# Patient Record
Sex: MALE | Race: WHITE | NOT HISPANIC OR LATINO | Employment: OTHER | URBAN - NONMETROPOLITAN AREA
[De-identification: names, ages, dates, MRNs, and addresses within clinical notes are randomized per-mention and may not be internally consistent; named-entity substitution may affect disease eponyms.]

---

## 2020-12-19 ENCOUNTER — HOSPITAL ENCOUNTER (EMERGENCY)
Facility: HOSPITAL | Age: 85
Discharge: HOME | End: 2020-12-19
Attending: EMERGENCY MEDICINE
Payer: MEDICARE

## 2020-12-19 VITALS
HEIGHT: 69 IN | WEIGHT: 205 LBS | BODY MASS INDEX: 30.36 KG/M2 | DIASTOLIC BLOOD PRESSURE: 88 MMHG | HEART RATE: 84 BPM | RESPIRATION RATE: 14 BRPM | OXYGEN SATURATION: 98 % | SYSTOLIC BLOOD PRESSURE: 155 MMHG | TEMPERATURE: 98 F

## 2020-12-19 DIAGNOSIS — S39.012A LUMBAR STRAIN, INITIAL ENCOUNTER: Primary | ICD-10-CM

## 2020-12-19 DIAGNOSIS — R52 PAIN: ICD-10-CM

## 2020-12-19 LAB
BACTERIA #/AREA URNS HPF: NEGATIVE /HPF
BILIRUB UR QL STRIP: NEGATIVE
CLARITY UR: CLEAR
COLOR UR: YELLOW
GLUCOSE UR QL STRIP: NEGATIVE
HGB UR QL STRIP: ABNORMAL
HYALINE CASTS #/AREA URNS LPF: 2 /LPF
KETONES UR QL STRIP: ABNORMAL
LEUKOCYTE ESTERASE UR QL STRIP: NEGATIVE
MICROSCOPIC COMMENT: ABNORMAL
NITRITE UR QL STRIP: NEGATIVE
PH UR STRIP: 6 [PH] (ref 5–8)
PROT UR QL STRIP: ABNORMAL
RBC #/AREA URNS HPF: 12 /HPF (ref 0–4)
SP GR UR STRIP: 1.02 (ref 1–1.03)
SQUAMOUS #/AREA URNS HPF: 1 /HPF
URN SPEC COLLECT METH UR: ABNORMAL
UROBILINOGEN UR STRIP-ACNC: 1 EU/DL
WBC #/AREA URNS HPF: 2 /HPF (ref 0–5)

## 2020-12-19 PROCEDURE — 25000003 PHARM REV CODE 250: Performed by: EMERGENCY MEDICINE

## 2020-12-19 PROCEDURE — 99284 EMERGENCY DEPT VISIT MOD MDM: CPT | Mod: 25

## 2020-12-19 PROCEDURE — 81000 URINALYSIS NONAUTO W/SCOPE: CPT

## 2020-12-19 RX ORDER — NAPROXEN 375 MG/1
375 TABLET ORAL 2 TIMES DAILY WITH MEALS
Qty: 10 TABLET | Refills: 1 | Status: SHIPPED | OUTPATIENT
Start: 2020-12-19 | End: 2021-05-26

## 2020-12-19 RX ORDER — NAPROXEN 500 MG/1
500 TABLET ORAL
Status: COMPLETED | OUTPATIENT
Start: 2020-12-19 | End: 2020-12-19

## 2020-12-19 RX ADMIN — NAPROXEN 500 MG: 500 TABLET ORAL at 10:12

## 2020-12-20 NOTE — DISCHARGE INSTRUCTIONS
Compression fracture of the L3 vertebra appears stable.  Rest, avoid heavy lifting, see your doctor as needed.

## 2020-12-20 NOTE — ED PROVIDER NOTES
Encounter Date: 12/19/2020       History     Chief Complaint   Patient presents with    Hip Pain     I think I pulled something in my lower back or hip two days ago.    Back Pain     He is generally active and vigorous, has been doing yard work but no specific heavy lifting or trauma.  No fall.  Feels that he pulled something in his left low back and hip region, painful for 2 or 3 days.  Some relief with Advil and Tylenol.  Stable chronic urinary frequency and nocturia without change.  No radicular symptoms and no weakness.  No nausea, vomiting, fever, flank pain, or other complaints.  Here with his wife.  Has chronic stable dizziness and chronic known heart murmur    The history is provided by the patient and the spouse. No  was used.     Review of patient's allergies indicates:  No Known Allergies  Past Medical History:   Diagnosis Date    Hyperchloremia      Past Surgical History:   Procedure Laterality Date    SHOULDER SURGERY       History reviewed. No pertinent family history.  Social History     Tobacco Use    Smoking status: Never Smoker   Substance Use Topics    Alcohol use: Yes    Drug use: Never     Review of Systems   Constitutional: Negative for chills and fever.   HENT: Negative for congestion, facial swelling, nosebleeds and sinus pressure.    Eyes: Negative for pain and redness.   Respiratory: Negative for chest tightness, shortness of breath and wheezing.    Cardiovascular: Negative for chest pain, palpitations and leg swelling.   Gastrointestinal: Negative for abdominal distention, abdominal pain, diarrhea, nausea and vomiting.   Endocrine: Negative for cold intolerance, polydipsia and polyphagia.   Genitourinary: Positive for frequency. Negative for difficulty urinating, dysuria and hematuria.   Musculoskeletal: Positive for arthralgias and back pain. Negative for myalgias and neck pain.        See HPI   Skin: Negative for color change and rash.   Neurological: Negative  for dizziness, weakness, numbness and headaches.   Hematological: Negative for adenopathy. Does not bruise/bleed easily.   Psychiatric/Behavioral: Negative for agitation and behavioral problems.   All other systems reviewed and are negative.      Physical Exam     Initial Vitals [12/19/20 1949]   BP Pulse Resp Temp SpO2   (!) 160/99 90 16 98 °F (36.7 °C) 95 %      MAP       --         Physical Exam    Nursing note and vitals reviewed.  Constitutional: He appears well-developed and well-nourished. He is not diaphoretic. No distress.   HENT:   Head: Normocephalic and atraumatic.   Mouth/Throat: Oropharynx is clear and moist. No oropharyngeal exudate.   Eyes: Conjunctivae and EOM are normal. Pupils are equal, round, and reactive to light. Right eye exhibits no discharge. Left eye exhibits no discharge. No scleral icterus.   Neck: Normal range of motion. Neck supple. No thyromegaly present. No tracheal deviation present. No JVD present.   Cardiovascular: Normal rate and regular rhythm. Exam reveals no gallop and no friction rub.    No murmur heard.  2-3/6 systolic murmur widely radiating   Pulmonary/Chest: Breath sounds normal. No respiratory distress. He has no wheezes. He has no rhonchi. He has no rales. He exhibits no tenderness.   Abdominal: Soft. Bowel sounds are normal. He exhibits no distension and no mass. There is no abdominal tenderness. There is no rebound and no guarding.   Musculoskeletal: Normal range of motion. No tenderness or edema.      Comments: Indicates right low lumbar and right posterior hip region generally without point tenderness, effusion, warmth, deformity, or other findings.  Gait and station are normal, range of motion is normal, negative straight leg raise.   Lymphadenopathy:     He has no cervical adenopathy.   Neurological: He is alert and oriented to person, place, and time. He has normal strength. No cranial nerve deficit.   Skin: Skin is warm and dry. No rash noted. No erythema.    Psychiatric: He has a normal mood and affect. His behavior is normal. Judgment and thought content normal.         ED Course   Procedures  Labs Reviewed   URINALYSIS, REFLEX TO URINE CULTURE - Abnormal; Notable for the following components:       Result Value    Protein, UA 2+ (*)     Ketones, UA Trace (*)     Occult Blood UA 2+ (*)     All other components within normal limits    Narrative:     Specimen Source->Urine   URINALYSIS MICROSCOPIC - Abnormal; Notable for the following components:    RBC, UA 12 (*)     Hyaline Casts, UA 2 (*)     All other components within normal limits    Narrative:     Specimen Source->Urine          Imaging Results          CT Lumbar Spine Without Contrast (In process)                X-Ray Lumbar Spine Ap And Lateral (In process)                X-Ray Hip 2 View Left (In process)               X-Rays:   Independently Interpreted Readings:   Other Readings:  Plain films hips - DJD/ NAF; plain films L-spine - age indeterminant L3 compression    CT lumbar spine shows 40% compression of the body of L3, probably subacute, with minimal associated spinal canal stenosis due to retropulsion of bony cortex.  Degenerative changes otherwise, no acute findings.      10:01 PM Stable/ no change/ counseled in detail/ stable for d/c.                                Clinical Impression:     ICD-10-CM ICD-9-CM   1. Lumbar strain, initial encounter  S39.012A 847.2   2. Pain  R52 780.96                      Disposition:   Disposition: Discharged  Condition: Stable     ED Disposition Condition    Discharge Stable        ED Prescriptions     Medication Sig Dispense Start Date End Date Auth. Provider    naproxen (EC-NAPROSYN) 375 MG TbEC EC tablet Take 1 tablet (375 mg total) by mouth 2 (two) times daily with meals. 10 tablet 12/19/2020  Rivera Bangura MD        Follow-up Information     Follow up With Specialties Details Why Contact Info Additional Information    Luis Enrique Guzman Jr., MD Internal  Medicine  As needed [address] [facility] [address]  [phone]       [facility] - Emergency Department Emergency Medicine  As needed [address] [facility] [id number] [phone] Floor 1                                       Rivera Bangura MD  12/19/20 1928

## 2020-12-21 ENCOUNTER — TELEPHONE (OUTPATIENT)
Dept: EMERGENCY MEDICINE | Facility: HOSPITAL | Age: 85
End: 2020-12-21

## 2021-01-20 ENCOUNTER — IMMUNIZATION (OUTPATIENT)
Dept: OBSTETRICS AND GYNECOLOGY | Facility: CLINIC | Age: 86
End: 2021-01-20
Payer: MEDICARE

## 2021-01-20 DIAGNOSIS — Z23 NEED FOR VACCINATION: Primary | ICD-10-CM

## 2021-01-20 PROCEDURE — 91300 COVID-19, MRNA, LNP-S, PF, 30 MCG/0.3 ML DOSE VACCINE: CPT | Mod: PBBFAC | Performed by: ANESTHESIOLOGY

## 2021-02-11 ENCOUNTER — IMMUNIZATION (OUTPATIENT)
Dept: OBSTETRICS AND GYNECOLOGY | Facility: CLINIC | Age: 86
End: 2021-02-11
Payer: MEDICARE

## 2021-02-11 DIAGNOSIS — Z23 NEED FOR VACCINATION: Primary | ICD-10-CM

## 2021-02-11 PROCEDURE — 91300 COVID-19, MRNA, LNP-S, PF, 30 MCG/0.3 ML DOSE VACCINE: CPT | Mod: PBBFAC | Performed by: ANESTHESIOLOGY

## 2021-02-11 PROCEDURE — 0002A COVID-19, MRNA, LNP-S, PF, 30 MCG/0.3 ML DOSE VACCINE: CPT | Mod: PBBFAC | Performed by: ANESTHESIOLOGY

## 2021-05-26 PROBLEM — E55.9 VITAMIN D DEFICIENCY: Status: ACTIVE | Noted: 2021-05-26

## 2021-05-26 PROBLEM — E78.5 HYPERLIPIDEMIA: Status: ACTIVE | Noted: 2021-05-26

## 2021-05-26 PROBLEM — I65.29 CAROTID ARTERY STENOSIS: Status: ACTIVE | Noted: 2021-05-26

## 2021-05-26 PROBLEM — R42 DIZZINESS: Status: ACTIVE | Noted: 2021-05-26

## 2021-05-26 PROBLEM — I10 HYPERTENSION: Status: ACTIVE | Noted: 2021-05-26

## 2021-05-26 PROBLEM — R73.01 IFG (IMPAIRED FASTING GLUCOSE): Status: ACTIVE | Noted: 2021-05-26

## 2021-05-26 PROBLEM — N40.0 BPH (BENIGN PROSTATIC HYPERPLASIA): Status: ACTIVE | Noted: 2021-05-26

## 2021-11-12 ENCOUNTER — IMMUNIZATION (OUTPATIENT)
Dept: OBSTETRICS AND GYNECOLOGY | Facility: CLINIC | Age: 86
End: 2021-11-12
Payer: MEDICARE

## 2021-11-12 DIAGNOSIS — Z23 NEED FOR VACCINATION: Primary | ICD-10-CM

## 2021-11-12 PROCEDURE — 0003A COVID-19, MRNA, LNP-S, PF, 30 MCG/0.3 ML DOSE VACCINE: CPT | Mod: PBBFAC

## 2021-11-12 PROCEDURE — 91300 COVID-19, MRNA, LNP-S, PF, 30 MCG/0.3 ML DOSE VACCINE: CPT | Mod: PBBFAC

## 2021-11-30 PROBLEM — Z00.00 WELLNESS EXAMINATION: Status: ACTIVE | Noted: 2021-11-30

## 2022-01-07 ENCOUNTER — LAB VISIT (OUTPATIENT)
Dept: LAB | Facility: HOSPITAL | Age: 87
End: 2022-01-07
Attending: INTERNAL MEDICINE
Payer: MEDICARE

## 2022-01-07 DIAGNOSIS — Z20.822 EXPOSURE TO COVID-19 VIRUS: ICD-10-CM

## 2022-01-07 PROCEDURE — U0005 INFEC AGEN DETEC AMPLI PROBE: HCPCS | Performed by: NURSE PRACTITIONER

## 2022-01-07 PROCEDURE — U0003 INFECTIOUS AGENT DETECTION BY NUCLEIC ACID (DNA OR RNA); SEVERE ACUTE RESPIRATORY SYNDROME CORONAVIRUS 2 (SARS-COV-2) (CORONAVIRUS DISEASE [COVID-19]), AMPLIFIED PROBE TECHNIQUE, MAKING USE OF HIGH THROUGHPUT TECHNOLOGIES AS DESCRIBED BY CMS-2020-01-R: HCPCS | Performed by: NURSE PRACTITIONER

## 2022-01-08 DIAGNOSIS — U07.1 COVID-19 VIRUS DETECTED: ICD-10-CM

## 2022-01-08 LAB
SARS-COV-2 RNA RESP QL NAA+PROBE: DETECTED
SARS-COV-2- CYCLE NUMBER: 21

## 2022-03-07 PROBLEM — Z00.00 WELLNESS EXAMINATION: Status: RESOLVED | Noted: 2021-11-30 | Resolved: 2022-03-07

## 2022-04-11 ENCOUNTER — HISTORICAL (OUTPATIENT)
Dept: ADMINISTRATIVE | Facility: HOSPITAL | Age: 87
End: 2022-04-11
Payer: MEDICARE

## 2022-04-27 VITALS
DIASTOLIC BLOOD PRESSURE: 84 MMHG | BODY MASS INDEX: 30.36 KG/M2 | SYSTOLIC BLOOD PRESSURE: 121 MMHG | HEIGHT: 69 IN | WEIGHT: 205 LBS

## 2022-06-20 ENCOUNTER — HOSPITAL ENCOUNTER (OUTPATIENT)
Dept: RADIOLOGY | Facility: HOSPITAL | Age: 87
Discharge: HOME | End: 2022-06-20
Attending: NURSE PRACTITIONER
Payer: MEDICARE

## 2022-06-20 DIAGNOSIS — M50.93 CERVICAL DISC DISORDER OF CERVICOTHORACIC REGION: Primary | ICD-10-CM

## 2022-06-20 DIAGNOSIS — M50.93 CERVICAL DISC DISORDER OF CERVICOTHORACIC REGION: ICD-10-CM

## 2022-06-20 PROCEDURE — 72040 X-RAY EXAM NECK SPINE 2-3 VW: CPT | Mod: TC

## 2022-10-26 DIAGNOSIS — M50.93 CERVICAL DISC DISORDER, UNSPECIFIED, CERVICOTHORACIC REGION: Primary | ICD-10-CM

## 2022-10-27 ENCOUNTER — HOSPITAL ENCOUNTER (OUTPATIENT)
Dept: RADIOLOGY | Facility: HOSPITAL | Age: 87
Discharge: HOME | End: 2022-10-27
Attending: NURSE PRACTITIONER
Payer: MEDICARE

## 2022-10-27 DIAGNOSIS — M50.93 CERVICAL DISC DISORDER, UNSPECIFIED, CERVICOTHORACIC REGION: ICD-10-CM

## 2022-10-27 PROCEDURE — 72141 MRI NECK SPINE W/O DYE: CPT | Mod: TC

## 2022-12-07 PROBLEM — Z00.00 ROUTINE GENERAL MEDICAL EXAMINATION AT A HEALTH CARE FACILITY: Status: ACTIVE | Noted: 2022-12-07

## 2022-12-29 ENCOUNTER — HOSPITAL ENCOUNTER (OUTPATIENT)
Dept: RADIOLOGY | Facility: HOSPITAL | Age: 87
Discharge: HOME | End: 2022-12-29
Attending: FAMILY MEDICINE
Payer: MEDICARE

## 2022-12-29 DIAGNOSIS — J06.9 UPPER RESPIRATORY TRACT INFECTION, UNSPECIFIED TYPE: ICD-10-CM

## 2022-12-29 PROCEDURE — 71046 X-RAY EXAM CHEST 2 VIEWS: CPT | Mod: TC

## 2023-03-13 PROBLEM — Z00.00 ROUTINE GENERAL MEDICAL EXAMINATION AT A HEALTH CARE FACILITY: Status: RESOLVED | Noted: 2022-12-07 | Resolved: 2023-03-13

## 2023-05-30 ENCOUNTER — HOSPITAL ENCOUNTER (OUTPATIENT)
Dept: PULMONOLOGY | Facility: HOSPITAL | Age: 88
Discharge: HOME | End: 2023-05-30
Attending: INTERNAL MEDICINE
Payer: MEDICARE

## 2023-05-30 DIAGNOSIS — I50.9 CHF (CONGESTIVE HEART FAILURE): ICD-10-CM

## 2023-05-30 DIAGNOSIS — I50.9 CHF (CONGESTIVE HEART FAILURE): Primary | ICD-10-CM

## 2023-05-30 PROCEDURE — 94727 GAS DIL/WSHOT DETER LNG VOL: CPT

## 2023-05-30 PROCEDURE — 94729 DIFFUSING CAPACITY: CPT

## 2023-05-30 PROCEDURE — 94375 RESPIRATORY FLOW VOLUME LOOP: CPT

## 2023-06-15 PROBLEM — Z20.822 EXPOSURE TO COVID-19 VIRUS: Status: RESOLVED | Noted: 2022-01-07 | Resolved: 2023-06-15

## 2023-06-15 PROBLEM — J06.9 URI (UPPER RESPIRATORY INFECTION): Status: RESOLVED | Noted: 2022-12-29 | Resolved: 2023-06-15

## 2023-06-15 PROBLEM — I38 VALVULAR HEART DISEASE: Status: ACTIVE | Noted: 2023-06-15

## 2023-08-25 PROBLEM — I73.9 PAD (PERIPHERAL ARTERY DISEASE): Status: ACTIVE | Noted: 2023-08-25

## 2023-08-25 PROBLEM — I77.1 STENOSIS OF LEFT SUBCLAVIAN ARTERY: Status: ACTIVE | Noted: 2023-08-25

## 2023-10-03 PROBLEM — I50.9: Status: ACTIVE | Noted: 2023-10-03

## 2023-10-03 PROBLEM — I35.0 NONRHEUMATIC AORTIC VALVE STENOSIS: Status: ACTIVE | Noted: 2023-10-03

## 2023-10-03 PROBLEM — I50.32 CHRONIC DIASTOLIC HEART FAILURE, NYHA CLASS 1: Status: ACTIVE | Noted: 2023-10-03

## 2023-10-04 PROBLEM — Z95.3 S/P TAVR (TRANSCATHETER AORTIC VALVE REPLACEMENT): Status: ACTIVE | Noted: 2023-10-04

## 2023-10-04 PROBLEM — Z95.2 S/P TAVR (TRANSCATHETER AORTIC VALVE REPLACEMENT): Status: ACTIVE | Noted: 2023-10-04

## 2023-10-05 PROBLEM — I35.0 NONRHEUMATIC AORTIC VALVE STENOSIS: Status: RESOLVED | Noted: 2023-10-03 | Resolved: 2023-10-05

## 2023-10-05 PROBLEM — I50.32 CHRONIC DIASTOLIC HEART FAILURE, NYHA CLASS 1: Status: RESOLVED | Noted: 2023-10-03 | Resolved: 2023-10-05

## 2023-10-06 ENCOUNTER — HOSPITAL ENCOUNTER (EMERGENCY)
Facility: HOSPITAL | Age: 88
Discharge: ANOTHER HOSPITAL | End: 2023-10-06
Attending: EMERGENCY MEDICINE
Payer: MEDICARE

## 2023-10-06 VITALS
SYSTOLIC BLOOD PRESSURE: 134 MMHG | WEIGHT: 202 LBS | RESPIRATION RATE: 18 BRPM | OXYGEN SATURATION: 96 % | TEMPERATURE: 98 F | HEIGHT: 69 IN | BODY MASS INDEX: 29.92 KG/M2 | DIASTOLIC BLOOD PRESSURE: 60 MMHG | HEART RATE: 72 BPM

## 2023-10-06 DIAGNOSIS — Z13.9 ENCOUNTER FOR MEDICAL SCREENING EXAMINATION: ICD-10-CM

## 2023-10-06 DIAGNOSIS — I44.0 1ST DEGREE AV BLOCK: Primary | ICD-10-CM

## 2023-10-06 DIAGNOSIS — Z95.2 S/P TAVR (TRANSCATHETER AORTIC VALVE REPLACEMENT): ICD-10-CM

## 2023-10-06 PROBLEM — I44.2 CHB (COMPLETE HEART BLOCK): Status: ACTIVE | Noted: 2023-10-06

## 2023-10-06 LAB
ANION GAP SERPL CALC-SCNC: 3 MMOL/L (ref 3–11)
BASOPHILS # BLD AUTO: 0.02 K/UL (ref 0–0.2)
BASOPHILS NFR BLD: 0.3 % (ref 0–1.9)
BUN SERPL-MCNC: 17 MG/DL (ref 10–30)
CALCIUM SERPL-MCNC: 8.2 MG/DL (ref 8.7–10.5)
CHLORIDE SERPL-SCNC: 107 MMOL/L (ref 95–110)
CO2 SERPL-SCNC: 29 MMOL/L (ref 23–29)
CREAT SERPL-MCNC: 1.2 MG/DL (ref 0.5–1.4)
DIFFERENTIAL METHOD: ABNORMAL
EOSINOPHIL # BLD AUTO: 0.1 K/UL (ref 0–0.5)
EOSINOPHIL NFR BLD: 1.8 % (ref 0–8)
ERYTHROCYTE [DISTWIDTH] IN BLOOD BY AUTOMATED COUNT: 12.8 % (ref 11.5–14.5)
EST. GFR  (NO RACE VARIABLE): 56.4 ML/MIN/1.73 M^2
GLUCOSE SERPL-MCNC: 120 MG/DL (ref 70–110)
HCT VFR BLD AUTO: 39.8 % (ref 40–54)
HGB BLD-MCNC: 13.3 G/DL (ref 14–18)
IMM GRANULOCYTES # BLD AUTO: 0.02 K/UL (ref 0–0.04)
IMM GRANULOCYTES NFR BLD AUTO: 0.3 % (ref 0–0.5)
LYMPHOCYTES # BLD AUTO: 0.5 K/UL (ref 1–4.8)
LYMPHOCYTES NFR BLD: 7.8 % (ref 18–48)
MCH RBC QN AUTO: 32.8 PG (ref 27–31)
MCHC RBC AUTO-ENTMCNC: 33.4 G/DL (ref 32–36)
MCV RBC AUTO: 98 FL (ref 82–98)
MONOCYTES # BLD AUTO: 0.5 K/UL (ref 0.3–1)
MONOCYTES NFR BLD: 6.9 % (ref 4–15)
NEUTROPHILS # BLD AUTO: 5.4 K/UL (ref 1.8–7.7)
NEUTROPHILS NFR BLD: 82.9 % (ref 38–73)
NRBC BLD-RTO: 0 /100 WBC
PLATELET # BLD AUTO: 120 K/UL (ref 150–450)
PMV BLD AUTO: 10.1 FL (ref 9.2–12.9)
POTASSIUM SERPL-SCNC: 4 MMOL/L (ref 3.5–5.1)
RBC # BLD AUTO: 4.06 M/UL (ref 4.6–6.2)
SODIUM SERPL-SCNC: 139 MMOL/L (ref 136–145)
WBC # BLD AUTO: 6.54 K/UL (ref 3.9–12.7)

## 2023-10-06 PROCEDURE — 99285 EMERGENCY DEPT VISIT HI MDM: CPT

## 2023-10-06 PROCEDURE — 80048 BASIC METABOLIC PNL TOTAL CA: CPT | Performed by: EMERGENCY MEDICINE

## 2023-10-06 PROCEDURE — 36415 COLL VENOUS BLD VENIPUNCTURE: CPT | Performed by: EMERGENCY MEDICINE

## 2023-10-06 PROCEDURE — 85025 COMPLETE CBC W/AUTO DIFF WBC: CPT | Performed by: EMERGENCY MEDICINE

## 2023-10-06 NOTE — ED NOTES
[facility] reporting 3rd degree AV block captured on telemetry monitor - unable to fax rhythm strip per staff.

## 2023-10-06 NOTE — ED NOTES
Spoke to staff at [facility] who states patient was placed on a halter monitor and went into a 3rd degree block in the night and patient was called and told to go to the ER. Extension given to speak with tech but tech unavailable Dr. Garza would like to know how long patient was in block

## 2023-10-06 NOTE — ED PROVIDER NOTES
"EMERGENCY DEPARTMENT HISTORY AND PHYSICAL EXAM     This note is dictated on M*Modal word recognition program.  There are word recognition mistakes and grammatical errors that are occasionally missed on review.     Date: 10/6/2023   Patient Name: Tobi Liz Jr.       History of Presenting Illness      Chief Complaint   Patient presents with    Monitor check     Has heart monitor in place, post valve replacement on Wed at [facility], was called this morning and told to come to ER to have "monitor checked."  Pt denies chest pain, irregular heart beat, shortness of breath.        1300   Tobi Liz Jr. is a 89 y.o. male with PMHX of hypertension, hyperlipidemia, PID, congestive heart failure, status post TAVR POD#3 who presents to the emergency department C/O possible arrhythmia.    Patient was discharged from [facility] yesterday.  States he has been feeling well and denies complaints.  Patient is wearing a remote cardiac monitor and they were called today by [facility] and instructed to go to nearest hospital based on abnormal rhythm      PCP: Luis Enrique Guzman Jr., MD        No current facility-administered medications for this encounter.     Current Outpatient Medications   Medication Sig Dispense Refill    amLODIPine (NORVASC) 5 MG tablet Take 1 tablet (5 mg total) by mouth once daily. 30 tablet 11    aspirin (ECOTRIN) 81 MG EC tablet Take 81 mg by mouth once daily.      atorvastatin (LIPITOR) 80 MG tablet Take 1 tablet by mouth once daily. Prescribed by Dr. Gamble      finasteride (PROSCAR) 5 mg tablet Take 5 mg by mouth once daily. Prescribed by Urologist      tamsulosin (FLOMAX) 0.4 mg Cap Take 1 capsule by mouth once daily. Prescribed by Dr. Navarrete             Past History     Past Medical History:   Past Medical History:   Diagnosis Date    Aortic valve stenosis     Arthritis     BPH (benign prostatic hyperplasia)     Carotid artery stenosis     Chronic diastolic heart failure  "    ETOH abuse     Exposure to COVID-19 virus 01/07/2022    Hyperchloremia     Kidney stones     Murmur     Polymyalgia rheumatica     PVD (peripheral vascular disease)     Renal artery stenosis         Past Surgical History:   Past Surgical History:   Procedure Laterality Date    ANGIOGRAM, CAROTID Left 8/25/2023    Procedure: ANGIOGRAM, CAROTID;  Surgeon: Nick Box MD;  Location: [facility] CATH;  Service: Cardiology;  Laterality: Left;    CATARACT EXTRACTION, BILATERAL      ECHOCARDIOGRAM,TRANSESOPHAGEAL N/A 10/3/2023    Procedure: Transesophageal echo (JOSE ARMANDO) intra-procedure log documentation;  Surgeon: Nick Box MD;  Location: [facility] OR;  Service: Cardiology;  Laterality: N/A;    HAND SURGERY      PERCUTANEOUS TRANSCATHETER AORTIC VALVE REPLACEMENT (TAVR) Left 10/3/2023    Procedure: REPLACEMENT, AORTIC VALVE, PERCUTANEOUS, TRANSCATHETER;  Surgeon: Nick Box MD;  Location: [facility] OR;  Service: Cardiology;  Laterality: Left;    PERCUTANEOUS TRANSCATHETER AORTIC VALVE REPLACEMENT (TAVR) Left 10/3/2023    Procedure: REPLACEMENT, AORTIC VALVE, PERCUTANEOUS, TRANSCATHETER carotid access;  Surgeon: Jun Brito MD;  Location: [facility] OR;  Service: Cardiovascular;  Laterality: Left;    PERCUTANEOUS TRANSLUMINAL ANGIOPLASTY N/A 8/25/2023    Procedure: ANGIOPLASTY-PERCUTANEOUS TRANSLUMINAL (PTA);  Surgeon: Nick Box MD;  Location: [facility] CATH;  Service: Cardiology;  Laterality: N/A;    SHOULDER SURGERY Left     total shoulder replacement    WOUND EXPLORATION N/A 10/3/2023    Procedure: EXPLORATION, WOUND;  Surgeon: Jun Brito MD;  Location: [facility] OR;  Service: Cardiology;  Laterality: N/A;        Family History:   Family History   Problem Relation Age of Onset    Cancer Mother     Stroke Father         Social History:   Social History     Tobacco Use    Smoking status: Former     Types: Cigars    Smokeless tobacco: Never   Substance Use Topics    Alcohol use: Yes     Alcohol/week: 3.0 standard  "drinks of alcohol     Types: 3 Shots of liquor per week     Comment: 3 martinis daily    Drug use: Never        Allergies:   Review of patient's allergies indicates:  No Known Allergies       Review of Systems   Review of Systems   See HPI for pertinent positives and negatives       Physical Exam     Vitals:    10/06/23 1203 10/06/23 1208   BP: (!) 161/69    Pulse: 73    Resp: 17    Temp:  98.2 °F (36.8 °C)   TempSrc:  Oral   SpO2: (!) 93%    Weight: 91.6 kg (202 lb)    Height: 5' 9" (1.753 m)       Physical Exam  Vitals and nursing note reviewed.   Constitutional:       General: He is not in acute distress.     Appearance: Normal appearance. He is well-developed. He is not ill-appearing.   HENT:      Head: Normocephalic and atraumatic.   Eyes:      Extraocular Movements: Extraocular movements intact.      Conjunctiva/sclera: Conjunctivae normal.   Neck:      Comments: Ecchymosis to left side of neck  Cardiovascular:      Rate and Rhythm: Normal rate and regular rhythm.      Comments: Monitor on chest (bodyguardian mini plus)  Pulmonary:      Effort: Pulmonary effort is normal. No respiratory distress.   Musculoskeletal:         General: No deformity or signs of injury. Normal range of motion.      Cervical back: Normal range of motion. No rigidity.   Skin:     General: Skin is warm and dry.      Coloration: Skin is not pale.      Findings: No rash.   Neurological:      General: No focal deficit present.      Mental Status: He is alert and oriented to person, place, and time.      Cranial Nerves: No cranial nerve deficit.      Motor: No weakness.      Coordination: Coordination normal.              Diagnostic Study Results      Labs -   Recent Results (from the past 12 hour(s))   CBC Auto Differential    Collection Time: 10/06/23 12:36 PM   Result Value Ref Range    WBC 6.54 3.90 - 12.70 K/uL    RBC 4.06 (L) 4.60 - 6.20 M/uL    Hemoglobin 13.3 (L) 14.0 - 18.0 g/dL    Hematocrit 39.8 (L) 40.0 - 54.0 %    MCV 98 82 - " 98 fL    MCH 32.8 (H) 27.0 - 31.0 pg    MCHC 33.4 32.0 - 36.0 g/dL    RDW 12.8 11.5 - 14.5 %    Platelets 120 (L) 150 - 450 K/uL    MPV 10.1 9.2 - 12.9 fL    Immature Granulocytes 0.3 0.0 - 0.5 %    Gran # (ANC) 5.4 1.8 - 7.7 K/uL    Immature Grans (Abs) 0.02 0.00 - 0.04 K/uL    Lymph # 0.5 (L) 1.0 - 4.8 K/uL    Mono # 0.5 0.3 - 1.0 K/uL    Eos # 0.1 0.0 - 0.5 K/uL    Baso # 0.02 0.00 - 0.20 K/uL    nRBC 0 0 /100 WBC    Gran % 82.9 (H) 38.0 - 73.0 %    Lymph % 7.8 (L) 18.0 - 48.0 %    Mono % 6.9 4.0 - 15.0 %    Eosinophil % 1.8 0.0 - 8.0 %    Basophil % 0.3 0.0 - 1.9 %    Differential Method Automated    Basic Metabolic Panel    Collection Time: 10/06/23 12:36 PM   Result Value Ref Range    Sodium 139 136 - 145 mmol/L    Potassium 4.0 3.5 - 5.1 mmol/L    Chloride 107 95 - 110 mmol/L    CO2 29 23 - 29 mmol/L    Glucose 120 (H) 70 - 110 mg/dL    BUN 17 10 - 30 mg/dL    Creatinine 1.2 0.5 - 1.4 mg/dL    Calcium 8.2 (L) 8.7 - 10.5 mg/dL    Anion Gap 3 3 - 11 mmol/L    eGFR 56.4 (A) >60 mL/min/1.73 m^2        Radiologic Studies -    No orders to display        Medications given in the ED-   Medications - No data to display        Medical Decision Making    I am the first provider for this patient.     I reviewed the vital signs, available nursing notes, past medical history, past surgical history, family history and social history.     Vital Signs:  Reviewed the patient's vital signs.     Pulse Oximetry Analysis and Interpretation:    93% on Room Air, low normal    Cardiac Monitor:   Interpreted by Dr. Nabil Garza at 1324    Rate: 74 bpm   Rhythm: Sinus     EKG Interpretation: (Per my independent interpretation, pending formal read)   Interpreted by Nabil Garza MD at 1240   Sinus rhythm rate of 73 first-degree AV block, [place] interval 264, left bundle branch block pattern unchanged from prior study yesterday      External Test Results (Pertinent to encounter):    Records Reviewed: Nursing Notes, Current  Prescription Medications, Old Medical Records, External Medical Records , Previous EKG, and Previous Radiology Studies    History Obtained By: Patient    Provider Notes: Tobi Liz Jr. is a 89 y.o. male with cardiac arrhythmia    Co-morbidities Considered: recent TAVR    Differential Diagnosis:  Dysrhythmia      ED Course:    Charge nurse spoke with [facility] in [place] and they report the patient had a 3.9s run of 3rd degree heart block around 10AM.    1:30 PM  Spoke with Dr. Box, patients cardiologist.  He reports that as patient had run of third-degree heart block he will require a pacemaker as there is high likelihood he will re-enter third-degree heart block.  He has accepted patient to transfer to home.    I updated patient and his wife of plan.    ED Course as of 10/06/23 1603   Fri Oct 06, 2023   1349 WBC: 6.54 [MO]   1349 Hemoglobin(!): 13.3 [MO]   1349 Sodium: 139 [MO]   1349 Potassium: 4.0 [MO]   1349 Creatinine: 1.2 [MO]      ED Course User Index  [MO] Nabil Garza MD       Problems Addressed:  Heart block    Procedures:   Procedures       Diagnosis and Disposition          CLINICAL IMPRESSION:         1. 1st degree AV block    2. Encounter for medical screening examination    3. S/P TAVR (transcatheter aortic valve replacement)              PLAN:   1. Transfer  2.      Medication List        ASK your doctor about these medications      amLODIPine 5 MG tablet  Commonly known as: NORVASC  Take 1 tablet (5 mg total) by mouth once daily.     aspirin 81 MG EC tablet  Commonly known as: ECOTRIN     atorvastatin 80 MG tablet  Commonly known as: LIPITOR     finasteride 5 mg tablet  Commonly known as: PROSCAR     tamsulosin 0.4 mg Cap  Commonly known as: FLOMAX             3. Luis Enrique Guzman Jr., MD  [address] [place] [address]  [phone]    Schedule an appointment as soon as possible for a visit   Primary care follow up    Nick Box MD  [address] [place] [address]  [id number]  [phone]    Go to   As Scheduled       _______________________________     Please note that this dictation was completed with M*"[employer], [employer] [employer]", the computer voice recognition software.  Quite often unanticipated grammatical, syntax, homophones, and other interpretive errors are inadvertently transcribed by the computer software.  Please disregard these errors.  Please excuse any errors that have escaped final proofreading.             Nabil Garza MD  10/06/23 8895

## 2024-02-26 ENCOUNTER — HOSPITAL ENCOUNTER (EMERGENCY)
Facility: HOSPITAL | Age: 89
Discharge: HOME | End: 2024-02-26
Attending: EMERGENCY MEDICINE
Payer: MEDICARE

## 2024-02-26 VITALS
RESPIRATION RATE: 18 BRPM | TEMPERATURE: 98 F | HEIGHT: 69 IN | SYSTOLIC BLOOD PRESSURE: 144 MMHG | DIASTOLIC BLOOD PRESSURE: 65 MMHG | HEART RATE: 75 BPM | WEIGHT: 200 LBS | OXYGEN SATURATION: 95 % | BODY MASS INDEX: 29.62 KG/M2

## 2024-02-26 DIAGNOSIS — M94.0 COSTOCHONDRITIS: Primary | ICD-10-CM

## 2024-02-26 DIAGNOSIS — R07.9 CHEST PAIN: ICD-10-CM

## 2024-02-26 LAB
ALBUMIN SERPL BCP-MCNC: 3.4 G/DL (ref 3.5–5.2)
ALP SERPL-CCNC: 33 U/L (ref 55–135)
ALT SERPL W/O P-5'-P-CCNC: 17 U/L (ref 10–44)
ANION GAP SERPL CALC-SCNC: 5 MMOL/L (ref 3–11)
AST SERPL-CCNC: 11 U/L (ref 10–40)
BASOPHILS # BLD AUTO: 0.01 K/UL (ref 0–0.2)
BASOPHILS NFR BLD: 0.1 % (ref 0–1.9)
BILIRUB SERPL-MCNC: 0.7 MG/DL (ref 0.1–1)
BUN SERPL-MCNC: 17 MG/DL (ref 10–30)
CALCIUM SERPL-MCNC: 9 MG/DL (ref 8.7–10.5)
CHLORIDE SERPL-SCNC: 107 MMOL/L (ref 95–110)
CO2 SERPL-SCNC: 27 MMOL/L (ref 23–29)
CREAT SERPL-MCNC: 1.2 MG/DL (ref 0.5–1.4)
DIFFERENTIAL METHOD BLD: ABNORMAL
EOSINOPHIL # BLD AUTO: 0 K/UL (ref 0–0.5)
EOSINOPHIL NFR BLD: 0.5 % (ref 0–8)
ERYTHROCYTE [DISTWIDTH] IN BLOOD BY AUTOMATED COUNT: 12.4 % (ref 11.5–14.5)
EST. GFR  (NO RACE VARIABLE): 56 ML/MIN/1.73 M^2
GLUCOSE SERPL-MCNC: 146 MG/DL (ref 70–110)
HCT VFR BLD AUTO: 42.9 % (ref 40–54)
HGB BLD-MCNC: 14.8 G/DL (ref 14–18)
IMM GRANULOCYTES # BLD AUTO: 0.02 K/UL (ref 0–0.04)
IMM GRANULOCYTES NFR BLD AUTO: 0.2 % (ref 0–0.5)
LYMPHOCYTES # BLD AUTO: 0.4 K/UL (ref 1–4.8)
LYMPHOCYTES NFR BLD: 4.5 % (ref 18–48)
MCH RBC QN AUTO: 33.2 PG (ref 27–31)
MCHC RBC AUTO-ENTMCNC: 34.5 G/DL (ref 32–36)
MCV RBC AUTO: 96 FL (ref 82–98)
MONOCYTES # BLD AUTO: 0.6 K/UL (ref 0.3–1)
MONOCYTES NFR BLD: 6.6 % (ref 4–15)
NEUTROPHILS # BLD AUTO: 7.6 K/UL (ref 1.8–7.7)
NEUTROPHILS NFR BLD: 88.1 % (ref 38–73)
NRBC BLD-RTO: 0 /100 WBC
NT-PROBNP SERPL-MCNC: 200 PG/ML (ref 5–1800)
PLATELET # BLD AUTO: 158 K/UL (ref 150–450)
PMV BLD AUTO: 9.5 FL (ref 9.2–12.9)
POTASSIUM SERPL-SCNC: 4.1 MMOL/L (ref 3.5–5.1)
PROT SERPL-MCNC: 7.4 G/DL (ref 6–8.4)
RBC # BLD AUTO: 4.46 M/UL (ref 4.6–6.2)
SODIUM SERPL-SCNC: 139 MMOL/L (ref 136–145)
TROPONIN I SERPL DL<=0.01 NG/ML-MCNC: 10.8 PG/ML (ref 0–60)
WBC # BLD AUTO: 8.59 K/UL (ref 3.9–12.7)

## 2024-02-26 PROCEDURE — 85025 COMPLETE CBC W/AUTO DIFF WBC: CPT | Performed by: CLINICAL NURSE SPECIALIST

## 2024-02-26 PROCEDURE — 80053 COMPREHEN METABOLIC PANEL: CPT | Performed by: CLINICAL NURSE SPECIALIST

## 2024-02-26 PROCEDURE — 36415 COLL VENOUS BLD VENIPUNCTURE: CPT | Performed by: CLINICAL NURSE SPECIALIST

## 2024-02-26 PROCEDURE — 84484 ASSAY OF TROPONIN QUANT: CPT | Performed by: CLINICAL NURSE SPECIALIST

## 2024-02-26 PROCEDURE — 99285 EMERGENCY DEPT VISIT HI MDM: CPT | Mod: 25

## 2024-02-26 PROCEDURE — 93005 ELECTROCARDIOGRAM TRACING: CPT

## 2024-02-26 PROCEDURE — 83880 ASSAY OF NATRIURETIC PEPTIDE: CPT | Performed by: CLINICAL NURSE SPECIALIST

## 2024-02-26 PROCEDURE — 93010 ELECTROCARDIOGRAM REPORT: CPT | Mod: ,,, | Performed by: INTERNAL MEDICINE

## 2024-02-26 RX ORDER — METHOCARBAMOL 500 MG/1
1000 TABLET, FILM COATED ORAL 3 TIMES DAILY
Qty: 30 TABLET | Refills: 0 | Status: SHIPPED | OUTPATIENT
Start: 2024-02-26 | End: 2024-03-02

## 2024-02-26 RX ORDER — KETOROLAC TROMETHAMINE 10 MG/1
10 TABLET, FILM COATED ORAL EVERY 6 HOURS
Qty: 20 TABLET | Refills: 0 | Status: SHIPPED | OUTPATIENT
Start: 2024-02-26 | End: 2024-03-02

## 2024-02-26 NOTE — ED PROVIDER NOTES
Encounter Date: 2/26/2024       History     Chief Complaint   Patient presents with    Chest Pain     Pt referred to ED from [facility] due to complaints of upper chest discomfort worsened with movement / deep inspiration. Denied URI symptoms.      34-year-old male presents to the emergency room with upper chest discomfort which is worse with movement and taking a deep breath.  Denies any URI symptoms.  Patient went to [facility] and was if sent to the emergency room for evaluation.  Patient has aortic valve stenosis, PVD        Review of patient's allergies indicates:  No Known Allergies  Past Medical History:   Diagnosis Date    Aortic valve stenosis     Arthritis     BPH (benign prostatic hyperplasia)     Carotid artery stenosis     Chronic diastolic heart failure     ETOH abuse     Exposure to COVID-19 virus 01/07/2022    Hyperchloremia     Kidney stones     Murmur     Polymyalgia rheumatica     PVD (peripheral vascular disease)     Renal artery stenosis      Past Surgical History:   Procedure Laterality Date    A-V CARDIAC PACEMAKER INSERTION N/A 10/6/2023    Procedure: INSERTION, CARDIAC PACEMAKER, DUAL CHAMBER;  Surgeon: Douglas Salguero MD;  Location: [facility] CATH;  Service: Cardiology;  Laterality: N/A;    ANGIOGRAM, CAROTID Left 8/25/2023    Procedure: ANGIOGRAM, CAROTID;  Surgeon: Nick Box MD;  Location: [facility] CATH;  Service: Cardiology;  Laterality: Left;    CATARACT EXTRACTION, BILATERAL      ECHOCARDIOGRAM,TRANSESOPHAGEAL N/A 10/3/2023    Procedure: Transesophageal echo (JOSE ARMANDO) intra-procedure log documentation;  Surgeon: Nick Box MD;  Location: [facility] OR;  Service: Cardiology;  Laterality: N/A;    HAND SURGERY      PERCUTANEOUS TRANSCATHETER AORTIC VALVE REPLACEMENT (TAVR) Left 10/3/2023    Procedure: REPLACEMENT, AORTIC VALVE, PERCUTANEOUS, TRANSCATHETER;  Surgeon: Nick Box MD;  Location: [facility] OR;  Service: Cardiology;  Laterality: Left;    PERCUTANEOUS TRANSCATHETER AORTIC VALVE REPLACEMENT (TAVR) Left  10/3/2023    Procedure: REPLACEMENT, AORTIC VALVE, PERCUTANEOUS, TRANSCATHETER carotid access;  Surgeon: Jun Brito MD;  [facility];  Service: Cardiovascular;  Laterality: Left;    PERCUTANEOUS TRANSLUMINAL ANGIOPLASTY N/A 8/25/2023    Procedure: ANGIOPLASTY-PERCUTANEOUS TRANSLUMINAL (PTA);  Surgeon: Nick Box MD;  [facility];  Service: Cardiology;  Laterality: N/A;    SHOULDER SURGERY Left     total shoulder replacement    WOUND EXPLORATION N/A 10/3/2023    Procedure: EXPLORATION, WOUND;  Surgeon: Jun Brito MD;  [facility];  Service: Cardiology;  Laterality: N/A;     Family History   Problem Relation Age of Onset    Cancer Mother     Stroke Father      Social History     Tobacco Use    Smoking status: Former     Types: Cigars    Smokeless tobacco: Never   Substance Use Topics    Alcohol use: Yes     Alcohol/week: 3.0 standard drinks of alcohol     Types: 3 Shots of liquor per week     Comment: 3 martinis daily    Drug use: Never     Review of Systems   Constitutional:  Negative for fever.   HENT:  Negative for sore throat.    Respiratory:  Positive for chest tightness and shortness of breath.    Cardiovascular:  Positive for chest pain.   Gastrointestinal:  Negative for nausea.   Genitourinary:  Negative for dysuria.   Musculoskeletal:  Negative for back pain.   Skin:  Negative for rash.   Neurological:  Negative for weakness.   Hematological:  Does not bruise/bleed easily.   All other systems reviewed and are negative.      Physical Exam     Initial Vitals [02/26/24 1440]   BP Pulse Resp Temp SpO2   (!) 143/73 89 18 98 °F (36.7 °C) 96 %      MAP       --         Physical Exam    Nursing note and vitals reviewed.  Constitutional: He appears well-developed and well-nourished.   HENT:   Head: Normocephalic and atraumatic.   Eyes: Pupils are equal, round, and reactive to light.   Neck:   Normal range of motion.  Cardiovascular:  Normal rate and regular rhythm.            Pulmonary/Chest: Breath sounds normal.   Abdominal: Abdomen is soft. Bowel sounds are normal.   Musculoskeletal:         General: Normal range of motion.      Cervical back: Normal range of motion.     Neurological: He is alert and oriented to person, place, and time.   Psychiatric: He has a normal mood and affect.         ED Course   Procedures  Labs Reviewed   CBC W/ AUTO DIFFERENTIAL - Abnormal; Notable for the following components:       Result Value    RBC 4.46 (*)     MCH 33.2 (*)     Lymph # 0.4 (*)     Gran % 88.1 (*)     Lymph % 4.5 (*)     All other components within normal limits   COMPREHENSIVE METABOLIC PANEL - Abnormal; Notable for the following components:    Glucose 146 (*)     Albumin 3.4 (*)     Alkaline Phosphatase 33 (*)     eGFR 56.0 (*)     All other components within normal limits   NT-PRO NATRIURETIC PEPTIDE   TROPONIN I HIGH SENSITIVITY          Imaging Results              X-Ray Chest AP Portable (Final result)  Result time 02/26/24 16:16:33      Final result by Yasmine Flores MD (02/26/24 16:16:33)                   Impression:      No acute findings      Electronically signed by: Yasmine Flores MD  Date:    02/26/2024  Time:    16:16               Narrative:    EXAMINATION:  XR CHEST AP PORTABLE    CLINICAL HISTORY:  Chest pain, unspecified    COMPARISON:  10/06/2023    FINDINGS:  Clear lungs.  No pleural fluid or pneumothorax detected.    Enlarged cardiac silhouette.  Prior TAVR.  Left pacemaker.  Left shoulder prosthesis.                                       Medications - No data to display  Medical Decision Making  Amount and/or Complexity of Data Reviewed  Labs: ordered.  Radiology: ordered.    Risk  Prescription drug management.                                      Clinical Impression:  Final diagnoses:  [R07.9] Chest pain  [M94.0] Costochondritis (Primary)          ED Disposition Condition    Discharge Stable          ED Prescriptions       Medication Sig Dispense Start  Date End Date Auth. Provider    methocarbamoL (ROBAXIN) 500 MG Tab Take 2 tablets (1,000 mg total) by mouth 3 (three) times daily. for 5 days 30 tablet 2/26/2024 3/2/2024 Yvonne Santana NP    ketorolac (TORADOL) 10 mg tablet Take 1 tablet (10 mg total) by mouth every 6 (six) hours. for 5 days 20 tablet 2/26/2024 3/2/2024 Yvonne Santana NP          Follow-up Information       Follow up With Specialties Details Why Contact Info    Luis Enrique Guzman Jr., MD Internal Medicine  As needed [address] [address]  [phone]               Yvonne Santana NP  02/26/24 8106

## 2024-02-27 ENCOUNTER — PATIENT OUTREACH (OUTPATIENT)
Dept: EMERGENCY MEDICINE | Facility: HOSPITAL | Age: 89
End: 2024-02-27
Payer: MEDICARE

## 2024-02-27 LAB
OHS QRS DURATION: 148 MS
OHS QTC CALCULATION: 498 MS

## 2024-02-27 NOTE — PROGRESS NOTES
Patient declined assistance making a follow-up appointment with his PCP at this time.       Dana Rainey  ED Navigator  [phone]

## 2024-03-13 ENCOUNTER — HOSPITAL ENCOUNTER (EMERGENCY)
Facility: HOSPITAL | Age: 89
Discharge: HOME | End: 2024-03-13
Attending: INTERNAL MEDICINE
Payer: MEDICARE

## 2024-03-13 VITALS
TEMPERATURE: 98 F | DIASTOLIC BLOOD PRESSURE: 75 MMHG | RESPIRATION RATE: 26 BRPM | WEIGHT: 200 LBS | HEART RATE: 115 BPM | BODY MASS INDEX: 29.62 KG/M2 | HEIGHT: 69 IN | OXYGEN SATURATION: 96 % | SYSTOLIC BLOOD PRESSURE: 131 MMHG

## 2024-03-13 DIAGNOSIS — R73.9 HYPERGLYCEMIA: ICD-10-CM

## 2024-03-13 DIAGNOSIS — R42 DIZZINESS: Primary | ICD-10-CM

## 2024-03-13 LAB
ALBUMIN SERPL BCP-MCNC: 3.2 G/DL (ref 3.5–5.2)
ALP SERPL-CCNC: 43 U/L (ref 55–135)
ALT SERPL W/O P-5'-P-CCNC: 40 U/L (ref 10–44)
ANION GAP SERPL CALC-SCNC: 7 MMOL/L (ref 3–11)
AST SERPL-CCNC: 16 U/L (ref 10–40)
BASOPHILS # BLD AUTO: 0.02 K/UL (ref 0–0.2)
BASOPHILS NFR BLD: 0.2 % (ref 0–1.9)
BILIRUB SERPL-MCNC: 1.1 MG/DL (ref 0.1–1)
BUN SERPL-MCNC: 28 MG/DL (ref 10–30)
CALCIUM SERPL-MCNC: 9.3 MG/DL (ref 8.7–10.5)
CHLORIDE SERPL-SCNC: 102 MMOL/L (ref 95–110)
CO2 SERPL-SCNC: 26 MMOL/L (ref 23–29)
CREAT SERPL-MCNC: 1.3 MG/DL (ref 0.5–1.4)
DIFFERENTIAL METHOD BLD: ABNORMAL
EOSINOPHIL # BLD AUTO: 0 K/UL (ref 0–0.5)
EOSINOPHIL NFR BLD: 0.3 % (ref 0–8)
ERYTHROCYTE [DISTWIDTH] IN BLOOD BY AUTOMATED COUNT: 12.5 % (ref 11.5–14.5)
EST. GFR  (NO RACE VARIABLE): 50.9 ML/MIN/1.73 M^2
GLUCOSE SERPL-MCNC: 222 MG/DL (ref 70–110)
HCT VFR BLD AUTO: 40.4 % (ref 40–54)
HGB BLD-MCNC: 13.5 G/DL (ref 14–18)
IMM GRANULOCYTES # BLD AUTO: 0.06 K/UL (ref 0–0.04)
IMM GRANULOCYTES NFR BLD AUTO: 0.5 % (ref 0–0.5)
LYMPHOCYTES # BLD AUTO: 0.5 K/UL (ref 1–4.8)
LYMPHOCYTES NFR BLD: 4.2 % (ref 18–48)
MCH RBC QN AUTO: 32.1 PG (ref 27–31)
MCHC RBC AUTO-ENTMCNC: 33.4 G/DL (ref 32–36)
MCV RBC AUTO: 96 FL (ref 82–98)
MONOCYTES # BLD AUTO: 0.7 K/UL (ref 0.3–1)
MONOCYTES NFR BLD: 6.6 % (ref 4–15)
NEUTROPHILS # BLD AUTO: 9.7 K/UL (ref 1.8–7.7)
NEUTROPHILS NFR BLD: 88.2 % (ref 38–73)
NRBC BLD-RTO: 0 /100 WBC
PLATELET # BLD AUTO: 262 K/UL (ref 150–450)
PMV BLD AUTO: 10.1 FL (ref 9.2–12.9)
POTASSIUM SERPL-SCNC: 4.7 MMOL/L (ref 3.5–5.1)
PROT SERPL-MCNC: 7.5 G/DL (ref 6–8.4)
RBC # BLD AUTO: 4.2 M/UL (ref 4.6–6.2)
SODIUM SERPL-SCNC: 135 MMOL/L (ref 136–145)
WBC # BLD AUTO: 11.02 K/UL (ref 3.9–12.7)

## 2024-03-13 PROCEDURE — 25000003 PHARM REV CODE 250: Performed by: INTERNAL MEDICINE

## 2024-03-13 PROCEDURE — 63600175 PHARM REV CODE 636 W HCPCS: Performed by: INTERNAL MEDICINE

## 2024-03-13 PROCEDURE — 80053 COMPREHEN METABOLIC PANEL: CPT | Performed by: INTERNAL MEDICINE

## 2024-03-13 PROCEDURE — 96375 TX/PRO/DX INJ NEW DRUG ADDON: CPT

## 2024-03-13 PROCEDURE — 96374 THER/PROPH/DIAG INJ IV PUSH: CPT

## 2024-03-13 PROCEDURE — 85025 COMPLETE CBC W/AUTO DIFF WBC: CPT | Performed by: INTERNAL MEDICINE

## 2024-03-13 PROCEDURE — 99285 EMERGENCY DEPT VISIT HI MDM: CPT | Mod: 25

## 2024-03-13 PROCEDURE — 36415 COLL VENOUS BLD VENIPUNCTURE: CPT | Performed by: INTERNAL MEDICINE

## 2024-03-13 RX ORDER — PROCHLORPERAZINE EDISYLATE 5 MG/ML
10 INJECTION INTRAMUSCULAR; INTRAVENOUS
Status: COMPLETED | OUTPATIENT
Start: 2024-03-13 | End: 2024-03-13

## 2024-03-13 RX ORDER — MECLIZINE HYDROCHLORIDE 25 MG/1
25 TABLET ORAL 3 TIMES DAILY PRN
Qty: 20 TABLET | Refills: 0 | Status: SHIPPED | OUTPATIENT
Start: 2024-03-13 | End: 2024-03-15 | Stop reason: ALTCHOICE

## 2024-03-13 RX ORDER — DEXAMETHASONE SODIUM PHOSPHATE 4 MG/ML
8 INJECTION, SOLUTION INTRA-ARTICULAR; INTRALESIONAL; INTRAMUSCULAR; INTRAVENOUS; SOFT TISSUE
Status: COMPLETED | OUTPATIENT
Start: 2024-03-13 | End: 2024-03-13

## 2024-03-13 RX ORDER — DEXAMETHASONE SODIUM PHOSPHATE 4 MG/ML
8 INJECTION, SOLUTION INTRA-ARTICULAR; INTRALESIONAL; INTRAMUSCULAR; INTRAVENOUS; SOFT TISSUE
Status: DISCONTINUED | OUTPATIENT
Start: 2024-03-13 | End: 2024-03-13

## 2024-03-13 RX ADMIN — PROCHLORPERAZINE EDISYLATE 10 MG: 5 INJECTION INTRAMUSCULAR; INTRAVENOUS at 07:03

## 2024-03-13 RX ADMIN — DEXAMETHASONE SODIUM PHOSPHATE 8 MG: 4 INJECTION, SOLUTION INTRA-ARTICULAR; INTRALESIONAL; INTRAMUSCULAR; INTRAVENOUS; SOFT TISSUE at 07:03

## 2024-03-13 RX ADMIN — SODIUM CHLORIDE 1000 ML: 9 INJECTION, SOLUTION INTRAVENOUS at 07:03

## 2024-03-13 NOTE — ED PROVIDER NOTES
"03/13/2024         6:32 AM    Source of History:  History obtained from patient and significant other.     Chief complaint:  From Nurse Triage:  Dizziness (Patient reports he has had intermittent dizziness x 15 days that "worsens with his leisure time over the last two days."/Denies chest pain or shortness of breath. No nausea/vomiting. Patient also notes weakness, lack of sleep, and has not been eating well. )    HISTORY OF PRESENT ILLNES:  Tobi Liz Jr. is a 89 y.o. male  has a past medical history of Aortic valve stenosis, Arthritis, BPH (benign prostatic hyperplasia), Carotid artery stenosis, Chronic diastolic heart failure, ETOH abuse, Exposure to COVID-19 virus (01/07/2022), Hyperchloremia, Kidney stones, Murmur, Polymyalgia rheumatica, PVD (peripheral vascular disease), and Renal artery stenosis. presenting with Dizziness (Patient reports he has had intermittent dizziness x 15 days that "worsens with his leisure time over the last two days."/Denies chest pain or shortness of breath. No nausea/vomiting. Patient also notes weakness, lack of sleep, and has not been eating well. )  Patient states he has been dizzy for 15 years.  He states over the past 2 weeks and especially last 2 days it has been constant.  Describes it as a lightheadedness.  Has not had any syncopal episodes or near syncopal episodes and no focal weaknesses.      REVIEW OF SYSTEMS:   Constitutional symptoms:     Skin symptoms:      Eye symptoms:     ENMT symptoms:      Respiratory symptoms:      Cardiovascular symptoms:     Gastrointestinal symptoms:      Genitourinary symptoms:     Musculoskeletal symptoms:      Neurologic symptoms:      Psychiatric symptoms:               Additional review of systems information: Patient Denies Any Other Complaints.    All Other Systems Reviewed With Patient And Negative.    ALLEGIES:  Review of patient's allergies indicates:  No Known Allergies    MEDICINE LIST:  Current Outpatient Medications "   Medication Instructions    aspirin (ECOTRIN) 81 mg, Oral, Daily    atorvastatin (LIPITOR) 80 MG tablet 1 tablet, Oral, Daily, Prescribed by Dr. Gamble    finasteride (PROSCAR) 5 mg, Oral, Daily, Prescribed by Urologist    meclizine (ANTIVERT) 25 mg, Oral, 3 times daily PRN    tamsulosin (FLOMAX) 0.4 mg Cap 1 capsule, Oral, Daily, Prescribed by Dr. Navarrete        PMH:  As per HPI and below:    Reviewed and updated in chart.    PAST MEDICAL HISTORY:  Past Medical History:   Diagnosis Date    Aortic valve stenosis     Arthritis     BPH (benign prostatic hyperplasia)     Carotid artery stenosis     Chronic diastolic heart failure     ETOH abuse     Exposure to COVID-19 virus 01/07/2022    Hyperchloremia     Kidney stones     Murmur     Polymyalgia rheumatica     PVD (peripheral vascular disease)     Renal artery stenosis         PAST SURGICAL HISTORY:  Past Surgical History:   Procedure Laterality Date    A-V CARDIAC PACEMAKER INSERTION N/A 10/6/2023    Procedure: INSERTION, CARDIAC PACEMAKER, DUAL CHAMBER;  Surgeon: Douglas Salguero MD;  Location: [facility] CATH;  Service: Cardiology;  Laterality: N/A;    ANGIOGRAM, CAROTID Left 8/25/2023    Procedure: ANGIOGRAM, CAROTID;  Surgeon: Nick Box MD;  Location: [facility] CATH;  Service: Cardiology;  Laterality: Left;    CATARACT EXTRACTION, BILATERAL      ECHOCARDIOGRAM,TRANSESOPHAGEAL N/A 10/3/2023    Procedure: Transesophageal echo (JOSE ARMANDO) intra-procedure log documentation;  Surgeon: Nick Box MD;  Location: [facility] OR;  Service: Cardiology;  Laterality: N/A;    HAND SURGERY      PERCUTANEOUS TRANSCATHETER AORTIC VALVE REPLACEMENT (TAVR) Left 10/3/2023    Procedure: REPLACEMENT, AORTIC VALVE, PERCUTANEOUS, TRANSCATHETER;  Surgeon: Nick Box MD;  Location: [facility] OR;  Service: Cardiology;  Laterality: Left;    PERCUTANEOUS TRANSCATHETER AORTIC VALVE REPLACEMENT (TAVR) Left 10/3/2023    Procedure: REPLACEMENT, AORTIC VALVE, PERCUTANEOUS, TRANSCATHETER carotid access;   Surgeon: Jun Brito MD;  Location: [facility] OR;  Service: Cardiovascular;  Laterality: Left;    PERCUTANEOUS TRANSLUMINAL ANGIOPLASTY N/A 8/25/2023    Procedure: ANGIOPLASTY-PERCUTANEOUS TRANSLUMINAL (PTA);  Surgeon: Nick Box MD;  Location: [facility] CATH;  Service: Cardiology;  Laterality: N/A;    SHOULDER SURGERY Left     total shoulder replacement    WOUND EXPLORATION N/A 10/3/2023    Procedure: EXPLORATION, WOUND;  Surgeon: Jun Brito MD;  Location: [facility] OR;  Service: Cardiology;  Laterality: N/A;       SOCIAL HISTORY:  Social History     Tobacco Use    Smoking status: Former     Types: Cigars    Smokeless tobacco: Never   Substance Use Topics    Alcohol use: Yes     Alcohol/week: 3.0 standard drinks of alcohol     Types: 3 Shots of liquor per week     Comment: 3 martinis daily    Drug use: Never       FAMILY HISTORY:  Family History   Problem Relation Age of Onset    Cancer Mother     Stroke Father         PROBLEM LIST:  Patient Active Problem List   Diagnosis    Hypertension    Hyperlipidemia    Dizziness    Carotid artery stenosis    BPH (benign prostatic hyperplasia)    IFG (impaired fasting glucose)    Vitamin D deficiency    Valvular heart disease    PAD (peripheral artery disease)    Stenosis of left subclavian artery    Congestive heart failure, NYHA class II    S/P TAVR (transcatheter aortic valve replacement)    CHB (complete heart block)        PHYSICAL EXAM:      ED Triage Vitals [03/13/24 0615]   BP (!) 168/96   Pulse (!) 115   Resp 20   Temp 98.3 °F (36.8 °C)   SpO2 96 %        Vital Signs: Reviewed As In Chart.  General:  Alert, No Cardiorespiratory Distress Noted.  Head: Normocephalic   Eye:  Pupils equal and reactive to light and accomodation. Extraocular Movements Are Intact.   ENT: Mucus membranes are moist.   Neck: Supple  Cardiovascular:  Regular Rate And Rhythm     Respiratory:  Clear to auscultation bilaterally    Gastrointestinal:  Soft, Non Distended, Non Tenderness,  Normal Bowel Sounds.    Neurological:  Alert And Oriented To Person, Place, Time, And Situation, Normal Motor Observed, Normal Speech Observed.  No nystagmus  Back: Normal range of motion  Musculoskeletal:  No Gross Deformity Noted.   Genital: deferred    Psychiatric:  Cooperative.  Skin: warm, dry  Lymphatic: No lymphadenopathy      ED WORKUP FOR MEDICAL DECISION MAKING:    ED ORDERS:  Orders Placed This Encounter   Procedures    CT Head Without Contrast    CBC auto differential    Comprehensive metabolic panel    Orthostatic vital signs    Insert Saline lock IV       ED MEDICINES:  Medications   sodium chloride 0.9% bolus 1,000 mL 1,000 mL (1,000 mLs Intravenous New Bag 3/13/24 0706)   prochlorperazine injection Soln 10 mg (10 mg Intravenous Given 3/13/24 0705)   dexAMETHasone injection 8 mg (8 mg Intravenous Given 3/13/24 0705)                ED LABS ORDERED AND REVIEWED:  Admission on 03/13/2024   Component Date Value Ref Range Status    WBC 03/13/2024 11.02  3.90 - 12.70 K/uL Final    RBC 03/13/2024 4.20 (L)  4.60 - 6.20 M/uL Final    Hemoglobin 03/13/2024 13.5 (L)  14.0 - 18.0 g/dL Final    Hematocrit 03/13/2024 40.4  40.0 - 54.0 % Final    MCV 03/13/2024 96  82 - 98 fL Final    MCH 03/13/2024 32.1 (H)  27.0 - 31.0 pg Final    MCHC 03/13/2024 33.4  32.0 - 36.0 g/dL Final    RDW 03/13/2024 12.5  11.5 - 14.5 % Final    Platelets 03/13/2024 262  150 - 450 K/uL Final    MPV 03/13/2024 10.1  9.2 - 12.9 fL Final    Immature Granulocytes 03/13/2024 0.5  0.0 - 0.5 % Final    Gran # (ANC) 03/13/2024 9.7 (H)  1.8 - 7.7 K/uL Final    Immature Grans (Abs) 03/13/2024 0.06 (H)  0.00 - 0.04 K/uL Final    Lymph # 03/13/2024 0.5 (L)  1.0 - 4.8 K/uL Final    Mono # 03/13/2024 0.7  0.3 - 1.0 K/uL Final    Eos # 03/13/2024 0.0  0.0 - 0.5 K/uL Final    Baso # 03/13/2024 0.02  0.00 - 0.20 K/uL Final    nRBC 03/13/2024 0  0 /100 WBC Final    Gran % 03/13/2024 88.2 (H)  38.0 - 73.0 % Final    Lymph % 03/13/2024 4.2 (L)  18.0 - 48.0 %  "Final    Mono % 03/13/2024 6.6  4.0 - 15.0 % Final    Eosinophil % 03/13/2024 0.3  0.0 - 8.0 % Final    Basophil % 03/13/2024 0.2  0.0 - 1.9 % Final    Differential Method 03/13/2024 Automated   Final    Sodium 03/13/2024 135 (L)  136 - 145 mmol/L Final    Potassium 03/13/2024 4.7  3.5 - 5.1 mmol/L Final    Chloride 03/13/2024 102  95 - 110 mmol/L Final    CO2 03/13/2024 26  23 - 29 mmol/L Final    Glucose 03/13/2024 222 (H)  70 - 110 mg/dL Final    BUN 03/13/2024 28  10 - 30 mg/dL Final    Creatinine 03/13/2024 1.3  0.5 - 1.4 mg/dL Final    Calcium 03/13/2024 9.3  8.7 - 10.5 mg/dL Final    Total Protein 03/13/2024 7.5  6.0 - 8.4 g/dL Final    Albumin 03/13/2024 3.2 (L)  3.5 - 5.2 g/dL Final    Total Bilirubin 03/13/2024 1.1 (H)  0.1 - 1.0 mg/dL Final    Alkaline Phosphatase 03/13/2024 43 (L)  55 - 135 U/L Final    AST 03/13/2024 16  10 - 40 U/L Final    ALT 03/13/2024 40  10 - 44 U/L Final    eGFR 03/13/2024 50.9 (A)  >60 mL/min/1.73 m^2 Final    Anion Gap 03/13/2024 7  3 - 11 mmol/L Final       RADIOLOGY STUDIES ORDERED AND REVIEWED:  Imaging Results              CT Head Without Contrast (In process)                     MEDICAL DECISION MAKING:    Tobi LOZADA Shalonda Dooley is 89 y.o. male who  has a past medical history of Aortic valve stenosis, Arthritis, BPH (benign prostatic hyperplasia), Carotid artery stenosis, Chronic diastolic heart failure, ETOH abuse, Exposure to COVID-19 virus (01/07/2022), Hyperchloremia, Kidney stones, Murmur, Polymyalgia rheumatica, PVD (peripheral vascular disease), and Renal artery stenosis. arrives in ER with c/o Dizziness (Patient reports he has had intermittent dizziness x 15 days that "worsens with his leisure time over the last two days."/Denies chest pain or shortness of breath. No nausea/vomiting. Patient also notes weakness, lack of sleep, and has not been eating well. )      Reviewed Nurses Note. Reviewed Vital Signs.     Reviewed Pertinent old records, History and updated as " necessary.    Vitals:    03/13/24 0731   BP: 131/75   Pulse: (!) 115   Resp: (!) 26   Temp:         Medical Decision Making  Differential diagnosis includes but is not limited to dizziness, vertigo, generalized weakness, syncope, dysrhythmia, CVA, TIA, hypertension, hypotension, hyperglycemia, hypoglycemia, dehydration, labyrinthitis, Meniere's, upper respiratory infection, otitis media, sinusitis, UTI, pneumonia, GI bleed, anemia and anxiety.    Amount and/or Complexity of Data Reviewed  Labs: ordered.     Details: Elevated glucose otherwise unremarkable    Radiology: ordered.     Details: Radiology report reviewed no acute findings.    Risk  Prescription drug management.                        PROCEDURES PERFORMED IN ED:  Procedures    DIAGNOSTIC IMPRESSION:        ICD-10-CM ICD-9-CM   1. Dizziness  R42 780.4   2. Hyperglycemia  R73.9 790.29         ED Disposition Condition    Discharge Stable               Medication List        START taking these medications      meclizine 25 mg tablet  Commonly known as: ANTIVERT  Take 1 tablet (25 mg total) by mouth 3 (three) times daily as needed.            ASK your doctor about these medications      aspirin 81 MG EC tablet  Commonly known as: ECOTRIN     atorvastatin 80 MG tablet  Commonly known as: LIPITOR     finasteride 5 mg tablet  Commonly known as: PROSCAR     tamsulosin 0.4 mg Cap  Commonly known as: FLOMAX               Where to Get Your Medications        These medications were sent to [address]  [address] [place] [address] [address]      Phone: [phone]   meclizine 25 mg tablet           Follow-up Information       Primary care physician In 2 days.                              ED Prescriptions       Medication Sig Dispense Start Date End Date Auth. Provider    meclizine (ANTIVERT) 25 mg tablet Take 1 tablet (25 mg total) by mouth 3 (three) times daily as needed. 20 tablet 3/13/2024 -- Trevon Bear MD           Follow-up Information       Follow up With Specialties Details Why Contact Info    Primary care physician  In 2 days                Trevon Bear MD  03/13/24 7024

## 2024-03-14 ENCOUNTER — PATIENT OUTREACH (OUTPATIENT)
Dept: EMERGENCY MEDICINE | Facility: HOSPITAL | Age: 89
End: 2024-03-14
Payer: MEDICARE

## 2024-03-14 NOTE — PROGRESS NOTES
Dana Rainey, Patient Care Assistant  ED Navigator  Emergency Department    Project: [facility] ED Navigator  Role: Community Health Worker    Date: 03/14/2024  Patient Name: Tobi Liz Jr.  MRN: [id number]  PCP: Luis Enrique Guzman Jr., MD    Assessment:     Tobi Liz Jr. is a 89 y.o. male who has presented to ED for dizziness. Patient has visited the ED 2 times in the past 3 months. Patient did contact PCP.     ED Navigator Initial Assessment    ED Navigator Enrollment Documentation  Consent to Services  Does patient consent to completing the assessment?: Yes  Contact  Method of Initial Contact: Phone  Transportation  Does the patient have issues with Transportation?: No  Does the patient have transportation to and from healthcare appointments?: Yes  Insurance Coverage  Do you have coverage/adequate coverage?: Yes  Type/kind of coverage: MEDICARE PART A & B  Specialist Appointment  PCP Follow Up Appointment  Medications  Is patient able to afford medication?: Yes  Is patient unable to get medication due to lack of transportation?: No  Psychological  Food  Communication/Education  Other Financial Concerns  Other Social Barriers/Concerns  Does the patient have any additional barriers or concerns?: None  Primary Barrier  Barriers identified: Financial barrier (health insurance, employment, etc.), Structural barrier (service availability, waiting times, etc.)  Root Cause of ED Utilization: Chronic Conditions  Plan to address Chronic Conditions: Remind patient of upcoming PCP/specialist appointment within 24 to 48 hours before scheduled appt  Next steps: Scheduled Appointment/Referral, Provided Education  Scheduled Appointment Date: 3/15/24  Appointment Reminder Date: 3/14/24  Expected Date of Follow Up 1: 4/22/24  Additional Documentation: Pt stated he is a little dizzy and has a PCP appointment for tomorrow scheduled. Pt did not need/want any other appointments scheduled. Pt confirmed he has no issues with  transportation or getting medications. ED navigator will follow-up with patient on/around 4/22/2024.    Dana Rainey  ED Navigator  [phone]            Social History     Socioeconomic History    Marital status:    Tobacco Use    Smoking status: Former     Types: Cigars    Smokeless tobacco: Never   Substance and Sexual Activity    Alcohol use: Yes     Alcohol/week: 3.0 standard drinks of alcohol     Types: 3 Shots of liquor per week     Comment: 3 martinis daily    Drug use: Never    Sexual activity: Not Currently     Social Determinants of Health     Financial Resource Strain: Low Risk  (10/5/2023)    Overall Financial Resource Strain (CARDIA)     Difficulty of Paying Living Expenses: Not hard at all   Food Insecurity: No Food Insecurity (10/5/2023)    Hunger Vital Sign     Worried About Running Out of Food in the Last Year: Never true     Ran Out of Food in the Last Year: Never true   Transportation Needs: No Transportation Needs (10/5/2023)    PRAPARE - Transportation     Lack of Transportation (Medical): No     Lack of Transportation (Non-Medical): No   Stress: No Stress Concern Present (10/5/2023)    [employer] - Occupational Stress Questionnaire     Feeling of Stress : Not at all   Housing Stability: Unknown (10/5/2023)    Housing Stability Vital Sign     Unable to Pay for Housing in the Last Year: No     Unstable Housing in the Last Year: No       Plan:     Pt stated he is a little dizzy and has a PCP appointment for tomorrow scheduled. Pt did not need/want any other appointments scheduled. Pt confirmed he has no issues with transportation or getting medications. ED navigator will follow-up with patient on/around 4/22/2024.    Dana Rainey  ED Navigator  [phone]

## 2024-03-14 NOTE — PROGRESS NOTES
Pt is scheduled with Dr. Guzman for tomorrow at 10:30 a.m. and will be attending.    Dana Rainey  ED Navigator  [phone]

## 2024-03-15 PROBLEM — I74.5 OCCLUSION OF RIGHT ILIAC ARTERY: Status: ACTIVE | Noted: 2024-03-15

## 2024-03-15 PROBLEM — M35.3 POLYMYALGIA RHEUMATICA: Status: ACTIVE | Noted: 2024-03-15

## 2024-04-01 PROBLEM — L03.90 CELLULITIS: Status: ACTIVE | Noted: 2024-04-01

## 2024-04-05 PROBLEM — G47.00 INSOMNIA: Status: ACTIVE | Noted: 2024-04-05

## 2024-04-08 PROBLEM — R60.0 LOCALIZED EDEMA: Status: ACTIVE | Noted: 2024-04-08

## 2024-04-11 ENCOUNTER — OFFICE VISIT (OUTPATIENT)
Dept: SURGERY | Facility: CLINIC | Age: 89
End: 2024-04-11
Payer: MEDICARE

## 2024-04-11 VITALS
SYSTOLIC BLOOD PRESSURE: 165 MMHG | DIASTOLIC BLOOD PRESSURE: 71 MMHG | OXYGEN SATURATION: 96 % | HEIGHT: 69 IN | HEART RATE: 76 BPM | BODY MASS INDEX: 29.52 KG/M2 | WEIGHT: 199.31 LBS

## 2024-04-11 DIAGNOSIS — L97.919 CHRONIC VENOUS HYPERTENSION W ULCER OF BILATERAL LOW EXTRM: Primary | ICD-10-CM

## 2024-04-11 DIAGNOSIS — L97.929 CHRONIC VENOUS HYPERTENSION W ULCER OF BILATERAL LOW EXTRM: Primary | ICD-10-CM

## 2024-04-11 DIAGNOSIS — L03.119 CELLULITIS OF LOWER EXTREMITY, UNSPECIFIED LATERALITY: ICD-10-CM

## 2024-04-11 DIAGNOSIS — I87.313 CHRONIC VENOUS HYPERTENSION W ULCER OF BILATERAL LOW EXTRM: Primary | ICD-10-CM

## 2024-04-11 PROCEDURE — 99213 OFFICE O/P EST LOW 20 MIN: CPT | Mod: PBBFAC | Performed by: STUDENT IN AN ORGANIZED HEALTH CARE EDUCATION/TRAINING PROGRAM

## 2024-04-11 PROCEDURE — 99999 PR PBB SHADOW E&M-EST. PATIENT-LVL III: CPT | Mod: PBBFAC,,, | Performed by: STUDENT IN AN ORGANIZED HEALTH CARE EDUCATION/TRAINING PROGRAM

## 2024-04-11 PROCEDURE — 99204 OFFICE O/P NEW MOD 45 MIN: CPT | Mod: S$PBB,,, | Performed by: STUDENT IN AN ORGANIZED HEALTH CARE EDUCATION/TRAINING PROGRAM

## 2024-04-15 NOTE — H&P
[facility] H&P      Consult: bilateral venous ulcers   Consulting Service: Dr. Guzman   Chief Complaint: bilateral leg pain    HPI: Pt is a 89 y.o. male who presents with bilateral LE venous HTN with superficial ulceration. Has HH once a week;  Spouse changes dressings on off days.        PMH:   Past Medical History:   Diagnosis Date    Aortic valve stenosis     Arthritis     BPH (benign prostatic hyperplasia)     Carotid artery stenosis     Chronic diastolic heart failure     ETOH abuse     Exposure to COVID-19 virus 01/07/2022    Hyperchloremia     Kidney stones     Murmur     Polymyalgia rheumatica     PVD (peripheral vascular disease)     Renal artery stenosis      PSH:   Past Surgical History:   Procedure Laterality Date    A-V CARDIAC PACEMAKER INSERTION N/A 10/6/2023    Procedure: INSERTION, CARDIAC PACEMAKER, DUAL CHAMBER;  Surgeon: Douglas Salguero MD;  Location: [facility] CATH;  Service: Cardiology;  Laterality: N/A;    ANGIOGRAM, CAROTID Left 8/25/2023    Procedure: ANGIOGRAM, CAROTID;  Surgeon: Nick Box MD;  Location: [facility] CATH;  Service: Cardiology;  Laterality: Left;    CATARACT EXTRACTION, BILATERAL      ECHOCARDIOGRAM,TRANSESOPHAGEAL N/A 10/3/2023    Procedure: Transesophageal echo (JOSE ARMANDO) intra-procedure log documentation;  Surgeon: Nick Box MD;  Location: [facility] OR;  Service: Cardiology;  Laterality: N/A;    HAND SURGERY      PERCUTANEOUS TRANSCATHETER AORTIC VALVE REPLACEMENT (TAVR) Left 10/3/2023    Procedure: REPLACEMENT, AORTIC VALVE, PERCUTANEOUS, TRANSCATHETER;  Surgeon: Nick Box MD;  Location: [facility] OR;  Service: Cardiology;  Laterality: Left;    PERCUTANEOUS TRANSCATHETER AORTIC VALVE REPLACEMENT (TAVR) Left 10/3/2023    Procedure: REPLACEMENT, AORTIC VALVE, PERCUTANEOUS, TRANSCATHETER carotid access;  Surgeon: Jun Brito MD;  Location: [facility] OR;  Service: Cardiovascular;  Laterality: Left;    PERCUTANEOUS TRANSLUMINAL ANGIOPLASTY N/A  8/25/2023    Procedure: ANGIOPLASTY-PERCUTANEOUS TRANSLUMINAL (PTA);  Surgeon: Nick Box MD;  Location: [facility] CATH;  Service: Cardiology;  Laterality: N/A;    SHOULDER SURGERY Left     total shoulder replacement    WOUND EXPLORATION N/A 10/3/2023    Procedure: EXPLORATION, WOUND;  Surgeon: Jun Brito MD;  Location: [facility] OR;  Service: Cardiology;  Laterality: N/A;     Meds: See medication list;  No anticoagulation  ALL: Patient has no known allergies.  FHX: non contributory    SOC:   Social History     Socioeconomic History    Marital status:    Tobacco Use    Smoking status: Former     Types: Cigars    Smokeless tobacco: Never   Substance and Sexual Activity    Alcohol use: Yes     Alcohol/week: 3.0 standard drinks of alcohol     Types: 3 Shots of liquor per week     Comment: 3 martinis daily    Drug use: Never    Sexual activity: Not Currently     Social Determinants of Health     Financial Resource Strain: Low Risk  (10/5/2023)    Overall Financial Resource Strain (CARDIA)     Difficulty of Paying Living Expenses: Not hard at all   Food Insecurity: No Food Insecurity (10/5/2023)    Hunger Vital Sign     Worried About Running Out of Food in the Last Year: Never true     Ran Out of Food in the Last Year: Never true   Transportation Needs: No Transportation Needs (10/5/2023)    PRAPARE - Transportation     Lack of Transportation (Medical): No     Lack of Transportation (Non-Medical): No   Stress: No Stress Concern Present (10/5/2023)    [employer] [place] [employer] - Occupational Stress Questionnaire     Feeling of Stress : Not at all   Housing Stability: Unknown (10/5/2023)    Housing Stability Vital Sign     Unable to Pay for Housing in the Last Year: No     Unstable Housing in the Last Year: No     ROS: Review of Systems   Constitutional:  Negative for chills, fever, malaise/fatigue and weight loss.   Respiratory:  Negative for cough.    Cardiovascular:  Negative for chest pain.  "  Gastrointestinal:  Negative for abdominal pain, blood in stool, constipation, diarrhea, heartburn, melena, nausea and vomiting.   All other systems reviewed and are negative.      Physical Exam:  BP (!) 165/71   Pulse 76   Ht 5' 9" (1.753 m)   Wt 90.4 kg (199 lb 4.7 oz)   SpO2 96%   BMI 29.43 kg/m²   Physical Exam  Constitutional:       General: He is not in acute distress.     Appearance: He is not ill-appearing or toxic-appearing.   Cardiovascular:      Rate and Rhythm: Normal rate and regular rhythm.   Pulmonary:      Effort: Pulmonary effort is normal. No respiratory distress.   Abdominal:      General: There is no distension.      Palpations: Abdomen is soft.      Tenderness: There is no abdominal tenderness. There is no guarding or rebound.   Musculoskeletal:      Right lower leg: Edema present.      Left lower leg: Edema present.      Comments: +2 pitting edema to bilateral LE with stasis dermatitis   Superficial excoriations to dorsum of L foot and ant leg    Skin:     General: Skin is warm and dry.      Capillary Refill: Capillary refill takes less than 2 seconds.   Neurological:      Mental Status: He is alert.             A/P: Pt is a 89 y.o. male who presents with bilateral venous HTN with ulceration   -Unna boot to LLE   -Multi layer wrap to RLE   - clinic on Tuesday       Carolina Burgos MD  [phone]      "

## 2024-04-16 ENCOUNTER — OFFICE VISIT (OUTPATIENT)
Dept: WOUND CARE | Facility: HOSPITAL | Age: 89
End: 2024-04-16
Attending: STUDENT IN AN ORGANIZED HEALTH CARE EDUCATION/TRAINING PROGRAM
Payer: MEDICARE

## 2024-04-16 VITALS
SYSTOLIC BLOOD PRESSURE: 127 MMHG | HEART RATE: 73 BPM | TEMPERATURE: 98 F | DIASTOLIC BLOOD PRESSURE: 87 MMHG | RESPIRATION RATE: 18 BRPM

## 2024-04-16 DIAGNOSIS — L97.522 ULCER OF LEFT FOOT, WITH FAT LAYER EXPOSED: Primary | ICD-10-CM

## 2024-04-16 DIAGNOSIS — L97.919 CHRONIC VENOUS HYPERTENSION W ULCER OF BILATERAL LOW EXTRM: ICD-10-CM

## 2024-04-16 DIAGNOSIS — L97.929 IDIOPATHIC CHRONIC VENOUS HYPERTENSION OF LEFT LOWER EXTREMITY WITH ULCER: ICD-10-CM

## 2024-04-16 DIAGNOSIS — L97.929 CHRONIC VENOUS HYPERTENSION W ULCER OF BILATERAL LOW EXTRM: ICD-10-CM

## 2024-04-16 DIAGNOSIS — L97.919 IDIOPATHIC CHRONIC VENOUS HYPERTENSION OF RIGHT LOWER EXTREMITY WITH ULCER: ICD-10-CM

## 2024-04-16 DIAGNOSIS — L97.812 ULCER OF RIGHT PRETIBIAL REGION, WITH FAT LAYER EXPOSED: ICD-10-CM

## 2024-04-16 DIAGNOSIS — I87.313 CHRONIC VENOUS HYPERTENSION W ULCER OF BILATERAL LOW EXTRM: ICD-10-CM

## 2024-04-16 DIAGNOSIS — I87.311 IDIOPATHIC CHRONIC VENOUS HYPERTENSION OF RIGHT LOWER EXTREMITY WITH ULCER: ICD-10-CM

## 2024-04-16 DIAGNOSIS — I73.9 PERIPHERAL VASCULAR DISEASE, UNSPECIFIED: ICD-10-CM

## 2024-04-16 DIAGNOSIS — I87.312 IDIOPATHIC CHRONIC VENOUS HYPERTENSION OF LEFT LOWER EXTREMITY WITH ULCER: ICD-10-CM

## 2024-04-16 PROCEDURE — 99214 OFFICE O/P EST MOD 30 MIN: CPT | Mod: 25

## 2024-04-16 PROCEDURE — 11042 DBRDMT SUBQ TIS 1ST 20SQCM/<: CPT

## 2024-04-16 PROCEDURE — 99499 UNLISTED E&M SERVICE: CPT | Mod: ,,, | Performed by: STUDENT IN AN ORGANIZED HEALTH CARE EDUCATION/TRAINING PROGRAM

## 2024-04-16 PROCEDURE — 11042 DBRDMT SUBQ TIS 1ST 20SQCM/<: CPT | Mod: ,,, | Performed by: STUDENT IN AN ORGANIZED HEALTH CARE EDUCATION/TRAINING PROGRAM

## 2024-04-16 NOTE — ASSESSMENT & PLAN NOTE
Wounds debrided in clinic - improved swelling and drainage from last week   HH twice a week   Silver Ag with multi compression wrap every other day   Leg elevation

## 2024-04-16 NOTE — H&P
[facility]  Wound Care  History and Physical        History:  Pt is a 89 y.o. male who presents with bilateral LE venous HTN with superficial ulceration. Has HH once a week;  Dressing changed in  surgery clinic last Thursday.    Past Medical History:   Diagnosis Date    Aortic valve stenosis     Arthritis     BPH (benign prostatic hyperplasia)     Carotid artery stenosis     Chronic diastolic heart failure     ETOH abuse     Exposure to COVID-19 virus 01/07/2022    Hyperchloremia     Kidney stones     Murmur     Polymyalgia rheumatica     PVD (peripheral vascular disease)     Renal artery stenosis        Past Surgical History:   Procedure Laterality Date    A-V CARDIAC PACEMAKER INSERTION N/A 10/6/2023    Procedure: INSERTION, CARDIAC PACEMAKER, DUAL CHAMBER;  Surgeon: Douglas Salguero MD;  Location: [facility] CATH;  Service: Cardiology;  Laterality: N/A;    ANGIOGRAM, CAROTID Left 8/25/2023    Procedure: ANGIOGRAM, CAROTID;  Surgeon: Nick Box MD;  Location: [facility] CATH;  Service: Cardiology;  Laterality: Left;    CATARACT EXTRACTION, BILATERAL      ECHOCARDIOGRAM,TRANSESOPHAGEAL N/A 10/3/2023    Procedure: Transesophageal echo (JOSE ARMANDO) intra-procedure log documentation;  Surgeon: Nick Box MD;  Location: [facility] OR;  Service: Cardiology;  Laterality: N/A;    HAND SURGERY      PERCUTANEOUS TRANSCATHETER AORTIC VALVE REPLACEMENT (TAVR) Left 10/3/2023    Procedure: REPLACEMENT, AORTIC VALVE, PERCUTANEOUS, TRANSCATHETER;  Surgeon: Nick Box MD;  Location: [facility] OR;  Service: Cardiology;  Laterality: Left;    PERCUTANEOUS TRANSCATHETER AORTIC VALVE REPLACEMENT (TAVR) Left 10/3/2023    Procedure: REPLACEMENT, AORTIC VALVE, PERCUTANEOUS, TRANSCATHETER carotid access;  Surgeon: Jun Brito MD;  Location: [facility] OR;  Service: Cardiovascular;  Laterality: Left;    PERCUTANEOUS TRANSLUMINAL ANGIOPLASTY N/A 8/25/2023    Procedure: ANGIOPLASTY-PERCUTANEOUS TRANSLUMINAL (PTA);  Surgeon: Charu  Nick WHITE MD;  Location: [facility] CATH;  Service: Cardiology;  Laterality: N/A;    SHOULDER SURGERY Left     total shoulder replacement    WOUND EXPLORATION N/A 10/3/2023    Procedure: EXPLORATION, WOUND;  Surgeon: Jun Brito MD;  Location: [facility] OR;  Service: Cardiology;  Laterality: N/A;       Family History   Problem Relation Name Age of Onset    Cancer Mother      Stroke Father          reports that he has quit smoking. His smoking use included cigars. He has never used smokeless tobacco. He reports current alcohol use of about 3.0 standard drinks of alcohol per week. He reports that he does not use drugs.    has a current medication list which includes the following prescription(s): aspirin, clindamycin, clotrimazole-betamethasone 1-0.05%, finasteride, furosemide, ketorolac, mupirocin, rosuvastatin, tamsulosin, and zonisamide.    Allergies:  Patient has no known allergies.    Review of Systems:  Review of Systems   Constitutional:  Negative for chills, fever, malaise/fatigue and weight loss.   Respiratory:  Negative for cough.    Cardiovascular:  Negative for chest pain.   Gastrointestinal:  Negative for abdominal pain, blood in stool, constipation, diarrhea, heartburn, melena, nausea and vomiting.   All other systems reviewed and are negative.        Vitals:    04/16/24 1314   BP: 127/87   Pulse: 73   Resp: 18   Temp: 98.2 °F (36.8 °C)         BMI:  There is no height or weight on file to calculate BMI.    Physical Exam:  Physical Exam  Constitutional:       General: He is not in acute distress.     Appearance: He is not ill-appearing or toxic-appearing.   Cardiovascular:      Rate and Rhythm: Normal rate and regular rhythm.   Pulmonary:      Effort: Pulmonary effort is normal. No respiratory distress.   Abdominal:      General: There is no distension.      Palpations: Abdomen is soft.      Tenderness: There is no abdominal tenderness. There is no guarding or rebound.   Musculoskeletal:      Comments:  "5.0 x 1.5 x 0.1 cm superficial ulcer to dorsum of left foot   0.6 x 0.5 x 0.1 cm superficial ulcer to ant right leg with no SOI    Unable to obtain RLE TYRELL   Skin:     General: Skin is warm and dry.      Capillary Refill: Capillary refill takes less than 2 seconds.   Neurological:      Mental Status: He is alert.         A1C:  No results for input(s): "HGBA1C" in the last 2160 hours.  BMP:  Recent Labs   Lab Result Units 03/13/24  0645   Glucose mg/dL 222*   Sodium mmol/L 135*   Potassium mmol/L 4.7   Chloride mmol/L 102   CO2 mmol/L 26   BUN mg/dL 28   Creatinine mg/dL 1.3   Calcium mg/dL 9.3      CBC:  Recent Labs   Lab Result Units 02/26/24  1518 03/13/24  0645   WBC K/uL 8.59 11.02   RBC M/uL 4.46* 4.20*   Hemoglobin g/dL 14.8 13.5*   Hematocrit % 42.9 40.4   Platelets K/uL 158 262   MCV fL 96 96   MCH pg 33.2* 32.1*   MCHC g/dL 34.5 33.4     CMP:  Recent Labs   Lab Result Units 02/26/24  1518 03/13/24  0645   Glucose mg/dL 146* 222*   Calcium mg/dL 9.0 9.3   Albumin g/dL 3.4* 3.2*   Total Protein g/dL 7.4 7.5   Sodium mmol/L 139 135*   Potassium mmol/L 4.1 4.7   CO2 mmol/L 27 26   Chloride mmol/L 107 102   BUN mg/dL 17 28   Alkaline Phosphatase U/L 33* 43*   ALT U/L 17 40   AST U/L 11 16   Total Bilirubin mg/dL 0.7 1.1*     PREALBUMIN:  No results for input(s): "PREALBUMIN" in the last 2160 hours.  WOUND CULTURES:  No results for input(s): "LABAERO" in the last 2160 hours.        Plan:  Problem List Items Addressed This Visit          Orthopedic    Idiopathic chronic venous hypertension of right lower extremity with ulcer    Current Assessment & Plan     See left leg ulcer         Idiopathic chronic venous hypertension of left lower extremity with ulcer    Current Assessment & Plan     Wounds debrided in clinic - improved swelling and drainage from last week   HH twice a week   Silver Ag with multi compression wrap every other day   Leg elevation             Other Visit Diagnoses       Ulcer of left foot, with " fat layer exposed    -  Primary    Ulcer of right pretibial region, with fat layer exposed        Peripheral vascular disease, unspecified        Chronic venous hypertension w ulcer of bilateral low extrm              See Wound Docs note for plan and follow up.    RTC 1 week     Katrina Castille [facility]

## 2024-04-23 ENCOUNTER — OFFICE VISIT (OUTPATIENT)
Dept: WOUND CARE | Facility: HOSPITAL | Age: 89
End: 2024-04-23
Attending: STUDENT IN AN ORGANIZED HEALTH CARE EDUCATION/TRAINING PROGRAM
Payer: MEDICARE

## 2024-04-23 VITALS
TEMPERATURE: 98 F | HEART RATE: 76 BPM | DIASTOLIC BLOOD PRESSURE: 88 MMHG | SYSTOLIC BLOOD PRESSURE: 126 MMHG | RESPIRATION RATE: 18 BRPM

## 2024-04-23 DIAGNOSIS — L97.522 ULCER OF LEFT FOOT, WITH FAT LAYER EXPOSED: Primary | ICD-10-CM

## 2024-04-23 DIAGNOSIS — I87.312 IDIOPATHIC CHRONIC VENOUS HYPERTENSION OF LEFT LOWER EXTREMITY WITH ULCER: ICD-10-CM

## 2024-04-23 DIAGNOSIS — I73.9 PERIPHERAL VASCULAR DISEASE, UNSPECIFIED: ICD-10-CM

## 2024-04-23 DIAGNOSIS — L97.929 IDIOPATHIC CHRONIC VENOUS HYPERTENSION OF LEFT LOWER EXTREMITY WITH ULCER: ICD-10-CM

## 2024-04-23 PROCEDURE — 11042 DBRDMT SUBQ TIS 1ST 20SQCM/<: CPT | Mod: ,,, | Performed by: STUDENT IN AN ORGANIZED HEALTH CARE EDUCATION/TRAINING PROGRAM

## 2024-04-23 PROCEDURE — 11042 DBRDMT SUBQ TIS 1ST 20SQCM/<: CPT

## 2024-04-23 PROCEDURE — 99499 UNLISTED E&M SERVICE: CPT | Mod: ,,, | Performed by: STUDENT IN AN ORGANIZED HEALTH CARE EDUCATION/TRAINING PROGRAM

## 2024-04-23 NOTE — PROGRESS NOTES
[facility]  Wound Care  Progress Note        HPI:  Pt is a 89 y.o. male who presents with bilateral LE venous HTN with superficial ulceration to dorsal surface of left foot.  Complaint with wound care and around the clock leg elevation.  Voices no complaints.       Review of Systems:  12 point ROS negative save for those outlined in HPI          Vitals:    04/23/24 0950   BP: 126/88   Pulse: 76   Resp: 18   Temp: 98.4 °F (36.9 °C)           Physical Exam:  Gen: AAOx3; NAD  CV: RRR  Resp: breaths equal and nonlabored  Abd: Soft, NT, ND;  Ext: no c/c/e  Wound: 3.0 x 1.3 x 0.1 cm superficial wounds to dorsum of left toes with moderate exudate        Problem List Items Addressed This Visit          Orthopedic    Idiopathic chronic venous hypertension of left lower extremity with ulcer    Current Assessment & Plan     Wounds debrided in clinic - continued improvement due to compliance with leg elevation  HH twice a week   Silver Ag with multi compression wrap every other day   Leg elevation           Other Visit Diagnoses       Ulcer of left foot, with fat layer exposed    -  Primary    Peripheral vascular disease, unspecified                  Plan:  See Wound Docs note for plan and follow up.    RTC 1 week     Carolina Burgos  [facility]

## 2024-04-23 NOTE — ASSESSMENT & PLAN NOTE
Wounds debrided in clinic - continued improvement due to compliance with leg elevation  HH twice a week   Silver Ag with multi compression wrap every other day   Leg elevation

## 2024-04-25 ENCOUNTER — PATIENT OUTREACH (OUTPATIENT)
Dept: EMERGENCY MEDICINE | Facility: HOSPITAL | Age: 89
End: 2024-04-25
Payer: MEDICARE

## 2024-04-25 NOTE — PROGRESS NOTES
Pt is doing pretty good, as expressed. Pt revealed he has everything he is needing.    ED navigator ensured there was nothing she could help patient with today. ED navigator reminded patient to reach out if there is ever anything she can assist with. ED navigator will follow-up with patient on/around 6/20/2024.    Dana Rainey  ED Navigator  [phone]

## 2024-04-30 ENCOUNTER — OFFICE VISIT (OUTPATIENT)
Dept: WOUND CARE | Facility: HOSPITAL | Age: 89
End: 2024-04-30
Attending: STUDENT IN AN ORGANIZED HEALTH CARE EDUCATION/TRAINING PROGRAM
Payer: MEDICARE

## 2024-04-30 VITALS
DIASTOLIC BLOOD PRESSURE: 79 MMHG | TEMPERATURE: 98 F | SYSTOLIC BLOOD PRESSURE: 124 MMHG | RESPIRATION RATE: 19 BRPM | HEART RATE: 73 BPM

## 2024-04-30 DIAGNOSIS — I87.312 IDIOPATHIC CHRONIC VENOUS HYPERTENSION OF LEFT LOWER EXTREMITY WITH ULCER: ICD-10-CM

## 2024-04-30 DIAGNOSIS — L97.929 IDIOPATHIC CHRONIC VENOUS HYPERTENSION OF LEFT LOWER EXTREMITY WITH ULCER: ICD-10-CM

## 2024-04-30 DIAGNOSIS — I73.9 PERIPHERAL VASCULAR DISEASE, UNSPECIFIED: ICD-10-CM

## 2024-04-30 DIAGNOSIS — L60.0 INGROWN NAIL OF GREAT TOE OF RIGHT FOOT: ICD-10-CM

## 2024-04-30 DIAGNOSIS — L97.522 ULCER OF LEFT FOOT, WITH FAT LAYER EXPOSED: Primary | ICD-10-CM

## 2024-04-30 PROCEDURE — 11765 WEDGE EXCISION SKN NAIL FOLD: CPT | Mod: T5,,, | Performed by: STUDENT IN AN ORGANIZED HEALTH CARE EDUCATION/TRAINING PROGRAM

## 2024-04-30 PROCEDURE — 99499 UNLISTED E&M SERVICE: CPT | Mod: ,,, | Performed by: STUDENT IN AN ORGANIZED HEALTH CARE EDUCATION/TRAINING PROGRAM

## 2024-04-30 PROCEDURE — 99213 OFFICE O/P EST LOW 20 MIN: CPT

## 2024-05-01 NOTE — ASSESSMENT & PLAN NOTE
Medial edge excised and clipped - hemostatic  Bacitracin ointment and cover daily   OK to shower  RTC PRN

## 2024-05-01 NOTE — PROGRESS NOTES
[facility]  Wound Care  Progress Note        HPI:  Pt is a 89 y.o. male who presents with bilateral LE venous HTN with superficial ulceration to dorsal surface of left foot.  Complaint with wound care and around the clock leg elevation.  Reports pain to right great toe due to ingrowth of medial aspect.       Review of Systems:  12 point ROS negative save for those outlined in HPI          Vitals:    04/30/24 0932   BP: 124/79   Pulse: 73   Resp: 19   Temp: 98.4 °F (36.9 °C)           Physical Exam:  Gen: AAOx3; NAD  CV: RRR  Resp: breaths equal and nonlabored  Abd: Soft, NT, ND;  Ext: no c/c/e  Wound: healed left foot with no SOI  Ingrowth of medial edge of right great toe nail        Problem List Items Addressed This Visit          Derm    Ingrown nail of great toe of right foot    Current Assessment & Plan     Medial edge excised and clipped - hemostatic  Bacitracin ointment and cover daily   OK to shower  RTC PRN             Orthopedic    Idiopathic chronic venous hypertension of left lower extremity with ulcer    Current Assessment & Plan     Healed   Tubigrips ordered and given   Continue around the clock compression except when sleeping   RTC PRN           Other Visit Diagnoses       Ulcer of left foot, with fat layer exposed    -  Primary    Peripheral vascular disease, unspecified                  Plan:  See Wound Docs note for plan and follow up.    RTC 1 week     Carolina Burgos  [facility]

## 2024-05-01 NOTE — PROCEDURES
[facility]   General Surgery Department  Procedure Note     SUMMARY     Date of Procedure: 4/30/2024    Procedure: Procedure(s) (LRB):  Excision of nail fold of right great toe    Surgeon(s) and Role:  Carolina Burgos MD     Pre-Operative Diagnosis: Ingrown nail of right great toe     Post-Operative Diagnosis: Same         Anesthesia: Local    Findings:  Excision of medial nail fold of right great toe    Indications for the Procedure:  89yo male who presents with ingrown nail of great right toe    Procedure in Detail:   After informed consent was obtained and time out performed, the right great toe was prepped and draped in sterile fashion. A digital block was administered with 1% lidocaine without epi. Once the digit was confirmed numb by the patient, the nail plate along the medial nail fold was released and excised. The nail bed was cauterized with silver nitrate. Anitbiotic ointment and sterile dressing were applied. All sharps disposed of in appropriate receptacle. Patient tolerated procedure well and discharged to home in stable condition.     Complications: No    Estimated Blood Loss (EBL): None             Specimens: None           Condition: Good        Carolina Burgos MD  General Surgery   [phone]

## 2024-05-01 NOTE — ASSESSMENT & PLAN NOTE
Healed   Tubigrips ordered and given   Continue around the clock compression except when sleeping   RTC PRN

## 2024-05-08 ENCOUNTER — LAB VISIT (OUTPATIENT)
Dept: LAB | Facility: HOSPITAL | Age: 89
End: 2024-05-08
Attending: INTERNAL MEDICINE
Payer: MEDICARE

## 2024-05-08 DIAGNOSIS — I74.5 OCCLUSION OF RIGHT ILIAC ARTERY: ICD-10-CM

## 2024-05-08 DIAGNOSIS — I65.23 BILATERAL CAROTID ARTERY STENOSIS: ICD-10-CM

## 2024-05-08 DIAGNOSIS — I44.2 CHB (COMPLETE HEART BLOCK): ICD-10-CM

## 2024-05-08 DIAGNOSIS — I10 PRIMARY HYPERTENSION: ICD-10-CM

## 2024-05-08 DIAGNOSIS — I73.9 PAD (PERIPHERAL ARTERY DISEASE): ICD-10-CM

## 2024-05-08 DIAGNOSIS — L03.119 CELLULITIS OF LOWER EXTREMITY, UNSPECIFIED LATERALITY: ICD-10-CM

## 2024-05-08 DIAGNOSIS — E78.2 MIXED HYPERLIPIDEMIA: ICD-10-CM

## 2024-05-08 DIAGNOSIS — Z79.899 ENCOUNTER FOR LONG-TERM (CURRENT) USE OF OTHER MEDICATIONS: ICD-10-CM

## 2024-05-08 DIAGNOSIS — I50.9 CONGESTIVE HEART FAILURE, NYHA CLASS 2, UNSPECIFIED CONGESTIVE HEART FAILURE TYPE: ICD-10-CM

## 2024-05-08 LAB
ALBUMIN SERPL BCP-MCNC: 3.3 G/DL (ref 3.5–5.2)
ALBUMIN/CREAT UR: 6.1 UG/MG (ref 0–30)
ALP SERPL-CCNC: 53 U/L (ref 55–135)
ALT SERPL W/O P-5'-P-CCNC: 32 U/L (ref 10–44)
ANION GAP SERPL CALC-SCNC: 3 MMOL/L (ref 3–11)
AST SERPL-CCNC: 17 U/L (ref 10–40)
BASOPHILS # BLD AUTO: 0.01 K/UL (ref 0–0.2)
BASOPHILS NFR BLD: 0.2 % (ref 0–1.9)
BILIRUB SERPL-MCNC: 0.6 MG/DL (ref 0.1–1)
BUN SERPL-MCNC: 13 MG/DL (ref 10–30)
CALCIUM SERPL-MCNC: 9.2 MG/DL (ref 8.7–10.5)
CHLORIDE SERPL-SCNC: 106 MMOL/L (ref 95–110)
CHOLEST SERPL-MCNC: 211 MG/DL (ref 120–199)
CHOLEST/HDLC SERPL: 3.1 {RATIO} (ref 2–5)
CO2 SERPL-SCNC: 29 MMOL/L (ref 23–29)
CREAT SERPL-MCNC: 1 MG/DL (ref 0.5–1.4)
CREAT UR-MCNC: 82.5 MG/DL (ref 23–375)
DIFFERENTIAL METHOD BLD: ABNORMAL
EOSINOPHIL # BLD AUTO: 0.1 K/UL (ref 0–0.5)
EOSINOPHIL NFR BLD: 2.1 % (ref 0–8)
ERYTHROCYTE [DISTWIDTH] IN BLOOD BY AUTOMATED COUNT: 15.4 % (ref 11.5–14.5)
EST. GFR  (NO RACE VARIABLE): >60 ML/MIN/1.73 M^2
ESTIMATED AVG GLUCOSE: 131 MG/DL (ref 68–131)
GLUCOSE SERPL-MCNC: 115 MG/DL (ref 70–110)
HBA1C MFR BLD: 6.2 % (ref 4–5.6)
HCT VFR BLD AUTO: 43.1 % (ref 40–54)
HDLC SERPL-MCNC: 69 MG/DL (ref 40–75)
HDLC SERPL: 32.7 % (ref 20–50)
HGB BLD-MCNC: 13.9 G/DL (ref 14–18)
IMM GRANULOCYTES # BLD AUTO: 0.03 K/UL (ref 0–0.04)
IMM GRANULOCYTES NFR BLD AUTO: 0.5 % (ref 0–0.5)
LDLC SERPL CALC-MCNC: 118.4 MG/DL (ref 63–159)
LYMPHOCYTES # BLD AUTO: 0.6 K/UL (ref 1–4.8)
LYMPHOCYTES NFR BLD: 9.8 % (ref 18–48)
MCH RBC QN AUTO: 29.6 PG (ref 27–31)
MCHC RBC AUTO-ENTMCNC: 32.3 G/DL (ref 32–36)
MCV RBC AUTO: 92 FL (ref 82–98)
MICROALBUMIN UR DL<=1MG/L-MCNC: 5 MG/L
MONOCYTES # BLD AUTO: 0.4 K/UL (ref 0.3–1)
MONOCYTES NFR BLD: 7.2 % (ref 4–15)
NEUTROPHILS # BLD AUTO: 4.6 K/UL (ref 1.8–7.7)
NEUTROPHILS NFR BLD: 80.2 % (ref 38–73)
NONHDLC SERPL-MCNC: 142 MG/DL
NRBC BLD-RTO: 0 /100 WBC
PLATELET # BLD AUTO: 220 K/UL (ref 150–450)
PMV BLD AUTO: 9 FL (ref 9.2–12.9)
POTASSIUM SERPL-SCNC: 4.7 MMOL/L (ref 3.5–5.1)
PROT SERPL-MCNC: 7.9 G/DL (ref 6–8.4)
RBC # BLD AUTO: 4.7 M/UL (ref 4.6–6.2)
SODIUM SERPL-SCNC: 138 MMOL/L (ref 136–145)
T4 FREE SERPL-MCNC: 0.79 NG/DL (ref 0.71–1.51)
TRIGL SERPL-MCNC: 118 MG/DL (ref 30–150)
TSH SERPL DL<=0.005 MIU/L-ACNC: 3.47 UIU/ML (ref 0.4–4)
WBC # BLD AUTO: 5.72 K/UL (ref 3.9–12.7)

## 2024-05-08 PROCEDURE — 80053 COMPREHEN METABOLIC PANEL: CPT | Performed by: INTERNAL MEDICINE

## 2024-05-08 PROCEDURE — 85025 COMPLETE CBC W/AUTO DIFF WBC: CPT | Performed by: INTERNAL MEDICINE

## 2024-05-08 PROCEDURE — 36415 COLL VENOUS BLD VENIPUNCTURE: CPT | Performed by: INTERNAL MEDICINE

## 2024-05-08 PROCEDURE — 83036 HEMOGLOBIN GLYCOSYLATED A1C: CPT | Performed by: INTERNAL MEDICINE

## 2024-05-08 PROCEDURE — 80061 LIPID PANEL: CPT | Performed by: INTERNAL MEDICINE

## 2024-05-08 PROCEDURE — 84439 ASSAY OF FREE THYROXINE: CPT | Performed by: INTERNAL MEDICINE

## 2024-05-08 PROCEDURE — 82043 UR ALBUMIN QUANTITATIVE: CPT | Performed by: INTERNAL MEDICINE

## 2024-05-08 PROCEDURE — 84443 ASSAY THYROID STIM HORMONE: CPT | Performed by: INTERNAL MEDICINE

## 2024-06-11 ENCOUNTER — OFFICE VISIT (OUTPATIENT)
Dept: WOUND CARE | Facility: HOSPITAL | Age: 89
End: 2024-06-11
Attending: STUDENT IN AN ORGANIZED HEALTH CARE EDUCATION/TRAINING PROGRAM
Payer: MEDICARE

## 2024-06-11 VITALS
HEART RATE: 73 BPM | DIASTOLIC BLOOD PRESSURE: 60 MMHG | RESPIRATION RATE: 17 BRPM | TEMPERATURE: 98 F | SYSTOLIC BLOOD PRESSURE: 127 MMHG

## 2024-06-11 DIAGNOSIS — I87.312 IDIOPATHIC CHRONIC VENOUS HYPERTENSION OF LEFT LOWER EXTREMITY WITH ULCER: ICD-10-CM

## 2024-06-11 DIAGNOSIS — L97.929 IDIOPATHIC CHRONIC VENOUS HYPERTENSION OF LEFT LOWER EXTREMITY WITH ULCER: ICD-10-CM

## 2024-06-11 DIAGNOSIS — L97.919 IDIOPATHIC CHRONIC VENOUS HYPERTENSION OF RIGHT LOWER EXTREMITY WITH ULCER: ICD-10-CM

## 2024-06-11 DIAGNOSIS — I87.311 IDIOPATHIC CHRONIC VENOUS HYPERTENSION OF RIGHT LOWER EXTREMITY WITH ULCER: ICD-10-CM

## 2024-06-11 DIAGNOSIS — L97.511 NON-PRESSURE CHRONIC ULCER OF OTHER PART OF RIGHT FOOT LIMITED TO BREAKDOWN OF SKIN: Primary | ICD-10-CM

## 2024-06-11 DIAGNOSIS — L97.521 NON-PRESSURE CHRONIC ULCER OF OTHER PART OF LEFT FOOT LIMITED TO BREAKDOWN OF SKIN: ICD-10-CM

## 2024-06-11 DIAGNOSIS — I73.9 PERIPHERAL VASCULAR DISEASE, UNSPECIFIED: ICD-10-CM

## 2024-06-11 PROCEDURE — 99214 OFFICE O/P EST MOD 30 MIN: CPT | Mod: ,,, | Performed by: STUDENT IN AN ORGANIZED HEALTH CARE EDUCATION/TRAINING PROGRAM

## 2024-06-11 PROCEDURE — 99214 OFFICE O/P EST MOD 30 MIN: CPT

## 2024-06-12 RX ORDER — CLINDAMYCIN HYDROCHLORIDE 300 MG/1
300 CAPSULE ORAL 3 TIMES DAILY
Qty: 21 CAPSULE | Refills: 0 | Status: SHIPPED | OUTPATIENT
Start: 2024-06-12 | End: 2024-06-19

## 2024-06-17 NOTE — ASSESSMENT & PLAN NOTE
Silver Ag to 5th toe wound   ACE wrap from foot to below the knee   Pt advised to remove wrapping only when bathing and sleeping  Around the clock leg elevation while not ambulating

## 2024-06-17 NOTE — PROGRESS NOTES
[facility]  Wound Care  Progress Note        HPI:  Pt is a 89 y.o. male who presents with bilateral LE venous HTN who presents with recurrent swelling and pain to bilateral LE.  Reports compliance with compression socks.         Review of Systems:  12 point ROS negative save for those outlined in HPI          Vitals:    06/11/24 1000   BP: 127/60   Pulse: 73   Resp: 17   Temp: 98.4 °F (36.9 °C)           Physical Exam:  Gen: AAOx3; NAD  CV: RRR  Resp: breaths equal and nonlabored  Abd: Soft, NT, ND;  Ext: +2 pitting edema to bilateral LE with mild erythema from feet to mid shin.  No open wound or excoriations.  Evidence of dried exudate around toe nails and interdigit skin maceration.   Small 0.2 cm wound to 5th toe without necrosis         Problem List Items Addressed This Visit          Orthopedic    Idiopathic chronic venous hypertension of right lower extremity with ulcer    Current Assessment & Plan     Silver Ag to 5th toe wound   ACE wrap from foot to below the knee   Pt advised to remove wrapping only when bathing and sleeping  Around the clock leg elevation while not ambulating          Idiopathic chronic venous hypertension of left lower extremity with ulcer    Current Assessment & Plan     See right lower extremity           Other Visit Diagnoses       Non-pressure chronic ulcer of other part of right foot limited to breakdown of skin    -  Primary    Peripheral vascular disease, unspecified        Non-pressure chronic ulcer of other part of left foot limited to breakdown of skin                  Plan:  See Wound Docs note for plan and follow up.    RTC 1 week     Carolina Burgos  [facility]

## 2024-06-18 ENCOUNTER — OFFICE VISIT (OUTPATIENT)
Dept: WOUND CARE | Facility: HOSPITAL | Age: 89
End: 2024-06-18
Attending: STUDENT IN AN ORGANIZED HEALTH CARE EDUCATION/TRAINING PROGRAM
Payer: MEDICARE

## 2024-06-18 VITALS
RESPIRATION RATE: 18 BRPM | DIASTOLIC BLOOD PRESSURE: 63 MMHG | TEMPERATURE: 98 F | SYSTOLIC BLOOD PRESSURE: 124 MMHG | HEART RATE: 70 BPM

## 2024-06-18 DIAGNOSIS — I87.311 IDIOPATHIC CHRONIC VENOUS HYPERTENSION OF RIGHT LOWER EXTREMITY WITH ULCER: ICD-10-CM

## 2024-06-18 DIAGNOSIS — L97.929 IDIOPATHIC CHRONIC VENOUS HYPERTENSION OF LEFT LOWER EXTREMITY WITH ULCER: ICD-10-CM

## 2024-06-18 DIAGNOSIS — I73.9 PERIPHERAL VASCULAR DISEASE, UNSPECIFIED: Primary | ICD-10-CM

## 2024-06-18 DIAGNOSIS — I87.312 IDIOPATHIC CHRONIC VENOUS HYPERTENSION OF LEFT LOWER EXTREMITY WITH ULCER: ICD-10-CM

## 2024-06-18 DIAGNOSIS — L97.919 IDIOPATHIC CHRONIC VENOUS HYPERTENSION OF RIGHT LOWER EXTREMITY WITH ULCER: ICD-10-CM

## 2024-06-18 PROCEDURE — 99213 OFFICE O/P EST LOW 20 MIN: CPT

## 2024-06-18 PROCEDURE — 99213 OFFICE O/P EST LOW 20 MIN: CPT | Mod: ,,, | Performed by: STUDENT IN AN ORGANIZED HEALTH CARE EDUCATION/TRAINING PROGRAM

## 2024-06-20 ENCOUNTER — PATIENT OUTREACH (OUTPATIENT)
Dept: EMERGENCY MEDICINE | Facility: HOSPITAL | Age: 89
End: 2024-06-20
Payer: MEDICARE

## 2024-06-20 NOTE — PROGRESS NOTES
Pt is doing well. Pt has all his appointments scheduled. Pt has no needs, he expressed.    ED navigator ensured there was nothing she could help patient with. ED navigator may contact pt again if he comes back into ER and falls under report.    Dana Rainey  ED Navigator  [phone]

## 2024-06-21 NOTE — PROGRESS NOTES
[facility]  Wound Care  Progress Note        HPI:  Pt is a 89 y.o. male who presents with bilateral LE venous HTN who presents with recurrent swelling and pain to bilateral LE.  Says legs much improved.  Voices no complaints      Review of Systems:  12 point ROS negative save for those outlined in HPI          Vitals:    06/18/24 1143   BP: 124/63   Pulse: 70   Resp: 18   Temp: 98.4 °F (36.9 °C)           Physical Exam:  Gen: AAOx3; NAD  CV: RRR  Resp: breaths equal and nonlabored  Abd: Soft, NT, ND;  Ext:  Bilateral lower extremities without edema or wounds.  Stable, dry scab to dorsum of 5th right toe with no SOI  dry, stable scab to medial left foot with no SOI        Problem List Items Addressed This Visit          Orthopedic    Idiopathic chronic venous hypertension of right lower extremity with ulcer    Current Assessment & Plan     Tubigrips given  Return to clinic 1 week for surveillance visit         Idiopathic chronic venous hypertension of left lower extremity with ulcer     Other Visit Diagnoses       Peripheral vascular disease, unspecified    -  Primary              Plan:  See Wound Docs note for plan and follow up.    RTC 1 week     Carolina Burgos  [facility]

## 2024-06-25 ENCOUNTER — OFFICE VISIT (OUTPATIENT)
Dept: WOUND CARE | Facility: HOSPITAL | Age: 89
End: 2024-06-25
Attending: STUDENT IN AN ORGANIZED HEALTH CARE EDUCATION/TRAINING PROGRAM
Payer: MEDICARE

## 2024-06-25 VITALS
DIASTOLIC BLOOD PRESSURE: 66 MMHG | SYSTOLIC BLOOD PRESSURE: 121 MMHG | TEMPERATURE: 98 F | HEART RATE: 69 BPM | RESPIRATION RATE: 18 BRPM

## 2024-06-25 DIAGNOSIS — I73.9 PERIPHERAL VASCULAR DISEASE, UNSPECIFIED: Primary | ICD-10-CM

## 2024-06-25 PROCEDURE — 99212 OFFICE O/P EST SF 10 MIN: CPT

## 2024-06-25 PROCEDURE — 99499 UNLISTED E&M SERVICE: CPT | Mod: ,,, | Performed by: STUDENT IN AN ORGANIZED HEALTH CARE EDUCATION/TRAINING PROGRAM

## 2024-06-25 NOTE — PROGRESS NOTES
[facility]  Wound Care  Progress Note        HPI:  Pt is a 89 y.o. male who presents with bilateral LE venous HTN who presents with recurrent swelling and pain to bilateral LE.  Voices no complaints.  Has been compliant with around the clock compression sock use.    Review of Systems:  12 point ROS negative save for those outlined in HPI          Vitals:    06/25/24 1009   BP: 121/66   Pulse: 69   Resp: 18   Temp: 98.4 °F (36.9 °C)           Physical Exam:  Gen: AAOx3; NAD  CV: RRR  Resp: breaths equal and nonlabored  Abd: Soft, NT, ND;  Ext:  no c/c/e to bilateral lower extremities.  Dependent rubor to left lower extremity while sitting.  No ulcers or drainage        Problem List Items Addressed This Visit    None  Visit Diagnoses       Peripheral vascular disease, unspecified    -  Primary              Plan:  See Wound Docs note for plan and follow up.  Continue around the clock compression sock use  Return precautions given  Return to clinic p.r.n.    Carolina Burgos  [facility]

## 2024-07-22 PROBLEM — L03.90 CELLULITIS: Status: RESOLVED | Noted: 2024-04-01 | Resolved: 2024-07-22

## 2024-08-09 PROBLEM — E66.3 OVERWEIGHT (BMI 25.0-29.9): Status: ACTIVE | Noted: 2024-08-09

## 2024-09-02 PROBLEM — Z00.00 ROUTINE GENERAL MEDICAL EXAMINATION AT A HEALTH CARE FACILITY: Status: RESOLVED | Noted: 2022-12-07 | Resolved: 2024-09-02

## 2025-01-27 PROBLEM — I70.223 ATHEROSCLEROSIS OF NATIVE ARTERY OF BOTH LOWER EXTREMITIES WITH REST PAIN: Status: ACTIVE | Noted: 2023-08-25

## 2025-01-27 PROBLEM — N39.46 MIXED STRESS AND URGE URINARY INCONTINENCE: Status: ACTIVE | Noted: 2025-01-27

## 2025-01-29 PROBLEM — L03.119 LOWER EXTREMITY CELLULITIS: Status: ACTIVE | Noted: 2024-04-01

## 2025-02-11 ENCOUNTER — OFFICE VISIT (OUTPATIENT)
Dept: WOUND CARE | Facility: HOSPITAL | Age: OVER 89
End: 2025-02-11
Attending: STUDENT IN AN ORGANIZED HEALTH CARE EDUCATION/TRAINING PROGRAM
Payer: MEDICARE

## 2025-02-11 VITALS — SYSTOLIC BLOOD PRESSURE: 158 MMHG | DIASTOLIC BLOOD PRESSURE: 92 MMHG | HEART RATE: 70 BPM

## 2025-02-11 DIAGNOSIS — L97.812 NON-PRS CHRONIC ULCER OTH PRT R LOW LEG W FAT LAYER EXPOSED: ICD-10-CM

## 2025-02-11 DIAGNOSIS — I73.9 PERIPHERAL VASCULAR DISEASE, UNSPECIFIED: Primary | ICD-10-CM

## 2025-02-11 DIAGNOSIS — I87.311 IDIOPATHIC CHRONIC VENOUS HYPERTENSION OF RIGHT LOWER EXTREMITY WITH ULCER: ICD-10-CM

## 2025-02-11 DIAGNOSIS — L97.929 IDIOPATHIC CHRONIC VENOUS HYPERTENSION OF LEFT LOWER EXTREMITY WITH ULCER: ICD-10-CM

## 2025-02-11 DIAGNOSIS — L97.919 IDIOPATHIC CHRONIC VENOUS HYPERTENSION OF RIGHT LOWER EXTREMITY WITH ULCER: ICD-10-CM

## 2025-02-11 DIAGNOSIS — I87.312 IDIOPATHIC CHRONIC VENOUS HYPERTENSION OF LEFT LOWER EXTREMITY WITH ULCER: ICD-10-CM

## 2025-02-11 DIAGNOSIS — L97.822 NON-PRS CHRONIC ULCER OTH PRT L LOW LEG W FAT LAYER EXPOSED: ICD-10-CM

## 2025-02-11 PROCEDURE — 99214 OFFICE O/P EST MOD 30 MIN: CPT | Mod: 25

## 2025-02-11 PROCEDURE — 11042 DBRDMT SUBQ TIS 1ST 20SQCM/<: CPT

## 2025-02-11 PROCEDURE — 99499 UNLISTED E&M SERVICE: CPT | Mod: ,,, | Performed by: STUDENT IN AN ORGANIZED HEALTH CARE EDUCATION/TRAINING PROGRAM

## 2025-02-11 PROCEDURE — 11042 DBRDMT SUBQ TIS 1ST 20SQCM/<: CPT | Mod: ,,, | Performed by: STUDENT IN AN ORGANIZED HEALTH CARE EDUCATION/TRAINING PROGRAM

## 2025-02-18 ENCOUNTER — OFFICE VISIT (OUTPATIENT)
Dept: WOUND CARE | Facility: HOSPITAL | Age: OVER 89
End: 2025-02-18
Attending: STUDENT IN AN ORGANIZED HEALTH CARE EDUCATION/TRAINING PROGRAM
Payer: MEDICARE

## 2025-02-18 VITALS
DIASTOLIC BLOOD PRESSURE: 76 MMHG | TEMPERATURE: 98 F | HEART RATE: 83 BPM | RESPIRATION RATE: 18 BRPM | SYSTOLIC BLOOD PRESSURE: 139 MMHG

## 2025-02-18 DIAGNOSIS — L97.929 CHRONIC VENOUS HYPERTENSION W ULCER OF BILATERAL LOW EXTRM: ICD-10-CM

## 2025-02-18 DIAGNOSIS — L97.522 ULCER OF LEFT FOOT, WITH FAT LAYER EXPOSED: ICD-10-CM

## 2025-02-18 DIAGNOSIS — L97.919 CHRONIC VENOUS HYPERTENSION W ULCER OF BILATERAL LOW EXTRM: ICD-10-CM

## 2025-02-18 DIAGNOSIS — I87.311 IDIOPATHIC CHRONIC VENOUS HYPERTENSION OF RIGHT LOWER EXTREMITY WITH ULCER: ICD-10-CM

## 2025-02-18 DIAGNOSIS — L97.511 NON-PRESSURE CHRONIC ULCER OF OTHER PART OF RIGHT FOOT LIMITED TO BREAKDOWN OF SKIN: ICD-10-CM

## 2025-02-18 DIAGNOSIS — I87.312 IDIOPATHIC CHRONIC VENOUS HYPERTENSION OF LEFT LOWER EXTREMITY WITH ULCER: ICD-10-CM

## 2025-02-18 DIAGNOSIS — I73.9 PERIPHERAL VASCULAR DISEASE, UNSPECIFIED: Primary | ICD-10-CM

## 2025-02-18 DIAGNOSIS — I87.313 CHRONIC VENOUS HYPERTENSION W ULCER OF BILATERAL LOW EXTRM: ICD-10-CM

## 2025-02-18 DIAGNOSIS — L97.919 IDIOPATHIC CHRONIC VENOUS HYPERTENSION OF RIGHT LOWER EXTREMITY WITH ULCER: ICD-10-CM

## 2025-02-18 DIAGNOSIS — L97.812 NON-PRS CHRONIC ULCER OTH PRT R LOW LEG W FAT LAYER EXPOSED: ICD-10-CM

## 2025-02-18 DIAGNOSIS — L97.822 NON-PRS CHRONIC ULCER OTH PRT L LOW LEG W FAT LAYER EXPOSED: ICD-10-CM

## 2025-02-18 DIAGNOSIS — L97.929 IDIOPATHIC CHRONIC VENOUS HYPERTENSION OF LEFT LOWER EXTREMITY WITH ULCER: ICD-10-CM

## 2025-02-18 PROCEDURE — 11045 DBRDMT SUBQ TISS EACH ADDL: CPT

## 2025-02-18 PROCEDURE — 99499 UNLISTED E&M SERVICE: CPT | Mod: ,,, | Performed by: STUDENT IN AN ORGANIZED HEALTH CARE EDUCATION/TRAINING PROGRAM

## 2025-02-18 PROCEDURE — 11042 DBRDMT SUBQ TIS 1ST 20SQCM/<: CPT | Mod: ,,, | Performed by: STUDENT IN AN ORGANIZED HEALTH CARE EDUCATION/TRAINING PROGRAM

## 2025-02-18 PROCEDURE — 11045 DBRDMT SUBQ TISS EACH ADDL: CPT | Mod: ,,, | Performed by: STUDENT IN AN ORGANIZED HEALTH CARE EDUCATION/TRAINING PROGRAM

## 2025-02-18 PROCEDURE — 11042 DBRDMT SUBQ TIS 1ST 20SQCM/<: CPT

## 2025-02-22 NOTE — H&P
[facility]  Wound Care  History and Physical          HPI:  89yo male with history of bilateral LE venous hypertension and PAD who presents with recurrent bilateral LE swelling and venous stasis ulcers.     History:    Past Medical History:   Diagnosis Date    Aortic valve stenosis     Arthritis     BPH (benign prostatic hyperplasia)     Carotid artery stenosis     Chronic diastolic heart failure     ETOH abuse     Exposure to COVID-19 virus 01/07/2022    Hyperchloremia     Kidney stones     Murmur     Polymyalgia rheumatica     PVD (peripheral vascular disease)     Renal artery stenosis        Past Surgical History:   Procedure Laterality Date    A-V CARDIAC PACEMAKER INSERTION N/A 10/6/2023    Procedure: INSERTION, CARDIAC PACEMAKER, DUAL CHAMBER;  Surgeon: Douglas Salguero MD;  Location: [facility] CATH;  Service: Cardiology;  Laterality: N/A;    ANGIOGRAM, CAROTID Left 8/25/2023    Procedure: ANGIOGRAM, CAROTID;  Surgeon: Nick Box MD;  Location: [facility] CATH;  Service: Cardiology;  Laterality: Left;    CATARACT EXTRACTION, BILATERAL      ECHOCARDIOGRAM,TRANSESOPHAGEAL N/A 10/3/2023    Procedure: Transesophageal echo (JOSE ARMANDO) intra-procedure log documentation;  Surgeon: Nick Box MD;  Location: [facility] OR;  Service: Cardiology;  Laterality: N/A;    HAND SURGERY      INSERTION OF STENT INTO PERIPHERAL VESSEL N/A 1/30/2025    Procedure: INSERTION, STENT, VESSEL, PERIPHERAL;  Surgeon: Harsha Salcedo MD;  Location: [facility] CATH;  Service: Cardiology;  Laterality: N/A;    PERCUTANEOUS TRANSCATHETER AORTIC VALVE REPLACEMENT (TAVR) Left 10/3/2023    Procedure: REPLACEMENT, AORTIC VALVE, PERCUTANEOUS, TRANSCATHETER;  Surgeon: Nick Box MD;  Location: [facility] OR;  Service: Cardiology;  Laterality: Left;    PERCUTANEOUS TRANSCATHETER AORTIC VALVE REPLACEMENT (TAVR) Left 10/3/2023    Procedure: REPLACEMENT, AORTIC VALVE, PERCUTANEOUS, TRANSCATHETER carotid access;  Surgeon: Jun Brito MD;   Location: [facility] OR;  Service: Cardiovascular;  Laterality: Left;    PERCUTANEOUS TRANSLUMINAL ANGIOPLASTY N/A 8/25/2023    Procedure: ANGIOPLASTY-PERCUTANEOUS TRANSLUMINAL (PTA);  Surgeon: Nick Box MD;  Location: [facility] CATH;  Service: Cardiology;  Laterality: N/A;    SHOULDER SURGERY Left     total shoulder replacement    WOUND EXPLORATION N/A 10/3/2023    Procedure: EXPLORATION, WOUND;  Surgeon: Jun Brito MD;  Location: [facility] OR;  Service: Cardiology;  Laterality: N/A;       Family History   Problem Relation Name Age of Onset    Cancer Mother      Stroke Father          reports that he has quit smoking. His smoking use included cigars. He has never used smokeless tobacco. He reports current alcohol use of about 3.0 standard drinks of alcohol per week. He reports that he does not use drugs.    has a current medication list which includes the following prescription(s): amlodipine, aspirin, clopidogrel, rosuvastatin, solifenacin, and tamsulosin.    Allergies:  Patient has no known allergies.    Review of Systems:  Review of Systems   Constitutional:  Negative for chills, fever, malaise/fatigue and weight loss.   Respiratory:  Negative for cough.    Cardiovascular:  Negative for chest pain.   Gastrointestinal:  Negative for abdominal pain, blood in stool, constipation, diarrhea, heartburn, melena, nausea and vomiting.   All other systems reviewed and are negative.        Vitals:    02/11/25 1052   BP: (!) 158/92   Pulse: 70         BMI:  There is no height or weight on file to calculate BMI.    Physical Exam:  Physical Exam  Constitutional:       General: He is not in acute distress.     Appearance: He is not ill-appearing or toxic-appearing.   Cardiovascular:      Rate and Rhythm: Normal rate and regular rhythm.   Pulmonary:      Effort: Pulmonary effort is normal. No respiratory distress.   Abdominal:      General: There is no distension.      Palpations: Abdomen is soft.      Tenderness: There is no  "abdominal tenderness. There is no guarding or rebound.   Skin:     General: Skin is warm and dry.      Capillary Refill: Capillary refill takes less than 2 seconds.      Comments: +3 pitting edema to bilateral LE  13 x 12 x 0.1 cm skin excoriation and slough to ant right leg   10 x 20 x 0.1 cm circumferential area of skin excoriation and slough to LLE    Neurological:      Mental Status: He is alert.         A1C:  No results for input(s): "HGBA1C" in the last 2160 hours.  BMP:  Recent Labs   Lab Result Units 01/31/25  0532   Glucose mg/dL 116*   Sodium mmol/L 137   Potassium mmol/L 4.2   Chloride mmol/L 108   CO2 mmol/L 27   BUN mg/dL 26*   Creatinine mg/dL 1.21   Calcium mg/dL 8.8      CBC:  Recent Labs   Lab Result Units 01/29/25  1704 01/30/25  0535 01/31/25  0532   WBC K/uL 7.70 7.00 8.00   RBC M/uL 4.69 4.31* 4.31*   Hemoglobin g/dL 14.9 14.2 14.0   Hematocrit % 43.8 40.2 40.6   Platelets K/uL 185 178 185   MCV fL 93 93 94   MCH pg 31.8* 33.0* 32.4*   MCHC g/dL 34.0 35.3 34.4     CMP:  Recent Labs   Lab Result Units 01/29/25  1704 01/30/25  0535 01/31/25  0532   Glucose mg/dL 108* 117* 116*   Calcium mg/dL 9.0 8.5 8.8   Albumin g/dL 3.8 3.4* 3.5   Total Protein g/dL 7.0 6.6 6.9   Sodium mmol/L 139 141 137   Potassium mmol/L 4.7 4.1 4.2   CO2 mmol/L 25 27 27   Chloride mmol/L 106 108 108   BUN mg/dL 42* 33* 26*   Alkaline Phosphatase U/L 49 43 42   ALT U/L 25 23 23   AST U/L 27 29 35   Total Bilirubin mg/dL 0.7 0.9 0.9     PREALBUMIN:  No results for input(s): "PREALBUMIN" in the last 2160 hours.  WOUND CULTURES:  Recent Labs   Lab Result Units 01/29/25  1735   Aerobic Bacterial Culture  PROVIDENCIA RETTGERI  Few  *           Plan:  Problem List Items Addressed This Visit       Idiopathic chronic venous hypertension of right lower extremity with ulcer    Idiopathic chronic venous hypertension of left lower extremity with ulcer     Other Visit Diagnoses         Peripheral vascular disease, unspecified    -  " Primary      Non-prs chronic ulcer oth prt l low leg w fat layer exposed          Non-prs chronic ulcer oth prt r low leg w fat layer exposed                See Wound Docs note for plan and follow up.    Bilateral LE debrided   Silver Ag + multi layer wrap to bilateral LE   Unna boot contraindicated by HH due to profound PAD   Followed by [facility] - has angio scheduled for later this week     RTC 1 week     Katrina Castille [facility]

## 2025-02-24 NOTE — PROGRESS NOTES
[facility]  Wound Care  Progress Note        HPI:    89yo male with history of bilateral venous hypertension and PAD complicated by bilateral venous stasis ulceration and swelling who presents for wound check.  S/p bilateral LE angio with  of right iliac, 99% stenosis of left CFA s/p arthrectomy with PTA, and PTA of left IRVING.    Review of Systems:  12 point ROS negative save for those outlined in HPI          Vitals:    02/18/25 1040   BP: 139/76   Pulse: 83   Resp: 18   Temp: 98.2 °F (36.8 °C)           Physical Exam:  Gen: AAOx3; NAD  CV: RRR  Resp: breaths equal and nonlabored  Abd: Soft, NT, ND;  Ext: +2 pitting edema to bilateral LE   Wound: 17 x 13 x 0.1 cm superficial ulcer to ant right leg   15 x 15 x 0.1 cm circumferential superficial ulcer to left lower leg         Problem List Items Addressed This Visit       Idiopathic chronic venous hypertension of right lower extremity with ulcer    Idiopathic chronic venous hypertension of left lower extremity with ulcer     Other Visit Diagnoses         Peripheral vascular disease, unspecified    -  Primary      Non-prs chronic ulcer oth prt l low leg w fat layer exposed          Non-prs chronic ulcer oth prt r low leg w fat layer exposed          Non-pressure chronic ulcer of other part of right foot limited to breakdown of skin          Ulcer of left foot, with fat layer exposed          Chronic venous hypertension w ulcer of bilateral low extrm                  Plan:  See Wound Docs note for plan and follow up.    --Wounds debrided   --Pt with severe PAD s/p angio with PTA of left CFA and IRVING with  of right IRVING  --Not candidate for unna boot placement due severity of disease and risk of critical limb ischemia   --Cardiology plans to observe wound healing for 1 month, with plans for LLE revascularization if not improvement seen.    --RLE revascularization is last resort   -Continue silver Ag + light to moderate compression wraps  daily  -Leg elevation while not ambulating     RTC 1 week       Carolinaduran Burgos  [facility]

## 2025-02-25 ENCOUNTER — OFFICE VISIT (OUTPATIENT)
Dept: WOUND CARE | Facility: HOSPITAL | Age: OVER 89
End: 2025-02-25
Attending: STUDENT IN AN ORGANIZED HEALTH CARE EDUCATION/TRAINING PROGRAM
Payer: MEDICARE

## 2025-02-25 VITALS — HEART RATE: 85 BPM | SYSTOLIC BLOOD PRESSURE: 135 MMHG | RESPIRATION RATE: 18 BRPM | DIASTOLIC BLOOD PRESSURE: 78 MMHG

## 2025-02-25 DIAGNOSIS — L97.812 NON-PRS CHRONIC ULCER OTH PRT R LOW LEG W FAT LAYER EXPOSED: ICD-10-CM

## 2025-02-25 DIAGNOSIS — I87.312 IDIOPATHIC CHRONIC VENOUS HYPERTENSION OF LEFT LOWER EXTREMITY WITH ULCER: ICD-10-CM

## 2025-02-25 DIAGNOSIS — L97.822 NON-PRS CHRONIC ULCER OTH PRT L LOW LEG W FAT LAYER EXPOSED: ICD-10-CM

## 2025-02-25 DIAGNOSIS — I87.311 IDIOPATHIC CHRONIC VENOUS HYPERTENSION OF RIGHT LOWER EXTREMITY WITH ULCER: ICD-10-CM

## 2025-02-25 DIAGNOSIS — I73.9 PERIPHERAL VASCULAR DISEASE, UNSPECIFIED: Primary | ICD-10-CM

## 2025-02-25 DIAGNOSIS — L97.929 IDIOPATHIC CHRONIC VENOUS HYPERTENSION OF LEFT LOWER EXTREMITY WITH ULCER: ICD-10-CM

## 2025-02-25 DIAGNOSIS — L97.919 IDIOPATHIC CHRONIC VENOUS HYPERTENSION OF RIGHT LOWER EXTREMITY WITH ULCER: ICD-10-CM

## 2025-02-25 PROCEDURE — 11042 DBRDMT SUBQ TIS 1ST 20SQCM/<: CPT | Mod: ,,, | Performed by: STUDENT IN AN ORGANIZED HEALTH CARE EDUCATION/TRAINING PROGRAM

## 2025-02-25 PROCEDURE — 11042 DBRDMT SUBQ TIS 1ST 20SQCM/<: CPT

## 2025-02-25 PROCEDURE — 99499 UNLISTED E&M SERVICE: CPT | Mod: ,,, | Performed by: STUDENT IN AN ORGANIZED HEALTH CARE EDUCATION/TRAINING PROGRAM

## 2025-02-25 PROCEDURE — 11045 DBRDMT SUBQ TISS EACH ADDL: CPT

## 2025-02-25 PROCEDURE — 11045 DBRDMT SUBQ TISS EACH ADDL: CPT | Mod: ,,, | Performed by: STUDENT IN AN ORGANIZED HEALTH CARE EDUCATION/TRAINING PROGRAM

## 2025-02-28 NOTE — PROGRESS NOTES
[facility]  Wound Care  Progress Note        HPI:    89yo male with history of bilateral venous hypertension and PAD complicated by bilateral venous stasis ulceration and swelling who presents for wound check.  S/p bilateral LE angio with  of right iliac, 99% stenosis of left CFA s/p arthrectomy with PTA, and PTA of left IRVING.      Compliant with light compression.  Still reporting lots of drainage.     Review of Systems:  12 point ROS negative save for those outlined in HPI          Vitals:    02/25/25 1042   BP: 135/78   Pulse: 85   Resp: 18           Physical Exam:  Gen: AAOx3; NAD  CV: RRR  Resp: breaths equal and nonlabored  Abd: Soft, NT, ND;  Ext: +2 pitting edema to bilateral LE   Wound: 16 x 14 x 0.1 cm superficial ulcer to ant right leg   14 x 15 x 0.1 cm circumferential superficial ulcer to left lower leg         Problem List Items Addressed This Visit       Idiopathic chronic venous hypertension of right lower extremity with ulcer    Idiopathic chronic venous hypertension of left lower extremity with ulcer     Other Visit Diagnoses         Peripheral vascular disease, unspecified    -  Primary      Non-prs chronic ulcer oth prt l low leg w fat layer exposed          Non-prs chronic ulcer oth prt r low leg w fat layer exposed                  Plan:  See Wound Docs note for plan and follow up.    --Wounds debrided   --Pt with severe PAD s/p angio with PTA of left CFA and IRVING with  of right IRVING  --Not candidate for unna boot placement due severity of disease and risk of critical limb ischemia   --Cardiology plans to observe wound healing for 1 month, with plans for LLE revascularization if not improvement seen.    --RLE revascularization is last resort   -Continue silver Ag + moderate compression wraps daily  -Leg elevation while not ambulating     RTC 1 week       Carolina Burgos  [facility]

## 2025-03-04 ENCOUNTER — HOSPITAL ENCOUNTER (EMERGENCY)
Facility: HOSPITAL | Age: OVER 89
Discharge: HOME | End: 2025-03-04
Attending: EMERGENCY MEDICINE
Payer: MEDICARE

## 2025-03-04 VITALS
HEIGHT: 69 IN | SYSTOLIC BLOOD PRESSURE: 181 MMHG | WEIGHT: 205 LBS | HEART RATE: 73 BPM | DIASTOLIC BLOOD PRESSURE: 75 MMHG | RESPIRATION RATE: 16 BRPM | OXYGEN SATURATION: 98 % | BODY MASS INDEX: 30.36 KG/M2 | TEMPERATURE: 98 F
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
## 2025-03-04 DIAGNOSIS — M25.551 PAIN OF RIGHT HIP: Primary | ICD-10-CM

## 2025-03-04 DIAGNOSIS — M54.50 RIGHT-SIDED LOW BACK PAIN WITHOUT SCIATICA, UNSPECIFIED CHRONICITY: ICD-10-CM

## 2025-03-04 PROCEDURE — 99284 EMERGENCY DEPT VISIT MOD MDM: CPT | Mod: 25

## 2025-03-04 PROCEDURE — 25000003 PHARM REV CODE 250: Performed by: EMERGENCY MEDICINE

## 2025-03-04 RX ORDER — OXYBUTYNIN CHLORIDE 5 MG/1
TABLET, EXTENDED RELEASE ORAL
Status: ON HOLD | COMMUNITY
Start: 2025-02-07

## 2025-03-04 RX ORDER — TRAMADOL HYDROCHLORIDE 50 MG/1
50 TABLET ORAL
Status: COMPLETED | OUTPATIENT
Start: 2025-03-04 | End: 2025-03-04

## 2025-03-04 RX ORDER — FINASTERIDE 5 MG/1
5 TABLET, FILM COATED ORAL DAILY
Status: ON HOLD | COMMUNITY

## 2025-03-04 RX ADMIN — TRAMADOL HYDROCHLORIDE 50 MG: 50 TABLET, COATED ORAL at 01:03

## 2025-03-04 NOTE — ED NOTES
Indwelling willams catheter intact upon patient's arrival.  Pt states he sees Dr Jules.  Pt states when the catheter was put in they had difficulty due to enlarged prostate

## 2025-03-04 NOTE — ED NOTES
Patients diaper brown with foul odor.  Diaper removed and patient cleansed.  Clean pad placed under patient.  Bolton intact, draining clear yellow urine in bag.

## 2025-03-04 NOTE — ED NOTES
Patient stood up with assistance.  Pt able to ambulate with help, state walking better then he was when he came in.  Pt's right lower extremity dressed with abd pad, rosalie, ace bandage and non skid socks applied and slippers put on.  Pt helped into wheelchair.  Bolton drained prior to d/c.

## 2025-03-04 NOTE — ED NOTES
Pt repositioned. Pillow placed to right side of midback and pt pulled up in bed; warm blanket given to pt's family member.

## 2025-03-04 NOTE — ED NOTES
Daughter at the bedside noticed dressing to right lower leg wet.  Dressing removed.  Purple dye and clear salve on legs.  Wound left open to air at this time and will be redressed.

## 2025-03-04 NOTE — ED PROVIDER NOTES
[facility] - EMERGENCY DEPARTMENT  EMERGENCY DEPARTMENT ENCOUNTER    Pt Name:  Tobi Liz Jr.  MRN:  [id number]  YOB: 1929  Date of evaluation: 3/4/2025  Provider: Hansel Lewis MD      CHIEF COMPLAINT:     Chief Complaint   Patient presents with    Back Pain     Pt stated that yesterday he began experiencing left sided groin / left lower back pain - that has since caused mobility issues to left leg - resulting in fall last night. Bolton in place - denied dysuria       HPI history provided by the patient.    HISTORY IF PRESENT ILLNESS:  (location/symptom, timing/onset, context/setting, quality, duration, modifying factors, severity)   Note limiting factors.     Tobi Liz Jr. is a 89 y.o. male who presents to the emergency department for right hip pain.  Patient tells me that he began having right-sided groin pain that radiated into his right lower back.  He then had a fall last night.  He tells me that he was attempting to pick his wife up off the ground after she fell.  This then caused him to fall onto his right hip.  Paramedics got there last night and lifted him into a chair where he basically sat the rest of the evening.  He complained that his right hip was hurt and he was unable to get comfortable.  This afternoon he has been unable to walk secondary to him having a right hip pain.    Nursing notes were reviewed    REVIEW OF SYSTEMS:     Review of Systems   Constitutional:  Negative for fatigue and fever.   Genitourinary:  Positive for flank pain (Right). Negative for hematuria.        Indwelling Bolton   Musculoskeletal:  Positive for arthralgias (Right hip), back pain (Right back) and gait problem (Secondary to right hip pain).       PAST MEDICAL HISTORY:     Past Medical History:   Diagnosis Date    Aortic valve stenosis     Arthritis     BPH (benign prostatic hyperplasia)     Carotid artery stenosis     Chronic diastolic heart failure     ETOH abuse     Exposure to COVID-19 virus  01/07/2022    Hyperchloremia     Kidney stones     Murmur     Polymyalgia rheumatica     PVD (peripheral vascular disease)     Renal artery stenosis        SURGICAL HISTORY:     Past Surgical History:   Procedure Laterality Date    A-V CARDIAC PACEMAKER INSERTION N/A 10/6/2023    Procedure: INSERTION, CARDIAC PACEMAKER, DUAL CHAMBER;  Surgeon: Douglas Salguero MD;  Location: [facility] CATH;  Service: Cardiology;  Laterality: N/A;    ANGIOGRAM, CAROTID Left 8/25/2023    Procedure: ANGIOGRAM, CAROTID;  Surgeon: Nick Box MD;  Location: [facility] CATH;  Service: Cardiology;  Laterality: Left;    CATARACT EXTRACTION, BILATERAL      ECHOCARDIOGRAM,TRANSESOPHAGEAL N/A 10/3/2023    Procedure: Transesophageal echo (JOSE ARMANDO) intra-procedure log documentation;  Surgeon: Nick Box MD;  Location: [facility] OR;  Service: Cardiology;  Laterality: N/A;    HAND SURGERY      INSERTION OF STENT INTO PERIPHERAL VESSEL N/A 1/30/2025    Procedure: INSERTION, STENT, VESSEL, PERIPHERAL;  Surgeon: Harsha Salcedo MD;  Location: [facility] CATH;  Service: Cardiology;  Laterality: N/A;    PERCUTANEOUS TRANSCATHETER AORTIC VALVE REPLACEMENT (TAVR) Left 10/3/2023    Procedure: REPLACEMENT, AORTIC VALVE, PERCUTANEOUS, TRANSCATHETER;  Surgeon: Nick Box MD;  Location: [facility] OR;  Service: Cardiology;  Laterality: Left;    PERCUTANEOUS TRANSCATHETER AORTIC VALVE REPLACEMENT (TAVR) Left 10/3/2023    Procedure: REPLACEMENT, AORTIC VALVE, PERCUTANEOUS, TRANSCATHETER carotid access;  Surgeon: Jun Brito MD;  Location: [facility] OR;  Service: Cardiovascular;  Laterality: Left;    PERCUTANEOUS TRANSLUMINAL ANGIOPLASTY N/A 8/25/2023    Procedure: ANGIOPLASTY-PERCUTANEOUS TRANSLUMINAL (PTA);  Surgeon: Nick Box MD;  Location: [facility] CATH;  Service: Cardiology;  Laterality: N/A;    SHOULDER SURGERY Left     total shoulder replacement    WOUND EXPLORATION N/A 10/3/2023    Procedure: EXPLORATION, WOUND;  Surgeon: Jun Brito MD;  Location: [facility]  OR;  Service: Cardiology;  Laterality: N/A;       CURRENT MEDICATIONS:     Previous Medications    AMLODIPINE (NORVASC) 10 MG TABLET    Take 5 mg by mouth once daily. Prescribed by Dr. Box    ASPIRIN (ECOTRIN) 81 MG EC TABLET    Take 81 mg by mouth once daily.    CLOPIDOGREL (PLAVIX) 75 MG TABLET    Take 1 tablet (75 mg total) by mouth once daily.    FINASTERIDE (PROSCAR) 5 MG TABLET    Take 5 mg by mouth once daily.    OXYBUTYNIN (DITROPAN-XL) 5 MG TR24    oxyBUTYnin chloride ER 5 mg tablet,extended release 24 hr, [RxNorm: 734226]    ROSUVASTATIN (CRESTOR) 5 MG TABLET    Take 1 tablet (5 mg total) by mouth once daily.    SOLIFENACIN (VESICARE) 10 MG TABLET    Take 10 mg by mouth once daily. Prescribed by Dr. Navarrete    TAMSULOSIN (FLOMAX) 0.4 MG CAP    Take 1 capsule by mouth once daily. Prescribed by Dr. Navarrete       ALLERGIES:     Patient has no known allergies.    SOCIAL HISTORY:     Social History     Socioeconomic History    Marital status:    Tobacco Use    Smoking status: Former     Types: Cigars     Passive exposure: Past    Smokeless tobacco: Never   Substance and Sexual Activity    Alcohol use: Yes     Alcohol/week: 3.0 standard drinks of alcohol     Types: 3 Shots of liquor per week     Comment: 3 martinis daily    Drug use: Never    Sexual activity: Not Currently     Social Drivers of Health     Financial Resource Strain: Low Risk  (1/30/2025)    Overall Financial Resource Strain (CARDIA)     Difficulty of Paying Living Expenses: Not hard at all   Food Insecurity: No Food Insecurity (1/30/2025)    Hunger Vital Sign     Worried About Running Out of Food in the Last Year: Never true     Ran Out of Food in the Last Year: Never true   Transportation Needs: No Transportation Needs (1/30/2025)    TRANSPORTATION NEEDS     Transportation : No   Stress: No Stress Concern Present (1/29/2025)    [employer] - Occupational Stress Questionnaire     Feeling of Stress : Not  at all   Housing Stability: Unknown (1/30/2025)    Housing Stability Vital Sign     Unable to Pay for Housing in the Last Year: No     Homeless in the Last Year: No       SCREENINGS:           PHYSICAL EXAM:     ED Triage Vitals   BP 03/04/25 1020 (!) 143/67   Pulse 03/04/25 1018 80   Resp 03/04/25 1018 18   Temp 03/04/25 1018 97.7 °F (36.5 °C)   SpO2 03/04/25 1018 98 %        Physical Exam  Vitals and nursing note reviewed.   Constitutional:       Appearance: Normal appearance. He is not ill-appearing.   HENT:      Head: Normocephalic and atraumatic.   Musculoskeletal:         General: Signs of injury present. No swelling or deformity.      Right hip: Tenderness present. No deformity or bony tenderness. Decreased range of motion (Secondary to pain).        Legs:       Comments: Patient unable to flex at the hip secondary to pain.  He tells me he has some internal and external rotation without pain.  He reports pain to the right LS region.   Skin:     General: Skin is warm and dry.      Capillary Refill: Capillary refill takes less than 2 seconds.   Neurological:      General: No focal deficit present.      Mental Status: He is alert.   Psychiatric:         Mood and Affect: Mood normal.         DIAGNOSTIC RESULTS:           RADIOLOGY:  Non plain film images such as CT, ultrasound and MRI are read by the radiologist.  Plain radiographic images were visualized and preliminarily interpreted by the emergency physician below findings:        CT Hip Without Contrast Right   Final Result      Mild osteoarthrosis right hip.  No fracture.         Electronically signed by: Luis Enrique Baig MD   Date:    03/04/2025   Time:    14:07      CT Abdomen Pelvis  Without Contrast   Final Result      1. Bilateral nephrolithiasis without hydronephrosis.   2. Evidence of constipation.  No bowel dilatation.   3. No acute findings.         Electronically signed by: Luis Enrique Baig MD   Date:    03/04/2025   Time:    11:33      X-Ray Hip 2  "or 3 views Right with Pelvis when performed   Final Result      No fracture.         Electronically signed by: Luis Enrique Baig MD   Date:    03/04/2025   Time:    11:29              LABS:  Labs Reviewed - No data to display    All other labs were within normal range her not returned as of this dictation.    EMERGENCY DEPARTMENT COURSE IN DIFFERENTIAL DIAGNOSIS/MDM:     Vitals:   Vitals:    03/04/25 1020 03/04/25 1021 03/04/25 1317 03/04/25 1407   BP: (!) 143/67   (!) 181/75   BP Location:    Left arm   Patient Position:    Lying   Pulse:    73   Resp:   16 16   Temp:    97.5 °F (36.4 °C)   TempSrc:    Oral   SpO2:    98%   Weight:  93 kg (205 lb)     Height:  5' 9" (1.753 m)          Medical Decision Making  Patient presented to the ED with multiple complaints.  He had reported some right lower back pain that was radiating to his groin prior to his fall.  Following his fall he was able to get up and get in his chair, but was uncomfortable and could not get into a position where he could not hurt.  Imaging this evening did not show a hip fracture.  I suspect that he has more of a contusion of his hip.  The CT showed renal stones, but no other acute findings.  CT of his hip also was negative for an acute fracture.  I question if the bilateral renal stones that he has may be contributing to the back and groin pain he experienced.  He told me he had no prior history of renal stones, but then as I am talking to him he said he has had to have them broken up before.  Patient was having some pain and I treated with Ultram.  He reports that his pain is better.  He is also able to ambulate.  He has follow up tomorrow.  After careful review of history, physical, and supporting data, there is very low suspicion for a life-threatening or limb-threatening cause of the patient's symptoms.  The patient's symptoms have improved from presentation and appears clinically well.  Patient was reexamined prior to discharge and appears to " be clinically improved.  Patient has been encouraged to follow up with his/her PCP and to return to the ED with any lack of improvement or worsening symptoms.  The patient's questions regarding the workup and plan were invited and answered.  The patient/guardian states understanding and agreement with the discharge planning.        Amount and/or Complexity of Data Reviewed  Radiology: ordered.    Risk  Prescription drug management.         Reassessment:          Medications   traMADoL tablet 50 mg (50 mg Oral Given 3/4/25 1317)       CONSULTS:  None  Unless otherwise noted below, none.    Procedures      Additional MDM:   Differential Diagnosis:   Differential diagnoses include, but not limited to hip fracture, UTI, pyelonephritis, hip contusion, renal stone           FINAL IMPRESSION:     1. Pain of right hip    2. Fall    3. Right-sided low back pain without sciatica, unspecified chronicity         DISPOSITION/PLAN:      ED Disposition Condition    Discharge Stable            PATIENT REFERRED TO:    Luis Enrique Guzman Jr., MD  [address] [facility] [address]  [phone]    Call in 3 days  As needed, For follow up      DISCHARGE MEDICATIONS:  New Prescriptions    No medications on file           (Please note that portions of this chart were completed with the voice recognition program.  Efforts were made to edit the dictations but occasionally words are mis-transcribed.)    Hansel Lewis MD (electronically signed) Emergency Physician                                 Hansel Lewis MD  03/04/25 4045

## 2025-03-08 ENCOUNTER — HOSPITAL ENCOUNTER (INPATIENT)
Facility: HOSPITAL | Age: OVER 89
LOS: 4 days | Discharge: NURSING, REHAB OR LONG-TERM CARE | DRG: 812 | End: 2025-03-13
Attending: EMERGENCY MEDICINE | Admitting: EMERGENCY MEDICINE
Payer: MEDICARE

## 2025-03-08 DIAGNOSIS — R00.0 RAPID HEART BEAT: ICD-10-CM

## 2025-03-08 DIAGNOSIS — I50.9 CONGESTIVE HEART FAILURE, NYHA CLASS 2, UNSPECIFIED CONGESTIVE HEART FAILURE TYPE: ICD-10-CM

## 2025-03-08 DIAGNOSIS — R52 PAIN: ICD-10-CM

## 2025-03-08 DIAGNOSIS — D64.9 ANEMIA: Primary | ICD-10-CM

## 2025-03-08 DIAGNOSIS — N40.0 BENIGN PROSTATIC HYPERPLASIA, UNSPECIFIED WHETHER LOWER URINARY TRACT SYMPTOMS PRESENT: ICD-10-CM

## 2025-03-08 DIAGNOSIS — R25.1 EPISODE OF SHAKING: ICD-10-CM

## 2025-03-08 DIAGNOSIS — D64.9 ANEMIA, UNSPECIFIED TYPE: ICD-10-CM

## 2025-03-08 LAB
ABO + RH BLD: NORMAL
ALBUMIN SERPL BCP-MCNC: 2.8 G/DL (ref 3.5–5.2)
ALP SERPL-CCNC: 41 U/L (ref 55–135)
ALT SERPL W/O P-5'-P-CCNC: 19 U/L (ref 10–44)
ANION GAP SERPL CALC-SCNC: 9 MMOL/L (ref 8–16)
AST SERPL-CCNC: 23 U/L (ref 10–40)
BASOPHILS # BLD AUTO: 0.02 K/UL (ref 0–0.2)
BASOPHILS NFR BLD: 0.2 % (ref 0–1.9)
BILIRUB SERPL-MCNC: 0.4 MG/DL (ref 0.1–1)
BLD GP AB SCN CELLS X3 SERPL QL: NORMAL
BUN SERPL-MCNC: 51 MG/DL (ref 10–30)
CALCIUM SERPL-MCNC: 8.2 MG/DL (ref 8.7–10.5)
CHLORIDE SERPL-SCNC: 109 MMOL/L (ref 95–110)
CO2 SERPL-SCNC: 19 MMOL/L (ref 23–29)
CREAT SERPL-MCNC: 1.2 MG/DL (ref 0.5–1.4)
DIFFERENTIAL METHOD BLD: ABNORMAL
EOSINOPHIL # BLD AUTO: 0 K/UL (ref 0–0.5)
EOSINOPHIL NFR BLD: 0.3 % (ref 0–8)
ERYTHROCYTE [DISTWIDTH] IN BLOOD BY AUTOMATED COUNT: 15.4 % (ref 11.5–14.5)
EST. GFR  (NO RACE VARIABLE): 55.7 ML/MIN/1.73 M^2
GLUCOSE SERPL-MCNC: 162 MG/DL (ref 70–110)
HCT VFR BLD AUTO: 17.3 % (ref 40–54)
HGB BLD-MCNC: 5.7 G/DL (ref 14–18)
IMM GRANULOCYTES # BLD AUTO: 0.32 K/UL (ref 0–0.04)
IMM GRANULOCYTES NFR BLD AUTO: 2.7 % (ref 0–0.5)
LYMPHOCYTES # BLD AUTO: 0.5 K/UL (ref 1–4.8)
LYMPHOCYTES NFR BLD: 4.5 % (ref 18–48)
MCH RBC QN AUTO: 32.4 PG (ref 27–31)
MCHC RBC AUTO-ENTMCNC: 32.9 G/DL (ref 32–36)
MCV RBC AUTO: 98 FL (ref 82–98)
MONOCYTES # BLD AUTO: 0.8 K/UL (ref 0.3–1)
MONOCYTES NFR BLD: 6.9 % (ref 4–15)
NEUTROPHILS # BLD AUTO: 10.2 K/UL (ref 1.8–7.7)
NEUTROPHILS NFR BLD: 85.4 % (ref 38–73)
NRBC BLD-RTO: 0 /100 WBC
OB PNL STL: NEGATIVE
PLATELET # BLD AUTO: 215 K/UL (ref 150–450)
PMV BLD AUTO: 9.6 FL (ref 9.2–12.9)
POTASSIUM SERPL-SCNC: 4.4 MMOL/L (ref 3.5–5.1)
PROLACTIN SERPL IA-MCNC: 30 NG/ML (ref 3.5–19.4)
PROT SERPL-MCNC: 5.8 G/DL (ref 6–8.4)
RBC # BLD AUTO: 1.76 M/UL (ref 4.6–6.2)
SODIUM SERPL-SCNC: 137 MMOL/L (ref 136–145)
SPECIMEN OUTDATE: NORMAL
TROPONIN I SERPL DL<=0.01 NG/ML-MCNC: 18 NG/L (ref 0–35)
WBC # BLD AUTO: 11.92 K/UL (ref 3.9–12.7)

## 2025-03-08 PROCEDURE — 99285 EMERGENCY DEPT VISIT HI MDM: CPT | Mod: 25

## 2025-03-08 PROCEDURE — 84146 ASSAY OF PROLACTIN: CPT | Performed by: EMERGENCY MEDICINE

## 2025-03-08 PROCEDURE — 36415 COLL VENOUS BLD VENIPUNCTURE: CPT | Performed by: EMERGENCY MEDICINE

## 2025-03-08 PROCEDURE — 93005 ELECTROCARDIOGRAM TRACING: CPT

## 2025-03-08 PROCEDURE — 82272 OCCULT BLD FECES 1-3 TESTS: CPT | Performed by: EMERGENCY MEDICINE

## 2025-03-08 PROCEDURE — 85025 COMPLETE CBC W/AUTO DIFF WBC: CPT | Performed by: EMERGENCY MEDICINE

## 2025-03-08 PROCEDURE — 86920 COMPATIBILITY TEST SPIN: CPT | Performed by: EMERGENCY MEDICINE

## 2025-03-08 PROCEDURE — 86850 RBC ANTIBODY SCREEN: CPT | Performed by: EMERGENCY MEDICINE

## 2025-03-08 PROCEDURE — 80053 COMPREHEN METABOLIC PANEL: CPT | Performed by: EMERGENCY MEDICINE

## 2025-03-08 PROCEDURE — 84484 ASSAY OF TROPONIN QUANT: CPT | Performed by: EMERGENCY MEDICINE

## 2025-03-08 PROCEDURE — 93010 ELECTROCARDIOGRAM REPORT: CPT | Mod: ,,, | Performed by: INTERNAL MEDICINE

## 2025-03-08 RX ORDER — CIPROFLOXACIN 500 MG/1
500 TABLET ORAL 2 TIMES DAILY
Status: ON HOLD | COMMUNITY
Start: 2025-02-26 | End: 2025-03-13

## 2025-03-09 PROBLEM — N30.01 ACUTE CYSTITIS WITH HEMATURIA: Status: ACTIVE | Noted: 2025-03-09

## 2025-03-09 PROBLEM — D64.9 ANEMIA: Status: ACTIVE | Noted: 2025-03-09

## 2025-03-09 LAB
ALBUMIN SERPL BCP-MCNC: 2.7 G/DL (ref 3.5–5.2)
ALP SERPL-CCNC: 42 U/L (ref 55–135)
ALT SERPL W/O P-5'-P-CCNC: 18 U/L (ref 10–44)
ANION GAP SERPL CALC-SCNC: 8 MMOL/L (ref 8–16)
AST SERPL-CCNC: 21 U/L (ref 10–40)
BACTERIA #/AREA URNS HPF: ABNORMAL /HPF
BASOPHILS # BLD AUTO: 0.03 K/UL (ref 0–0.2)
BASOPHILS NFR BLD: 0.3 % (ref 0–1.9)
BILIRUB SERPL-MCNC: 0.6 MG/DL (ref 0.1–1)
BILIRUB UR QL STRIP: NEGATIVE
BLD PROD TYP BPU: NORMAL
BLOOD UNIT EXPIRATION DATE: NORMAL
BLOOD UNIT TYPE CODE: 6200
BLOOD UNIT TYPE: NORMAL
BUN SERPL-MCNC: 43 MG/DL (ref 10–30)
CALCIUM SERPL-MCNC: 8 MG/DL (ref 8.7–10.5)
CHLORIDE SERPL-SCNC: 109 MMOL/L (ref 95–110)
CLARITY UR: ABNORMAL
CO2 SERPL-SCNC: 20 MMOL/L (ref 23–29)
CODING SYSTEM: NORMAL
COLOR UR: ABNORMAL
CREAT SERPL-MCNC: 1.1 MG/DL (ref 0.5–1.4)
CROSSMATCH INTERPRETATION: NORMAL
DIFFERENTIAL METHOD BLD: ABNORMAL
DISPENSE STATUS: NORMAL
EOSINOPHIL # BLD AUTO: 0 K/UL (ref 0–0.5)
EOSINOPHIL NFR BLD: 0.2 % (ref 0–8)
ERYTHROCYTE [DISTWIDTH] IN BLOOD BY AUTOMATED COUNT: 16.4 % (ref 11.5–14.5)
EST. GFR  (NO RACE VARIABLE): >60 ML/MIN/1.73 M^2
GLUCOSE SERPL-MCNC: 128 MG/DL (ref 70–110)
GLUCOSE UR QL STRIP: NEGATIVE
HCT VFR BLD AUTO: 18.7 % (ref 40–54)
HCT VFR BLD AUTO: 23.4 % (ref 40–54)
HCT VFR BLD AUTO: 25.8 % (ref 40–54)
HCT VFR BLD AUTO: 25.9 % (ref 40–54)
HCT VFR BLD AUTO: 26.7 % (ref 40–54)
HGB BLD-MCNC: 6.2 G/DL (ref 14–18)
HGB BLD-MCNC: 7.9 G/DL (ref 14–18)
HGB BLD-MCNC: 8.7 G/DL (ref 14–18)
HGB BLD-MCNC: 8.9 G/DL (ref 14–18)
HGB BLD-MCNC: 9.2 G/DL (ref 14–18)
HGB UR QL STRIP: ABNORMAL
HYALINE CASTS #/AREA URNS LPF: 2.4 /LPF
IMM GRANULOCYTES # BLD AUTO: 0.2 K/UL (ref 0–0.04)
IMM GRANULOCYTES NFR BLD AUTO: 1.8 % (ref 0–0.5)
KETONES UR QL STRIP: NEGATIVE
LEUKOCYTE ESTERASE UR QL STRIP: ABNORMAL
LYMPHOCYTES # BLD AUTO: 0.7 K/UL (ref 1–4.8)
LYMPHOCYTES NFR BLD: 6.3 % (ref 18–48)
MCH RBC QN AUTO: 31.5 PG (ref 27–31)
MCHC RBC AUTO-ENTMCNC: 33.2 G/DL (ref 32–36)
MCV RBC AUTO: 95 FL (ref 82–98)
MICROSCOPIC COMMENT: ABNORMAL
MONOCYTES # BLD AUTO: 0.8 K/UL (ref 0.3–1)
MONOCYTES NFR BLD: 7 % (ref 4–15)
NEUTROPHILS # BLD AUTO: 9.6 K/UL (ref 1.8–7.7)
NEUTROPHILS NFR BLD: 84.4 % (ref 38–73)
NITRITE UR QL STRIP: NEGATIVE
NRBC BLD-RTO: 0 /100 WBC
NUM UNITS TRANS PACKED RBC: NORMAL
OHS QRS DURATION: 112 MS
OHS QRS DURATION: 158 MS
OHS QTC CALCULATION: 466 MS
OHS QTC CALCULATION: 552 MS
PH UR STRIP: 7 [PH] (ref 5–8)
PLATELET # BLD AUTO: 200 K/UL (ref 150–450)
PMV BLD AUTO: 9.3 FL (ref 9.2–12.9)
POTASSIUM SERPL-SCNC: 4.2 MMOL/L (ref 3.5–5.1)
PROT SERPL-MCNC: 5.6 G/DL (ref 6–8.4)
PROT UR QL STRIP: ABNORMAL
RBC # BLD AUTO: 1.97 M/UL (ref 4.6–6.2)
RBC #/AREA URNS HPF: >100 /HPF (ref 0–4)
SODIUM SERPL-SCNC: 137 MMOL/L (ref 136–145)
SP GR UR STRIP: >=1.03 (ref 1–1.03)
SQUAMOUS #/AREA URNS HPF: 1 /HPF
URN SPEC COLLECT METH UR: ABNORMAL
UROBILINOGEN UR STRIP-ACNC: 1 EU/DL
WBC # BLD AUTO: 11.37 K/UL (ref 3.9–12.7)
WBC #/AREA URNS HPF: >100 /HPF (ref 0–5)

## 2025-03-09 PROCEDURE — 87088 URINE BACTERIA CULTURE: CPT | Performed by: EMERGENCY MEDICINE

## 2025-03-09 PROCEDURE — P9016 RBC LEUKOCYTES REDUCED: HCPCS | Performed by: EMERGENCY MEDICINE

## 2025-03-09 PROCEDURE — 80053 COMPREHEN METABOLIC PANEL: CPT | Performed by: EMERGENCY MEDICINE

## 2025-03-09 PROCEDURE — 25000003 PHARM REV CODE 250: Performed by: EMERGENCY MEDICINE

## 2025-03-09 PROCEDURE — 63600175 PHARM REV CODE 636 W HCPCS: Performed by: STUDENT IN AN ORGANIZED HEALTH CARE EDUCATION/TRAINING PROGRAM

## 2025-03-09 PROCEDURE — 85018 HEMOGLOBIN: CPT | Mod: 91 | Performed by: EMERGENCY MEDICINE

## 2025-03-09 PROCEDURE — 81000 URINALYSIS NONAUTO W/SCOPE: CPT | Performed by: EMERGENCY MEDICINE

## 2025-03-09 PROCEDURE — 87186 SC STD MICRODIL/AGAR DIL: CPT | Performed by: EMERGENCY MEDICINE

## 2025-03-09 PROCEDURE — 36430 TRANSFUSION BLD/BLD COMPNT: CPT

## 2025-03-09 PROCEDURE — 93005 ELECTROCARDIOGRAM TRACING: CPT

## 2025-03-09 PROCEDURE — 25500020 PHARM REV CODE 255: Performed by: EMERGENCY MEDICINE

## 2025-03-09 PROCEDURE — 85014 HEMATOCRIT: CPT | Mod: 91 | Performed by: EMERGENCY MEDICINE

## 2025-03-09 PROCEDURE — 85025 COMPLETE CBC W/AUTO DIFF WBC: CPT | Performed by: EMERGENCY MEDICINE

## 2025-03-09 PROCEDURE — 93010 ELECTROCARDIOGRAM REPORT: CPT | Mod: ,,, | Performed by: INTERNAL MEDICINE

## 2025-03-09 PROCEDURE — 36415 COLL VENOUS BLD VENIPUNCTURE: CPT | Performed by: EMERGENCY MEDICINE

## 2025-03-09 PROCEDURE — 63600175 PHARM REV CODE 636 W HCPCS: Performed by: EMERGENCY MEDICINE

## 2025-03-09 PROCEDURE — 87086 URINE CULTURE/COLONY COUNT: CPT | Performed by: EMERGENCY MEDICINE

## 2025-03-09 PROCEDURE — 30233N1 TRANSFUSION OF NONAUTOLOGOUS RED BLOOD CELLS INTO PERIPHERAL VEIN, PERCUTANEOUS APPROACH: ICD-10-PCS | Performed by: EMERGENCY MEDICINE

## 2025-03-09 PROCEDURE — 25000003 PHARM REV CODE 250: Performed by: INTERNAL MEDICINE

## 2025-03-09 PROCEDURE — 21400001 HC TELEMETRY ROOM

## 2025-03-09 PROCEDURE — 25000003 PHARM REV CODE 250: Performed by: STUDENT IN AN ORGANIZED HEALTH CARE EDUCATION/TRAINING PROGRAM

## 2025-03-09 RX ORDER — SODIUM CHLORIDE 0.9 % (FLUSH) 0.9 %
10 SYRINGE (ML) INJECTION
Status: DISCONTINUED | OUTPATIENT
Start: 2025-03-09 | End: 2025-03-13 | Stop reason: HOSPADM

## 2025-03-09 RX ORDER — FENTANYL CITRATE 50 UG/ML
25 INJECTION, SOLUTION INTRAMUSCULAR; INTRAVENOUS EVERY 4 HOURS PRN
Refills: 0 | Status: DISCONTINUED | OUTPATIENT
Start: 2025-03-09 | End: 2025-03-13 | Stop reason: HOSPADM

## 2025-03-09 RX ORDER — CEFTRIAXONE 2 G/1
2 INJECTION, POWDER, FOR SOLUTION INTRAMUSCULAR; INTRAVENOUS
Status: DISCONTINUED | OUTPATIENT
Start: 2025-03-09 | End: 2025-03-12

## 2025-03-09 RX ORDER — FINASTERIDE 5 MG/1
5 TABLET, FILM COATED ORAL DAILY
Status: DISCONTINUED | OUTPATIENT
Start: 2025-03-09 | End: 2025-03-13 | Stop reason: HOSPADM

## 2025-03-09 RX ORDER — LIDOCAINE HYDROCHLORIDE 20 MG/ML
JELLY TOPICAL
Status: COMPLETED | OUTPATIENT
Start: 2025-03-09 | End: 2025-03-09

## 2025-03-09 RX ORDER — MUPIROCIN 20 MG/G
OINTMENT TOPICAL 2 TIMES DAILY
Status: DISCONTINUED | OUTPATIENT
Start: 2025-03-09 | End: 2025-03-13 | Stop reason: HOSPADM

## 2025-03-09 RX ORDER — ACETAMINOPHEN 325 MG/1
650 TABLET ORAL EVERY 8 HOURS PRN
Status: DISCONTINUED | OUTPATIENT
Start: 2025-03-09 | End: 2025-03-13 | Stop reason: HOSPADM

## 2025-03-09 RX ORDER — FENTANYL CITRATE 50 UG/ML
25 INJECTION, SOLUTION INTRAMUSCULAR; INTRAVENOUS
Refills: 0 | Status: COMPLETED | OUTPATIENT
Start: 2025-03-09 | End: 2025-03-09

## 2025-03-09 RX ORDER — ONDANSETRON HYDROCHLORIDE 2 MG/ML
4 INJECTION, SOLUTION INTRAVENOUS EVERY 8 HOURS PRN
Status: DISCONTINUED | OUTPATIENT
Start: 2025-03-09 | End: 2025-03-13 | Stop reason: HOSPADM

## 2025-03-09 RX ORDER — OXYBUTYNIN CHLORIDE 5 MG/1
5 TABLET, EXTENDED RELEASE ORAL DAILY
Status: DISCONTINUED | OUTPATIENT
Start: 2025-03-09 | End: 2025-03-13 | Stop reason: HOSPADM

## 2025-03-09 RX ORDER — ATORVASTATIN CALCIUM 20 MG/1
20 TABLET, FILM COATED ORAL DAILY
Status: DISCONTINUED | OUTPATIENT
Start: 2025-03-09 | End: 2025-03-13 | Stop reason: HOSPADM

## 2025-03-09 RX ORDER — FAMOTIDINE 10 MG/ML
20 INJECTION, SOLUTION INTRAVENOUS DAILY
Status: DISCONTINUED | OUTPATIENT
Start: 2025-03-09 | End: 2025-03-10

## 2025-03-09 RX ORDER — TAMSULOSIN HYDROCHLORIDE 0.4 MG/1
1 CAPSULE ORAL DAILY
Status: DISCONTINUED | OUTPATIENT
Start: 2025-03-09 | End: 2025-03-13 | Stop reason: HOSPADM

## 2025-03-09 RX ORDER — TALC
6 POWDER (GRAM) TOPICAL NIGHTLY PRN
Status: DISCONTINUED | OUTPATIENT
Start: 2025-03-09 | End: 2025-03-13 | Stop reason: HOSPADM

## 2025-03-09 RX ADMIN — ATORVASTATIN CALCIUM 20 MG: 20 TABLET, FILM COATED ORAL at 12:03

## 2025-03-09 RX ADMIN — FAMOTIDINE 20 MG: 10 INJECTION, SOLUTION INTRAVENOUS at 08:03

## 2025-03-09 RX ADMIN — FINASTERIDE 5 MG: 5 TABLET, FILM COATED ORAL at 08:03

## 2025-03-09 RX ADMIN — FENTANYL CITRATE 25 MCG: 50 INJECTION, SOLUTION INTRAMUSCULAR; INTRAVENOUS at 12:03

## 2025-03-09 RX ADMIN — IOHEXOL 100 ML: 350 INJECTION, SOLUTION INTRAVENOUS at 12:03

## 2025-03-09 RX ADMIN — MUPIROCIN: 20 OINTMENT TOPICAL at 12:03

## 2025-03-09 RX ADMIN — CEFTRIAXONE 2 G: 2 INJECTION, POWDER, FOR SOLUTION INTRAMUSCULAR; INTRAVENOUS at 12:03

## 2025-03-09 RX ADMIN — LIDOCAINE HYDROCHLORIDE 10 ML: 20 JELLY TOPICAL at 10:03

## 2025-03-09 RX ADMIN — MUPIROCIN: 20 OINTMENT TOPICAL at 08:03

## 2025-03-09 RX ADMIN — FENTANYL CITRATE 25 MCG: 50 INJECTION, SOLUTION INTRAMUSCULAR; INTRAVENOUS at 05:03

## 2025-03-09 RX ADMIN — Medication 6 MG: at 10:03

## 2025-03-09 RX ADMIN — TAMSULOSIN HYDROCHLORIDE 0.4 MG: 0.4 CAPSULE ORAL at 12:03

## 2025-03-09 RX ADMIN — OXYBUTYNIN CHLORIDE 5 MG: 5 TABLET, EXTENDED RELEASE ORAL at 08:03

## 2025-03-09 NOTE — ASSESSMENT & PLAN NOTE
Anemia is likely due to  unsure at this time . Urine shows 3+ rbc and red color however pt does have a chronic indwelling catheter. He reports when he had the catheter placed a little over a week ago the bag was full of blood for 3 days. He also reports having a kidney stone. Most recent hemoglobin and hematocrit are listed below.  - Rectal exam without BRBPR/melena  - Stool occult megative  - CXR wnl, CTA abdomen with no identifiable causes, hip XR normal, no signs of bleeding or bruising other than some small wounds  - UA many bacteria and shows blood however unsure if it is from trauma of the catheter or UTI  - Receieved 3 units of pRBCs with response below  Recent Labs     03/08/25  2157 03/09/25  0424 03/09/25  0751   HGB 5.7* 6.2* 7.9*   HCT 17.3* 18.7* 23.4*     Plan  - Monitor serial CBC: Daily  - Transfuse PRBC if patient becomes hemodynamically unstable, symptomatic or H/H drops below 7/21.  - Patient has received 3 units of PRBCs on 3/9/25  - Patient's anemia is currently improving  -

## 2025-03-09 NOTE — ED NOTES
Performed many services for the pt. Transferred pt to a hospital bed, changed sheets, removed old bandages, pat dried his wounds (hx chronic PVD); dressed BLE with non-adherent telfa, kerlix, and ace wraps. Applied cushion dressing to coccyx (presence of stage II pressure wound), documented extensively his wounds. Applied SCDs and turned pt to L side using wedge. Left pt with call button and bed lowered and x3 bed rails up.

## 2025-03-09 NOTE — ED NOTES
Pt had a rhythm change after ambulating to the bathroom and back to bed. RN repeated EKG and notified. Dr. Venegas of rhythm change.

## 2025-03-09 NOTE — ED NOTES
Replaced pts willams catheter. Cleaned and changed pt bedding. Placed a brief on pt. Updated family on pts condition. Returned pt to left side and placed wedge under pt. Noted skin breakdown to pt external urethral opening (see media).

## 2025-03-09 NOTE — ASSESSMENT & PLAN NOTE
- UA with many bacteria and shows blood however unsure if it is from trauma of the catheter or UTI  - Receieved 3 units of pRBCs with response below    Plan  - Start Rocephin  - Follow cultures

## 2025-03-09 NOTE — H&P
"[facility] Emergency Department  [facility] Medicine  History & Physical    Patient Name: Tobi Liz Jr.  MRN: [id number]  Patient Class: IP- Inpatient  Admission Date: 3/8/2025  Attending Physician: Luis Enrique Guzman Jr., MD   Primary Care Provider: Luis Enrique Guzman Jr., MD         Patient information was obtained from patient and ER records.     Subjective:     Principal Problem:Anemia    Chief Complaint:   Chief Complaint   Patient presents with    Shaking     Pt to the ER for eval due to concerns over multiple episodes of "shaking and screaming" after standing from a seated position per EMS. Family concerned that activity may have been seizures, no hx. AAOX4 upon arrival, no complaints.         HPI: ED HPI:  89-year-old male history of arthritis, BPH, coronary artery disease presents the emergency department via EMS after he had an episode of shaking after he stood up fast. Never had a postictal phase. Talking during the episode. Occurred twice today. Denies chest pain or shortness of breath. Here in the ER states he feels fine, no weakness. No slurred speech no other issues. Alert oriented x3, GCS is 15. Family reports he has a history of anemia in the past. Patient's only complaint is minor pain to his right hip area after he leaned against his wheelchair when he got dizzy earlier.     IM HPI:  Patient Tobi Liz Jr. is a 89 y.o. male with a PMHx of  has a past medical history of Aortic valve stenosis, Arthritis, BPH (benign prostatic hyperplasia), Carotid artery stenosis, Chronic diastolic heart failure, ETOH abuse, Exposure to COVID-19 virus (01/07/2022), Hyperchloremia, Kidney stones, Murmur, Polymyalgia rheumatica, PVD (peripheral vascular disease), and Renal artery stenosis., presents complaining of shaking after standing from a seated position that started yesterday, he was aware and talking during the episode so not likely a seizure. Labs show he was severely anemic in the ED with Hgb of " 5. He had hematuria as well on UA along with bacteria. Ucx pending. His family states that he has a history of anemia in the past. He otherwise feels fine. He received 3 units of pRBCs in the ED with appropriate response.      Past Medical History:   Diagnosis Date    Aortic valve stenosis     Arthritis     BPH (benign prostatic hyperplasia)     Carotid artery stenosis     Chronic diastolic heart failure     ETOH abuse     Exposure to COVID-19 virus 01/07/2022    Hyperchloremia     Kidney stones     Murmur     Polymyalgia rheumatica     PVD (peripheral vascular disease)     Renal artery stenosis        Past Surgical History:   Procedure Laterality Date    A-V CARDIAC PACEMAKER INSERTION N/A 10/6/2023    Procedure: INSERTION, CARDIAC PACEMAKER, DUAL CHAMBER;  Surgeon: Douglas Salguero MD;  Location: [facility] CATH;  Service: Cardiology;  Laterality: N/A;    ANGIOGRAM, CAROTID Left 8/25/2023    Procedure: ANGIOGRAM, CAROTID;  Surgeon: Nick Box MD;  Location: [facility] CATH;  Service: Cardiology;  Laterality: Left;    CATARACT EXTRACTION, BILATERAL      ECHOCARDIOGRAM,TRANSESOPHAGEAL N/A 10/3/2023    Procedure: Transesophageal echo (JOSE ARMANDO) intra-procedure log documentation;  Surgeon: Nick Box MD;  Location: [facility] OR;  Service: Cardiology;  Laterality: N/A;    HAND SURGERY      INSERTION OF STENT INTO PERIPHERAL VESSEL N/A 1/30/2025    Procedure: INSERTION, STENT, VESSEL, PERIPHERAL;  Surgeon: Harsha Sacledo MD;  Location: [facility] CATH;  Service: Cardiology;  Laterality: N/A;    PERCUTANEOUS TRANSCATHETER AORTIC VALVE REPLACEMENT (TAVR) Left 10/3/2023    Procedure: REPLACEMENT, AORTIC VALVE, PERCUTANEOUS, TRANSCATHETER;  Surgeon: Nick Box MD;  Location: [facility] OR;  Service: Cardiology;  Laterality: Left;    PERCUTANEOUS TRANSCATHETER AORTIC VALVE REPLACEMENT (TAVR) Left 10/3/2023    Procedure: REPLACEMENT, AORTIC VALVE, PERCUTANEOUS, TRANSCATHETER carotid access;  Surgeon: Jun Brito MD;  Location: [facility]  OR;  Service: Cardiovascular;  Laterality: Left;    PERCUTANEOUS TRANSLUMINAL ANGIOPLASTY N/A 8/25/2023    Procedure: ANGIOPLASTY-PERCUTANEOUS TRANSLUMINAL (PTA);  Surgeon: Nick Box MD;  Location: [facility] CATH;  Service: Cardiology;  Laterality: N/A;    SHOULDER SURGERY Left     total shoulder replacement    WOUND EXPLORATION N/A 10/3/2023    Procedure: EXPLORATION, WOUND;  Surgeon: Jun Brito MD;  Location: [facility] OR;  Service: Cardiology;  Laterality: N/A;       Review of patient's allergies indicates:  No Known Allergies    No current facility-administered medications on file prior to encounter.     Current Outpatient Medications on File Prior to Encounter   Medication Sig    ciprofloxacin HCl (CIPRO) 500 MG tablet Take 500 mg by mouth 2 (two) times daily.    amLODIPine (NORVASC) 10 MG tablet Take 5 mg by mouth once daily. Prescribed by Dr. Box    aspirin (ECOTRIN) 81 MG EC tablet Take 81 mg by mouth once daily.    clopidogreL (PLAVIX) 75 mg tablet Take 1 tablet (75 mg total) by mouth once daily.    finasteride (PROSCAR) 5 mg tablet Take 5 mg by mouth once daily.    oxybutynin (DITROPAN-XL) 5 MG TR24 oxyBUTYnin chloride ER 5 mg tablet,extended release 24 hr, [RxNorm: 869996]    rosuvastatin (CRESTOR) 5 MG tablet Take 1 tablet (5 mg total) by mouth once daily.    tamsulosin (FLOMAX) 0.4 mg Cap Take 1 capsule by mouth once daily. Prescribed by Dr. Navarrete     Family History       Problem Relation (Age of Onset)    Cancer Mother    Stroke Father          Tobacco Use    Smoking status: Former     Types: Cigars     Passive exposure: Past    Smokeless tobacco: Never   Substance and Sexual Activity    Alcohol use: Yes     Alcohol/week: 3.0 standard drinks of alcohol     Types: 3 Shots of liquor per week     Comment: 3 martinis daily    Drug use: Never    Sexual activity: Not Currently     Review of Systems   Constitutional:  Negative for activity change, appetite change, fatigue and fever.   HENT:   Negative for congestion and voice change.    Eyes:  Negative for visual disturbance.   Respiratory:  Negative for cough, shortness of breath and wheezing.    Cardiovascular:  Negative for chest pain and leg swelling.   Gastrointestinal:  Negative for abdominal distention, abdominal pain, anal bleeding, blood in stool, constipation, diarrhea, nausea, rectal pain and vomiting.   Endocrine: Negative for polyuria.   Genitourinary:  Positive for difficulty urinating and hematuria. Negative for dysuria, flank pain and urgency.   Musculoskeletal:  Positive for arthralgias.   Skin:  Positive for pallor.   Neurological:  Negative for weakness, light-headedness and numbness.   Hematological:  Bruises/bleeds easily.   Psychiatric/Behavioral: Negative.       Objective:     Vital Signs (Most Recent):  Temp: 98.1 °F (36.7 °C) (03/09/25 0847)  Pulse: 71 (03/09/25 1042)  Resp: 16 (03/09/25 1136)  BP: (!) 127/55 (03/09/25 1136)  SpO2: 100 % (03/09/25 1136) Vital Signs (24h Range):  Temp:  [97.7 °F (36.5 °C)-98.6 °F (37 °C)] 98.1 °F (36.7 °C)  Pulse:  [69-90] 71  Resp:  [13-29] 16  SpO2:  [96 %-100 %] 100 %  BP: (106-149)/(40-79) 127/55     Weight: 98.3 kg (216 lb 11.4 oz)  Body mass index is 32 kg/m².     Physical Exam  Constitutional:       General: He is not in acute distress.     Appearance: He is not toxic-appearing.   HENT:      Head: Normocephalic.      Mouth/Throat:      Mouth: Mucous membranes are moist.   Eyes:      Extraocular Movements: Extraocular movements intact.   Cardiovascular:      Rate and Rhythm: Normal rate and regular rhythm.      Pulses: Normal pulses.      Heart sounds: No murmur heard.     No gallop.   Pulmonary:      Effort: Pulmonary effort is normal. No respiratory distress.      Breath sounds: Normal breath sounds. No wheezing.   Abdominal:      General: There is no distension.      Tenderness: There is no abdominal tenderness. There is no guarding.   Musculoskeletal:         General: No swelling,  "deformity or signs of injury.      Cervical back: Normal range of motion.   Skin:     General: Skin is warm.      Findings: Lesion (chronic wounds BLLE) present. No bruising, erythema or rash.   Neurological:      Mental Status: He is alert and oriented to person, place, and time.   Psychiatric:         Mood and Affect: Mood normal.                Recent Labs   Lab 03/08/25 2157 03/09/25 0424 03/09/25  0751   WBC 11.92 11.37  --    HGB 5.7* 6.2* 7.9*   HCT 17.3* 18.7* 23.4*   MCV 98 95  --    RBC 1.76* 1.97*  --    MCH 32.4* 31.5*  --    MCHC 32.9 33.2  --    RDW 15.4* 16.4*  --     200  --    MPV 9.6 9.3  --    GRAN 85.4*  10.2* 84.4*  9.6*  --    LYMPH 4.5*  0.5* 6.3*  0.7*  --    MONO 6.9  0.8 7.0  0.8  --    EOSINOPHIL 0.3 0.2  --    BASOPHIL 0.2 0.3  --        Recent Labs   Lab 03/08/25 2157 03/09/25  0424    137   K 4.4 4.2    109   CO2 19* 20*   BUN 51* 43*   CREATININE 1.2 1.1   * 128*   CALCIUM 8.2* 8.0*   PROT 5.8* 5.6*   ALBUMIN 2.8* 2.7*   ALKPHOS 41* 42*   BILITOT 0.4 0.6   ALT 19 18   AST 23 21   ANIONGAP 9 8       Recent Labs   Lab 03/09/25  1034   COLORU Red*   APPEARANCEUA Cloudy*   PHUR 7.0   SPECGRAV >=1.030*   PROTEINUA 2+*   GLUCUA Negative   KETONESU Negative   BILIRUBINUA Negative   OCCULTUA 3+*   UROBILINOGEN 1.0   NITRITE Negative   LEUKOCYTESUR 3+*   RBCUA >100*   WBCUA >100*   BACTERIA Rare   SQUAMEPITHEL 1   HYALINECASTS 2.4*   MICROCMT SEE COMMENT       No results for input(s): "PH", "PCO2", "PO2", "HCO3", "POCSATURATED", "BE" in the last 168 hours.    No results for input(s): "TROPONINI", "CPK", "CPKMB" in the last 168 hours.    No results for input(s): "PT", "INR", "APTT" in the last 168 hours.    Lab Results   Component Value Date    HGBA1C 5.8 (H) 11/11/2024       No results for input(s): "TSH", "J5IGAVU", "G1JJLKE", "THYROIDAB", "FREET4" in the last 168 hours.    Microbiology Results (last 7 days)       Procedure Component Value Units Date/Time    " Urine culture [[id number]] Collected: 03/09/25 1034    Order Status: No result Specimen: Urine Updated: 03/09/25 1118            CT Abdomen Pelvis With IV Contrast NO Oral Contrast  Result Date: 3/9/2025  EXAMINATION: CT ABDOMEN PELVIS WITH IV CONTRAST CLINICAL HISTORY: Anemia; CT/Cardiac Nuclear exams in prior 12 months: 1 TECHNIQUE: CT abdomen and pelvis with IV contrast.  Coronal reformats prepared.  Iterative reconstruction utilized. COMPARISON: CT without IV contrast of 03/04/2025 FINDINGS: Thickened, inflamed appearing proximal duodenum suggests duodenitis/peptic ulcer disease.  No free air. Catheter within an empty diffusely thickened urinary bladder.  Mildly enlarged prostate gland.  Segmental mild left hydroureter with areas of uroepithelial thickening suggest urinary tract infection.  Bilateral renal stones.  No hydronephrosis.  No ureteral stones. Unremarkable liver, adrenals, spleen and pancreas.  No bowel obstruction.  No free air.  Extensive aortoiliac system atherosclerotic plaque.  Prior TAVR.     1. Thickened inflamed appearing proximal duodenum suggests duodenitis/peptic ulcer disease.  No evidence of perforation. 2. Diffuse thick-walled urinary bladder, possibly chronic outlet obstruction and/or cystitis.  Left uroepithelial thickening suggest urinary tract infection 3. Bilateral nephrolithiasis 4. Otherwise, please see above Electronically signed by: Yasmine Flores MD Date:    03/09/2025 Time:    10:14    CT Hip Without Contrast Right  Result Date: 3/4/2025  EXAMINATION: CT HIP WITHOUT CONTRAST RIGHT CLINICAL HISTORY: Hip pain, stress fracture suspected, neg xray; TECHNIQUE: Axial CT images were obtained. Iterative reconstruction technique was used.  CT/cardiac nuclear exam/s in prior 12 months: 1. COMPARISON: CT abdomen pelvis without IV contrast 03/04/2025. FINDINGS: No right hip fracture or dislocation.  Mild joint space narrowing right hip joint with hypertrophic change.  No soft tissue  abnormality.     Mild osteoarthrosis right hip.  No fracture. Electronically signed by: Luis Enrique Baig MD Date:    03/04/2025 Time:    14:07    CT Abdomen Pelvis  Without Contrast  Result Date: 3/4/2025  EXAMINATION: CT ABDOMEN PELVIS WITHOUT CONTRAST CLINICAL HISTORY: Flank pain, kidney stone suspected;right flank and lower back pain; TECHNIQUE: Axial CT images were obtained. Iterative reconstruction technique was used. CT/cardiac nuclear exam/s in prior 12 months: 1. COMPARISON: None. FINDINGS: No hepatic abnormality.  No gallstones.  Spleen, pancreas, adrenal glands demonstrate no abnormality.  5 mm stone lower pole right kidney.  No right ureteral stone or hydronephrosis.  Multiple left renal stones measuring 2 mm each.  No left ureteral stone or hydronephrosis.  No gross gastric abnormality.  Small hiatal hernia.  No dilated bowel.  Moderate amount of stool in the colon and rectum.  The appendix is normal.  There is no free fluid or free air.  There is diffuse urinary bladder wall thickening with a Bolton catheter in place.  The prostate is enlarged.  There is atherosclerotic disease of the abdominal aorta.  No aneurysm.  No lymph node enlargement.  Visualized lung bases are clear.  Chronic compression fracture of L3, stable from CT of 12/19/2020.     1. Bilateral nephrolithiasis without hydronephrosis. 2. Evidence of constipation.  No bowel dilatation. 3. No acute findings. Electronically signed by: Luis Enrique Baig MD Date:    03/04/2025 Time:    11:33    X-Ray Hip 2 or 3 views Right with Pelvis when performed  Result Date: 3/4/2025  EXAMINATION: XR HIP WITH PELVIS WHEN PERFORMED 2 OR 3 VIEWS RIGHT CLINICAL HISTORY: Unspecified fall, initial encounter COMPARISON: None FINDINGS: Right hip radiographs, two views, demonstrate no fracture or dislocation.  No focal soft tissue abnormality.     No fracture. Electronically signed by: Luis Enrique Baig MD Date:    03/04/2025 Time:    11:29      Assessment/Plan:     *  Anemia  Anemia is likely due to  unsure at this time . Urine shows 3+ rbc and red color however pt does have a chronic indwelling catheter. He reports when he had the catheter placed a little over a week ago the bag was full of blood for 3 days. He also reports having a kidney stone. Most recent hemoglobin and hematocrit are listed below.  - Rectal exam without BRBPR/melena  - Stool occult megative  - CXR wnl, CTA abdomen with no identifiable causes, hip XR normal, no signs of bleeding or bruising other than some small wounds  - UA many bacteria and shows blood however unsure if it is from trauma of the catheter or UTI  - Receieved 3 units of pRBCs with response below  Recent Labs     03/08/25  2157 03/09/25  0424 03/09/25  0751   HGB 5.7* 6.2* 7.9*   HCT 17.3* 18.7* 23.4*     Plan  - Monitor serial CBC: Daily  - Transfuse PRBC if patient becomes hemodynamically unstable, symptomatic or H/H drops below 7/21.  - Patient has received 3 units of PRBCs on 3/9/25  - Patient's anemia is currently improving  -     Acute cystitis with hematuria  - UA with many bacteria and shows blood however unsure if it is from trauma of the catheter or UTI  - Receieved 3 units of pRBCs with response below    Plan  - Start Rocephin  - Follow cultures      Atherosclerosis of native artery of both lower extremities with rest pain  Hold plavix in the setting of a possible bleed    Plan  - Restart as needed      BPH (benign prostatic hyperplasia)  Cont home meds      Hyperlipidemia  Cont home statin      Hypertension  Patient's blood pressure range in the last 24 hours was: BP  Min: 106/40  Max: 149/79.The patient's inpatient anti-hypertensive regimen is listed below:  Current Antihypertensives       Plan  - BP is controlled, no changes needed to their regimen  - Hold home antihypertensives for now      VTE Risk Mitigation (From admission, onward)           Ordered     IP VTE HIGH RISK PATIENT  Once         03/09/25 0337     Place sequential  compression device  Until discontinued         03/09/25 0337     Place MERRITT hose  Until discontinued         03/09/25 0337                                    Gabriel Venegas MD  Department of Hospital Medicine  [place] - Emergency Department

## 2025-03-09 NOTE — ED PROVIDER NOTES
"Encounter Date: 3/8/2025       History     Chief Complaint   Patient presents with    Shaking     Pt to the ER for eval due to concerns over multiple episodes of "shaking and screaming" after standing from a seated position per EMS. Family concerned that activity may have been seizures, no hx. AAOX4 upon arrival, no complaints.      89-year-old male history of arthritis, BPH, coronary artery disease presents the emergency department via EMS after he had an episode of shaking after he stood up fast.  Never had a postictal phase.  Talking during the episode.  Occurred twice today.  Denies chest pain or shortness of breath.  Here in the ER states he feels fine, no weakness.  No slurred speech no other issues.  Alert oriented x3, GCS is 15.  Family reports he has a history of anemia in the past.  Patient's only complaint is minor pain to his right hip area after he leaned against his wheelchair when he got dizzy earlier.      Review of patient's allergies indicates:  No Known Allergies  Past Medical History:   Diagnosis Date    Aortic valve stenosis     Arthritis     BPH (benign prostatic hyperplasia)     Carotid artery stenosis     Chronic diastolic heart failure     ETOH abuse     Exposure to COVID-19 virus 01/07/2022    Hyperchloremia     Kidney stones     Murmur     Polymyalgia rheumatica     PVD (peripheral vascular disease)     Renal artery stenosis      Past Surgical History:   Procedure Laterality Date    A-V CARDIAC PACEMAKER INSERTION N/A 10/6/2023    Procedure: INSERTION, CARDIAC PACEMAKER, DUAL CHAMBER;  Surgeon: Douglas Slaguero MD;  Location: [facility] CATH;  Service: Cardiology;  Laterality: N/A;    ANGIOGRAM, CAROTID Left 8/25/2023    Procedure: ANGIOGRAM, CAROTID;  Surgeon: Nick Box MD;  Location: [facility] CATH;  Service: Cardiology;  Laterality: Left;    CATARACT EXTRACTION, BILATERAL      ECHOCARDIOGRAM,TRANSESOPHAGEAL N/A 10/3/2023    Procedure: Transesophageal echo (JOSE ARMANDO) intra-procedure log " documentation;  Surgeon: Nick Box MD;  [facility];  Service: Cardiology;  Laterality: N/A;    HAND SURGERY      INSERTION OF STENT INTO PERIPHERAL VESSEL N/A 1/30/2025    Procedure: INSERTION, STENT, VESSEL, PERIPHERAL;  Surgeon: Harsha Salcedo MD;  [facility];  Service: Cardiology;  Laterality: N/A;    PERCUTANEOUS TRANSCATHETER AORTIC VALVE REPLACEMENT (TAVR) Left 10/3/2023    Procedure: REPLACEMENT, AORTIC VALVE, PERCUTANEOUS, TRANSCATHETER;  Surgeon: Nick Box MD;  [facility];  Service: Cardiology;  Laterality: Left;    PERCUTANEOUS TRANSCATHETER AORTIC VALVE REPLACEMENT (TAVR) Left 10/3/2023    Procedure: REPLACEMENT, AORTIC VALVE, PERCUTANEOUS, TRANSCATHETER carotid access;  Surgeon: Jun Brito MD;  [facility];  Service: Cardiovascular;  Laterality: Left;    PERCUTANEOUS TRANSLUMINAL ANGIOPLASTY N/A 8/25/2023    Procedure: ANGIOPLASTY-PERCUTANEOUS TRANSLUMINAL (PTA);  Surgeon: Nick Box MD;  [facility];  Service: Cardiology;  Laterality: N/A;    SHOULDER SURGERY Left     total shoulder replacement    WOUND EXPLORATION N/A 10/3/2023    Procedure: EXPLORATION, WOUND;  Surgeon: Jun Brito MD;  [facility];  Service: Cardiology;  Laterality: N/A;     Family History   Problem Relation Name Age of Onset    Cancer Mother      Stroke Father       Social History[1]  Review of Systems   Constitutional:  Negative for fever.   HENT:  Negative for sore throat.    Respiratory:  Negative for shortness of breath.    Cardiovascular:  Negative for chest pain.   Gastrointestinal:  Negative for nausea.   Genitourinary:  Negative for dysuria.   Musculoskeletal:  Negative for back pain.   Skin:  Positive for pallor. Negative for rash.   Neurological:  Negative for weakness.   Hematological:  Does not bruise/bleed easily.   All other systems reviewed and are negative.      Physical Exam     Initial Vitals   BP Pulse Resp Temp SpO2   03/08/25 2128  03/08/25 2128 03/08/25 2128 03/08/25 2131 03/08/25 2128   (!) 141/64 88 20 97.8 °F (36.6 °C) 98 %      MAP       --                Physical Exam    Nursing note and vitals reviewed.  Constitutional: He appears well-developed and well-nourished. He is not diaphoretic. No distress.   HENT:   Head: Normocephalic and atraumatic.   Eyes: Conjunctivae and EOM are normal. Pupils are equal, round, and reactive to light. Right eye exhibits no discharge. Left eye exhibits no discharge. No scleral icterus.   Neck: Neck supple. No JVD present.   Normal range of motion.  Cardiovascular:  Normal rate, regular rhythm, normal heart sounds and intact distal pulses.           Pulmonary/Chest: Breath sounds normal. No stridor. No respiratory distress. He has no wheezes. He has no rhonchi. He has no rales. He exhibits no tenderness.   Abdominal: Abdomen is soft. Bowel sounds are normal. He exhibits no distension and no mass. There is no abdominal tenderness.   Abdomen is completely benign to deep palpation, no distention There is no rebound and no guarding.   Genitourinary:    Genitourinary Comments: Rectal exam performed with male RN at bedside.  Stool appears brown, no overt melena or hematochezia.  He has an indwelling Bolton catheter at he arrived with, urine is clear yellow     Musculoskeletal:         General: No tenderness or edema. Normal range of motion.      Cervical back: Normal range of motion and neck supple.      Comments: No bruising noted to thighs, no swelling noted as well.  No color changes.  Thorough exam performed of lower extremities, no swelling noted whatsoever, no color changes     Neurological: He is alert and oriented to person, place, and time. He has normal strength. GCS score is 15. GCS eye subscore is 4. GCS verbal subscore is 5. GCS motor subscore is 6.   Skin: Skin is warm and dry. Capillary refill takes less than 2 seconds. There is pallor.   Chronic wounds noted to bilateral lower legs         ED  Course   Critical Care    Date/Time: 3/9/2025 12:36 AM    Performed by: Antonino Graham MD  Authorized by: Antonino Graham MD  Direct patient critical care time: 20 minutes  Additional history critical care time: 10 minutes  Ordering / reviewing critical care time: 10 minutes  Documentation critical care time: 10 minutes  Consulting other physicians critical care time: 10 minutes  Consult with family critical care time: 10 minutes  Total critical care time (exclusive of procedural time) : 70 minutes  Critical care was necessary to treat or prevent imminent or life-threatening deterioration of the following conditions: Severe anemia requiring packed red blood cell transfusion.  Critical care was time spent personally by me on the following activities: review of old charts, re-evaluation of patient's condition, pulse oximetry, ordering and review of radiographic studies, ordering and review of laboratory studies, ordering and performing treatments and interventions, obtaining history from patient or surrogate, examination of patient, evaluation of patient's response to treatment, discussions with primary provider and development of treatment plan with patient or surrogate.        Labs Reviewed   CBC W/ AUTO DIFFERENTIAL - Abnormal       Result Value    WBC 11.92      RBC 1.76 (*)     Hemoglobin 5.7 (*)     Hematocrit 17.3 (*)     MCV 98      MCH 32.4 (*)     MCHC 32.9      RDW 15.4 (*)     Platelets 215      MPV 9.6      Immature Granulocytes 2.7 (*)     Gran # (ANC) 10.2 (*)     Immature Grans (Abs) 0.32 (*)     Lymph # 0.5 (*)     Mono # 0.8      Eos # 0.0      Baso # 0.02      nRBC 0      Gran % 85.4 (*)     Lymph % 4.5 (*)     Mono % 6.9      Eosinophil % 0.3      Basophil % 0.2      Differential Method Automated      Narrative:       H&H critical result(s) called and verbal readback obtained from   BILL MOORE RN by SOLO 03/08/2025 22:19   COMPREHENSIVE METABOLIC PANEL - Abnormal    Sodium 137      Potassium  4.4      Chloride 109      CO2 19 (*)     Glucose 162 (*)     BUN 51 (*)     Creatinine 1.2      Calcium 8.2 (*)     Total Protein 5.8 (*)     Albumin 2.8 (*)     Total Bilirubin 0.4      Alkaline Phosphatase 41 (*)     AST 23      ALT 19      eGFR 55.7 (*)     Anion Gap 9     PROLACTIN - Abnormal    Prolactin 30.0 (*)    TROPONIN I HIGH SENSITIVITY    Troponin I High Sensitivity 18     OCCULT BLOOD X 1, STOOL    Occult Blood Negative     CBC W/ AUTO DIFFERENTIAL   COMPREHENSIVE METABOLIC PANEL   HEMOGLOBIN   HEMATOCRIT   TYPE & SCREEN    Group & Rh A POS      Indirect Claire NEG      Specimen Outdate 03/11/2025 23:59     PREPARE RBC SOFT    UNIT NUMBER [id number]      Product Code A3356U90      DISPENSE STATUS ISSUED      CODING SYSTEM BXRJ351      Unit Blood Type Code 6200      Unit Blood Type A POS      Unit Expiration [id number]      CROSSMATCH INTERPRETATION Compatible      UNIT NUMBER [id number]      Product Code S8004S85      DISPENSE STATUS CROSSMATCHED      CODING SYSTEM KYQL454      Unit Blood Type Code 6200      Unit Blood Type A POS      Unit Expiration [id number]      CROSSMATCH INTERPRETATION Compatible      UNIT NUMBER [id number]      Product Code A6878K10      DISPENSE STATUS CROSSMATCHED      CODING SYSTEM ECGE996      Unit Blood Type Code 6200      Unit Blood Type A POS      Unit Expiration [id number]      CROSSMATCH INTERPRETATION Compatible       EKG Readings: (Independently Interpreted)   Initial Reading: No STEMI. Rhythm: Normal Sinus Rhythm. Heart Rate: 86. Ectopy: No Ectopy. Conduction: 1st Degree AV Block (Incomplete right bundle branch block). ST Segments: Normal ST Segments. T Waves: Normal. Axis: Left Axis Deviation. Clinical Impression: Normal Sinus Rhythm and AV Block - 1st Degree       Imaging Results              CT Abdomen Pelvis With IV Contrast NO Oral Contrast (In process)                      X-Ray Chest 1 View (In process)                      X-Ray Femur Ap/Lat  Right (In process)                      Medications   finasteride tablet 5 mg (has no administration in time range)   oxybutynin 24 hr tablet 5 mg (has no administration in time range)   sodium chloride 0.9% flush 10 mL (has no administration in time range)   melatonin tablet 6 mg (has no administration in time range)   ondansetron injection 4 mg (has no administration in time range)   famotidine (PF) injection 20 mg (has no administration in time range)   acetaminophen tablet 650 mg (has no administration in time range)   fentaNYL injection 25 mcg (has no administration in time range)   iohexoL (OMNIPAQUE 350) injection 100 mL (100 mLs Intravenous Given 3/9/25 0004)   fentaNYL injection 25 mcg (25 mcg Intravenous Given 3/9/25 0051)     Medical Decision Making  Amount and/or Complexity of Data Reviewed  Labs: ordered.  Radiology: ordered.    Risk  OTC drugs.  Prescription drug management.  Decision regarding hospitalization.               ED Course as of 03/09/25 0403   Sun Mar 09, 2025   0035 CT scan is negative for acute intra-abdominal or pelvic abnormality.  Chest x-ray is chronic changes.  Femur x-ray is negative as well.  Patient's only complaint is pain to his right leg after hitting it on his wheelchair when he got dizzy [SD]      ED Course User Index  [SD] Antonino Graham MD               Medical Decision Making:   Differential Diagnosis:   Episode of shaking, orthostatic episode             Clinical Impression:  Final diagnoses:  [R25.1] Episode of shaking  [R52] Pain  [D64.9] Anemia (Primary)          ED Disposition Condition    Admit Stable                  Antonino Graham MD  03/09/25 0041       Antonino Graham MD  03/09/25 0043         [1]   Social History  Tobacco Use    Smoking status: Former     Types: Cigars     Passive exposure: Past    Smokeless tobacco: Never   Substance Use Topics    Alcohol use: Yes     Alcohol/week: 3.0 standard drinks of alcohol     Types: 3 Shots of liquor per week      Comment: 3 krissis daily    Drug use: Never        Antonino Graham MD  03/09/25 0402

## 2025-03-09 NOTE — SUBJECTIVE & OBJECTIVE
Past Medical History:   Diagnosis Date    Aortic valve stenosis     Arthritis     BPH (benign prostatic hyperplasia)     Carotid artery stenosis     Chronic diastolic heart failure     ETOH abuse     Exposure to COVID-19 virus 01/07/2022    Hyperchloremia     Kidney stones     Murmur     Polymyalgia rheumatica     PVD (peripheral vascular disease)     Renal artery stenosis        Past Surgical History:   Procedure Laterality Date    A-V CARDIAC PACEMAKER INSERTION N/A 10/6/2023    Procedure: INSERTION, CARDIAC PACEMAKER, DUAL CHAMBER;  Surgeon: Douglas Salguero MD;  Location: [facility] CATH;  Service: Cardiology;  Laterality: N/A;    ANGIOGRAM, CAROTID Left 8/25/2023    Procedure: ANGIOGRAM, CAROTID;  Surgeon: Nick Box MD;  Location: [facility] CATH;  Service: Cardiology;  Laterality: Left;    CATARACT EXTRACTION, BILATERAL      ECHOCARDIOGRAM,TRANSESOPHAGEAL N/A 10/3/2023    Procedure: Transesophageal echo (JOSE ARMANDO) intra-procedure log documentation;  Surgeon: Nick Box MD;  Location: [facility] OR;  Service: Cardiology;  Laterality: N/A;    HAND SURGERY      INSERTION OF STENT INTO PERIPHERAL VESSEL N/A 1/30/2025    Procedure: INSERTION, STENT, VESSEL, PERIPHERAL;  Surgeon: Harsha Salcedo MD;  Location: [facility] CATH;  Service: Cardiology;  Laterality: N/A;    PERCUTANEOUS TRANSCATHETER AORTIC VALVE REPLACEMENT (TAVR) Left 10/3/2023    Procedure: REPLACEMENT, AORTIC VALVE, PERCUTANEOUS, TRANSCATHETER;  Surgeon: Nick Box MD;  Location: [facility] OR;  Service: Cardiology;  Laterality: Left;    PERCUTANEOUS TRANSCATHETER AORTIC VALVE REPLACEMENT (TAVR) Left 10/3/2023    Procedure: REPLACEMENT, AORTIC VALVE, PERCUTANEOUS, TRANSCATHETER carotid access;  Surgeon: Jun Brito MD;  Location: [facility] OR;  Service: Cardiovascular;  Laterality: Left;    PERCUTANEOUS TRANSLUMINAL ANGIOPLASTY N/A 8/25/2023    Procedure: ANGIOPLASTY-PERCUTANEOUS TRANSLUMINAL (PTA);  Surgeon: Nick Box MD;  Location: [facility] CATH;  Service:  Cardiology;  Laterality: N/A;    SHOULDER SURGERY Left     total shoulder replacement    WOUND EXPLORATION N/A 10/3/2023    Procedure: EXPLORATION, WOUND;  Surgeon: Jun Brito MD;  Location: [facility] OR;  Service: Cardiology;  Laterality: N/A;       Review of patient's allergies indicates:  No Known Allergies    No current facility-administered medications on file prior to encounter.     Current Outpatient Medications on File Prior to Encounter   Medication Sig    ciprofloxacin HCl (CIPRO) 500 MG tablet Take 500 mg by mouth 2 (two) times daily.    amLODIPine (NORVASC) 10 MG tablet Take 5 mg by mouth once daily. Prescribed by Dr. Box    aspirin (ECOTRIN) 81 MG EC tablet Take 81 mg by mouth once daily.    clopidogreL (PLAVIX) 75 mg tablet Take 1 tablet (75 mg total) by mouth once daily.    finasteride (PROSCAR) 5 mg tablet Take 5 mg by mouth once daily.    oxybutynin (DITROPAN-XL) 5 MG TR24 oxyBUTYnin chloride ER 5 mg tablet,extended release 24 hr, [RxNorm: 039453]    rosuvastatin (CRESTOR) 5 MG tablet Take 1 tablet (5 mg total) by mouth once daily.    tamsulosin (FLOMAX) 0.4 mg Cap Take 1 capsule by mouth once daily. Prescribed by Dr. Navarrete     Family History       Problem Relation (Age of Onset)    Cancer Mother    Stroke Father          Tobacco Use    Smoking status: Former     Types: Cigars     Passive exposure: Past    Smokeless tobacco: Never   Substance and Sexual Activity    Alcohol use: Yes     Alcohol/week: 3.0 standard drinks of alcohol     Types: 3 Shots of liquor per week     Comment: 3 martinis daily    Drug use: Never    Sexual activity: Not Currently     Review of Systems   Constitutional:  Negative for activity change, appetite change, fatigue and fever.   HENT:  Negative for congestion and voice change.    Eyes:  Negative for visual disturbance.   Respiratory:  Negative for cough, shortness of breath and wheezing.    Cardiovascular:  Negative for chest pain and leg swelling.    Gastrointestinal:  Negative for abdominal distention, abdominal pain, anal bleeding, blood in stool, constipation, diarrhea, nausea, rectal pain and vomiting.   Endocrine: Negative for polyuria.   Genitourinary:  Positive for difficulty urinating and hematuria. Negative for dysuria, flank pain and urgency.   Musculoskeletal:  Positive for arthralgias.   Skin:  Positive for pallor.   Neurological:  Negative for weakness, light-headedness and numbness.   Hematological:  Bruises/bleeds easily.   Psychiatric/Behavioral: Negative.       Objective:     Vital Signs (Most Recent):  Temp: 98.1 °F (36.7 °C) (03/09/25 0847)  Pulse: 71 (03/09/25 1042)  Resp: 16 (03/09/25 1136)  BP: (!) 127/55 (03/09/25 1136)  SpO2: 100 % (03/09/25 1136) Vital Signs (24h Range):  Temp:  [97.7 °F (36.5 °C)-98.6 °F (37 °C)] 98.1 °F (36.7 °C)  Pulse:  [69-90] 71  Resp:  [13-29] 16  SpO2:  [96 %-100 %] 100 %  BP: (106-149)/(40-79) 127/55     Weight: 98.3 kg (216 lb 11.4 oz)  Body mass index is 32 kg/m².     Physical Exam  Constitutional:       General: He is not in acute distress.     Appearance: He is not toxic-appearing.   HENT:      Head: Normocephalic.      Mouth/Throat:      Mouth: Mucous membranes are moist.   Eyes:      Extraocular Movements: Extraocular movements intact.   Cardiovascular:      Rate and Rhythm: Normal rate and regular rhythm.      Pulses: Normal pulses.      Heart sounds: No murmur heard.     No gallop.   Pulmonary:      Effort: Pulmonary effort is normal. No respiratory distress.      Breath sounds: Normal breath sounds. No wheezing.   Abdominal:      General: There is no distension.      Tenderness: There is no abdominal tenderness. There is no guarding.   Musculoskeletal:         General: No swelling, deformity or signs of injury.      Cervical back: Normal range of motion.   Skin:     General: Skin is warm.      Findings: Lesion (chronic wounds BLLE) present. No bruising, erythema or rash.   Neurological:      Mental  "Status: He is alert and oriented to person, place, and time.   Psychiatric:         Mood and Affect: Mood normal.                Recent Labs   Lab 03/08/25 2157 03/09/25 0424 03/09/25  0751   WBC 11.92 11.37  --    HGB 5.7* 6.2* 7.9*   HCT 17.3* 18.7* 23.4*   MCV 98 95  --    RBC 1.76* 1.97*  --    MCH 32.4* 31.5*  --    MCHC 32.9 33.2  --    RDW 15.4* 16.4*  --     200  --    MPV 9.6 9.3  --    GRAN 85.4*  10.2* 84.4*  9.6*  --    LYMPH 4.5*  0.5* 6.3*  0.7*  --    MONO 6.9  0.8 7.0  0.8  --    EOSINOPHIL 0.3 0.2  --    BASOPHIL 0.2 0.3  --        Recent Labs   Lab 03/08/25 2157 03/09/25  0424    137   K 4.4 4.2    109   CO2 19* 20*   BUN 51* 43*   CREATININE 1.2 1.1   * 128*   CALCIUM 8.2* 8.0*   PROT 5.8* 5.6*   ALBUMIN 2.8* 2.7*   ALKPHOS 41* 42*   BILITOT 0.4 0.6   ALT 19 18   AST 23 21   ANIONGAP 9 8       Recent Labs   Lab 03/09/25  1034   COLORU Red*   APPEARANCEUA Cloudy*   PHUR 7.0   SPECGRAV >=1.030*   PROTEINUA 2+*   GLUCUA Negative   KETONESU Negative   BILIRUBINUA Negative   OCCULTUA 3+*   UROBILINOGEN 1.0   NITRITE Negative   LEUKOCYTESUR 3+*   RBCUA >100*   WBCUA >100*   BACTERIA Rare   SQUAMEPITHEL 1   HYALINECASTS 2.4*   MICROCMT SEE COMMENT       No results for input(s): "PH", "PCO2", "PO2", "HCO3", "POCSATURATED", "BE" in the last 168 hours.    No results for input(s): "TROPONINI", "CPK", "CPKMB" in the last 168 hours.    No results for input(s): "PT", "INR", "APTT" in the last 168 hours.    Lab Results   Component Value Date    HGBA1C 5.8 (H) 11/11/2024       No results for input(s): "TSH", "I9AFQJC", "S4QEYIU", "THYROIDAB", "FREET4" in the last 168 hours.    Microbiology Results (last 7 days)       Procedure Component Value Units Date/Time    Urine culture [[id number]] Collected: 03/09/25 1034    Order Status: No result Specimen: Urine Updated: 03/09/25 1118            CT Abdomen Pelvis With IV Contrast NO Oral Contrast  Result Date: 3/9/2025  EXAMINATION: CT " ABDOMEN PELVIS WITH IV CONTRAST CLINICAL HISTORY: Anemia; CT/Cardiac Nuclear exams in prior 12 months: 1 TECHNIQUE: CT abdomen and pelvis with IV contrast.  Coronal reformats prepared.  Iterative reconstruction utilized. COMPARISON: CT without IV contrast of 03/04/2025 FINDINGS: Thickened, inflamed appearing proximal duodenum suggests duodenitis/peptic ulcer disease.  No free air. Catheter within an empty diffusely thickened urinary bladder.  Mildly enlarged prostate gland.  Segmental mild left hydroureter with areas of uroepithelial thickening suggest urinary tract infection.  Bilateral renal stones.  No hydronephrosis.  No ureteral stones. Unremarkable liver, adrenals, spleen and pancreas.  No bowel obstruction.  No free air.  Extensive aortoiliac system atherosclerotic plaque.  Prior TAVR.     1. Thickened inflamed appearing proximal duodenum suggests duodenitis/peptic ulcer disease.  No evidence of perforation. 2. Diffuse thick-walled urinary bladder, possibly chronic outlet obstruction and/or cystitis.  Left uroepithelial thickening suggest urinary tract infection 3. Bilateral nephrolithiasis 4. Otherwise, please see above Electronically signed by: Yasmine Flores MD Date:    03/09/2025 Time:    10:14    CT Hip Without Contrast Right  Result Date: 3/4/2025  EXAMINATION: CT HIP WITHOUT CONTRAST RIGHT CLINICAL HISTORY: Hip pain, stress fracture suspected, neg xray; TECHNIQUE: Axial CT images were obtained. Iterative reconstruction technique was used.  CT/cardiac nuclear exam/s in prior 12 months: 1. COMPARISON: CT abdomen pelvis without IV contrast 03/04/2025. FINDINGS: No right hip fracture or dislocation.  Mild joint space narrowing right hip joint with hypertrophic change.  No soft tissue abnormality.     Mild osteoarthrosis right hip.  No fracture. Electronically signed by: Luis Enrique Baig MD Date:    03/04/2025 Time:    14:07    CT Abdomen Pelvis  Without Contrast  Result Date: 3/4/2025  EXAMINATION: CT  ABDOMEN PELVIS WITHOUT CONTRAST CLINICAL HISTORY: Flank pain, kidney stone suspected;right flank and lower back pain; TECHNIQUE: Axial CT images were obtained. Iterative reconstruction technique was used. CT/cardiac nuclear exam/s in prior 12 months: 1. COMPARISON: None. FINDINGS: No hepatic abnormality.  No gallstones.  Spleen, pancreas, adrenal glands demonstrate no abnormality.  5 mm stone lower pole right kidney.  No right ureteral stone or hydronephrosis.  Multiple left renal stones measuring 2 mm each.  No left ureteral stone or hydronephrosis.  No gross gastric abnormality.  Small hiatal hernia.  No dilated bowel.  Moderate amount of stool in the colon and rectum.  The appendix is normal.  There is no free fluid or free air.  There is diffuse urinary bladder wall thickening with a Bolton catheter in place.  The prostate is enlarged.  There is atherosclerotic disease of the abdominal aorta.  No aneurysm.  No lymph node enlargement.  Visualized lung bases are clear.  Chronic compression fracture of L3, stable from CT of 12/19/2020.     1. Bilateral nephrolithiasis without hydronephrosis. 2. Evidence of constipation.  No bowel dilatation. 3. No acute findings. Electronically signed by: Luis Enrique Baig MD Date:    03/04/2025 Time:    11:33    X-Ray Hip 2 or 3 views Right with Pelvis when performed  Result Date: 3/4/2025  EXAMINATION: XR HIP WITH PELVIS WHEN PERFORMED 2 OR 3 VIEWS RIGHT CLINICAL HISTORY: Unspecified fall, initial encounter COMPARISON: None FINDINGS: Right hip radiographs, two views, demonstrate no fracture or dislocation.  No focal soft tissue abnormality.     No fracture. Electronically signed by: Luis Enrique Baig MD Date:    03/04/2025 Time:    11:29

## 2025-03-09 NOTE — ED NOTES
"Contacted Med Surg again, spoke with MARK King , "Rafael went to get some Steroids, I'll have her call you when she gets back"  "

## 2025-03-09 NOTE — ASSESSMENT & PLAN NOTE
Patient's blood pressure range in the last 24 hours was: BP  Min: 106/40  Max: 149/79.The patient's inpatient anti-hypertensive regimen is listed below:  Current Antihypertensives       Plan  - BP is controlled, no changes needed to their regimen  - Hold home antihypertensives for now

## 2025-03-10 LAB
ANION GAP SERPL CALC-SCNC: 6 MMOL/L (ref 8–16)
BASOPHILS # BLD AUTO: 0.03 K/UL (ref 0–0.2)
BASOPHILS NFR BLD: 0.3 % (ref 0–1.9)
BUN SERPL-MCNC: 27 MG/DL (ref 10–30)
CALCIUM SERPL-MCNC: 7.8 MG/DL (ref 8.7–10.5)
CHLORIDE SERPL-SCNC: 111 MMOL/L (ref 95–110)
CO2 SERPL-SCNC: 23 MMOL/L (ref 23–29)
CREAT SERPL-MCNC: 1.1 MG/DL (ref 0.5–1.4)
DIFFERENTIAL METHOD BLD: ABNORMAL
EOSINOPHIL # BLD AUTO: 0.3 K/UL (ref 0–0.5)
EOSINOPHIL NFR BLD: 2.6 % (ref 0–8)
ERYTHROCYTE [DISTWIDTH] IN BLOOD BY AUTOMATED COUNT: 17.5 % (ref 11.5–14.5)
EST. GFR  (NO RACE VARIABLE): >60 ML/MIN/1.73 M^2
GLUCOSE SERPL-MCNC: 107 MG/DL (ref 70–110)
HCT VFR BLD AUTO: 24.9 % (ref 40–54)
HCT VFR BLD AUTO: 25.3 % (ref 40–54)
HGB BLD-MCNC: 8.4 G/DL (ref 14–18)
HGB BLD-MCNC: 8.4 G/DL (ref 14–18)
IMM GRANULOCYTES # BLD AUTO: 0.17 K/UL (ref 0–0.04)
IMM GRANULOCYTES NFR BLD AUTO: 1.6 % (ref 0–0.5)
LYMPHOCYTES # BLD AUTO: 0.6 K/UL (ref 1–4.8)
LYMPHOCYTES NFR BLD: 5.2 % (ref 18–48)
MCH RBC QN AUTO: 31.1 PG (ref 27–31)
MCHC RBC AUTO-ENTMCNC: 33.2 G/DL (ref 32–36)
MCV RBC AUTO: 94 FL (ref 82–98)
MONOCYTES # BLD AUTO: 0.6 K/UL (ref 0.3–1)
MONOCYTES NFR BLD: 5.4 % (ref 4–15)
NEUTROPHILS # BLD AUTO: 9.2 K/UL (ref 1.8–7.7)
NEUTROPHILS NFR BLD: 84.9 % (ref 38–73)
NRBC BLD-RTO: 1 /100 WBC
PLATELET # BLD AUTO: 167 K/UL (ref 150–450)
PMV BLD AUTO: 9.5 FL (ref 9.2–12.9)
POTASSIUM SERPL-SCNC: 3.8 MMOL/L (ref 3.5–5.1)
RBC # BLD AUTO: 2.7 M/UL (ref 4.6–6.2)
SODIUM SERPL-SCNC: 140 MMOL/L (ref 136–145)
WBC # BLD AUTO: 10.87 K/UL (ref 3.9–12.7)

## 2025-03-10 PROCEDURE — 21400001 HC TELEMETRY ROOM

## 2025-03-10 PROCEDURE — 97162 PT EVAL MOD COMPLEX 30 MIN: CPT

## 2025-03-10 PROCEDURE — 63600175 PHARM REV CODE 636 W HCPCS: Performed by: STUDENT IN AN ORGANIZED HEALTH CARE EDUCATION/TRAINING PROGRAM

## 2025-03-10 PROCEDURE — 85025 COMPLETE CBC W/AUTO DIFF WBC: CPT | Performed by: STUDENT IN AN ORGANIZED HEALTH CARE EDUCATION/TRAINING PROGRAM

## 2025-03-10 PROCEDURE — 25000003 PHARM REV CODE 250: Performed by: STUDENT IN AN ORGANIZED HEALTH CARE EDUCATION/TRAINING PROGRAM

## 2025-03-10 PROCEDURE — 80048 BASIC METABOLIC PNL TOTAL CA: CPT | Performed by: STUDENT IN AN ORGANIZED HEALTH CARE EDUCATION/TRAINING PROGRAM

## 2025-03-10 PROCEDURE — 97166 OT EVAL MOD COMPLEX 45 MIN: CPT

## 2025-03-10 PROCEDURE — 11000001 HC ACUTE MED/SURG PRIVATE ROOM

## 2025-03-10 PROCEDURE — 36415 COLL VENOUS BLD VENIPUNCTURE: CPT | Performed by: STUDENT IN AN ORGANIZED HEALTH CARE EDUCATION/TRAINING PROGRAM

## 2025-03-10 PROCEDURE — 25000003 PHARM REV CODE 250: Performed by: EMERGENCY MEDICINE

## 2025-03-10 RX ORDER — FAMOTIDINE 20 MG/1
20 TABLET, FILM COATED ORAL DAILY
Status: DISCONTINUED | OUTPATIENT
Start: 2025-03-11 | End: 2025-03-13

## 2025-03-10 RX ORDER — BENZONATATE 100 MG/1
200 CAPSULE ORAL 3 TIMES DAILY PRN
Status: DISCONTINUED | OUTPATIENT
Start: 2025-03-10 | End: 2025-03-13 | Stop reason: HOSPADM

## 2025-03-10 RX ADMIN — OXYBUTYNIN CHLORIDE 5 MG: 5 TABLET, EXTENDED RELEASE ORAL at 08:03

## 2025-03-10 RX ADMIN — ACETAMINOPHEN 650 MG: 325 TABLET ORAL at 03:03

## 2025-03-10 RX ADMIN — CEFTRIAXONE 2 G: 2 INJECTION, POWDER, FOR SOLUTION INTRAMUSCULAR; INTRAVENOUS at 12:03

## 2025-03-10 RX ADMIN — ACETAMINOPHEN 650 MG: 325 TABLET ORAL at 10:03

## 2025-03-10 RX ADMIN — ATORVASTATIN CALCIUM 20 MG: 20 TABLET, FILM COATED ORAL at 08:03

## 2025-03-10 RX ADMIN — TAMSULOSIN HYDROCHLORIDE 0.4 MG: 0.4 CAPSULE ORAL at 08:03

## 2025-03-10 RX ADMIN — MUPIROCIN: 20 OINTMENT TOPICAL at 08:03

## 2025-03-10 RX ADMIN — FINASTERIDE 5 MG: 5 TABLET, FILM COATED ORAL at 08:03

## 2025-03-10 RX ADMIN — FAMOTIDINE 20 MG: 10 INJECTION, SOLUTION INTRAVENOUS at 08:03

## 2025-03-10 RX ADMIN — MUPIROCIN: 20 OINTMENT TOPICAL at 10:03

## 2025-03-10 RX ADMIN — Medication 6 MG: at 10:03

## 2025-03-10 NOTE — PROGRESS NOTES
Pharmacist Intervention IV to PO Note    Tobi Liz Jr. is a 89 y.o. male being treated with IV medication famotidine    Patient Data:    Vital Signs (Most Recent):  Temp: 98.2 °F (36.8 °C) (03/10/25 1114)  Pulse: 80 (03/10/25 1114)  Resp: 18 (03/10/25 1114)  BP: (!) 112/57 (03/10/25 1114)  SpO2: 98 % (03/10/25 1114) Vital Signs (72h Range):  Temp:  [97.5 °F (36.4 °C)-98.6 °F (37 °C)]   Pulse:  [69-90]   Resp:  [13-29]   BP: (101-149)/(40-79)   SpO2:  [96 %-100 %]      CBC:  Recent Labs   Lab 03/08/25 2157 03/09/25 0424 03/09/25  0751 03/09/25  2054 03/09/25  2354 03/10/25  0602   WBC 11.92 11.37  --   --   --  10.87   RBC 1.76* 1.97*  --   --   --  2.70*   HGB 5.7* 6.2*   < > 8.7* 8.4* 8.4*   HCT 17.3* 18.7*   < > 25.9* 24.9* 25.3*    200  --   --   --  167   MCV 98 95  --   --   --  94   MCH 32.4* 31.5*  --   --   --  31.1*   MCHC 32.9 33.2  --   --   --  33.2    < > = values in this interval not displayed.     CMP:     Recent Labs   Lab 03/08/25  2157 03/09/25  0424 03/10/25  0602   * 128* 107   CALCIUM 8.2* 8.0* 7.8*   ALBUMIN 2.8* 2.7*  --    PROT 5.8* 5.6*  --     137 140   K 4.4 4.2 3.8   CO2 19* 20* 23    109 111*   BUN 51* 43* 27   CREATININE 1.2 1.1 1.1   ALKPHOS 41* 42*  --    ALT 19 18  --    AST 23 21  --    BILITOT 0.4 0.6  --        Dietary Orders:  Diet Orders            Diet Heart Healthy Standard Tray: Heart Healthy starting at 03/09 1917            Based on the following criteria, this patient qualifies for intravenous to oral conversion:  [x] The patients gastrointestinal tract is functioning (tolerating medications via oral or enteral route for 24 hours and tolerating food or enteral feeds for 24 hours).  [x] The patient is hemodynamically stable for 24 hours (heart rate <100 beats per minute, systolic blood pressure >99 mm Hg, and respiratory rate <20 breaths per minute).  [x] The patient shows clinical improvement (afebrile for at least 24 hours and white  blood cell count downtrending or normalized). Additionally, the patient must be non-neutropenic (absolute neutrophil count >500 cells/mm3).  [x] For antimicrobials, the patient has received IV therapy for at least 24 hours.    IV medication famotidine will be changed to oral medication famotidine    Pharmacist's Name: Dipika Lee  Pharmacist's Extension: [phone]

## 2025-03-10 NOTE — HOSPITAL COURSE
"Chief Complaint   Patient presents with    Shaking       Pt to the ER for eval due to concerns over multiple episodes of "shaking and screaming" after standing from a seated position per EMS. Family concerned that activity may have been seizures, no hx. AAOX4 upon arrival, no complaints.          HPI: ED HPI:  89-year-old male history of arthritis, BPH, coronary artery disease presents the emergency department via EMS after he had an episode of shaking after he stood up fast. Never had a postictal phase. Talking during the episode. Occurred twice today. Denies chest pain or shortness of breath. Here in the ER states he feels fine, no weakness. No slurred speech no other issues. Alert oriented x3, GCS is 15. Family reports he has a history of anemia in the past. Patient's only complaint is minor pain to his right hip area after he leaned against his wheelchair when he got dizzy earlier.      IM HPI:  Patient Tobi Liz Jr. is a 89 y.o. male with a PMHx of  has a past medical history of Aortic valve stenosis, Arthritis, BPH (benign prostatic hyperplasia), Carotid artery stenosis, Chronic diastolic heart failure, ETOH abuse, Exposure to COVID-19 virus (01/07/2022), Hyperchloremia, Kidney stones, Murmur, Polymyalgia rheumatica, PVD (peripheral vascular disease), and Renal artery stenosis., presents complaining of shaking after standing from a seated position that started yesterday, he was aware and talking during the episode so not likely a seizure. Labs show he was severely anemic in the ED with Hgb of 5. He had hematuria as well on UA along with bacteria. Ucx pending. His family states that he has a history of anemia in the past. He otherwise feels fine. He received 3 units of pRBCs in the ED with appropriate response.       3/10/2025:  Patient with continued hematuria but is improving.  H&H are holding this morning.  Urine culture is pending and we will continue IV Rocephin for now.  Patient currently on " Flomax as well.  Patient c/o cough and recent CXR normal.   3/11/2025:  Urine culture growing Gram-negative rods.  Continue Rocephin as sensitivity is pending.  Otherwise labs show drop in H&H overnight and we will transfuse with 1 unit packed red blood cells today.  We will add steroids and Mucinex for congestion.  Psychiatric consult to assess decision-making capacity.  Will add nutritional consult, consider PPN.    3/12/2025: Patient seen by dietary yesterday and recommended adding supplements for now and adjusting diet to patient's legs.  She will follow up today to see outpatient does with month these modifications.  H&H significantly improved with blood transfusion yesterday.  Urine culture shows sensitivity to Bactrim and we will transition from IV antibiotics to oral therapy.  Mentation much improved this morning.    3/13/2025:  Patient overall significantly improved from admission.  H&H were stable yesterday.  Awaiting updated CBC this morning.  Otherwise renal function stable and no new electrolyte abnormalities noted.  Patient accepted to inpatient rehab and we will discharge today.

## 2025-03-10 NOTE — ASSESSMENT & PLAN NOTE
Anemia is likely due to unsure at this time. Urine shows 3+ rbc and red color however pt does have a chronic indwelling catheter. He reports when he had the catheter placed a little over a week ago the bag was full of blood for 3 days. He also reports having a kidney stone. Most recent hemoglobin and hematocrit are listed below.  - Rectal exam without BRBPR/melena  - Stool occult megative  - CXR wnl, CTA abdomen with no identifiable causes, hip XR normal, no signs of bleeding or bruising other than some small wounds  - UA many bacteria and shows blood however unsure if it is from trauma of the catheter or UTI  - Receieved 3 units of pRBCs with response below  Recent Labs     03/09/25  2054 03/09/25  2354 03/10/25  0602   HGB 8.7* 8.4* 8.4*   HCT 25.9* 24.9* 25.3*     Plan  - Monitor serial CBC: Daily  - Transfuse PRBC if patient becomes hemodynamically unstable, symptomatic or H/H drops below 7/21.  - Patient has received 3 units of PRBCs on 3/9/25  - Patient's anemia is currently improving  -    3/10/2025: H&H improved status post transfusion.  Continue to follow for now.

## 2025-03-10 NOTE — ASSESSMENT & PLAN NOTE
- UA with many bacteria and shows blood however unsure if it is from trauma of the catheter or UTI  - Receieved 3 units of pRBCs with response below    Plan  - Start Rocephin  - Follow cultures    3/10/2025:  Urine culture pending.  Continue Rocephin.

## 2025-03-10 NOTE — PLAN OF CARE
Problem: Occupational Therapy  Goal: Occupational Therapy Goal  Description: Goals to be met by: 3/28/2025     Patient will increase functional independence with ADLs by performing:    Bathing from  shower chair/bench with Supervision.  Sitting at edge of bed x10 minutes with [place].  Step transfer with Supervision and rolling walker  Increased functional strength to WFL for increased independence in activities of daily living.

## 2025-03-10 NOTE — SUBJECTIVE & OBJECTIVE
Review of Systems   Constitutional:  Negative for activity change, appetite change, fatigue and fever.   HENT:  Negative for congestion and voice change.    Eyes:  Negative for visual disturbance.   Respiratory:  Negative for cough, shortness of breath and wheezing.    Cardiovascular:  Negative for chest pain and leg swelling.   Gastrointestinal:  Negative for abdominal distention, abdominal pain, anal bleeding, blood in stool, constipation, diarrhea, nausea, rectal pain and vomiting.   Endocrine: Negative for polyuria.   Genitourinary:  Positive for difficulty urinating and hematuria. Negative for dysuria, flank pain and urgency.   Musculoskeletal:  Positive for arthralgias.   Skin:  Positive for pallor.   Neurological:  Negative for weakness, light-headedness and numbness.   Hematological:  Bruises/bleeds easily.   Psychiatric/Behavioral: Negative.       Objective:     Vital Signs (Most Recent):  Temp: 97.5 °F (36.4 °C) (03/10/25 0800)  Pulse: 75 (03/10/25 0800)  Resp: 18 (03/10/25 0800)  BP: (!) 111/58 (03/10/25 0800)  SpO2: 99 % (03/10/25 0800) Vital Signs (24h Range):  Temp:  [97.5 °F (36.4 °C)-98.4 °F (36.9 °C)] 97.5 °F (36.4 °C)  Pulse:  [71-82] 75  Resp:  [13-21] 18  SpO2:  [96 %-100 %] 99 %  BP: (101-149)/(44-79) 111/58     Weight: 98.3 kg (216 lb 11.4 oz)  Body mass index is 32 kg/m².    Intake/Output Summary (Last 24 hours) at 3/10/2025 0843  Last data filed at 3/10/2025 0516  Gross per 24 hour   Intake 470 ml   Output 1400 ml   Net -930 ml         Physical Exam  Constitutional:       General: He is not in acute distress.     Appearance: He is not toxic-appearing.   HENT:      Head: Normocephalic.      Mouth/Throat:      Mouth: Mucous membranes are moist.   Eyes:      Extraocular Movements: Extraocular movements intact.   Cardiovascular:      Rate and Rhythm: Normal rate and regular rhythm.      Pulses: Normal pulses.      Heart sounds: No murmur heard.     No gallop.   Pulmonary:      Effort: Pulmonary  effort is normal. No respiratory distress.      Breath sounds: Normal breath sounds. No wheezing.   Abdominal:      General: There is no distension.      Tenderness: There is no abdominal tenderness. There is no guarding.   Musculoskeletal:         General: No swelling, deformity or signs of injury.      Cervical back: Normal range of motion.   Skin:     General: Skin is warm.      Findings: Lesion (chronic wounds BLLE) present. No bruising, erythema or rash.   Neurological:      Mental Status: He is alert and oriented to person, place, and time.   Psychiatric:         Mood and Affect: Mood normal.               Significant Labs: All pertinent labs within the past 24 hours have been reviewed.    Significant Imaging: I have reviewed all pertinent imaging results/findings within the past 24 hours.

## 2025-03-10 NOTE — PLAN OF CARE
Director spoke to the patient's son, Mateo, via phone regarding current status of patient. Initially, alek was coming to the hospital today to assist the patient/family with assigning the son POA today at 3 PM. I explained that based on the providers review, the patient does not currently have capacity to sign paperwork. Mateo stated that he understood and would cancel the notary. We also discussed long term planning - patient's wife broke her hip and it currently in inpatient rehabilitation at [facility]. I will [place] back to the son if/when the patient is deemed to have capacity. Potential placement at [facility] is being considered by the family IF it is appropriate for patient.

## 2025-03-10 NOTE — ASSESSMENT & PLAN NOTE
Patient's blood pressure range in the last 24 hours was: BP  Min: 101/65  Max: 149/79.The patient's inpatient anti-hypertensive regimen is listed below:  Current Antihypertensives       Plan  - BP is controlled, no changes needed to their regimen  - Hold home antihypertensives for now

## 2025-03-10 NOTE — PROGRESS NOTES
[facility] Medicine  Progress Note    Patient Name: Tobi Liz Jr.  MRN: [id number]  Patient Class: IP- Inpatient   Admission Date: 3/8/2025  Length of Stay: 1 days  Attending Physician: Luis Enrique Guzman Jr., MD  Primary Care Provider: Luis Enrique Guzman Jr., MD        Subjective     Principal Problem:Anemia        HPI:  ED HPI:  89-year-old male history of arthritis, BPH, coronary artery disease presents the emergency department via EMS after he had an episode of shaking after he stood up fast. Never had a postictal phase. Talking during the episode. Occurred twice today. Denies chest pain or shortness of breath. Here in the ER states he feels fine, no weakness. No slurred speech no other issues. Alert oriented x3, GCS is 15. Family reports he has a history of anemia in the past. Patient's only complaint is minor pain to his right hip area after he leaned against his wheelchair when he got dizzy earlier.     IM HPI:  Patient Tobi Liz Jr. is a 89 y.o. male with a PMHx of  has a past medical history of Aortic valve stenosis, Arthritis, BPH (benign prostatic hyperplasia), Carotid artery stenosis, Chronic diastolic heart failure, ETOH abuse, Exposure to COVID-19 virus (01/07/2022), Hyperchloremia, Kidney stones, Murmur, Polymyalgia rheumatica, PVD (peripheral vascular disease), and Renal artery stenosis., presents complaining of shaking after standing from a seated position that started yesterday, he was aware and talking during the episode so not likely a seizure. Labs show he was severely anemic in the ED with Hgb of 5. He had hematuria as well on UA along with bacteria. Ucx pending. His family states that he has a history of anemia in the past. He otherwise feels fine. He received 3 units of pRBCs in the ED with appropriate response.      Overview/Hospital Course:  3/10/2025:  Patient with continued hematuria but is improving.  H&H are holding this morning.  Urine culture is  pending and we will continue IV Rocephin for now.  Patient currently on Flomax as well.  Patient c/o cough and recent CXR normal.       Review of Systems   Constitutional:  Negative for activity change, appetite change, fatigue and fever.   HENT:  Negative for congestion and voice change.    Eyes:  Negative for visual disturbance.   Respiratory:  Negative for cough, shortness of breath and wheezing.    Cardiovascular:  Negative for chest pain and leg swelling.   Gastrointestinal:  Negative for abdominal distention, abdominal pain, anal bleeding, blood in stool, constipation, diarrhea, nausea, rectal pain and vomiting.   Endocrine: Negative for polyuria.   Genitourinary:  Positive for difficulty urinating and hematuria. Negative for dysuria, flank pain and urgency.   Musculoskeletal:  Positive for arthralgias.   Skin:  Positive for pallor.   Neurological:  Negative for weakness, light-headedness and numbness.   Hematological:  Bruises/bleeds easily.   Psychiatric/Behavioral: Negative.       Objective:     Vital Signs (Most Recent):  Temp: 97.5 °F (36.4 °C) (03/10/25 0800)  Pulse: 75 (03/10/25 0800)  Resp: 18 (03/10/25 0800)  BP: (!) 111/58 (03/10/25 0800)  SpO2: 99 % (03/10/25 0800) Vital Signs (24h Range):  Temp:  [97.5 °F (36.4 °C)-98.4 °F (36.9 °C)] 97.5 °F (36.4 °C)  Pulse:  [71-82] 75  Resp:  [13-21] 18  SpO2:  [96 %-100 %] 99 %  BP: (101-149)/(44-79) 111/58     Weight: 98.3 kg (216 lb 11.4 oz)  Body mass index is 32 kg/m².    Intake/Output Summary (Last 24 hours) at 3/10/2025 0843  Last data filed at 3/10/2025 0516  Gross per 24 hour   Intake 470 ml   Output 1400 ml   Net -930 ml         Physical Exam  Constitutional:       General: He is not in acute distress.     Appearance: He is not toxic-appearing.   HENT:      Head: Normocephalic.      Mouth/Throat:      Mouth: Mucous membranes are moist.   Eyes:      Extraocular Movements: Extraocular movements intact.   Cardiovascular:      Rate and Rhythm: Normal rate  and regular rhythm.      Pulses: Normal pulses.      Heart sounds: No murmur heard.     No gallop.   Pulmonary:      Effort: Pulmonary effort is normal. No respiratory distress.      Breath sounds: Normal breath sounds. No wheezing.   Abdominal:      General: There is no distension.      Tenderness: There is no abdominal tenderness. There is no guarding.   Musculoskeletal:         General: No swelling, deformity or signs of injury.      Cervical back: Normal range of motion.   Skin:     General: Skin is warm.      Findings: Lesion (chronic wounds BLLE) present. No bruising, erythema or rash.   Neurological:      Mental Status: He is alert and oriented to person, place, and time.   Psychiatric:         Mood and Affect: Mood normal.               Significant Labs: All pertinent labs within the past 24 hours have been reviewed.    Significant Imaging: I have reviewed all pertinent imaging results/findings within the past 24 hours.      Assessment & Plan  Anemia  Anemia is likely due to  unsure at this time . Urine shows 3+ rbc and red color however pt does have a chronic indwelling catheter. He reports when he had the catheter placed a little over a week ago the bag was full of blood for 3 days. He also reports having a kidney stone. Most recent hemoglobin and hematocrit are listed below.  - Rectal exam without BRBPR/melena  - Stool occult megative  - CXR wnl, CTA abdomen with no identifiable causes, hip XR normal, no signs of bleeding or bruising other than some small wounds  - UA many bacteria and shows blood however unsure if it is from trauma of the catheter or UTI  - Receieved 3 units of pRBCs with response below  Recent Labs     03/09/25  2054 03/09/25  2354 03/10/25  0602   HGB 8.7* 8.4* 8.4*   HCT 25.9* 24.9* 25.3*     Plan  - Monitor serial CBC: Daily  - Transfuse PRBC if patient becomes hemodynamically unstable, symptomatic or H/H drops below 7/21.  - Patient has received 3 units of PRBCs on 3/9/25  -  Patient's anemia is currently improving  -    3/10/2025: H&H improved status post transfusion.  Continue to follow for now.  Hypertension  Patient's blood pressure range in the last 24 hours was: BP  Min: 101/65  Max: 149/79.The patient's inpatient anti-hypertensive regimen is listed below:  Current Antihypertensives       Plan  - BP is controlled, no changes needed to their regimen  - Hold home antihypertensives for now  Hyperlipidemia  Cont home statin    BPH (benign prostatic hyperplasia)  Cont home meds    Atherosclerosis of native artery of both lower extremities with rest pain  Hold plavix in the setting of a possible bleed    Plan  - Restart as needed    Acute cystitis with hematuria  - UA with many bacteria and shows blood however unsure if it is from trauma of the catheter or UTI  - Receieved 3 units of pRBCs with response below    Plan  - Start Rocephin  - Follow cultures    3/10/2025:  Urine culture pending.  Continue Rocephin.      VTE Risk Mitigation (From admission, onward)           Ordered     IP VTE HIGH RISK PATIENT  Once         03/09/25 0337     Place sequential compression device  Until discontinued         03/09/25 0337     Place MERRITT hose  Until discontinued         03/09/25 0337                    Discharge Planning   ADONAY:      Code Status: Full Code   Medical Readiness for Discharge Date:                            TALHA Luna  Department of Hospital Medicine   [facility]

## 2025-03-10 NOTE — PT/OT/SLP EVAL
Physical Therapy Evaluation    Patient Name:  Tobi Liz Jr.   MRN:  [id number]    Recommendations:     Discharge Recommendations:  (low intensity with potential to progress to moderate intensity)   Discharge Equipment Recommendations:  (TBD)   Barriers to discharge: Decreased caregiver support    Assessment:     Tobi Liz Jr. is a 89 y.o. male admitted with a medical diagnosis of Anemia.  He presents with the following impairments/functional limitations: weakness, impaired endurance, impaired functional mobility, impaired self care skills, gait instability, impaired balance, impaired cognition, decreased coordination, decreased lower extremity function, decreased safety awareness, pain, decreased ROM, impaired coordination, other (comment) (safety with functional mobility).    Rehab Prognosis: Fair; patient would benefit from acute skilled PT services to address these deficits and reach maximum level of function.    Recent Surgery: * No surgery found *      Plan:     During this hospitalization, patient to be seen 5 x/week to address the identified rehab impairments via gait training, therapeutic activities, therapeutic exercises, therapeutic groups, neuromuscular re-education, wheelchair management/training and progress toward the following goals:    Plan of Care Expires:  03/21/25    Subjective     Chief Complaint: patient reports he fell trying to assist his wife off of the floor following fall. Patient confused and thinking he is at the wrong hospital. Generalized (bilateral) LE discomfort with bandages noted (bilateral) LE's (lower leg). Patient unreliable historian.   Patient/Family Comments/goals: Patient hoping to return home with his wife. Family did not state patient's discharge location.   Pain/Comfort:  Pain Rating 1: 5/10  Location - Side 1: Bilateral  Location - Orientation 1: lower  Location 1:  (Lower extremeties generalized)  Pain Addressed 1: Reposition, Cessation of  Activity    Patients cultural, spiritual, Jainism conflicts given the current situation: no    Living Environment:  Lives with wife in single story home per patient report. Patient's wife recently had fall with hip fracture and subsequent surgery. Patient wife is in another facility. Patient has children and their spouses who help out when needed. Patient previously walking with SPC and has recently transitioned to Walker and wheelchair. Physical therapist spoke to patient son and son in law and it appears that patient has had multiple falls and has declined in functional mobility. Family reports that patient and wife are mobile in community. Patient reports wife performed cooking and driving tasks prior to most recent injury.   Prior to admission, patients level of function was MOD I.  Equipment used at home: walker, rolling, wheelchair.  DME owned (not currently used): single point cane.  Upon discharge, patient will have assistance from TBD.    Objective:     Communicated with nurse prior to session.  Patient found supine with willams catheter, peripheral IV  upon PT entry to room.    General Precautions: Standard, fall, other (see comments) (patient with confusion thinking he is in the wrong hospital as his wife is currently hospitalized in [place])  Orthopedic Precautions:  none  Braces:  none  Respiratory Status: Room air 99% pulse ox    Exams:  Cognitive Exam:  Patient is oriented to Person  Gross Motor Coordination:  impaired  Postural Exam:  Patient presented with the following abnormalities:    -       Rounded shoulders  -       Forward head  -       slumped posture  RLE ROM: WFL with apparent arthritic changes knees  RLE Strength: Deficits: grossly assessed 3+/5  LLE ROM: WFL with apparent arthritic changes knees  LLE Strength: grossly assessed 3+/5    Functional Mobility:  Bed Mobility:     Rolling Left:  stand by assistance  Rolling Right: stand by assistance  Scooting: contact guard  assistance  Bridging: contact guard assistance  Supine to Sit: moderate assistance  Sit to Supine: moderate assistance  Transfers:     Sit to Stand:  moderate assistance and of 2 persons with rolling walker  Gait: sidestepping at edge of bed to position self higher in the bed with rolling walker and assist of two persons; patient with dizziness upon standing  Balance: patient needing moderate assistance with standing dynamic activities      Treatment & Education:  Patient educated on importance of calling for assistance for mobility out of bed. Patient re-assured that his family knows what hospital he is in and son and son-in-law present at various times throughout evaluation. Patient/family educated that patient will receive physical therapist intervention while in this hospital as long as appropriate.     Patient left HOB elevated with all lines intact, call button in reach, bed alarm on, and nurse notified.    GOALS:   Multidisciplinary Problems       Physical Therapy Goals          Problem: Physical Therapy    Goal Priority Disciplines Outcome Interventions   Physical Therapy Goal     PT, PT/OT     Description: 1. Patient demonstrates transfers with mod independence with rolling walker to safely transition between surfaces.   2. Patient demonstrates ambulation with rolling walker with min assistance x50 feet.   3. Patient demonstrates modified independence with bed mobility tasks.   4. Patient demonstrates ability to perform (bilateral) LE therex sitting unsupported edge of bed x15 minutes.   5. Goals PRN.                        DME Justifications:  To be determined    History:     Past Medical History:   Diagnosis Date    Aortic valve stenosis     Arthritis     BPH (benign prostatic hyperplasia)     Carotid artery stenosis     Chronic diastolic heart failure     ETOH abuse     Exposure to COVID-19 virus 01/07/2022    Hyperchloremia     Kidney stones     Murmur     Polymyalgia rheumatica     PVD (peripheral  vascular disease)     Renal artery stenosis        Past Surgical History:   Procedure Laterality Date    A-V CARDIAC PACEMAKER INSERTION N/A 10/6/2023    Procedure: INSERTION, CARDIAC PACEMAKER, DUAL CHAMBER;  Surgeon: Douglas Salguero MD;  Location: [facility] CATH;  Service: Cardiology;  Laterality: N/A;    ANGIOGRAM, CAROTID Left 8/25/2023    Procedure: ANGIOGRAM, CAROTID;  Surgeon: Nick Box MD;  Location: [facility] CATH;  Service: Cardiology;  Laterality: Left;    CATARACT EXTRACTION, BILATERAL      ECHOCARDIOGRAM,TRANSESOPHAGEAL N/A 10/3/2023    Procedure: Transesophageal echo (JOSE ARMANDO) intra-procedure log documentation;  Surgeon: Nick Box MD;  Location: [facility] OR;  Service: Cardiology;  Laterality: N/A;    HAND SURGERY      INSERTION OF STENT INTO PERIPHERAL VESSEL N/A 1/30/2025    Procedure: INSERTION, STENT, VESSEL, PERIPHERAL;  Surgeon: Harsha Salcedo MD;  Location: [facility] CATH;  Service: Cardiology;  Laterality: N/A;    PERCUTANEOUS TRANSCATHETER AORTIC VALVE REPLACEMENT (TAVR) Left 10/3/2023    Procedure: REPLACEMENT, AORTIC VALVE, PERCUTANEOUS, TRANSCATHETER;  Surgeon: Nick Box MD;  Location: [facility] OR;  Service: Cardiology;  Laterality: Left;    PERCUTANEOUS TRANSCATHETER AORTIC VALVE REPLACEMENT (TAVR) Left 10/3/2023    Procedure: REPLACEMENT, AORTIC VALVE, PERCUTANEOUS, TRANSCATHETER carotid access;  Surgeon: Jun Brito MD;  Location: [facility] OR;  Service: Cardiovascular;  Laterality: Left;    PERCUTANEOUS TRANSLUMINAL ANGIOPLASTY N/A 8/25/2023    Procedure: ANGIOPLASTY-PERCUTANEOUS TRANSLUMINAL (PTA);  Surgeon: Nick Box MD;  Location: [facility] CATH;  Service: Cardiology;  Laterality: N/A;    SHOULDER SURGERY Left     total shoulder replacement    WOUND EXPLORATION N/A 10/3/2023    Procedure: EXPLORATION, WOUND;  Surgeon: Jun Brito MD;  Location: [facility] OR;  Service: Cardiology;  Laterality: N/A;       Time Tracking:     PT Received On: 03/10/25  PT Start Time: 0952     PT Stop  Time: 1015  PT Total Time (min): 23 min     Billable Minutes: Evaluation 23 minutes      03/10/2025

## 2025-03-10 NOTE — NURSING
Multiple attempts to get up out of bed. Moved closer to nurses' station. Bed alarm continues to be in use. Notified Daughter and son-in-law

## 2025-03-11 PROBLEM — S81.809A WOUND OF LOWER EXTREMITY: Status: ACTIVE | Noted: 2025-03-11

## 2025-03-11 LAB
ANION GAP SERPL CALC-SCNC: 7 MMOL/L (ref 8–16)
BACTERIA UR CULT: ABNORMAL
BASOPHILS # BLD AUTO: 0.03 K/UL (ref 0–0.2)
BASOPHILS NFR BLD: 0.3 % (ref 0–1.9)
BLD PROD TYP BPU: NORMAL
BLOOD UNIT EXPIRATION DATE: NORMAL
BLOOD UNIT TYPE CODE: 6200
BLOOD UNIT TYPE: NORMAL
BUN SERPL-MCNC: 20 MG/DL (ref 10–30)
CALCIUM SERPL-MCNC: 7.8 MG/DL (ref 8.7–10.5)
CHLORIDE SERPL-SCNC: 110 MMOL/L (ref 95–110)
CO2 SERPL-SCNC: 22 MMOL/L (ref 23–29)
CODING SYSTEM: NORMAL
CREAT SERPL-MCNC: 1 MG/DL (ref 0.5–1.4)
CROSSMATCH INTERPRETATION: NORMAL
DIFFERENTIAL METHOD BLD: ABNORMAL
DISPENSE STATUS: NORMAL
EOSINOPHIL # BLD AUTO: 0.4 K/UL (ref 0–0.5)
EOSINOPHIL NFR BLD: 3.9 % (ref 0–8)
ERYTHROCYTE [DISTWIDTH] IN BLOOD BY AUTOMATED COUNT: 17.5 % (ref 11.5–14.5)
EST. GFR  (NO RACE VARIABLE): >60 ML/MIN/1.73 M^2
GLUCOSE SERPL-MCNC: 104 MG/DL (ref 70–110)
HCT VFR BLD AUTO: 25 % (ref 40–54)
HGB BLD-MCNC: 8.1 G/DL (ref 14–18)
IMM GRANULOCYTES # BLD AUTO: 0.09 K/UL (ref 0–0.04)
IMM GRANULOCYTES NFR BLD AUTO: 0.9 % (ref 0–0.5)
LYMPHOCYTES # BLD AUTO: 0.3 K/UL (ref 1–4.8)
LYMPHOCYTES NFR BLD: 3.1 % (ref 18–48)
MCH RBC QN AUTO: 31 PG (ref 27–31)
MCHC RBC AUTO-ENTMCNC: 32.4 G/DL (ref 32–36)
MCV RBC AUTO: 96 FL (ref 82–98)
MONOCYTES # BLD AUTO: 0.4 K/UL (ref 0.3–1)
MONOCYTES NFR BLD: 4.6 % (ref 4–15)
NEUTROPHILS # BLD AUTO: 8.3 K/UL (ref 1.8–7.7)
NEUTROPHILS NFR BLD: 87.2 % (ref 38–73)
NRBC BLD-RTO: 0 /100 WBC
NUM UNITS TRANS PACKED RBC: NORMAL
PLATELET # BLD AUTO: 179 K/UL (ref 150–450)
PMV BLD AUTO: 9.5 FL (ref 9.2–12.9)
POTASSIUM SERPL-SCNC: 3.6 MMOL/L (ref 3.5–5.1)
RBC # BLD AUTO: 2.61 M/UL (ref 4.6–6.2)
SODIUM SERPL-SCNC: 139 MMOL/L (ref 136–145)
WBC # BLD AUTO: 9.5 K/UL (ref 3.9–12.7)

## 2025-03-11 PROCEDURE — 80048 BASIC METABOLIC PNL TOTAL CA: CPT | Performed by: STUDENT IN AN ORGANIZED HEALTH CARE EDUCATION/TRAINING PROGRAM

## 2025-03-11 PROCEDURE — 63600175 PHARM REV CODE 636 W HCPCS: Mod: JZ,TB | Performed by: NURSE PRACTITIONER

## 2025-03-11 PROCEDURE — 25000003 PHARM REV CODE 250: Performed by: NURSE PRACTITIONER

## 2025-03-11 PROCEDURE — 21400001 HC TELEMETRY ROOM

## 2025-03-11 PROCEDURE — P9016 RBC LEUKOCYTES REDUCED: HCPCS | Performed by: NURSE PRACTITIONER

## 2025-03-11 PROCEDURE — 25000003 PHARM REV CODE 250: Performed by: INTERNAL MEDICINE

## 2025-03-11 PROCEDURE — 36415 COLL VENOUS BLD VENIPUNCTURE: CPT | Performed by: STUDENT IN AN ORGANIZED HEALTH CARE EDUCATION/TRAINING PROGRAM

## 2025-03-11 PROCEDURE — 85025 COMPLETE CBC W/AUTO DIFF WBC: CPT | Performed by: STUDENT IN AN ORGANIZED HEALTH CARE EDUCATION/TRAINING PROGRAM

## 2025-03-11 PROCEDURE — 25000003 PHARM REV CODE 250: Performed by: STUDENT IN AN ORGANIZED HEALTH CARE EDUCATION/TRAINING PROGRAM

## 2025-03-11 PROCEDURE — 63600175 PHARM REV CODE 636 W HCPCS: Performed by: STUDENT IN AN ORGANIZED HEALTH CARE EDUCATION/TRAINING PROGRAM

## 2025-03-11 PROCEDURE — 25000003 PHARM REV CODE 250: Performed by: EMERGENCY MEDICINE

## 2025-03-11 PROCEDURE — 86920 COMPATIBILITY TEST SPIN: CPT | Performed by: NURSE PRACTITIONER

## 2025-03-11 RX ORDER — GUAIFENESIN 600 MG/1
1200 TABLET, EXTENDED RELEASE ORAL 2 TIMES DAILY
Status: DISCONTINUED | OUTPATIENT
Start: 2025-03-11 | End: 2025-03-13 | Stop reason: HOSPADM

## 2025-03-11 RX ORDER — HYDROCODONE BITARTRATE AND ACETAMINOPHEN 500; 5 MG/1; MG/1
TABLET ORAL
Status: DISCONTINUED | OUTPATIENT
Start: 2025-03-11 | End: 2025-03-13 | Stop reason: HOSPADM

## 2025-03-11 RX ORDER — METHYLPREDNISOLONE SOD SUCC 125 MG
80 VIAL (EA) INJECTION EVERY 8 HOURS
Status: DISCONTINUED | OUTPATIENT
Start: 2025-03-11 | End: 2025-03-13 | Stop reason: HOSPADM

## 2025-03-11 RX ADMIN — FAMOTIDINE 20 MG: 20 TABLET, FILM COATED ORAL at 10:03

## 2025-03-11 RX ADMIN — GUAIFENESIN 1200 MG: 600 TABLET, EXTENDED RELEASE ORAL at 12:03

## 2025-03-11 RX ADMIN — MUPIROCIN: 20 OINTMENT TOPICAL at 09:03

## 2025-03-11 RX ADMIN — ATORVASTATIN CALCIUM 20 MG: 20 TABLET, FILM COATED ORAL at 10:03

## 2025-03-11 RX ADMIN — GUAIFENESIN 1200 MG: 600 TABLET, EXTENDED RELEASE ORAL at 09:03

## 2025-03-11 RX ADMIN — CEFTRIAXONE 2 G: 2 INJECTION, POWDER, FOR SOLUTION INTRAMUSCULAR; INTRAVENOUS at 12:03

## 2025-03-11 RX ADMIN — OXYBUTYNIN CHLORIDE 5 MG: 5 TABLET, EXTENDED RELEASE ORAL at 10:03

## 2025-03-11 RX ADMIN — Medication 6 MG: at 09:03

## 2025-03-11 RX ADMIN — TAMSULOSIN HYDROCHLORIDE 0.4 MG: 0.4 CAPSULE ORAL at 10:03

## 2025-03-11 RX ADMIN — MUPIROCIN: 20 OINTMENT TOPICAL at 10:03

## 2025-03-11 RX ADMIN — METHYLPREDNISOLONE SODIUM SUCCINATE 80 MG: 125 INJECTION, POWDER, FOR SOLUTION INTRAMUSCULAR; INTRAVENOUS at 01:03

## 2025-03-11 RX ADMIN — METHYLPREDNISOLONE SODIUM SUCCINATE 80 MG: 125 INJECTION, POWDER, FOR SOLUTION INTRAMUSCULAR; INTRAVENOUS at 09:03

## 2025-03-11 RX ADMIN — FINASTERIDE 5 MG: 5 TABLET, FILM COATED ORAL at 10:03

## 2025-03-11 NOTE — PROGRESS NOTES
[facility] Medicine  Progress Note    Patient Name: Tobi Liz Jr.  MRN: [id number]  Patient Class: IP- Inpatient   Admission Date: 3/8/2025  Length of Stay: 2 days  Attending Physician: Luis Enrique Guzman Jr., MD  Primary Care Provider: Luis Enrique Guzman Jr., MD        Subjective     Principal Problem:Anemia        HPI:  ED HPI:  89-year-old male history of arthritis, BPH, coronary artery disease presents the emergency department via EMS after he had an episode of shaking after he stood up fast. Never had a postictal phase. Talking during the episode. Occurred twice today. Denies chest pain or shortness of breath. Here in the ER states he feels fine, no weakness. No slurred speech no other issues. Alert oriented x3, GCS is 15. Family reports he has a history of anemia in the past. Patient's only complaint is minor pain to his right hip area after he leaned against his wheelchair when he got dizzy earlier.     IM HPI:  Patient Tobi Liz Jr. is a 89 y.o. male with a PMHx of  has a past medical history of Aortic valve stenosis, Arthritis, BPH (benign prostatic hyperplasia), Carotid artery stenosis, Chronic diastolic heart failure, ETOH abuse, Exposure to COVID-19 virus (01/07/2022), Hyperchloremia, Kidney stones, Murmur, Polymyalgia rheumatica, PVD (peripheral vascular disease), and Renal artery stenosis., presents complaining of shaking after standing from a seated position that started yesterday, he was aware and talking during the episode so not likely a seizure. Labs show he was severely anemic in the ED with Hgb of 5. He had hematuria as well on UA along with bacteria. Ucx pending. His family states that he has a history of anemia in the past. He otherwise feels fine. He received 3 units of pRBCs in the ED with appropriate response.      Overview/Hospital Course:  3/10/2025:  Patient with continued hematuria but is improving.  H&H are holding this morning.  Urine culture is  pending and we will continue IV Rocephin for now.  Patient currently on Flomax as well.  Patient c/o cough and recent CXR normal.   3/11/2025:  Urine culture growing Gram-negative rods.  Continue Rocephin as sensitivity is pending.  Otherwise labs show drop in H&H overnight and we will transfuse with 1 unit packed red blood cells today.  We will add steroids and Mucinex for congestion.  Psychiatric consult to assess decision-making capacity.  Will add nutritional consult, consider PPN.          Review of Systems   Constitutional:  Negative for activity change, appetite change, fatigue and fever.   HENT:  Negative for congestion and voice change.    Eyes:  Negative for visual disturbance.   Respiratory:  Negative for cough, shortness of breath and wheezing.    Cardiovascular:  Negative for chest pain and leg swelling.   Gastrointestinal:  Negative for abdominal distention, abdominal pain, anal bleeding, blood in stool, constipation, diarrhea, nausea, rectal pain and vomiting.   Endocrine: Negative for polyuria.   Genitourinary:  Positive for difficulty urinating and hematuria. Negative for dysuria, flank pain and urgency.   Musculoskeletal:  Positive for arthralgias.   Skin:  Positive for pallor.   Neurological:  Negative for weakness, light-headedness and numbness.   Hematological:  Bruises/bleeds easily.   Psychiatric/Behavioral: Negative.       Objective:     Vital Signs (Most Recent):  Temp: 98 °F (36.7 °C) (03/11/25 1101)  Pulse: 76 (03/11/25 1101)  Resp: 18 (03/11/25 1101)  BP: (!) 140/65 (03/11/25 1101)  SpO2: 95 % (03/11/25 1101) Vital Signs (24h Range):  Temp:  [98 °F (36.7 °C)-98.9 °F (37.2 °C)] 98 °F (36.7 °C)  Pulse:  [63-88] 76  Resp:  [16-18] 18  SpO2:  [94 %-98 %] 95 %  BP: ()/(43-65) 140/65     Weight: 98.3 kg (216 lb 11.4 oz)  Body mass index is 32 kg/m².    Intake/Output Summary (Last 24 hours) at 3/11/2025 1122  Last data filed at 3/11/2025 0638  Gross per 24 hour   Intake --   Output 1900 ml    Net -1900 ml         Physical Exam  Constitutional:       General: He is not in acute distress.     Appearance: He is not toxic-appearing.   HENT:      Head: Normocephalic.      Mouth/Throat:      Mouth: Mucous membranes are moist.   Eyes:      Extraocular Movements: Extraocular movements intact.   Cardiovascular:      Rate and Rhythm: Normal rate and regular rhythm.      Pulses: Normal pulses.      Heart sounds: No murmur heard.     No gallop.   Pulmonary:      Effort: Pulmonary effort is normal. No respiratory distress.      Breath sounds: Normal breath sounds. No wheezing.   Abdominal:      General: There is no distension.      Tenderness: There is no abdominal tenderness. There is no guarding.   Musculoskeletal:         General: No swelling, deformity or signs of injury.      Cervical back: Normal range of motion.   Skin:     General: Skin is warm.      Findings: Lesion (chronic wounds BLLE) present. No bruising, erythema or rash.   Neurological:      Mental Status: He is alert.   Psychiatric:         Mood and Affect: Mood normal.               Significant Labs: All pertinent labs within the past 24 hours have been reviewed.    Significant Imaging: I have reviewed all pertinent imaging results/findings within the past 24 hours.      Assessment & Plan  Anemia  Anemia is likely due to  unsure at this time . Urine shows 3+ rbc and red color however pt does have a chronic indwelling catheter. He reports when he had the catheter placed a little over a week ago the bag was full of blood for 3 days. He also reports having a kidney stone. Most recent hemoglobin and hematocrit are listed below.  - Rectal exam without BRBPR/melena  - Stool occult megative  - CXR wnl, CTA abdomen with no identifiable causes, hip XR normal, no signs of bleeding or bruising other than some small wounds  - UA many bacteria and shows blood however unsure if it is from trauma of the catheter or UTI  - Receieved 3 units of pRBCs with response  below  Recent Labs     03/09/25  2354 03/10/25  0602 03/11/25  0558   HGB 8.4* 8.4* 8.1*   HCT 24.9* 25.3* 25.0*     Plan  - Monitor serial CBC: Daily  - Transfuse PRBC if patient becomes hemodynamically unstable, symptomatic or H/H drops below 7/21.  - Patient has received 3 units of PRBCs on 3/9/25  - Patient's anemia is currently improving  -    3/10/2025: H&H improved status post transfusion.  Continue to follow for now.  3/11/2025: We will transfuse 1 unit packed red blood cells today.  Hypertension  Patient's blood pressure range in the last 24 hours was: BP  Min: 95/43  Max: 140/65.The patient's inpatient anti-hypertensive regimen is listed below:  Current Antihypertensives       Plan  - BP is controlled, no changes needed to their regimen  - Hold home antihypertensives for now  Hyperlipidemia  Cont home statin    BPH (benign prostatic hyperplasia)  Cont home meds    Atherosclerosis of native artery of both lower extremities with rest pain  Hold plavix in the setting of a possible bleed    Plan  - Restart as needed    Acute cystitis with hematuria  - UA with many bacteria and shows blood however unsure if it is from trauma of the catheter or UTI  - Receieved 3 units of pRBCs with response below    Plan  - Start Rocephin  - Follow cultures    3/10/2025:  Urine culture pending.  Continue Rocephin.  3/11/2025: Culture showing Gram-negative rods.  Continue Rocephin until sensitivity results.    Wound of lower extremity  3/11/2025: Wound Care consult.      VTE Risk Mitigation (From admission, onward)           Ordered     IP VTE HIGH RISK PATIENT  Once         03/09/25 0337     Place sequential compression device  Until discontinued         03/09/25 0337     Place MERRITT hose  Until discontinued         03/09/25 0337                    Discharge Planning   ADONAY:      Code Status: Full Code   Medical Readiness for Discharge Date:   Discharge Plan A: Assisted Living                Please place Justification for  SAMIA Santos, TALHA  Department of Hospital Medicine   [facility]

## 2025-03-11 NOTE — CONSULTS
[facility] Surg  Adult Nutrition  Consult Note    SUMMARY     Recommendations  1. Rec'd Cardiac diet.   2. Rec'd ONS: Chocolate Ensure Enlive TID  to provide additional nutrition. 3. Rec'd Jordan BID to promote wound healing and to provide additional nutrition.  4. Encourage PO inatake and compliance with drinking ONS.   5. RD to follow and make rec's accordingly.  Goals:   1. Pt will consume > 75% of meals by next RD follow up.  Nutrition Goal Status: new  Communication of RD Recs: reviewed with physician    Nutrition Discharge Planning     Nutrition Discharge Planning: Too early to determine, pending clinical course    Assessment and Plan    Nutrition Problem  Inadequate protein intake    Related to (etiology):   Increased nutrient needs (protein) secondary to delayed wound healing    Signs and Symptoms (as evidenced by):   Protein intake < estimated protein requirements, Poor PO intake x 2 weeks     Interventions/Recommendations (treatment strategy):  1. Rec'd Cardiac diet.   2. Rec'd ONS: Chocolate Ensure Enlive TID  to provide additional nutrition. 3. Rec'd Jordan BID to promote wound healing and to provide additional nutrition.  4. Encourage PO inatake and compliance with drinking ONS.   5. RD to follow and make rec's accordingly.    Nutrition Diagnosis Status:   New    Malnutrition Assessment  NFPE not warranted at this time after further review. RD to continue to monitor for signs and symptoms of malnutrition.    Nutrition Related Social Determinants of Health:  None identified at this time.    Reason for Assessment    Reason For Assessment: consult (Protein- curtis malnutrition, PPN)  Diagnosis: other (see comments) (Anemia)  General Information Comments: RD consulted for PPN rec's. Spoke with pt's family at bedside while pt napped. Family states pt has had decreased appetite x 2 weeks with no weight loss. Pt does have wounds/skin tears and bruising. Worked with pt's family on food preferences/dislikes and  "meal selections. COmmunicated with MD about adding supplements TID and BID to provide additional nutrition and promote wound healing. MD on board with holding off on PPN for now and tring PO options first. WIll continue to work with pt, pt's family members, and MD to best meet pt's nutrtitional needs via PO. RD to follow and make rec's accordingly.    Nutrition/Diet History    Nutrition Intake History: General Healthy Diet  Food Preferences: Chocolate Ensure, Hamburgers, Gumbo, Meatloaf  Spiritual, Cultural Beliefs, Rastafari Practices, Values that Affect Care: no  Food Allergies: NKFA  Factors Affecting Nutritional Intake: decreased appetite    Anthropometrics    Height: 5' 9" (175.3 cm)  Height (inches): 69 in  Height Method: Stated  Weight: 98.3 kg (216 lb 11.4 oz)  Weight (lb): 216.71 lb  Weight Method: Bed Scale  Ideal Body Weight (IBW), Male: 160 lb  % Ideal Body Weight, Male (lb): 135.44 %  BMI (Calculated): 32  BMI Grade: 30 - 34.9- obesity - grade I    Lab/Procedures/Meds    Pertinent Labs Reviewed: reviewed  Pertinent Medications Reviewed: reviewed    Estimated/Assessed Needs    Weight Used For Calorie Calculations: 78.9 kg (174 lb) (AdjBW)  Energy Calorie Requirements (kcal): 1973 (25 kcal/kg AdjBW)  Energy Need Method: Kcal/kg  Protein Requirements:  (1.2-1.5g/kg IBW)  Weight Used For Protein Calculations: 72.6 kg (160 lb) (IBW)  Fluid Requirements (mL): 1973 (1mL/kcal)  Estimated Fluid Requirement Method: RDA Method  RDA Method (mL): 1973    Nutrition Prescription Ordered    Current Diet Order: Cardiac  Oral Nutrition Supplement: None    Evaluation of Received Nutrient/Fluid Intake    % Kcal Needs: 50%  % Protein Needs: 50%  I/O: - 900  Energy Calories Required: not meeting needs  Protein Required: not meeting needs  Tolerance: tolerating  % Intake of Estimated Energy Needs: 25 - 50 %  % Meal Intake: 25 - 50 %    Nutrition Risk    Level of Risk/Frequency of Follow-up: moderate     Monitor and " Evaluation    Monitor and Evaluation: Energy intake, Food and beverage intake, Protein intake, Diet order, Enteral and parenteral nutrition administration, Food and nutrition knowledge, Weight, Beliefs and attitudes, Electrolyte and renal panel, Gastrointestinal profile, Glucose/endocrine profile, Inflammatory profile, Lipid profile     Nutrition Follow-Up    RD Follow-up?: Yes

## 2025-03-11 NOTE — SUBJECTIVE & OBJECTIVE
Review of Systems   Constitutional:  Negative for activity change, appetite change, fatigue and fever.   HENT:  Negative for congestion and voice change.    Eyes:  Negative for visual disturbance.   Respiratory:  Negative for cough, shortness of breath and wheezing.    Cardiovascular:  Negative for chest pain and leg swelling.   Gastrointestinal:  Negative for abdominal distention, abdominal pain, anal bleeding, blood in stool, constipation, diarrhea, nausea, rectal pain and vomiting.   Endocrine: Negative for polyuria.   Genitourinary:  Positive for difficulty urinating and hematuria. Negative for dysuria, flank pain and urgency.   Musculoskeletal:  Positive for arthralgias.   Skin:  Positive for pallor.   Neurological:  Negative for weakness, light-headedness and numbness.   Hematological:  Bruises/bleeds easily.   Psychiatric/Behavioral: Negative.       Objective:     Vital Signs (Most Recent):  Temp: 98 °F (36.7 °C) (03/11/25 1101)  Pulse: 76 (03/11/25 1101)  Resp: 18 (03/11/25 1101)  BP: (!) 140/65 (03/11/25 1101)  SpO2: 95 % (03/11/25 1101) Vital Signs (24h Range):  Temp:  [98 °F (36.7 °C)-98.9 °F (37.2 °C)] 98 °F (36.7 °C)  Pulse:  [63-88] 76  Resp:  [16-18] 18  SpO2:  [94 %-98 %] 95 %  BP: ()/(43-65) 140/65     Weight: 98.3 kg (216 lb 11.4 oz)  Body mass index is 32 kg/m².    Intake/Output Summary (Last 24 hours) at 3/11/2025 1122  Last data filed at 3/11/2025 0638  Gross per 24 hour   Intake --   Output 1900 ml   Net -1900 ml         Physical Exam  Constitutional:       General: He is not in acute distress.     Appearance: He is not toxic-appearing.   HENT:      Head: Normocephalic.      Mouth/Throat:      Mouth: Mucous membranes are moist.   Eyes:      Extraocular Movements: Extraocular movements intact.   Cardiovascular:      Rate and Rhythm: Normal rate and regular rhythm.      Pulses: Normal pulses.      Heart sounds: No murmur heard.     No gallop.   Pulmonary:      Effort: Pulmonary effort  is normal. No respiratory distress.      Breath sounds: Normal breath sounds. No wheezing.   Abdominal:      General: There is no distension.      Tenderness: There is no abdominal tenderness. There is no guarding.   Musculoskeletal:         General: No swelling, deformity or signs of injury.      Cervical back: Normal range of motion.   Skin:     General: Skin is warm.      Findings: Lesion (chronic wounds BLLE) present. No bruising, erythema or rash.   Neurological:      Mental Status: He is alert.   Psychiatric:         Mood and Affect: Mood normal.               Significant Labs: All pertinent labs within the past 24 hours have been reviewed.    Significant Imaging: I have reviewed all pertinent imaging results/findings within the past 24 hours.

## 2025-03-11 NOTE — PT/OT/SLP PROGRESS
Occupational Therapy      Patient Name:  Tobi KIERRA Liz Jr.   MRN:  [id number]    Patient not seen today secondary to Blood transfusion. Will follow-up 3/12/25.    3/11/2025  Linh MELENDEZ

## 2025-03-11 NOTE — ASSESSMENT & PLAN NOTE
Anemia is likely due to unsure at this time. Urine shows 3+ rbc and red color however pt does have a chronic indwelling catheter. He reports when he had the catheter placed a little over a week ago the bag was full of blood for 3 days. He also reports having a kidney stone. Most recent hemoglobin and hematocrit are listed below.  - Rectal exam without BRBPR/melena  - Stool occult megative  - CXR wnl, CTA abdomen with no identifiable causes, hip XR normal, no signs of bleeding or bruising other than some small wounds  - UA many bacteria and shows blood however unsure if it is from trauma of the catheter or UTI  - Receieved 3 units of pRBCs with response below  Recent Labs     03/09/25  2354 03/10/25  0602 03/11/25  0558   HGB 8.4* 8.4* 8.1*   HCT 24.9* 25.3* 25.0*     Plan  - Monitor serial CBC: Daily  - Transfuse PRBC if patient becomes hemodynamically unstable, symptomatic or H/H drops below 7/21.  - Patient has received 3 units of PRBCs on 3/9/25  - Patient's anemia is currently improving  -    3/10/2025: H&H improved status post transfusion.  Continue to follow for now.  3/11/2025: We will transfuse 1 unit packed red blood cells today.

## 2025-03-11 NOTE — ASSESSMENT & PLAN NOTE
Patient's blood pressure range in the last 24 hours was: BP  Min: 95/43  Max: 140/65.The patient's inpatient anti-hypertensive regimen is listed below:  Current Antihypertensives       Plan  - BP is controlled, no changes needed to their regimen  - Hold home antihypertensives for now

## 2025-03-11 NOTE — ASSESSMENT & PLAN NOTE
- UA with many bacteria and shows blood however unsure if it is from trauma of the catheter or UTI  - Receieved 3 units of pRBCs with response below    Plan  - Start Rocephin  - Follow cultures    3/10/2025:  Urine culture pending.  Continue Rocephin.  3/11/2025: Culture showing Gram-negative rods.  Continue Rocephin until sensitivity results.

## 2025-03-11 NOTE — PLAN OF CARE
Recommendations  1. Rec'd Cardiac diet.   2. Rec'd ONS: Chocolate Ensure Enlive TID  to provide additional nutrition. 3. Rec'd Jordan BID to promote wound healing and to provide additional nutrition.  4. Encourage PO inatake and compliance with drinking ONS.   5. RD to follow and make rec's accordingly.  Goals:   1. Pt will consume > 75% of meals by next RD follow up.  Nutrition Goal Status: new

## 2025-03-11 NOTE — PLAN OF CARE
Problem: Adult Inpatient Plan of Care  Goal: Plan of Care Review  Outcome: Progressing  Goal: Patient-Specific Goal (Individualized)  Outcome: Progressing  Goal: Absence of Hospital-Acquired Illness or Injury  Outcome: Progressing  Goal: Optimal Comfort and Wellbeing  Outcome: Progressing  Goal: Readiness for Transition of Care  Outcome: Progressing     Problem: Fall Injury Risk  Goal: Absence of Fall and Fall-Related Injury  Outcome: Progressing     Problem: Skin Injury Risk Increased  Goal: Skin Health and Integrity  Outcome: Progressing     Problem: Infection  Goal: Absence of Infection Signs and Symptoms  Outcome: Progressing     Problem: Wound  Goal: Optimal Coping  Outcome: Progressing  Goal: Optimal Functional Ability  Outcome: Progressing  Goal: Absence of Infection Signs and Symptoms  Outcome: Progressing  Goal: Improved Oral Intake  Outcome: Progressing  Goal: Optimal Pain Control and Function  Outcome: Progressing  Goal: Skin Health and Integrity  Outcome: Progressing  Goal: Optimal Wound Healing  Outcome: Progressing      Continue Regimen: Aklief 0.005 % topical cream QHS\\nQuantity: 45.0 g  Days Supply: 30\\nSig: Apply a pea-sized amount to entire clean and moisturized face and affected areas of fave every night as tolerated Render In Strict Bullet Format?: No Detail Level: Zone

## 2025-03-12 ENCOUNTER — OUTPATIENT CASE MANAGEMENT (OUTPATIENT)
Dept: ADMINISTRATIVE | Facility: OTHER | Age: OVER 89
End: 2025-03-12
Payer: MEDICARE

## 2025-03-12 LAB
ABO + RH BLD: NORMAL
ANION GAP SERPL CALC-SCNC: 6 MMOL/L (ref 8–16)
BASOPHILS # BLD AUTO: 0 K/UL (ref 0–0.2)
BASOPHILS NFR BLD: 0 % (ref 0–1.9)
BLD GP AB SCN CELLS X3 SERPL QL: NORMAL
BUN SERPL-MCNC: 25 MG/DL (ref 10–30)
CALCIUM SERPL-MCNC: 7.9 MG/DL (ref 8.7–10.5)
CHLORIDE SERPL-SCNC: 108 MMOL/L (ref 95–110)
CO2 SERPL-SCNC: 23 MMOL/L (ref 23–29)
CREAT SERPL-MCNC: 1 MG/DL (ref 0.5–1.4)
DIFFERENTIAL METHOD BLD: ABNORMAL
EOSINOPHIL # BLD AUTO: 0 K/UL (ref 0–0.5)
EOSINOPHIL NFR BLD: 0.1 % (ref 0–8)
ERYTHROCYTE [DISTWIDTH] IN BLOOD BY AUTOMATED COUNT: 16.5 % (ref 11.5–14.5)
EST. GFR  (NO RACE VARIABLE): >60 ML/MIN/1.73 M^2
GLUCOSE SERPL-MCNC: 174 MG/DL (ref 70–110)
HCT VFR BLD AUTO: 30.8 % (ref 40–54)
HGB BLD-MCNC: 10.2 G/DL (ref 14–18)
IMM GRANULOCYTES # BLD AUTO: 0.07 K/UL (ref 0–0.04)
IMM GRANULOCYTES NFR BLD AUTO: 1 % (ref 0–0.5)
LYMPHOCYTES # BLD AUTO: 0.2 K/UL (ref 1–4.8)
LYMPHOCYTES NFR BLD: 2.9 % (ref 18–48)
MCH RBC QN AUTO: 32 PG (ref 27–31)
MCHC RBC AUTO-ENTMCNC: 33.1 G/DL (ref 32–36)
MCV RBC AUTO: 97 FL (ref 82–98)
MONOCYTES # BLD AUTO: 0.1 K/UL (ref 0.3–1)
MONOCYTES NFR BLD: 1.6 % (ref 4–15)
NEUTROPHILS # BLD AUTO: 6.5 K/UL (ref 1.8–7.7)
NEUTROPHILS NFR BLD: 94.4 % (ref 38–73)
NRBC BLD-RTO: 0 /100 WBC
PLATELET # BLD AUTO: 182 K/UL (ref 150–450)
PMV BLD AUTO: 9.6 FL (ref 9.2–12.9)
POTASSIUM SERPL-SCNC: 4.3 MMOL/L (ref 3.5–5.1)
RBC # BLD AUTO: 3.19 M/UL (ref 4.6–6.2)
SODIUM SERPL-SCNC: 137 MMOL/L (ref 136–145)
SPECIMEN OUTDATE: NORMAL
WBC # BLD AUTO: 6.85 K/UL (ref 3.9–12.7)

## 2025-03-12 PROCEDURE — 85025 COMPLETE CBC W/AUTO DIFF WBC: CPT | Performed by: STUDENT IN AN ORGANIZED HEALTH CARE EDUCATION/TRAINING PROGRAM

## 2025-03-12 PROCEDURE — 25000003 PHARM REV CODE 250: Performed by: NURSE PRACTITIONER

## 2025-03-12 PROCEDURE — 97530 THERAPEUTIC ACTIVITIES: CPT

## 2025-03-12 PROCEDURE — 36415 COLL VENOUS BLD VENIPUNCTURE: CPT | Performed by: STUDENT IN AN ORGANIZED HEALTH CARE EDUCATION/TRAINING PROGRAM

## 2025-03-12 PROCEDURE — 99223 1ST HOSP IP/OBS HIGH 75: CPT | Mod: ,,, | Performed by: PSYCHIATRY & NEUROLOGY

## 2025-03-12 PROCEDURE — 86900 BLOOD TYPING SEROLOGIC ABO: CPT | Performed by: INTERNAL MEDICINE

## 2025-03-12 PROCEDURE — 25000003 PHARM REV CODE 250: Performed by: EMERGENCY MEDICINE

## 2025-03-12 PROCEDURE — 25000003 PHARM REV CODE 250: Performed by: INTERNAL MEDICINE

## 2025-03-12 PROCEDURE — 94761 N-INVAS EAR/PLS OXIMETRY MLT: CPT

## 2025-03-12 PROCEDURE — 21400001 HC TELEMETRY ROOM

## 2025-03-12 PROCEDURE — 63600175 PHARM REV CODE 636 W HCPCS: Mod: JZ,TB | Performed by: NURSE PRACTITIONER

## 2025-03-12 PROCEDURE — 99900035 HC TECH TIME PER 15 MIN (STAT)

## 2025-03-12 PROCEDURE — 25000003 PHARM REV CODE 250: Performed by: STUDENT IN AN ORGANIZED HEALTH CARE EDUCATION/TRAINING PROGRAM

## 2025-03-12 PROCEDURE — 99900031 HC PATIENT EDUCATION (STAT)

## 2025-03-12 PROCEDURE — 80048 BASIC METABOLIC PNL TOTAL CA: CPT | Performed by: STUDENT IN AN ORGANIZED HEALTH CARE EDUCATION/TRAINING PROGRAM

## 2025-03-12 RX ORDER — GENTIAN VIOLET 2% 2 G/100ML
0.5 SOLUTION TOPICAL DAILY
Status: DISCONTINUED | OUTPATIENT
Start: 2025-03-12 | End: 2025-03-13 | Stop reason: HOSPADM

## 2025-03-12 RX ORDER — SULFAMETHOXAZOLE AND TRIMETHOPRIM 800; 160 MG/1; MG/1
1 TABLET ORAL 2 TIMES DAILY
Status: DISCONTINUED | OUTPATIENT
Start: 2025-03-12 | End: 2025-03-13 | Stop reason: HOSPADM

## 2025-03-12 RX ADMIN — FAMOTIDINE 20 MG: 20 TABLET, FILM COATED ORAL at 08:03

## 2025-03-12 RX ADMIN — GENTIAN VIOLET 2% 0.5 ML: 20 LIQUID TOPICAL at 01:03

## 2025-03-12 RX ADMIN — GUAIFENESIN 1200 MG: 600 TABLET, EXTENDED RELEASE ORAL at 09:03

## 2025-03-12 RX ADMIN — METHYLPREDNISOLONE SODIUM SUCCINATE 80 MG: 125 INJECTION, POWDER, FOR SOLUTION INTRAMUSCULAR; INTRAVENOUS at 01:03

## 2025-03-12 RX ADMIN — Medication 6 MG: at 09:03

## 2025-03-12 RX ADMIN — SULFAMETHOXAZOLE AND TRIMETHOPRIM 1 TABLET: 800; 160 TABLET ORAL at 09:03

## 2025-03-12 RX ADMIN — METHYLPREDNISOLONE SODIUM SUCCINATE 80 MG: 125 INJECTION, POWDER, FOR SOLUTION INTRAMUSCULAR; INTRAVENOUS at 09:03

## 2025-03-12 RX ADMIN — OXYBUTYNIN CHLORIDE 5 MG: 5 TABLET, EXTENDED RELEASE ORAL at 09:03

## 2025-03-12 RX ADMIN — BENZONATATE 200 MG: 100 CAPSULE ORAL at 01:03

## 2025-03-12 RX ADMIN — METHYLPREDNISOLONE SODIUM SUCCINATE 80 MG: 125 INJECTION, POWDER, FOR SOLUTION INTRAMUSCULAR; INTRAVENOUS at 06:03

## 2025-03-12 RX ADMIN — MUPIROCIN: 20 OINTMENT TOPICAL at 09:03

## 2025-03-12 RX ADMIN — FINASTERIDE 5 MG: 5 TABLET, FILM COATED ORAL at 08:03

## 2025-03-12 RX ADMIN — TAMSULOSIN HYDROCHLORIDE 0.4 MG: 0.4 CAPSULE ORAL at 09:03

## 2025-03-12 RX ADMIN — ATORVASTATIN CALCIUM 20 MG: 20 TABLET, FILM COATED ORAL at 09:03

## 2025-03-12 NOTE — CONSULTS
Wound care consult received.  Patient is being followed by [facility] and also has [facility].  Contact [facility] and spoke with Mary Kay regarding wound care orders with wound care as follows:  Cleanse bilateral lower leg wounds with NS, apply Gentian violet to periwound skin and silver alginate to wounds, apply ABD & wrap with Conform.  Outpatient wound care note with plan to continue silver alginate and moderate compression wraps daily.  Per MD note:  Unna boots contraindicated due to severity of disease and risk of critical limb ischemia.  Elevate legs while not ambulating.

## 2025-03-12 NOTE — ASSESSMENT & PLAN NOTE
Anemia is likely due to unsure at this time. Urine shows 3+ rbc and red color however pt does have a chronic indwelling catheter. He reports when he had the catheter placed a little over a week ago the bag was full of blood for 3 days. He also reports having a kidney stone. Most recent hemoglobin and hematocrit are listed below.  - Rectal exam without BRBPR/melena  - Stool occult megative  - CXR wnl, CTA abdomen with no identifiable causes, hip XR normal, no signs of bleeding or bruising other than some small wounds  - UA many bacteria and shows blood however unsure if it is from trauma of the catheter or UTI  - Receieved 3 units of pRBCs with response below  Recent Labs     03/10/25  0602 03/11/25  0558 03/12/25  0606   HGB 8.4* 8.1* 10.2*   HCT 25.3* 25.0* 30.8*     Plan  - Monitor serial CBC: Daily  - Transfuse PRBC if patient becomes hemodynamically unstable, symptomatic or H/H drops below 7/21.  - Patient has received 3 units of PRBCs on 3/9/25  - Patient's anemia is currently improving  -    3/10/2025: H&H improved status post transfusion.  Continue to follow for now.  3/11/2025: We will transfuse 1 unit packed red blood cells today.  3/12/2025: Significant improvement in hemoglobin and hematocrit status post transfusion.  Continue to follow.

## 2025-03-12 NOTE — PROGRESS NOTES
[facility]  Progress Note    Patient Name: Tobi Liz Jr.  MRN: [id number]  Patient Class: IP- Inpatient   Admission Date: 3/8/2025  Length of Stay: 3 days  Attending Physician: Luis Enrique Guzman Jr., MD  Primary Care Provider: Luis Enrique Guzman Jr., MD        Subjective     Principal Problem:Anemia        HPI:  ED HPI:  89-year-old male history of arthritis, BPH, coronary artery disease presents the emergency department via EMS after he had an episode of shaking after he stood up fast. Never had a postictal phase. Talking during the episode. Occurred twice today. Denies chest pain or shortness of breath. Here in the ER states he feels fine, no weakness. No slurred speech no other issues. Alert oriented x3, GCS is 15. Family reports he has a history of anemia in the past. Patient's only complaint is minor pain to his right hip area after he leaned against his wheelchair when he got dizzy earlier.     IM HPI:  Patient Tobi Liz Jr. is a 89 y.o. male with a PMHx of  has a past medical history of Aortic valve stenosis, Arthritis, BPH (benign prostatic hyperplasia), Carotid artery stenosis, Chronic diastolic heart failure, ETOH abuse, Exposure to COVID-19 virus (01/07/2022), Hyperchloremia, Kidney stones, Murmur, Polymyalgia rheumatica, PVD (peripheral vascular disease), and Renal artery stenosis., presents complaining of shaking after standing from a seated position that started yesterday, he was aware and talking during the episode so not likely a seizure. Labs show he was severely anemic in the ED with Hgb of 5. He had hematuria as well on UA along with bacteria. Ucx pending. His family states that he has a history of anemia in the past. He otherwise feels fine. He received 3 units of pRBCs in the ED with appropriate response.      Overview/Hospital Course:  3/10/2025:  Patient with continued hematuria but is improving.  H&H are holding this morning.  Urine culture is  pending and we will continue IV Rocephin for now.  Patient currently on Flomax as well.  Patient c/o cough and recent CXR normal.   3/11/2025:  Urine culture growing Gram-negative rods.  Continue Rocephin as sensitivity is pending.  Otherwise labs show drop in H&H overnight and we will transfuse with 1 unit packed red blood cells today.  We will add steroids and Mucinex for congestion.  Psychiatric consult to assess decision-making capacity.  Will add nutritional consult, consider PPN.    3/12/2025: Patient seen by dietary yesterday and recommended adding supplements for now and adjusting diet to patient's legs.  She will follow up today to see outpatient does with month these modifications.  H&H significantly improved with blood transfusion yesterday.  Urine culture shows sensitivity to Bactrim and we will transition from IV antibiotics to oral therapy.        Review of Systems   Constitutional:  Negative for activity change, appetite change, fatigue and fever.   HENT:  Negative for congestion and voice change.    Eyes:  Negative for visual disturbance.   Respiratory:  Negative for cough, shortness of breath and wheezing.    Cardiovascular:  Negative for chest pain and leg swelling.   Gastrointestinal:  Negative for abdominal distention, abdominal pain, anal bleeding, blood in stool, constipation, diarrhea, nausea, rectal pain and vomiting.   Endocrine: Negative for polyuria.   Genitourinary:  Positive for difficulty urinating and hematuria. Negative for dysuria, flank pain and urgency.   Musculoskeletal:  Positive for arthralgias.   Skin:  Positive for pallor.   Neurological:  Negative for weakness, light-headedness and numbness.   Hematological:  Bruises/bleeds easily.   Psychiatric/Behavioral: Negative.       Objective:     Vital Signs (Most Recent):  Temp: 97.5 °F (36.4 °C) (03/12/25 0750)  Pulse: 92 (03/12/25 1446)  Resp: 18 (03/12/25 0815)  BP: (!) 148/67 (03/12/25 0750)  SpO2: (!) 94 % (03/12/25 0815) Vital  Signs (24h Range):  Temp:  [97.5 °F (36.4 °C)-98 °F (36.7 °C)] 97.5 °F (36.4 °C)  Pulse:  [60-92] 92  Resp:  [16-18] 18  SpO2:  [94 %-96 %] 94 %  BP: (129-148)/(59-67) 148/67     Weight: 98.3 kg (216 lb 11.4 oz)  Body mass index is 32 kg/m².    Intake/Output Summary (Last 24 hours) at 3/12/2025 1521  Last data filed at 3/12/2025 0538  Gross per 24 hour   Intake 120 ml   Output 1450 ml   Net -1330 ml         Physical Exam  Constitutional:       General: He is not in acute distress.     Appearance: He is not toxic-appearing.   HENT:      Head: Normocephalic.      Mouth/Throat:      Mouth: Mucous membranes are moist.   Eyes:      Extraocular Movements: Extraocular movements intact.   Cardiovascular:      Rate and Rhythm: Normal rate and regular rhythm.      Pulses: Normal pulses.      Heart sounds: No murmur heard.     No gallop.   Pulmonary:      Effort: Pulmonary effort is normal. No respiratory distress.      Breath sounds: Normal breath sounds. No wheezing.   Abdominal:      General: There is no distension.      Tenderness: There is no abdominal tenderness. There is no guarding.   Musculoskeletal:         General: No swelling, deformity or signs of injury.      Cervical back: Normal range of motion.   Skin:     General: Skin is warm.      Findings: Lesion (chronic wounds BLLE) present. No bruising, erythema or rash.   Neurological:      General: No focal deficit present.      Mental Status: He is alert and oriented to person, place, and time. Mental status is at baseline.   Psychiatric:         Mood and Affect: Mood normal.               Significant Labs: All pertinent labs within the past 24 hours have been reviewed.    Significant Imaging: I have reviewed all pertinent imaging results/findings within the past 24 hours.      Assessment & Plan  Anemia  Anemia is likely due to  unsure at this time . Urine shows 3+ rbc and red color however pt does have a chronic indwelling catheter. He reports when he had the  catheter placed a little over a week ago the bag was full of blood for 3 days. He also reports having a kidney stone. Most recent hemoglobin and hematocrit are listed below.  - Rectal exam without BRBPR/melena  - Stool occult megative  - CXR wnl, CTA abdomen with no identifiable causes, hip XR normal, no signs of bleeding or bruising other than some small wounds  - UA many bacteria and shows blood however unsure if it is from trauma of the catheter or UTI  - Receieved 3 units of pRBCs with response below  Recent Labs     03/10/25  0602 03/11/25  0558 03/12/25  0606   HGB 8.4* 8.1* 10.2*   HCT 25.3* 25.0* 30.8*     Plan  - Monitor serial CBC: Daily  - Transfuse PRBC if patient becomes hemodynamically unstable, symptomatic or H/H drops below 7/21.  - Patient has received 3 units of PRBCs on 3/9/25  - Patient's anemia is currently improving  -    3/10/2025: H&H improved status post transfusion.  Continue to follow for now.  3/11/2025: We will transfuse 1 unit packed red blood cells today.  3/12/2025: Significant improvement in hemoglobin and hematocrit status post transfusion.  Continue to follow.  Hypertension  Patient's blood pressure range in the last 24 hours was: BP  Min: 129/59  Max: 148/67.The patient's inpatient anti-hypertensive regimen is listed below:  Current Antihypertensives       Plan  - BP is controlled, no changes needed to their regimen  - Hold home antihypertensives for now  Hyperlipidemia  Cont home statin    BPH (benign prostatic hyperplasia)  Cont home meds    Atherosclerosis of native artery of both lower extremities with rest pain  Hold plavix in the setting of a possible bleed    Plan  - Restart as needed    Acute cystitis with hematuria  - UA with many bacteria and shows blood however unsure if it is from trauma of the catheter or UTI  - Receieved 3 units of pRBCs with response below    Plan  - Start Rocephin  - Follow cultures    3/10/2025:  Urine culture pending.  Continue  Rocephin.  3/11/2025: Culture showing Gram-negative rods.  Continue Rocephin until sensitivity results.  3/12/2025: Culture shows sensitivity to oral Bactrim and we will transition off IV Rocephin and continue Bactrim for 10 more days.    Wound of lower extremity  3/11/2025: Wound Care consult.  3/12/2025: Consult pending.      VTE Risk Mitigation (From admission, onward)           Ordered     IP VTE HIGH RISK PATIENT  Once         03/09/25 0337     Place sequential compression device  Until discontinued         03/09/25 0337     Place MERRITT hose  Until discontinued         03/09/25 0337                    Discharge Planning   ADONAY:      Code Status: Full Code   Medical Readiness for Discharge Date:   Discharge Plan A: Assisted Living          The patient with a alert and oriented x3.  Answers questions appropriately for me during my evaluation.  In my medical review is the sound mind.      Please place Justification for DME        TALHA Luna  Department of Hospital Medicine   [facility]

## 2025-03-12 NOTE — CONSULTS
PSYCHIATRY CONSULT      3/12/2025 11:01 AM   Tobi Liz Jr.   12/30/1929   [id number]         DATE OF ADMISSION: 3/8/2025  9:26 PM    SITE: [facility]    CURRENT LEGAL STATUS: Voluntary Medical Admission        HISTORY    CHIEF COMPLAINT   Tobi Liz Jr. is a 89 y.o. male with a past psychiatric history of altered mental status currently admitted to the inpatient unit with the following chief complaint: capacity evaluation    HPI   The patient was seen and examined. The chart was reviewed.    The patient presented to the ER on 3/8/2025 .    Per ED/Hospital medicine:  HPI:  ED HPI:  89-year-old male history of arthritis, BPH, coronary artery disease presents the emergency department via EMS after he had an episode of shaking after he stood up fast. Never had a postictal phase. Talking during the episode. Occurred twice today. Denies chest pain or shortness of breath. Here in the ER states he feels fine, no weakness. No slurred speech no other issues. Alert oriented x3, GCS is 15. Family reports he has a history of anemia in the past. Patient's only complaint is minor pain to his right hip area after he leaned against his wheelchair when he got dizzy earlier.      IM HPI:  Patient Tobi Liz Jr. is a 89 y.o. male with a PMHx of  has a past medical history of Aortic valve stenosis, Arthritis, BPH (benign prostatic hyperplasia), Carotid artery stenosis, Chronic diastolic heart failure, ETOH abuse, Exposure to COVID-19 virus (01/07/2022), Hyperchloremia, Kidney stones, Murmur, Polymyalgia rheumatica, PVD (peripheral vascular disease), and Renal artery stenosis., presents complaining of shaking after standing from a seated position that started yesterday, he was aware and talking during the episode so not likely a seizure. Labs show he was severely anemic in the ED with Hgb of 5. He had hematuria as well on UA along with bacteria. Ucx pending. His family states that he has a history of anemia  in the past. He otherwise feels fine. He received 3 units of pRBCs in the ED with appropriate response.        Overview/Hospital Course:  3/10/2025:  Patient with continued hematuria but is improving.  H&H are holding this morning.  Urine culture is pending and we will continue IV Rocephin for now.  Patient currently on Flomax as well.  Patient c/o cough and recent CXR normal.   3/11/2025:  Urine culture growing Gram-negative rods.  Continue Rocephin as sensitivity is pending.  Otherwise labs show drop in H&H overnight and we will transfuse with 1 unit packed red blood cells today.  We will add steroids and Mucinex for congestion.  Psychiatric consult to assess decision-making capacity.  Will add nutritional consult, consider PPN.    3/12/2025: Patient seen by dietary yesterday and recommended adding supplements for now and adjusting diet to patient's legs.  She will follow up today to see outpatient does with month these modifications.  H&H significantly improved with blood transfusion yesterday.  Urine culture shows sensitivity to Bactrim and we will transition from IV antibiotics to oral therapy.           Review of Systems   Constitutional:  Negative for activity change, appetite change, fatigue and fever.   HENT:  Negative for congestion and voice change.    Eyes:  Negative for visual disturbance.   Respiratory:  Negative for cough, shortness of breath and wheezing.    Cardiovascular:  Negative for chest pain and leg swelling.   Gastrointestinal:  Negative for abdominal distention, abdominal pain, anal bleeding, blood in stool, constipation, diarrhea, nausea, rectal pain and vomiting.   Endocrine: Negative for polyuria.   Genitourinary:  Positive for difficulty urinating and hematuria. Negative for dysuria, flank pain and urgency.   Musculoskeletal:  Positive for arthralgias.   Skin:  Positive for pallor.   Neurological:  Negative for weakness, light-headedness and numbness.   Hematological:  Bruises/bleeds  "easily.   Psychiatric/Behavioral: Negative.       Per initial psychiatric assessment:    Patient seen for psychiatric consultation/capacity assessment. Pt observed sitting up in bed, two family members at bedside. He is awake, alert, oriented, to person, place, time, situation, and current events. He is able to verbalize circumstances leading to hospitalization ("I had a little blood in my urine") general treatment received thus far ("antibiotics") and proposed plan ("They're thinking of moving me to the rehab floor for a little while to get my strength back.") He is able to verbalize generally consequences of refusing care ("Getting weaker", "a fall.")     Symptoms of Depression: diminished mood - No, loss of interest/anhedonia - No;  recurrent - No, >14 days - No, diminished energy - No, change in sleep - No, change in appetite - No, diminished concentration or cognition or indecisiveness - No, PMA/R -  No, excessive guilt or hopelessness or worthlessness - No, suicidal ideations - No    Changes in Sleep: trouble with initiation- No, maintenance, - No early morning awakening with inability to return to sleep - No, hypersomnolence - No    Suicidal- active/passive ideations - No, organized plans, future intentions - No    Homicidal ideations: active/passive ideations - No, organized plans, future intentions - No    Symptoms of psychosis: hallucinations - No, delusions - No, disorganized speech - No, disorganized behavior or abnormal motor behavior - No, or negative symptoms (diminshed emotional expression, avolition, anhedonia, alogia, asociality) - No, active phase symptoms >1 month - No, continuous signs of illness > 6 months - No, since onset of illness decreased level of functioning present - No    Symptoms of yoselin or hypomania: elevated, expansive, or irritable mood with increased energy or activity - No; > 4 days - No,  >7 days - No; with inflated self-esteem or grandiosity - No, decreased need for sleep - " "No, increased rate of speech - No, FOI or racing thoughts - No, distractibility - No, increased goal directed activity or PMA - No, risky/disinhibited behavior - No    Symptoms of JESUS: excessive anxiety/worry/fear, more days than not, about numerous issues - No, ongoing for >6 months - No, difficult to control - No, with restlessness - No, fatigue - No, poor concentration - No, irritability - No, muscle tension - No, sleep disturbance - No; causes functionally impairing distress - No    Symptoms of Panic Disorder: recurrent panic attacks (palpitations/heart racing, sweating, shakiness, dyspnea, choking, chest pain/discomfort, Gi symptoms, dizzy/lightheadedness, hot/col flashes, paresthesias, derealization, fear of losing control or fear of dying or fear of "going crazy") - No, precipitated - No, un-precipitated - No, source of worry and/or behavioral changes secondary for 1 month or longer- No, agoraphobia - No    Symptoms of PTSD: h/o trauma exposure - No; re-experiencing/intrusive symptoms - No, avoidant behavior - No, 2 or more negative alterations in cognition or mood - No, 2 or more hyperarousal symptoms - No; with dissociative symptoms - No, ongoing for 1 or more  months - No    Symptoms of OCD: obsessions (recurrent thoughts/urges/images; intrusive and/or unwanted; uses other thoughts/actions to suppress) - No; compulsions (repetitive behaviors used to lower distress/anxiety/obsessions) - No, time-consuming (over 1 hour per day) or cause significant distress/impairment - - No    Symptoms of Anorexia: restriction of caloric intake leading to significantly low body weight - No, intense fear of gaining weight or persistent behavior that interferes with weight gain even thought at a significantly low weight - No, disturbance in the way in which one's body weight or shape is experienced, undue influence of body weight or shape on self evaluation, or persistent lack of recognition of the seriousness of the current " low body weight - No    Symptoms of Bulimia: recurrent episodes of binge eating (definitely larger amount  than what others would eat and lack of a sense of control over eating during episode) - No, recurrent inappropriate compensatory behaviors in order to prevent weight gain (fasting, medications, exercise, vomiting) - No, binges and compensatory behaviors both occur on average at least once a week for 3 months - No, self evaluations is unduly influenced by body shape/weight- - No    Symptoms of Binge eating: recurrent episodes of binge eating (definitely larger amount than what others would eat and lack of a sense of control over eating during episode) - No, 3 or more of following (eating much more rapidly, eating until uncomfortably full, large amounts when not hungry, eating alone because of embarrassed by how much,  feeling disgusted with oneself, depressed or very guilty afterward) - No, distress regarding binges - No, binges occur on average at least once a week for 3 months - No      Substance/s:  Taken in larger amounts or over longer periods than intended: No,  Persistent desire or unsuccessful attempts to cut down or stop: No,  Great deal of time spent seeking, using or recovering from: No,  Craving or strong desire to use: No,  Recurrent use despite failure to meet major role obligation: No,  Continued use despite persistent or recurrent social/interparsonal issues due to use: No,  Important social/work/recreational activities given up due to use: No,  Recurrent use in physically hazardous situations: No,  Continued use despite knowledge of persistent physical or psychological problem: No,  Tolerance (either increased need or diminished effect): No,        PAST PSYCHIATRIC HISTORY  Previous Psychiatric Hospitalizations: No  Previous SI/HI: No,  Previous Suicide Attempts: No,   Previous Medication Trials: No,  Psychiatric Care (current & past): No,  History of Psychotherapy: No,  History of Violence:  No,  History of sexual/physical abuse: No,    PAST MEDICAL & SURGICAL HISTORY   Past Medical History:   Diagnosis Date    Aortic valve stenosis     Arthritis     BPH (benign prostatic hyperplasia)     Carotid artery stenosis     Chronic diastolic heart failure     ETOH abuse     Exposure to COVID-19 virus 01/07/2022    Hyperchloremia     Kidney stones     Murmur     Polymyalgia rheumatica     PVD (peripheral vascular disease)     Renal artery stenosis      Past Surgical History:   Procedure Laterality Date    A-V CARDIAC PACEMAKER INSERTION N/A 10/6/2023    Procedure: INSERTION, CARDIAC PACEMAKER, DUAL CHAMBER;  Surgeon: Douglas Salguero MD;  Location: [facility] CATH;  Service: Cardiology;  Laterality: N/A;    ANGIOGRAM, CAROTID Left 8/25/2023    Procedure: ANGIOGRAM, CAROTID;  Surgeon: Nick Box MD;  Location: [facility] CATH;  Service: Cardiology;  Laterality: Left;    CATARACT EXTRACTION, BILATERAL      ECHOCARDIOGRAM,TRANSESOPHAGEAL N/A 10/3/2023    Procedure: Transesophageal echo (JOSE ARMANDO) intra-procedure log documentation;  Surgeon: Nick Box MD;  Location: [facility] OR;  Service: Cardiology;  Laterality: N/A;    HAND SURGERY      INSERTION OF STENT INTO PERIPHERAL VESSEL N/A 1/30/2025    Procedure: INSERTION, STENT, VESSEL, PERIPHERAL;  Surgeon: Harsha Salcedo MD;  Location: [facility] CATH;  Service: Cardiology;  Laterality: N/A;    PERCUTANEOUS TRANSCATHETER AORTIC VALVE REPLACEMENT (TAVR) Left 10/3/2023    Procedure: REPLACEMENT, AORTIC VALVE, PERCUTANEOUS, TRANSCATHETER;  Surgeon: Nick Box MD;  Location: [facility] OR;  Service: Cardiology;  Laterality: Left;    PERCUTANEOUS TRANSCATHETER AORTIC VALVE REPLACEMENT (TAVR) Left 10/3/2023    Procedure: REPLACEMENT, AORTIC VALVE, PERCUTANEOUS, TRANSCATHETER carotid access;  Surgeon: Jun Brito MD;  Location: [facility] OR;  Service: Cardiovascular;  Laterality: Left;    PERCUTANEOUS TRANSLUMINAL ANGIOPLASTY N/A 8/25/2023    Procedure: ANGIOPLASTY-PERCUTANEOUS  TRANSLUMINAL (PTA);  Surgeon: Nick Box MD;  Location: [facility] CATH;  Service: Cardiology;  Laterality: N/A;    SHOULDER SURGERY Left     total shoulder replacement    WOUND EXPLORATION N/A 10/3/2023    Procedure: EXPLORATION, WOUND;  Surgeon: Jun Brito MD;  Location: [facility] OR;  Service: Cardiology;  Laterality: N/A;         CURRENT PSYCH MEDICATION REGIMEN   none  Current Medication side effects:    Current Medication compliance:      Previous psych meds trials      Home Meds:   Prior to Admission medications    Medication Sig Start Date End Date Taking? Authorizing Provider   ciprofloxacin HCl (CIPRO) 500 MG tablet Take 500 mg by mouth 2 (two) times daily. 2/26/25  Yes Provider, Historical   amLODIPine (NORVASC) 10 MG tablet Take 5 mg by mouth once daily. Prescribed by Dr. Box 11/6/24   Provider, Historical   aspirin (ECOTRIN) 81 MG EC tablet Take 81 mg by mouth once daily.    Provider, Historical   clopidogreL (PLAVIX) 75 mg tablet Take 1 tablet (75 mg total) by mouth once daily. 2/1/25 2/1/26  Yasmine Dallas NP   finasteride (PROSCAR) 5 mg tablet Take 5 mg by mouth once daily.    Provider, Historical   oxybutynin (DITROPAN-XL) 5 MG TR24 oxyBUTYnin chloride ER 5 mg tablet,extended release 24 hr, [RxNorm: 533048] 2/7/25   Provider, Historical   rosuvastatin (CRESTOR) 5 MG tablet Take 1 tablet (5 mg total) by mouth once daily. 1/31/25 1/31/26  Yasmine Dallas NP   tamsulosin (FLOMAX) 0.4 mg Cap Take 1 capsule by mouth once daily. Prescribed by Dr. Navarrete 11/4/22   Provider, Historical         OTC Meds:     Scheduled Meds:    atorvastatin  20 mg Oral Daily    famotidine  20 mg Oral Daily    finasteride  5 mg Oral Daily    guaiFENesin  1,200 mg Oral BID    methylPREDNISolone injection (PEDS and ADULTS)  80 mg Intravenous Q8H    mupirocin   Nasal BID    oxybutynin  5 mg Oral Daily    sulfamethoxazole-trimethoprim 800-160mg  1 tablet Oral BID    tamsulosin  1 capsule Oral Daily      PRN Meds:  "  Current Facility-Administered Medications:     0.9%  NaCl infusion (for blood administration), , Intravenous, Q24H PRN    acetaminophen, 650 mg, Oral, Q8H PRN    benzonatate, 200 mg, Oral, TID PRN    fentaNYL, 25 mcg, Intravenous, Q4H PRN    melatonin, 6 mg, Oral, Nightly PRN    ondansetron, 4 mg, Intravenous, Q8H PRN    sodium chloride 0.9%, 10 mL, Intravenous, PRN   Psychotherapeutics (From admission, onward)      None            ALLERGIES   Review of patient's allergies indicates:  No Known Allergies    NEUROLOGIC HISTORY  Seizures: No  Head trauma: No    SOCIAL HISTORY:  Developmental/Childhood:Achieved all developmental milestones timely  Education:College degree   Employment Status/Finances:Retired   Relationship Status/Sexual Orientation:   Children: 4  Housing Status: Home    history:  NO   Access to Firearms: YES:     ;  Locked up? YES:      Presybeterian:Spiritual without formal affiliation  Recreational activities:Time with family    SUBSTANCE ABUSE HISTORY   Recreational Drugs: none   Use of Alcohol: "Three ounces of gin every night"  Rehab History:no   Tobacco Use:no    LEGAL HISTORY:   Past charges/incarcerations: NO  Pending charges:NO    FAMILY PSYCHIATRIC HISTORY   Family History   Problem Relation Name Age of Onset    Cancer Mother      Stroke Father                ROS  Review of Systems   Constitutional: Negative.    HENT: Negative.     Eyes: Negative.    Respiratory: Negative.     Cardiovascular: Negative.    Gastrointestinal: Negative.    Genitourinary: Negative.    Musculoskeletal: Negative.    Skin: Negative.    Neurological: Negative.    Endo/Heme/Allergies: Negative.          EXAMINATION    PHYSICAL EXAM  Reviewed note/exam by Dr. gordon from 3/12/25     VITALS   Vitals:    03/12/25 1044   BP:    Pulse: 66   Resp:    Temp:         Body mass index is 32 kg/m².        PAIN  0/10  Subjective report of pain matches objective signs and symptoms: NA    LABORATORY DATA   Recent Results " (from the past 72 hours)   Hemoglobin    Collection Time: 03/09/25 12:21 PM   Result Value Ref Range    Hemoglobin 9.2 (L) 14.0 - 18.0 g/dL   Hematocrit    Collection Time: 03/09/25 12:21 PM   Result Value Ref Range    Hematocrit 26.7 (L) 40.0 - 54.0 %   EKG 12-lead (Rapid Heart Beat / Palpitations) Age > 50    Collection Time: 03/09/25  2:22 PM   Result Value Ref Range    QRS Duration 158 ms    [facility] QTC Calculation 552 ms   Hemoglobin    Collection Time: 03/09/25  4:04 PM   Result Value Ref Range    Hemoglobin 8.9 (L) 14.0 - 18.0 g/dL   Hematocrit    Collection Time: 03/09/25  4:04 PM   Result Value Ref Range    Hematocrit 25.8 (L) 40.0 - 54.0 %   Hemoglobin    Collection Time: 03/09/25  8:54 PM   Result Value Ref Range    Hemoglobin 8.7 (L) 14.0 - 18.0 g/dL   Hematocrit    Collection Time: 03/09/25  8:54 PM   Result Value Ref Range    Hematocrit 25.9 (L) 40.0 - 54.0 %   Hemoglobin    Collection Time: 03/09/25 11:54 PM   Result Value Ref Range    Hemoglobin 8.4 (L) 14.0 - 18.0 g/dL   Hematocrit    Collection Time: 03/09/25 11:54 PM   Result Value Ref Range    Hematocrit 24.9 (L) 40.0 - 54.0 %   Basic Metabolic Panel    Collection Time: 03/10/25  6:02 AM   Result Value Ref Range    Sodium 140 136 - 145 mmol/L    Potassium 3.8 3.5 - 5.1 mmol/L    Chloride 111 (H) 95 - 110 mmol/L    CO2 23 23 - 29 mmol/L    Glucose 107 70 - 110 mg/dL    BUN 27 10 - 30 mg/dL    Creatinine 1.1 0.5 - 1.4 mg/dL    Calcium 7.8 (L) 8.7 - 10.5 mg/dL    Anion Gap 6 (L) 8 - 16 mmol/L    eGFR >60.0 >60 mL/min/1.73 m^2   CBC Auto Differential    Collection Time: 03/10/25  6:02 AM   Result Value Ref Range    WBC 10.87 3.90 - 12.70 K/uL    RBC 2.70 (L) 4.60 - 6.20 M/uL    Hemoglobin 8.4 (L) 14.0 - 18.0 g/dL    Hematocrit 25.3 (L) 40.0 - 54.0 %    MCV 94 82 - 98 fL    MCH 31.1 (H) 27.0 - 31.0 pg    MCHC 33.2 32.0 - 36.0 g/dL    RDW 17.5 (H) 11.5 - 14.5 %    Platelets 167 150 - 450 K/uL    MPV 9.5 9.2 - 12.9 fL    Immature Granulocytes 1.6 (H) 0.0 -  0.5 %    Gran # (ANC) 9.2 (H) 1.8 - 7.7 K/uL    Immature Grans (Abs) 0.17 (H) 0.00 - 0.04 K/uL    Lymph # 0.6 (L) 1.0 - 4.8 K/uL    Mono # 0.6 0.3 - 1.0 K/uL    Eos # 0.3 0.0 - 0.5 K/uL    Baso # 0.03 0.00 - 0.20 K/uL    nRBC 1 (A) 0 /100 WBC    Gran % 84.9 (H) 38.0 - 73.0 %    Lymph % 5.2 (L) 18.0 - 48.0 %    Mono % 5.4 4.0 - 15.0 %    Eosinophil % 2.6 0.0 - 8.0 %    Basophil % 0.3 0.0 - 1.9 %    Differential Method Automated    Basic Metabolic Panel    Collection Time: 03/11/25  5:58 AM   Result Value Ref Range    Sodium 139 136 - 145 mmol/L    Potassium 3.6 3.5 - 5.1 mmol/L    Chloride 110 95 - 110 mmol/L    CO2 22 (L) 23 - 29 mmol/L    Glucose 104 70 - 110 mg/dL    BUN 20 10 - 30 mg/dL    Creatinine 1.0 0.5 - 1.4 mg/dL    Calcium 7.8 (L) 8.7 - 10.5 mg/dL    Anion Gap 7 (L) 8 - 16 mmol/L    eGFR >60.0 >60 mL/min/1.73 m^2   CBC Auto Differential    Collection Time: 03/11/25  5:58 AM   Result Value Ref Range    WBC 9.50 3.90 - 12.70 K/uL    RBC 2.61 (L) 4.60 - 6.20 M/uL    Hemoglobin 8.1 (L) 14.0 - 18.0 g/dL    Hematocrit 25.0 (L) 40.0 - 54.0 %    MCV 96 82 - 98 fL    MCH 31.0 27.0 - 31.0 pg    MCHC 32.4 32.0 - 36.0 g/dL    RDW 17.5 (H) 11.5 - 14.5 %    Platelets 179 150 - 450 K/uL    MPV 9.5 9.2 - 12.9 fL    Immature Granulocytes 0.9 (H) 0.0 - 0.5 %    Gran # (ANC) 8.3 (H) 1.8 - 7.7 K/uL    Immature Grans (Abs) 0.09 (H) 0.00 - 0.04 K/uL    Lymph # 0.3 (L) 1.0 - 4.8 K/uL    Mono # 0.4 0.3 - 1.0 K/uL    Eos # 0.4 0.0 - 0.5 K/uL    Baso # 0.03 0.00 - 0.20 K/uL    nRBC 0 0 /100 WBC    Gran % 87.2 (H) 38.0 - 73.0 %    Lymph % 3.1 (L) 18.0 - 48.0 %    Mono % 4.6 4.0 - 15.0 %    Eosinophil % 3.9 0.0 - 8.0 %    Basophil % 0.3 0.0 - 1.9 %    Differential Method Automated    Basic Metabolic Panel    Collection Time: 03/12/25  6:06 AM   Result Value Ref Range    Sodium 137 136 - 145 mmol/L    Potassium 4.3 3.5 - 5.1 mmol/L    Chloride 108 95 - 110 mmol/L    CO2 23 23 - 29 mmol/L    Glucose 174 (H) 70 - 110 mg/dL    BUN 25 10  "- 30 mg/dL    Creatinine 1.0 0.5 - 1.4 mg/dL    Calcium 7.9 (L) 8.7 - 10.5 mg/dL    Anion Gap 6 (L) 8 - 16 mmol/L    eGFR >60.0 >60 mL/min/1.73 m^2   CBC Auto Differential    Collection Time: 03/12/25  6:06 AM   Result Value Ref Range    WBC 6.85 3.90 - 12.70 K/uL    RBC 3.19 (L) 4.60 - 6.20 M/uL    Hemoglobin 10.2 (L) 14.0 - 18.0 g/dL    Hematocrit 30.8 (L) 40.0 - 54.0 %    MCV 97 82 - 98 fL    MCH 32.0 (H) 27.0 - 31.0 pg    MCHC 33.1 32.0 - 36.0 g/dL    RDW 16.5 (H) 11.5 - 14.5 %    Platelets 182 150 - 450 K/uL    MPV 9.6 9.2 - 12.9 fL    Immature Granulocytes 1.0 (H) 0.0 - 0.5 %    Gran # (ANC) 6.5 1.8 - 7.7 K/uL    Immature Grans (Abs) 0.07 (H) 0.00 - 0.04 K/uL    Lymph # 0.2 (L) 1.0 - 4.8 K/uL    Mono # 0.1 (L) 0.3 - 1.0 K/uL    Eos # 0.0 0.0 - 0.5 K/uL    Baso # 0.00 0.00 - 0.20 K/uL    nRBC 0 0 /100 WBC    Gran % 94.4 (H) 38.0 - 73.0 %    Lymph % 2.9 (L) 18.0 - 48.0 %    Mono % 1.6 (L) 4.0 - 15.0 %    Eosinophil % 0.1 0.0 - 8.0 %    Basophil % 0.0 0.0 - 1.9 %    Differential Method Automated    Type & Screen    Collection Time: 03/12/25  6:06 AM   Result Value Ref Range    Group & Rh A POS     Indirect Claire NEG     Specimen Outdate 03/15/2025 23:59       No results found for: "PHENYTOIN", "PHENOBARB", "VALPROATE", "CBMZ"        CONSTITUTIONAL  General Appearance: unremarkable, age appropriate    MUSCULOSKELETAL  Muscle Strength and Tone:not examined, no tremor, no tic  Abnormal Involuntary Movements: No  Gait and Station: in bed    PSYCHIATRIC   Level of Consciousness: awake, alert , and oriented  Orientation: person, place, time, and situation  Grooming: Hospital garb  Psychomotor Behavior: normal, cooperative, friendly and cooperative  Speech: normal tone, normal rate, normal pitch, normal volume  Language: grossly intact  Mood: fine  Affect: Consistent with mood and Congruent with thought  Thought Process: linear, logical  Associations: intact   Thought Content: denies SI and denies HI  Perceptions: " denies AH, denies  VH, and not RIS  Memory: Registers and recalls 3/3 objects at 1 minute and appx 5 minutes, Able to recall past events, Remote intact, and Recent intact  Attention:Attends to interview without distraction  Fund of Knowledge: Aware of current events and Vocabulary appropriate   Estimate if Intelligence:  Average based on work/education history, vocabulary and mental status exam  Insight: has awareness of illness  Judgment: behavior is adequate to circumstances      PSYCHOSOCIAL    PSYCHOSOCIAL STRESSORS   health    FUNCTIONING RELATIONSHIPS   good support system    STRENGTHS AND LIABILITIES   Mobility deficit     Is the patient aware of the biomedical complications associated with substance abuse and mental illness? yes    Does the patient have an Advance Directive for Mental Health treatment? no  (If yes, inform patient to bring copy.)        MEDICAL DECISION MAKING        ASSESSMENT       Patient able to understand medical problem Yes  Patient able to understand proposed treatment Yes  Patient able to understand alternatives to approved treatment (if available) Yes  Patient able to understand option of refusing treatment Yes  Patient able to appreciate reasonably foreseeable consequences of accepting proposed treatment Yes  Patient able to appreciate reasonably foreseeable consequences of refusing proposed treatment Yes  Consent is given freely and willingly with no external duress Yes  Decision impacted by depression? NA  Decision impacted by psychosis? NA    Overall impression: Patient demonstrates capacity to make informed medical decisions at time of assessment.  Please note that capacity to make medical decisions is fluid and subject to change.        PRESCRIPTION DRUG MANAGEMENT    Discussed diagnosis, risks and benefits of proposed treatment vs alternative treatments vs no treatment, potential side effects of these treatments and the inherent unpredictability of treatment. The patient  expresses understanding of the above and displays the capacity to agree with this treatment given said understanding. Patient also agrees that, currently, the benefits outweigh the risks and would like to pursue/continue treatment at this time.    Any medications being used off-label were discussed with the patient inclusive of the evidence base for the use of the medications and consent was obtained for the off-label use of the medication.         DIAGNOSTIC TESTING  Labs reviewed with patient; follow up pending labs    Disposition:   Inpatient psychiatric hospitalization not indicated at this time.     Thank you for consulting our services. Please feel free to reach out with any additional questions/concerns.     Damian Pete, PMHNP-BC

## 2025-03-12 NOTE — PROGRESS NOTES
03/12/25 1403        Wound 03/12/25 1403 Venous Ulcer Right anterior;lower Leg   Date First Assessed/Time First Assessed: 03/12/25 1403   Present on Original Admission: Yes  Primary Wound Type: Venous Ulcer  Side: Right  Orientation: anterior;lower  Location: Leg   Wound Image    Dressing Appearance Open to air   Drainage Amount Small   Drainage Characteristics/Odor Bleeding controlled;No odor   Appearance Red;Pink;Moist   Periwound Area Dry;Pink;Intact   Wound Edges Irregular   Wound Length (cm) 15 cm   Wound Width (cm) 8.5 cm   Wound Depth (cm) 0.05 cm   Wound Volume (cm^3) 3.338 cm^3   Wound Surface Area (cm^2) 100.14 cm^2   Care Cleansed with:;Wound cleanser   Dressing Applied;Hydrofiber;Silver;Rolled gauze;Elastic bandage   Periwound Care Moisture barrier applied;Cleansed with pH balanced cleanser  (Gentian violet applied to periwound skin)   Dressing Change Due 03/13/25

## 2025-03-12 NOTE — SUBJECTIVE & OBJECTIVE
Review of Systems   Constitutional:  Negative for activity change, appetite change, fatigue and fever.   HENT:  Negative for congestion and voice change.    Eyes:  Negative for visual disturbance.   Respiratory:  Negative for cough, shortness of breath and wheezing.    Cardiovascular:  Negative for chest pain and leg swelling.   Gastrointestinal:  Negative for abdominal distention, abdominal pain, anal bleeding, blood in stool, constipation, diarrhea, nausea, rectal pain and vomiting.   Endocrine: Negative for polyuria.   Genitourinary:  Positive for difficulty urinating and hematuria. Negative for dysuria, flank pain and urgency.   Musculoskeletal:  Positive for arthralgias.   Skin:  Positive for pallor.   Neurological:  Negative for weakness, light-headedness and numbness.   Hematological:  Bruises/bleeds easily.   Psychiatric/Behavioral: Negative.       Objective:     Vital Signs (Most Recent):  Temp: 97.5 °F (36.4 °C) (03/12/25 0750)  Pulse: 60 (03/12/25 0750)  Resp: 18 (03/12/25 0750)  BP: (!) 148/67 (03/12/25 0750)  SpO2: (!) 94 % (03/12/25 0750) Vital Signs (24h Range):  Temp:  [97.5 °F (36.4 °C)-98 °F (36.7 °C)] 97.5 °F (36.4 °C)  Pulse:  [60-88] 60  Resp:  [16-18] 18  SpO2:  [94 %-97 %] 94 %  BP: (106-148)/(50-67) 148/67     Weight: 98.3 kg (216 lb 11.4 oz)  Body mass index is 32 kg/m².    Intake/Output Summary (Last 24 hours) at 3/12/2025 0819  Last data filed at 3/12/2025 0538  Gross per 24 hour   Intake 470 ml   Output 1450 ml   Net -980 ml         Physical Exam  Constitutional:       General: He is not in acute distress.     Appearance: He is not toxic-appearing.   HENT:      Head: Normocephalic.      Mouth/Throat:      Mouth: Mucous membranes are moist.   Eyes:      Extraocular Movements: Extraocular movements intact.   Cardiovascular:      Rate and Rhythm: Normal rate and regular rhythm.      Pulses: Normal pulses.      Heart sounds: No murmur heard.     No gallop.   Pulmonary:      Effort:  Pulmonary effort is normal. No respiratory distress.      Breath sounds: Normal breath sounds. No wheezing.   Abdominal:      General: There is no distension.      Tenderness: There is no abdominal tenderness. There is no guarding.   Musculoskeletal:         General: No swelling, deformity or signs of injury.      Cervical back: Normal range of motion.   Skin:     General: Skin is warm.      Findings: Lesion (chronic wounds BLLE) present. No bruising, erythema or rash.   Neurological:      Mental Status: He is alert.   Psychiatric:         Mood and Affect: Mood normal.               Significant Labs: All pertinent labs within the past 24 hours have been reviewed.    Significant Imaging: I have reviewed all pertinent imaging results/findings within the past 24 hours.

## 2025-03-12 NOTE — SUBJECTIVE & OBJECTIVE
Review of Systems   Constitutional:  Negative for activity change, appetite change, fatigue and fever.   HENT:  Negative for congestion and voice change.    Eyes:  Negative for visual disturbance.   Respiratory:  Negative for cough, shortness of breath and wheezing.    Cardiovascular:  Negative for chest pain and leg swelling.   Gastrointestinal:  Negative for abdominal distention, abdominal pain, anal bleeding, blood in stool, constipation, diarrhea, nausea, rectal pain and vomiting.   Endocrine: Negative for polyuria.   Genitourinary:  Positive for difficulty urinating and hematuria. Negative for dysuria, flank pain and urgency.   Musculoskeletal:  Positive for arthralgias.   Skin:  Positive for pallor.   Neurological:  Negative for weakness, light-headedness and numbness.   Hematological:  Bruises/bleeds easily.   Psychiatric/Behavioral: Negative.       Objective:     Vital Signs (Most Recent):  Temp: 97.5 °F (36.4 °C) (03/12/25 0750)  Pulse: 92 (03/12/25 1446)  Resp: 18 (03/12/25 0815)  BP: (!) 148/67 (03/12/25 0750)  SpO2: (!) 94 % (03/12/25 0815) Vital Signs (24h Range):  Temp:  [97.5 °F (36.4 °C)-98 °F (36.7 °C)] 97.5 °F (36.4 °C)  Pulse:  [60-92] 92  Resp:  [16-18] 18  SpO2:  [94 %-96 %] 94 %  BP: (129-148)/(59-67) 148/67     Weight: 98.3 kg (216 lb 11.4 oz)  Body mass index is 32 kg/m².    Intake/Output Summary (Last 24 hours) at 3/12/2025 1521  Last data filed at 3/12/2025 0538  Gross per 24 hour   Intake 120 ml   Output 1450 ml   Net -1330 ml         Physical Exam  Constitutional:       General: He is not in acute distress.     Appearance: He is not toxic-appearing.   HENT:      Head: Normocephalic.      Mouth/Throat:      Mouth: Mucous membranes are moist.   Eyes:      Extraocular Movements: Extraocular movements intact.   Cardiovascular:      Rate and Rhythm: Normal rate and regular rhythm.      Pulses: Normal pulses.      Heart sounds: No murmur heard.     No gallop.   Pulmonary:      Effort:  Pulmonary effort is normal. No respiratory distress.      Breath sounds: Normal breath sounds. No wheezing.   Abdominal:      General: There is no distension.      Tenderness: There is no abdominal tenderness. There is no guarding.   Musculoskeletal:         General: No swelling, deformity or signs of injury.      Cervical back: Normal range of motion.   Skin:     General: Skin is warm.      Findings: Lesion (chronic wounds BLLE) present. No bruising, erythema or rash.   Neurological:      General: No focal deficit present.      Mental Status: He is alert and oriented to person, place, and time. Mental status is at baseline.   Psychiatric:         Mood and Affect: Mood normal.               Significant Labs: All pertinent labs within the past 24 hours have been reviewed.    Significant Imaging: I have reviewed all pertinent imaging results/findings within the past 24 hours.

## 2025-03-12 NOTE — PROGRESS NOTES
03/12/25 1408        Wound 01/29/25 1729 Venous Ulcer Left distal Leg #1   Date First Assessed/Time First Assessed: 01/29/25 1729   Primary Wound Type: Venous Ulcer  Side: Left  Orientation: distal  Location: Leg  Wound Number: #1  Is this injury device related?: No   Wound Image    Dressing Appearance Open to air   Drainage Amount None   Appearance [place];Dry   Periwound Area Intact;Dry   Wound Edges Irregular   Wound Length (cm) 11 cm   Wound Width (cm) 12 cm   Wound Surface Area (cm^2) 103.67 cm^2   Care Cleansed with:;Wound cleanser   Dressing Applied;Hydrofiber;Silver;Rolled gauze;Elastic bandage   Periwound Care Cleansed with pH balanced cleanser;Moisture barrier applied  (Gentian violet applied to periwound skin)   Dressing Change Due 03/13/25

## 2025-03-12 NOTE — PT/OT/SLP PROGRESS
Physical Therapy Treatment    Patient Name:  Tobi Liz Jr.   MRN:  [id number]    Recommendations:     Discharge Recommendations: Low Intensity Therapy  Discharge Equipment Recommendations: none  Barriers to discharge: None    Assessment:     Tobi Liz Jr. is a 89 y.o. male admitted with a medical diagnosis of Anemia.  He presents with the following impairments/functional limitations: weakness, impaired endurance, impaired self care skills, impaired functional mobility, gait instability, impaired balance, decreased lower extremity function, decreased safety awareness.  Pt tolerated treatment session well and displayed increased independence with gait tasks.    Rehab Prognosis: Fair; patient would benefit from acute skilled PT services to address these deficits and reach maximum level of function.    Recent Surgery: * No surgery found *      Plan:     During this hospitalization, patient to be seen 5 x/week to address the identified rehab impairments via gait training, therapeutic activities, therapeutic exercises, neuromuscular re-education and progress toward the following goals:    Plan of Care Expires:  03/21/25    Subjective     Chief Complaint: none stated  Patient/Family Comments/goals: increase strength  Pain/Comfort:  Pain Rating 1: 0/10      Objective:     Communicated with pt prior to session.  Patient found supine with peripheral IV, willams catheter upon PT entry to room.     General Precautions: Standard, fall  Orthopedic Precautions: N/A  Braces: N/A  Respiratory Status: Room air     Functional Mobility:  Bed Mobility:     Rolling Left:  contact guard assistance  Rolling Right: contact guard assistance  Scooting: contact guard assistance  Supine to Sit: contact guard assistance  Sit to Supine: contact guard assistance  Transfers:     Sit to Stand:  contact guard assistance with rolling walker  Bed to Chair: minimum assistance with  rolling walker  using  Step Transfer  Gait: CGA 20' and  50' w/ RW      AM-PAC 6 CLICK MOBILITY  Turning over in bed (including adjusting bedclothes, sheets and blankets)?: 4  Sitting down on and standing up from a chair with arms (e.g., wheelchair, bedside commode, etc.): 3  Moving from lying on back to sitting on the side of the bed?: 3  Moving to and from a bed to a chair (including a wheelchair)?: 3  Need to walk in hospital room?: 3  Climbing 3-5 steps with a railing?: 2  Basic Mobility Total Score: 18       Treatment & Education:  Supine to sit x 2  Sit to stand x 4  Static standing 2 x 1 min  Gait 20' and 50' w/ RW  Static sitting balance x 4 min    Patient left supine with all lines intact and call button in reach..    GOALS:   Multidisciplinary Problems       Physical Therapy Goals          Problem: Physical Therapy    Goal Priority Disciplines Outcome Interventions   Physical Therapy Goal     PT, PT/OT     Description: 1. Patient demonstrates transfers with mod independence with rolling walker to safely transition between surfaces.   2. Patient demonstrates ambulation with rolling walker with min assistance x50 feet.   3. Patient demonstrates modified independence with bed mobility tasks.   4. Patient demonstrates ability to perform (bilateral) LE therex sitting unsupported edge of bed x15 minutes.   5. Goals PRN.                        Time Tracking:     PT Received On: 03/12/25  PT Start Time: 1220     PT Stop Time: 1243  PT Total Time (min): 23 min     Billable Minutes: Therapeutic Activity 23    Treatment Type: Treatment  PT/PTA: PT           03/12/2025

## 2025-03-12 NOTE — PT/OT/SLP PROGRESS
Occupational Therapy   Treatment    Name: Tobi Liz Jr.  MRN: [id number]  Admitting Diagnosis:  Anemia       Recommendations:     Discharge Recommendations:  (To be further determined based on progress prior to discharge, but pt has surgery scheduled for 03/20/25 at [facility].)  Discharge Equipment Recommendations:  other (see comments) (TBD)  Barriers to discharge:       Assessment:     Tobi Liz Jr. is a 89 y.o. male with a medical diagnosis of Anemia.  He presents with improved functional performance with transfers as noted by CGA-SBA utilizing RW. Performance deficits affecting function are weakness, impaired endurance, impaired self care skills, impaired functional mobility, impaired cognition, decreased upper extremity function, decreased safety awareness, pain, impaired cardiopulmonary response to activity.     Rehab Prognosis:  Good and Fair; patient would benefit from acute skilled OT services to address these deficits and reach maximum level of function.       Plan:     Patient to be seen 5 x/week to address the above listed problems via self-care/home management, therapeutic activities, therapeutic exercises  Plan of Care Expires: 03/28/25  Plan of Care Reviewed with: patient, family    Subjective     Chief Complaint: weakness  Patient/Family Comments/goals: Pt would like regain independence with ADL's including functional mobility.  Pain/Comfort:  Pain Rating 1: 0/10  Pain Rating Post-Intervention 1: 0/10    Objective:     Communicated with: nurse prior to session.  Patient found HOB elevated with peripheral IV, willams catheter upon OT entry to room.    General Precautions: Standard, fall    Orthopedic Precautions:N/A  Braces: N/A  Respiratory Status: Room air     Occupational Performance:     Bed Mobility:    Patient completed Rolling/Turning to Left with  contact guard assistance  Patient completed Rolling/Turning to Right with contact guard assistance  Patient completed Scooting/Bridging  with contact guard assistance  Patient completed Supine to Sit with contact guard assistance  Patient completed Sit to Supine with contact guard assistance     Functional Mobility/Transfers:  Patient completed Sit <> Stand Transfer with contact guard assistance  with  rolling walker   Patient completed Bed <> Chair Transfer using Step Transfer technique with contact guard assistance with rolling walker  Patient completed Toilet Transfer Step Transfer technique with contact guard assistance with  rolling walker and grab bars  Functional Mobility: Pt ambulated 175' between surfaces requiring CGA utilizing RW.     Treatment & Education:  Pt was cooperative and highly motivated without verbal encouragement while exhibiting positive affect. He performed bed mobility requiring CGA secondary to mostly tactile cueing for technique with use of bedrails. Pt then participated in extensive functional transfer retraining emphasizing fall prevention with use of assistive devices requiring CGA secondary to intermittent steadying assist with verbal and tactile cueing for safety awareness and technique. He ambulated 175' between surfaces requiring CGA utilizing RW.     Patient left HOB elevated with all lines intact, call button in reach, and nurse notified    GOALS:   Multidisciplinary Problems       Occupational Therapy Goals          Problem: Occupational Therapy    Goal Priority Disciplines Outcome Interventions   Occupational Therapy Goal     OT, PT/OT Progressing    Description: Goals to be met by: 3/28/2025     Patient will increase functional independence with ADLs by performing:    Bathing from  shower chair/bench with Supervision.  Sitting at edge of bed x10 minutes with [place].  Step transfer with Supervision and rolling walker  Increased functional strength to WFL for increased independence in activities of daily living.                         DME Justifications:  No DME recommended requiring DME  justifications    Time Tracking:     OT Date of Treatment: 03/12/25  OT Start Time: 1428  OT Stop Time: 1458  OT Total Time (min): 30 min    Billable Minutes:Therapeutic Activity 30    OT/JOHNATHON: OT          3/12/2025

## 2025-03-12 NOTE — ASSESSMENT & PLAN NOTE
Patient's blood pressure range in the last 24 hours was: BP  Min: 129/59  Max: 148/67.The patient's inpatient anti-hypertensive regimen is listed below:  Current Antihypertensives       Plan  - BP is controlled, no changes needed to their regimen  - Hold home antihypertensives for now

## 2025-03-12 NOTE — ASSESSMENT & PLAN NOTE
- UA with many bacteria and shows blood however unsure if it is from trauma of the catheter or UTI  - Receieved 3 units of pRBCs with response below    Plan  - Start Rocephin  - Follow cultures    3/10/2025:  Urine culture pending.  Continue Rocephin.  3/11/2025: Culture showing Gram-negative rods.  Continue Rocephin until sensitivity results.  3/12/2025: Culture shows sensitivity to oral Bactrim and we will transition off IV Rocephin and continue Bactrim for 10 more days.

## 2025-03-12 NOTE — PLAN OF CARE
Problem: Occupational Therapy  Goal: Occupational Therapy Goal  Description: Goals to be met by: 3/28/2025     Patient will increase functional independence with ADLs by performing:    Bathing from  shower chair/bench with Supervision.  Sitting at edge of bed x10 minutes with [place].  Step transfer with Supervision and rolling walker  Increased functional strength to WFL for increased independence in activities of daily living.    Outcome: Progressing

## 2025-03-13 ENCOUNTER — HOSPITAL ENCOUNTER (INPATIENT)
Facility: HOSPITAL | Age: OVER 89
LOS: 12 days | Discharge: HOME HEALTH | DRG: 092 | End: 2025-03-25
Attending: INTERNAL MEDICINE | Admitting: INTERNAL MEDICINE
Payer: MEDICARE

## 2025-03-13 VITALS
SYSTOLIC BLOOD PRESSURE: 120 MMHG | TEMPERATURE: 98 F | HEART RATE: 60 BPM | OXYGEN SATURATION: 95 % | WEIGHT: 216.69 LBS | HEIGHT: 69 IN | BODY MASS INDEX: 32.09 KG/M2 | RESPIRATION RATE: 18 BRPM | DIASTOLIC BLOOD PRESSURE: 58 MMHG

## 2025-03-13 DIAGNOSIS — N30.01 ACUTE CYSTITIS WITH HEMATURIA: ICD-10-CM

## 2025-03-13 DIAGNOSIS — G72.9 MYOPATHY: Primary | ICD-10-CM

## 2025-03-13 DIAGNOSIS — G72.2 MYOPATHY DUE TO OTHER TOXIC AGENTS: ICD-10-CM

## 2025-03-13 LAB
ANION GAP SERPL CALC-SCNC: 6 MMOL/L (ref 8–16)
BASOPHILS # BLD AUTO: 0.01 K/UL (ref 0–0.2)
BASOPHILS NFR BLD: 0.1 % (ref 0–1.9)
BUN SERPL-MCNC: 28 MG/DL (ref 10–30)
CALCIUM SERPL-MCNC: 7.7 MG/DL (ref 8.7–10.5)
CHLORIDE SERPL-SCNC: 108 MMOL/L (ref 95–110)
CO2 SERPL-SCNC: 22 MMOL/L (ref 23–29)
CREAT SERPL-MCNC: 0.8 MG/DL (ref 0.5–1.4)
DIFFERENTIAL METHOD BLD: ABNORMAL
EOSINOPHIL # BLD AUTO: 0 K/UL (ref 0–0.5)
EOSINOPHIL NFR BLD: 0.4 % (ref 0–8)
ERYTHROCYTE [DISTWIDTH] IN BLOOD BY AUTOMATED COUNT: 16.2 % (ref 11.5–14.5)
EST. GFR  (NO RACE VARIABLE): >60 ML/MIN/1.73 M^2
GLUCOSE SERPL-MCNC: 152 MG/DL (ref 70–110)
HCT VFR BLD AUTO: 31.6 % (ref 40–54)
HGB BLD-MCNC: 10.6 G/DL (ref 14–18)
IMM GRANULOCYTES # BLD AUTO: 0.09 K/UL (ref 0–0.04)
IMM GRANULOCYTES NFR BLD AUTO: 0.8 % (ref 0–0.5)
LYMPHOCYTES # BLD AUTO: 0.3 K/UL (ref 1–4.8)
LYMPHOCYTES NFR BLD: 2.9 % (ref 18–48)
MCH RBC QN AUTO: 32.2 PG (ref 27–31)
MCHC RBC AUTO-ENTMCNC: 33.5 G/DL (ref 32–36)
MCV RBC AUTO: 96 FL (ref 82–98)
MONOCYTES # BLD AUTO: 0.3 K/UL (ref 0.3–1)
MONOCYTES NFR BLD: 2.7 % (ref 4–15)
NEUTROPHILS # BLD AUTO: 10.4 K/UL (ref 1.8–7.7)
NEUTROPHILS NFR BLD: 93.1 % (ref 38–73)
NRBC BLD-RTO: 0 /100 WBC
PLATELET # BLD AUTO: 190 K/UL (ref 150–450)
PMV BLD AUTO: 9.5 FL (ref 9.2–12.9)
POTASSIUM SERPL-SCNC: 4 MMOL/L (ref 3.5–5.1)
RBC # BLD AUTO: 3.29 M/UL (ref 4.6–6.2)
SODIUM SERPL-SCNC: 136 MMOL/L (ref 136–145)
WBC # BLD AUTO: 11.19 K/UL (ref 3.9–12.7)

## 2025-03-13 PROCEDURE — 25000003 PHARM REV CODE 250: Performed by: NURSE PRACTITIONER

## 2025-03-13 PROCEDURE — 25000003 PHARM REV CODE 250: Performed by: INTERNAL MEDICINE

## 2025-03-13 PROCEDURE — 63600175 PHARM REV CODE 636 W HCPCS: Mod: JZ,TB | Performed by: NURSE PRACTITIONER

## 2025-03-13 PROCEDURE — 85025 COMPLETE CBC W/AUTO DIFF WBC: CPT | Performed by: STUDENT IN AN ORGANIZED HEALTH CARE EDUCATION/TRAINING PROGRAM

## 2025-03-13 PROCEDURE — 92523 SPEECH SOUND LANG COMPREHEN: CPT

## 2025-03-13 PROCEDURE — 11800000 HC REHAB PRIVATE ROOM

## 2025-03-13 PROCEDURE — 80048 BASIC METABOLIC PNL TOTAL CA: CPT | Performed by: STUDENT IN AN ORGANIZED HEALTH CARE EDUCATION/TRAINING PROGRAM

## 2025-03-13 PROCEDURE — 97162 PT EVAL MOD COMPLEX 30 MIN: CPT

## 2025-03-13 PROCEDURE — 97166 OT EVAL MOD COMPLEX 45 MIN: CPT

## 2025-03-13 PROCEDURE — 25000003 PHARM REV CODE 250: Performed by: EMERGENCY MEDICINE

## 2025-03-13 PROCEDURE — 25000003 PHARM REV CODE 250: Performed by: STUDENT IN AN ORGANIZED HEALTH CARE EDUCATION/TRAINING PROGRAM

## 2025-03-13 PROCEDURE — 36415 COLL VENOUS BLD VENIPUNCTURE: CPT | Performed by: STUDENT IN AN ORGANIZED HEALTH CARE EDUCATION/TRAINING PROGRAM

## 2025-03-13 PROCEDURE — 92610 EVALUATE SWALLOWING FUNCTION: CPT

## 2025-03-13 RX ORDER — GUAIFENESIN 600 MG/1
1200 TABLET, EXTENDED RELEASE ORAL 2 TIMES DAILY
Status: CANCELLED | OUTPATIENT
Start: 2025-03-13

## 2025-03-13 RX ORDER — OXYBUTYNIN CHLORIDE 5 MG/1
5 TABLET, EXTENDED RELEASE ORAL DAILY
Status: CANCELLED | OUTPATIENT
Start: 2025-03-13

## 2025-03-13 RX ORDER — ONDANSETRON HYDROCHLORIDE 2 MG/ML
4 INJECTION, SOLUTION INTRAVENOUS EVERY 8 HOURS PRN
Status: DISCONTINUED | OUTPATIENT
Start: 2025-03-13 | End: 2025-03-13

## 2025-03-13 RX ORDER — MUPIROCIN 20 MG/G
OINTMENT TOPICAL 2 TIMES DAILY
Status: CANCELLED | OUTPATIENT
Start: 2025-03-13 | End: 2025-03-14

## 2025-03-13 RX ORDER — FAMOTIDINE 20 MG/1
20 TABLET, FILM COATED ORAL 2 TIMES DAILY
Status: CANCELLED | OUTPATIENT
Start: 2025-03-13

## 2025-03-13 RX ORDER — GENTIAN VIOLET 2% 2 G/100ML
0.5 SOLUTION TOPICAL DAILY
Status: CANCELLED | OUTPATIENT
Start: 2025-03-13

## 2025-03-13 RX ORDER — CARBOXYMETHYLCELLULOSE SODIUM 5 MG/ML
1 SOLUTION/ DROPS OPHTHALMIC EVERY 4 HOURS PRN
Status: DISCONTINUED | OUTPATIENT
Start: 2025-03-13 | End: 2025-03-25 | Stop reason: HOSPADM

## 2025-03-13 RX ORDER — TAMSULOSIN HYDROCHLORIDE 0.4 MG/1
1 CAPSULE ORAL DAILY
Status: CANCELLED | OUTPATIENT
Start: 2025-03-13

## 2025-03-13 RX ORDER — SULFAMETHOXAZOLE AND TRIMETHOPRIM 800; 160 MG/1; MG/1
1 TABLET ORAL 2 TIMES DAILY
Status: DISCONTINUED | OUTPATIENT
Start: 2025-03-13 | End: 2025-03-14

## 2025-03-13 RX ORDER — FAMOTIDINE 20 MG/1
20 TABLET, FILM COATED ORAL 2 TIMES DAILY
Status: DISCONTINUED | OUTPATIENT
Start: 2025-03-13 | End: 2025-03-14

## 2025-03-13 RX ORDER — PREDNISONE 10 MG/1
20 TABLET ORAL DAILY
Status: COMPLETED | OUTPATIENT
Start: 2025-03-14 | End: 2025-03-18

## 2025-03-13 RX ORDER — SODIUM CHLORIDE 0.9 % (FLUSH) 0.9 %
10 SYRINGE (ML) INJECTION
Status: DISCONTINUED | OUTPATIENT
Start: 2025-03-13 | End: 2025-03-25 | Stop reason: HOSPADM

## 2025-03-13 RX ORDER — POLYETHYLENE GLYCOL 3350 17 G/17G
17 POWDER, FOR SOLUTION ORAL 2 TIMES DAILY PRN
Status: DISCONTINUED | OUTPATIENT
Start: 2025-03-13 | End: 2025-03-21

## 2025-03-13 RX ORDER — GUAIFENESIN 600 MG/1
1200 TABLET, EXTENDED RELEASE ORAL 2 TIMES DAILY
Status: DISCONTINUED | OUTPATIENT
Start: 2025-03-13 | End: 2025-03-25 | Stop reason: HOSPADM

## 2025-03-13 RX ORDER — FINASTERIDE 5 MG/1
5 TABLET, FILM COATED ORAL DAILY
Status: DISCONTINUED | OUTPATIENT
Start: 2025-03-14 | End: 2025-03-25 | Stop reason: HOSPADM

## 2025-03-13 RX ORDER — TALC
6 POWDER (GRAM) TOPICAL NIGHTLY PRN
Status: CANCELLED | OUTPATIENT
Start: 2025-03-13

## 2025-03-13 RX ORDER — ATORVASTATIN CALCIUM 20 MG/1
20 TABLET, FILM COATED ORAL DAILY
Status: CANCELLED | OUTPATIENT
Start: 2025-03-13

## 2025-03-13 RX ORDER — PREDNISONE 20 MG/1
20 TABLET ORAL DAILY
Status: CANCELLED | OUTPATIENT
Start: 2025-03-13 | End: 2025-03-18

## 2025-03-13 RX ORDER — TAMSULOSIN HYDROCHLORIDE 0.4 MG/1
1 CAPSULE ORAL DAILY
Status: DISCONTINUED | OUTPATIENT
Start: 2025-03-14 | End: 2025-03-25 | Stop reason: HOSPADM

## 2025-03-13 RX ORDER — HYDROCODONE BITARTRATE AND ACETAMINOPHEN 500; 5 MG/1; MG/1
TABLET ORAL
Status: DISCONTINUED | OUTPATIENT
Start: 2025-03-13 | End: 2025-03-25 | Stop reason: HOSPADM

## 2025-03-13 RX ORDER — SULFAMETHOXAZOLE AND TRIMETHOPRIM 800; 160 MG/1; MG/1
1 TABLET ORAL 2 TIMES DAILY
Status: CANCELLED | OUTPATIENT
Start: 2025-03-13 | End: 2025-03-22

## 2025-03-13 RX ORDER — ACETAMINOPHEN 325 MG/1
650 TABLET ORAL EVERY 8 HOURS PRN
Status: CANCELLED | OUTPATIENT
Start: 2025-03-13

## 2025-03-13 RX ORDER — TALC
6 POWDER (GRAM) TOPICAL NIGHTLY PRN
Status: DISCONTINUED | OUTPATIENT
Start: 2025-03-13 | End: 2025-03-25 | Stop reason: HOSPADM

## 2025-03-13 RX ORDER — PREDNISONE 10 MG/1
20 TABLET ORAL DAILY
Status: DISCONTINUED | OUTPATIENT
Start: 2025-03-13 | End: 2025-03-13

## 2025-03-13 RX ORDER — HYDROCODONE BITARTRATE AND ACETAMINOPHEN 500; 5 MG/1; MG/1
TABLET ORAL
Status: CANCELLED | OUTPATIENT
Start: 2025-03-13

## 2025-03-13 RX ORDER — SODIUM CHLORIDE 0.9 % (FLUSH) 0.9 %
10 SYRINGE (ML) INJECTION
Status: CANCELLED | OUTPATIENT
Start: 2025-03-13

## 2025-03-13 RX ORDER — FINASTERIDE 5 MG/1
5 TABLET, FILM COATED ORAL DAILY
Status: CANCELLED | OUTPATIENT
Start: 2025-03-13

## 2025-03-13 RX ORDER — OXYBUTYNIN CHLORIDE 5 MG/1
5 TABLET, EXTENDED RELEASE ORAL DAILY
Status: DISCONTINUED | OUTPATIENT
Start: 2025-03-14 | End: 2025-03-25 | Stop reason: HOSPADM

## 2025-03-13 RX ORDER — FAMOTIDINE 20 MG/1
20 TABLET, FILM COATED ORAL 2 TIMES DAILY
Status: DISCONTINUED | OUTPATIENT
Start: 2025-03-13 | End: 2025-03-13 | Stop reason: HOSPADM

## 2025-03-13 RX ORDER — ONDANSETRON HYDROCHLORIDE 2 MG/ML
4 INJECTION, SOLUTION INTRAVENOUS EVERY 8 HOURS PRN
Status: CANCELLED | OUTPATIENT
Start: 2025-03-13

## 2025-03-13 RX ORDER — BENZONATATE 100 MG/1
200 CAPSULE ORAL 3 TIMES DAILY PRN
Status: DISCONTINUED | OUTPATIENT
Start: 2025-03-13 | End: 2025-03-25 | Stop reason: HOSPADM

## 2025-03-13 RX ORDER — GENTIAN VIOLET 2% 2 G/100ML
0.5 SOLUTION TOPICAL DAILY
Status: DISCONTINUED | OUTPATIENT
Start: 2025-03-13 | End: 2025-03-25 | Stop reason: HOSPADM

## 2025-03-13 RX ORDER — ACETAMINOPHEN 325 MG/1
650 TABLET ORAL EVERY 8 HOURS PRN
Status: DISCONTINUED | OUTPATIENT
Start: 2025-03-13 | End: 2025-03-17

## 2025-03-13 RX ORDER — BENZONATATE 100 MG/1
200 CAPSULE ORAL 3 TIMES DAILY PRN
Status: CANCELLED | OUTPATIENT
Start: 2025-03-13

## 2025-03-13 RX ORDER — ATORVASTATIN CALCIUM 20 MG/1
20 TABLET, FILM COATED ORAL DAILY
Status: DISCONTINUED | OUTPATIENT
Start: 2025-03-14 | End: 2025-03-25 | Stop reason: HOSPADM

## 2025-03-13 RX ORDER — ONDANSETRON 4 MG/1
4 TABLET, ORALLY DISINTEGRATING ORAL EVERY 8 HOURS PRN
Status: DISCONTINUED | OUTPATIENT
Start: 2025-03-13 | End: 2025-03-25 | Stop reason: HOSPADM

## 2025-03-13 RX ORDER — MUPIROCIN 20 MG/G
OINTMENT TOPICAL 2 TIMES DAILY
Status: COMPLETED | OUTPATIENT
Start: 2025-03-13 | End: 2025-03-14

## 2025-03-13 RX ADMIN — METHYLPREDNISOLONE SODIUM SUCCINATE 80 MG: 125 INJECTION, POWDER, FOR SOLUTION INTRAMUSCULAR; INTRAVENOUS at 05:03

## 2025-03-13 RX ADMIN — MUPIROCIN: 20 OINTMENT TOPICAL at 08:03

## 2025-03-13 RX ADMIN — GUAIFENESIN 1200 MG: 600 TABLET, EXTENDED RELEASE ORAL at 08:03

## 2025-03-13 RX ADMIN — GUAIFENESIN 1200 MG: 600 TABLET, EXTENDED RELEASE ORAL at 09:03

## 2025-03-13 RX ADMIN — FINASTERIDE 5 MG: 5 TABLET, FILM COATED ORAL at 09:03

## 2025-03-13 RX ADMIN — FAMOTIDINE 20 MG: 20 TABLET, FILM COATED ORAL at 09:03

## 2025-03-13 RX ADMIN — GENTIAN VIOLET 2% 0.5 ML: 20 LIQUID TOPICAL at 06:03

## 2025-03-13 RX ADMIN — TAMSULOSIN HYDROCHLORIDE 0.4 MG: 0.4 CAPSULE ORAL at 09:03

## 2025-03-13 RX ADMIN — ATORVASTATIN CALCIUM 20 MG: 20 TABLET, FILM COATED ORAL at 09:03

## 2025-03-13 RX ADMIN — SULFAMETHOXAZOLE AND TRIMETHOPRIM 1 TABLET: 800; 160 TABLET ORAL at 08:03

## 2025-03-13 RX ADMIN — FAMOTIDINE 20 MG: 20 TABLET, FILM COATED ORAL at 08:03

## 2025-03-13 RX ADMIN — SULFAMETHOXAZOLE AND TRIMETHOPRIM 1 TABLET: 800; 160 TABLET ORAL at 09:03

## 2025-03-13 RX ADMIN — OXYBUTYNIN CHLORIDE 5 MG: 5 TABLET, EXTENDED RELEASE ORAL at 09:03

## 2025-03-13 NOTE — PLAN OF CARE
Problem: Rehabilitation (IRF) Plan of Care  Goal: Plan of Care Review  Outcome: Progressing  Goal: Patient-Specific Goal (Individualized)  Outcome: Progressing  Goal: Absence of New-Onset Illness or Injury  Outcome: Progressing  Goal: Optimal Comfort and Wellbeing  Outcome: Progressing  Goal: Home and Community Transition Plan Established  Outcome: Progressing     Problem: Infection  Goal: Absence of Infection Signs and Symptoms  Outcome: Progressing     Problem: Wound  Goal: Optimal Coping  Outcome: Progressing  Goal: Optimal Functional Ability  Outcome: Progressing  Goal: Absence of Infection Signs and Symptoms  Outcome: Progressing  Goal: Improved Oral Intake  Outcome: Progressing  Goal: Optimal Pain Control and Function  Outcome: Progressing  Goal: Skin Health and Integrity  Outcome: Progressing  Goal: Optimal Wound Healing  Outcome: Progressing     Problem: Fall Injury Risk  Goal: Absence of Fall and Fall-Related Injury  Outcome: Progressing     Problem: Mobility Impairment  Goal: Optimal Mobility  Outcome: Progressing     Problem: Pain Acute  Goal: Optimal Pain Control and Function  Outcome: Progressing     Problem: Skin Injury Risk Increased  Goal: Skin Health and Integrity  Outcome: Progressing

## 2025-03-13 NOTE — PLAN OF CARE
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 No  Secondary Emergency Contact: indy Hemphill  Mobile Phone: [phone]  Relation: Daughter  Preferred language: English   needed? No    Discharge Plan A: Home  Discharge Plan B: Assisted Living, Home Health      [facility] - [place], [place] - [address]  [address]  [facility] [address]  Phone: [phone] Fax: [phone]    [id number] [place], [place] - [address] [facility] [address]  [address]  [facility] [address]  Phone: [phone] Fax: [phone]      Initial Assessment (most recent)       Adult Discharge Assessment - 03/13/25 1425          Discharge Assessment    Assessment Type Discharge Planning Assessment     Confirmed/corrected address, phone number and insurance Yes     Confirmed Demographics Correct on Facesheet     Source of Information patient;family     If unable to respond/provide information was family/caregiver contacted? No Contact Information Available     When was your last doctors appointment? 02/25/25   Seen by Dr. Burgos at wound clinic    Communicated ADONAY with patient/caregiver Yes     Reason For Admission IPR     People in Home spouse     Facility Arrived From: [facility]     Do you expect to return to your current living situation? Other (see comments)   Possible assistive living. TBD    Do you have help at home or someone to help you manage your care at home? No      Prior to hospitilization cognitive status: Unable to Assess     Current cognitive status: Alert/Oriented     Walking or Climbing Stairs Difficulty yes     Walking or Climbing Stairs ambulation difficulty, requires equipment     Mobility Management Utilizes RW or Cane     Dressing/Bathing Difficulty yes     Dressing/Bathing bathing difficulty, assistance 1 person     Dressing/Bathing Management Sometime wife assists     Number of Stairs, Within Home, Primary two     Equipment Currently Used at Home urinary catheter supplies;walker, rolling;cane, straight;wound care supplies     Readmission within 30 days? No     Patient currently being followed by outpatient case management? No     Do you currently have service(s) that help you manage your care at home? Yes     Name and Contact number of agency [facility]     Is the pt/caregiver preference to resume services with current agency Yes     Do you take prescription medications? Yes     Do you have prescription coverage? Yes     Do you have any problems affording any of your prescribed medications? TBD     Is the patient taking medications as prescribed? yes     Who is going to help you get home at discharge? Family     How do you get to doctors appointments? family or friend will provide     Are you on dialysis? No     Do you take coumadin? No     Discharge Plan A Home     Discharge Plan B Assisted Living;Home Health     DME Needed Upon Discharge  none     Discharge Plan discussed with: Patient;Adult children   Mateo Liz  Son  [phone]    Transition of Care Barriers Mobility        OTHER    Name(s) of People in Home Therese Liz                   DCP completed with patient and family members. Contact patient's son, Mateo and obtained some information. Some information obtained from patient's daughter Eugenio. Patient was living at home with spouse. Spouse currently at [facility] following hip fx. Family possibly looking for assistive living.  CM did discuss with patient and he is open to it. CM to assist with any needs. No other social concerns noted.

## 2025-03-13 NOTE — PT/OT/SLP DISCHARGE
Physical Therapy Discharge Summary    Name: Tobi Liz Jr.  MRN: [id number]   Principal Problem: Anemia     Patient Discharged from acute Physical Therapy on 3/13/25.  Please refer to prior PT noted date on 3/12/25 for functional status.     Assessment:     Patient appropriate for care in another setting.    Objective:     GOALS:   Multidisciplinary Problems       Physical Therapy Goals          Problem: Physical Therapy    Goal Priority Disciplines Outcome Interventions   Physical Therapy Goal     PT, PT/OT Adequate for Care Transition    Description: 1. Patient demonstrates transfers with mod independence with rolling walker to safely transition between surfaces.   2. Patient demonstrates ambulation with rolling walker with min assistance x50 feet.   3. Patient demonstrates modified independence with bed mobility tasks.   4. Patient demonstrates ability to perform (bilateral) LE therex sitting unsupported edge of bed x15 minutes.   5. Goals PRN.                        Reasons for Discontinuation of Therapy Services  Transfer to alternate level of care.      Plan:     Patient Discharged to: Inpatient Rehab.      3/13/2025

## 2025-03-13 NOTE — PT/OT/SLP EVAL
Occupational Therapy Inpatient Rehab Evaluation    Name: Tobi Liz Jr.  MRN: [id number]    Recommendations:     Discharge Recommendations:  Low Intensity Therapy   Discharge Equipment Recommendations: other (see comments) (To be further determined based on progress prior to discharge.)   Barriers to discharge:  Medical and functional status    Assessment:  Tobi Liz Jr. is a 89 y.o. male admitted with a medical diagnosis of <principal problem not specified>.  He presents with the following impairments/functional limitations:  weakness, impaired endurance, impaired self care skills, impaired functional mobility, gait instability, impaired balance, impaired cognition, decreased coordination, decreased upper extremity function, decreased lower extremity function, decreased safety awareness, pain, decreased ROM, impaired fine motor, impaired skin, edema, impaired cardiopulmonary response to activity, impaired joint extensibility, impaired muscle length.    General Precautions: Standard, fall     Orthopedic Precautions:N/A     Braces: N/A    Rehab Prognosis: Good; patient would benefit from acute skilled OT services to address these deficits and reach maximum level of function.      History:     Past Medical History:   Diagnosis Date    Aortic valve stenosis     Arthritis     BPH (benign prostatic hyperplasia)     Carotid artery stenosis     Chronic diastolic heart failure     ETOH abuse     Exposure to COVID-19 virus 01/07/2022    Hyperchloremia     Kidney stones     Murmur     Polymyalgia rheumatica     PVD (peripheral vascular disease)     Renal artery stenosis        Past Surgical History:   Procedure Laterality Date    A-V CARDIAC PACEMAKER INSERTION N/A 10/6/2023    Procedure: INSERTION, CARDIAC PACEMAKER, DUAL CHAMBER;  Surgeon: Douglas Salguero MD;  Location: [facility] CATH;  Service: Cardiology;  Laterality: N/A;    ANGIOGRAM, CAROTID Left 8/25/2023    Procedure: ANGIOGRAM, CAROTID;  Surgeon: Charu  Nick WHITE MD;  Location: [facility] CATH;  Service: Cardiology;  Laterality: Left;    CATARACT EXTRACTION, BILATERAL      ECHOCARDIOGRAM,TRANSESOPHAGEAL N/A 10/3/2023    Procedure: Transesophageal echo (JOSE ARMANDO) intra-procedure log documentation;  Surgeon: Nick Box MD;  Location: [facility] OR;  Service: Cardiology;  Laterality: N/A;    HAND SURGERY      INSERTION OF STENT INTO PERIPHERAL VESSEL N/A 1/30/2025    Procedure: INSERTION, STENT, VESSEL, PERIPHERAL;  Surgeon: Harsha Salcedo MD;  Location: [facility] CATH;  Service: Cardiology;  Laterality: N/A;    PERCUTANEOUS TRANSCATHETER AORTIC VALVE REPLACEMENT (TAVR) Left 10/3/2023    Procedure: REPLACEMENT, AORTIC VALVE, PERCUTANEOUS, TRANSCATHETER;  Surgeon: Nick Box MD;  Location: [facility] OR;  Service: Cardiology;  Laterality: Left;    PERCUTANEOUS TRANSCATHETER AORTIC VALVE REPLACEMENT (TAVR) Left 10/3/2023    Procedure: REPLACEMENT, AORTIC VALVE, PERCUTANEOUS, TRANSCATHETER carotid access;  Surgeon: Jun Brito MD;  Location: [facility] OR;  Service: Cardiovascular;  Laterality: Left;    PERCUTANEOUS TRANSLUMINAL ANGIOPLASTY N/A 8/25/2023    Procedure: ANGIOPLASTY-PERCUTANEOUS TRANSLUMINAL (PTA);  Surgeon: Nick Box MD;  Location: [facility] CATH;  Service: Cardiology;  Laterality: N/A;    SHOULDER SURGERY Left     total shoulder replacement    WOUND EXPLORATION N/A 10/3/2023    Procedure: EXPLORATION, WOUND;  Surgeon: Jun Brito MD;  Location: [facility] OR;  Service: Cardiology;  Laterality: N/A;       Subjective     Orientation: Oriented x4    Chief Complaint: weakness and decreased independence    Patient/Family Comments/goals: Pt would like to regain independence with ADL's including functional mobility in order to return home safely with spouse.     Vitals  Vitals at Rest  BP     HR     O2 Sat     Pain Pain Rating 1: 0/10  Pain Rating Post-Intervention 1: 0/10     Vitals With Activity  BP     HR     O2 Sat     Pain Pain Rating 1: 0/10  Pain Rating  Post-Intervention 1: 0/10     Respiratory Status: Room air    Patients cultural, spiritual, Sikh conflicts given the current situation: no       Living Environment   Living Environment  People in Home: spouse  Name(s) of People in Home: Therese Liz  Living Arrangements: house  Home Accessibility: wheelchair accessible  Number of Stairs, Within Home, Primary: two  Stair Railings at Home: none  Home Layout: Able to live on 1st floor  Transportation Anticipated: car, drives self, family or friend will provide  Living Environment Comment: SSH with spouse with threshold to enter and exit  Equipment Currently Used at Home: cane, straight, wheelchair, walker, rolling, urinary catheter supplies  Shower Setup: Tub/Shower combo and Walk-in shower    Prior Level of Function  BADL: Independent    IADL: Modified Independent    Equipment used at home: walker, rolling, wheelchair, urinary catheter supplies, cane, straight.  DME owned (not currently used): wheelchair.      Upon discharge, patient will have assistance from spouse and family.    Objective:     Patient found HOB elevated with willams catheter, peripheral IV  upon OT entry to room.    Mobility   Patient completed:  Sit to Stand Transfer with minimum assistance with rolling walker  Bed to Chair Transfer using Step Transfer technique with minimum assistance with rolling walker  Toilet Transfer Step Transfer technique with minimum assistance with  grab bars  Shower Transfer Step Transfer technique with minimum assistance with grab bars and shower chair.    Functional Mobility   Pt ambulated 186' between surfaces requiring steadying assist utilizing RW.   ADLs     Current Status   Eating 5   Oral Hygiene 4   Shower, Bathe Self 3   Upper Body Dressing 3   Lower Body Dressing 2   Toileting Hygiene 2   Toilet Transfer 3   Putting On, Taking Off Footwear 2     Limiting Factors for ADLs: endurance, limited ROM, balance, weakness, coordination, cognition, safety  awareness, and pain    Exams     ROM: -       WFL    Hand Dominance: Right    ROM Hand  Left Hand: WFL  Right Hand: WFL    Strength  Overall Strength: -       Deficits, Left Upper    Strength Comment   Shoulder Flexion  4-/5    Shoulder Extension 4-/5    Shoulder Abduction  4-/5    Shoulder Adduction 4/5    Shoulder External Rotation 4-/5    Shoulder Internal Rotation 4/5    Elbow Flexion 4/5    Elbow Extension  4/5    Forearm Pronation 4/5    Forearm Supination 4/5    Wrist Flexion 4/5    Wrist Extension 4/5    MP Flexion     MP Extension     Thumb Flexion     Thumb Extension       -       Deficits, Right Upper    Strength Comment   Shoulder Flexion  4-/5    Shoulder Extension 4-/5    Shoulder Abduction  4-/5    Shoulder Adduction 4/5    Shoulder External Rotation 4-/5    Shoulder Internal Rotation 4/5    Elbow Flexion 4/5    Elbow Extension  4/5    Forearm Pronation 4/5    Forearm Supination 4/5    Wrist Flexion 4/5    Wrist Extension 4/5    MP Flexion     MP Extension     Thumb Flexion     Thumb Extension          Strength:   Left   Left  Strength Trial 1 Trial 2 [address] 3    Strength 45 48 46       Right   Right  Strength Trial 1 Trial 2 Trail 3    Strength 61 60 58       Pinch Strength:   Left    Trial [address] [place] 3   Key Pinch 16 15 14   Palmar Pinch 12 13 12       Right    Trial [address] [place] 3   Key Pinch 18 16 17   [place]mar Pinch 15 14 13       Sensation  Left: -       WNL  Right: -       WNL    Coordination:   -       Impaired  Left hand, finger to nose  , Right hand, finger to nose  , and Fine motor coordination tests 9-Hole Peg Test    Tone  Left: WNL  Right: WNL    Visual/Perceptual  Intact    Cognition:   Deficits, STM    Balance    Sitting  Sitting Surface: EOB Unsupported   Static: No UE Support, Good  Dynamic: No UE extremity support, Fair (+)    Standing  Static: Bilateral UE Extremity Support, Fair  Dynamic: Bilateral UE extremity support, Poor (+)    Righting Reaction:    Delayed Posteriorly    Posture/Deviations  Rounded shoulders    Additional Treatments   9-Hole Peg Test: (R) 41.57 seconds; (L) 46.29 seconds    LifeStyle Change and Education     Patient left up in chair with all lines intact, call button in reach, and nurse notified.    Education provided: Roles and goals of OT, ADLs, transfer training, bed mobility, body mechanics, assistive device, safety precautions, fall prevention, equipment recommendations, and home safety    Plan     During this hospitalization, patient to be seen 5 x/week (for 90 min per day, for 14 days) to address the identified rehab impairments via self-care/home management, community/work re-entry, therapeutic activities, therapeutic exercises, cognitive retraining and progress toward the following goals:    Short Term Goals to be met by: 03/20/25     Patient will increase functional independence with ADLs by performing:      UE Dressing with Set-up Assistance.  LE Dressing with Minimal Assistance.  Grooming while standing at sink with Set-up Assistance.  Toileting from toilet with Contact Guard Assistance for hygiene and clothing management.   Bathing from  shower chair/bench with Minimal Assistance.  Toilet transfer to toilet with Stand-by Assistance.  Increased functional strength to 4 to 4+/5 for bilateral UE's.    Long Term Goals to be met by: 03/27/25     Patient will increase functional independence with ADLs by performing:    Feeding with [place].  UE Dressing with Modified [place].  LE Dressing with Modified [place].  Grooming while standing at sink with Modified [place].  Toileting from toilet with Modified [place] for hygiene and clothing management.   Bathing from  shower chair/bench with Modified [place].  Toilet transfer to toilet with Modified [place].  Increased functional strength to 4+ to 5/5 for bilateral UE's.    Plan of Care Expires:  03/27/25    Time Tracking     OT Received On: 03/13/25  Time  In 1445     Time Out 1600  Total Time 75 min  Therapy Time: OT Individual: 75  Missed Time:    Missed Time Reason:      Billable Minutes: Evaluation 75    03/13/2025

## 2025-03-13 NOTE — PRE ADMISSION SCREENING
[facility] Surg  Inpatient Rehab Prescreen    PATIENT INFORMATION     Assessment date/time: 03/13/2025 @ 0924    Name: Tobi Liz Jr. Phone: [phone]   Address:   [address] [place] [address] SSN:    YOB: 1929 Age: 89 y.o. Gender: male   Race: White   Marital Status:    Advance Directives: Full code     COVERAGE INFORMATION     Patient Medicare #: [id number]    Primary Insurance Type: Medicare/Medicare Part a & B Secondary Insurance Type: Blue Cross Blue Shield/Medicare Supplement [employer] [place]   Policy #: [id number] Policy #: [id number]   Insurance contact name/number:  Insurance contact name/number:    Authorization #:  Authorization #:    Verified Coverage: Financial department   Pending Coverage:    Prescription Coverage:  Insurance details/comments:      PHYSICIAN/REFERRAL INFORMATION     Primary Care Physician: Luis Enrique Guzman Jr., MD Attending Physician: Dave Reyes III., MD   Consulting physician/specialist:  Referring Physician: Luis Enrique Guzman Jr., MD   Referring facility: [facility] Referring contact name/phone: GAYLE Rowe [phone]   Physician details/comments:  Admit to IPR     CONTACT INFORMATION   Extended Emergency Contact Information  Primary Emergency Contact: Therese Liz  Moore Phone: [phone]  Relation: Spouse  Preferred language: English   needed? No  Secondary Emergency Contact: indy Hemphill  "[employer]" Phone: [phone]  Relation: Daughter  Preferred language: English   needed? No    PRIOR LIVING SITUATION      Patient lives in single story home with spouse.   Bedroom location/setup:  [facility] home   Bathroom location/setup:  Tub/shower combo   Equipment at home: RW, SPC, WC   Prior device use:  RW and SPC     PRIOR LEVEL OF FUNCTION     Did a helper need to assist with the following activities prior to the current illness, exacerbation, or injury?   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 is a 89 y.o. male with a PMHx of has a past medical history of Aortic valve stenosis, Arthritis, BPH (benign prostatic hyperplasia), Carotid artery stenosis, Chronic diastolic heart failure, ETOH abuse, Exposure to COVID-19 virus (01/07/2022), Hyperchloremia, Kidney stones, Murmur, Polymyalgia rheumatica, PVD (peripheral vascular disease), and Renal artery stenosis., presents complaining of shaking after standing from a seated position that started yesterday, he was aware and talking during the episode so not likely a seizure. Labs show he was severely anemic in the ED with Hgb of 5. He had hematuria as well on UA along with bacteria. Ucx pending. His family states that he has a history of anemia in the past. He otherwise feels fine. He received 3 units of pRBCs in the ED with appropriate response.     Patient overall significantly improved from admission. H&H were stable yesterday. Awaiting updated CBC this morning. Otherwise renal function stable and no new electrolyte abnormalities noted. Patient accepted to inpatient rehab and we will discharge today.     Patient  requires acute inpatient rehab admission with 24-hour nursing and active physician oversight to monitor and manage acute medical comorbid conditions, labs, pain, and functional deficits. Patient/family will also require teaching and integration of improving functional skills into daily living. Patient will also require an individualized, interdisciplinary approach to their care, receiving PT, OT services 3 hours per day, 5 days per week. Required care cannot be provided at a lower level of care. Patient is anticipated to require approximately 10-14 days LOS with expected discharge home  with  services.    Impairment group (IGC):   Neuromuscular Disorders 03.8 Etiologic diagnosis/description:   G72.9: Myopathy  D64.9: Anemia     Date of onset:  3/8/2025 Date of surgery: N/A   Allergies: Patient has no known allergies.   Comorbid condition:   HTN, HLD, BPH, CAD, Valvular heart disease, CHF class II, Occlusion of right iliac artery   Medical/functional conditions requiring inpatient rehabilitation:     This patient requires medical management/24-hour nursing of complex co morbidities HTN, HLD, BPH, CAD, Valvular heart disease, CHF class II, Occlusion of right iliac artery, labs, medications (see medications list), pain, sleep hygiene, anticoagulation, nutrition, hydration, neurological, pulmonary, cardiac status, and preventive healthcare.    This patient requires intense therapy and an integrated, interdisciplinary approach to address safety, impaired mobility, impaired ADLs (dressing, toileting, grooming, showering), judgment, and memory, communication, pulmonary insufficiency, bowel/bladder problems,preventive healthcare, medication management, integration of functional skills into daily living, and home caregiver support and training.    Risk for medical/clinical complications:     This patient is at risk for the following complications: DVT/PE, pneumonia, malnutrition, neurological decline, respiratory  insufficiency, worsening activity intolerance, complications from anticoagulation, skin breakdown, inadequate sleep, and constipation.      SPECIAL REHABILITATION NEEDS     IV: PIV Skin: Venous Ulcer  right and left lower extremities  B[place] Catheter   Pace Maker to left chest wall        Peripheral IV - Single Lumen 03/11/25 1300 20 G Anterior;Distal;Left Upper Arm (Active)   Site Assessment Clean;Dry;Intact;No redness;No swelling 03/13/25 0400   Line Securement Device Antimicrobial Adhesive 03/12/25 0400   Extremity Assessment Distal to IV No warmth;No swelling;No redness;No abnormal discoloration 03/12/25 2000   Line Status Saline locked;Flushed 03/13/25 0400   Dressing Status Clean;Dry;Intact 03/13/25 0400   Dressing Intervention Integrity maintained 03/13/25 0400          PRECAUTIONS     Cardiac precautions: hx CHF, Diet: Cardiac diet, Safety/fall precautions: Recent history of falls, High fall risk, Decreased balance, and Do not leave alone in the bathroom, and Skin: venous stasis ulcers bilaterally lower extremities     PAST MEDICAL, SOCIAL, FAMILY HISTORY     Pertinent past medical history:   Past Medical History:   Diagnosis Date    Aortic valve stenosis     Arthritis     BPH (benign prostatic hyperplasia)     Carotid artery stenosis     Chronic diastolic heart failure     ETOH abuse     Exposure to COVID-19 virus 01/07/2022    Hyperchloremia     Kidney stones     Murmur     Polymyalgia rheumatica     PVD (peripheral vascular disease)     Renal artery stenosis       Has the patient had two or more falls in the past year or any fall with injury in the past year: Yes   Has the patient had major surgery during the 100 days prior to admission: No   Family Medical History:   Family History   Problem Relation Name Age of Onset    Cancer Mother      Stroke Father        Substance use history:   Social History     Substance and Sexual Activity   Alcohol Use Yes    Alcohol/week: 3.0 standard drinks of alcohol     "Types: 3 Shots of liquor per week    Comment: 3 martinis daily     Tobacco Use History[1]  Social History     Substance and Sexual Activity   Drug Use Never      VITALS   BP:  (!) 152/67     Temp: 97 °F (36.1 °C)     HR: 68     Resp: 20     SpO2: 98 %     Height: 5' 9" (1.753 m)     Weight: 98.3 kg (216 lb 11.4 oz)     BMI: 32          MED/LABS/DIAGNOSITICS   Current Medications[2]   Pertinent lab results:   Lab Results   Component Value Date    WBC 6.85 03/12/2025    HGB 10.2 (L) 03/12/2025    HCT 30.8 (L) 03/12/2025     03/12/2025     03/13/2025    K 4.0 03/13/2025    BUN 28 03/13/2025    CO2 22 (L) 03/13/2025     03/13/2025      Pertinent diagnostic studies:    Additional labs and diagnostic studies required prior to admission:      QI: GG Care Tool     QI: GG Care Tool  Therapy Evaluation   Swallowing/  Nutritional Status   Diet/Feeding/  Swallowing Independent   Eating       Oral Hygiene   Grooming Independent   Toileting Hygiene       Shower/Bathe   Bathing Mod A   Upper Body Dressing   Dressing Upper CGA   Lower Body Dressing   Dressing Lower Mod A   Putting On/Taking Off Footwear       Toilet Transfer   Toileting Mod A   Bladder Continence Status   Bladder Incontinent   Bowel Continence Status   Bowel Continent   Roll Left and Right   Bed Mobility Patient completed Rolling/Turning to Left with  contact guard assistance  Patient completed Rolling/Turning to Right with contact guard assistance  Patient completed Scooting/Bridging with contact guard assistance  Patient completed Supine to Sit with contact guard assistance  Patient completed Sit to Supine with contact guard assistance    Sit to Lying       Lying to Sitting on Side of Bed       Sit to Stand       Chair/Bed-to- Chair   Transfers Patient completed Sit <> Stand Transfer with contact guard assistance  with  rolling walker   Patient completed Bed <> Chair Transfer using Step Transfer technique with contact guard assistance with " rolling walker  Patient completed Toilet Transfer Step Transfer technique with contact guard assistance with  rolling walker and grab bars       Car Transfer       Walk 10 Feet   Equipment RW     Walk 50 Feet with Two Turns   Balance patient needing moderate assistance with standing dynamic activities    Walk 150 Feet   Endurance Impaired   Walk 10 Feet on Uneven Surfaces   Gait Functional Mobility: Pt ambulated 175' between surfaces requiring CGA utilizing RW.    Picking up an Object       Wheel 50 Feet with Two Turns   Wheelchair Not attempted   Wheel 150 Feet       1 Step (Curb)   Stairs Not attempted   4 Steps       12 Steps       Expression of Ideas and Wants   Communication Clear/fluent   Understanding  Verbal and Non-Verbal Content       Cognitive Patterns   Cognition AAO x 3  Able to follow commands      Safety Precautions Standard  Fall      Lower Extremity      Strength Right: Deficits: grossly assessed 3+/5   Left: Deficits: grossly assessed 3+/5       ROM Right: WFL with apparent arthritic changes knees   Left: WFL with apparent arthritic changes knees       Upper Extremity      Strength Right:  Left:      ROM Right:  Left:     Care Score Value Definitions  6: Independent. [place] provides no assistance with tasks. A device may or may not have been used.  5: Set-up or clean-up assistance. [place] sets up or cleans up, but does not assist with tasks. [place]on may have assisted prior to or following the activity.  4: Supervision or touching assistance. [place] provides verbal cues or touching/steadying or contact guard assistance. Assistance may be provided throughout the activity or intermittently.  3: Partial/moderate assistance. [place] does less than half the effort. G[place]on lifts, holds, or supports trunk or limbs, but provides less than half the effort.  2: Substantial/maximal assistance. G[place]on does more than half the effort. [place]on lifts or holds trunk or limbs, and provides more than half the effort.  1:  "Dependent. Mchenry does all of the effort, or the assistance of two or more helpers is required for the patient to complete the activity.  Care Score Activity Not Attempted Value Definitions  7: Patient refused.  9: Not applicable. Not attempted and the patient did not perform this activity prior to the current illness, exacerbation, or injury.  10: Not attempted due to environmental limitations (e.g., lack of equipment, weather constraints).  88: Not attempted due to medical condition or safety concerns.    DISCHARGE GOALS/ANTICPATED INTERVENTIONS/SERVICES     Expected Level of Improvement for Safe Discharge: Patient/caregivers anticipate discharge home at or near baseline level of functioning and/or decreased burden of care.    Patient/Family/Caregiver Goals:  "Be able to walk better so that I can go back home and help my wife."   Required Treatments/Services: Rehabilitation Nursing, Dietitian/nutrition, and Case Management   Required Therapy Therapy Type Min/Day Days/Week Duration of Therapy   Physical Therapy 90 5 10 - 14 days   Occupational Therapy 90 5 10 - 14 days   Speech and Language Pathology 30 5 10 - 14 days   Prosthetic/Orthotics      Recreational Therapy      Anticipated Services upon Discharge:  home health   Additional rehabilitation needs: Dietary needs: Education on cardiac diet   Expected Discharge Destination:  home vs Assistive Living   Barriers to Discharge: Requires caregiver assist   Discharge Support: Patient has a caregiver available, Discharge plan has been verified with patient's caregiver, and Caregiver is in agreement with the discharge plan   Patient/Family/Caregiver Orientation: Patient/family/caregiver oriented and agreeable to inpatient rehabilitation plan   Estimated Length of Stay: 10 - 14 days   Projected Admission Date: 03/13/2025   Medical Prognosis: good   Physicians Review and Admission Determination:  Patient's chart and history noted and I agree that patient is an excellent " candidate for inpatient rehab management and further assistance in recovery.  Patient needs close physician oversight with multiple modalities of therapy and meets current Medicare criteria.  Patient is transitioning today.            [1]   Social History  Tobacco Use   Smoking Status Former    Types: Cigars    Passive exposure: Past   Smokeless Tobacco Never   [2]   Current Facility-Administered Medications   Medication Dose Route Frequency Provider Last Rate Last Admin    0.9%  NaCl infusion (for blood administration)   Intravenous Q24H PRN Janessa Santos FNP        acetaminophen tablet 650 mg  650 mg Oral Q8H PRN Antonino Graham MD   650 mg at 03/10/25 2206    atorvastatin tablet 20 mg  20 mg Oral Daily Gabriel Venegas MD   20 mg at 03/13/25 0906    benzonatate capsule 200 mg  200 mg Oral TID PRN Janessa Santos FNP   200 mg at 03/12/25 1354    famotidine tablet 20 mg  20 mg Oral BID Luis Enrique Guzman Jr., MD   20 mg at 03/13/25 0906    fentaNYL injection 25 mcg  25 mcg Intravenous Q4H PRN Gabriel Venegas MD   25 mcg at 03/09/25 0506    finasteride tablet 5 mg  5 mg Oral Daily Antonino Graham MD   5 mg at 03/13/25 0906    gentian violet 2 % topical solution 0.5 mL  0.5 mL Topical (Top) Daily Janessa Santos FNP   0.5 mL at 03/12/25 1359    guaiFENesin 12 hr tablet 1,200 mg  1,200 mg Oral BID Janessa Santos FNP   1,200 mg at 03/13/25 0906    melatonin tablet 6 mg  6 mg Oral Nightly PRN Antonino Graham MD   6 mg at 03/12/25 2126    methylPREDNISolone sodium succinate injection 80 mg  80 mg Intravenous Q8H Janessa Santos FNP   80 mg at 03/13/25 0534    mupirocin 2 % ointment   Nasal BID Gabriel Venegas MD   Given at 03/12/25 2127    ondansetron injection 4 mg  4 mg Intravenous Q8H PRN Antonino Graham MD        oxybutynin 24 hr tablet 5 mg  5 mg Oral Daily Antonino Graham MD   5 mg at 03/13/25 0906    sodium chloride 0.9% flush 10 mL  10 mL Intravenous PRN Antonino Graham MD         sulfamethoxazole-trimethoprim 800-160mg per tablet 1 tablet  1 tablet Oral BID Janessa Santos FNP   1 tablet at 03/13/25 0906    tamsulosin 24 hr capsule 0.4 mg  1 capsule Oral Daily Gabriel Venegas MD   0.4 mg at 03/13/25 0906

## 2025-03-13 NOTE — ASSESSMENT & PLAN NOTE
3/13/2025:  Patient with diffuse muscle weakness and myopathy from acute illness.  Recommend inpatient rehab.

## 2025-03-13 NOTE — PLAN OF CARE
Plan of care discussed with pt., room near unit station, tele sitter in use, disoriented to time and situation, takes meds whole, denies pain, no SOB noted, IV steroids as scheduled, wound care as ordered daily, will continue to monitor.       Problem: Infection  Goal: Absence of Infection Signs and Symptoms  Outcome: Progressing     Problem: Wound  Goal: Optimal Coping  Outcome: Progressing  Goal: Optimal Functional Ability  Outcome: Progressing  Goal: Absence of Infection Signs and Symptoms  Outcome: Progressing  Goal: Improved Oral Intake  Outcome: Progressing  Goal: Optimal Pain Control and Function  Outcome: Progressing  Goal: Skin Health and Integrity  Outcome: Progressing  Goal: Optimal Wound Healing  Outcome: Progressing     Problem: Fall Injury Risk  Goal: Absence of Fall and Fall-Related Injury  Outcome: Progressing     Problem: Skin Injury Risk Increased  Goal: Skin Health and Integrity  Outcome: Progressing

## 2025-03-13 NOTE — PLAN OF CARE
[facility] - Med Surg  Discharge Final Note    Primary Care Provider: Luis Enrique Guzman Jr., MD    Expected Discharge Date: 3/13/2025    Final Discharge Note (most recent)       Final Note - 03/13/25 1138          Final Note    Assessment Type Final Discharge Note     Anticipated Discharge Disposition Rehab Facility        Post-Acute Status    Post-Acute Authorization Placement     Post-Acute Placement Status Set-up Complete/Auth obtained     Discharge Delays None known at this time                     Important Message from Medicare  Important Message from Medicare regarding Discharge Appeal Rights: Signed/date by patient/caregiver, Other (comments) (Obtained by registration staff.)     Date IMM was signed: 03/09/25  Time IMM was signed: 1304    After-discharge care                Destination       [facility] - Rehab   Service: Inpatient Rehabilitation    [address] [place] [address]   Phone: [phone]                   Final note is completed. The patient will be discharging to inpatient rehab. The Patient's Choice Form was signed for [facility].

## 2025-03-13 NOTE — PLAN OF CARE
Problem: Physical Therapy  Goal: Physical Therapy Goal  Description: 1. Patient demonstrates transfers with mod independence with rolling walker to safely transition between surfaces.   2. Patient demonstrates ambulation with rolling walker with min assistance x50 feet.   3. Patient demonstrates modified independence with bed mobility tasks.   4. Patient demonstrates ability to perform (bilateral) LE therex sitting unsupported edge of bed x15 minutes.   5. Goals PRN.   Outcome: Adequate for Care Transition

## 2025-03-13 NOTE — ASSESSMENT & PLAN NOTE
Anemia is likely due to unsure at this time. Urine shows 3+ rbc and red color however pt does have a chronic indwelling catheter. He reports when he had the catheter placed a little over a week ago the bag was full of blood for 3 days. He also reports having a kidney stone. Most recent hemoglobin and hematocrit are listed below.  - Rectal exam without BRBPR/melena  - Stool occult megative  - CXR wnl, CTA abdomen with no identifiable causes, hip XR normal, no signs of bleeding or bruising other than some small wounds  - UA many bacteria and shows blood however unsure if it is from trauma of the catheter or UTI  - Receieved 3 units of pRBCs with response below  Recent Labs     03/11/25  0558 03/12/25  0606 03/13/25  0531   HGB 8.1* 10.2* 10.6*   HCT 25.0* 30.8* 31.6*     Plan  - Monitor serial CBC: Daily  - Transfuse PRBC if patient becomes hemodynamically unstable, symptomatic or H/H drops below 7/21.  - Patient has received 3 units of PRBCs on 3/9/25  - Patient's anemia is currently improving  -    3/10/2025: H&H improved status post transfusion.  Continue to follow for now.  3/11/2025: We will transfuse 1 unit packed red blood cells today.  3/12/2025: Significant improvement in hemoglobin and hematocrit status post transfusion.  Continue to follow.  3/13/2025:  H&H improved yesterday.  Pending updated CBC this morning.  Can continue to follow on inpatient rehab.

## 2025-03-13 NOTE — ASSESSMENT & PLAN NOTE
3/11/2025: Wound Care consult.  3/12/2025: Consult pending.  3/13/2025:  Wound care consult completed yesterday.  Appreciate input.  Continue recommendations on inpatient rehab.

## 2025-03-13 NOTE — PT/OT/SLP EVAL
Speech Language Pathology   Evaluation Gabriel Liz Jr.   MRN: [id number]   Admitting Diagnosis: <principal problem not specified>    Diet recommendations: Solid Diet Level: Regular Diet - IDDSI Level 7  Liquid Diet Level: Thin liquids - IDDSI Level 0 HOB to 90 degrees and Standard aspiration precautions    SLP Treatment Date: 03/13/25  Speech Start Time: 1415     Speech Stop Time: 1445     Speech Total (min): 30 min       TREATMENT BILLABLE MINUTES:  Eval x 20 , Eval Swallow and Oral Function x 10, and Total Time x 30    Past Medical History:   Diagnosis Date    Aortic valve stenosis     Arthritis     BPH (benign prostatic hyperplasia)     Carotid artery stenosis     Chronic diastolic heart failure     ETOH abuse     Exposure to COVID-19 virus 01/07/2022    Hyperchloremia     Kidney stones     Murmur     Polymyalgia rheumatica     PVD (peripheral vascular disease)     Renal artery stenosis      Past Surgical History:   Procedure Laterality Date    A-V CARDIAC PACEMAKER INSERTION N/A 10/6/2023    Procedure: INSERTION, CARDIAC PACEMAKER, DUAL CHAMBER;  Surgeon: Douglas Salguero MD;  Location: [facility] CATH;  Service: Cardiology;  Laterality: N/A;    ANGIOGRAM, CAROTID Left 8/25/2023    Procedure: ANGIOGRAM, CAROTID;  Surgeon: Nick Box MD;  Location: [facility] CATH;  Service: Cardiology;  Laterality: Left;    CATARACT EXTRACTION, BILATERAL      ECHOCARDIOGRAM,TRANSESOPHAGEAL N/A 10/3/2023    Procedure: Transesophageal echo (JOSE ARMANDO) intra-procedure log documentation;  Surgeon: Nick Box MD;  Location: [facility] OR;  Service: Cardiology;  Laterality: N/A;    HAND SURGERY      INSERTION OF STENT INTO PERIPHERAL VESSEL N/A 1/30/2025    Procedure: INSERTION, STENT, VESSEL, PERIPHERAL;  Surgeon: Harsha Salcedo MD;  Location: [facility] CATH;  Service: Cardiology;  Laterality: N/A;    PERCUTANEOUS TRANSCATHETER AORTIC VALVE REPLACEMENT (TAVR) Left 10/3/2023    Procedure: REPLACEMENT, AORTIC VALVE, PERCUTANEOUS,  TRANSCATHETER;  Surgeon: Nick Box MD;  Location: [facility] OR;  Service: Cardiology;  Laterality: Left;    PERCUTANEOUS TRANSCATHETER AORTIC VALVE REPLACEMENT (TAVR) Left 10/3/2023    Procedure: REPLACEMENT, AORTIC VALVE, PERCUTANEOUS, TRANSCATHETER carotid access;  Surgeon: Jun Brito MD;  Location: [facility] OR;  Service: Cardiovascular;  Laterality: Left;    PERCUTANEOUS TRANSLUMINAL ANGIOPLASTY N/A 8/25/2023    Procedure: ANGIOPLASTY-PERCUTANEOUS TRANSLUMINAL (PTA);  Surgeon: Nick Box MD;  Location: [facility] CATH;  Service: Cardiology;  Laterality: N/A;    SHOULDER SURGERY Left     total shoulder replacement    WOUND EXPLORATION N/A 10/3/2023    Procedure: EXPLORATION, WOUND;  Surgeon: Jun Brito MD;  Location: [facility] OR;  Service: Cardiology;  Laterality: N/A;       Has the patient been evaluated by SLP for swallowing? : Yes  Keep patient NPO?: No General Precautions: Standard, fall          Social Hx: Patient lives with wife.    Prior diet: regular/thin.    Subjective:  Pt received awake/alert. Pt denies dysphagia-related reports/concerns. Pt denies cognitive deficits.  Patient goals: none stated    Pain/Comfort  Pain Rating 1: 0/10    Objective:   Patient found with: willams catheter, peripheral IV    Oral Musculature Evaluation  Oral Musculature: WFL  Dentition: present and adequate  Secretion Management: adequate  Mucosal Quality: adequate  Mandibular Strength and Mobility: WFL  Oral Labial Strength and Mobility: WFL  Lingual Strength and Mobility: WFL  Velar Elevation: WFL  Buccal Strength and Mobility: WFL  Volitional Cough: perceptually strong  Voice Prior to PO Intake: perceptually rough     Cognitive Status:  Behavioral Observations: alert, appropriate, and cooperative-  Memory: immediate recall 4/5 (1st trial) & 5/5 (2nd trial); delayed recall 2/5; LTM recall WFL   Attention: WFL  Problem Solving: WFL  Pragmatics:  WNL  Executive Function: initiation, insight/awareness,  self-correction, and self-monitoring WFL    Orientation Yes No  Inconsistent Comments   Person x      Place x      Situation x      City x      State x      Month x      Date x      Day of Week x      Year x       x      Age x      President x      Family Members x         MoCA-BLIND / [place] Cognitive Assessment for the Blind: an adapted version of the original MoCA, a rapid screening instrument for mild cognitive dysfunction.    Total Score: 17/22 = pt scored slightly below normal limits     Attention:   Language: 2/3  Abstraction:  Delayed Recall:   Orientation:     WNL = greater than/equal to a score of 18/22     Auditory Comprehension: able to identify objects, answers yes/no questions, and able to follow commands    Verbal Expression: conversational speech WFL    Motor Speech:  WFL; 100% intelligibility    Voice:  perceptually rough    Bedside Swallow Eval:  Consistencies Assessed: Thin liquids via self-regulated sequential straw  Solids via self-regulated bites  Oral Phase: WFL  Pharyngeal Phase: no overt clinical signs/symptoms of aspiration  no overt clinical signs/symptoms of pharyngeal dysphagia    Assessment:  Tobi LOZADA Shalonda Dooley is a 89 y.o. male with a medical diagnosis of <principal problem not specified> and presents w/ borderline functional cognitive-linguistic deficits per assessment results; however, pt's overall cognitive skills appear functional for conversational purposes. No overt signs/symptoms of aspiration appreciated. No further skilled ST services warranted at this time.    Spiritual, Cultural Beliefs, Mandaeism Practices, Values that Affect Care: no    Discharge recommendations: Therapy Intensity Recommendations at Discharge: No Therapy Indicated     Plan:   Plan reviewed with: patient  SLP Follow-up?: No              2025

## 2025-03-13 NOTE — PLAN OF CARE
Problem: Occupational Therapy  Goal: Occupational Therapy Goal  Description: Long Term Goals to be met by: 03/27/25     Patient will increase functional independence with ADLs by performing:    Feeding with [place].  UE Dressing with Modified [place].  LE Dressing with Modified [place].  Grooming while standing at sink with Modified [place].  Toileting from toilet with Modified [place] for hygiene and clothing management.   Bathing from  shower chair/bench with Modified [place].  Toilet transfer to toilet with Modified [place].  Increased functional strength to 4+ to 5/5 for bilateral UE's.    Outcome: Established OT POC

## 2025-03-13 NOTE — DISCHARGE SUMMARY
[facility] Medicine  Discharge Summary      Patient Name: Tobi Liz Jr.  MRN: [id number]  RENATO: [id number]  Patient Class: IP- Inpatient  Admission Date: 3/8/2025  Hospital Length of Stay: 4 days  Discharge Date and Time:  03/13/2025 9:01 AM  Attending Physician: Luis Enrique Guzman Jr., MD   Discharging Provider: TALHA Luna  Primary Care Provider: Luis Enrique Guzman Jr., MD    Primary Care Team: Networked reference to record PCT     HPI:   ED HPI:  89-year-old male history of arthritis, BPH, coronary artery disease presents the emergency department via EMS after he had an episode of shaking after he stood up fast. Never had a postictal phase. Talking during the episode. Occurred twice today. Denies chest pain or shortness of breath. Here in the ER states he feels fine, no weakness. No slurred speech no other issues. Alert oriented x3, GCS is 15. Family reports he has a history of anemia in the past. Patient's only complaint is minor pain to his right hip area after he leaned against his wheelchair when he got dizzy earlier.     IM HPI:  Patient Tobi Liz Jr. is a 89 y.o. male with a PMHx of  has a past medical history of Aortic valve stenosis, Arthritis, BPH (benign prostatic hyperplasia), Carotid artery stenosis, Chronic diastolic heart failure, ETOH abuse, Exposure to COVID-19 virus (01/07/2022), Hyperchloremia, Kidney stones, Murmur, Polymyalgia rheumatica, PVD (peripheral vascular disease), and Renal artery stenosis., presents complaining of shaking after standing from a seated position that started yesterday, he was aware and talking during the episode so not likely a seizure. Labs show he was severely anemic in the ED with Hgb of 5. He had hematuria as well on UA along with bacteria. Ucx pending. His family states that he has a history of anemia in the past. He otherwise feels fine. He received 3 units of pRBCs in the ED with appropriate response.      * No surgery found  "*      Hospital Course:   Chief Complaint   Patient presents with    Shaking       Pt to the ER for eval due to concerns over multiple episodes of "shaking and screaming" after standing from a seated position per EMS. Family concerned that activity may have been seizures, no hx. AAOX4 upon arrival, no complaints.          HPI: ED HPI:  89-year-old male history of arthritis, BPH, coronary artery disease presents the emergency department via EMS after he had an episode of shaking after he stood up fast. Never had a postictal phase. Talking during the episode. Occurred twice today. Denies chest pain or shortness of breath. Here in the ER states he feels fine, no weakness. No slurred speech no other issues. Alert oriented x3, GCS is 15. Family reports he has a history of anemia in the past. Patient's only complaint is minor pain to his right hip area after he leaned against his wheelchair when he got dizzy earlier.      IM HPI:  Patient Tobi Liz Jr. is a 89 y.o. male with a PMHx of  has a past medical history of Aortic valve stenosis, Arthritis, BPH (benign prostatic hyperplasia), Carotid artery stenosis, Chronic diastolic heart failure, ETOH abuse, Exposure to COVID-19 virus (01/07/2022), Hyperchloremia, Kidney stones, Murmur, Polymyalgia rheumatica, PVD (peripheral vascular disease), and Renal artery stenosis., presents complaining of shaking after standing from a seated position that started yesterday, he was aware and talking during the episode so not likely a seizure. Labs show he was severely anemic in the ED with Hgb of 5. He had hematuria as well on UA along with bacteria. Ucx pending. His family states that he has a history of anemia in the past. He otherwise feels fine. He received 3 units of pRBCs in the ED with appropriate response.       3/10/2025:  Patient with continued hematuria but is improving.  H&H are holding this morning.  Urine culture is pending and we will continue IV Rocephin for now.  " Patient currently on Flomax as well.  Patient c/o cough and recent CXR normal.   3/11/2025:  Urine culture growing Gram-negative rods.  Continue Rocephin as sensitivity is pending.  Otherwise labs show drop in H&H overnight and we will transfuse with 1 unit packed red blood cells today.  We will add steroids and Mucinex for congestion.  Psychiatric consult to assess decision-making capacity.  Will add nutritional consult, consider PPN.    3/12/2025: Patient seen by dietary yesterday and recommended adding supplements for now and adjusting diet to patient's legs.  She will follow up today to see outpatient does with month these modifications.  H&H significantly improved with blood transfusion yesterday.  Urine culture shows sensitivity to Bactrim and we will transition from IV antibiotics to oral therapy.  Mentation much improved this morning.    3/13/2025:  Patient overall significantly improved from admission.  H&H were stable yesterday.  Awaiting updated CBC this morning.  Otherwise renal function stable and no new electrolyte abnormalities noted.  Patient accepted to inpatient rehab and we will discharge today.     Goals of Care Treatment Preferences:  Code Status: Full Code      SDOH Screening:  The patient was screened for utility difficulties, food insecurity, transport difficulties, housing insecurity, and interpersonal safety and there were no concerns identified this admission.     Consults:   Consults (From admission, onward)          Status Ordering Provider     Inpatient consult to Podiatry  Once        Provider:  Sujit Schmidt DPM    Acknowledged ARPAN AMANDA     Inpatient consult to Social Work/Case Management  Once        Provider:  (Not yet assigned)    Completed ARPAN AMANDA     Inpatient consult to Registered Dietitian/Nutritionist  Once        Provider:  (Not yet assigned)    Completed ARPAN AMANDA     Inpatient consult to Psychiatry  Once        Provider:  Viktor Zambrano III, MD     Completed ARPAN AMANDA            Assessment & Plan  Anemia  Anemia is likely due to  unsure at this time . Urine shows 3+ rbc and red color however pt does have a chronic indwelling catheter. He reports when he had the catheter placed a little over a week ago the bag was full of blood for 3 days. He also reports having a kidney stone. Most recent hemoglobin and hematocrit are listed below.  - Rectal exam without BRBPR/melena  - Stool occult megative  - CXR wnl, CTA abdomen with no identifiable causes, hip XR normal, no signs of bleeding or bruising other than some small wounds  - UA many bacteria and shows blood however unsure if it is from trauma of the catheter or UTI  - Receieved 3 units of pRBCs with response below  Recent Labs     03/11/25  0558 03/12/25  0606 03/13/25  0531   HGB 8.1* 10.2* 10.6*   HCT 25.0* 30.8* 31.6*     Plan  - Monitor serial CBC: Daily  - Transfuse PRBC if patient becomes hemodynamically unstable, symptomatic or H/H drops below 7/21.  - Patient has received 3 units of PRBCs on 3/9/25  - Patient's anemia is currently improving  -    3/10/2025: H&H improved status post transfusion.  Continue to follow for now.  3/11/2025: We will transfuse 1 unit packed red blood cells today.  3/12/2025: Significant improvement in hemoglobin and hematocrit status post transfusion.  Continue to follow.  3/13/2025:  H&H improved yesterday.  Pending updated CBC this morning.  Can continue to follow on inpatient rehab.  Hypertension  Patient's blood pressure range in the last 24 hours was: BP  Min: 117/57  Max: 152/67.The patient's inpatient anti-hypertensive regimen is listed below:  Current Antihypertensives       Plan  - BP is controlled, no changes needed to their regimen  - Hold home antihypertensives for now  Hyperlipidemia  Cont home statin    BPH (benign prostatic hyperplasia)  Cont home meds    Atherosclerosis of native artery of both lower extremities with rest pain  Hold plavix in the setting of a  possible bleed    Plan  - Restart as needed    Acute cystitis with hematuria  - UA with many bacteria and shows blood however unsure if it is from trauma of the catheter or UTI  - Receieved 3 units of pRBCs with response below    Plan  - Start Rocephin  - Follow cultures    3/10/2025:  Urine culture pending.  Continue Rocephin.  3/11/2025: Culture showing Gram-negative rods.  Continue Rocephin until sensitivity results.  3/12/2025: Culture shows sensitivity to oral Bactrim and we will transition off IV Rocephin and continue Bactrim for 10 more days.    Wound of lower extremity  3/11/2025: Wound Care consult.  3/12/2025: Consult pending.  3/13/2025:  Wound care consult completed yesterday.  Appreciate input.  Continue recommendations on inpatient rehab.  Myopathy  3/13/2025:  Patient with diffuse muscle weakness and myopathy from acute illness.  Recommend inpatient rehab.    Final Active Diagnoses:    Diagnosis Date Noted POA    PRINCIPAL PROBLEM:  Anemia [D64.9] 03/09/2025 Yes    Myopathy [G72.9] 03/13/2025 Yes    Wound of lower extremity [S81.809A] 03/11/2025 Unknown    Acute cystitis with hematuria [N30.01] 03/09/2025 Yes    Atherosclerosis of native artery of both lower extremities with rest pain [I70.223] 08/25/2023 Yes    Hypertension [I10] 05/26/2021 Yes    Hyperlipidemia [E78.5] 05/26/2021 Yes    BPH (benign prostatic hyperplasia) [N40.0] 05/26/2021 Yes      Problems Resolved During this Admission:       Discharged Condition: good    Disposition: Rehab Facility    Follow Up:   Contact information for after-discharge care       Destination       [facility] .    Service: Inpatient Rehabilitation  Contact information:  [address] [place] [id number] [phone]                                 Patient Instructions:      Ambulatory referral/consult to Outpatient Case Management   Referral Priority: Routine Referral Type: Consultation   Referral Reason: Specialty Services  Required   Number of Visits Requested: 1       Significant Diagnostic Studies: Labs: All labs within the past 24 hours have been reviewed    Pending Diagnostic Studies:       None           Medications:  Transfer Medications (for Discharge Readmit only): Current Medications[1]    Indwelling Lines/Drains at time of discharge:   Lines/Drains/Airways       Drain  Duration                  Urethral Catheter 03/09/25 1003 Silicone 16 Fr. 3 days                    Time spent on the discharge of patient: 35 minutes         TALHA Luna  Department of Hospital Medicine  [facility]         [1]   Current Facility-Administered Medications   Medication Dose Route Frequency Provider Last Rate Last Admin    0.9%  NaCl infusion (for blood administration)   Intravenous Q24H PRN Janessa Santos FNP        acetaminophen tablet 650 mg  650 mg Oral Q8H PRN Antonino Graham MD   650 mg at 03/10/25 2206    atorvastatin tablet 20 mg  20 mg Oral Daily Gabriel Venegas MD   20 mg at 03/13/25 0906    benzonatate capsule 200 mg  200 mg Oral TID PRN Janessa Santos FNP   200 mg at 03/12/25 1354    famotidine tablet 20 mg  20 mg Oral BID Luis Enrique Guzman Jr., MD   20 mg at 03/13/25 0906    fentaNYL injection 25 mcg  25 mcg Intravenous Q4H PRN Gabriel Venegas MD   25 mcg at 03/09/25 0506    finasteride tablet 5 mg  5 mg Oral Daily Antonino Graham MD   5 mg at 03/13/25 0906    gentian violet 2 % topical solution 0.5 mL  0.5 mL Topical (Top) Daily Janessa Santos FNP   0.5 mL at 03/12/25 1359    guaiFENesin 12 hr tablet 1,200 mg  1,200 mg Oral BID Janessa Santos FNP   1,200 mg at 03/13/25 0906    melatonin tablet 6 mg  6 mg Oral Nightly PRN Antonino Graham MD   6 mg at 03/12/25 2126    methylPREDNISolone sodium succinate injection 80 mg  80 mg Intravenous Q8H Janessa Santos FNP   80 mg at 03/13/25 0534    mupirocin 2 % ointment   Nasal BID Gabriel Venegas MD   Given at 03/12/25 2127    ondansetron injection 4 mg  4 mg Intravenous Q8H  PRN Antonino Graham MD        oxybutynin 24 hr tablet 5 mg  5 mg Oral Daily Antonino Graham MD   5 mg at 03/13/25 0906    sodium chloride 0.9% flush 10 mL  10 mL Intravenous PRN Antonino Graham MD        sulfamethoxazole-trimethoprim 800-160mg per tablet 1 tablet  1 tablet Oral BID Janessa Santos FNP   1 tablet at 03/13/25 0906    tamsulosin 24 hr capsule 0.4 mg  1 capsule Oral Daily Gabriel Venegas MD   0.4 mg at 03/13/25 0906

## 2025-03-13 NOTE — PLAN OF CARE
Problem: Physical Therapy  Goal: Physical Therapy Goal  Description: Physical Therapy Goal     PT, PT/OT Progressing    Description:   Short Term Goals: 2025  Long Term Goals: 3/28/25     Transfers Status     Sit to Stand  Short Term Goal: Complete sit to stand with Supervision or Set-up Assistance using Rolling Walker with no verbal cues  Long Term Goal:  Complete sit to stand with Independent using Rolling Walker with no  verbal cues     Stand Step  Short Term Goal: Complete stand step with  Set-Up Clean Up  using Rolling Walker with no verbal cues  Long Term Goal:  Complete stand step with  Modified Independent  using Rolling Walker with no  verbal cues     Supine <> Sit  Short Term Goal: Complete supine <> sit with Modified [place]  with  minimal verbal cues rails prn  Long Term Goal: Complete supine <> sit with Independent  With no  verbal cues        Balance/Coordination     Dynamic Sitting  Short Term Goal: Complete dynamic sitting with Set Up Assistance with minimal  verbal cues  minimal to moderate excursions  Long Term Goal: Complete dynamic sitting with [place]  with  no verbal cues moderate to maximal excursions     Dynamic Standing  Short Term Goal: Complete dynamic standing with Stand-by Assistance  with minimal  verbal cues minimal to moderate excursions with RW  Long Term Goal: Complete dynamic standing with Modified [place] with no  verbal cues and moderate to maximal excursions with RW     Endurance     Short Term Goal:   Physical Therapy: 2 times a day, 30-45 minutes  Rest periods: multiple  Sitting: 3 hours/day     Long Term Goal:  Physical Therapy: 2 times a day, 45 minutes  Rest periods: minimal  Sittin-8 hours/day     Mobility/Gait  Short Term Goal: Will ambulate with Stand-by Assistance  Using Rolling Walker with no  verbal cues x 200 ft  Long Term Goal: Will ambulate with Modified Independent  using Rolling Walker with no verbal cues x 500 ft     Stairs  Assistance  Short Term Goal: Patient will ascend/descend 4 stairs/steps using bilateral handrails reciprocal  steps with Stand-by Assistance and minimal verbal cues  Long Term Goal: Patient will ascend/descend 12 stairs/steps using no handrails  reciprocal steps with Plain and minimal verbal cues           Ramp/Uneven 10 feet surface  Long  Term Goal; Patient will be able to navigate a 10 inch uneven surface with proper A.D. with  Modified [place]        2-6  inch curb  Long  Term Goal; Patient will be able to navigate a  2 to 6 inch curb with proper A.D. with independence     Picking up object   Long  Term Goal; Patient will be able to  a small object from the floor  with proper A.D. with  independence     Car transfers  Long  Term Goal; Patient will be able to get in and out of a vehicle  with proper A.D. with  independence     Education  Long Term Goals:  Patient/family/caregivers trained on physical mobility and precautions with Supervision.     Patient/family/caregivers trained on safety awareness with Supervision.     Order and obtain all appropriate equipment.      Outcome: Progressing

## 2025-03-13 NOTE — ASSESSMENT & PLAN NOTE
Patient's blood pressure range in the last 24 hours was: BP  Min: 117/57  Max: 152/67.The patient's inpatient anti-hypertensive regimen is listed below:  Current Antihypertensives       Plan  - BP is controlled, no changes needed to their regimen  - Hold home antihypertensives for now

## 2025-03-13 NOTE — PT/OT/SLP EVAL
Physical Therapy Rehab Evaluation    Patient Name:  Tobi Liz Jr.   MRN:  [id number]    Recommendations:     Discharge Recommendations:  Low Intensity Therapy   Discharge Equipment Recommendations: other (see comments) (TBD based on progression with therapies)   Barriers to discharge:  Current medical and functional status    Assessment:     Tobi Liz Jr. is a 89 y.o. male admitted with a medical diagnosis of <principal problem not specified>.  He presents with the following impairments/functional limitations:     weakness, impaired endurance, impaired self care skills, impaired functional mobility, gait instability, impaired balance, impaired cognition, decreased coordination, decreased upper extremity function, decreased lower extremity function, decreased safety awareness, pain, decreased ROM, impaired fine motor, impaired skin, edema, impaired cardiopulmonary response to activity, impaired joint extensibility, impaired muscle length.     Patient presents with eagerness to perform evaluation this visit. He is able to perform all bed mobility with no assistance required and transfers with CGA required with verbal cuing for safety. Patient is able to ambulate to and from gym with narrow EVELIN and decreased step length noted. Patient required cuing to ensure safe use of RW. .    Rehab Diagnosis:  Neuromuscular disorders, myopathy     Recent Surgery: * No surgery found *      General Precautions: Standard, fall     Orthopedic Precautions: N/A     Braces: N/A    Rehab Prognosis: Good; patient would benefit from acute skilled PT services to address these deficits and reach maximum level of function.      History:     Past Medical History:   Diagnosis Date    Aortic valve stenosis     Arthritis     BPH (benign prostatic hyperplasia)     Carotid artery stenosis     Chronic diastolic heart failure     ETOH abuse     Exposure to COVID-19 virus 01/07/2022    Hyperchloremia     Kidney stones     Murmur      Polymyalgia rheumatica     PVD (peripheral vascular disease)     Renal artery stenosis        Past Surgical History:   Procedure Laterality Date    A-V CARDIAC PACEMAKER INSERTION N/A 10/6/2023    Procedure: INSERTION, CARDIAC PACEMAKER, DUAL CHAMBER;  Surgeon: Douglas Salguero MD;  Location: [facility] CATH;  Service: Cardiology;  Laterality: N/A;    ANGIOGRAM, CAROTID Left 8/25/2023    Procedure: ANGIOGRAM, CAROTID;  Surgeon: Nick Box MD;  Location: [facility] CATH;  Service: Cardiology;  Laterality: Left;    CATARACT EXTRACTION, BILATERAL      ECHOCARDIOGRAM,TRANSESOPHAGEAL N/A 10/3/2023    Procedure: Transesophageal echo (JOSE ARMANDO) intra-procedure log documentation;  Surgeon: Nick Box MD;  Location: [facility] OR;  Service: Cardiology;  Laterality: N/A;    HAND SURGERY      INSERTION OF STENT INTO PERIPHERAL VESSEL N/A 1/30/2025    Procedure: INSERTION, STENT, VESSEL, PERIPHERAL;  Surgeon: Harsha Salcedo MD;  Location: [facility] CATH;  Service: Cardiology;  Laterality: N/A;    PERCUTANEOUS TRANSCATHETER AORTIC VALVE REPLACEMENT (TAVR) Left 10/3/2023    Procedure: REPLACEMENT, AORTIC VALVE, PERCUTANEOUS, TRANSCATHETER;  Surgeon: Nick Box MD;  Location: [facility] OR;  Service: Cardiology;  Laterality: Left;    PERCUTANEOUS TRANSCATHETER AORTIC VALVE REPLACEMENT (TAVR) Left 10/3/2023    Procedure: REPLACEMENT, AORTIC VALVE, PERCUTANEOUS, TRANSCATHETER carotid access;  Surgeon: Jun Brito MD;  Location: [facility] OR;  Service: Cardiovascular;  Laterality: Left;    PERCUTANEOUS TRANSLUMINAL ANGIOPLASTY N/A 8/25/2023    Procedure: ANGIOPLASTY-PERCUTANEOUS TRANSLUMINAL (PTA);  Surgeon: Nick Box MD;  Location: [facility] CATH;  Service: Cardiology;  Laterality: N/A;    SHOULDER SURGERY Left     total shoulder replacement    WOUND EXPLORATION N/A 10/3/2023    Procedure: EXPLORATION, WOUND;  Surgeon: Jun Brito MD;  Location: [facility] OR;  Service: Cardiology;  Laterality: N/A;       Subjective     Chief Complaint:  Fatigue  Patient/Family Comments/goals: return home safely with improved functional independence    Patients cultural, spiritual, Christianity conflicts given the current situation: no       Living Environment  People in Home: spouse  Name(s) of People in Home: Therese Liz  Living Arrangements: house  Home Accessibility: wheelchair accessible  Number of Stairs, Within Home, Primary: two  Stair Railings at Home: none  Home Layout: Able to live on 1st floor  Transportation Anticipated: car, drives self, family or friend will provide  Living Environment Comment: Pt lives with his spouse in a H with threshold to enter/exit home.  Equipment Currently Used at Home: urinary catheter supplies, walker, rolling, cane, straight, wound care supplies    Prior Level of Function  Ambulation Skills: needs device  Stairs: needs device  Transfer Skills: independent  ADL Skills: needs device  Work/Leisure Activity: needs device  Cognitive Communication: independent    Equipment used at home: cane, straight, wheelchair, walker, rolling, urinary catheter supplies.  DME owned (not currently used): none.      Upon discharge, patient will have assistance from family.    Objective:     Communicated with nursing and family prior to session.  Patient found HOB elevated with willams catheter, peripheral IV  upon PT entry to room.        Respiratory Status: Room air    Exams  Cognitive Exam:  Patient is oriented to Person, Place, Time, and Situation  RLE ROM:          -       WFL  RLE Strength:          -       Deficits,     Comment   Hip Flexion 4/5         Hip Abduction 3+/5         Hip External Rotation 4-/5    Hip Internal Rotation 4-/5    Knee Flexion 5/5    Knee Extension 5/5    Ankle Dorsiflexion 4/5                          LLE ROM:          -       WFL  LLE Strength:          -       Deficits,     Comment   Hip Flexion 4/5         Hip Abduction 3+/5         Hip External Rotation 4-/5    Hip Internal Rotation 4-/5    Knee Flexion 5/5     Knee Extension 5/5    Ankle Dorsiflexion 4/5                            Functional Mobility  Bed Mobility:     Rolling Left:  Set-up or clean-up assistance   Rolling Right: Set-up or clean-up assistance   Scooting: Partial/moderate assistance   Bridging: Partial/moderate assistance   Supine to Sit: Supervision or touching assistance   Sit to Supine: Set-up or clean-up assistance   Transfers:     Sit to Stand: Supervision or touching assistance  with rolling walker  Bed to Chair: Supervision or touching assistance  with rolling walker  using  Stand Pivot  Gait: Pt ambulates 200 feet 2x with RW with Set-up or clean-up assistance .   Impairments contributing to gait deviations include impaired balance, decreased flexibility, and decreased strength.  Balance: Static Sitting/Standing  Patient performed static sitting on low mat using  none  with Set-up or clean-up assistance  and minimal verbal cues.    Static Sit: GOOD: Takes MODERATE challenges from all directions  Static Stand: FAIR+: Takes MINIMAL challenges from all directions    12 stairs with bilateral handrails with Supervision or touching assistance   1 curb with rolling walker with Supervision or touching assistance    object from ground in standing position without reacher with rolling walker with Partial/moderate assistance     GGs   Admit Current   Status Goal   Functional Area: Care Score: Care Score: Care Score:   Roll Left and Right 5 5 Independent   Sit to Lying 5 4 Independent   Lying to Sitting on Side of Bed 5 4 Independent   Sit to Stand 4 4 Set-up/clean-up   Chair/Bed-to-Chair Transfer 4 4 Independent   Car Transfer 10 10     Walk 10 Feet 4 4 Independent   Walk 50 Feet with Two Turns 4 4 Set-up/clean-up   Walk 150 Feet 4 4 Set-up/clean-up   Walk 10 Feet Uneven Surface 4 4 Independent   1 Step (Curb) 4 4 Independent   4 Steps 4 4 Set-up/clean-up   12 Steps 4 4 Set-up/clean-up   Picking Up Object 4 4 Set-up/clean-up   Wheel 50 Feet with Two  Turns 9 9     Wheel 150 Feet 9 9           Activity Tolerance: Excellent    Patient left HOB elevated with all lines intact, call button in reach, nursing notified, and family present.    Education Provided: roles and goals of PT/PTA, transfer training, bed mob, gait training, stair training, balance training, safety awareness, body mechanics, assistive device, wheelchair management, strengthening exercises, and fall prevention    Expected compliance: High compliance    GOALS:   Multidisciplinary Problems       Physical Therapy Goals          Problem: Physical Therapy    Goal Priority Disciplines Outcome Interventions   Physical Therapy Goal     PT, PT/OT Progressing    Description: Physical Therapy Goal     PT, PT/OT Progressing    Description:   Short Term Goals: 03/20/2025  Long Term Goals: 3/28/25     Transfers Status     Sit to Stand  Short Term Goal: Complete sit to stand with Supervision or Set-up Assistance using Rolling Walker with no verbal cues  Long Term Goal:  Complete sit to stand with Independent using Rolling Walker with no  verbal cues     Stand Step  Short Term Goal: Complete stand step with  Set-Up Clean Up  using Rolling Walker with no verbal cues  Long Term Goal:  Complete stand step with  Modified Independent  using Rolling Walker with no  verbal cues     Supine <> Sit  Short Term Goal: Complete supine <> sit with Modified [place]  with  minimal verbal cues rails prn  Long Term Goal: Complete supine <> sit with Independent  With no  verbal cues        Balance/Coordination     Dynamic Sitting  Short Term Goal: Complete dynamic sitting with Set Up Assistance with minimal  verbal cues  minimal to moderate excursions  Long Term Goal: Complete dynamic sitting with [place]  with  no verbal cues moderate to maximal excursions     Dynamic Standing  Short Term Goal: Complete dynamic standing with Stand-by Assistance  with minimal  verbal cues minimal to moderate excursions with RW  Long Term  Goal: Complete dynamic standing with Modified [place] with no  verbal cues and moderate to maximal excursions with RW     Endurance     Short Term Goal:   Physical Therapy: 2 times a day, 30-45 minutes  Rest periods: multiple  Sitting: 3 hours/day     Long Term Goal:  Physical Therapy: 2 times a day, 45 minutes  Rest periods: minimal  Sittin-8 hours/day     Mobility/Gait  Short Term Goal: Will ambulate with Stand-by Assistance  Using Rolling Walker with no  verbal cues x 200 ft  Long Term Goal: Will ambulate with Modified Independent  using Rolling Walker with no verbal cues x 500 ft     Stairs Assistance  Short Term Goal: Patient will ascend/descend 4 stairs/steps using bilateral handrails reciprocal  steps with Stand-by Assistance and minimal verbal cues  Long Term Goal: Patient will ascend/descend 12 stairs/steps using no handrails  reciprocal steps with [place] and minimal verbal cues           Ramp/Uneven 10 feet surface  Long  Term Goal; Patient will be able to navigate a 10 inch uneven surface with proper A.D. with  Modified [place]        2-6  inch curb  Long  Term Goal; Patient will be able to navigate a  2 to 6 inch curb with proper A.D. with independence     Picking up object   Long  Term Goal; Patient will be able to  a small object from the floor  with proper A.D. with  independence     Car transfers  Long  Term Goal; Patient will be able to get in and out of a vehicle  with proper A.D. with  independence     Education  Long Term Goals:  Patient/family/caregivers trained on physical mobility and precautions with Supervision.     Patient/family/caregivers trained on safety awareness with Supervision.     Order and obtain all appropriate equipment.                           Plan:     During this hospitalization, patient to be seen 5 x/week to address the identified rehab impairments via gait training, therapeutic activities, therapeutic exercises, therapeutic groups,  neuromuscular re-education and progress toward the following goals:    Plan of Care Expires:  03/28/25  PT Next Visit Date: 03/14/25  Plan of Care reviewed with: patient, family      Additional Infomation:           Time Tracking:     Therapy Time   PT Received On: 03/13/25  PT Start Time: 1605  PT Stop Time: 1655  PT Total Time (min): 50 min  PT Individual: 50  Missed Time:    Time Missed due to:      Billable Minutes: Evaluation 50    03/13/2025

## 2025-03-14 PROBLEM — N13.9 URINARY OBSTRUCTION: Status: ACTIVE | Noted: 2025-03-14

## 2025-03-14 PROBLEM — J20.9 ACUTE BRONCHITIS: Status: ACTIVE | Noted: 2025-03-14

## 2025-03-14 LAB
ALBUMIN SERPL BCP-MCNC: 3 G/DL (ref 3.5–5.2)
ALP SERPL-CCNC: 46 U/L (ref 55–135)
ALT SERPL W/O P-5'-P-CCNC: 51 U/L (ref 10–44)
ANION GAP SERPL CALC-SCNC: 7 MMOL/L (ref 8–16)
AST SERPL-CCNC: 36 U/L (ref 10–40)
BASOPHILS # BLD AUTO: 0.01 K/UL (ref 0–0.2)
BASOPHILS NFR BLD: 0.1 % (ref 0–1.9)
BILIRUB SERPL-MCNC: 0.6 MG/DL (ref 0.1–1)
BUN SERPL-MCNC: 34 MG/DL (ref 10–30)
CALCIUM SERPL-MCNC: 8.1 MG/DL (ref 8.7–10.5)
CHLORIDE SERPL-SCNC: 106 MMOL/L (ref 95–110)
CO2 SERPL-SCNC: 23 MMOL/L (ref 23–29)
CREAT SERPL-MCNC: 1.2 MG/DL (ref 0.5–1.4)
DIFFERENTIAL METHOD BLD: ABNORMAL
EOSINOPHIL # BLD AUTO: 0 K/UL (ref 0–0.5)
EOSINOPHIL NFR BLD: 0.2 % (ref 0–8)
ERYTHROCYTE [DISTWIDTH] IN BLOOD BY AUTOMATED COUNT: 16.3 % (ref 11.5–14.5)
EST. GFR  (NO RACE VARIABLE): 55.7 ML/MIN/1.73 M^2
GLUCOSE SERPL-MCNC: 89 MG/DL (ref 70–110)
HCT VFR BLD AUTO: 35.9 % (ref 40–54)
HGB BLD-MCNC: 11.6 G/DL (ref 14–18)
IMM GRANULOCYTES # BLD AUTO: 0.07 K/UL (ref 0–0.04)
IMM GRANULOCYTES NFR BLD AUTO: 0.6 % (ref 0–0.5)
LYMPHOCYTES # BLD AUTO: 0.8 K/UL (ref 1–4.8)
LYMPHOCYTES NFR BLD: 7.4 % (ref 18–48)
MAGNESIUM SERPL-MCNC: 2.2 MG/DL (ref 1.6–2.6)
MCH RBC QN AUTO: 31.4 PG (ref 27–31)
MCHC RBC AUTO-ENTMCNC: 32.3 G/DL (ref 32–36)
MCV RBC AUTO: 97 FL (ref 82–98)
MONOCYTES # BLD AUTO: 0.6 K/UL (ref 0.3–1)
MONOCYTES NFR BLD: 5.6 % (ref 4–15)
NEUTROPHILS # BLD AUTO: 9.7 K/UL (ref 1.8–7.7)
NEUTROPHILS NFR BLD: 86.1 % (ref 38–73)
NRBC BLD-RTO: 0 /100 WBC
PLATELET # BLD AUTO: 229 K/UL (ref 150–450)
PMV BLD AUTO: 9.5 FL (ref 9.2–12.9)
POTASSIUM SERPL-SCNC: 4.3 MMOL/L (ref 3.5–5.1)
PROT SERPL-MCNC: 6.5 G/DL (ref 6–8.4)
RBC # BLD AUTO: 3.69 M/UL (ref 4.6–6.2)
SODIUM SERPL-SCNC: 136 MMOL/L (ref 136–145)
WBC # BLD AUTO: 11.29 K/UL (ref 3.9–12.7)

## 2025-03-14 PROCEDURE — 63600175 PHARM REV CODE 636 W HCPCS: Performed by: INTERNAL MEDICINE

## 2025-03-14 PROCEDURE — 25000003 PHARM REV CODE 250: Performed by: INTERNAL MEDICINE

## 2025-03-14 PROCEDURE — 25000003 PHARM REV CODE 250: Performed by: NURSE PRACTITIONER

## 2025-03-14 PROCEDURE — 11800000 HC REHAB PRIVATE ROOM

## 2025-03-14 PROCEDURE — 94799 UNLISTED PULMONARY SVC/PX: CPT

## 2025-03-14 PROCEDURE — 97530 THERAPEUTIC ACTIVITIES: CPT

## 2025-03-14 PROCEDURE — 85025 COMPLETE CBC W/AUTO DIFF WBC: CPT | Performed by: NURSE PRACTITIONER

## 2025-03-14 PROCEDURE — 36415 COLL VENOUS BLD VENIPUNCTURE: CPT | Performed by: INTERNAL MEDICINE

## 2025-03-14 PROCEDURE — 94761 N-INVAS EAR/PLS OXIMETRY MLT: CPT

## 2025-03-14 PROCEDURE — 94640 AIRWAY INHALATION TREATMENT: CPT

## 2025-03-14 PROCEDURE — 97535 SELF CARE MNGMENT TRAINING: CPT

## 2025-03-14 PROCEDURE — 99900035 HC TECH TIME PER 15 MIN (STAT)

## 2025-03-14 PROCEDURE — 99900031 HC PATIENT EDUCATION (STAT)

## 2025-03-14 PROCEDURE — 25000242 PHARM REV CODE 250 ALT 637 W/ HCPCS: Performed by: INTERNAL MEDICINE

## 2025-03-14 PROCEDURE — 97116 GAIT TRAINING THERAPY: CPT

## 2025-03-14 PROCEDURE — 83735 ASSAY OF MAGNESIUM: CPT | Performed by: INTERNAL MEDICINE

## 2025-03-14 PROCEDURE — 80053 COMPREHEN METABOLIC PANEL: CPT | Performed by: INTERNAL MEDICINE

## 2025-03-14 RX ORDER — CEFTRIAXONE 1 G/1
1 INJECTION, POWDER, FOR SOLUTION INTRAMUSCULAR; INTRAVENOUS
Status: DISCONTINUED | OUTPATIENT
Start: 2025-03-14 | End: 2025-03-17

## 2025-03-14 RX ORDER — LEVALBUTEROL 1.25 MG/.5ML
1.25 SOLUTION, CONCENTRATE RESPIRATORY (INHALATION) EVERY 8 HOURS
Status: DISCONTINUED | OUTPATIENT
Start: 2025-03-14 | End: 2025-03-14

## 2025-03-14 RX ORDER — CLARITHROMYCIN 500 MG/1
500 TABLET, FILM COATED ORAL EVERY 12 HOURS
Status: COMPLETED | OUTPATIENT
Start: 2025-03-14 | End: 2025-03-23

## 2025-03-14 RX ORDER — LEVALBUTEROL 1.25 MG/.5ML
1.25 SOLUTION, CONCENTRATE RESPIRATORY (INHALATION)
Status: DISCONTINUED | OUTPATIENT
Start: 2025-03-15 | End: 2025-03-25 | Stop reason: HOSPADM

## 2025-03-14 RX ORDER — FAMOTIDINE 20 MG/1
20 TABLET, FILM COATED ORAL DAILY
Status: DISCONTINUED | OUTPATIENT
Start: 2025-03-14 | End: 2025-03-25 | Stop reason: HOSPADM

## 2025-03-14 RX ORDER — MICONAZOLE NITRATE 2 G/100G
POWDER TOPICAL 2 TIMES DAILY
Status: DISCONTINUED | OUTPATIENT
Start: 2025-03-14 | End: 2025-03-25 | Stop reason: HOSPADM

## 2025-03-14 RX ADMIN — Medication 6 MG: at 08:03

## 2025-03-14 RX ADMIN — CEFTRIAXONE 1 G: 1 INJECTION, POWDER, FOR SOLUTION INTRAMUSCULAR; INTRAVENOUS at 09:03

## 2025-03-14 RX ADMIN — PREDNISONE 20 MG: 10 TABLET ORAL at 08:03

## 2025-03-14 RX ADMIN — SULFAMETHOXAZOLE AND TRIMETHOPRIM 1 TABLET: 800; 160 TABLET ORAL at 08:03

## 2025-03-14 RX ADMIN — MICONAZOLE NITRATE: 20 POWDER TOPICAL at 11:03

## 2025-03-14 RX ADMIN — MICONAZOLE NITRATE: 20 POWDER TOPICAL at 09:03

## 2025-03-14 RX ADMIN — CLARITHROMYCIN 500 MG: 500 TABLET, FILM COATED ORAL at 11:03

## 2025-03-14 RX ADMIN — ATORVASTATIN CALCIUM 20 MG: 20 TABLET, FILM COATED ORAL at 08:03

## 2025-03-14 RX ADMIN — FINASTERIDE 5 MG: 5 TABLET, FILM COATED ORAL at 08:03

## 2025-03-14 RX ADMIN — OXYBUTYNIN CHLORIDE 5 MG: 5 TABLET, EXTENDED RELEASE ORAL at 08:03

## 2025-03-14 RX ADMIN — LEVALBUTEROL 1.25 MG: 1.25 SOLUTION, CONCENTRATE RESPIRATORY (INHALATION) at 03:03

## 2025-03-14 RX ADMIN — CEFTRIAXONE 1 G: 1 INJECTION, POWDER, FOR SOLUTION INTRAMUSCULAR; INTRAVENOUS at 11:03

## 2025-03-14 RX ADMIN — FAMOTIDINE 20 MG: 20 TABLET, FILM COATED ORAL at 08:03

## 2025-03-14 RX ADMIN — GENTIAN VIOLET 2% 0.5 ML: 20 LIQUID TOPICAL at 08:03

## 2025-03-14 RX ADMIN — GUAIFENESIN 1200 MG: 600 TABLET, EXTENDED RELEASE ORAL at 08:03

## 2025-03-14 RX ADMIN — BENZONATATE 200 MG: 100 CAPSULE ORAL at 08:03

## 2025-03-14 RX ADMIN — POLYETHYLENE GLYCOL (3350) 17 G: 17 POWDER, FOR SOLUTION ORAL at 08:03

## 2025-03-14 RX ADMIN — CARBOXYMETHYLCELLULOSE SODIUM 1 DROP: 5 SOLUTION/ DROPS OPHTHALMIC at 08:03

## 2025-03-14 RX ADMIN — ACETAMINOPHEN 650 MG: 325 TABLET ORAL at 02:03

## 2025-03-14 RX ADMIN — MUPIROCIN: 20 OINTMENT TOPICAL at 08:03

## 2025-03-14 RX ADMIN — CLARITHROMYCIN 500 MG: 500 TABLET, FILM COATED ORAL at 10:03

## 2025-03-14 RX ADMIN — TAMSULOSIN HYDROCHLORIDE 0.4 MG: 0.4 CAPSULE ORAL at 08:03

## 2025-03-14 NOTE — ASSESSMENT & PLAN NOTE
Patient has had a significant decline in function.  Agree with inpatient rehabilitative course as he needs multiple modalities of therapy to aid in his recovery and close physician oversight of his complex medical problems.    ESTIMATED LENGTH OF STAY:  The patient's estimated length of stay is 10-14 days and he is expected to be able to be discharged to home with family .    REHABILITATION PLAN/GOALS  The patient is being admitted to a comprehensive acute inpatient rehabilitation program consisting of at least 3 hours of combined physical ( 1.5hrs./day, 5 days a week), and occupational therapy (1.5 hrs. A day, 5 days a week),speech therapy services if necessary, 24-hour skilled rehabilitation nursing care, and close supervision of a physician with special training and experience in rehabilitation medicine. Patient's prognosis for significant practical improvement within a reasonable period of time appears good . Given the patient's complex medical condition and risk of further medical complications, rehabilitation services could not be safely provided at a lower level of care such as a skilled nursing facility.                    1. Physical Therapy to Evaluate and Treat, Work on bed mobility, Assist with transfers, Strength, Gait, Fall Prevention, Balance, and Modalities as Needed   2. Occupational Therapy to ADL Retraining, Functional Transfer Training, Therapeutic Activity and Exercises, Neuromuscular Re-education, Manual Therapy, Use of Adaptive Equipment and Retraining, Safety Awareness and Fall Prevention, and Home Modification   3. Speech Pathology to Evaluate and assist with dysphagia, Make recommendations for a safe diet, Evaluate and Treat Cognitive linguistic deficits, Evaluate and Treat expressive and receptive language deficits, and Evaluate and treat motor speech deficits   4. Specialized 24-hour Rehabilitation Nursing Care to Protection of Skin and Soft Tissue, Assisting with continence, Bowel and  bladder training, Neurological checks, Medicine administration and medicine and management, Wound care, and Fall protection and general encouragement   5. Dietary to optimize diet and nutrition.   6. Social Work/Case management to assist with discharge planning activities, acquisition of durable medical equipment, and ordering of follow up services as necessary.    The services provided in the acute medical rehab setting are under my supervision 24 hours a day. These services are necessary for this patient to achieve the most appropriate functional outcome and to determine the most appropriate discharge disposition.  I have reviewed the treatment plan with the patient and answered all of her questions, discussed goals and expectation.    REVIEW OF PRE-SCREEN ASSESSMENT  I have reviewed the patient's preadmission screen including her medical and functional status and compared it with her status upon arrival to the rehab unit and see no changes . Acute rehab services are still necessary and appropriate for this patient to achieve the best functional outcome while determining the most appropriate discharge disposition and services. The patient will require close medical management of multiple medical co-morbidities including as noted .

## 2025-03-14 NOTE — PLAN OF CARE
BLE venous ulcer wound care performed, dressing changed. Sacral ulcer cleansed and mepilex applied. [place] care performed. Green drainage, smegma, and  irritation noted after pulling foreskin back to cleanse. Small irritated area on meatus. See media in chart of all problem areas. IV removed and site rotated. 20g Left wrist SL.     Problem: Rehabilitation (IR[facility]) Plan of Care  Goal: Plan of Care Review  Outcome: Progressing  Goal: Patient-Specific Goal (Individualized)  Outcome: Progressing  Goal: Absence of New-Onset Illness or Injury  Outcome: Progressing  Goal: Optimal Comfort and Wellbeing  Outcome: Progressing  Goal: Home and Community Transition Plan Established  Outcome: Progressing     Problem: Infection  Goal: Absence of Infection Signs and Symptoms  Outcome: Progressing     Problem: Wound  Goal: Optimal Coping  Outcome: Progressing  Goal: Optimal Functional Ability  Outcome: Progressing  Goal: Absence of Infection Signs and Symptoms  Outcome: Progressing  Goal: Improved Oral Intake  Outcome: Progressing  Goal: Optimal Pain Control and Function  Outcome: Progressing  Goal: Skin Health and Integrity  Outcome: Progressing  Goal: Optimal Wound Healing  Outcome: Progressing     Problem: Fall Injury Risk  Goal: Absence of Fall and Fall-Related Injury  Outcome: Progressing     Problem: Mobility Impairment  Goal: Optimal Mobility  Outcome: Progressing     Problem: Pain Acute  Goal: Optimal Pain Control and Function  Outcome: Progressing     Problem: Skin Injury Risk Increased  Goal: Skin Health and Integrity  Outcome: Progressing

## 2025-03-14 NOTE — PLAN OF CARE
Problem: Physical Therapy  Goal: Physical Therapy Goal  Description: Physical Therapy Goal     PT, PT/OT Progressing    Description:   Short Term Goals: 2025  Long Term Goals: 3/28/25     Transfers Status     Sit to Stand  Short Term Goal: Complete sit to stand with Supervision or Set-up Assistance using Rolling Walker with no verbal cues  Long Term Goal:  Complete sit to stand with Independent using Rolling Walker with no  verbal cues     Stand Step  Short Term Goal: Complete stand step with  Set-Up Clean Up  using Rolling Walker with no verbal cues  Long Term Goal:  Complete stand step with  Modified Independent  using Rolling Walker with no  verbal cues     Supine <> Sit  Short Term Goal: Complete supine <> sit with Modified [place]  with  minimal verbal cues rails prn  Long Term Goal: Complete supine <> sit with Independent  With no  verbal cues        Balance/Coordination     Dynamic Sitting  Short Term Goal: Complete dynamic sitting with Set Up Assistance with minimal  verbal cues  minimal to moderate excursions  Long Term Goal: Complete dynamic sitting with [place]  with  no verbal cues moderate to maximal excursions     Dynamic Standing  Short Term Goal: Complete dynamic standing with Stand-by Assistance  with minimal  verbal cues minimal to moderate excursions with RW  Long Term Goal: Complete dynamic standing with Modified [place] with no  verbal cues and moderate to maximal excursions with RW     Endurance     Short Term Goal:   Physical Therapy: 2 times a day, 30-45 minutes  Rest periods: multiple  Sitting: 3 hours/day     Long Term Goal:  Physical Therapy: 2 times a day, 45 minutes  Rest periods: minimal  Sittin-8 hours/day     Mobility/Gait  Short Term Goal: Will ambulate with Stand-by Assistance  Using Rolling Walker with no  verbal cues x 200 ft  Long Term Goal: Will ambulate with Modified Independent  using Rolling Walker with no verbal cues x 500 ft     Stairs  Assistance  Short Term Goal: Patient will ascend/descend 4 stairs/steps using bilateral handrails reciprocal  steps with Stand-by Assistance and minimal verbal cues  Long Term Goal: Patient will ascend/descend 12 stairs/steps using no handrails  reciprocal steps with [place] and minimal verbal cues           Ramp/Uneven 10 feet surface  Long  Term Goal; Patient will be able to navigate a 10 inch uneven surface with proper A.D. with  Modified [place]        2-6  inch curb  Long  Term Goal; Patient will be able to navigate a  2 to 6 inch curb with proper A.D. with independence     Picking up object   Long  Term Goal; Patient will be able to  a small object from the floor  with proper A.D. with  independence     Car transfers  Long  Term Goal; Patient will be able to get in and out of a vehicle  with proper A.D. with  independence     Education  Long Term Goals:  Patient/family/caregivers trained on physical mobility and precautions with Supervision.     Patient/family/caregivers trained on safety awareness with Supervision.     Order and obtain all appropriate equipment.      3/14/2025 1524 by Sebastian Curry, PT  Outcome: Progressing  3/14/2025 1108 by Sebastian Curry, PT  Outcome: Progressing

## 2025-03-14 NOTE — PLAN OF CARE
Problem: Occupational Therapy  Goal: Occupational Therapy Goal  Description: Long Term Goals to be met by: 03/27/25     Patient will increase functional independence with ADLs by performing:    Feeding with [place].  UE Dressing with Modified [place].  LE Dressing with Modified [place].  Grooming while standing at sink with Modified [place].  Toileting from toilet with Modified [place] for hygiene and clothing management.   Bathing from  shower chair/bench with Modified [place].  Toilet transfer to toilet with Modified [place].  Increased functional strength to 4+ to 5/5 for bilateral UE's.    Outcome: Progressing

## 2025-03-14 NOTE — ASSESSMENT & PLAN NOTE
"Patient has Combined Systolic and Diastolic heart failure that is Chronic. On presentation their CHF was well compensated. Most recent BNP and echo results are listed below.  No results for input(s): "BNP" in the last 72 hours.  Latest ECHO  No results found for this or any previous visit.    Current Heart Failure Medications       Plan  - Monitor strict I&Os and daily weights.    - Place on telemetry  - Low sodium diet      "

## 2025-03-14 NOTE — CONSULTS
[facility])  Adult Nutrition  Consult Note    SUMMARY     Recommendations  1. Rec'd Cardiac diet.     2. Rec'd ONS: Chocolate Ensure Enlive TID  to provide additional nutrition. 3. Rec'd Jordan BID to promote wound healing and to provide additional nutrition.    4. Encourage PO inatake and compliance with drinking ONS.     5. RD to follow and make rec's accordingly.  Goals:   1. Pt will consume > 75% of meals by next RD follow up.  Nutrition Goal Status: new  Communication of RD Recs: reviewed with RN    Nutrition Discharge Planning     Nutrition Discharge Planning: Too early to determine, pending clinical course    Nutrition Discharge Planning      Nutrition Discharge Planning: Too early to determine, pending clinical course     Assessment and Plan     Nutrition Problem  Inadequate protein intake     Related to (etiology):   Increased nutrient needs (protein) secondary to delayed wound healing     Signs and Symptoms (as evidenced by):   Protein intake < estimated protein requirements, Poor PO intake x 2 weeks      Interventions/Recommendations (treatment strategy):  1. Rec'd Cardiac diet.   2. Rec'd ONS: Chocolate Ensure Enlive TID  to provide additional nutrition. 3. Rec'd Jordan BID to promote wound healing and to provide additional nutrition.  4. Encourage PO inatake and compliance with drinking ONS.   5. RD to follow and make rec's accordingly.     Nutrition Diagnosis Status:   New     Malnutrition Assessment  NFPE not warranted at this time after further review. RD to continue to monitor for signs and symptoms of malnutrition.     Nutrition Related Social Determinants of Health:  None identified at this time.    Reason for Assessment    Reason For Assessment: consult (New IPR admit)  Diagnosis: other (see comments) (No active principal problem)  General Information Comments: RD consulted for new IPR admit. Spoke with pt about current appetite and PO intake. Pt reports decreased appetite but  "states he is willing to drink ONS. Will work with pt on meal selections and food preferences/dilikes. Continue to encouragae good PO intake. RD to follow and make rec's accordingly.    Nutrition/Diet History    Nutrition Intake History: General Healthy Diet  Food Preferences: Chocolate Ensure, Hamburgers, Gumbo, Meatloaf  Spiritual, Cultural Beliefs, Congregation Practices, Values that Affect Care: no  Food Allergies: NKFA  Factors Affecting Nutritional Intake: decreased appetite    Anthropometrics    Height: 5' 9" (175.3 cm)  Height (inches): 69 in  Height Method: Stated  Weight: 82.1 kg (181 lb)  Weight (lb): 181 lb  Weight Method: Standard Scale  Ideal Body Weight (IBW), Male: 160 lb  % Ideal Body Weight, Male (lb): 113.13 %  BMI (Calculated): 26.7  BMI Grade: 25 - 29.9 - overweight    Lab/Procedures/Meds    Pertinent Labs Reviewed: reviewed  Pertinent Labs Comments: GFR = 55.7, BUN = 34  Pertinent Medications Reviewed: reviewed    Estimated/Assessed Needs    Weight Used For Calorie Calculations: 82.1 kg (181 lb)  Energy Calorie Requirements (kcal): 2052  Energy Need Method: Kcal/kg  Protein Requirements:  (1.2-1.5g/kg)  Weight Used For Protein Calculations: 82.1 kg (181 lb)  Fluid Requirements (mL): 2052 (1mL/kcal)  Estimated Fluid Requirement Method: RDA Method  RDA Method (mL): 2052    Nutrition Prescription Ordered    Current Diet Order: Heart Healthy  Oral Nutrition Supplement: Ensure Enlive Chocolate TID and Jordan BID    Evaluation of Received Nutrient/Fluid Intake    % Kcal Needs: 25-50%  % Protein Needs: 25-50%  I/O: -200  Energy Calories Required: not meeting needs  Protein Required: not meeting needs  Tolerance: tolerating  % Intake of Estimated Energy Needs: 25 - 50 %  % Meal Intake: 25 - 50 %    Nutrition Risk    Level of Risk/Frequency of Follow-up: low     Monitor and Evaluation    Monitor and Evaluation: Energy intake, Food and beverage intake, Protein intake, Diet order, Food and nutrition " knowledge, Weight, Beliefs and attitudes, Electrolyte and renal panel, Gastrointestinal profile, Glucose/endocrine profile, Inflammatory profile, Lipid profile     Nutrition Follow-Up    RD Follow-up?: Yes

## 2025-03-14 NOTE — HPI
Tobi Liz Jr. is a 89 y.o. male with a PMHx of has a past medical history of Aortic valve stenosis, Arthritis, BPH (benign prostatic hyperplasia), Carotid artery stenosis, Chronic diastolic heart failure, ETOH abuse, Exposure to COVID-19 virus (01/07/2022), Hyperchloremia, Kidney stones, Murmur, Polymyalgia rheumatica, PVD (peripheral vascular disease), and Renal artery stenosis., presents complaining of shaking after standing from a seated position that started yesterday, he was aware and talking during the episode so not likely a seizure. Labs show he was severely anemic in the ED with Hgb of 5. He had hematuria as well on UA along with bacteria. Ucx pending. His family states that he has a history of anemia in the past. He otherwise feels fine. He received 3 units of pRBCs in the ED with appropriate response.     Patient overall significantly improved from admission. H&H were stable yesterday. Awaiting updated CBC this morning. Otherwise renal function stable and no new electrolyte abnormalities noted. Patient accepted to inpatient rehab and we will discharging.    Patient is seen and examined after admission and patient is complaining of respiratory congestion wheezing and dyspnea.  Patient's currently on Bactrim and sensitivities noted for his urinary tract infection as well as the other antibiotics that maybe appropriate we will switch to Rocephin as we will get better respiratory coverage as the patient does seem to be battling a bronchitis and possibly a pneumonia.  Repeat chest x-ray pending.  Patient is motivated to improve he is very eager to return home patient's significantly decline in his overall functional state and unable to complete ADLs safely at this time.  He has developed a myopathy as well as some confusion and encephalopathy agree with inpatient rehab patient meets current Medicare criteria.       Patient requires acute inpatient rehab admission with 24-hour nursing and active  physician oversight to monitor and manage acute medical comorbid conditions, labs, pain, and functional deficits. Patient/family will also require teaching and integration of improving functional skills into daily living. Patient will also require an individualized, interdisciplinary approach to their care, receiving PT, OT services 3 hours per day, 5 days per week. Required care cannot be provided at a lower level of care. Patient is anticipated to require approximately 10-14 days LOS with expected discharge home  with  services.    Impairment group (IGC):   Neuromuscular Disorders 03.8 Etiologic diagnosis/description:   G72.9: Myopathy  D64.9: Anemia      Date of onset:  3/8/2025 Date of surgery: N/A   Allergies: Patient has no known allergies.   Comorbid condition:   HTN, HLD, BPH, CAD, Valvular heart disease, CHF class II, Occlusion of right iliac artery   Medical/functional conditions requiring inpatient rehabilitation:      This patient requires medical management/24-hour nursing of complex co morbidities HTN, HLD, BPH, CAD, Valvular heart disease, CHF class II, Occlusion of right iliac artery, labs, medications (see medications list), pain, sleep hygiene, anticoagulation, nutrition, hydration, neurological, pulmonary, cardiac status, and preventive healthcare.     This patient requires intense therapy and an integrated, interdisciplinary approach to address safety, impaired mobility, impaired ADLs (dressing, toileting, grooming, showering), judgment, and memory, communication, pulmonary insufficiency, bowel/bladder problems,preventive healthcare, medication management, integration of functional skills into daily living, and home caregiver support and training.    Risk for medical/clinical complications:      This patient is at risk for the following complications: DVT/PE, pneumonia, malnutrition, neurological decline, respiratory insufficiency, worsening activity intolerance, complications from  anticoagulation, skin breakdown, inadequate sleep, and constipation.

## 2025-03-14 NOTE — PLAN OF CARE
Problem: Physical Therapy  Goal: Physical Therapy Goal  Description: Physical Therapy Goal     PT, PT/OT Progressing    Description:   Short Term Goals: 2025  Long Term Goals: 3/28/25     Transfers Status     Sit to Stand  Short Term Goal: Complete sit to stand with Supervision or Set-up Assistance using [place] with no verbal cues  Long Term Goal:  Complete sit to stand with Independent using Rolling Walker with no  verbal cues     Stand Step  Short Term Goal: Complete stand step with  Set-Up Clean Up  using Rolling Walker with no verbal cues  Long Term Goal:  Complete stand step with  Modified Independent  using Rolling Walker with no  verbal cues     Supine <> Sit  Short Term Goal: Complete supine <> sit with Modified [place]  with  minimal verbal cues rails prn  Long Term Goal: Complete supine <> sit with Independent  With no  verbal cues        Balance/Coordination     Dynamic Sitting  Short Term Goal: Complete dynamic sitting with Set Up Assistance with minimal  verbal cues  minimal to moderate excursions  Long Term Goal: Complete dynamic sitting with [place]  with  no verbal cues moderate to maximal excursions     Dynamic Standing  Short Term Goal: Complete dynamic standing with Stand-by Assistance  with minimal  verbal cues minimal to moderate excursions with RW  Long Term Goal: Complete dynamic standing with Modified [place] with no  verbal cues and moderate to maximal excursions with RW     Endurance     Short Term Goal:   Physical Therapy: 2 times a day, 30-45 minutes  Rest periods: multiple  Sitting: 3 hours/day     Long Term Goal:  Physical Therapy: 2 times a day, 45 minutes  Rest periods: minimal  Sittin-8 hours/day     Mobility/Gait  Short Term Goal: Will ambulate with Stand-by Assistance  Using Rolling Walker with no  verbal cues x 200 ft  Long Term Goal: Will ambulate with Modified Independent  using Rolling Walker with no verbal cues x 500 ft     Stairs  Assistance  Short Term Goal: Patient will ascend/descend 4 stairs/steps using bilateral handrails reciprocal  steps with Stand-by Assistance and minimal verbal cues  Long Term Goal: Patient will ascend/descend 12 stairs/steps using no handrails  reciprocal steps with [place] and minimal verbal cues           Ramp/Uneven 10 feet surface  Long  Term Goal; Patient will be able to navigate a 10 inch uneven surface with proper A.D. with  Modified [place]        2-6  inch curb  Long  Term Goal; Patient will be able to navigate a  2 to 6 inch curb with proper A.D. with independence     Picking up object   Long  Term Goal; Patient will be able to  a small object from the floor  with proper A.D. with  independence     Car transfers  Long  Term Goal; Patient will be able to get in and out of a vehicle  with proper A.D. with  independence     Education  Long Term Goals:  Patient/family/caregivers trained on physical mobility and precautions with Supervision.     Patient/family/caregivers trained on safety awareness with Supervision.     Order and obtain all appropriate equipment.      Outcome: Progressing

## 2025-03-14 NOTE — ASSESSMENT & PLAN NOTE
Anemia is likely due to chronic disease due to Chronic Kidney Disease. Most recent hemoglobin and hematocrit are listed below.  Recent Labs     03/12/25  0606 03/13/25  0531 03/14/25  0646   HGB 10.2* 10.6* 11.6*   HCT 30.8* 31.6* 35.9*     Plan  - Monitor serial CBC:  q72hrs  - Transfuse PRBC if patient becomes hemodynamically unstable, symptomatic or H/H drops below 7/21.  - Patient has received 3 units of PRBCs  - Patient's anemia is currently stable

## 2025-03-14 NOTE — SUBJECTIVE & OBJECTIVE
Past Medical History:   Diagnosis Date    Aortic valve stenosis     Arthritis     BPH (benign prostatic hyperplasia)     Carotid artery stenosis     Chronic diastolic heart failure     ETOH abuse     Exposure to COVID-19 virus 01/07/2022    Hyperchloremia     Kidney stones     Murmur     Polymyalgia rheumatica     PVD (peripheral vascular disease)     Renal artery stenosis        Past Surgical History:   Procedure Laterality Date    A-V CARDIAC PACEMAKER INSERTION N/A 10/6/2023    Procedure: INSERTION, CARDIAC PACEMAKER, DUAL CHAMBER;  Surgeon: Douglas Salguero MD;  Location: [facility] CATH;  Service: Cardiology;  Laterality: N/A;    ANGIOGRAM, CAROTID Left 8/25/2023    Procedure: ANGIOGRAM, CAROTID;  Surgeon: Nick Box MD;  Location: [facility] CATH;  Service: Cardiology;  Laterality: Left;    CATARACT EXTRACTION, BILATERAL      ECHOCARDIOGRAM,TRANSESOPHAGEAL N/A 10/3/2023    Procedure: Transesophageal echo (JOSE ARMANDO) intra-procedure log documentation;  Surgeon: Nick Box MD;  Location: [facility] OR;  Service: Cardiology;  Laterality: N/A;    HAND SURGERY      INSERTION OF STENT INTO PERIPHERAL VESSEL N/A 1/30/2025    Procedure: INSERTION, STENT, VESSEL, PERIPHERAL;  Surgeon: Harsha Salcedo MD;  Location: [facility] CATH;  Service: Cardiology;  Laterality: N/A;    PERCUTANEOUS TRANSCATHETER AORTIC VALVE REPLACEMENT (TAVR) Left 10/3/2023    Procedure: REPLACEMENT, AORTIC VALVE, PERCUTANEOUS, TRANSCATHETER;  Surgeon: Nick Box MD;  Location: [facility] OR;  Service: Cardiology;  Laterality: Left;    PERCUTANEOUS TRANSCATHETER AORTIC VALVE REPLACEMENT (TAVR) Left 10/3/2023    Procedure: REPLACEMENT, AORTIC VALVE, PERCUTANEOUS, TRANSCATHETER carotid access;  Surgeon: Jun Brito MD;  Location: [facility] OR;  Service: Cardiovascular;  Laterality: Left;    PERCUTANEOUS TRANSLUMINAL ANGIOPLASTY N/A 8/25/2023    Procedure: ANGIOPLASTY-PERCUTANEOUS TRANSLUMINAL (PTA);  Surgeon: Nick Box MD;  Location: [facility] CATH;  Service:  Cardiology;  Laterality: N/A;    SHOULDER SURGERY Left     total shoulder replacement    WOUND EXPLORATION N/A 10/3/2023    Procedure: EXPLORATION, WOUND;  Surgeon: Jun Brito MD;  Location: [facility];  Service: Cardiology;  Laterality: N/A;       Review of patient's allergies indicates:  No Known Allergies    Current Facility-Administered Medications on File Prior to Encounter   Medication    [DISCONTINUED] 0.9%  NaCl infusion (for blood administration)    [DISCONTINUED] acetaminophen tablet 650 mg    [DISCONTINUED] atorvastatin tablet 20 mg    [DISCONTINUED] benzonatate capsule 200 mg    [DISCONTINUED] famotidine tablet 20 mg    [DISCONTINUED] fentaNYL injection 25 mcg    [DISCONTINUED] finasteride tablet 5 mg    [DISCONTINUED] gentian violet 2 % topical solution 0.5 mL    [DISCONTINUED] guaiFENesin 12 hr tablet 1,200 mg    [DISCONTINUED] melatonin tablet 6 mg    [DISCONTINUED] methylPREDNISolone sodium succinate injection 80 mg    [DISCONTINUED] mupirocin 2 % ointment    [DISCONTINUED] ondansetron injection 4 mg    [DISCONTINUED] oxybutynin 24 hr tablet 5 mg    [DISCONTINUED] sodium chloride 0.9% flush 10 mL    [DISCONTINUED] sulfamethoxazole-trimethoprim 800-160mg per tablet 1 tablet    [DISCONTINUED] tamsulosin 24 hr capsule 0.4 mg     Current Outpatient Medications on File Prior to Encounter   Medication Sig    finasteride (PROSCAR) 5 mg tablet Take 5 mg by mouth once daily.    oxybutynin (DITROPAN-XL) 5 MG TR24 oxyBUTYnin chloride ER 5 mg tablet,extended release 24 hr, [RxNorm: 616510]    tamsulosin (FLOMAX) 0.4 mg Cap Take 1 capsule by mouth once daily. Prescribed by Dr. Navarrete    amLODIPine (NORVASC) 10 MG tablet Take 5 mg by mouth once daily. Prescribed by Dr. Box    aspirin (ECOTRIN) 81 MG EC tablet Take 81 mg by mouth once daily.    clopidogreL (PLAVIX) 75 mg tablet Take 1 tablet (75 mg total) by mouth once daily.    rosuvastatin (CRESTOR) 5 MG tablet Take 1 tablet (5 mg total) by mouth  once daily.     Family History       Problem Relation (Age of Onset)    Cancer Mother    Stroke Father          Tobacco Use    Smoking status: Former     Types: Cigars     Passive exposure: Past    Smokeless tobacco: Never   Substance and Sexual Activity    Alcohol use: Yes     Alcohol/week: 3.0 standard drinks of alcohol     Types: 3 Shots of liquor per week     Comment: 3 martinis daily    Drug use: Never    Sexual activity: Not Currently     Review of Systems   Constitutional:  Positive for activity change, appetite change and fatigue. Negative for chills and fever.   HENT:  Positive for postnasal drip, sinus pressure and sinus pain. Negative for ear pain, mouth sores, nosebleeds and sore throat.    Eyes:  Negative for visual disturbance.   Respiratory:  Positive for cough, shortness of breath and wheezing. Negative for apnea.    Cardiovascular:  Negative for chest pain, palpitations and leg swelling.   Gastrointestinal:  Positive for constipation. Negative for abdominal distention, abdominal pain, blood in stool, diarrhea, nausea and vomiting.   Endocrine: Positive for cold intolerance. Negative for polyphagia.   Musculoskeletal:  Positive for arthralgias and back pain.   Skin:  Negative for rash.   Neurological:  Positive for tremors and weakness. Negative for seizures, syncope, facial asymmetry and speech difficulty.   Hematological:  Negative for adenopathy. Bruises/bleeds easily.   Psychiatric/Behavioral:  Positive for confusion. Negative for agitation and hallucinations. The patient is nervous/anxious.      Objective:     Vital Signs (Most Recent):  Temp: 98 °F (36.7 °C) (03/14/25 0705)  Pulse: 61 (03/14/25 0705)  Resp: 18 (03/14/25 0705)  BP: (!) 131/59 (03/14/25 0705)  SpO2: 98 % (03/14/25 0705) Vital Signs (24h Range):  Temp:  [97.8 °F (36.6 °C)-98.2 °F (36.8 °C)] 98 °F (36.7 °C)  Pulse:  [61-76] 61  Resp:  [16-20] 18  SpO2:  [98 %-100 %] 98 %  BP: (131-142)/(59-62) 131/59     Weight: 82.1 kg (181  lb)  Body mass index is 26.73 kg/m².     Physical Exam  Vitals and nursing note reviewed.   Constitutional:       General: He is awake. He is not in acute distress.     Appearance: He is ill-appearing. He is not toxic-appearing.   HENT:      Head: Normocephalic and atraumatic.      Nose: Congestion and rhinorrhea present.      Mouth/Throat:      Mouth: Mucous membranes are moist.      Pharynx: Oropharynx is clear. No oropharyngeal exudate or posterior oropharyngeal erythema.   Eyes:      General: No scleral icterus.        Right eye: No discharge.         Left eye: No discharge.      Extraocular Movements: Extraocular movements intact.      Pupils: Pupils are equal, round, and reactive to light.   Neck:      Thyroid: No thyroid mass or thyromegaly.      Vascular: No carotid bruit.      Meningeal: Brudzinski's sign and Kernig's sign absent.   Cardiovascular:      Rate and Rhythm: Normal rate and regular rhythm.      Chest Wall: PMI is not displaced. No thrill.      Heart sounds: Murmur heard.      No friction rub. No gallop.   Pulmonary:      Effort: No tachypnea, accessory muscle usage, prolonged expiration or respiratory distress.      Breath sounds: No stridor or decreased air movement. Wheezing, rhonchi and rales present.   Chest:      Chest wall: No tenderness.   Abdominal:      General: Bowel sounds are normal. There is no distension.      Palpations: Abdomen is soft. There is no hepatomegaly, splenomegaly or mass.      Tenderness: There is no abdominal tenderness. There is no right CVA tenderness, left CVA tenderness, guarding or rebound.      Hernia: No hernia is present.   Musculoskeletal:      Cervical back: Neck supple. No rigidity. No muscular tenderness.      Right lower leg: No edema.      Left lower leg: No edema.   Lymphadenopathy:      Cervical: No cervical adenopathy.   Skin:     General: Skin is warm.      Capillary Refill: Capillary refill takes less than 2 seconds.      Coloration: Skin is not  cyanotic, jaundiced or pale.      Findings: No erythema or petechiae.   Neurological:      Mental Status: He is alert and oriented to person, place, and time.      Cranial Nerves: No cranial nerve deficit, dysarthria or facial asymmetry.      Motor: Weakness present. No tremor.      Gait: Gait abnormal.   Psychiatric:         Attention and Perception: He does not perceive auditory hallucinations.         Mood and Affect: Mood is anxious. Mood is not depressed. Affect is not flat.         Speech: Speech is not rapid and pressured or slurred.         Behavior: Behavior normal. Behavior is not agitated, aggressive or combative.         Thought Content: Thought content normal. Thought content is not paranoid or delusional.         Cognition and Memory: Cognition is impaired.         Judgment: Judgment normal.              CRANIAL NERVES     CN III, IV, VI   Pupils are equal, round, and reactive to light.       Significant Labs:   Recent Lab Results         03/14/25  0646        Albumin 3.0       ALP 46       ALT 51       Anion Gap 7       AST 36       Baso # 0.01       Basophil % 0.1       BILIRUBIN TOTAL 0.6  Comment: For infants and newborns, interpretation of results should be based  on gestational age, weight and in agreement with clinical  observations.    Premature Infant recommended reference ranges:  Up to 24 hours.............<8.0 mg/dL  Up to 48 hours............<12.0 mg/dL  3-5 days..................<15.0 mg/dL  6-29 days.................<15.0 mg/dL         BUN 34       Calcium 8.1       Chloride 106       CO2 23       Creatinine 1.2       Differential Method Automated       eGFR 55.7       Eos # 0.0       Eos % 0.2       Glucose 89       Gran # (ANC) 9.7       Gran % 86.1       Hematocrit 35.9       Hemoglobin 11.6       Immature Grans (Abs) 0.07  Comment: Mild elevation in immature granulocytes is non specific and   can be seen in a variety of conditions including stress response,   acute inflammation,  trauma and pregnancy. Correlation with other   laboratory and clinical findings is essential.         Immature Granulocytes 0.6       Lymph # 0.8       Lymph % 7.4       Magnesium  2.2       MCH 31.4       MCHC 32.3       MCV 97       Mono # 0.6       Mono % 5.6       MPV 9.5       nRBC 0       Platelet Count 229       Potassium 4.3       PROTEIN TOTAL 6.5       RBC 3.69       RDW 16.3       Sodium 136       WBC 11.29               Significant Imaging: I have reviewed all pertinent imaging results/findings within the past 24 hours.

## 2025-03-14 NOTE — PT/OT/SLP PROGRESS
Occupational Therapy Inpatient Rehab Treatment    Name: Tobi Liz Jr.  MRN: [id number]    Assessment:  Tobi Liz Jr. is a 89 y.o. male admitted with a medical diagnosis of <principal problem not specified>.  He presents with the following impairments/functional limitations:  weakness, impaired endurance, impaired self care skills, impaired functional mobility, gait instability, impaired balance, impaired cognition, decreased coordination, decreased upper extremity function, decreased lower extremity function, decreased safety awareness, pain, decreased ROM, impaired fine motor, impaired skin, edema, impaired cardiopulmonary response to activity, impaired joint extensibility, impaired muscle length.    Pt demonstrated improved functional performance with bathing as noted by min assist in which patient able to wash / dry 8 of 10 parts with steadying assist for safety and mod verbal and tactile cueing for safety awareness, technique, and some sequencing.    General Precautions: Standard, fall     Orthopedic Precautions:N/A     Braces: N/A    Rehab Prognosis: Good; patient would benefit from acute skilled OT services to address these deficits and reach maximum level of function.      History:     Past Medical History:   Diagnosis Date    Aortic valve stenosis     Arthritis     BPH (benign prostatic hyperplasia)     Carotid artery stenosis     Chronic diastolic heart failure     ETOH abuse     Exposure to COVID-19 virus 01/07/2022    Hyperchloremia     Kidney stones     Murmur     Polymyalgia rheumatica     PVD (peripheral vascular disease)     Renal artery stenosis        Past Surgical History:   Procedure Laterality Date    A-V CARDIAC PACEMAKER INSERTION N/A 10/6/2023    Procedure: INSERTION, CARDIAC PACEMAKER, DUAL CHAMBER;  Surgeon: Douglas Salguero MD;  Location: [facility] CATH;  Service: Cardiology;  Laterality: N/A;    ANGIOGRAM, CAROTID Left 8/25/2023    Procedure: ANGIOGRAM, CAROTID;  Surgeon: Charu  Nick WHITE MD;  Location: [facility] CATH;  Service: Cardiology;  Laterality: Left;    CATARACT EXTRACTION, BILATERAL      ECHOCARDIOGRAM,TRANSESOPHAGEAL N/A 10/3/2023    Procedure: Transesophageal echo (JOSE ARMANDO) intra-procedure log documentation;  Surgeon: Nick Box MD;  Location: [facility] OR;  Service: Cardiology;  Laterality: N/A;    HAND SURGERY      INSERTION OF STENT INTO PERIPHERAL VESSEL N/A 1/30/2025    Procedure: INSERTION, STENT, VESSEL, PERIPHERAL;  Surgeon: Harsha Salcedo MD;  Location: [facility] CATH;  Service: Cardiology;  Laterality: N/A;    PERCUTANEOUS TRANSCATHETER AORTIC VALVE REPLACEMENT (TAVR) Left 10/3/2023    Procedure: REPLACEMENT, AORTIC VALVE, PERCUTANEOUS, TRANSCATHETER;  Surgeon: Nick Box MD;  Location: [facility] OR;  Service: Cardiology;  Laterality: Left;    PERCUTANEOUS TRANSCATHETER AORTIC VALVE REPLACEMENT (TAVR) Left 10/3/2023    Procedure: REPLACEMENT, AORTIC VALVE, PERCUTANEOUS, TRANSCATHETER carotid access;  Surgeon: Jun Brito MD;  Location: [facility] OR;  Service: Cardiovascular;  Laterality: Left;    PERCUTANEOUS TRANSLUMINAL ANGIOPLASTY N/A 8/25/2023    Procedure: ANGIOPLASTY-PERCUTANEOUS TRANSLUMINAL (PTA);  Surgeon: Nick Box MD;  Location: [facility] CATH;  Service: Cardiology;  Laterality: N/A;    SHOULDER SURGERY Left     total shoulder replacement    WOUND EXPLORATION N/A 10/3/2023    Procedure: EXPLORATION, WOUND;  Surgeon: Jun Brito MD;  Location: [facility] OR;  Service: Cardiology;  Laterality: N/A;       Subjective     Orientation: Oriented x4    Chief Complaint: weakness and decreased independence     Patient/Family Comments/goals: Pt would like to regain independence with ADL's including functional mobility in order to return home safely with spouse.     Respiratory Status: Room air    Patients cultural, spiritual, Ep[employer]opalian conflicts given the current situation: no       Objective:     Patient found up in chair with willams catheter, peripheral IV  upon OT entry  to room.    Mobility   Patient completed:  Sit to Stand Transfer with contact guard assistance with rolling walker.  Bed to Chair Transfer using Step Transfer technique with contact guard assistance with rolling walker.  Shower Transfer Step Transfer technique with contact guard assistance with grab bars and shower chair.    Functional Mobility  Pt ambulated greater than 200' between surfaces requiring CGA utilizing RW.     ADLs   Current Status   Eating 5   Oral Hygiene 5   Shower, Bathe Self 3   Upper Body Dressing 3   Lower Body Dressing 2   Toileting Hygiene     Toilet Transfer     Putting On, Taking Off Footwear 2     Limiting Factors for ADLs: endurance, limited ROM, balance, weakness, coordination, cognition, safety awareness, and pain     Additional Treatments: He participated in extensive ADL retraining as further noted above emphasizing fall prevention, and use of compensatory strategies and assistive devices providing extra time with repetition requiring verbal and tactile cueing for safety awareness and technique.     LifeStyle Change and Education:     Patient left up in chair with all lines intact, nurse notified, and PT present.     Education provided: Roles and goals of OT, ADLs, transfer training, bed mobility, body mechanics, assistive device, wheelchair precautions, safety precautions, fall prevention, equipment recommendations, and home safety    Multidisciplinary Problems       Occupational Therapy Goals          Problem: Occupational Therapy    Goal Priority Disciplines Outcome Interventions   Occupational Therapy Goal     OT, PT/OT Progressing    Description: Long Term Goals to be met by: 03/27/25     Patient will increase functional independence with ADLs by performing:    Feeding with [place].  UE Dressing with Modified [place].  LE Dressing with Modified [place].  Grooming while standing at sink with Modified [place].  Toileting from toilet with Modified [place] for  hygiene and clothing management.   Bathing from  shower chair/bench with Modified [place].  Toilet transfer to toilet with Modified [place].  Increased functional strength to 4+ to 5/5 for bilateral UE's.                         Time Tracking     OT Received On: 03/14/25  Time In 0900     Time Out 1030  Total Time 90 min  Therapy Time: OT Individual: 60  OT Concurrent: 30  Missed Time:    Missed Time Reason:      Billable Minutes: Self Care/Home Management 90    03/14/2025

## 2025-03-14 NOTE — PT/OT/SLP PROGRESS
Physical Therapy Inpatient Rehab Treatment    Patient Name:  Tobi Liz Jr.   MRN:  [id number]    Recommendations:     Discharge Recommendations:  Low Intensity Therapy   Discharge Equipment Recommendations: other (see comments) (TBD based on progression with therapies)   Barriers to discharge:  Current medical and functional status    Assessment:     Tobi Liz Jr. is a 89 y.o. male admitted with a medical diagnosis of <principal problem not specified>.  He presents with the following impairments/functional limitations:    weakness, impaired endurance, impaired self care skills, impaired functional mobility, gait instability, impaired balance, impaired cognition, decreased coordination, decreased upper extremity function, decreased lower extremity function, decreased safety awareness, pain, decreased ROM, impaired fine motor, impaired skin, edema, impaired cardiopulmonary response to activity, impaired joint extensibility, impaired muscle length. .    Rehab Diagnosis:  Neuromuscular disorders, myopathy     Recent Surgery: * No surgery found *      General Precautions: Standard, fall     Orthopedic Precautions:N/A     Braces: N/A    Rehab Prognosis: Good; patient would benefit from acute skilled PT services to address these deficits and reach maximum level of function.      History:     Past Medical History:   Diagnosis Date    Aortic valve stenosis     Arthritis     BPH (benign prostatic hyperplasia)     Carotid artery stenosis     Chronic diastolic heart failure     ETOH abuse     Exposure to COVID-19 virus 01/07/2022    Hyperchloremia     Kidney stones     Murmur     Polymyalgia rheumatica     PVD (peripheral vascular disease)     Renal artery stenosis        Past Surgical History:   Procedure Laterality Date    A-V CARDIAC PACEMAKER INSERTION N/A 10/6/2023    Procedure: INSERTION, CARDIAC PACEMAKER, DUAL CHAMBER;  Surgeon: Douglas Salguero MD;  Location: [facility] CATH;  Service: Cardiology;   Laterality: N/A;    ANGIOGRAM, CAROTID Left 8/25/2023    Procedure: ANGIOGRAM, CAROTID;  Surgeon: Nick Box MD;  Location: [facility] CATH;  Service: Cardiology;  Laterality: Left;    CATARACT EXTRACTION, BILATERAL      ECHOCARDIOGRAM,TRANSESOPHAGEAL N/A 10/3/2023    Procedure: Transesophageal echo (JOSE ARMANDO) intra-procedure log documentation;  Surgeon: Nick Box MD;  Location: [facility] OR;  Service: Cardiology;  Laterality: N/A;    HAND SURGERY      INSERTION OF STENT INTO PERIPHERAL VESSEL N/A 1/30/2025    Procedure: INSERTION, STENT, VESSEL, PERIPHERAL;  Surgeon: Harsha Salcedo MD;  Location: [facility] CATH;  Service: Cardiology;  Laterality: N/A;    PERCUTANEOUS TRANSCATHETER AORTIC VALVE REPLACEMENT (TAVR) Left 10/3/2023    Procedure: REPLACEMENT, AORTIC VALVE, PERCUTANEOUS, TRANSCATHETER;  Surgeon: Nick Box MD;  Location: [facility] OR;  Service: Cardiology;  Laterality: Left;    PERCUTANEOUS TRANSCATHETER AORTIC VALVE REPLACEMENT (TAVR) Left 10/3/2023    Procedure: REPLACEMENT, AORTIC VALVE, PERCUTANEOUS, TRANSCATHETER carotid access;  Surgeon: Jun Brito MD;  Location: [facility] OR;  Service: Cardiovascular;  Laterality: Left;    PERCUTANEOUS TRANSLUMINAL ANGIOPLASTY N/A 8/25/2023    Procedure: ANGIOPLASTY-PERCUTANEOUS TRANSLUMINAL (PTA);  Surgeon: Nick Box MD;  Location: [facility] CATH;  Service: Cardiology;  Laterality: N/A;    SHOULDER SURGERY Left     total shoulder replacement    WOUND EXPLORATION N/A 10/3/2023    Procedure: EXPLORATION, WOUND;  Surgeon: Jun Brito MD;  Location: [facility] OR;  Service: Cardiology;  Laterality: N/A;       Subjective     Chief Complaint: Fatigue this visit    Respiratory Status: Room air    Patients cultural, spiritual, Jainism conflicts given the current situation: no      Objective:     Communicated with nursing staff and occupational therapy prior to session.  Patient found up in chair with willams catheter, peripheral IV  upon PT entry to room.    Pt is  Oriented x3 and Alert, Cooperative, and Motivated.    Vitals   Vitals at Rest  /58   HR                  Functional Mobility:   Transfers:     Sit to Stand: Supervision or touching assistance  with rolling walker  Gait: Pt ambulates 150 feet 2x with RW with Supervision or touching assistance .   Balance: Static Sitting/Standing  Patient performed static sitting on level surface using rolling walker with Supervision or touching assistance  and minimal verbal cues.  12 stairs with bilateral handrails with Supervision or touching assistance      Current   Status  Discharge   Goal   Functional Area: Care Score:    Roll Left and Right   Independent   Sit to Lying   Independent   Lying to Sitting on Side of Bed   Independent   Sit to Stand 4 Set-up/clean-up   Chair/Bed-to-Chair Transfer   Independent   Car Transfer       Walk 10 Feet 4 Independent   Walk 50 Feet with Two Turns 4 Set-up/clean-up   Walk 150 Feet 4 Set-up/clean-up   Walk 10 Feet Uneven Surface   Independent   1 Step (Curb)   Independent   4 Steps 4 Set-up/clean-up   12 Steps 4 Set-up/clean-up   Picking Up Object   Set-up/clean-up   Wheel 50 Feet with Two Turns       Wheel 150 Feet           Therapeutic Activities and Exercises:  Sit<>Stand  Gait Training 150 feet 2x with RW and SBA with cuing for proper step length and proper width of EVEL[place]   12 stairs with B HR and cuing for forward lean to avoid posterior lean with descent of stairs   Sitting EOB with no assistance     Activity Tolerance: Good    Patient left sitting edge of bed with all lines intact, call button in reach, and nurse present.    Education provided: roles and goals of PT/PTA, transfer training, bed mob, gait training, stair training, balance training, safety awareness, body mechanics, assistive device, strengthening exercises, and fall prevention    Expected compliance: High compliance    GOALS:   Multidisciplinary Problems       Physical Therapy Goals          Problem: Physical Therapy     Goal Priority Disciplines Outcome Interventions   Physical Therapy Goal     PT, PT/OT Progressing    Description: Physical Therapy Goal     PT, PT/OT Progressing    Description:   Short Term Goals: 2025  Long Term Goals: 3/28/25     Transfers Status     Sit to Stand  Short Term Goal: Complete sit to stand with Supervision or Set-up Assistance using Rolling Walker with no verbal cues  Long Term Goal:  Complete sit to stand with Independent using Rolling Walker with no  verbal cues     Stand Step  Short Term Goal: Complete stand step with  Set-Up Clean Up  using Rolling Walker with no verbal cues  Long Term Goal:  Complete stand step with  Modified Independent  using Rolling Walker with no  verbal cues     Supine <> Sit  Short Term Goal: Complete supine <> sit with Modified [place]  with  minimal verbal cues rails prn  Long Term Goal: Complete supine <> sit with Independent  With no  verbal cues        Balance/Coordination     Dynamic Sitting  Short Term Goal: Complete dynamic sitting with Set Up Assistance with minimal  verbal cues  minimal to moderate excursions  Long Term Goal: Complete dynamic sitting with [place]  with  no verbal cues moderate to maximal excursions     Dynamic Standing  Short Term Goal: Complete dynamic standing with Stand-by Assistance  with minimal  verbal cues minimal to moderate excursions with RW  Long Term Goal: Complete dynamic standing with Modified [place] with no  verbal cues and moderate to maximal excursions with RW     Endurance     Short Term Goal:   Physical Therapy: 2 times a day, 30-45 minutes  Rest periods: multiple  Sitting: 3 hours/day     Long Term Goal:  Physical Therapy: 2 times a day, 45 minutes  Rest periods: minimal  Sittin-8 hours/day     Mobility/Gait  Short Term Goal: Will ambulate with Stand-by Assistance  Using Rolling Walker with no  verbal cues x 200 ft  Long Term Goal: Will ambulate with Modified Independent  using Rolling Walker with  no verbal cues x 500 ft     Stairs Assistance  Short Term Goal: Patient will ascend/descend 4 stairs/steps using bilateral handrails reciprocal  steps with Stand-by Assistance and minimal verbal cues  Long Term Goal: Patient will ascend/descend 12 stairs/steps using no handrails  reciprocal steps with [place] and minimal verbal cues           Ramp/Uneven 10 feet surface  Long  Term Goal; Patient will be able to navigate a 10 inch uneven surface with proper A.D. with  Modified [place]        2-6  inch curb  Long  Term Goal; Patient will be able to navigate a  2 to 6 inch curb with proper A.D. with independence     Picking up object   Long  Term Goal; Patient will be able to  a small object from the floor  with proper A.D. with  independence     Car transfers  Long  Term Goal; Patient will be able to get in and out of a vehicle  with proper A.D. with  independence     Education  Long Term Goals:  Patient/family/caregivers trained on physical mobility and precautions with Supervision.     Patient/family/caregivers trained on safety awareness with Supervision.     Order and obtain all appropriate equipment.                           Plan:     During this hospitalization, patient to be seen 5 x/week to address the identified rehab impairments via gait training, therapeutic activities, therapeutic exercises, therapeutic groups, wheelchair management/training, neuromuscular re-education and progress toward the following goals:    Plan of Care Expires:  03/28/25  PT Next Visit Date: 03/14/25  Plan of Care reviewed with: patient    Additional Information:         Time Tracking:     Therapy Time  PT Received On: 03/14/25  PT Start Time: 1030  PT Stop Time: 1100  PT Total Time (min): 30 min   PT Individual: 30  Missed Time:    Time Missed due to:      Billable Minutes: Gait Training 15 and Therapeutic Activity 15    03/14/2025

## 2025-03-14 NOTE — PT/OT/SLP PROGRESS
Physical Therapy Inpatient Rehab Treatment    Patient Name:  Tobi Liz Jr.   MRN:  [id number]    Recommendations:     Discharge Recommendations:  Low Intensity Therapy   Discharge Equipment Recommendations: other (see comments) (TBD based on progression with therapies)   Barriers to discharge:Current medical and functional status     Assessment:     Tobi Liz Jr. is a 89 y.o. male admitted with a medical diagnosis of Myopathy.  He presents with the following impairments/functional limitations:     weakness, impaired endurance, impaired self care skills, impaired functional mobility, gait instability, impaired balance, impaired cognition, decreased coordination, decreased upper extremity function, decreased lower extremity function, decreased safety awareness, pain, decreased ROM, impaired fine motor, impaired skin, edema, impaired cardiopulmonary response to activity, impaired joint extensibility, impaired muscle length.    Patient presents with decreased fatigue this afternoon. He is able to perform gait training well this visit with cuing required for step length. He also required cuing to stay inside of his RW when performing curb in order to remain safe with his AD. Patient was requiring significant cuing for sit<>stand form in order to push from chair, lean forward, and avoid posterior lean when getting into standing position.     Rehab Diagnosis:  Neuromuscular disorders, myopathy     Recent Surgery: * No surgery found *      General Precautions: Standard, fall     Orthopedic Precautions:N/A     Braces: N/A    Rehab Prognosis: Good; patient would benefit from acute skilled PT services to address these deficits and reach maximum level of function.      History:     Past Medical History:   Diagnosis Date    Aortic valve stenosis     Arthritis     BPH (benign prostatic hyperplasia)     Carotid artery stenosis     Chronic diastolic heart failure     ETOH abuse     Exposure to COVID-19 virus  01/07/2022    Hyperchloremia     Kidney stones     Murmur     Polymyalgia rheumatica     PVD (peripheral vascular disease)     Renal artery stenosis        Past Surgical History:   Procedure Laterality Date    A-V CARDIAC PACEMAKER INSERTION N/A 10/6/2023    Procedure: INSERTION, CARDIAC PACEMAKER, DUAL CHAMBER;  Surgeon: Douglas Salguero MD;  Location: [facility] CATH;  Service: Cardiology;  Laterality: N/A;    ANGIOGRAM, CAROTID Left 8/25/2023    Procedure: ANGIOGRAM, CAROTID;  Surgeon: Nick Box MD;  Location: [facility] CATH;  Service: Cardiology;  Laterality: Left;    CATARACT EXTRACTION, BILATERAL      ECHOCARDIOGRAM,TRANSESOPHAGEAL N/A 10/3/2023    Procedure: Transesophageal echo (JOSE ARMANDO) intra-procedure log documentation;  Surgeon: Nick Box MD;  Location: [facility] OR;  Service: Cardiology;  Laterality: N/A;    HAND SURGERY      INSERTION OF STENT INTO PERIPHERAL VESSEL N/A 1/30/2025    Procedure: INSERTION, STENT, VESSEL, PERIPHERAL;  Surgeon: Harsha Salcedo MD;  Location: [facility] CATH;  Service: Cardiology;  Laterality: N/A;    PERCUTANEOUS TRANSCATHETER AORTIC VALVE REPLACEMENT (TAVR) Left 10/3/2023    Procedure: REPLACEMENT, AORTIC VALVE, PERCUTANEOUS, TRANSCATHETER;  Surgeon: Nick Box MD;  Location: [facility] OR;  Service: Cardiology;  Laterality: Left;    PERCUTANEOUS TRANSCATHETER AORTIC VALVE REPLACEMENT (TAVR) Left 10/3/2023    Procedure: REPLACEMENT, AORTIC VALVE, PERCUTANEOUS, TRANSCATHETER carotid access;  Surgeon: Jun Brito MD;  Location: [facility] OR;  Service: Cardiovascular;  Laterality: Left;    PERCUTANEOUS TRANSLUMINAL ANGIOPLASTY N/A 8/25/2023    Procedure: ANGIOPLASTY-PERCUTANEOUS TRANSLUMINAL (PTA);  Surgeon: Nick Box MD;  Location: [facility] CATH;  Service: Cardiology;  Laterality: N/A;    SHOULDER SURGERY Left     total shoulder replacement    WOUND EXPLORATION N/A 10/3/2023    Procedure: EXPLORATION, WOUND;  Surgeon: Jun Brito MD;  Location: [facility] OR;  Service:  Cardiology;  Laterality: N/A;       Subjective     Chief Complaint: Weakness     Respiratory Status: Room air    Patients cultural, spiritual, Shinto conflicts given the current situation: no      Objective:     Communicated with nursing staff prior to session.  Patient found up in chair with willams catheter, peripheral IV  upon PT entry to room.    Pt is Oriented x3 and Alert, Cooperative, and Motivated.          Functional Mobility:   Transfers:     Sit to Stand: Supervision or touching assistance  with rolling walker  Gait: Pt ambulates 200 feet, 150 feet, and 100 feet with RW with Supervision or touching assistance .   Impairments contributing to gait deviations include impaired balance and decreased strength.  Balance: Static Sitting/Standing  Patient performed static standing on level surface using rolling walker with Supervision or touching assistance  and minimal verbal cues.    Static Sit: GOOD: Takes MODERATE challenges from all directions  Static Stand: FAIR: Maintains without assist but unable to take challenges    4 inch curb with rolling walker with Supervision or touching assistance   Ambulate 10 feet on uneven surfaces/ramps with rolling walker with Supervision or touching assistance      Current   Status  Discharge   Goal   Functional Area: Care Score:    Roll Left and Right 5 Independent   Sit to Lying 4 Independent   Lying to Sitting on Side of Bed 4 Independent   Sit to Stand 4 Set-up/clean-up   Chair/Bed-to-Chair Transfer   Independent   Car Transfer       Walk 10 Feet 4 Independent   Walk 50 Feet with Two Turns 4 Set-up/clean-up   Walk 150 Feet 4 Set-up/clean-up   Walk 10 Feet Uneven Surface 4 Independent   1 Step (Curb) 4 Independent   4 Steps 4 Set-up/clean-up   12 Steps 4 Set-up/clean-up   Picking Up Object   Set-up/clean-up   Wheel 50 Feet with Two Turns       Wheel 150 Feet           Therapeutic Activities and Exercises:  Curb navigation with cuing for safety to remain inside of walker  and not let his RW get in front of him to where he has to lean forward and increase fall risk  Sit<>Stand 3x10 reps with sequencing of getting to edge of chair, hands on chair arms, push through chair and lean forward, contract glutes, and grab one hand on the walker then push up and grab other hand on walker   Gait training with RW and cuing for proper form in order to increase step length   Ramp navigation with cuing to ensure leaning forward to avoid increased fall risk  Sit<>Stand from recliner with emphasis on safety     Activity Tolerance: Good    Patient left up in chair with all lines intact, call button in reach, and nursing staff notified.    Education provided: roles and goals of PT/PTA, transfer training, gait training, balance training, safety awareness, body mechanics, assistive device, strengthening exercises, fall prevention, and oriented to student    Expected compliance: High compliance    GOALS:   Multidisciplinary Problems       Physical Therapy Goals          Problem: Physical Therapy    Goal Priority Disciplines Outcome Interventions   Physical Therapy Goal     PT, PT/OT Progressing    Description: Physical Therapy Goal     PT, PT/OT Progressing    Description:   Short Term Goals: 03/20/2025  Long Term Goals: 3/28/25     Transfers Status     Sit to Stand  Short Term Goal: Complete sit to stand with Supervision or Set-up Assistance using Rolling Walker with no verbal cues  Long Term Goal:  Complete sit to stand with Independent using Rolling Walker with no  verbal cues     Stand Step  Short Term Goal: Complete stand step with  Set-Up Clean Up  using Rolling Walker with no verbal cues  Long Term Goal:  Complete stand step with  Modified Independent  using Rolling Walker with no  verbal cues     Supine <> Sit  Short Term Goal: Complete supine <> sit with Modified [place]  with  minimal verbal cues rails prn  Long Term Goal: Complete supine <> sit with Independent  With no  verbal cues         Balance/Coordination     Dynamic Sitting  Short Term Goal: Complete dynamic sitting with Set Up Assistance with minimal  verbal cues  minimal to moderate excursions  Long Term Goal: Complete dynamic sitting with [place]  with  no verbal cues moderate to maximal excursions     Dynamic Standing  Short Term Goal: Complete dynamic standing with Stand-by Assistance  with minimal  verbal cues minimal to moderate excursions with RW  Long Term Goal: Complete dynamic standing with Modified [place] with no  verbal cues and moderate to maximal excursions with RW     Endurance     Short Term Goal:   Physical Therapy: 2 times a day, 30-45 minutes  Rest periods: multiple  Sitting: 3 hours/day     Long Term Goal:  Physical Therapy: 2 times a day, 45 minutes  Rest periods: minimal  Sittin-8 hours/day     Mobility/Gait  Short Term Goal: Will ambulate with Stand-by Assistance  Using Rolling Walker with no  verbal cues x 200 ft  Long Term Goal: Will ambulate with Modified Independent  using Rolling Walker with no verbal cues x 500 ft     Stairs Assistance  Short Term Goal: Patient will ascend/descend 4 stairs/steps using bilateral handrails reciprocal  steps with Stand-by Assistance and minimal verbal cues  Long Term Goal: Patient will ascend/descend 12 stairs/steps using no handrails  reciprocal steps with Chaves and minimal verbal cues           Ramp/Uneven 10 feet surface  Long  Term Goal; Patient will be able to navigate a 10 inch uneven surface with proper A.D. with  Modified [place]        2-6  inch curb  Long  Term Goal; Patient will be able to navigate a  2 to 6 inch curb with proper A.D. with independence     Picking up object   Long  Term Goal; Patient will be able to  a small object from the floor  with proper A.D. with  independence     Car transfers  Long  Term Goal; Patient will be able to get in and out of a vehicle  with proper A.D. with  independence     Education  Long Term  Goals:  Patient/family/caregivers trained on physical mobility and precautions with Supervision.     Patient/family/caregivers trained on safety awareness with Supervision.     Order and obtain all appropriate equipment.                           Plan:     During this hospitalization, patient to be seen 5 x/week to address the identified rehab impairments via gait training, therapeutic activities, therapeutic exercises, therapeutic groups, neuromuscular re-education and progress toward the following goals:    Plan of Care Expires:  03/28/25  PT Next Visit Date: 03/17/25  Plan of Care reviewed with: patient    Additional Information:         Time Tracking:     Therapy Time  PT Received On: 03/14/25  PT Start Time: 1415  PT Stop Time: 1520  PT Total Time (min): 65 min   PT Individual: 65  Missed Time:    Time Missed due to:      Billable Minutes: Gait Training 25, Therapeutic Activity 30, and Therapeutic Exercise 10    03/14/2025

## 2025-03-14 NOTE — ASSESSMENT & PLAN NOTE
Patient's blood pressure range in the last 24 hours was: BP  Min: 131/59  Max: 142/62.The patient's inpatient anti-hypertensive regimen is listed below:  Current Antihypertensives       Plan  - BP is controlled, no changes needed to their regimen

## 2025-03-14 NOTE — H&P
[facility] Medicine  History & Physical / GUTIERREZ    Patient Name: Tobi Liz Jr.  MRN: [id number]  Patient Class: IP- Rehab  Admission Date: 3/13/2025  Attending Physician: Dave Reyes III, MD  Primary Care Provider: Luis Enrique Guzman Jr., MD         Patient information was obtained from patient, past medical records, ER records, and primary team.     Subjective:     Principal Problem:Myopathy    Chief Complaint:   Chief Complaint   Patient presents with    Weakness        HPI:   Tobi Liz Jr. is a 89 y.o. male with a PMHx of has a past medical history of Aortic valve stenosis, Arthritis, BPH (benign prostatic hyperplasia), Carotid artery stenosis, Chronic diastolic heart failure, ETOH abuse, Exposure to COVID-19 virus (01/07/2022), Hyperchloremia, Kidney stones, Murmur, Polymyalgia rheumatica, PVD (peripheral vascular disease), and Renal artery stenosis., presents complaining of shaking after standing from a seated position that started yesterday, he was aware and talking during the episode so not likely a seizure. Labs show he was severely anemic in the ED with Hgb of 5. He had hematuria as well on UA along with bacteria. Ucx pending. His family states that he has a history of anemia in the past. He otherwise feels fine. He received 3 units of pRBCs in the ED with appropriate response.     Patient overall significantly improved from admission. H&H were stable yesterday. Awaiting updated CBC this morning. Otherwise renal function stable and no new electrolyte abnormalities noted. Patient accepted to inpatient rehab and we will discharging.    Patient is seen and examined after admission and patient is complaining of respiratory congestion wheezing and dyspnea.  Patient's currently on Bactrim and sensitivities noted for his urinary tract infection as well as the other antibiotics that maybe appropriate we will switch to Rocephin as we will get better respiratory coverage  as the patient does seem to be battling a bronchitis and possibly a pneumonia.  Repeat chest x-ray pending.  Patient is motivated to improve he is very eager to return home patient's significantly decline in his overall functional state and unable to complete ADLs safely at this time.  He has developed a myopathy as well as some confusion and encephalopathy agree with inpatient rehab patient meets current Medicare criteria.       Patient requires acute inpatient rehab admission with 24-hour nursing and active physician oversight to monitor and manage acute medical comorbid conditions, labs, pain, and functional deficits. Patient/family will also require teaching and integration of improving functional skills into daily living. Patient will also require an individualized, interdisciplinary approach to their care, receiving PT, OT services 3 hours per day, 5 days per week. Required care cannot be provided at a lower level of care. Patient is anticipated to require approximately 10-14 days LOS with expected discharge home  with  services.    Impairment group (IGC):   Neuromuscular Disorders 03.8 Etiologic diagnosis/description:   G72.9: Myopathy  D64.9: Anemia      Date of onset:  3/8/2025 Date of surgery: N/A   Allergies: Patient has no known allergies.   Comorbid condition:   HTN, HLD, BPH, CAD, Valvular heart disease, CHF class II, Occlusion of right iliac artery   Medical/functional conditions requiring inpatient rehabilitation:      This patient requires medical management/24-hour nursing of complex co morbidities HTN, HLD, BPH, CAD, Valvular heart disease, CHF class II, Occlusion of right iliac artery, labs, medications (see medications list), pain, sleep hygiene, anticoagulation, nutrition, hydration, neurological, pulmonary, cardiac status, and preventive healthcare.     This patient requires intense therapy and an integrated, interdisciplinary approach to address safety, impaired mobility, impaired ADLs  (dressing, toileting, grooming, showering), judgment, and memory, communication, pulmonary insufficiency, bowel/bladder problems,preventive healthcare, medication management, integration of functional skills into daily living, and home caregiver support and training.    Risk for medical/clinical complications:      This patient is at risk for the following complications: DVT/PE, pneumonia, malnutrition, neurological decline, respiratory insufficiency, worsening activity intolerance, complications from anticoagulation, skin breakdown, inadequate sleep, and constipation.        Past Medical History:   Diagnosis Date    Aortic valve stenosis     Arthritis     BPH (benign prostatic hyperplasia)     Carotid artery stenosis     Chronic diastolic heart failure     ETOH abuse     Exposure to COVID-19 virus 01/07/2022    Hyperchloremia     Kidney stones     Murmur     Polymyalgia rheumatica     PVD (peripheral vascular disease)     Renal artery stenosis        Past Surgical History:   Procedure Laterality Date    A-V CARDIAC PACEMAKER INSERTION N/A 10/6/2023    Procedure: INSERTION, CARDIAC PACEMAKER, DUAL CHAMBER;  Surgeon: Douglas Salguero MD;  Location: [facility] CATH;  Service: Cardiology;  Laterality: N/A;    ANGIOGRAM, CAROTID Left 8/25/2023    Procedure: ANGIOGRAM, CAROTID;  Surgeon: Nick Box MD;  Location: [facility] CATH;  Service: Cardiology;  Laterality: Left;    CATARACT EXTRACTION, BILATERAL      ECHOCARDIOGRAM,TRANSESOPHAGEAL N/A 10/3/2023    Procedure: Transesophageal echo (JOSE ARMANDO) intra-procedure log documentation;  Surgeon: Nick Box MD;  Location: [facility] OR;  Service: Cardiology;  Laterality: N/A;    HAND SURGERY      INSERTION OF STENT INTO PERIPHERAL VESSEL N/A 1/30/2025    Procedure: INSERTION, STENT, VESSEL, PERIPHERAL;  Surgeon: Harsha Salcedo MD;  Location: [facility] CATH;  Service: Cardiology;  Laterality: N/A;    PERCUTANEOUS TRANSCATHETER AORTIC VALVE REPLACEMENT (TAVR) Left 10/3/2023    Procedure:  REPLACEMENT, AORTIC VALVE, PERCUTANEOUS, TRANSCATHETER;  Surgeon: Nick Box MD;  Location: [facility] OR;  Service: Cardiology;  Laterality: Left;    PERCUTANEOUS TRANSCATHETER AORTIC VALVE REPLACEMENT (TAVR) Left 10/3/2023    Procedure: REPLACEMENT, AORTIC VALVE, PERCUTANEOUS, TRANSCATHETER carotid access;  Surgeon: Jun Brito MD;  Location: [facility] OR;  Service: Cardiovascular;  Laterality: Left;    PERCUTANEOUS TRANSLUMINAL ANGIOPLASTY N/A 8/25/2023    Procedure: ANGIOPLASTY-PERCUTANEOUS TRANSLUMINAL (PTA);  Surgeon: Nick Box MD;  Location: [facility] CATH;  Service: Cardiology;  Laterality: N/A;    SHOULDER SURGERY Left     total shoulder replacement    WOUND EXPLORATION N/A 10/3/2023    Procedure: EXPLORATION, WOUND;  Surgeon: Jun Brito MD;  Location: [facility] OR;  Service: Cardiology;  Laterality: N/A;       Review of patient's allergies indicates:  No Known Allergies    Current Facility-Administered Medications on File Prior to Encounter   Medication    [DISCONTINUED] 0.9%  NaCl infusion (for blood administration)    [DISCONTINUED] acetaminophen tablet 650 mg    [DISCONTINUED] atorvastatin tablet 20 mg    [DISCONTINUED] benzonatate capsule 200 mg    [DISCONTINUED] famotidine tablet 20 mg    [DISCONTINUED] fentaNYL injection 25 mcg    [DISCONTINUED] finasteride tablet 5 mg    [DISCONTINUED] gentian violet 2 % topical solution 0.5 mL    [DISCONTINUED] guaiFENesin 12 hr tablet 1,200 mg    [DISCONTINUED] melatonin tablet 6 mg    [DISCONTINUED] methylPREDNISolone sodium succinate injection 80 mg    [DISCONTINUED] mupirocin 2 % ointment    [DISCONTINUED] ondansetron injection 4 mg    [DISCONTINUED] oxybutynin 24 hr tablet 5 mg    [DISCONTINUED] sodium chloride 0.9% flush 10 mL    [DISCONTINUED] sulfamethoxazole-trimethoprim 800-160mg per tablet 1 tablet    [DISCONTINUED] tamsulosin 24 hr capsule 0.4 mg     Current Outpatient Medications on File Prior to Encounter   Medication Sig    finasteride  (PROSCAR) 5 mg tablet Take 5 mg by mouth once daily.    oxybutynin (DITROPAN-XL) 5 MG TR24 oxyBUTYnin chloride ER 5 mg tablet,extended release 24 hr, [RxNorm: 776924]    tamsulosin (FLOMAX) 0.4 mg Cap Take 1 capsule by mouth once daily. Prescribed by Dr. Navarrete    amLODIPine (NORVASC) 10 MG tablet Take 5 mg by mouth once daily. Prescribed by Dr. Box    aspirin (ECOTRIN) 81 MG EC tablet Take 81 mg by mouth once daily.    clopidogreL (PLAVIX) 75 mg tablet Take 1 tablet (75 mg total) by mouth once daily.    rosuvastatin (CRESTOR) 5 MG tablet Take 1 tablet (5 mg total) by mouth once daily.     Family History       Problem Relation (Age of Onset)    Cancer Mother    Stroke Father          Tobacco Use    Smoking status: Former     Types: Cigars     Passive exposure: Past    Smokeless tobacco: Never   Substance and Sexual Activity    Alcohol use: Yes     Alcohol/week: 3.0 standard drinks of alcohol     Types: 3 Shots of liquor per week     Comment: 3 martinis daily    Drug use: Never    Sexual activity: Not Currently     Review of Systems   Constitutional:  Positive for activity change, appetite change and fatigue. Negative for chills and fever.   HENT:  Positive for postnasal drip, sinus pressure and sinus pain. Negative for ear pain, mouth sores, nosebleeds and sore throat.    Eyes:  Negative for visual disturbance.   Respiratory:  Positive for cough, shortness of breath and wheezing. Negative for apnea.    Cardiovascular:  Negative for chest pain, palpitations and leg swelling.   Gastrointestinal:  Positive for constipation. Negative for abdominal distention, abdominal pain, blood in stool, diarrhea, nausea and vomiting.   Endocrine: Positive for cold intolerance. Negative for polyphagia.   Musculoskeletal:  Positive for arthralgias and back pain.   Skin:  Negative for rash.   Neurological:  Positive for tremors and weakness. Negative for seizures, syncope, facial asymmetry and speech difficulty.   Hematological:   Negative for adenopathy. Bruises/bleeds easily.   Psychiatric/Behavioral:  Positive for confusion. Negative for agitation and hallucinations. The patient is nervous/anxious.      Objective:     Vital Signs (Most Recent):  Temp: 98 °F (36.7 °C) (03/14/25 0705)  Pulse: 61 (03/14/25 0705)  Resp: 18 (03/14/25 0705)  BP: (!) 131/59 (03/14/25 0705)  SpO2: 98 % (03/14/25 0705) Vital Signs (24h Range):  Temp:  [97.8 °F (36.6 °C)-98.2 °F (36.8 °C)] 98 °F (36.7 °C)  Pulse:  [61-76] 61  Resp:  [16-20] 18  SpO2:  [98 %-100 %] 98 %  BP: (131-142)/(59-62) 131/59     Weight: 82.1 kg (181 lb)  Body mass index is 26.73 kg/m².     Physical Exam  Vitals and nursing note reviewed.   Constitutional:       General: He is awake. He is not in acute distress.     Appearance: He is ill-appearing. He is not toxic-appearing.   HENT:      Head: Normocephalic and atraumatic.      Nose: Congestion and rhinorrhea present.      Mouth/Throat:      Mouth: Mucous membranes are moist.      Pharynx: Oropharynx is clear. No oropharyngeal exudate or posterior oropharyngeal erythema.   Eyes:      General: No scleral icterus.        Right eye: No discharge.         Left eye: No discharge.      Extraocular Movements: Extraocular movements intact.      Pupils: Pupils are equal, round, and reactive to light.   Neck:      Thyroid: No thyroid mass or thyromegaly.      Vascular: No carotid bruit.      Meningeal: Brudzinski's sign and Kernig's sign absent.   Cardiovascular:      Rate and Rhythm: Normal rate and regular rhythm.      Chest Wall: PMI is not displaced. No thrill.      Heart sounds: Murmur heard.      No friction rub. No gallop.   Pulmonary:      Effort: No tachypnea, accessory muscle usage, prolonged expiration or respiratory distress.      Breath sounds: No stridor or decreased air movement. Wheezing, rhonchi and rales present.   Chest:      Chest wall: No tenderness.   Abdominal:      General: Bowel sounds are normal. There is no distension.       Palpations: Abdomen is soft. There is no hepatomegaly, splenomegaly or mass.      Tenderness: There is no abdominal tenderness. There is no right CVA tenderness, left CVA tenderness, guarding or rebound.      Hernia: No hernia is present.   Musculoskeletal:      Cervical back: Neck supple. No rigidity. No muscular tenderness.      Right lower leg: No edema.      Left lower leg: No edema.   Lymphadenopathy:      Cervical: No cervical adenopathy.   Skin:     General: Skin is warm.      Capillary Refill: Capillary refill takes less than 2 seconds.      Coloration: Skin is not cyanotic, jaundiced or pale.      Findings: No erythema or petechiae.   Neurological:      Mental Status: He is alert and oriented to person, place, and time.      Cranial Nerves: No cranial nerve deficit, dysarthria or facial asymmetry.      Motor: Weakness present. No tremor.      Gait: Gait abnormal.   Psychiatric:         Attention and Perception: He does not perceive auditory hallucinations.         Mood and Affect: Mood is anxious. Mood is not depressed. Affect is not flat.         Speech: Speech is not rapid and pressured or slurred.         Behavior: Behavior normal. Behavior is not agitated, aggressive or combative.         Thought Content: Thought content normal. Thought content is not paranoid or delusional.         Cognition and Memory: Cognition is impaired.         Judgment: Judgment normal.              CRANIAL NERVES     CN III, IV, VI   Pupils are equal, round, and reactive to light.       Significant Labs:   Recent Lab Results         03/14/25  0646        Albumin 3.0       ALP 46       ALT 51       Anion Gap 7       AST 36       Baso # 0.01       Basophil % 0.1       BILIRUBIN TOTAL 0.6  Comment: For infants and newborns, interpretation of results should be based  on gestational age, weight and in agreement with clinical  observations.    Premature Infant recommended reference ranges:  Up to 24 hours.............<8.0 mg/dL  Up  to 48 hours............<12.0 mg/dL  3-5 days..................<15.0 mg/dL  6-29 days.................<15.0 mg/dL         BUN 34       Calcium 8.1       Chloride 106       CO2 23       Creatinine 1.2       Differential Method Automated       eGFR 55.7       Eos # 0.0       Eos % 0.2       Glucose 89       Gran # (ANC) 9.7       Gran % 86.1       Hematocrit 35.9       Hemoglobin 11.6       Immature Grans (Abs) 0.07  Comment: Mild elevation in immature granulocytes is non specific and   can be seen in a variety of conditions including stress response,   acute inflammation, trauma and pregnancy. Correlation with other   laboratory and clinical findings is essential.         Immature Granulocytes 0.6       Lymph # 0.8       Lymph % 7.4       Magnesium  2.2       MCH 31.4       MCHC 32.3       MCV 97       Mono # 0.6       Mono % 5.6       MPV 9.5       nRBC 0       Platelet Count 229       Potassium 4.3       PROTEIN TOTAL 6.5       RBC 3.69       RDW 16.3       Sodium 136       WBC 11.29               Significant Imaging: I have reviewed all pertinent imaging results/findings within the past 24 hours.  Assessment/Plan:     * Myopathy  Patient has had a significant decline in function.  Agree with inpatient rehabilitative course as he needs multiple modalities of therapy to aid in his recovery and close physician oversight of his complex medical problems.    ESTIMATED LENGTH OF STAY:  The patient's estimated length of stay is 10-14 days and he is expected to be able to be discharged to home with family .    REHABILITATION PLAN/GOALS  The patient is being admitted to a comprehensive acute inpatient rehabilitation program consisting of at least 3 hours of combined physical ( 1.5hrs./day, 5 days a week), and occupational therapy (1.5 hrs. A day, 5 days a week),speech therapy services if necessary, 24-hour skilled rehabilitation nursing care, and close supervision of a physician with special training and experience in  rehabilitation medicine. Patient's prognosis for significant practical improvement within a reasonable period of time appears good . Given the patient's complex medical condition and risk of further medical complications, rehabilitation services could not be safely provided at a lower level of care such as a skilled nursing facility.                    1. Physical Therapy to Evaluate and Treat, Work on bed mobility, Assist with transfers, Strength, Gait, Fall Prevention, Balance, and Modalities as Needed   2. Occupational Therapy to ADL Retraining, Functional Transfer Training, Therapeutic Activity and Exercises, Neuromuscular Re-education, Manual Therapy, Use of Adaptive Equipment and Retraining, Safety Awareness and Fall Prevention, and Home Modification   3. Speech Pathology to Evaluate and assist with dysphagia, Make recommendations for a safe diet, Evaluate and Treat Cognitive linguistic deficits, Evaluate and Treat expressive and receptive language deficits, and Evaluate and treat motor speech deficits   4. Specialized 24-hour Rehabilitation Nursing Care to Protection of Skin and Soft Tissue, Assisting with continence, Bowel and bladder training, Neurological checks, Medicine administration and medicine and management, Wound care, and Fall protection and general encouragement   5. Dietary to optimize diet and nutrition.   6. Social Work/Case management to assist with discharge planning activities, acquisition of durable medical equipment, and ordering of follow up services as necessary.    The services provided in the acute medical rehab setting are under my supervision 24 hours a day. These services are necessary for this patient to achieve the most appropriate functional outcome and to determine the most appropriate discharge disposition.  I have reviewed the treatment plan with the patient and answered all of her questions, discussed goals and expectation.    REVIEW OF PRE-SCREEN ASSESSMENT  I have reviewed  "the patient's preadmission screen including her medical and functional status and compared it with her status upon arrival to the rehab unit and see no changes . Acute rehab services are still necessary and appropriate for this patient to achieve the best functional outcome while determining the most appropriate discharge disposition and services. The patient will require close medical management of multiple medical co-morbidities including as noted .       Urinary obstruction  Willams in place, plan on DC with willams.      Acute bronchitis  As above.      Acute cystitis with hematuria  Abx changed 2nd dual resp infection.      Anemia  Anemia is likely due to chronic disease due to Chronic Kidney Disease. Most recent hemoglobin and hematocrit are listed below.  Recent Labs     03/12/25  0606 03/13/25  0531 03/14/25  0646   HGB 10.2* 10.6* 11.6*   HCT 30.8* 31.6* 35.9*     Plan  - Monitor serial CBC:  q72hrs  - Transfuse PRBC if patient becomes hemodynamically unstable, symptomatic or H/H drops below 7/21.  - Patient has received 3 units of PRBCs  - Patient's anemia is currently stable    Insomnia  Monitor, PRN meds.      S/P TAVR (transcatheter aortic valve replacement)  Has done well post procedure.      Congestive heart failure, NYHA class II  Patient has Combined Systolic and Diastolic heart failure that is Chronic. On presentation their CHF was well compensated. Most recent BNP and echo results are listed below.  No results for input(s): "BNP" in the last 72 hours.  Latest ECHO  No results found for this or any previous visit.    Current Heart Failure Medications       Plan  - Monitor strict I&Os and daily weights.    - Place on telemetry  - Low sodium diet        Atherosclerosis of native artery of both lower extremities with rest pain        BPH (benign prostatic hyperplasia)  Willams in place.      Hyperlipidemia  Recheck labs.      Hypertension  Patient's blood pressure range in the last 24 hours was: BP  Min: " 131/59  Max: 142/62.The patient's inpatient anti-hypertensive regimen is listed below:  Current Antihypertensives       Plan  - BP is controlled, no changes needed to their regimen      VTE Risk Mitigation (From admission, onward)           Ordered     IP VTE HIGH RISK PATIENT  Once         03/13/25 1349     Place sequential compression device  Until discontinued         03/13/25 1349                                    Dave Reyes III, MD  Department of Hospital Medicine  [facility] ([facility])

## 2025-03-15 PROCEDURE — 99900031 HC PATIENT EDUCATION (STAT)

## 2025-03-15 PROCEDURE — 25000003 PHARM REV CODE 250: Performed by: INTERNAL MEDICINE

## 2025-03-15 PROCEDURE — A4216 STERILE WATER/SALINE, 10 ML: HCPCS | Performed by: NURSE PRACTITIONER

## 2025-03-15 PROCEDURE — 63600175 PHARM REV CODE 636 W HCPCS: Performed by: INTERNAL MEDICINE

## 2025-03-15 PROCEDURE — 25000242 PHARM REV CODE 250 ALT 637 W/ HCPCS: Performed by: INTERNAL MEDICINE

## 2025-03-15 PROCEDURE — 97535 SELF CARE MNGMENT TRAINING: CPT

## 2025-03-15 PROCEDURE — 99900035 HC TECH TIME PER 15 MIN (STAT)

## 2025-03-15 PROCEDURE — 94640 AIRWAY INHALATION TREATMENT: CPT

## 2025-03-15 PROCEDURE — 97530 THERAPEUTIC ACTIVITIES: CPT

## 2025-03-15 PROCEDURE — 11800000 HC REHAB PRIVATE ROOM

## 2025-03-15 PROCEDURE — 97110 THERAPEUTIC EXERCISES: CPT

## 2025-03-15 PROCEDURE — 25000003 PHARM REV CODE 250: Performed by: NURSE PRACTITIONER

## 2025-03-15 PROCEDURE — 94761 N-INVAS EAR/PLS OXIMETRY MLT: CPT

## 2025-03-15 RX ADMIN — LEVALBUTEROL 1.25 MG: 1.25 SOLUTION, CONCENTRATE RESPIRATORY (INHALATION) at 08:03

## 2025-03-15 RX ADMIN — ACETAMINOPHEN 650 MG: 325 TABLET ORAL at 09:03

## 2025-03-15 RX ADMIN — GENTIAN VIOLET 2% 0.5 ML: 20 LIQUID TOPICAL at 09:03

## 2025-03-15 RX ADMIN — CLARITHROMYCIN 500 MG: 500 TABLET, FILM COATED ORAL at 08:03

## 2025-03-15 RX ADMIN — FAMOTIDINE 20 MG: 20 TABLET, FILM COATED ORAL at 09:03

## 2025-03-15 RX ADMIN — GUAIFENESIN 1200 MG: 600 TABLET, EXTENDED RELEASE ORAL at 08:03

## 2025-03-15 RX ADMIN — MICONAZOLE NITRATE: 20 POWDER TOPICAL at 08:03

## 2025-03-15 RX ADMIN — Medication 6 MG: at 08:03

## 2025-03-15 RX ADMIN — PREDNISONE 20 MG: 10 TABLET ORAL at 09:03

## 2025-03-15 RX ADMIN — CEFTRIAXONE 1 G: 1 INJECTION, POWDER, FOR SOLUTION INTRAMUSCULAR; INTRAVENOUS at 09:03

## 2025-03-15 RX ADMIN — TAMSULOSIN HYDROCHLORIDE 0.4 MG: 0.4 CAPSULE ORAL at 09:03

## 2025-03-15 RX ADMIN — FINASTERIDE 5 MG: 5 TABLET, FILM COATED ORAL at 09:03

## 2025-03-15 RX ADMIN — OXYBUTYNIN CHLORIDE 5 MG: 5 TABLET, EXTENDED RELEASE ORAL at 09:03

## 2025-03-15 RX ADMIN — LEVALBUTEROL 1.25 MG: 1.25 SOLUTION, CONCENTRATE RESPIRATORY (INHALATION) at 02:03

## 2025-03-15 RX ADMIN — Medication 10 ML: at 09:03

## 2025-03-15 RX ADMIN — CLARITHROMYCIN 500 MG: 500 TABLET, FILM COATED ORAL at 09:03

## 2025-03-15 RX ADMIN — ATORVASTATIN CALCIUM 20 MG: 20 TABLET, FILM COATED ORAL at 09:03

## 2025-03-15 RX ADMIN — CARBOXYMETHYLCELLULOSE SODIUM 1 DROP: 5 SOLUTION/ DROPS OPHTHALMIC at 09:03

## 2025-03-15 RX ADMIN — GUAIFENESIN 1200 MG: 600 TABLET, EXTENDED RELEASE ORAL at 09:03

## 2025-03-15 RX ADMIN — MICONAZOLE NITRATE: 20 POWDER TOPICAL at 09:03

## 2025-03-15 NOTE — PLAN OF CARE
Problem: Occupational Therapy  Goal: Occupational Therapy Goal  Description: Long Term Goals to be met by: 03/27/25     Patient will increase functional independence with ADLs by performing:    Feeding with [place].  UE Dressing with Modified [place].  LE Dressing with Modified [place].  Grooming while standing at sink with Modified [place].  Toileting from toilet with Modified [place] for hygiene and clothing management.   Bathing from  shower chair/bench with Modified [place].  Toilet transfer to toilet with Modified [place].  Increased functional strength to 4+ to 5/5 for bilateral UE's.    Outcome: Progressing

## 2025-03-15 NOTE — PT/OT/SLP PROGRESS
Occupational Therapy Inpatient Rehab Treatment    Name: Tobi Liz Jr.  MRN: [id number]    Assessment:  Tobi Liz Jr. is a 89 y.o. male admitted with a medical diagnosis of Myopathy.  He presents with the following impairments/functional limitations:  weakness, impaired endurance, impaired self care skills, impaired functional mobility, gait instability, impaired balance, impaired cognition, decreased coordination, decreased upper extremity function, decreased lower extremity function, decreased safety awareness, pain, decreased ROM, impaired fine motor, impaired skin, edema, impaired cardiopulmonary response to activity, impaired joint extensibility, impaired muscle length.    Pt demonstrated improved functional performance with toileting as noted by mod assist in which patient able to perform perineal hygiene and pull clothing from waist requiring some assist to pull / adjust clothing back to waist with steadying assist secondary to posterior lean with standing.    General Precautions: Standard, fall     Orthopedic Precautions:N/A     Braces: N/A    Rehab Prognosis: Good; patient would benefit from acute skilled OT services to address these deficits and reach maximum level of function.      History:     Past Medical History:   Diagnosis Date    Aortic valve stenosis     Arthritis     BPH (benign prostatic hyperplasia)     Carotid artery stenosis     Chronic diastolic heart failure     ETOH abuse     Exposure to COVID-19 virus 01/07/2022    Hyperchloremia     Kidney stones     Murmur     Polymyalgia rheumatica     PVD (peripheral vascular disease)     Renal artery stenosis        Past Surgical History:   Procedure Laterality Date    A-V CARDIAC PACEMAKER INSERTION N/A 10/6/2023    Procedure: INSERTION, CARDIAC PACEMAKER, DUAL CHAMBER;  Surgeon: Douglas Salguero MD;  Location: [facility];  Service: Cardiology;  Laterality: N/A;    ANGIOGRAM, CAROTID Left 8/25/2023    Procedure: ANGIOGRAM, CAROTID;   Surgeon: Nick Box MD;  [facility];  Service: Cardiology;  Laterality: Left;    CATARACT EXTRACTION, BILATERAL      ECHOCARDIOGRAM,TRANSESOPHAGEAL N/A 10/3/2023    Procedure: Transesophageal echo (JOSE ARMANDO) intra-procedure log documentation;  Surgeon: Nick Box MD;  [facility];  Service: Cardiology;  Laterality: N/A;    HAND SURGERY      INSERTION OF STENT INTO PERIPHERAL VESSEL N/A 1/30/2025    Procedure: INSERTION, STENT, VESSEL, PERIPHERAL;  Surgeon: Harsha Salcedo MD;  [facility];  Service: Cardiology;  Laterality: N/A;    PERCUTANEOUS TRANSCATHETER AORTIC VALVE REPLACEMENT (TAVR) Left 10/3/2023    Procedure: REPLACEMENT, AORTIC VALVE, PERCUTANEOUS, TRANSCATHETER;  Surgeon: Nick Box MD;  [facility];  Service: Cardiology;  Laterality: Left;    PERCUTANEOUS TRANSCATHETER AORTIC VALVE REPLACEMENT (TAVR) Left 10/3/2023    Procedure: REPLACEMENT, AORTIC VALVE, PERCUTANEOUS, TRANSCATHETER carotid access;  Surgeon: Jun Brito MD;  [facility];  Service: Cardiovascular;  Laterality: Left;    PERCUTANEOUS TRANSLUMINAL ANGIOPLASTY N/A 8/25/2023    Procedure: ANGIOPLASTY-PERCUTANEOUS TRANSLUMINAL (PTA);  Surgeon: Nick Box MD;  [facility];  Service: Cardiology;  Laterality: N/A;    SHOULDER SURGERY Left     total shoulder replacement    WOUND EXPLORATION N/A 10/3/2023    Procedure: EXPLORATION, WOUND;  Surgeon: Jun Brito MD;  [facility];  Service: Cardiology;  Laterality: N/A;       Subjective     Orientation: Oriented x4     Chief Complaint: weakness and decreased independence      Patient/Family Comments/goals: Pt would like to regain independence with ADL's including functional mobility in order to return home safely with spouse.      Respiratory Status: Room air     Patients cultural, spiritual, Restoration conflicts given the current situation: no       Objective:     Patient found HOB elevated with willams catheter,  peripheral IV  upon OT entry to room.    Mobility   Patient completed:  Supine to Sit with supervision  Sit to Stand Transfer with contact guard assistance with rolling walker  Bed to Chair Transfer using Step Transfer technique with contact guard assistance with rolling walker  Toilet Transfer Step Transfer technique with contact guard assistance with  grab bars    Functional Mobility  Pt ambulated greater than 200' between surfaces requiring CGA utilizing RW.     ADLs   Current Status   Eating     Oral Hygiene     Shower, Bathe Self     Upper Body Dressing     Lower Body Dressing     Toileting Hygiene 3   Toilet Transfer 4   Putting On, Taking Off Footwear       Limiting Factors for ADLs: endurance, limited ROM, balance, weakness, coordination, cognition, safety awareness, and pain     Therapeutic Activities  Pt participated in therapeutic activity addressing trunk mobility, trunk control, dynamic sitting balance, trunk strength, and active ROM in bilateral shoulders with flexion and abduction challenging him to perform trunk flexion, extension, and lateral flexion utilizing large stability ball requiring verbal and tactile cueing to facilitate optimal movement patterns and increased range per trial in order to improve active ROM impacting performance with functional tasks below waist.      Therapeutic Exercise  Pt participated in therapeutic exercise performing 5x12 seated pushups requiring verbal and tactile cueing for technique challenging him to lift buttocks off seated surface to end range elbow extension in order to strengthen triceps brachii to improve performance with sit <> stand transitions particularly from lower surfaces. Also, he participated in therapeutic exercise performing 3x12 bilateral UE proximal strengthening exercises utilizing moderate resistance theraband with scapular retraction, shoulder extension, shoulder adduction, horizontal abduction, shoulder flexion in plane of scaption, internal  rotation, and external rotation requiring verbal and tactile cueing for technique emphasizing quality of movement.      Additional Treatments: Pt participated in ADL retraining as further noted above emphasizing fall prevention, and use of compensatory strategies and assistive devices providing extra time requiring verbal and tactile cueing for safety awareness and technique.     LifeStyle Change and Education:             Patient left up in chair with all lines intact and nurse notified.     Education provided: Roles and goals of OT, ADLs, transfer training, bed mobility, body mechanics, assistive device, wheelchair precautions, safety precautions, fall prevention, equipment recommendations, and home safety    Multidisciplinary Problems       Occupational Therapy Goals          Problem: Occupational Therapy    Goal Priority Disciplines Outcome Interventions   Occupational Therapy Goal     OT, PT/OT Progressing    Description: Long Term Goals to be met by: 03/27/25     Patient will increase functional independence with ADLs by performing:    Feeding with [place].  UE Dressing with Modified [place].  LE Dressing with Modified [place].  Grooming while standing at sink with Modified [place].  Toileting from toilet with Modified [place] for hygiene and clothing management.   Bathing from  shower chair/bench with Modified [place].  Toilet transfer to toilet with Modified [place].  Increased functional strength to 4+ to 5/5 for bilateral UE's.                         Time Tracking     OT Received On: 03/15/25  Time In 1015     Time Out 1145  Total Time 90 min  Therapy Time: OT Individual: 60  OT Concurrent: 30  Missed Time:    Missed Time Reason:      Billable Minutes: Self Care/Home Management 20, Therapeutic Activity 25, and Therapeutic Exercise 45    03/15/2025

## 2025-03-15 NOTE — PLAN OF CARE
Problem: Rehabilitation ([facility]) Plan of Care  Goal: Plan of Care Review  Outcome: Progressing  Goal: Patient-Specific Goal (Individualized)  Outcome: Progressing  Goal: Absence of New-Onset Illness or Injury  Outcome: Progressing  Goal: Optimal Comfort and Wellbeing  Outcome: Progressing  Goal: Home and Community Transition Plan Established  Outcome: Progressing     Problem: Infection  Goal: Absence of Infection Signs and Symptoms  Outcome: Progressing     Problem: Wound  Goal: Optimal Coping  Outcome: Progressing  Goal: Optimal Functional Ability  Outcome: Progressing  Goal: Absence of Infection Signs and Symptoms  Outcome: Progressing  Goal: Improved Oral Intake  Outcome: Progressing  Goal: Optimal Pain Control and Function  Outcome: Progressing  Goal: Skin Health and Integrity  Outcome: Progressing  Goal: Optimal Wound Healing  Outcome: Progressing     Problem: Fall Injury Risk  Goal: Absence of Fall and Fall-Related Injury  Outcome: Progressing     Problem: Mobility Impairment  Goal: Optimal Mobility  Outcome: Progressing     Problem: Pain Acute  Goal: Optimal Pain Control and Function  Outcome: Progressing     Problem: Skin Injury Risk Increased  Goal: Skin Health and Integrity  Outcome: Progressing   Will continue to monitor and will maintain a safe and secure environment.

## 2025-03-15 NOTE — PLAN OF CARE
Problem: Rehabilitation ([facility]) Plan of Care  Goal: Plan of Care Review  3/15/2025 1638 by Maddie Garner RN  Outcome: Progressing  3/15/2025 1457 by Maddie Garner RN  Outcome: Progressing  Goal: Patient-Specific Goal (Individualized)  3/15/2025 1638 by Maddie Garner RN  Outcome: Progressing  3/15/2025 1457 by Maddie Garner RN  Outcome: Progressing  Goal: Absence of New-Onset Illness or Injury  3/15/2025 1638 by Maddie Garner RN  Outcome: Progressing  3/15/2025 1457 by Maddie Garner RN  Outcome: Progressing  Goal: Optimal Comfort and Wellbeing  3/15/2025 1638 by Maddie Garner RN  Outcome: Progressing  3/15/2025 1457 by Maddie Garner RN  Outcome: Progressing  Goal: Home and Community Transition Plan Established  3/15/2025 1638 by Maddie Garner RN  Outcome: Progressing  3/15/2025 1457 by Maddie Garner RN  Outcome: Progressing     Problem: Infection  Goal: Absence of Infection Signs and Symptoms  3/15/2025 1638 by Maddie Garner RN  Outcome: Progressing  3/15/2025 1457 by Maddie Garner RN  Outcome: Progressing     Problem: Wound  Goal: Optimal Coping  3/15/2025 1638 by Maddie Garner RN  Outcome: Progressing  3/15/2025 1457 by Maddie Garner RN  Outcome: Progressing  Goal: Optimal Functional Ability  3/15/2025 1638 by Maddie Garner RN  Outcome: Progressing  3/15/2025 1457 by Maddie Garner RN  Outcome: Progressing  Goal: Absence of Infection Signs and Symptoms  3/15/2025 1638 by Maddie Garner RN  Outcome: Progressing  3/15/2025 1457 by Maddie Garner RN  Outcome: Progressing  Goal: Improved Oral Intake  3/15/2025 1638 by Maddie Garner RN  Outcome: Progressing  3/15/2025 1457 by Maddie Garner RN  Outcome: Progressing  Goal: Optimal Pain Control and Function  3/15/2025 1638 by Maddie Garner RN  Outcome: Progressing  3/15/2025 1457 by Maddie Garner RN  Outcome: Progressing  Goal: Skin Health and Integrity  3/15/2025 1638 by Maddie Garner RN  Outcome:  Progressing  3/15/2025 1457 by Maddie Garner RN  Outcome: Progressing  Goal: Optimal Wound Healing  3/15/2025 1638 by Maddie Garner RN  Outcome: Progressing  3/15/2025 1457 by Maddie Garner RN  Outcome: Progressing     Problem: Fall Injury Risk  Goal: Absence of Fall and Fall-Related Injury  3/15/2025 1638 by Maddie Garner RN  Outcome: Progressing  3/15/2025 1457 by Maddie Garner RN  Outcome: Progressing     Problem: Mobility Impairment  Goal: Optimal Mobility  3/15/2025 1638 by Maddie Garner RN  Outcome: Progressing  3/15/2025 1457 by Maddie Garner RN  Outcome: Progressing     Problem: Pain Acute  Goal: Optimal Pain Control and Function  3/15/2025 1638 by Maddie Garner RN  Outcome: Progressing  3/15/2025 1457 by Maddie Garner RN  Outcome: Progressing     Problem: Skin Injury Risk Increased  Goal: Skin Health and Integrity  3/15/2025 1638 by Maddie Garner RN  Outcome: Progressing  3/15/2025 1457 by Maddie Garner RN  Outcome: Progressing   Will continue to monitor and will maintain a safe environment.

## 2025-03-16 PROCEDURE — 25000003 PHARM REV CODE 250: Performed by: INTERNAL MEDICINE

## 2025-03-16 PROCEDURE — 25000003 PHARM REV CODE 250: Performed by: NURSE PRACTITIONER

## 2025-03-16 PROCEDURE — 63600175 PHARM REV CODE 636 W HCPCS: Performed by: INTERNAL MEDICINE

## 2025-03-16 PROCEDURE — 99900035 HC TECH TIME PER 15 MIN (STAT)

## 2025-03-16 PROCEDURE — 99900031 HC PATIENT EDUCATION (STAT)

## 2025-03-16 PROCEDURE — 94761 N-INVAS EAR/PLS OXIMETRY MLT: CPT

## 2025-03-16 PROCEDURE — 11800000 HC REHAB PRIVATE ROOM

## 2025-03-16 PROCEDURE — 94640 AIRWAY INHALATION TREATMENT: CPT

## 2025-03-16 PROCEDURE — 25000242 PHARM REV CODE 250 ALT 637 W/ HCPCS: Performed by: INTERNAL MEDICINE

## 2025-03-16 PROCEDURE — A4216 STERILE WATER/SALINE, 10 ML: HCPCS | Performed by: NURSE PRACTITIONER

## 2025-03-16 RX ADMIN — GENTIAN VIOLET 2% 0.5 ML: 20 LIQUID TOPICAL at 09:03

## 2025-03-16 RX ADMIN — GUAIFENESIN 1200 MG: 600 TABLET, EXTENDED RELEASE ORAL at 08:03

## 2025-03-16 RX ADMIN — MICONAZOLE NITRATE: 20 POWDER TOPICAL at 08:03

## 2025-03-16 RX ADMIN — PREDNISONE 20 MG: 10 TABLET ORAL at 08:03

## 2025-03-16 RX ADMIN — FAMOTIDINE 20 MG: 20 TABLET, FILM COATED ORAL at 08:03

## 2025-03-16 RX ADMIN — ACETAMINOPHEN 650 MG: 325 TABLET ORAL at 09:03

## 2025-03-16 RX ADMIN — TAMSULOSIN HYDROCHLORIDE 0.4 MG: 0.4 CAPSULE ORAL at 08:03

## 2025-03-16 RX ADMIN — LEVALBUTEROL 1.25 MG: 1.25 SOLUTION, CONCENTRATE RESPIRATORY (INHALATION) at 07:03

## 2025-03-16 RX ADMIN — ATORVASTATIN CALCIUM 20 MG: 20 TABLET, FILM COATED ORAL at 08:03

## 2025-03-16 RX ADMIN — CEFTRIAXONE 1 G: 1 INJECTION, POWDER, FOR SOLUTION INTRAMUSCULAR; INTRAVENOUS at 09:03

## 2025-03-16 RX ADMIN — Medication 10 ML: at 09:03

## 2025-03-16 RX ADMIN — MICONAZOLE NITRATE: 20 POWDER TOPICAL at 09:03

## 2025-03-16 RX ADMIN — ACETAMINOPHEN 650 MG: 325 TABLET ORAL at 10:03

## 2025-03-16 RX ADMIN — CARBOXYMETHYLCELLULOSE SODIUM 1 DROP: 5 SOLUTION/ DROPS OPHTHALMIC at 08:03

## 2025-03-16 RX ADMIN — CEFTRIAXONE 1 G: 1 INJECTION, POWDER, FOR SOLUTION INTRAMUSCULAR; INTRAVENOUS at 11:03

## 2025-03-16 RX ADMIN — LEVALBUTEROL 1.25 MG: 1.25 SOLUTION, CONCENTRATE RESPIRATORY (INHALATION) at 01:03

## 2025-03-16 RX ADMIN — Medication 6 MG: at 08:03

## 2025-03-16 RX ADMIN — OXYBUTYNIN CHLORIDE 5 MG: 5 TABLET, EXTENDED RELEASE ORAL at 08:03

## 2025-03-16 RX ADMIN — FINASTERIDE 5 MG: 5 TABLET, FILM COATED ORAL at 08:03

## 2025-03-16 RX ADMIN — CLARITHROMYCIN 500 MG: 500 TABLET, FILM COATED ORAL at 08:03

## 2025-03-16 NOTE — PLAN OF CARE
Problem: Rehabilitation (IRF) Plan of Care  Goal: Plan of Care Review  3/16/2025 1720 by Maddie Garner RN  Outcome: Progressing  3/16/2025 1429 by Maddie Garner RN  Outcome: Progressing  Goal: Patient-Specific Goal (Individualized)  3/16/2025 1720 by Maddie Garner RN  Outcome: Progressing  3/16/2025 1429 by Maddie Garner RN  Outcome: Progressing  Goal: Absence of New-Onset Illness or Injury  3/16/2025 1720 by Maddie Garner RN  Outcome: Progressing  3/16/2025 1429 by Maddie Garner RN  Outcome: Progressing  Goal: Optimal Comfort and Wellbeing  3/16/2025 1720 by Maddie Garner RN  Outcome: Progressing  3/16/2025 1429 by Maddie Garner RN  Outcome: Progressing  Goal: Home and Community Transition Plan Established  3/16/2025 1720 by Maddie Garner RN  Outcome: Progressing  3/16/2025 1429 by Maddie Garner RN  Outcome: Progressing     Problem: Infection  Goal: Absence of Infection Signs and Symptoms  3/16/2025 1720 by Maddie Garner RN  Outcome: Progressing  3/16/2025 1429 by Maddie Garner RN  Outcome: Progressing     Problem: Wound  Goal: Optimal Coping  3/16/2025 1720 by Maddie Garner RN  Outcome: Progressing  3/16/2025 1429 by Maddie Garner RN  Outcome: Progressing  Goal: Optimal Functional Ability  3/16/2025 1720 by Maddie Garner RN  Outcome: Progressing  3/16/2025 1429 by Maddie Garner RN  Outcome: Progressing  Goal: Absence of Infection Signs and Symptoms  3/16/2025 1720 by Maddie Garner RN  Outcome: Progressing  3/16/2025 1429 by Maddie Garner RN  Outcome: Progressing  Goal: Improved Oral Intake  3/16/2025 1720 by Maddie Garner RN  Outcome: Progressing  3/16/2025 1429 by Maddie Garner RN  Outcome: Progressing  Goal: Optimal Pain Control and Function  3/16/2025 1720 by Maddie Garner RN  Outcome: Progressing  3/16/2025 1429 by Maddie Garner RN  Outcome: Progressing  Goal: Skin Health and Integrity  3/16/2025 1720 by Maddie Garner RN  Outcome:  Progressing  3/16/2025 1429 by Maddie Garner RN  Outcome: Progressing  Goal: Optimal Wound Healing  3/16/2025 1720 by Maddie Garner RN  Outcome: Progressing  3/16/2025 1429 by Maddie Garner RN  Outcome: Progressing     Problem: Fall Injury Risk  Goal: Absence of Fall and Fall-Related Injury  3/16/2025 1720 by Maddie Garner RN  Outcome: Progressing  3/16/2025 1429 by Maddie Garner RN  Outcome: Progressing     Problem: Mobility Impairment  Goal: Optimal Mobility  3/16/2025 1720 by Maddie Garner RN  Outcome: Progressing  3/16/2025 1429 by Maddie Garner RN  Outcome: Progressing     Problem: Pain Acute  Goal: Optimal Pain Control and Function  3/16/2025 1720 by Maddie Garner RN  Outcome: Progressing  3/16/2025 1429 by Maddie Garner RN  Outcome: Progressing     Problem: Skin Injury Risk Increased  Goal: Skin Health and Integrity  3/16/2025 1720 by Maddie Garner RN  Outcome: Progressing  3/16/2025 1429 by Maddie Garner RN  Outcome: Progressing   Will continue to monitor and will maintain a safe environment.

## 2025-03-16 NOTE — ASSESSMENT & PLAN NOTE
Patient's blood pressure range in the last 24 hours was: BP  Min: 128/59  Max: 144/65.The patient's inpatient anti-hypertensive regimen is listed below:  Current Antihypertensives       Plan  - BP is controlled, no changes needed to their regimen

## 2025-03-16 NOTE — ASSESSMENT & PLAN NOTE
Anemia is likely due to chronic disease due to Chronic Kidney Disease. Most recent hemoglobin and hematocrit are listed below.  Recent Labs     03/14/25  0646   HGB 11.6*   HCT 35.9*     Plan  - Monitor serial CBC: q72hrs  - Transfuse PRBC if patient becomes hemodynamically unstable, symptomatic or H/H drops below 7/21.  - Patient has received 3 units of PRBCs  - Patient's anemia is currently stable

## 2025-03-16 NOTE — HOSPITAL COURSE
3/16 AA, weekend crosscover, no acute events reported, blood pressure stable, continue PT OT efforts    3/17 DL:  Patient doing well this morning.  He is sitting up in wheelchair in the dining room and is actively participating in therapy.  No acute events reported.  No acute distress noted.  Mild hyponatremia noted.  Patient encouraged to increase p.o. fluid intake.  Renal function stable.  Vital signs stable.  Patient reports therapy going well.  Pain.  Encouraged patient to continue therapy efforts.    3/18 FM:  Patient's respiratory symptoms are much improved but still having occasional cough and wheeze.  We will add low-dose budesonide.  Patient's cognition is improved Bolton catheter in place patient's pain improved.  Patient completing time needs in therapy and overall doing well    3/19 DL:  Patient doing well this morning.  He is in dining room and is actively participating in therapy.  Patient denies pain.  Vital signs stable.  Patient continues to work well with therapy.  Encouraged patient to continue therapy efforts.    3/20 DL:  Patient doing well this morning.  He is in the dining room and is actively participating in therapy.  Patient is without complaint this morning.  Labs and vital signs stable.  Patient reports therapy going well.  He states he feels he is making functional gains.  Encouraged patient to continue therapy efforts.    3/21 DL:  Patient reports increased right sided hip/lower back pain that is a barrier to progress.  Medications adjusted.  We will obtain x-rays of L-spine and right hip and pelvis.  Patient educated on importance of notifying staff of pain and requesting medications as needed.  Encouraged patient to continue therapy efforts.    3/22 SH: weekend crossover- VSS, patient having trouble in participating in PT de to pain imaging revealed compression deformity of the L1 body, new from the previous exam, mild chronic compression deformity of the L3 body and multilevel lumbar  spondylosis. Pain control on board    3/24 DL:  Patient doing well this morning.  He is ambulating in [place] with therapy.  Patient reports pain well-controlled with current regimen.  No acute distress noted.  Vital signs stable.  Patient continues to work well with therapy and remains highly motivated.  Patient states he has looking forward to discharge in the morning.  Encouraged patient to continue therapy efforts.    Discharge Note:  Patient had a good functional recovery.  Patient was started on nebulized treatments and antibiotics at the beginning of his rehab stay and made progressive improvement in his pulmonary complaints and was able to put in good effort and met all turn requirements.  Patient made functional gains across the board with strength endurance pain control patient did have exacerbation of his back pain see x-rays for details.  Otherwise medications were reconciled at the time of discharge and discharge care coordinated with family.  Patient will follow-up with his primary team and we have encouraged him to continue working with therapy.

## 2025-03-16 NOTE — PROGRESS NOTES
[facility]  Progress Note    Patient Name: Tobi Liz Jr.  MRN: [id number]  Patient Class: IP- Rehab   Admission Date: 3/13/2025  Length of Stay: 3 days  Attending Physician: Dave Reyes III, MD  Primary Care Provider: Luis Enrique Guzman Jr., MD        Subjective     Principal Problem:Myopathy        HPI:  Tobi Liz Jr. is a 89 y.o. male with a PMHx of has a past medical history of Aortic valve stenosis, Arthritis, BPH (benign prostatic hyperplasia), Carotid artery stenosis, Chronic diastolic heart failure, ETOH abuse, Exposure to COVID-19 virus (01/07/2022), Hyperchloremia, Kidney stones, Murmur, Polymyalgia rheumatica, PVD (peripheral vascular disease), and Renal artery stenosis., presents complaining of shaking after standing from a seated position that started yesterday, he was aware and talking during the episode so not likely a seizure. Labs show he was severely anemic in the ED with Hgb of 5. He had hematuria as well on UA along with bacteria. Ucx pending. His family states that he has a history of anemia in the past. He otherwise feels fine. He received 3 units of pRBCs in the ED with appropriate response.     Patient overall significantly improved from admission. H&H were stable yesterday. Awaiting updated CBC this morning. Otherwise renal function stable and no new electrolyte abnormalities noted. Patient accepted to inpatient rehab and we will discharging.    Patient is seen and examined after admission and patient is complaining of respiratory congestion wheezing and dyspnea.  Patient's currently on Bactrim and sensitivities noted for his urinary tract infection as well as the other antibiotics that maybe appropriate we will switch to Rocephin as we will get better respiratory coverage as the patient does seem to be battling a bronchitis and possibly a pneumonia.  Repeat chest x-ray pending.  Patient is motivated to improve he is very eager to return  home patient's significantly decline in his overall functional state and unable to complete ADLs safely at this time.  He has developed a myopathy as well as some confusion and encephalopathy agree with inpatient rehab patient meets current Medicare criteria.       Patient requires acute inpatient rehab admission with 24-hour nursing and active physician oversight to monitor and manage acute medical comorbid conditions, labs, pain, and functional deficits. Patient/family will also require teaching and integration of improving functional skills into daily living. Patient will also require an individualized, interdisciplinary approach to their care, receiving PT, OT services 3 hours per day, 5 days per week. Required care cannot be provided at a lower level of care. Patient is anticipated to require approximately 10-14 days LOS with expected discharge home  with  services.    Impairment group ([facility]):   Neuromuscular Disorders 03.8 Etiologic diagnosis/description:   G72.9: Myopathy  D64.9: Anemia      Date of onset:  3/8/2025 Date of surgery: N/A   Allergies: Patient has no known allergies.   Comorbid condition:   HTN, HLD, BPH, CAD, Valvular heart disease, CHF class II, Occlusion of right iliac artery   Medical/functional conditions requiring inpatient rehabilitation:      This patient requires medical management/24-hour nursing of complex co morbidities HTN, HLD, BPH, CAD, Valvular heart disease, CHF class II, Occlusion of right iliac artery, labs, medications (see medications list), pain, sleep hygiene, anticoagulation, nutrition, hydration, neurological, pulmonary, cardiac status, and preventive healthcare.     This patient requires intense therapy and an integrated, interdisciplinary approach to address safety, impaired mobility, impaired ADLs (dressing, toileting, grooming, showering), judgment, and memory, communication, pulmonary insufficiency, bowel/bladder problems,preventive healthcare, medication  management, integration of functional skills into daily living, and home caregiver support and training.    Risk for medical/clinical complications:      This patient is at risk for the following complications: DVT/PE, pneumonia, malnutrition, neurological decline, respiratory insufficiency, worsening activity intolerance, complications from anticoagulation, skin breakdown, inadequate sleep, and constipation.        Overview/Hospital Course:  3/16 AA, weekend crosscover, no acute events reported, blood pressure stable, continue PT OT efforts    Interval History: Patient seen and examined.     Review of Systems   Constitutional:  Positive for activity change, appetite change and fatigue. Negative for chills and fever.   HENT:  Positive for postnasal drip, sinus pressure and sinus pain. Negative for ear pain, mouth sores, nosebleeds and sore throat.    Eyes:  Negative for visual disturbance.   Respiratory:  Positive for cough, shortness of breath and wheezing. Negative for apnea.    Cardiovascular:  Negative for chest pain, palpitations and leg swelling.   Gastrointestinal:  Positive for constipation. Negative for abdominal distention, abdominal pain, blood in stool, diarrhea, nausea and vomiting.   Endocrine: Positive for cold intolerance. Negative for polyphagia.   Musculoskeletal:  Positive for arthralgias and back pain.   Skin:  Negative for rash.   Neurological:  Positive for tremors and weakness. Negative for seizures, syncope, facial asymmetry and speech difficulty.   Hematological:  Negative for adenopathy. Bruises/bleeds easily.   Psychiatric/Behavioral:  Positive for confusion. Negative for agitation and hallucinations. The patient is nervous/anxious.      Objective:     Vital Signs (Most Recent):  Temp: 97.6 °F (36.4 °C) (03/16/25 0616)  Pulse: 68 (03/16/25 1338)  Resp: 18 (03/16/25 1338)  BP: (!) 128/59 (03/16/25 0616)  SpO2: 97 % (03/16/25 1338) Vital Signs (24h Range):  Temp:  [97.6 °F (36.4 °C)-98 °F  (36.7 °C)] 97.6 °F (36.4 °C)  Pulse:  [65-74] 68  Resp:  [16-18] 18  SpO2:  [96 %-98 %] 97 %  BP: (128-144)/(59-65) 128/59     Weight: 82.1 kg (181 lb)  Body mass index is 26.73 kg/m².    Intake/Output Summary (Last 24 hours) at 3/16/2025 1538  Last data filed at 3/16/2025 1338  Gross per 24 hour   Intake 2740 ml   Output 2200 ml   Net 540 ml         Physical Exam  Vitals and nursing note reviewed.   Constitutional:       General: He is awake. He is not in acute distress.     Appearance: He is ill-appearing. He is not toxic-appearing.   HENT:      Head: Normocephalic and atraumatic.      Nose: Congestion and rhinorrhea present.      Mouth/Throat:      Mouth: Mucous membranes are moist.      Pharynx: Oropharynx is clear. No oropharyngeal exudate or posterior oropharyngeal erythema.   Eyes:      General: No scleral icterus.        Right eye: No discharge.         Left eye: No discharge.      Extraocular Movements: Extraocular movements intact.   Neck:      Thyroid: No thyroid mass or thyromegaly.      Vascular: No carotid bruit.      Meningeal: Brudzinski's sign and Kernig's sign absent.   Cardiovascular:      Rate and Rhythm: Normal rate and regular rhythm.      Chest Wall: PMI is not displaced. No thrill.      Heart sounds: Murmur heard.   Pulmonary:      Effort: No tachypnea, accessory muscle usage, prolonged expiration or respiratory distress.      Breath sounds: No decreased air movement. Rhonchi and rales present. No wheezing.   Abdominal:      General: Bowel sounds are normal. There is no distension.      Palpations: Abdomen is soft. There is no hepatomegaly or splenomegaly.      Tenderness: There is no abdominal tenderness. There is no guarding or rebound.   Musculoskeletal:      Cervical back: Neck supple. No rigidity. No muscular tenderness.      Right lower leg: No edema.      Left lower leg: No edema.   Lymphadenopathy:      Cervical: No cervical adenopathy.   Skin:     General: Skin is warm.       Capillary Refill: Capillary refill takes less than 2 seconds.      Coloration: Skin is not cyanotic, jaundiced or pale.      Findings: No erythema or petechiae.   Neurological:      Mental Status: He is alert and oriented to person, place, and time.      Cranial Nerves: No cranial nerve deficit, dysarthria or facial asymmetry.      Motor: Weakness present. No tremor.      Gait: Gait abnormal.   Psychiatric:         Attention and Perception: He does not perceive auditory hallucinations.         Mood and Affect: Mood is anxious. Mood is not depressed. Affect is not flat.         Speech: Speech is not rapid and pressured or slurred.         Behavior: Behavior normal. Behavior is not agitated, aggressive or combative.         Thought Content: Thought content normal. Thought content is not paranoid or delusional.         Cognition and Memory: Cognition is impaired.               Significant Labs: All pertinent labs within the past 24 hours have been reviewed.  Recent Lab Results       None            Significant Imaging: I have reviewed all pertinent imaging results/findings within the past 24 hours.      Assessment & Plan  Myopathy  Patient has had a significant decline in function.  Agree with inpatient rehabilitative course as he needs multiple modalities of therapy to aid in his recovery and close physician oversight of his complex medical problems.    ESTIMATED LENGTH OF STAY:  The patient's estimated length of stay is 10-14 days and he is expected to be able to be discharged to home with family .    REHABILITATION PLAN/GOALS  The patient is being admitted to a comprehensive acute inpatient rehabilitation program consisting of at least 3 hours of combined physical ( 1.5hrs./day, 5 days a week), and occupational therapy (1.5 hrs. A day, 5 days a week),speech therapy services if necessary, 24-hour skilled rehabilitation nursing care, and close supervision of a physician with special training and experience in  rehabilitation medicine. Patient's prognosis for significant practical improvement within a reasonable period of time appears good . Given the patient's complex medical condition and risk of further medical complications, rehabilitation services could not be safely provided at a lower level of care such as a skilled nursing facility.                    1. Physical Therapy to Evaluate and Treat, Work on bed mobility, Assist with transfers, Strength, Gait, Fall Prevention, Balance, and Modalities as Needed   2. Occupational Therapy to ADL Retraining, Functional Transfer Training, Therapeutic Activity and Exercises, Neuromuscular Re-education, Manual Therapy, Use of Adaptive Equipment and Retraining, Safety Awareness and Fall Prevention, and Home Modification   3. Speech Pathology to Evaluate and assist with dysphagia, Make recommendations for a safe diet, Evaluate and Treat Cognitive linguistic deficits, Evaluate and Treat expressive and receptive language deficits, and Evaluate and treat motor speech deficits   4. Specialized 24-hour Rehabilitation Nursing Care to Protection of Skin and Soft Tissue, Assisting with continence, Bowel and bladder training, Neurological checks, Medicine administration and medicine and management, Wound care, and Fall protection and general encouragement   5. Dietary to optimize diet and nutrition.   6. Social Work/Case management to assist with discharge planning activities, acquisition of durable medical equipment, and ordering of follow up services as necessary.    The services provided in the acute medical rehab setting are under my supervision 24 hours a day. These services are necessary for this patient to achieve the most appropriate functional outcome and to determine the most appropriate discharge disposition.  I have reviewed the treatment plan with the patient and answered all of her questions, discussed goals and expectation.    REVIEW OF PRE-SCREEN ASSESSMENT  I have reviewed  "the patient's preadmission screen including her medical and functional status and compared it with her status upon arrival to the rehab unit and see no changes . Acute rehab services are still necessary and appropriate for this patient to achieve the best functional outcome while determining the most appropriate discharge disposition and services. The patient will require close medical management of multiple medical co-morbidities including as noted .     Hypertension  Patient's blood pressure range in the last 24 hours was: BP  Min: 128/59  Max: 144/65.The patient's inpatient anti-hypertensive regimen is listed below:  Current Antihypertensives       Plan  - BP is controlled, no changes needed to their regimen  Hyperlipidemia  Recheck labs.    BPH (benign prostatic hyperplasia)  Bolton in place.    Atherosclerosis of native artery of both lower extremities with rest pain      Congestive heart failure, NYHA class II  Patient has Combined Systolic and Diastolic heart failure that is Chronic. On presentation their CHF was well compensated. Most recent BNP and echo results are listed below.  No results for input(s): "BNP" in the last 72 hours.  Latest ECHO  No results found for this or any previous visit.    Current Heart Failure Medications       Plan  - Monitor strict I&Os and daily weights.    - Place on telemetry  - Low sodium diet      S/P TAVR (transcatheter aortic valve replacement)  Has done well post procedure.    Insomnia  Monitor, PRN meds.    Anemia  Anemia is likely due to chronic disease due to Chronic Kidney Disease. Most recent hemoglobin and hematocrit are listed below.  Recent Labs     03/14/25  0646   HGB 11.6*   HCT 35.9*     Plan  - Monitor serial CBC:  q72hrs  - Transfuse PRBC if patient becomes hemodynamically unstable, symptomatic or H/H drops below 7/21.  - Patient has received 3 units of PRBCs  - Patient's anemia is currently stable  Acute cystitis with hematuria  Abx changed 2nd dual resp " infection.    Acute bronchitis  As above.    Urinary obstruction  Willams in place, plan on DC with willams.    VTE Risk Mitigation (From admission, onward)           Ordered     IP VTE HIGH RISK PATIENT  Once         03/13/25 1349     Place sequential compression device  Until discontinued         03/13/25 1349                    Discharge Planning   ADONAY:      Code Status: Full Code   Medical Readiness for Discharge Date:   Discharge Plan A: Home                Please place Justification for DME        Sujit Liz MD  Department of Hospital Medicine   [facility] ([facility])

## 2025-03-16 NOTE — PLAN OF CARE
Problem: Rehabilitation ([facility]) Plan of Care  Goal: Plan of Care Review  Outcome: Progressing  Goal: Patient-Specific Goal (Individualized)  Outcome: Progressing  Goal: Absence of New-Onset Illness or Injury  Outcome: Progressing  Goal: Optimal Comfort and Wellbeing  Outcome: Progressing  Goal: Home and Community Transition Plan Established  Outcome: Progressing     Problem: Infection  Goal: Absence of Infection Signs and Symptoms  Outcome: Progressing     Problem: Wound  Goal: Optimal Coping  Outcome: Progressing  Goal: Optimal Functional Ability  Outcome: Progressing  Goal: Absence of Infection Signs and Symptoms  Outcome: Progressing  Goal: Improved Oral Intake  Outcome: Progressing  Goal: Optimal Pain Control and Function  Outcome: Progressing  Goal: Skin Health and Integrity  Outcome: Progressing  Goal: Optimal Wound Healing  Outcome: Progressing     Problem: Fall Injury Risk  Goal: Absence of Fall and Fall-Related Injury  Outcome: Progressing     Problem: Mobility Impairment  Goal: Optimal Mobility  Outcome: Progressing     Problem: Pain Acute  Goal: Optimal Pain Control and Function  Outcome: Progressing     Problem: Skin Injury Risk Increased  Goal: Skin Health and Integrity  Outcome: Progressing   Will continue to monitor and will maintain a safe environment.

## 2025-03-16 NOTE — PLAN OF CARE
Patient complained of right great toe pain during the night. Tylenol administered. Legs elevated on pillows.     Problem: Rehabilitation (IRF) Plan of Care  Goal: Plan of Care Review  Outcome: Progressing  Goal: Patient-Specific Goal (Individualized)  Outcome: Progressing  Goal: Absence of New-Onset Illness or Injury  Outcome: Progressing  Goal: Optimal Comfort and Wellbeing  Outcome: Progressing  Goal: Home and Community Transition Plan Established  Outcome: Progressing     Problem: Infection  Goal: Absence of Infection Signs and Symptoms  Outcome: Progressing     Problem: Wound  Goal: Optimal Coping  Outcome: Progressing  Goal: Optimal Functional Ability  Outcome: Progressing  Goal: Absence of Infection Signs and Symptoms  Outcome: Progressing  Goal: Improved Oral Intake  Outcome: Progressing  Goal: Optimal Pain Control and Function  Outcome: Progressing  Goal: Skin Health and Integrity  Outcome: Progressing  Goal: Optimal Wound Healing  Outcome: Progressing     Problem: Fall Injury Risk  Goal: Absence of Fall and Fall-Related Injury  Outcome: Progressing     Problem: Mobility Impairment  Goal: Optimal Mobility  Outcome: Progressing     Problem: Pain Acute  Goal: Optimal Pain Control and Function  Outcome: Progressing     Problem: Skin Injury Risk Increased  Goal: Skin Health and Integrity  Outcome: Progressing

## 2025-03-16 NOTE — SUBJECTIVE & OBJECTIVE
Interval History: Patient seen and examined.     Review of Systems   Constitutional:  Positive for activity change, appetite change and fatigue. Negative for chills and fever.   HENT:  Positive for postnasal drip, sinus pressure and sinus pain. Negative for ear pain, mouth sores, nosebleeds and sore throat.    Eyes:  Negative for visual disturbance.   Respiratory:  Positive for cough, shortness of breath and wheezing. Negative for apnea.    Cardiovascular:  Negative for chest pain, palpitations and leg swelling.   Gastrointestinal:  Positive for constipation. Negative for abdominal distention, abdominal pain, blood in stool, diarrhea, nausea and vomiting.   Endocrine: Positive for cold intolerance. Negative for polyphagia.   Musculoskeletal:  Positive for arthralgias and back pain.   Skin:  Negative for rash.   Neurological:  Positive for tremors and weakness. Negative for seizures, syncope, facial asymmetry and speech difficulty.   Hematological:  Negative for adenopathy. Bruises/bleeds easily.   Psychiatric/Behavioral:  Positive for confusion. Negative for agitation and hallucinations. The patient is nervous/anxious.      Objective:     Vital Signs (Most Recent):  Temp: 97.6 °F (36.4 °C) (03/16/25 0616)  Pulse: 68 (03/16/25 1338)  Resp: 18 (03/16/25 1338)  BP: (!) 128/59 (03/16/25 0616)  SpO2: 97 % (03/16/25 1338) Vital Signs (24h Range):  Temp:  [97.6 °F (36.4 °C)-98 °F (36.7 °C)] 97.6 °F (36.4 °C)  Pulse:  [65-74] 68  Resp:  [16-18] 18  SpO2:  [96 %-98 %] 97 %  BP: (128-144)/(59-65) 128/59     Weight: 82.1 kg (181 lb)  Body mass index is 26.73 kg/m².    Intake/Output Summary (Last 24 hours) at 3/16/2025 1538  Last data filed at 3/16/2025 1338  Gross per 24 hour   Intake 2740 ml   Output 2200 ml   Net 540 ml         Physical Exam  Vitals and nursing note reviewed.   Constitutional:       General: He is awake. He is not in acute distress.     Appearance: He is ill-appearing. He is not toxic-appearing.   HENT:       Head: Normocephalic and atraumatic.      Nose: Congestion and rhinorrhea present.      Mouth/Throat:      Mouth: Mucous membranes are moist.      Pharynx: Oropharynx is clear. No oropharyngeal exudate or posterior oropharyngeal erythema.   Eyes:      General: No scleral icterus.        Right eye: No discharge.         Left eye: No discharge.      Extraocular Movements: Extraocular movements intact.   Neck:      Thyroid: No thyroid mass or thyromegaly.      Vascular: No carotid bruit.      Meningeal: Brudzinski's sign and Kernig's sign absent.   Cardiovascular:      Rate and Rhythm: Normal rate and regular rhythm.      Chest Wall: PMI is not displaced. No thrill.      Heart sounds: Murmur heard.   Pulmonary:      Effort: No tachypnea, accessory muscle usage, prolonged expiration or respiratory distress.      Breath sounds: No decreased air movement. Rhonchi and rales present. No wheezing.   Abdominal:      General: Bowel sounds are normal. There is no distension.      Palpations: Abdomen is soft. There is no hepatomegaly or splenomegaly.      Tenderness: There is no abdominal tenderness. There is no guarding or rebound.   Musculoskeletal:      Cervical back: Neck supple. No rigidity. No muscular tenderness.      Right lower leg: No edema.      Left lower leg: No edema.   Lymphadenopathy:      Cervical: No cervical adenopathy.   Skin:     General: Skin is warm.      Capillary Refill: Capillary refill takes less than 2 seconds.      Coloration: Skin is not cyanotic, jaundiced or pale.      Findings: No erythema or petechiae.   Neurological:      Mental Status: He is alert and oriented to person, place, and time.      Cranial Nerves: No cranial nerve deficit, dysarthria or facial asymmetry.      Motor: Weakness present. No tremor.      Gait: Gait abnormal.   Psychiatric:         Attention and Perception: He does not perceive auditory hallucinations.         Mood and Affect: Mood is anxious. Mood is not depressed. Affect  is not flat.         Speech: Speech is not rapid and pressured or slurred.         Behavior: Behavior normal. Behavior is not agitated, aggressive or combative.         Thought Content: Thought content normal. Thought content is not paranoid or delusional.         Cognition and Memory: Cognition is impaired.               Significant Labs: All pertinent labs within the past 24 hours have been reviewed.  Recent Lab Results       None            Significant Imaging: I have reviewed all pertinent imaging results/findings within the past 24 hours.

## 2025-03-17 LAB
ALBUMIN SERPL BCP-MCNC: 2.8 G/DL (ref 3.5–5.2)
ALP SERPL-CCNC: 47 U/L (ref 55–135)
ALT SERPL W/O P-5'-P-CCNC: 37 U/L (ref 10–44)
ANION GAP SERPL CALC-SCNC: 5 MMOL/L (ref 8–16)
AST SERPL-CCNC: 15 U/L (ref 10–40)
BASOPHILS # BLD AUTO: 0 K/UL (ref 0–0.2)
BASOPHILS NFR BLD: 0 % (ref 0–1.9)
BILIRUB SERPL-MCNC: 0.5 MG/DL (ref 0.1–1)
BUN SERPL-MCNC: 24 MG/DL (ref 10–30)
CALCIUM SERPL-MCNC: 7.7 MG/DL (ref 8.7–10.5)
CHLORIDE SERPL-SCNC: 104 MMOL/L (ref 95–110)
CO2 SERPL-SCNC: 24 MMOL/L (ref 23–29)
CREAT SERPL-MCNC: 1 MG/DL (ref 0.5–1.4)
DIFFERENTIAL METHOD BLD: ABNORMAL
EOSINOPHIL # BLD AUTO: 0.1 K/UL (ref 0–0.5)
EOSINOPHIL NFR BLD: 1 % (ref 0–8)
ERYTHROCYTE [DISTWIDTH] IN BLOOD BY AUTOMATED COUNT: 15.3 % (ref 11.5–14.5)
EST. GFR  (NO RACE VARIABLE): >60 ML/MIN/1.73 M^2
GLUCOSE SERPL-MCNC: 96 MG/DL (ref 70–110)
HCT VFR BLD AUTO: 34.4 % (ref 40–54)
HGB BLD-MCNC: 11.1 G/DL (ref 14–18)
IMM GRANULOCYTES # BLD AUTO: 0.08 K/UL (ref 0–0.04)
IMM GRANULOCYTES NFR BLD AUTO: 0.8 % (ref 0–0.5)
LYMPHOCYTES # BLD AUTO: 1.1 K/UL (ref 1–4.8)
LYMPHOCYTES NFR BLD: 11.2 % (ref 18–48)
MAGNESIUM SERPL-MCNC: 2.1 MG/DL (ref 1.6–2.6)
MCH RBC QN AUTO: 31.7 PG (ref 27–31)
MCHC RBC AUTO-ENTMCNC: 32.3 G/DL (ref 32–36)
MCV RBC AUTO: 98 FL (ref 82–98)
MONOCYTES # BLD AUTO: 0.6 K/UL (ref 0.3–1)
MONOCYTES NFR BLD: 5.7 % (ref 4–15)
NEUTROPHILS # BLD AUTO: 7.9 K/UL (ref 1.8–7.7)
NEUTROPHILS NFR BLD: 81.3 % (ref 38–73)
NRBC BLD-RTO: 0 /100 WBC
PLATELET # BLD AUTO: 198 K/UL (ref 150–450)
PMV BLD AUTO: 9.5 FL (ref 9.2–12.9)
POTASSIUM SERPL-SCNC: 4.2 MMOL/L (ref 3.5–5.1)
PROT SERPL-MCNC: 5.9 G/DL (ref 6–8.4)
RBC # BLD AUTO: 3.5 M/UL (ref 4.6–6.2)
SODIUM SERPL-SCNC: 133 MMOL/L (ref 136–145)
WBC # BLD AUTO: 9.69 K/UL (ref 3.9–12.7)

## 2025-03-17 PROCEDURE — 76937 US GUIDE VASCULAR ACCESS: CPT

## 2025-03-17 PROCEDURE — 11800000 HC REHAB PRIVATE ROOM

## 2025-03-17 PROCEDURE — 97530 THERAPEUTIC ACTIVITIES: CPT

## 2025-03-17 PROCEDURE — 25000003 PHARM REV CODE 250: Performed by: INTERNAL MEDICINE

## 2025-03-17 PROCEDURE — 97535 SELF CARE MNGMENT TRAINING: CPT

## 2025-03-17 PROCEDURE — 36415 COLL VENOUS BLD VENIPUNCTURE: CPT | Performed by: INTERNAL MEDICINE

## 2025-03-17 PROCEDURE — 36410 VNPNXR 3YR/> PHY/QHP DX/THER: CPT

## 2025-03-17 PROCEDURE — 85025 COMPLETE CBC W/AUTO DIFF WBC: CPT | Performed by: INTERNAL MEDICINE

## 2025-03-17 PROCEDURE — 63600175 PHARM REV CODE 636 W HCPCS: Performed by: INTERNAL MEDICINE

## 2025-03-17 PROCEDURE — 83735 ASSAY OF MAGNESIUM: CPT | Performed by: INTERNAL MEDICINE

## 2025-03-17 PROCEDURE — 99900035 HC TECH TIME PER 15 MIN (STAT)

## 2025-03-17 PROCEDURE — 97116 GAIT TRAINING THERAPY: CPT

## 2025-03-17 PROCEDURE — 80053 COMPREHEN METABOLIC PANEL: CPT | Performed by: INTERNAL MEDICINE

## 2025-03-17 PROCEDURE — 25000242 PHARM REV CODE 250 ALT 637 W/ HCPCS: Performed by: INTERNAL MEDICINE

## 2025-03-17 PROCEDURE — 94761 N-INVAS EAR/PLS OXIMETRY MLT: CPT

## 2025-03-17 PROCEDURE — 97110 THERAPEUTIC EXERCISES: CPT

## 2025-03-17 PROCEDURE — 94640 AIRWAY INHALATION TREATMENT: CPT

## 2025-03-17 PROCEDURE — C1751 CATH, INF, PER/CENT/MIDLINE: HCPCS

## 2025-03-17 PROCEDURE — 99900031 HC PATIENT EDUCATION (STAT)

## 2025-03-17 PROCEDURE — 25000003 PHARM REV CODE 250: Performed by: NURSE PRACTITIONER

## 2025-03-17 RX ORDER — TRAMADOL HYDROCHLORIDE 50 MG/1
50 TABLET ORAL EVERY 6 HOURS PRN
Status: DISCONTINUED | OUTPATIENT
Start: 2025-03-17 | End: 2025-03-25 | Stop reason: HOSPADM

## 2025-03-17 RX ORDER — CEFTRIAXONE 2 G/1
2 INJECTION, POWDER, FOR SOLUTION INTRAMUSCULAR; INTRAVENOUS
Status: DISCONTINUED | OUTPATIENT
Start: 2025-03-17 | End: 2025-03-17

## 2025-03-17 RX ORDER — CEFTRIAXONE 2 G/1
2 INJECTION, POWDER, FOR SOLUTION INTRAMUSCULAR; INTRAVENOUS
Status: DISCONTINUED | OUTPATIENT
Start: 2025-03-17 | End: 2025-03-25 | Stop reason: HOSPADM

## 2025-03-17 RX ORDER — SODIUM CHLORIDE 0.9 % (FLUSH) 0.9 %
10 SYRINGE (ML) INJECTION EVERY 12 HOURS PRN
Status: DISCONTINUED | OUTPATIENT
Start: 2025-03-17 | End: 2025-03-25 | Stop reason: HOSPADM

## 2025-03-17 RX ORDER — ACETAMINOPHEN 325 MG/1
650 TABLET ORAL EVERY 6 HOURS PRN
Status: DISCONTINUED | OUTPATIENT
Start: 2025-03-17 | End: 2025-03-25 | Stop reason: HOSPADM

## 2025-03-17 RX ADMIN — LEVALBUTEROL 1.25 MG: 1.25 SOLUTION, CONCENTRATE RESPIRATORY (INHALATION) at 08:03

## 2025-03-17 RX ADMIN — GUAIFENESIN 1200 MG: 600 TABLET, EXTENDED RELEASE ORAL at 08:03

## 2025-03-17 RX ADMIN — PREDNISONE 20 MG: 10 TABLET ORAL at 08:03

## 2025-03-17 RX ADMIN — GENTIAN VIOLET 2% 0.5 ML: 20 LIQUID TOPICAL at 08:03

## 2025-03-17 RX ADMIN — CEFTRIAXONE SODIUM 2 G: 2 INJECTION, POWDER, FOR SOLUTION INTRAMUSCULAR; INTRAVENOUS at 02:03

## 2025-03-17 RX ADMIN — TAMSULOSIN HYDROCHLORIDE 0.4 MG: 0.4 CAPSULE ORAL at 08:03

## 2025-03-17 RX ADMIN — CARBOXYMETHYLCELLULOSE SODIUM 1 DROP: 5 SOLUTION/ DROPS OPHTHALMIC at 08:03

## 2025-03-17 RX ADMIN — ATORVASTATIN CALCIUM 20 MG: 20 TABLET, FILM COATED ORAL at 08:03

## 2025-03-17 RX ADMIN — ACETAMINOPHEN 650 MG: 325 TABLET ORAL at 02:03

## 2025-03-17 RX ADMIN — FINASTERIDE 5 MG: 5 TABLET, FILM COATED ORAL at 08:03

## 2025-03-17 RX ADMIN — CLARITHROMYCIN 500 MG: 500 TABLET, FILM COATED ORAL at 08:03

## 2025-03-17 RX ADMIN — MICONAZOLE NITRATE: 20 POWDER TOPICAL at 08:03

## 2025-03-17 RX ADMIN — TRAMADOL HYDROCHLORIDE 50 MG: 50 TABLET, COATED ORAL at 04:03

## 2025-03-17 RX ADMIN — ONDANSETRON 4 MG: 4 TABLET, ORALLY DISINTEGRATING ORAL at 09:03

## 2025-03-17 RX ADMIN — LEVALBUTEROL 1.25 MG: 1.25 SOLUTION, CONCENTRATE RESPIRATORY (INHALATION) at 01:03

## 2025-03-17 RX ADMIN — ACETAMINOPHEN 650 MG: 325 TABLET ORAL at 08:03

## 2025-03-17 RX ADMIN — POLYETHYLENE GLYCOL (3350) 17 G: 17 POWDER, FOR SOLUTION ORAL at 08:03

## 2025-03-17 RX ADMIN — Medication 6 MG: at 08:03

## 2025-03-17 RX ADMIN — LEVALBUTEROL 1.25 MG: 1.25 SOLUTION, CONCENTRATE RESPIRATORY (INHALATION) at 06:03

## 2025-03-17 RX ADMIN — FAMOTIDINE 20 MG: 20 TABLET, FILM COATED ORAL at 08:03

## 2025-03-17 RX ADMIN — OXYBUTYNIN CHLORIDE 5 MG: 5 TABLET, EXTENDED RELEASE ORAL at 08:03

## 2025-03-17 NOTE — PROGRESS NOTES
[facility]  Progress Note    Patient Name: Tobi Liz Jr.  MRN: [id number]  Patient Class: IP- Rehab   Admission Date: 3/13/2025  Length of Stay: 4 days  Attending Physician: Dave Reyes III, MD  Primary Care Provider: Luis Enrique Guzman Jr., MD        Subjective     Principal Problem:Myopathy        HPI:  Tobi Liz Jr. is a 89 y.o. male with a PMHx of has a past medical history of Aortic valve stenosis, Arthritis, BPH (benign prostatic hyperplasia), Carotid artery stenosis, Chronic diastolic heart failure, ETOH abuse, Exposure to COVID-19 virus (01/07/2022), Hyperchloremia, Kidney stones, Murmur, Polymyalgia rheumatica, PVD (peripheral vascular disease), and Renal artery stenosis., presents complaining of shaking after standing from a seated position that started yesterday, he was aware and talking during the episode so not likely a seizure. Labs show he was severely anemic in the ED with Hgb of 5. He had hematuria as well on UA along with bacteria. Ucx pending. His family states that he has a history of anemia in the past. He otherwise feels fine. He received 3 units of pRBCs in the ED with appropriate response.     Patient overall significantly improved from admission. H&H were stable yesterday. Awaiting updated CBC this morning. Otherwise renal function stable and no new electrolyte abnormalities noted. Patient accepted to inpatient rehab and we will discharging.    Patient is seen and examined after admission and patient is complaining of respiratory congestion wheezing and dyspnea.  Patient's currently on Bactrim and sensitivities noted for his urinary tract infection as well as the other antibiotics that maybe appropriate we will switch to Rocephin as we will get better respiratory coverage as the patient does seem to be battling a bronchitis and possibly a pneumonia.  Repeat chest x-ray pending.  Patient is motivated to improve he is very eager to return  home patient's significantly decline in his overall functional state and unable to complete ADLs safely at this time.  He has developed a myopathy as well as some confusion and encephalopathy agree with inpatient rehab patient meets current Medicare criteria.       Patient requires acute inpatient rehab admission with 24-hour nursing and active physician oversight to monitor and manage acute medical comorbid conditions, labs, pain, and functional deficits. Patient/family will also require teaching and integration of improving functional skills into daily living. Patient will also require an individualized, interdisciplinary approach to their care, receiving PT, OT services 3 hours per day, 5 days per week. Required care cannot be provided at a lower level of care. Patient is anticipated to require approximately 10-14 days LOS with expected discharge home  with  services.    Impairment group (IG[facility]):   Neuromuscular Disorders 03.8 Etiologic diagnosis/description:   G72.9: Myopathy  D64.9: Anemia      Date of onset:  3/8/2025 Date of surgery: N/A   Allergies: Patient has no known allergies.   Comorbid condition:   HTN, HLD, BPH, CAD, Valvular heart disease, CHF class II, Occlusion of right iliac artery   Medical/functional conditions requiring inpatient rehabilitation:      This patient requires medical management/24-hour nursing of complex co morbidities HTN, HLD, BPH, CAD, Valvular heart disease, CHF class II, Occlusion of right iliac artery, labs, medications (see medications list), pain, sleep hygiene, anticoagulation, nutrition, hydration, neurological, pulmonary, cardiac status, and preventive healthcare.     This patient requires intense therapy and an integrated, interdisciplinary approach to address safety, impaired mobility, impaired ADLs (dressing, toileting, grooming, showering), judgment, and memory, communication, pulmonary insufficiency, bowel/bladder problems,preventive healthcare, medication  management, integration of functional skills into daily living, and home caregiver support and training.    Risk for medical/clinical complications:      This patient is at risk for the following complications: DVT/PE, pneumonia, malnutrition, neurological decline, respiratory insufficiency, worsening activity intolerance, complications from anticoagulation, skin breakdown, inadequate sleep, and constipation.        Overview/Hospital Course:  3/16 AA, weekend crosscover, no acute events reported, blood pressure stable, continue PT OT efforts    3/17 DL:  Patient doing well this morning.  He is sitting up in wheelchair in the dining room and is actively participating in therapy.  No acute events reported.  No acute distress noted.  Mild hyponatremia noted.  Patient encouraged to increase p.o. fluid intake.  Renal function stable.  Vital signs stable.  Patient reports therapy going well.  Pain.  Encouraged patient to continue therapy efforts.    Interval History: Patient seen and examined.     Review of Systems   Constitutional:  Positive for activity change, appetite change and fatigue. Negative for chills and fever.   HENT:  Positive for postnasal drip, sinus pressure and sinus pain. Negative for ear pain, mouth sores, nosebleeds and sore throat.    Eyes:  Negative for visual disturbance.   Respiratory:  Positive for cough, shortness of breath and wheezing. Negative for apnea.    Cardiovascular:  Negative for chest pain, palpitations and leg swelling.   Gastrointestinal:  Positive for constipation. Negative for abdominal distention, abdominal pain, blood in stool, diarrhea, nausea and vomiting.   Endocrine: Positive for cold intolerance. Negative for polyphagia.   Musculoskeletal:  Positive for arthralgias and back pain.   Skin:  Negative for rash.   Neurological:  Positive for tremors and weakness. Negative for seizures, syncope, facial asymmetry and speech difficulty.   Hematological:  Negative for adenopathy.  Bruises/bleeds easily.   Psychiatric/Behavioral:  Positive for confusion. Negative for agitation and hallucinations. The patient is nervous/anxious.      Objective:     Vital Signs (Most Recent):  Temp: 97.4 °F (36.3 °C) (03/17/25 0715)  Pulse: 86 (03/17/25 0829)  Resp: 18 (03/17/25 0829)  BP: (!) 150/67 (03/17/25 0715)  SpO2: 96 % (03/17/25 0829) Vital Signs (24h Range):  Temp:  [97.4 °F (36.3 °C)-98.2 °F (36.8 °C)] 97.4 °F (36.3 °C)  Pulse:  [68-86] 86  Resp:  [17-18] 18  SpO2:  [95 %-97 %] 96 %  BP: (150-156)/(65-67) 150/67     Weight: 82.1 kg (181 lb)  Body mass index is 26.73 kg/m².    Intake/Output Summary (Last 24 hours) at 3/17/2025 0958  Last data filed at 3/17/2025 0845  Gross per 24 hour   Intake 2480 ml   Output 2000 ml   Net 480 ml         Physical Exam  Vitals and nursing note reviewed.   Constitutional:       General: He is awake. He is not in acute distress.     Appearance: He is ill-appearing. He is not toxic-appearing.   HENT:      Head: Normocephalic and atraumatic.      Nose: Congestion and rhinorrhea present.      Mouth/Throat:      Mouth: Mucous membranes are moist.      Pharynx: Oropharynx is clear. No oropharyngeal exudate or posterior oropharyngeal erythema.   Eyes:      General: No scleral icterus.        Right eye: No discharge.         Left eye: No discharge.      Extraocular Movements: Extraocular movements intact.   Neck:      Thyroid: No thyroid mass or thyromegaly.      Vascular: No carotid bruit.      Meningeal: Brudzinski's sign and Kernig's sign absent.   Cardiovascular:      Rate and Rhythm: Normal rate and regular rhythm.      Chest Wall: PMI is not displaced. No thrill.      Heart sounds: Murmur heard.   Pulmonary:      Effort: No tachypnea, accessory muscle usage, prolonged expiration or respiratory distress.      Breath sounds: No decreased air movement. Rhonchi and rales present. No wheezing.   Abdominal:      General: Bowel sounds are normal. There is no distension.       Palpations: Abdomen is soft. There is no hepatomegaly or splenomegaly.      Tenderness: There is no abdominal tenderness. There is no guarding or rebound.   Musculoskeletal:      Cervical back: Neck supple. No rigidity. No muscular tenderness.      Right lower leg: No edema.      Left lower leg: No edema.   Lymphadenopathy:      Cervical: No cervical adenopathy.   Skin:     General: Skin is warm.      Capillary Refill: Capillary refill takes less than 2 seconds.      Coloration: Skin is not cyanotic, jaundiced or pale.      Findings: No erythema or petechiae.   Neurological:      Mental Status: He is alert and oriented to person, place, and time.      Cranial Nerves: No cranial nerve deficit, dysarthria or facial asymmetry.      Motor: Weakness present. No tremor.      Gait: Gait abnormal.   Psychiatric:         Attention and Perception: He does not perceive auditory hallucinations.         Mood and Affect: Mood is anxious. Mood is not depressed. Affect is not flat.         Speech: Speech is not rapid and pressured or slurred.         Behavior: Behavior normal. Behavior is not agitated, aggressive or combative.         Thought Content: Thought content normal. Thought content is not paranoid or delusional.         Cognition and Memory: Cognition is impaired.               Significant Labs: All pertinent labs within the past 24 hours have been reviewed.  Recent Lab Results         03/17/25  0559        Albumin 2.8       ALP 47       ALT 37       Anion Gap 5       AST 15       Baso # 0.00       Basophil % 0.0       BILIRUBIN TOTAL 0.5  Comment: For infants and newborns, interpretation of results should be based  on gestational age, weight and in agreement with clinical  observations.    Premature Infant recommended reference ranges:  Up to 24 hours.............<8.0 mg/dL  Up to 48 hours............<12.0 mg/dL  3-5 days..................<15.0 mg/dL  6-29 days.................<15.0 mg/dL         BUN 24       Calcium  "7.7       Chloride 104       CO2 24       Creatinine 1.0       Differential Method Automated       eGFR >60.0       Eos # 0.1       Eos % 1.0       Glucose 96       Gran # (ANC) 7.9       Gran % 81.3       Hematocrit 34.4       Hemoglobin 11.1       Immature Grans (Abs) 0.08  Comment: Mild elevation in immature granulocytes is non specific and   can be seen in a variety of conditions including stress response,   acute inflammation, trauma and pregnancy. Correlation with other   laboratory and clinical findings is essential.         Immature Granulocytes 0.8       Lymph # 1.1       Lymph % 11.2       Magnesium  2.1       MCH 31.7       MCHC 32.3       MCV 98       Mono # 0.6       Mono % 5.7       MPV 9.5       nRBC 0       Platelet Count 198       Potassium 4.2       PROTEIN TOTAL 5.9       RBC 3.50       RDW 15.3       Sodium 133       WBC 9.69               Significant Imaging: I have reviewed all pertinent imaging results/findings within the past 24 hours.      Assessment & Plan  Myopathy  Continue PT/OT efforts.  Hypertension  Patient's blood pressure range in the last 24 hours was: BP  Min: 150/67  Max: 156/65.The patient's inpatient anti-hypertensive regimen is listed below:  Current Antihypertensives       Plan  - BP is controlled, no changes needed to their regimen  Hyperlipidemia  Recheck labs.    BPH (benign prostatic hyperplasia)  Bolton in place.    Atherosclerosis of native artery of both lower extremities with rest pain  Noted.  Continue current medications.    Congestive heart failure, NYHA class II  Patient has Combined Systolic and Diastolic heart failure that is Chronic. On presentation their CHF was well compensated. Most recent BNP and echo results are listed below.  No results for input(s): "BNP" in the last 72 hours.  Latest ECHO  No results found for this or any previous visit.    Current Heart Failure Medications       Plan  - Monitor strict I&Os and daily weights.    - Place on telemetry  - " Low sodium diet      S/P TAVR (transcatheter aortic valve replacement)  Has done well post procedure.    Insomnia  Monitor, PRN meds.    Anemia  Anemia is likely due to chronic disease due to Chronic Kidney Disease. Most recent hemoglobin and hematocrit are listed below.  Recent Labs     03/17/25  0559   HGB 11.1*   HCT 34.4*     Plan  - Monitor serial CBC:  q72hrs  - Transfuse PRBC if patient becomes hemodynamically unstable, symptomatic or H/H drops below 7/21.  - Patient has received 3 units of PRBCs  - Patient's anemia is currently stable  Acute cystitis with hematuria  Abx changed 2nd dual resp infection.    Acute bronchitis  As above.    Urinary obstruction  Willams in place, plan on DC with willams.      VTE Risk Mitigation (From admission, onward)           Ordered     IP VTE HIGH RISK PATIENT  Once         03/13/25 1349     Place sequential compression device  Until discontinued         03/13/25 1349                    Discharge Planning   ADONAY:      Code Status: Full Code   Medical Readiness for Discharge Date:   Discharge Plan A: Home                Please place Justification for DME        Zoya Lee NP  Department of Hospital Medicine   [facility] ([facility])

## 2025-03-17 NOTE — PLAN OF CARE
Received in report that patient has kidney stone pain last night. Patient reports no pain at this time. Will continue to monitor.  Problem: Rehabilitation ([facility]) Plan of Care  Goal: Plan of Care Review  Outcome: Progressing  Goal: Patient-Specific Goal (Individualized)  Outcome: Progressing  Goal: Absence of New-Onset Illness or Injury  Outcome: Progressing  Goal: Optimal Comfort and Wellbeing  Outcome: Progressing  Goal: Home and Community Transition Plan Established  Outcome: Progressing     Problem: Infection  Goal: Absence of Infection Signs and Symptoms  Outcome: Progressing     Problem: Wound  Goal: Optimal Coping  Outcome: Progressing  Goal: Optimal Functional Ability  Outcome: Progressing  Goal: Absence of Infection Signs and Symptoms  Outcome: Progressing  Goal: Improved Oral Intake  Outcome: Progressing  Goal: Optimal Pain Control and Function  Outcome: Progressing  Goal: Skin Health and Integrity  Outcome: Progressing  Goal: Optimal Wound Healing  Outcome: Progressing     Problem: Fall Injury Risk  Goal: Absence of Fall and Fall-Related Injury  Outcome: Progressing     Problem: Mobility Impairment  Goal: Optimal Mobility  Outcome: Progressing     Problem: Pain Acute  Goal: Optimal Pain Control and Function  Outcome: Progressing     Problem: Skin Injury Risk Increased  Goal: Skin Health and Integrity  Outcome: Progressing

## 2025-03-17 NOTE — NURSING
Patient's tylenol Q8hrs PRN. Patient requesting tylenol before medication is due again. Sent Dr. Reyes a secure chat requesting to change tylenol to Q6hrs. Received message back from Dr. Reyes ok to change tylenol to Q6hrs. See new order in the computer.

## 2025-03-17 NOTE — PROGRESS NOTES
Pharmacist Renal Dose Adjustment Note    Tobi Liz Jr. is a 89 y.o. male being treated with the medication Ceftriaxone    Patient Data:    Vital Signs (Most Recent):  Temp: 97.4 °F (36.3 °C) (03/17/25 0715)  Pulse: 73 (03/17/25 0715)  Resp: 17 (03/17/25 0715)  BP: (!) 150/67 (03/17/25 0715)  SpO2: 95 % (03/17/25 0715) Vital Signs (72h Range):  Temp:  [97.4 °F (36.3 °C)-98.2 °F (36.8 °C)]   Pulse:  [65-75]   Resp:  [16-20]   BP: (128-156)/(59-67)   SpO2:  [95 %-100 %]      Recent Labs   Lab 03/13/25  0531 03/14/25  0646 03/17/25  0559   CREATININE 0.8 1.2 1.0     Serum creatinine: 1 mg/dL 03/17/25 0559  Estimated creatinine clearance: 44.2 mL/min    Medication:ceftriaxone dose: 1 g frequency q12hr will be changed to medication:ceftriaxone dose:2 g frequency:daily    Pharmacist's Name: Dipika Lee  Pharmacist's Extension: [phone]

## 2025-03-17 NOTE — PT/OT/SLP PROGRESS
Physical Therapy Inpatient Rehab Treatment    Patient Name:  Tobi Liz Jr.   MRN:  [id number]    Recommendations:     Discharge Recommendations:  Low Intensity Therapy   Discharge Equipment Recommendations: walker, rolling   Barriers to discharge: None    Assessment:     Tobi Liz Jr. is a 89 y.o. male admitted with a medical diagnosis of Myopathy.  He presents with the following impairments/functional limitations:    weakness, impaired endurance, impaired self care skills, impaired functional mobility, gait instability, impaired balance, decreased coordination, decreased upper extremity function, decreased lower extremity function, decreased safety awareness, pain, decreased ROM, , impaired skin, edema, impaired cardiopulmonary response to activity, impaired joint extensibility,  and impaired muscle length.     Patient is c/o back pain in supine and performing thera. Exercises. His willams catheter bag was leaking, attending nurse Sarai Pierson RN made aware.  No  LOB or SOB with gait; needs cueing to stay inside the walker and hand placement with sit <> stand and transfers for safety.    Rehab Diagnosis:  Myopathy     Recent Surgery: * No surgery found *      General Precautions: Standard, fall     Orthopedic Precautions:N/A     Braces: N/A    Rehab Prognosis: Good and Fair; patient would benefit from acute skilled PT services to address these deficits and reach maximum level of function.      History:     Past Medical History:   Diagnosis Date    Aortic valve stenosis     Arthritis     BPH (benign prostatic hyperplasia)     Carotid artery stenosis     Chronic diastolic heart failure     ETOH abuse     Exposure to COVID-19 virus 01/07/2022    Hyperchloremia     Kidney stones     Murmur     Polymyalgia rheumatica     PVD (peripheral vascular disease)     Renal artery stenosis        Past Surgical History:   Procedure Laterality Date    A-V CARDIAC PACEMAKER INSERTION N/A 10/6/2023    Procedure:  "INSERTION, CARDIAC PACEMAKER, DUAL CHAMBER;  Surgeon: Douglas Salguero MD;  Location: [facility] CATH;  Service: Cardiology;  Laterality: N/A;    ANGIOGRAM, CAROTID Left 8/25/2023    Procedure: ANGIOGRAM, CAROTID;  Surgeon: Nick Box MD;  Location: [facility] CATH;  Service: Cardiology;  Laterality: Left;    CATARACT EXTRACTION, BILATERAL      ECHOCARDIOGRAM,TRANSESOPHAGEAL N/A 10/3/2023    Procedure: Transesophageal echo (JOSE ARMANDO) intra-procedure log documentation;  Surgeon: Nick Box MD;  Location: [facility] OR;  Service: Cardiology;  Laterality: N/A;    HAND SURGERY      INSERTION OF STENT INTO PERIPHERAL VESSEL N/A 1/30/2025    Procedure: INSERTION, STENT, VESSEL, PERIPHERAL;  Surgeon: Harsha Salcedo MD;  Location: [facility] CATH;  Service: Cardiology;  Laterality: N/A;    PERCUTANEOUS TRANSCATHETER AORTIC VALVE REPLACEMENT (TAVR) Left 10/3/2023    Procedure: REPLACEMENT, AORTIC VALVE, PERCUTANEOUS, TRANSCATHETER;  Surgeon: Nick Box MD;  Location: [facility] OR;  Service: Cardiology;  Laterality: Left;    PERCUTANEOUS TRANSCATHETER AORTIC VALVE REPLACEMENT (TAVR) Left 10/3/2023    Procedure: REPLACEMENT, AORTIC VALVE, PERCUTANEOUS, TRANSCATHETER carotid access;  Surgeon: Jun Brito MD;  Location: [facility] OR;  Service: Cardiovascular;  Laterality: Left;    PERCUTANEOUS TRANSLUMINAL ANGIOPLASTY N/A 8/25/2023    Procedure: ANGIOPLASTY-PERCUTANEOUS TRANSLUMINAL (PTA);  Surgeon: Nick Box MD;  Location: [facility] CATH;  Service: Cardiology;  Laterality: N/A;    SHOULDER SURGERY Left     total shoulder replacement    WOUND EXPLORATION N/A 10/3/2023    Procedure: EXPLORATION, WOUND;  Surgeon: Jun Brito MD;  Location: [facility] OR;  Service: Cardiology;  Laterality: N/A;       Subjective     Chief Complaint: "I have kidney stones."     Respiratory Status: Room air    Patients cultural, spiritual, Yazidi conflicts given the current situation: no      Objective:     Communicated with nurse and patient  prior to " session.  Patient found up in chair with peripheral IV, willams catheter  upon PT entry to room.    Pt is Oriented x3, Oriented to place, Oriented to situation, and Oriented to time and Alert, Cooperative, and Motivated.    Vitals   Vitals at Rest  /66 mmHg    HR 77 bpm    O2 Sat 95%    Pain Lower back      Vitals With Activity  Pain Lower back        Functional Mobility:   Bed Mobility:     Rolling Left:  Set-up or clean-up assistance   Rolling Right: Set-up or clean-up assistance   Scooting: Set-up or clean-up assistance   Bridging: Set-up or clean-up assistance   Supine to Sit: Set-up or clean-up assistance   Sit to Supine: Set-up or clean-up assistance   Transfers:     Sit to Stand: Set-up or clean-up assistance  with rolling walker  Bed to Chair: Supervision or touching assistance  with rolling walker  using  Step Transfer  Chair to mat: Supervision or touching assistance  with  rolling walker  using  Step Transfer  Gait: Pt ambulates 300 feet, 25 feet x 2 trials, 150 feet  with RW with Supervision or touching assistance .   Balance: Static Sitting/Standing  Patient performed static standing on level surface using rolling walker with Supervision or touching assistance  and minimal verbal cues.    Static Sit: GOOD: Takes MODERATE challenges from all directions  Static Stand: FAIR+: Takes MINIMAL challenges from all directions    Dynamic Sitting/Standing  Patient performed dynamic standing on level surface using rolling walker with Supervision or touching assistance  and minimal verbal cues during minimal excursions.    Dynamic Sit: FAIR+: Maintains balance through MINIMAL excursions of active trunk motion  Dynamic Stand: FAIR+: Needs CLOSE SUPERVISION during gait and is able to right self with minor LOB  12 steps (6 inch)  stairs with bilateral handrails with Supervision or touching assistance   2 inch and 4 inch  curb with rolling walker with Supervision or touching assistance   Ambulate 10  feet on uneven  surfaces/ramps with rolling walker with Supervision or touching assistance    object from ground in standing position with reacher with rolling walker with Supervision or touching assistance      Current   Status  Discharge   Goal   Functional Area: Care Score:    Roll Left and Right 5 Independent   Sit to Lying 5 Independent   Lying to Sitting on Side of Bed 5 Independent   Sit to Stand 5 Set-up/clean-up   Chair/Bed-to-Chair Transfer 4 Independent   Car Transfer 10     Walk 10 Feet 4 Independent   Walk 50 Feet with Two Turns 4 Set-up/clean-up   Walk 150 Feet 4 Set-up/clean-up   Walk 10 Feet Uneven Surface 4 Independent   1 Step (Curb) 4 Independent   4 Steps 4 Set-up/clean-up   12 Steps 4 Set-up/clean-up   Picking Up Object 4 Set-up/clean-up   Wheel 50 Feet with Two Turns       Wheel 150 Feet           Therapeutic Activities and Exercises:    Gait training with RW on flat level surface   Stair training with both side rails, non-reciprocal steps   Curb negotiation with RW  Ramp negotiation with RW  Picking up a small object from the floor with RW and reacher   Sit <> stand with both UE support  Stand step transfer with a RW   Sit <> supine  Rolling side <> side  Standing balance  with RW     SUPINE Both Lower Extremity   Active ROM Sets / Reps / Resistance / Etc.   Ankle PF and DF 3 x 10    Short Arc Quads 3 x 10      Nu step (recumbent stepper) x 5 minutes level 1      Application of Biofreeze on lumbosacral area to help with pain     Activity Tolerance: Good and Fair    Patient left up in chair with call button in reach and nurse  notified.    Education provided: roles and goals of PT/PTA, transfer training, bed mob, gait training, stair training, balance training, safety awareness, body mechanics, assistive device, strengthening exercises, and fall prevention    Expected compliance: Moderate compliance    GOALS:   Multidisciplinary Problems       Physical Therapy Goals          Problem: Physical Therapy     Goal Priority Disciplines Outcome Interventions   Physical Therapy Goal     PT, PT/OT Progressing    Description: Physical Therapy Goal     PT, PT/OT Progressing    Description:   Short Term Goals: 2025  Long Term Goals: 3/28/25     Transfers Status     Sit to Stand  Short Term Goal: Complete sit to stand with Supervision or Set-up Assistance using Rolling Walker with no verbal cues (MET dated 3/17/2025)   Long Term Goal:  Complete sit to stand with Independent using Rolling Walker with no  verbal cues     Stand Step  Short Term Goal: Complete stand step with  Set-Up Clean Up  using Rolling Walker with no verbal cues (On going dated 3/17/2025)   Long Term Goal:  Complete stand step with  Modified Independent  using Rolling Walker with no  verbal cues     Supine <> Sit  Short Term Goal: Complete supine <> sit with Modified [place]  with  minimal verbal cues rails prn(On going dated 3/17/2025)     Long Term Goal: Complete supine <> sit with Independent  With no  verbal cues        Balance/Coordination     Dynamic Sitting  Short Term Goal: Complete dynamic sitting with Set Up Assistance with minimal  verbal cues  minimal to moderate excursions(On going dated 3/17/2025)     Long Term Goal: Complete dynamic sitting with [place]  with  no verbal cues moderate to maximal excursions     Dynamic Standing  Short Term Goal: Complete dynamic standing with Stand-by Assistance  with minimal  verbal cues minimal to moderate excursions with RW(On going dated 3/17/2025)     Long Term Goal: Complete dynamic standing with Modified [place] with no  verbal cues and moderate to maximal excursions with RW     Endurance     Short Term Goal: (MET  dated 3/17/2025)     Physical Therapy: 2 times a day, 30-45 minutes  Rest periods: multiple  Sitting: 3 hours/day     Long Term Goal:  Physical Therapy: 2 times a day, 45 minutes  Rest periods: minimal  Sittin-8 hours/day     Mobility/Gait  Short Term Goal: Will ambulate  with Stand-by Assistance  Using Rolling Walker with no  verbal cues x 200 ft(MET dated 3/17/2025)     Long Term Goal: Will ambulate with Modified Independent  using Rolling Walker with no verbal cues x 500 ft     Stairs Assistance  Short Term Goal: Patient will ascend/descend 4 stairs/steps using bilateral handrails reciprocal  steps with Stand-by Assistance and minimal verbal cues(MET  dated 3/17/2025)     Long Term Goal: Patient will ascend/descend 12 stairs/steps using no handrails  reciprocal steps with [place] and minimal verbal cues           Ramp/Uneven 10 feet surface  Long  Term Goal; Patient will be able to navigate a 10 inch uneven surface with proper A.D. with  Modified [place]        2-6  inch curb  Long  Term Goal; Patient will be able to navigate a  2 to 6 inch curb with proper A.D. with independence     Picking up object   Long  Term Goal; Patient will be able to  a small object from the floor  with proper A.D. with  independence     Car transfers  Long  Term Goal; Patient will be able to get in and out of a vehicle  with proper A.D. with  independence     Education  Long Term Goals:  Patient/family/caregivers trained on physical mobility and precautions with Supervision.     Patient/family/caregivers trained on safety awareness with Supervision.     Order and obtain all appropriate equipment.                           Plan:     During this hospitalization, patient to be seen 5 x/week to address the identified rehab impairments via gait training, therapeutic activities, therapeutic exercises, neuromuscular re-education and progress toward the following goals:    Plan of Care Expires:  03/28/25  PT Next Visit Date: 03/18/25  Plan of Care reviewed with: patient    Additional Information:         Time Tracking:     Therapy Time  PT Received On: 03/17/25  PT Start Time: 1315  PT Stop Time: 1445  PT Total Time (min): 90 min   PT Individual: 90      Billable Minutes: Gait Training 30,  Therapeutic Activity 30, and Therapeutic Exercise 30    03/17/2025

## 2025-03-17 NOTE — PLAN OF CARE
Spoke to patient this a.m. as he is stating he has to go home because he has a scheduled procedure with Dr. Navarrete to address kidney stones and he also wants to have his stent placement with Dr. Mcleod. CM advise patient about importance of completing his therapy regime. CM informed patient that I would reschedule appt for him and will notify family. Patient in agreement in presence of his son-in-law and states he will stay to complete his therapy. KRISTI reached out to Dr. Navarrete and spoke to Yasmine who states patient is scheduled for UDS procedure in their office and that we can not complete at our facility. Yasmine also states that the willams catheter is to stay in until patient is seen in office and MD recommendations are made. KRISTI also contacted Dr. Mcleod's office and spoke to Tesha who states she will have nurse contact CM back as she can not reschedule a medical procedure and the nurse will have to do that. Contact information given for them to call CM back. CM updated patient's daughter, Eugenio with information.

## 2025-03-17 NOTE — PLAN OF CARE
Problem: Physical Therapy  Goal: Physical Therapy Goal  Description: Physical Therapy Goal     PT, PT/OT Progressing    Description:   Short Term Goals: 2025  Long Term Goals: 3/28/25     Transfers Status     Sit to Stand  Short Term Goal: Complete sit to stand with Supervision or Set-up Assistance using Rolling Walker with no verbal cues (MET dated 3/17/2025)   Long Term Goal:  Complete sit to stand with Independent using Rolling Walker with no  verbal cues     Stand Step  Short Term Goal: Complete stand step with  Set-Up Clean Up  using Rolling Walker with no verbal cues (On going dated 3/17/2025)   Long Term Goal:  Complete stand step with  Modified Independent  using Rolling Walker with no  verbal cues     Supine <> Sit  Short Term Goal: Complete supine <> sit with Modified [place]  with  minimal verbal cues rails prn(On going dated 3/17/2025)     Long Term Goal: Complete supine <> sit with Independent  With no  verbal cues        Balance/Coordination     Dynamic Sitting  Short Term Goal: Complete dynamic sitting with Set Up Assistance with minimal  verbal cues  minimal to moderate excursions(On going dated 3/17/2025)     Long Term Goal: Complete dynamic sitting with [place]  with  no verbal cues moderate to maximal excursions     Dynamic Standing  Short Term Goal: Complete dynamic standing with Stand-by Assistance  with minimal  verbal cues minimal to moderate excursions with RW(On going dated 3/17/2025)     Long Term Goal: Complete dynamic standing with Modified [place] with no  verbal cues and moderate to maximal excursions with RW     Endurance     Short Term Goal: (MET  dated 3/17/2025)     Physical Therapy: 2 times a day, 30-45 minutes  Rest periods: multiple  Sitting: 3 hours/day     Long Term Goal:  Physical Therapy: 2 times a day, 45 minutes  Rest periods: minimal  Sittin-8 hours/day     Mobility/Gait  Short Term Goal: Will ambulate with Stand-by Assistance  Using Rolling  Walker with no  verbal cues x 200 ft(MET dated 3/17/2025)     Long Term Goal: Will ambulate with Modified Independent  using Rolling Walker with no verbal cues x 500 ft     Stairs Assistance  Short Term Goal: Patient will ascend/descend 4 stairs/steps using bilateral handrails reciprocal  steps with Stand-by Assistance and minimal verbal cues(MET  dated 3/17/2025)     Long Term Goal: Patient will ascend/descend 12 stairs/steps using no handrails  reciprocal steps with [place] and minimal verbal cues           Ramp/Uneven 10 feet surface  Long  Term Goal; Patient will be able to navigate a 10 inch uneven surface with proper A.D. with  Modified [place]        2-6  inch curb  Long  Term Goal; Patient will be able to navigate a  2 to 6 inch curb with proper A.D. with independence     Picking up object   Long  Term Goal; Patient will be able to  a small object from the floor  with proper A.D. with  independence     Car transfers  Long  Term Goal; Patient will be able to get in and out of a vehicle  with proper A.D. with  independence     Education  Long Term Goals:  Patient/family/caregivers trained on physical mobility and precautions with Supervision.     Patient/family/caregivers trained on safety awareness with Supervision.     Order and obtain all appropriate equipment.      Outcome: Progressing, c/o back pain in supine; tno LOB or SOB with gait; needs cueing to stay inside the walker and hand placement with sit <> stand and transfers.

## 2025-03-17 NOTE — SUBJECTIVE & OBJECTIVE
Interval History: Patient seen and examined.     Review of Systems   Constitutional:  Positive for activity change, appetite change and fatigue. Negative for chills and fever.   HENT:  Positive for postnasal drip, sinus pressure and sinus pain. Negative for ear pain, mouth sores, nosebleeds and sore throat.    Eyes:  Negative for visual disturbance.   Respiratory:  Positive for cough, shortness of breath and wheezing. Negative for apnea.    Cardiovascular:  Negative for chest pain, palpitations and leg swelling.   Gastrointestinal:  Positive for constipation. Negative for abdominal distention, abdominal pain, blood in stool, diarrhea, nausea and vomiting.   Endocrine: Positive for cold intolerance. Negative for polyphagia.   Musculoskeletal:  Positive for arthralgias and back pain.   Skin:  Negative for rash.   Neurological:  Positive for tremors and weakness. Negative for seizures, syncope, facial asymmetry and speech difficulty.   Hematological:  Negative for adenopathy. Bruises/bleeds easily.   Psychiatric/Behavioral:  Positive for confusion. Negative for agitation and hallucinations. The patient is nervous/anxious.      Objective:     Vital Signs (Most Recent):  Temp: 97.4 °F (36.3 °C) (03/17/25 0715)  Pulse: 86 (03/17/25 0829)  Resp: 18 (03/17/25 0829)  BP: (!) 150/67 (03/17/25 0715)  SpO2: 96 % (03/17/25 0829) Vital Signs (24h Range):  Temp:  [97.4 °F (36.3 °C)-98.2 °F (36.8 °C)] 97.4 °F (36.3 °C)  Pulse:  [68-86] 86  Resp:  [17-18] 18  SpO2:  [95 %-97 %] 96 %  BP: (150-156)/(65-67) 150/67     Weight: 82.1 kg (181 lb)  Body mass index is 26.73 kg/m².    Intake/Output Summary (Last 24 hours) at 3/17/2025 0959  Last data filed at 3/17/2025 0845  Gross per 24 hour   Intake 2480 ml   Output 2000 ml   Net 480 ml         Physical Exam  Vitals and nursing note reviewed.   Constitutional:       General: He is awake. He is not in acute distress.     Appearance: He is ill-appearing. He is not toxic-appearing.   HENT:       Head: Normocephalic and atraumatic.      Nose: Congestion and rhinorrhea present.      Mouth/Throat:      Mouth: Mucous membranes are moist.      Pharynx: Oropharynx is clear. No oropharyngeal exudate or posterior oropharyngeal erythema.   Eyes:      General: No scleral icterus.        Right eye: No discharge.         Left eye: No discharge.      Extraocular Movements: Extraocular movements intact.   Neck:      Thyroid: No thyroid mass or thyromegaly.      Vascular: No carotid bruit.      Meningeal: Brudzinski's sign and Kernig's sign absent.   Cardiovascular:      Rate and Rhythm: Normal rate and regular rhythm.      Chest Wall: PMI is not displaced. No thrill.      Heart sounds: Murmur heard.   Pulmonary:      Effort: No tachypnea, accessory muscle usage, prolonged expiration or respiratory distress.      Breath sounds: No decreased air movement. Rhonchi and rales present. No wheezing.   Abdominal:      General: Bowel sounds are normal. There is no distension.      Palpations: Abdomen is soft. There is no hepatomegaly or splenomegaly.      Tenderness: There is no abdominal tenderness. There is no guarding or rebound.   Musculoskeletal:      Cervical back: Neck supple. No rigidity. No muscular tenderness.      Right lower leg: No edema.      Left lower leg: No edema.   Lymphadenopathy:      Cervical: No cervical adenopathy.   Skin:     General: Skin is warm.      Capillary Refill: Capillary refill takes less than 2 seconds.      Coloration: Skin is not cyanotic, jaundiced or pale.      Findings: No erythema or petechiae.   Neurological:      Mental Status: He is alert and oriented to person, place, and time.      Cranial Nerves: No cranial nerve deficit, dysarthria or facial asymmetry.      Motor: Weakness present. No tremor.      Gait: Gait abnormal.   Psychiatric:         Attention and Perception: He does not perceive auditory hallucinations.         Mood and Affect: Mood is anxious. Mood is not depressed.  Affect is not flat.         Speech: Speech is not rapid and pressured or slurred.         Behavior: Behavior normal. Behavior is not agitated, aggressive or combative.         Thought Content: Thought content normal. Thought content is not paranoid or delusional.         Cognition and Memory: Cognition is impaired.               Significant Labs: All pertinent labs within the past 24 hours have been reviewed.  Recent Lab Results         03/17/25  0559        Albumin 2.8       ALP 47       ALT 37       Anion Gap 5       AST 15       Baso # 0.00       Basophil % 0.0       BILIRUBIN TOTAL 0.5  Comment: For infants and newborns, interpretation of results should be based  on gestational age, weight and in agreement with clinical  observations.    Premature Infant recommended reference ranges:  Up to 24 hours.............<8.0 mg/dL  Up to 48 hours............<12.0 mg/dL  3-5 days..................<15.0 mg/dL  6-29 days.................<15.0 mg/dL         BUN 24       Calcium 7.7       Chloride 104       CO2 24       Creatinine 1.0       Differential Method Automated       eGFR >60.0       Eos # 0.1       Eos % 1.0       Glucose 96       Gran # (ANC) 7.9       Gran % 81.3       Hematocrit 34.4       Hemoglobin 11.1       Immature Grans (Abs) 0.08  Comment: Mild elevation in immature granulocytes is non specific and   can be seen in a variety of conditions including stress response,   acute inflammation, trauma and pregnancy. Correlation with other   laboratory and clinical findings is essential.         Immature Granulocytes 0.8       Lymph # 1.1       Lymph % 11.2       Magnesium  2.1       MCH 31.7       MCHC 32.3       MCV 98       Mono # 0.6       Mono % 5.7       MPV 9.5       nRBC 0       Platelet Count 198       Potassium 4.2       PROTEIN TOTAL 5.9       RBC 3.50       RDW 15.3       Sodium 133       WBC 9.69               Significant Imaging: I have reviewed all pertinent imaging results/findings within the past  24 hours.

## 2025-03-17 NOTE — PLAN OF CARE
Contacted Gemini with [facility] to follow up on status of patient going there upon discharge. Gemini states that family is going today to look at rooms as patient will have his wife also to go to [facility]. She states patient will likely need a TB before dc as it is their policy. She will confirm and let CM know. Financials have to be completed with family also. Gemini will keep CM updated on progress.

## 2025-03-17 NOTE — PLAN OF CARE
Problem: Rehabilitation (IRF) Plan of Care  Goal: Plan of Care Review  Outcome: Progressing  Goal: Patient-Specific Goal (Individualized)  Outcome: Progressing  Goal: Absence of New-Onset Illness or Injury  Outcome: Progressing  Goal: Optimal Comfort and Wellbeing  Outcome: Progressing  Goal: Home and Community Transition Plan Established  Outcome: Progressing     Problem: Infection  Goal: Absence of Infection Signs and Symptoms  Outcome: Progressing     Problem: Wound  Goal: Optimal Coping  Outcome: Progressing  Goal: Optimal Functional Ability  Outcome: Progressing  Goal: Absence of Infection Signs and Symptoms  Outcome: Progressing  Goal: Improved Oral Intake  Outcome: Progressing  Goal: Optimal Pain Control and Function  Outcome: Progressing  Goal: Skin Health and Integrity  Outcome: Progressing  Goal: Optimal Wound Healing  Outcome: Progressing     Problem: Fall Injury Risk  Goal: Absence of Fall and Fall-Related Injury  Outcome: Progressing     Problem: Mobility Impairment  Goal: Optimal Mobility  Outcome: Progressing     Problem: Pain Acute  Goal: Optimal Pain Control and Function  Outcome: Progressing     Problem: Skin Injury Risk Increased  Goal: Skin Health and Integrity  Outcome: Progressing     Problem: Rehabilitation (IRF) Plan of Care  Goal: Plan of Care Review  Outcome: Progressing  Goal: Patient-Specific Goal (Individualized)  Outcome: Progressing  Goal: Absence of New-Onset Illness or Injury  Outcome: Progressing  Goal: Optimal Comfort and Wellbeing  Outcome: Progressing  Goal: Home and Community Transition Plan Established  Outcome: Progressing     Problem: Infection  Goal: Absence of Infection Signs and Symptoms  Outcome: Progressing     Problem: Wound  Goal: Optimal Coping  Outcome: Progressing  Goal: Optimal Functional Ability  Outcome: Progressing  Goal: Absence of Infection Signs and Symptoms  Outcome: Progressing  Goal: Improved Oral Intake  Outcome: Progressing  Goal: Optimal Pain Control  and Function  Outcome: Progressing  Goal: Skin Health and Integrity  Outcome: Progressing  Goal: Optimal Wound Healing  Outcome: Progressing     Problem: Fall Injury Risk  Goal: Absence of Fall and Fall-Related Injury  Outcome: Progressing     Problem: Mobility Impairment  Goal: Optimal Mobility  Outcome: Progressing     Problem: Pain Acute  Goal: Optimal Pain Control and Function  Outcome: Progressing     Problem: Skin Injury Risk Increased  Goal: Skin Health and Integrity  Outcome: Progressing     Problem: Rehabilitation (IRF) Plan of Care  Goal: Plan of Care Review  Outcome: Progressing  Goal: Patient-Specific Goal (Individualized)  Outcome: Progressing  Goal: Absence of New-Onset Illness or Injury  Outcome: Progressing  Goal: Optimal Comfort and Wellbeing  Outcome: Progressing  Goal: Home and Community Transition Plan Established  Outcome: Progressing     Problem: Infection  Goal: Absence of Infection Signs and Symptoms  Outcome: Progressing     Problem: Wound  Goal: Optimal Coping  Outcome: Progressing  Goal: Optimal Functional Ability  Outcome: Progressing  Goal: Absence of Infection Signs and Symptoms  Outcome: Progressing  Goal: Improved Oral Intake  Outcome: Progressing  Goal: Optimal Pain Control and Function  Outcome: Progressing  Goal: Skin Health and Integrity  Outcome: Progressing  Goal: Optimal Wound Healing  Outcome: Progressing     Problem: Fall Injury Risk  Goal: Absence of Fall and Fall-Related Injury  Outcome: Progressing     Problem: Mobility Impairment  Goal: Optimal Mobility  Outcome: Progressing     Problem: Pain Acute  Goal: Optimal Pain Control and Function  Outcome: Progressing     Problem: Skin Injury Risk Increased  Goal: Skin Health and Integrity  Outcome: Progressing

## 2025-03-17 NOTE — PLAN OF CARE
Problem: Occupational Therapy  Goal: Occupational Therapy Goal  Description: Long Term Goals to be met by: 03/27/25     Patient will increase functional independence with ADLs by performing:    Feeding with [place].  UE Dressing with Modified [place].  LE Dressing with Modified [place].  Grooming while standing at sink with Modified [place].  Toileting from toilet with Modified [place] for hygiene and clothing management.   Bathing from  shower chair/bench with Modified [place].  Toilet transfer to toilet with Modified [place].  Increased functional strength to 4+ to 5/5 for bilateral UE's.    Outcome: Progressing

## 2025-03-17 NOTE — ASSESSMENT & PLAN NOTE
Patient's blood pressure range in the last 24 hours was: BP  Min: 150/67  Max: 156/65.The patient's inpatient anti-hypertensive regimen is listed below:  Current Antihypertensives       Plan  - BP is controlled, no changes needed to their regimen

## 2025-03-17 NOTE — NURSING
Patient c/o of back pain. He states that it is the kidney stones that is hurting. Patient only has tylenol ordered. It is ordered q8h prn. Gave lat dose at 2130 so it is not time for the next dose. Called  On call. Just waiting for physican to call back. Will continue to monitor.

## 2025-03-17 NOTE — PT/OT/SLP PROGRESS
Occupational Therapy Inpatient Rehab Treatment    Name: Tobi Liz Jr.  MRN: [id number]    Assessment:  Tobi Liz Jr. is a 89 y.o. male admitted with a medical diagnosis of Myopathy.  He presents with the following impairments/functional limitations:  weakness, impaired endurance, impaired self care skills, impaired functional mobility, gait instability, impaired balance, impaired cognition, decreased coordination, decreased upper extremity function, decreased lower extremity function, decreased safety awareness, pain, decreased ROM, impaired fine motor, impaired skin, edema, impaired cardiopulmonary response to activity, impaired joint extensibility, impaired muscle length.    Pt demonstrated progress with functional short term goals as noted below by changes in functional QI scores in which patient able to achieve 4/7 STG's while progressing towards long term goals. Pt now min assist to setup assistance with ADL's including functional mobility with use of assistive devices and compensator strategies with exception to LB dressing impacted by wound dressing.    General Precautions: Standard, fall     Orthopedic Precautions:N/A     Braces: N/A    Rehab Prognosis: Good; patient would benefit from acute skilled OT services to address these deficits and reach maximum level of function.      History:     Past Medical History:   Diagnosis Date    Aortic valve stenosis     Arthritis     BPH (benign prostatic hyperplasia)     Carotid artery stenosis     Chronic diastolic heart failure     ETOH abuse     Exposure to COVID-19 virus 01/07/2022    Hyperchloremia     Kidney stones     Murmur     Polymyalgia rheumatica     PVD (peripheral vascular disease)     Renal artery stenosis        Past Surgical History:   Procedure Laterality Date    A-V CARDIAC PACEMAKER INSERTION N/A 10/6/2023    Procedure: INSERTION, CARDIAC PACEMAKER, DUAL CHAMBER;  Surgeon: Douglas Salguero MD;  Location: [facility];  Service:  Cardiology;  Laterality: N/A;    ANGIOGRAM, CAROTID Left 8/25/2023    Procedure: ANGIOGRAM, CAROTID;  Surgeon: Nick Box MD;  Location: [facility] CATH;  Service: Cardiology;  Laterality: Left;    CATARACT EXTRACTION, BILATERAL      ECHOCARDIOGRAM,TRANSESOPHAGEAL N/A 10/3/2023    Procedure: Transesophageal echo (JOSE ARMANDO) intra-procedure log documentation;  Surgeon: Nick Box MD;  Location: [facility] OR;  Service: Cardiology;  Laterality: N/A;    HAND SURGERY      INSERTION OF STENT INTO PERIPHERAL VESSEL N/A 1/30/2025    Procedure: INSERTION, STENT, VESSEL, PERIPHERAL;  Surgeon: Harsha Salcedo MD;  Location: [facility] CATH;  Service: Cardiology;  Laterality: N/A;    PERCUTANEOUS TRANSCATHETER AORTIC VALVE REPLACEMENT (TAVR) Left 10/3/2023    Procedure: REPLACEMENT, AORTIC VALVE, PERCUTANEOUS, TRANSCATHETER;  Surgeon: Nick Box MD;  Location: [facility] OR;  Service: Cardiology;  Laterality: Left;    PERCUTANEOUS TRANSCATHETER AORTIC VALVE REPLACEMENT (TAVR) Left 10/3/2023    Procedure: REPLACEMENT, AORTIC VALVE, PERCUTANEOUS, TRANSCATHETER carotid access;  Surgeon: Jun Brito MD;  Location: [facility] OR;  Service: Cardiovascular;  Laterality: Left;    PERCUTANEOUS TRANSLUMINAL ANGIOPLASTY N/A 8/25/2023    Procedure: ANGIOPLASTY-PERCUTANEOUS TRANSLUMINAL (PTA);  Surgeon: Nick Box MD;  Location: [facility] CATH;  Service: Cardiology;  Laterality: N/A;    SHOULDER SURGERY Left     total shoulder replacement    WOUND EXPLORATION N/A 10/3/2023    Procedure: EXPLORATION, WOUND;  Surgeon: Jun Brito MD;  Location: [facility] OR;  Service: Cardiology;  Laterality: N/A;       Subjective     Orientation: Oriented x4    Chief Complaint: weakness and decreased independence     Patient/Family Comments/goals: Pt would like to regain independence with ADL's including functional mobility in order to return home safely with spouse.     Respiratory Status: Room air    Patients cultural, spiritual, Catholic conflicts given the  current situation: no       Objective:     Patient found up in chair with willams catheter, peripheral IV  upon OT entry to room.    Mobility   Patient completed:  Sit to Stand Transfer with stand by assistance with rolling walker  Bed to Chair Transfer using Step Transfer technique with stand by assistance with rolling walker  Toilet Transfer Step Transfer technique with contact guard assistance with  grab bars  Shower Transfer Step Transfer technique with contact guard assistance with grab bars and shower chair.    Functional Mobility  Pt ambulated greater than 200' between surfaces requiring SBA utilizing RW.     ADLs   Current Status   Eating 5   Oral Hygiene 5   Shower, Bathe Self 3   Upper Body Dressing 4   Lower Body Dressing 3   Toileting Hygiene 3   Toilet Transfer 4   Putting On, Taking Off Footwear 3     Limiting Factors for ADLs: endurance, limited ROM, balance, weakness, coordination, cognition, safety awareness, and pain     Therapeutic Exercise  Pt participated in therapeutic exercise performing 5x13 seated pushups requiring verbal and tactile cueing for technique challenging him to lift buttocks off seated surface to end range elbow extension in order to strengthen triceps brachii to improve performance with sit <> stand transitions particularly from lower surfaces. Also, he participated in therapeutic exercise performing 3x12-15 bilateral UE proximal strengthening exercises utilizing moderate to heavy resistance theraband with scapular retraction, shoulder extension, shoulder adduction, horizontal abduction, shoulder flexion in plane of scaption, internal rotation, and external rotation requiring mod verbal and tactile cueing for technique emphasizing quality of movement.      Additional Treatments: Pt participated in extensive ADL retraining as further noted above emphasizing fall prevention, and use of compensatory strategies and assistive devices providing extra time requiring min-mod verbal and  tactile cueing for safety awareness and technique.     LifeStyle Change and Education:     Patient left up in chair with  nurse notified.     Education provided: Roles and goals of OT, ADLs, transfer training, bed mobility, body mechanics, assistive device, safety precautions, fall prevention, equipment recommendations, and home safety    Short Term Goals to be met by: 03/20/25      Patient will increase functional independence with ADLs by performing:    UE Dressing with Set-up Assistance. MET  LE Dressing with Minimal Assistance. ONGOING  Grooming while standing at sink with Set-up Assistance. MET  Toileting from toilet with Contact Guard Assistance for hygiene and clothing management. ONGOING  Bathing from  shower chair/bench with Minimal Assistance. MET  Toilet transfer to toilet with Stand-by Assistance. MET  Increased functional strength to 4 to 4+/5 for bilateral UE's. ONGOING     Long Term Goals to be met by: 03/27/25      Patient will increase functional independence with ADLs by performing:     Feeding with [place]. ONGOING  UE Dressing with Modified [place]. ONGOING  LE Dressing with Modified [place]. ONGOING  Grooming while standing at sink with Modified [place]. ONGOING  Toileting from toilet with Modified [place] for hygiene and clothing management. ONGOING  Bathing from  shower chair/bench with Modified [place]. ONGOING  Toilet transfer to toilet with Modified [place]. ONGOING  Increased functional strength to 4+ to 5/5 for bilateral UE's. ONGOING      Time Tracking     OT Received On: 03/17/25  Time In 0830     Time Out 1000  Total Time 90 min  Therapy Time: OT Individual: 90  Missed Time:    Missed Time Reason:      Billable Minutes: Self Care/Home Management 45 and Therapeutic Exercise 45    03/17/2025

## 2025-03-17 NOTE — NURSING
Patient has had to have several IV's place since admit. Patient's IV not patent this morning when going to flush. IV was placed last night. Spoke to CLINT Everett with infection control to confirm a placement of a midline if MD agrees. CLINT Everett sent message back yes due to poor vascular assess. Zoya Younger NP notified and received an order to place a midline. See new order in the computer.

## 2025-03-17 NOTE — ASSESSMENT & PLAN NOTE
Anemia is likely due to chronic disease due to Chronic Kidney Disease. Most recent hemoglobin and hematocrit are listed below.  Recent Labs     03/17/25  0559   HGB 11.1*   HCT 34.4*     Plan  - Monitor serial CBC: q72hrs  - Transfuse PRBC if patient becomes hemodynamically unstable, symptomatic or H/H drops below 7/21.  - Patient has received 3 units of PRBCs  - Patient's anemia is currently stable

## 2025-03-18 PROCEDURE — 25000242 PHARM REV CODE 250 ALT 637 W/ HCPCS: Performed by: INTERNAL MEDICINE

## 2025-03-18 PROCEDURE — 97535 SELF CARE MNGMENT TRAINING: CPT

## 2025-03-18 PROCEDURE — 97530 THERAPEUTIC ACTIVITIES: CPT

## 2025-03-18 PROCEDURE — 63600175 PHARM REV CODE 636 W HCPCS: Performed by: INTERNAL MEDICINE

## 2025-03-18 PROCEDURE — 25000003 PHARM REV CODE 250: Performed by: NURSE PRACTITIONER

## 2025-03-18 PROCEDURE — 94761 N-INVAS EAR/PLS OXIMETRY MLT: CPT

## 2025-03-18 PROCEDURE — 99900035 HC TECH TIME PER 15 MIN (STAT)

## 2025-03-18 PROCEDURE — 94640 AIRWAY INHALATION TREATMENT: CPT

## 2025-03-18 PROCEDURE — 99900031 HC PATIENT EDUCATION (STAT)

## 2025-03-18 PROCEDURE — 97116 GAIT TRAINING THERAPY: CPT

## 2025-03-18 PROCEDURE — 97110 THERAPEUTIC EXERCISES: CPT

## 2025-03-18 PROCEDURE — 25000003 PHARM REV CODE 250: Performed by: INTERNAL MEDICINE

## 2025-03-18 PROCEDURE — 11800000 HC REHAB PRIVATE ROOM

## 2025-03-18 RX ORDER — BUDESONIDE 0.25 MG/2ML
0.25 INHALANT ORAL DAILY
Status: DISCONTINUED | OUTPATIENT
Start: 2025-03-19 | End: 2025-03-25 | Stop reason: HOSPADM

## 2025-03-18 RX ORDER — HYDROCODONE BITARTRATE AND ACETAMINOPHEN 5; 325 MG/1; MG/1
1 TABLET ORAL EVERY 6 HOURS PRN
Refills: 0 | Status: DISCONTINUED | OUTPATIENT
Start: 2025-03-18 | End: 2025-03-18

## 2025-03-18 RX ORDER — HYDROCODONE BITARTRATE AND ACETAMINOPHEN 5; 325 MG/1; MG/1
1 TABLET ORAL 3 TIMES DAILY PRN
Refills: 0 | Status: DISCONTINUED | OUTPATIENT
Start: 2025-03-18 | End: 2025-03-25 | Stop reason: HOSPADM

## 2025-03-18 RX ADMIN — ATORVASTATIN CALCIUM 20 MG: 20 TABLET, FILM COATED ORAL at 08:03

## 2025-03-18 RX ADMIN — HYDROCODONE BITARTRATE AND ACETAMINOPHEN 1 TABLET: 5; 325 TABLET ORAL at 09:03

## 2025-03-18 RX ADMIN — CLARITHROMYCIN 500 MG: 500 TABLET, FILM COATED ORAL at 08:03

## 2025-03-18 RX ADMIN — TRAMADOL HYDROCHLORIDE 50 MG: 50 TABLET, COATED ORAL at 07:03

## 2025-03-18 RX ADMIN — TAMSULOSIN HYDROCHLORIDE 0.4 MG: 0.4 CAPSULE ORAL at 08:03

## 2025-03-18 RX ADMIN — MICONAZOLE NITRATE: 20 POWDER TOPICAL at 08:03

## 2025-03-18 RX ADMIN — OXYBUTYNIN CHLORIDE 5 MG: 5 TABLET, EXTENDED RELEASE ORAL at 08:03

## 2025-03-18 RX ADMIN — FAMOTIDINE 20 MG: 20 TABLET, FILM COATED ORAL at 08:03

## 2025-03-18 RX ADMIN — FINASTERIDE 5 MG: 5 TABLET, FILM COATED ORAL at 08:03

## 2025-03-18 RX ADMIN — LEVALBUTEROL 1.25 MG: 1.25 SOLUTION, CONCENTRATE RESPIRATORY (INHALATION) at 07:03

## 2025-03-18 RX ADMIN — GENTIAN VIOLET 2% 0.5 ML: 20 LIQUID TOPICAL at 08:03

## 2025-03-18 RX ADMIN — CEFTRIAXONE SODIUM 2 G: 2 INJECTION, POWDER, FOR SOLUTION INTRAMUSCULAR; INTRAVENOUS at 01:03

## 2025-03-18 RX ADMIN — LEVALBUTEROL 1.25 MG: 1.25 SOLUTION, CONCENTRATE RESPIRATORY (INHALATION) at 01:03

## 2025-03-18 RX ADMIN — HYDROCODONE BITARTRATE AND ACETAMINOPHEN 1 TABLET: 5; 325 TABLET ORAL at 10:03

## 2025-03-18 RX ADMIN — PREDNISONE 20 MG: 10 TABLET ORAL at 08:03

## 2025-03-18 RX ADMIN — GUAIFENESIN 1200 MG: 600 TABLET, EXTENDED RELEASE ORAL at 08:03

## 2025-03-18 RX ADMIN — Medication 6 MG: at 08:03

## 2025-03-18 RX ADMIN — POLYETHYLENE GLYCOL (3350) 17 G: 17 POWDER, FOR SOLUTION ORAL at 08:03

## 2025-03-18 NOTE — PLAN OF CARE
No issues reported overnight.    Problem: Rehabilitation (IRF) Plan of Care  Goal: Plan of Care Review  Outcome: Progressing  Goal: Patient-Specific Goal (Individualized)  Outcome: Progressing  Goal: Absence of New-Onset Illness or Injury  Outcome: Progressing  Goal: Optimal Comfort and Wellbeing  Outcome: Progressing  Goal: Home and Community Transition Plan Established  Outcome: Progressing     Problem: Infection  Goal: Absence of Infection Signs and Symptoms  Outcome: Progressing     Problem: Wound  Goal: Optimal Coping  Outcome: Progressing  Goal: Optimal Functional Ability  Outcome: Progressing  Goal: Absence of Infection Signs and Symptoms  Outcome: Progressing  Goal: Improved Oral Intake  Outcome: Progressing  Goal: Optimal Pain Control and Function  Outcome: Progressing  Goal: Skin Health and Integrity  Outcome: Progressing  Goal: Optimal Wound Healing  Outcome: Progressing     Problem: Fall Injury Risk  Goal: Absence of Fall and Fall-Related Injury  Outcome: Progressing     Problem: Mobility Impairment  Goal: Optimal Mobility  Outcome: Progressing     Problem: Pain Acute  Goal: Optimal Pain Control and Function  Outcome: Progressing     Problem: Skin Injury Risk Increased  Goal: Skin Health and Integrity  Outcome: Progressing

## 2025-03-18 NOTE — NURSING
See secure chat sent to Dr. Reyes below:    Patient is complaining of lower mid spinal pain. I gave him tramadol this morning, but it is not helping. He is requesting something stronger.     Received message back norco 5 tid prn severe . See new order in epic.

## 2025-03-18 NOTE — PLAN OF CARE
Problem: Rehabilitation (IRF) Plan of Care  Goal: Plan of Care Review  Outcome: Progressing     Problem: Rehabilitation (IRF) Plan of Care  Goal: Patient-Specific Goal (Individualized)  Outcome: Progressing     Problem: Rehabilitation (IRF) Plan of Care  Goal: Absence of New-Onset Illness or Injury  Outcome: Progressing     Problem: Rehabilitation (IRF) Plan of Care  Goal: Optimal Comfort and Wellbeing  Outcome: Progressing     Problem: Infection  Goal: Absence of Infection Signs and Symptoms  Outcome: Progressing     Problem: Wound  Goal: Absence of Infection Signs and Symptoms  Outcome: Progressing     Problem: Wound  Goal: Skin Health and Integrity  Outcome: Progressing     Problem: Fall Injury Risk  Goal: Absence of Fall and Fall-Related Injury  Outcome: Progressing     Problem: Mobility Impairment  Goal: Optimal Mobility  Outcome: Progressing

## 2025-03-18 NOTE — PLAN OF CARE
Problem: Physical Therapy  Goal: Physical Therapy Goal  Description: Physical Therapy Goal     PT, PT/OT Progressing    Description:   Short Term Goals: 2025  Long Term Goals: 3/28/25     Transfers Status     Sit to Stand  Short Term Goal: Complete sit to stand with Supervision or Set-up Assistance using Rolling Walker with no verbal cues (MET dated 3/17/2025)   Long Term Goal:  Complete sit to stand with Independent using Rolling Walker with no  verbal cues     Stand Step  Short Term Goal: Complete stand step with  Set-Up Clean Up  using Rolling Walker with no verbal cues (On going dated 3/17/2025)   Long Term Goal:  Complete stand step with  Modified Independent  using Rolling Walker with no  verbal cues     Supine <> Sit  Short Term Goal: Complete supine <> sit with Modified [place]  with  minimal verbal cues rails prn(On going dated 3/17/2025)     Long Term Goal: Complete supine <> sit with Independent  With no  verbal cues        Balance/Coordination     Dynamic Sitting  Short Term Goal: Complete dynamic sitting with Set Up Assistance with minimal  verbal cues  minimal to moderate excursions(On going dated 3/17/2025)     Long Term Goal: Complete dynamic sitting with [place]  with  no verbal cues moderate to maximal excursions     Dynamic Standing  Short Term Goal: Complete dynamic standing with Stand-by Assistance  with minimal  verbal cues minimal to moderate excursions with RW(On going dated 3/17/2025)     Long Term Goal: Complete dynamic standing with Modified [place] with no  verbal cues and moderate to maximal excursions with RW     Endurance     Short Term Goal: (MET  dated 3/17/2025)     Physical Therapy: 2 times a day, 30-45 minutes  Rest periods: multiple  Sitting: 3 hours/day     Long Term Goal:  Physical Therapy: 2 times a day, 45 minutes  Rest periods: minimal  Sittin-8 hours/day     Mobility/Gait  Short Term Goal: Will ambulate with Stand-by Assistance  Using Rolling  Walker with no  verbal cues x 200 ft(MET dated 3/17/2025)     Long Term Goal: Will ambulate with Modified Independent  using Rolling Walker with no verbal cues x 500 ft     Stairs Assistance  Short Term Goal: Patient will ascend/descend 4 stairs/steps using bilateral handrails reciprocal  steps with Stand-by Assistance and minimal verbal cues(MET  dated 3/17/2025)     Long Term Goal: Patient will ascend/descend 12 stairs/steps using no handrails  reciprocal steps with [place] and minimal verbal cues           Ramp/Uneven 10 feet surface  Long  Term Goal; Patient will be able to navigate a 10 inch uneven surface with proper A.D. with  Modified [place]        2-6  inch curb  Long  Term Goal; Patient will be able to navigate a  2 to 6 inch curb with proper A.D. with independence     Picking up object   Long  Term Goal; Patient will be able to  a small object from the floor  with proper A.D. with  independence     Car transfers  Long  Term Goal; Patient will be able to get in and out of a vehicle  with proper A.D. with  independence     Education  Long Term Goals:  Patient/family/caregivers trained on physical mobility and precautions with Supervision.     Patient/family/caregivers trained on safety awareness with Supervision.     Order and obtain all appropriate equipment.      Outcome: Decline in function this morning due to severe back pain, patient was premedicated prior to therapy, rated his pain 9/10 before and after physical therapy; poor tolerance to activity this morning

## 2025-03-18 NOTE — PROGRESS NOTES
[facility])  Adult Nutrition  Progress Note    SUMMARY       Recommendations  1. Rec'd Cardiac diet.     2. Rec'd ONS: Chocolate Ensure Enlive TID  to provide additional nutrition. 3. Rec'd [place] BID to promote wound healing and to provide additional nutrition.    4. Encourage PO inatake and compliance with drinking ONS.     5. RD to follow and make rec's accordingly.  Goals:   1. Pt will consume > 75% of meals by next RD follow up.  Nutrition Goal Status: goal met  Communication of RD Recs: discussed on rounds    Nutrition Discharge Planning    Nutrition Discharge Planning: Therapeutic diet (comments)  Therapeutic diet (comments): Cardiac Diet    Assessment and Plan     Nutrition Problem  Inadequate protein intake     Related to (etiology):   Increased nutrient needs (protein) secondary to delayed wound healing     Signs and Symptoms (as evidenced by):   Protein intake < estimated protein requirements, Poor PO intake x 2 weeks      Interventions/Recommendations (treatment strategy):  1. Rec'd Cardiac diet.   2. Rec'd ONS: Chocolate Ensure Enlive TID  to provide additional nutrition. 3. Rec'd [place] BID to promote wound healing and to provide additional nutrition.  4. Encourage PO inatake and compliance with drinking ONS.   5. RD to follow and make rec's accordingly.     Nutrition Diagnosis Status:   Continues, Improving     Malnutrition Assessment  NFPE not warranted at this time after further review. RD to continue to monitor for signs and symptoms of malnutrition.     Nutrition Related Social Determinants of Health:  None identified at this time.    Reason for Assessment    Reason For Assessment: RD follow-up  Diagnosis: other (see comments) (No active principal problem)  General Information Comments: Followed up on pt this morning. Pt was eating breakfast with family members in the dfining room. Pt  reports good appetite and is eating 75% or more of meals    Nutrition/Diet History    Nutrition  "Intake History: General Healthy Diet  Food Preferences: Chocolate Ensure, Hamburgers, Gumbo, Meatloaf  Spiritual, Cultural Beliefs, Congregational Practices, Values that Affect Care: no  Food Allergies: NKFA  Factors Affecting Nutritional Intake: decreased appetite    Anthropometrics    Height: 5' 9" (175.3 cm)  Height (inches): 69 in  Height Method: Stated  Weight: 82.1 kg (181 lb)  Weight (lb): 181 lb  Weight Method: Standard Scale  Ideal Body Weight (IBW), Male: 160 lb  % Ideal Body Weight, Male (lb): 113.13 %  BMI (Calculated): 26.7  BMI Grade: 25 - 29.9 - overweight    Lab/Procedures/Meds    Pertinent Labs Reviewed: reviewed  Pertinent Labs Comments: GFR = WNL , BUN = WNL  Pertinent Medications Reviewed: reviewed    Estimated/Assessed Needs    Weight Used For Calorie Calculations: 82.1 kg (181 lb)  Energy Calorie Requirements (kcal): 2052  Energy Need Method: Kcal/kg  Protein Requirements:  (1.2-1.5g/kg)  Weight Used For Protein Calculations: 82.1 kg (181 lb)  Fluid Requirements (mL): 2052 (1mL/kcal)  Estimated Fluid Requirement Method: RDA Method  RDA Method (mL): 2052    Nutrition Prescription Ordered    Current Diet Order: Heart Healthy  Oral Nutrition Supplement: Ensure Enlive Chocolate TID and [place] BID    Evaluation of Received Nutrient/Fluid Intake    % Kcal Needs: 75%  % Protein Needs: 75%  I/O: -200  Energy Calories Required: not meeting needs  Protein Required: not meeting needs  Tolerance: tolerating  % Intake of Estimated Energy Needs: 75 - 100 %  % Meal Intake: 75 - 100 %    Nutrition Risk    Level of Risk/Frequency of Follow-up: low     Monitor and Evaluation    Monitor and Evaluation: Energy intake, Food and beverage intake, Protein intake, Diet order, Food and nutrition knowledge, Weight, Beliefs and attitudes, Electrolyte and renal panel, Gastrointestinal profile, Glucose/endocrine profile, Inflammatory profile, Lipid profile     Nutrition Follow-Up    RD Follow-up?: Yes      "

## 2025-03-18 NOTE — PT/OT/SLP PROGRESS
Physical Therapy Inpatient Rehab Treatment    Patient Name:  Tobi Liz Jr.   MRN:  [id number]    Recommendations:     Discharge Recommendations:  Low Intensity Therapy   Discharge Equipment Recommendations: walker, rolling   Barriers to discharge: None    Assessment:     Tobi Liz Jr. is a 89 y.o. male admitted with a medical diagnosis of Myopathy.  He presents with the following impairments/functional limitations:    weakness, impaired endurance, impaired self care skills, impaired functional mobility, gait instability, impaired balance, decreased coordination, decreased upper extremity function, decreased lower extremity function, decreased safety awareness, pain, decreased ROM, , impaired skin, edema, impaired cardiopulmonary response to activity, impaired joint extensibility,  and impaired muscle length. .     Decline in function this morning due to severe back pain, patient was premedicated prior to therapy, rated his pain 9/10 before and after physical therapy; poor tolerance to activity this morning  due to pain.    Rehab Diagnosis:  Myopathy     Recent Surgery: * No surgery found *      General Precautions: Standard, fall     Orthopedic Precautions:N/A     Braces: N/A    Rehab Prognosis: Fair; patient would benefit from acute skilled PT services to address these deficits and reach maximum level of function.      History:     Past Medical History:   Diagnosis Date    Aortic valve stenosis     Arthritis     BPH (benign prostatic hyperplasia)     Carotid artery stenosis     Chronic diastolic heart failure     ETOH abuse     Exposure to COVID-19 virus 01/07/2022    Hyperchloremia     Kidney stones     Murmur     Polymyalgia rheumatica     PVD (peripheral vascular disease)     Renal artery stenosis        Past Surgical History:   Procedure Laterality Date    A-V CARDIAC PACEMAKER INSERTION N/A 10/6/2023    Procedure: INSERTION, CARDIAC PACEMAKER, DUAL CHAMBER;  Surgeon: Douglas Salguero MD;   "Location: [facility] CATH;  Service: Cardiology;  Laterality: N/A;    ANGIOGRAM, CAROTID Left 8/25/2023    Procedure: ANGIOGRAM, CAROTID;  Surgeon: Nick Box MD;  Location: [facility] CATH;  Service: Cardiology;  Laterality: Left;    CATARACT EXTRACTION, BILATERAL      ECHOCARDIOGRAM,TRANSESOPHAGEAL N/A 10/3/2023    Procedure: Transesophageal echo (JOSE ARMANDO) intra-procedure log documentation;  Surgeon: Nick Box MD;  Location: [facility] OR;  Service: Cardiology;  Laterality: N/A;    HAND SURGERY      INSERTION OF STENT INTO PERIPHERAL VESSEL N/A 1/30/2025    Procedure: INSERTION, STENT, VESSEL, PERIPHERAL;  Surgeon: Harsha Salcedo MD;  Location: [facility] CATH;  Service: Cardiology;  Laterality: N/A;    PERCUTANEOUS TRANSCATHETER AORTIC VALVE REPLACEMENT (TAVR) Left 10/3/2023    Procedure: REPLACEMENT, AORTIC VALVE, PERCUTANEOUS, TRANSCATHETER;  Surgeon: Nick Box MD;  Location: [facility] OR;  Service: Cardiology;  Laterality: Left;    PERCUTANEOUS TRANSCATHETER AORTIC VALVE REPLACEMENT (TAVR) Left 10/3/2023    Procedure: REPLACEMENT, AORTIC VALVE, PERCUTANEOUS, TRANSCATHETER carotid access;  Surgeon: Jun Brito MD;  Location: [facility] OR;  Service: Cardiovascular;  Laterality: Left;    PERCUTANEOUS TRANSLUMINAL ANGIOPLASTY N/A 8/25/2023    Procedure: ANGIOPLASTY-PERCUTANEOUS TRANSLUMINAL (PTA);  Surgeon: Nick Box MD;  Location: [facility] CATH;  Service: Cardiology;  Laterality: N/A;    SHOULDER SURGERY Left     total shoulder replacement    WOUND EXPLORATION N/A 10/3/2023    Procedure: EXPLORATION, WOUND;  Surgeon: Jun Brito MD;  Location: [facility] OR;  Service: Cardiology;  Laterality: N/A;       Subjective     Chief Complaint: "I would just like to rest, my back hurts."     Respiratory Status: Room air    Patients cultural, spiritual, Rastafarian conflicts given the current situation: no      Objective:     Communicated with nurse and patient  prior to session.  Patient found up in chair with peripheral IV, " willams catheter  upon PT entry to room.    Pt is Oriented x3, Oriented to place, Oriented to situation, and Oriented to time and Alert and Cooperative.    Vitals   Vitals at Rest  /73 mmHg    HR 68 bpm    O2 Sat 91%   Pain 9/10 on lower back      Vitals With Activity  Pain 9/10 on lower back        Functional Mobility:   Bed Mobility:     Rolling Left:  Partial/moderate assistance   Rolling Right: Partial/moderate assistance   Scooting: Partial/moderate assistance   Bridging: Partial/moderate assistance   Supine to Sit: Partial/moderate assistance   Sit to Supine: Partial/moderate assistance   Transfers:     Sit to Stand: Supervision or touching assistance  with rolling walker  Chair to mat: Supervision or touching assistance  with  rolling walker  using  Step Transfer  Gait: Pt ambulates 150 feet x 3 trials  with RW with Supervision or touching assistance .   4 steps (6 inch)  stairs with bilateral handrails with Supervision or touching assistance   2 inch and 4 inch  curb with bilateral handrails with Supervision or touching assistance   Ambulate 10 feet  feet on uneven surfaces/ramps with rolling walker with Supervision or touching assistance    object from ground in standing position with reacher with rolling walker with Set-up or clean-up assistance      Current   Status  Discharge   Goal   Functional Area: Care Score:    Roll Left and Right 3 Independent   Sit to Lying 3 Independent   Lying to Sitting on Side of Bed 3 Independent   Sit to Stand 4 Set-up/clean-up   Chair/Bed-to-Chair Transfer 4 Independent   Car Transfer 10     Walk 10 Feet 4 Independent   Walk 50 Feet with Two Turns 4 Set-up/clean-up   Walk 150 Feet 4 Set-up/clean-up   Walk 10 Feet Uneven Surface 4 Independent   1 Step (Curb) 4 Independent   4 Steps 4 Set-up/clean-up   12 Steps 7 Set-up/clean-up   Picking Up Object 5 Set-up/clean-up   Wheel 50 Feet with Two Turns 9     Wheel 150 Feet 9         Therapeutic Activities and  Exercises:  Gait training with RW on flat level surface   Stair training with both side rails, non-reciprocal steps   Curb negotiation with RW  Ramp negotiation with RW  Picking up a small object from the floor with RW and reacher   Sit <> stand with both UE support  Stand step transfer with a RW   Sit <> supine  Rolling side <> side  Standing balance  with RW     SUPINE Both Lower Extremity   Active ROM Sets / Reps / Resistance / Etc.   Ankle PF and DF 2 x 15   Short Arc Quads 2 x 15    Lower trunk rotation 3 x 10    Single knee to chest  3 x 10 on each leg    Clams  3 x 10    Bridging 3 x 5        Activity Tolerance: Poor    Patient left up in chair with call button in reach, nurse  notified, and OT  present.    Education provided: roles and goals of PT,  transfer training, bed mob, gait training, stair training, balance training, safety awareness, body mechanics, assistive device, strengthening exercises, and fall prevention    Expected compliance: Moderate compliance and Low compliance    GOALS:   Multidisciplinary Problems       Physical Therapy Goals          Problem: Physical Therapy    Goal Priority Disciplines Outcome Interventions   Physical Therapy Goal     PT, PT/OT Progressing    Description: Physical Therapy Goal     PT, PT/OT Progressing    Description:   Short Term Goals: 03/20/2025  Long Term Goals: 3/28/25     Transfers Status     Sit to Stand  Short Term Goal: Complete sit to stand with Supervision or Set-up Assistance using Rolling Walker with no verbal cues (MET dated 3/17/2025)   Long Term Goal:  Complete sit to stand with Independent using Rolling Walker with no  verbal cues     Stand Step  Short Term Goal: Complete stand step with  Set-Up Clean Up  using Rolling Walker with no verbal cues (On going dated 3/17/2025)   Long Term Goal:  Complete stand step with  Modified Independent  using Rolling Walker with no  verbal cues     Supine <> Sit  Short Term Goal: Complete supine <> sit with  Modified [place]  with  minimal verbal cues rails prn(On going dated 3/17/2025)     Long Term Goal: Complete supine <> sit with Independent  With no  verbal cues        Balance/Coordination     Dynamic Sitting  Short Term Goal: Complete dynamic sitting with Set Up Assistance with minimal  verbal cues  minimal to moderate excursions(On going dated 3/17/2025)     Long Term Goal: Complete dynamic sitting with [place]  with  no verbal cues moderate to maximal excursions     Dynamic Standing  Short Term Goal: Complete dynamic standing with Stand-by Assistance  with minimal  verbal cues minimal to moderate excursions with RW(On going dated 3/17/2025)     Long Term Goal: Complete dynamic standing with Modified [place] with no  verbal cues and moderate to maximal excursions with RW     Endurance     Short Term Goal: (MET  dated 3/17/2025)     Physical Therapy: 2 times a day, 30-45 minutes  Rest periods: multiple  Sitting: 3 hours/day     Long Term Goal:  Physical Therapy: 2 times a day, 45 minutes  Rest periods: minimal  Sittin-8 hours/day     Mobility/Gait  Short Term Goal: Will ambulate with Stand-by Assistance  Using Rolling Walker with no  verbal cues x 200 ft(MET dated 3/17/2025)     Long Term Goal: Will ambulate with Modified Independent  using Rolling Walker with no verbal cues x 500 ft     Stairs Assistance  Short Term Goal: Patient will ascend/descend 4 stairs/steps using bilateral handrails reciprocal  steps with Stand-by Assistance and minimal verbal cues(MET  dated 3/17/2025)     Long Term Goal: Patient will ascend/descend 12 stairs/steps using no handrails  reciprocal steps with [place] and minimal verbal cues           Ramp/Uneven 10 feet surface  Long  Term Goal; Patient will be able to navigate a 10 inch uneven surface with proper A.D. with  Modified [place]        2-6  inch curb  Long  Term Goal; Patient will be able to navigate a  2 to 6 inch curb with proper A.D. with  independence     Picking up object   Long  Term Goal; Patient will be able to  a small object from the floor  with proper A.D. with  independence     Car transfers  Long  Term Goal; Patient will be able to get in and out of a vehicle  with proper A.D. with  independence     Education  Long Term Goals:  Patient/family/caregivers trained on physical mobility and precautions with Supervision.     Patient/family/caregivers trained on safety awareness with Supervision.     Order and obtain all appropriate equipment.                           Plan:     During this hospitalization, patient to be seen 5 x/week to address the identified rehab impairments via gait training, therapeutic activities, therapeutic exercises, neuromuscular re-education and progress toward the following goals:    Plan of Care Expires:  03/28/25  PT Next Visit Date: 03/19/25  Plan of Care reviewed with: patient    Additional Information:     Utilizing concurrent therapy to address goals related to safe bed mobility, transfers, gait and advance gait activities.      Time Tracking:     Therapy Time  PT Received On: 03/18/25  PT Start Time: 0815  PT Stop Time: 0945  PT Total Time (min): 90 min   PT Individual: 60  PT Concurrent: 30      Billable Minutes: Gait Training 15, Therapeutic Activity 30, and Therapeutic Exercise 45    03/18/2025

## 2025-03-18 NOTE — PT/OT/SLP PROGRESS
Occupational Therapy Inpatient Rehab Treatment    Name: Tobi Liz Jr.  MRN: [id number]    Assessment:  Tobi Liz Jr. is a 89 y.o. male admitted with a medical diagnosis of Myopathy.  He presents with the following impairments/functional limitations:  weakness, impaired endurance, impaired self care skills, impaired functional mobility, gait instability, impaired balance, impaired cognition, decreased coordination, decreased upper extremity function, decreased lower extremity function, decreased safety awareness, pain, decreased ROM, impaired fine motor, impaired skin, edema, impaired cardiopulmonary response to activity, impaired joint extensibility, impaired muscle length.    Pt demonstrated improved functional performance with toileting as noted by CGA in which patient able to perform perineal hygiene and clothing management without physical assistance, but mostly tactile cueing for safety and technique with emphasis on fall prevention.     General Precautions: Standard, fall     Orthopedic Precautions:N/A     Braces: N/A    Rehab Prognosis: Good; patient would benefit from acute skilled OT services to address these deficits and reach maximum level of function.      History:     Past Medical History:   Diagnosis Date    Aortic valve stenosis     Arthritis     BPH (benign prostatic hyperplasia)     Carotid artery stenosis     Chronic diastolic heart failure     ETOH abuse     Exposure to COVID-19 virus 01/07/2022    Hyperchloremia     Kidney stones     Murmur     Polymyalgia rheumatica     PVD (peripheral vascular disease)     Renal artery stenosis        Past Surgical History:   Procedure Laterality Date    A-V CARDIAC PACEMAKER INSERTION N/A 10/6/2023    Procedure: INSERTION, CARDIAC PACEMAKER, DUAL CHAMBER;  Surgeon: Douglas Salguero MD;  Location: [facility] CATH;  Service: Cardiology;  Laterality: N/A;    ANGIOGRAM, CAROTID Left 8/25/2023    Procedure: ANGIOGRAM, CAROTID;  Surgeon: Nick Box  MD;  Location: [facility] CATH;  Service: Cardiology;  Laterality: Left;    CATARACT EXTRACTION, BILATERAL      ECHOCARDIOGRAM,TRANSESOPHAGEAL N/A 10/3/2023    Procedure: Transesophageal echo (JOSE ARMANDO) intra-procedure log documentation;  Surgeon: Nick Box MD;  Location: [facility] OR;  Service: Cardiology;  Laterality: N/A;    HAND SURGERY      INSERTION OF STENT INTO PERIPHERAL VESSEL N/A 1/30/2025    Procedure: INSERTION, STENT, VESSEL, PERIPHERAL;  Surgeon: Harsha Salcedo MD;  Location: [facility] CATH;  Service: Cardiology;  Laterality: N/A;    PERCUTANEOUS TRANSCATHETER AORTIC VALVE REPLACEMENT (TAVR) Left 10/3/2023    Procedure: REPLACEMENT, AORTIC VALVE, PERCUTANEOUS, TRANSCATHETER;  Surgeon: Nick Box MD;  Location: [facility] OR;  Service: Cardiology;  Laterality: Left;    PERCUTANEOUS TRANSCATHETER AORTIC VALVE REPLACEMENT (TAVR) Left 10/3/2023    Procedure: REPLACEMENT, AORTIC VALVE, PERCUTANEOUS, TRANSCATHETER carotid access;  Surgeon: Jun Brito MD;  Location: [facility] OR;  Service: Cardiovascular;  Laterality: Left;    PERCUTANEOUS TRANSLUMINAL ANGIOPLASTY N/A 8/25/2023    Procedure: ANGIOPLASTY-PERCUTANEOUS TRANSLUMINAL (PTA);  Surgeon: Nick Box MD;  Location: [facility] CATH;  Service: Cardiology;  Laterality: N/A;    SHOULDER SURGERY Left     total shoulder replacement    WOUND EXPLORATION N/A 10/3/2023    Procedure: EXPLORATION, WOUND;  Surgeon: Jun Brito MD;  Location: [facility] OR;  Service: Cardiology;  Laterality: N/A;       Subjective     Orientation: Oriented x4     Chief Complaint: weakness and decreased independence      Patient/Family Comments/goals: Pt would like to regain independence with ADL's including functional mobility in order to return home safely with spouse.      Respiratory Status: Room air     Patients cultural, spiritual, Yazidi conflicts given the current situation: no       Objective:     Patient found up in chair with willams catheter, peripheral IV  upon OT entry to  room.    Mobility   Patient completed:  Sit to Stand Transfer with supervision with rolling walker  Bed to Chair Transfer using Step Transfer technique with supervision with rolling walker  Toilet Transfer Step Transfer technique with supervision with  grab bars    Functional Mobility  Pt ambulated greater than 200' between surfaces requiring SBA utilizing RW.     ADLs   Current Status   Eating     Oral Hygiene     Shower, Bathe Self     Upper Body Dressing     Lower Body Dressing     Toileting Hygiene 4   Toilet Transfer 4   Putting On, Taking Off Footwear       Limiting Factors for ADLs: endurance, limited ROM, balance, weakness, coordination, cognition, safety awareness, and pain     Therapeutic Activities  Pt participated in therapeutic activity addressing trunk mobility, trunk control, dynamic sitting balance, trunk strength, and active ROM in bilateral shoulders with flexion and abduction challenging him to perform trunk flexion, extension, and lateral flexion utilizing large stability ball requiring verbal and tactile cueing to facilitate optimal movement patterns and increased range per trial in order to improve active ROM impacting performance with functional tasks below waist.      Therapeutic Exercise  Pt participated in therapeutic exercise performing 5x14 seated pushups requiring verbal and tactile cueing for technique challenging him to lift buttocks off seated surface to end range elbow extension in order to strengthen triceps brachii to improve performance with sit <> stand transitions particularly from lower surfaces. Also, he participated in therapeutic exercise performing 3x12-15 bilateral UE proximal strengthening exercises utilizing moderate to heavy resistance theraband with scapular retraction, shoulder extension, shoulder adduction, horizontal abduction, shoulder flexion in plane of scaption, internal rotation, and external rotation requiring mod verbal and tactile cueing for technique  emphasizing quality of movement.      Additional Treatments: Pt participated in ADL retraining as further noted above emphasizing fall prevention, and use of compensatory strategies and assistive devices providing extra time requiring min-mod verbal and tactile cueing for safety awareness and technique.     LifeStyle Change and Education:             Patient left up in chair with  nurse notified.     Education provided: Roles and goals of OT, ADLs, transfer training, bed mobility, body mechanics, assistive device, safety precautions, fall prevention, equipment recommendations, and home safety    Multidisciplinary Problems       Occupational Therapy Goals          Problem: Occupational Therapy    Goal Priority Disciplines Outcome Interventions   Occupational Therapy Goal     OT, PT/OT Progressing    Description: Long Term Goals to be met by: 03/27/25     Patient will increase functional independence with ADLs by performing:    Feeding with [place].  UE Dressing with Modified [place].  LE Dressing with Modified [place].  Grooming while standing at sink with Modified [place].  Toileting from toilet with Modified [place] for hygiene and clothing management.   Bathing from  shower chair/bench with Modified [place].  Toilet transfer to toilet with Modified [place].  Increased functional strength to 4+ to 5/5 for bilateral UE's.                         Time Tracking     OT Received On: 03/18/25  Time In 1000     Time Out 1130  Total Time 90 min  Therapy Time: OT Individual: 60  OT Concurrent: 30  Missed Time:    Missed Time Reason:      Billable Minutes: Self Care/Home Management 20, Therapeutic Activity 25, and Therapeutic Exercise 45    03/18/2025

## 2025-03-18 NOTE — PLAN OF CARE
Recommendations  1. Rec'd Cardiac diet.     2. Rec'd ONS: Chocolate Ensure Enlive TID  to provide additional nutrition. 3. Rec'd Jordan BID to promote wound healing and to provide additional nutrition.    4. Encourage PO inatake and compliance with drinking ONS.     5. RD to follow and make rec's accordingly.  Goals:   1. Pt will consume > 75% of meals by next RD follow up.  Nutrition Goal Status: goal met

## 2025-03-18 NOTE — PLAN OF CARE
Slept well with few episodes of coughing, no sputum produced for specimen collection, C/O nausea without vomiting, Zofran given with moderate relief, no further complaints voiced.

## 2025-03-18 NOTE — PLAN OF CARE
Problem: Occupational Therapy  Goal: Occupational Therapy Goal  Description: Long Term Goals to be met by: 03/27/25     Patient will increase functional independence with ADLs by performing:    Feeding with [place].  UE Dressing with Modified [place].  LE Dressing with Modified [place].  Grooming while standing at sink with Modified [place].  Toileting from toilet with Modified [place] for hygiene and clothing management.   Bathing from  shower chair/bench with Modified [place].  Toilet transfer to toilet with Modified [place].  Increased functional strength to 4+ to 5/5 for bilateral UE's.    Outcome: Progressing

## 2025-03-19 ENCOUNTER — OUTPATIENT CASE MANAGEMENT (OUTPATIENT)
Dept: ADMINISTRATIVE | Facility: OTHER | Age: OVER 89
End: 2025-03-19
Payer: MEDICARE

## 2025-03-19 PROCEDURE — 97110 THERAPEUTIC EXERCISES: CPT

## 2025-03-19 PROCEDURE — 97530 THERAPEUTIC ACTIVITIES: CPT

## 2025-03-19 PROCEDURE — 25000003 PHARM REV CODE 250: Performed by: INTERNAL MEDICINE

## 2025-03-19 PROCEDURE — 25000242 PHARM REV CODE 250 ALT 637 W/ HCPCS: Performed by: INTERNAL MEDICINE

## 2025-03-19 PROCEDURE — 99900035 HC TECH TIME PER 15 MIN (STAT)

## 2025-03-19 PROCEDURE — 94761 N-INVAS EAR/PLS OXIMETRY MLT: CPT

## 2025-03-19 PROCEDURE — 99900031 HC PATIENT EDUCATION (STAT)

## 2025-03-19 PROCEDURE — 94640 AIRWAY INHALATION TREATMENT: CPT

## 2025-03-19 PROCEDURE — 25000003 PHARM REV CODE 250: Performed by: NURSE PRACTITIONER

## 2025-03-19 PROCEDURE — 63600175 PHARM REV CODE 636 W HCPCS: Performed by: INTERNAL MEDICINE

## 2025-03-19 PROCEDURE — 97535 SELF CARE MNGMENT TRAINING: CPT

## 2025-03-19 PROCEDURE — 11800000 HC REHAB PRIVATE ROOM

## 2025-03-19 PROCEDURE — 97116 GAIT TRAINING THERAPY: CPT

## 2025-03-19 RX ADMIN — OXYBUTYNIN CHLORIDE 5 MG: 5 TABLET, EXTENDED RELEASE ORAL at 08:03

## 2025-03-19 RX ADMIN — GUAIFENESIN 1200 MG: 600 TABLET, EXTENDED RELEASE ORAL at 08:03

## 2025-03-19 RX ADMIN — CLARITHROMYCIN 500 MG: 500 TABLET, FILM COATED ORAL at 08:03

## 2025-03-19 RX ADMIN — POLYETHYLENE GLYCOL (3350) 17 G: 17 POWDER, FOR SOLUTION ORAL at 08:03

## 2025-03-19 RX ADMIN — MICONAZOLE NITRATE: 20 POWDER TOPICAL at 08:03

## 2025-03-19 RX ADMIN — BUDESONIDE 0.25 MG: 0.25 INHALANT RESPIRATORY (INHALATION) at 07:03

## 2025-03-19 RX ADMIN — FAMOTIDINE 20 MG: 20 TABLET, FILM COATED ORAL at 08:03

## 2025-03-19 RX ADMIN — ATORVASTATIN CALCIUM 20 MG: 20 TABLET, FILM COATED ORAL at 08:03

## 2025-03-19 RX ADMIN — GENTIAN VIOLET 2% 0.5 ML: 20 LIQUID TOPICAL at 10:03

## 2025-03-19 RX ADMIN — FINASTERIDE 5 MG: 5 TABLET, FILM COATED ORAL at 08:03

## 2025-03-19 RX ADMIN — LEVALBUTEROL 1.25 MG: 1.25 SOLUTION, CONCENTRATE RESPIRATORY (INHALATION) at 07:03

## 2025-03-19 RX ADMIN — CEFTRIAXONE SODIUM 2 G: 2 INJECTION, POWDER, FOR SOLUTION INTRAMUSCULAR; INTRAVENOUS at 03:03

## 2025-03-19 RX ADMIN — HYDROCODONE BITARTRATE AND ACETAMINOPHEN 1 TABLET: 5; 325 TABLET ORAL at 04:03

## 2025-03-19 RX ADMIN — LEVALBUTEROL 1.25 MG: 1.25 SOLUTION, CONCENTRATE RESPIRATORY (INHALATION) at 01:03

## 2025-03-19 RX ADMIN — TAMSULOSIN HYDROCHLORIDE 0.4 MG: 0.4 CAPSULE ORAL at 08:03

## 2025-03-19 RX ADMIN — HYDROCODONE BITARTRATE AND ACETAMINOPHEN 1 TABLET: 5; 325 TABLET ORAL at 09:03

## 2025-03-19 RX ADMIN — ACETAMINOPHEN 650 MG: 325 TABLET ORAL at 08:03

## 2025-03-19 RX ADMIN — TRAMADOL HYDROCHLORIDE 50 MG: 50 TABLET, COATED ORAL at 07:03

## 2025-03-19 RX ADMIN — Medication 6 MG: at 08:03

## 2025-03-19 NOTE — SUBJECTIVE & OBJECTIVE
Interval History: Patient seen and examined.     Review of Systems   Constitutional:  Positive for activity change, appetite change and fatigue. Negative for chills and fever.   HENT:  Positive for postnasal drip, sinus pressure and sinus pain. Negative for ear pain, mouth sores, nosebleeds and sore throat.    Eyes:  Negative for visual disturbance.   Respiratory:  Positive for cough, shortness of breath and wheezing. Negative for apnea.    Cardiovascular:  Negative for chest pain, palpitations and leg swelling.   Gastrointestinal:  Positive for constipation. Negative for abdominal distention, abdominal pain, blood in stool, diarrhea, nausea and vomiting.   Endocrine: Positive for cold intolerance. Negative for polyphagia.   Musculoskeletal:  Positive for arthralgias and back pain.   Skin:  Negative for rash.   Neurological:  Positive for tremors and weakness. Negative for seizures, syncope, facial asymmetry and speech difficulty.   Hematological:  Negative for adenopathy. Bruises/bleeds easily.   Psychiatric/Behavioral:  Positive for confusion. Negative for agitation and hallucinations. The patient is nervous/anxious.      Objective:     Vital Signs (Most Recent):  Temp: 97.5 °F (36.4 °C) (03/18/25 1914)  Pulse: 68 (03/18/25 1919)  Resp: 18 (03/18/25 1919)  BP: 130/63 (03/18/25 1914)  SpO2: 97 % (03/18/25 1919) Vital Signs (24h Range):  Temp:  [97.5 °F (36.4 °C)-97.7 °F (36.5 °C)] 97.5 °F (36.4 °C)  Pulse:  [65-81] 68  Resp:  [12-20] 18  SpO2:  [94 %-100 %] 97 %  BP: (129-130)/(62-63) 130/63     Weight: 81.6 kg (179 lb 12.8 oz)  Body mass index is 26.55 kg/m².    Intake/Output Summary (Last 24 hours) at 3/18/2025 2021  Last data filed at 3/18/2025 1713  Gross per 24 hour   Intake 2120 ml   Output 1700 ml   Net 420 ml         Physical Exam  Vitals and nursing note reviewed.   Constitutional:       General: He is awake. He is not in acute distress.     Appearance: He is ill-appearing. He is not toxic-appearing.    HENT:      Head: Normocephalic and atraumatic.      Nose: Congestion and rhinorrhea present.      Mouth/Throat:      Mouth: Mucous membranes are moist.      Pharynx: Oropharynx is clear. No oropharyngeal exudate or posterior oropharyngeal erythema.   Eyes:      General: No scleral icterus.        Right eye: No discharge.         Left eye: No discharge.      Extraocular Movements: Extraocular movements intact.   Neck:      Thyroid: No thyroid mass or thyromegaly.      Vascular: No carotid bruit.      Meningeal: Brudzinski's sign and Kernig's sign absent.   Cardiovascular:      Rate and Rhythm: Normal rate and regular rhythm.      Chest Wall: PMI is not displaced. No thrill.      Heart sounds: Murmur heard.   Pulmonary:      Effort: No tachypnea, accessory muscle usage, prolonged expiration or respiratory distress.      Breath sounds: No decreased air movement. Rhonchi and rales present. No wheezing.   Abdominal:      General: Bowel sounds are normal. There is no distension.      Palpations: Abdomen is soft. There is no hepatomegaly or splenomegaly.      Tenderness: There is no abdominal tenderness. There is no guarding or rebound.   Musculoskeletal:      Cervical back: Neck supple. No rigidity. No muscular tenderness.      Right lower leg: No edema.      Left lower leg: No edema.   Lymphadenopathy:      Cervical: No cervical adenopathy.   Skin:     General: Skin is warm.      Capillary Refill: Capillary refill takes less than 2 seconds.      Coloration: Skin is not cyanotic, jaundiced or pale.      Findings: No erythema or petechiae.   Neurological:      Mental Status: He is alert and oriented to person, place, and time.      Cranial Nerves: No cranial nerve deficit, dysarthria or facial asymmetry.      Motor: Weakness present. No tremor.      Gait: Gait abnormal.   Psychiatric:         Attention and Perception: He does not perceive auditory hallucinations.         Mood and Affect: Mood is anxious. Mood is not  depressed. Affect is not flat.         Speech: Speech is not rapid and pressured or slurred.         Behavior: Behavior normal. Behavior is not agitated, aggressive or combative.         Thought Content: Thought content normal. Thought content is not paranoid or delusional.         Cognition and Memory: Cognition is impaired.               Significant Labs: All pertinent labs within the past 24 hours have been reviewed.  Recent Lab Results       None            Significant Imaging: I have reviewed all pertinent imaging results/findings within the past 24 hours.

## 2025-03-19 NOTE — PLAN OF CARE
Problem: Rehabilitation (IRF) Plan of Care  Goal: Absence of New-Onset Illness or Injury  Outcome: Progressing     Problem: Rehabilitation (IRF) Plan of Care  Goal: Plan of Care Review  Outcome: Progressing     Problem: Rehabilitation (IRF) Plan of Care  Goal: Optimal Comfort and Wellbeing  Outcome: Progressing     Problem: Infection  Goal: Absence of Infection Signs and Symptoms  Outcome: Progressing     Problem: Wound  Goal: Optimal Functional Ability  Outcome: Progressing     Problem: Wound  Goal: Absence of Infection Signs and Symptoms  Outcome: Progressing     Problem: Wound  Goal: Optimal Pain Control and Function  Outcome: Progressing     Problem: Wound  Goal: Skin Health and Integrity  Outcome: Progressing     Problem: Wound  Goal: Optimal Wound Healing  Outcome: Progressing     Problem: Fall Injury Risk  Goal: Absence of Fall and Fall-Related Injury  Outcome: Progressing     Problem: Mobility Impairment  Goal: Optimal Mobility  Outcome: Progressing     Problem: Pain Acute  Goal: Optimal Pain Control and Function  Outcome: Progressing     Problem: Skin Injury Risk Increased  Goal: Skin Health and Integrity  Outcome: Progressing

## 2025-03-19 NOTE — PROGRESS NOTES
[facility] Medicine  Progress Note    Patient Name: Tobi Liz Jr.  MRN: [id number]  Patient Class: IP- Rehab   Admission Date: 3/13/2025  Length of Stay: 6 days  Attending Physician: Dave Reyes III, MD  Primary Care Provider: Luis Enrique Guzman Jr., MD        Subjective     Principal Problem:Myopathy        HPI:  Tobi Liz Jr. is a 89 y.o. male with a PMHx of has a past medical history of Aortic valve stenosis, Arthritis, BPH (benign prostatic hyperplasia), Carotid artery stenosis, Chronic diastolic heart failure, ETOH abuse, Exposure to COVID-19 virus (01/07/2022), Hyperchloremia, Kidney stones, Murmur, Polymyalgia rheumatica, PVD (peripheral vascular disease), and Renal artery stenosis., presents complaining of shaking after standing from a seated position that started yesterday, he was aware and talking during the episode so not likely a seizure. Labs show he was severely anemic in the ED with Hgb of 5. He had hematuria as well on UA along with bacteria. Ucx pending. His family states that he has a history of anemia in the past. He otherwise feels fine. He received 3 units of pRBCs in the ED with appropriate response.     Patient overall significantly improved from admission. H&H were stable yesterday. Awaiting updated CBC this morning. Otherwise renal function stable and no new electrolyte abnormalities noted. Patient accepted to inpatient rehab and we will discharging.    Patient is seen and examined after admission and patient is complaining of respiratory congestion wheezing and dyspnea.  Patient's currently on Bactrim and sensitivities noted for his urinary tract infection as well as the other antibiotics that maybe appropriate we will switch to Rocephin as we will get better respiratory coverage as the patient does seem to be battling a bronchitis and possibly a pneumonia.  Repeat chest x-ray pending.  Patient is motivated to improve he is very eager to return  home patient's significantly decline in his overall functional state and unable to complete ADLs safely at this time.  He has developed a myopathy as well as some confusion and encephalopathy agree with inpatient rehab patient meets current Medicare criteria.       Patient requires acute inpatient rehab admission with 24-hour nursing and active physician oversight to monitor and manage acute medical comorbid conditions, labs, pain, and functional deficits. Patient/family will also require teaching and integration of improving functional skills into daily living. Patient will also require an individualized, interdisciplinary approach to their care, receiving PT, OT services 3 hours per day, 5 days per week. Required care cannot be provided at a lower level of care. Patient is anticipated to require approximately 10-14 days LOS with expected discharge home  with  services.    Impairment group (IGC):   Neuromuscular Disorders 03.8 Etiologic diagnosis/description:   G72.9: Myopathy  D64.9: Anemia      Date of onset:  3/8/2025 Date of surgery: N/A   Allergies: Patient has no known allergies.   Comorbid condition:   HTN, HLD, BPH, CAD, Valvular heart disease, CHF class II, Occlusion of right iliac artery   Medical/functional conditions requiring inpatient rehabilitation:      This patient requires medical management/24-hour nursing of complex co morbidities HTN, HLD, BPH, CAD, Valvular heart disease, CHF class II, Occlusion of right iliac artery, labs, medications (see medications list), pain, sleep hygiene, anticoagulation, nutrition, hydration, neurological, pulmonary, cardiac status, and preventive healthcare.     This patient requires intense therapy and an integrated, interdisciplinary approach to address safety, impaired mobility, impaired ADLs (dressing, toileting, grooming, showering), judgment, and memory, communication, pulmonary insufficiency, bowel/bladder problems,preventive healthcare, medication  management, integration of functional skills into daily living, and home caregiver support and training.    Risk for medical/clinical complications:      This patient is at risk for the following complications: DVT/PE, pneumonia, malnutrition, neurological decline, respiratory insufficiency, worsening activity intolerance, complications from anticoagulation, skin breakdown, inadequate sleep, and constipation.        Overview/Hospital Course:  3/16 AA, weekend crosscover, no acute events reported, blood pressure stable, continue PT OT efforts    3/17 DL:  Patient doing well this morning.  He is sitting up in wheelchair in the dining room and is actively participating in therapy.  No acute events reported.  No acute distress noted.  Mild hyponatremia noted.  Patient encouraged to increase p.o. fluid intake.  Renal function stable.  Vital signs stable.  Patient reports therapy going well.  Pain.  Encouraged patient to continue therapy efforts.    3/18 FM:  Patient's respiratory symptoms are much improved but still having occasional cough and wheeze.  We will add low-dose budesonide.  Patient's cognition is improved Bolton catheter in place patient's pain improved.  Patient completing time needs in therapy and overall doing well    3/19 DL:  Patient doing well this morning.  He is in dining room and is actively participating in therapy.  Patient denies pain.  Vital signs stable.  Patient continues to work well with therapy.  Encouraged patient to continue therapy efforts.    Interval History: Patient seen and examined.     Review of Systems   Constitutional:  Positive for activity change, appetite change and fatigue. Negative for chills and fever.   HENT:  Positive for postnasal drip, sinus pressure and sinus pain. Negative for ear pain, mouth sores, nosebleeds and sore throat.    Eyes:  Negative for visual disturbance.   Respiratory:  Positive for cough, shortness of breath and wheezing. Negative for apnea.     Cardiovascular:  Negative for chest pain, palpitations and leg swelling.   Gastrointestinal:  Positive for constipation. Negative for abdominal distention, abdominal pain, blood in stool, diarrhea, nausea and vomiting.   Endocrine: Positive for cold intolerance. Negative for polyphagia.   Musculoskeletal:  Positive for arthralgias and back pain.   Skin:  Negative for rash.   Neurological:  Positive for tremors and weakness. Negative for seizures, syncope, facial asymmetry and speech difficulty.   Hematological:  Negative for adenopathy. Bruises/bleeds easily.   Psychiatric/Behavioral:  Positive for confusion. Negative for agitation and hallucinations. The patient is nervous/anxious.      Objective:     Vital Signs (Most Recent):  Temp: 97.6 °F (36.4 °C) (03/19/25 0745)  Pulse: 64 (03/19/25 0745)  Resp: 18 (03/19/25 0911)  BP: (!) 142/63 (03/19/25 0745)  SpO2: 97 % (03/19/25 0745) Vital Signs (24h Range):  Temp:  [97.5 °F (36.4 °C)-97.6 °F (36.4 °C)] 97.6 °F (36.4 °C)  Pulse:  [64-71] 64  Resp:  [18-20] 18  SpO2:  [94 %-99 %] 97 %  BP: (130-142)/(63) 142/63     Weight: 81.6 kg (179 lb 12.8 oz)  Body mass index is 26.55 kg/m².    Intake/Output Summary (Last 24 hours) at 3/19/2025 0930  Last data filed at 3/19/2025 0352  Gross per 24 hour   Intake 1280 ml   Output 1600 ml   Net -320 ml         Physical Exam  Vitals and nursing note reviewed.   Constitutional:       General: He is awake. He is not in acute distress.     Appearance: He is ill-appearing. He is not toxic-appearing.   HENT:      Head: Normocephalic and atraumatic.      Nose: Congestion and rhinorrhea present.      Mouth/Throat:      Mouth: Mucous membranes are moist.      Pharynx: Oropharynx is clear. No oropharyngeal exudate or posterior oropharyngeal erythema.   Eyes:      General: No scleral icterus.        Right eye: No discharge.         Left eye: No discharge.      Extraocular Movements: Extraocular movements intact.   Neck:      Thyroid: No thyroid  mass or thyromegaly.      Vascular: No carotid bruit.      Meningeal: Brudzinski's sign and Kernig's sign absent.   Cardiovascular:      Rate and Rhythm: Normal rate and regular rhythm.      Chest Wall: PMI is not displaced. No thrill.      Heart sounds: Murmur heard.   Pulmonary:      Effort: No tachypnea, accessory muscle usage, prolonged expiration or respiratory distress.      Breath sounds: No decreased air movement. Rhonchi and rales present. No wheezing.   Abdominal:      General: Bowel sounds are normal. There is no distension.      Palpations: Abdomen is soft. There is no hepatomegaly or splenomegaly.      Tenderness: There is no abdominal tenderness. There is no guarding or rebound.   Musculoskeletal:      Cervical back: Neck supple. No rigidity. No muscular tenderness.      Right lower leg: No edema.      Left lower leg: No edema.   Lymphadenopathy:      Cervical: No cervical adenopathy.   Skin:     General: Skin is warm.      Capillary Refill: Capillary refill takes less than 2 seconds.      Coloration: Skin is not cyanotic, jaundiced or pale.      Findings: No erythema or petechiae.   Neurological:      Mental Status: He is alert and oriented to person, place, and time.      Cranial Nerves: No cranial nerve deficit, dysarthria or facial asymmetry.      Motor: Weakness present. No tremor.      Gait: Gait abnormal.   Psychiatric:         Attention and Perception: He does not perceive auditory hallucinations.         Mood and Affect: Mood is anxious. Mood is not depressed. Affect is not flat.         Speech: Speech is not rapid and pressured or slurred.         Behavior: Behavior normal. Behavior is not agitated, aggressive or combative.         Thought Content: Thought content normal. Thought content is not paranoid or delusional.         Cognition and Memory: Cognition is impaired.               Significant Labs: All pertinent labs within the past 24 hours have been reviewed.  Recent Lab Results        "None            Significant Imaging: I have reviewed all pertinent imaging results/findings within the past 24 hours.      Assessment & Plan  Myopathy  Continue PT/OT efforts.  Hypertension  Patient's blood pressure range in the last 24 hours was: BP  Min: 130/63  Max: 142/63.The patient's inpatient anti-hypertensive regimen is listed below:  Current Antihypertensives       Plan  - BP is controlled, no changes needed to their regimen  Hyperlipidemia  Recheck labs.    BPH (benign prostatic hyperplasia)  Willams in place.    Atherosclerosis of native artery of both lower extremities with rest pain  Noted.  Continue current medications.    Congestive heart failure, NYHA class II  Patient has Combined Systolic and Diastolic heart failure that is Chronic. On presentation their CHF was well compensated. Most recent BNP and echo results are listed below.  No results for input(s): "BNP" in the last 72 hours.  Latest ECHO  No results found for this or any previous visit.    Current Heart Failure Medications       Plan  - Monitor strict I&Os and daily weights.    - Place on telemetry  - Low sodium diet      S/P TAVR (transcatheter aortic valve replacement)  Has done well post procedure.    Insomnia  Monitor, PRN meds.    Anemia  Anemia is likely due to chronic disease due to Chronic Kidney Disease. Most recent hemoglobin and hematocrit are listed below.  Recent Labs     03/17/25  0559   HGB 11.1*   HCT 34.4*     Plan  - Monitor serial CBC:  q72hrs  - Transfuse PRBC if patient becomes hemodynamically unstable, symptomatic or H/H drops below 7/21.  - Patient has received 3 units of PRBCs  - Patient's anemia is currently stable  Acute cystitis with hematuria  Abx changed 2nd dual resp infection.    Acute bronchitis  As above.    Urinary obstruction  Willams in place, plan on DC with willams.      VTE Risk Mitigation (From admission, onward)           Ordered     IP VTE HIGH RISK PATIENT  Once         03/13/25 1349     Place sequential " compression device  Until discontinued         03/13/25 1349                    Discharge Planning   ADONAY:      Code Status: Full Code   Medical Readiness for Discharge Date:   Discharge Plan A: Home                Please place Justification for DME        Zoya Lee NP  Department of Hospital Medicine   [facility] ([facility])

## 2025-03-19 NOTE — PLAN OF CARE
Contacted [facility] in [place] to reschedule patient's revascularization procedure that is scheduled for tomorrow 3/20/2025.  was informed that [facility] attempted to call CM yesterday returning previous call from  but were unable to reach me. Was informed by Shellie that a nurse will be calling CM back.

## 2025-03-19 NOTE — PLAN OF CARE
Problem: Physical Therapy  Goal: Physical Therapy Goal  Description: Physical Therapy Goal     PT, PT/OT Progressing    Description:   Short Term Goals: 2025  Long Term Goals: 3/28/25     Transfers Status     Sit to Stand  Short Term Goal: Complete sit to stand with Supervision or Set-up Assistance using Rolling Walker with no verbal cues (MET dated 3/17/2025)   Long Term Goal:  Complete sit to stand with Independent using Rolling Walker with no  verbal cues     Stand Step  Short Term Goal: Complete stand step with  Set-Up Clean Up  using Rolling Walker with no verbal cues (On going dated 3/17/2025)   Long Term Goal:  Complete stand step with  Modified Independent  using Rolling Walker with no  verbal cues     Supine <> Sit  Short Term Goal: Complete supine <> sit with Modified [place]  with  minimal verbal cues rails prn(On going dated 3/17/2025)     Long Term Goal: Complete supine <> sit with Independent  With no  verbal cues        Balance/Coordination     Dynamic Sitting  Short Term Goal: Complete dynamic sitting with Set Up Assistance with minimal  verbal cues  minimal to moderate excursions(On going dated 3/17/2025)     Long Term Goal: Complete dynamic sitting with [place]  with  no verbal cues moderate to maximal excursions     Dynamic Standing  Short Term Goal: Complete dynamic standing with Stand-by Assistance  with minimal  verbal cues minimal to moderate excursions with RW(On going dated 3/17/2025)     Long Term Goal: Complete dynamic standing with Modified [place] with no  verbal cues and moderate to maximal excursions with RW     Endurance     Short Term Goal: (MET  dated 3/17/2025)     Physical Therapy: 2 times a day, 30-45 minutes  Rest periods: multiple  Sitting: 3 hours/day     Long Term Goal:  Physical Therapy: 2 times a day, 45 minutes  Rest periods: minimal  Sittin-8 hours/day     Mobility/Gait  Short Term Goal: Will ambulate with Stand-by Assistance  Using Rolling  Walker with no  verbal cues x 200 ft(MET dated 3/17/2025)     Long Term Goal: Will ambulate with Modified Independent  using Rolling Walker with no verbal cues x 500 ft     Stairs Assistance  Short Term Goal: Patient will ascend/descend 4 stairs/steps using bilateral handrails reciprocal  steps with Stand-by Assistance and minimal verbal cues(MET  dated 3/17/2025)     Long Term Goal: Patient will ascend/descend 12 stairs/steps using no handrails  reciprocal steps with [place] and minimal verbal cues           Ramp/Uneven 10 feet surface  Long  Term Goal; Patient will be able to navigate a 10 inch uneven surface with proper A.D. with  Modified [place]        2-6  inch curb  Long  Term Goal; Patient will be able to navigate a  2 to 6 inch curb with proper A.D. with independence     Picking up object   Long  Term Goal; Patient will be able to  a small object from the floor  with proper A.D. with  independence     Car transfers  Long  Term Goal; Patient will be able to get in and out of a vehicle  with proper A.D. with  independence     Education  Long Term Goals:  Patient/family/caregivers trained on physical mobility and precautions with Supervision.     Patient/family/caregivers trained on safety awareness with Supervision.     Order and obtain all appropriate equipment.      Outcome: Not Progressing due to increase in low back pain limiting participation in functional tasks.

## 2025-03-19 NOTE — PLAN OF CARE
Problem: Occupational Therapy  Goal: Occupational Therapy Goal  Description: Long Term Goals to be met by: 03/27/25     Patient will increase functional independence with ADLs by performing:    Feeding with [place].  UE Dressing with Modified [place].  LE Dressing with Modified [place].  Grooming while standing at sink with Modified [place].  Toileting from toilet with Modified [place] for hygiene and clothing management.   Bathing from  shower chair/bench with Modified [place].  Toilet transfer to toilet with Modified [place].  Increased functional strength to 4+ to 5/5 for bilateral UE's.    Outcome: Progressing

## 2025-03-19 NOTE — PLAN OF CARE
Problem: Rehabilitation ([facility]) Plan of Care  Goal: Plan of Care Review  3/19/2025 0721 by Halina Curtis LPN  Outcome: Progressing  3/19/2025 0721 by Halina Curtis LPN  Outcome: Progressing  Goal: Patient-Specific Goal (Individualized)  3/19/2025 0721 by Halina Curtis LPN  Outcome: Progressing  3/19/2025 0721 by Halina Curtis LPN  Outcome: Progressing  Goal: Absence of New-Onset Illness or Injury  3/19/2025 0721 by Halina Curtis LPN  Outcome: Progressing  3/19/2025 0721 by Halina Curtis LPN  Outcome: Progressing  Goal: Optimal Comfort and Wellbeing  3/19/2025 0721 by Halina Curtis LPN  Outcome: Progressing  3/19/2025 0721 by Halina Curtis LPN  Outcome: Progressing  Goal: Home and Community Transition Plan Established  3/19/2025 0721 by Halina Curtis LPN  Outcome: Progressing  3/19/2025 0721 by Halina Curtis LPN  Outcome: Progressing     Problem: Infection  Goal: Absence of Infection Signs and Symptoms  3/19/2025 0721 by Halina Curtis LPN  Outcome: Progressing  3/19/2025 0721 by Halina Curtis LPN  Outcome: Progressing     Problem: Wound  Goal: Optimal Coping  3/19/2025 0721 by Halina Curtis LPN  Outcome: Progressing  3/19/2025 0721 by Halina Curtis LPN  Outcome: Progressing  Goal: Optimal Functional Ability  3/19/2025 0721 by Halina Curtis LPN  Outcome: Progressing  3/19/2025 0721 by Halina Curtis LPN  Outcome: Progressing  Goal: Absence of Infection Signs and Symptoms  3/19/2025 0721 by Halina Curtis LPN  Outcome: Progressing  3/19/2025 0721 by Halina Curtis LPN  Outcome: Progressing  Goal: Improved Oral Intake  3/19/2025 0721 by Halina Curtis LPN  Outcome: Progressing  3/19/2025 0721 by Curtis, Halina, LPN  Outcome: Progressing  Goal: Optimal Pain Control and Function  3/19/2025 0721 by Halina Curtis LPN  Outcome: Progressing  3/19/2025 0721 by Halina Curtis LPN  Outcome: Progressing  Goal: Skin Health and Integrity  3/19/2025  0721 by Halina Curtis LPN  Outcome: Progressing  3/19/2025 0721 by Halina Curtis LPN  Outcome: Progressing  Goal: Optimal Wound Healing  3/19/2025 0721 by Halina Curtis LPN  Outcome: Progressing  3/19/2025 0721 by Halina Curtis LPN  Outcome: Progressing     Problem: Fall Injury Risk  Goal: Absence of Fall and Fall-Related Injury  3/19/2025 0721 by Halina Curtis LPN  Outcome: Progressing  3/19/2025 0721 by Halina Curtis LPN  Outcome: Progressing     Problem: Mobility Impairment  Goal: Optimal Mobility  3/19/2025 0721 by Halina Curtis LPN  Outcome: Progressing  3/19/2025 0721 by Halina Curtis LPN  Outcome: Progressing     Problem: Pain Acute  Goal: Optimal Pain Control and Function  3/19/2025 0721 by Halina Curtis LPN  Outcome: Progressing  3/19/2025 0721 by Halina Curtis LPN  Outcome: Progressing     Problem: Skin Injury Risk Increased  Goal: Skin Health and Integrity  3/19/2025 0721 by Halina Curtis LPN  Outcome: Progressing  3/19/2025 0721 by Halina Curtis LPN  Outcome: Progressing

## 2025-03-19 NOTE — ASSESSMENT & PLAN NOTE
Patient's blood pressure range in the last 24 hours was: BP  Min: 129/62  Max: 130/63.The patient's inpatient anti-hypertensive regimen is listed below:  Current Antihypertensives       Plan  - BP is controlled, no changes needed to their regimen

## 2025-03-19 NOTE — PT/OT/SLP PROGRESS
Occupational Therapy Inpatient Rehab Treatment    Name: Tobi Liz Jr.  MRN: [id number]    Assessment:  Tobi Liz Jr. is a 89 y.o. male admitted with a medical diagnosis of Myopathy.  He presents with the following impairments/functional limitations:  weakness, impaired endurance, impaired self care skills, impaired functional mobility, gait instability, impaired balance, impaired cognition, decreased coordination, decreased upper extremity function, decreased lower extremity function, decreased safety awareness, pain, decreased ROM, impaired fine motor, impaired skin, edema, impaired cardiopulmonary response to activity, impaired joint extensibility, impaired muscle length.    Pt demonstrated improved functional performance with LB dressing as noted by CGA to min assist, respectively, in which patient able to thread most of bilateral LE's into pants and pull / adjust pants to waist with steadying requiring min assist to garo bilateral socks secondary to Ace Wraps covering wound dressings.     General Precautions: Standard, fall     Orthopedic Precautions:N/A     Braces: N/A    Rehab Prognosis: Good and Fair; patient would benefit from acute skilled OT services to address these deficits and reach maximum level of function.      History:     Past Medical History:   Diagnosis Date    Aortic valve stenosis     Arthritis     BPH (benign prostatic hyperplasia)     Carotid artery stenosis     Chronic diastolic heart failure     ETOH abuse     Exposure to COVID-19 virus 01/07/2022    Hyperchloremia     Kidney stones     Murmur     Polymyalgia rheumatica     PVD (peripheral vascular disease)     Renal artery stenosis        Past Surgical History:   Procedure Laterality Date    A-V CARDIAC PACEMAKER INSERTION N/A 10/6/2023    Procedure: INSERTION, CARDIAC PACEMAKER, DUAL CHAMBER;  Surgeon: Douglas Salguero MD;  Location: [facility] CATH;  Service: Cardiology;  Laterality: N/A;    ANGIOGRAM, CAROTID Left 8/25/2023     Procedure: ANGIOGRAM, CAROTID;  Surgeon: Nick Box MD;  Location: [facility] CATH;  Service: Cardiology;  Laterality: Left;    CATARACT EXTRACTION, BILATERAL      ECHOCARDIOGRAM,TRANSESOPHAGEAL N/A 10/3/2023    Procedure: Transesophageal echo (JOSE ARMANDO) intra-procedure log documentation;  Surgeon: Nick Box MD;  Location: [facility] OR;  Service: Cardiology;  Laterality: N/A;    HAND SURGERY      INSERTION OF STENT INTO PERIPHERAL VESSEL N/A 1/30/2025    Procedure: INSERTION, STENT, VESSEL, PERIPHERAL;  Surgeon: Hasrha Salcedo MD;  Location: [facility] CATH;  Service: Cardiology;  Laterality: N/A;    PERCUTANEOUS TRANSCATHETER AORTIC VALVE REPLACEMENT (TAVR) Left 10/3/2023    Procedure: REPLACEMENT, AORTIC VALVE, PERCUTANEOUS, TRANSCATHETER;  Surgeon: Nick Box MD;  Location: [facility] OR;  Service: Cardiology;  Laterality: Left;    PERCUTANEOUS TRANSCATHETER AORTIC VALVE REPLACEMENT (TAVR) Left 10/3/2023    Procedure: REPLACEMENT, AORTIC VALVE, PERCUTANEOUS, TRANSCATHETER carotid access;  Surgeon: Jun Brito MD;  Location: [facility] OR;  Service: Cardiovascular;  Laterality: Left;    PERCUTANEOUS TRANSLUMINAL ANGIOPLASTY N/A 8/25/2023    Procedure: ANGIOPLASTY-PERCUTANEOUS TRANSLUMINAL (PTA);  Surgeon: Nick Box MD;  Location: [facility] CATH;  Service: Cardiology;  Laterality: N/A;    SHOULDER SURGERY Left     total shoulder replacement    WOUND EXPLORATION N/A 10/3/2023    Procedure: EXPLORATION, WOUND;  Surgeon: Jun Brito MD;  Location: [facility] OR;  Service: Cardiology;  Laterality: N/A;       Subjective     Orientation: Oriented x4     Chief Complaint: weakness and decreased independence      Patient/Family Comments/goals: Pt would like to regain independence with ADL's including functional mobility in order to return home safely with spouse.      Respiratory Status: Room air     Patients cultural, spiritual, Gnosticist conflicts given the current situation: no       Objective:     Patient found up in  chair with willams catheter, peripheral IV  upon OT entry to room.     Mobility   Patient completed:  Sit to Stand Transfer with supervision with rolling walker  Bed to Chair Transfer using Step Transfer technique with supervision with rolling walker     Functional Mobility  Pt ambulated greater than 200' between surfaces requiring supervision utilizing RW.     ADLs   Current Status   Eating     Oral Hygiene     Shower, Bathe Self     Upper Body Dressing 5   Lower Body Dressing 4   Toileting Hygiene     Toilet Transfer     Putting On, Taking Off Footwear 3     Limiting Factors for ADLs: endurance, limited ROM, balance, weakness, coordination, cognition, safety awareness, and pain     Therapeutic Activities  Pt participated in therapeutic activity addressing functional reaching, gross motor coordination, static / dynamic standing balance, standing tolerance, and energy for task / endurance challenging him to retrieve, lift, stabilize, manipulate, and place various items needed to perform functional tasks of ADL's while standing utilizing bilateral UE requiring continuous verbal and tactile cueing to facilitate optimal movements patterns, positioning within RW, and proper weight shifting utilizing forward, diagonal, and lateral steps with min steadying assist when reaching.    Therapeutic Exercise  He participated in therapeutic exercise performing 3x15 bilateral UE proximal strengthening exercises utilizing moderate to heavy resistance theraband with scapular retraction, shoulder extension, shoulder adduction, horizontal abduction, shoulder flexion in plane of scaption, internal rotation, and external rotation requiring mod verbal and tactile cueing for technique emphasizing quality of movement.      Additional Treatments: Pt participated in ADL retraining as further noted above emphasizing fall prevention, and use of compensatory strategies and assistive devices providing extra time requiring mod verbal and tactile  cueing for technique.     LifeStyle Change and Education:             Patient left up in chair with  nurse notified.     Education provided: Roles and goals of OT, ADLs, transfer training, bed mobility, body mechanics, assistive device, safety precautions, fall prevention, equipment recommendations, and home safety    Multidisciplinary Problems       Occupational Therapy Goals          Problem: Occupational Therapy    Goal Priority Disciplines Outcome Interventions   Occupational Therapy Goal     OT, PT/OT Progressing    Description: Long Term Goals to be met by: 03/27/25     Patient will increase functional independence with ADLs by performing:    Feeding with [place].  UE Dressing with Modified [place].  LE Dressing with Modified [place].  Grooming while standing at sink with Modified [place].  Toileting from toilet with Modified [place] for hygiene and clothing management.   Bathing from  shower chair/bench with Modified [place].  Toilet transfer to toilet with Modified [place].  Increased functional strength to 4+ to 5/5 for bilateral UE's.                         Time Tracking     OT Received On: 03/19/25  Time In 1045     Time Out 1215  Total Time 90 min  Therapy Time: OT Individual: 60  OT Concurrent: 30  Missed Time:    Missed Time Reason:      Billable Minutes: Self Care/Home Management 25, Therapeutic Activity 30, and Therapeutic Exercise 35    03/19/2025

## 2025-03-19 NOTE — ASSESSMENT & PLAN NOTE
Patient's blood pressure range in the last 24 hours was: BP  Min: 130/63  Max: 142/63.The patient's inpatient anti-hypertensive regimen is listed below:  Current Antihypertensives       Plan  - BP is controlled, no changes needed to their regimen

## 2025-03-19 NOTE — PLAN OF CARE
Spoke to patient's daughter, Eugenio about discharge planning and family training. Plans for patient to discharge to [facility] on 3/25/2025 and family training scheduled for Monday 3/24/2025 @ 1300, staff notified. Ms. Becker also states that she received a call from [facility] and was informed that once patient was discharged and after seeing urology, for family to call them to reschedule revascularization procedure.  does not have to schedule appt at this time. Family to schedule per her statement.

## 2025-03-19 NOTE — PT/OT/SLP PROGRESS
Physical Therapy Inpatient Rehab Treatment    Patient Name:  Tobi Liz Jr.   MRN:  [id number]    Recommendations:     Discharge Recommendations:  Low Intensity Therapy   Discharge Equipment Recommendations: walker, rolling   Barriers to discharge: None    Assessment:     Tobi Liz Jr. is a 89 y.o. male admitted with a medical diagnosis of Myopathy.  He presents with the following impairments/functional limitations:    weakness, impaired endurance, impaired self care skills, impaired functional mobility, gait instability, impaired balance, decreased coordination, decreased upper extremity function, decreased lower extremity function, decreased safety awareness, pain, decreased ROM, , impaired skin, edema, impaired cardiopulmonary response to activity, impaired joint extensibility, and impaired muscle length.    Pt presents with c/o 7/10 low back pain that increases to 9/10 with activities like supine to sit. Supine therapeutic interventions were conducted for most of the session due to pain to maintain comfort. MARK Meade was notified and she brought him his medications and oral analgesics. He tolerated exercises well but c/o increase in pain in supine bridges when he tried. Pt accepted hot packs to be placed on his lower back but with minimal effect on his pain. Pt was agreeable to perform some ambulation and transfers later in the session. Some mild LOZA noted during ambulation which subsides with brief standing rest and cued deep breathing. Pt reports he was at an 8/10 pain level after the session and was able to ambulate back to the dinning room with RW and SBA.     Rehab Diagnosis:  myopathy     Recent Surgery: * No surgery found *      General Precautions: Standard, fall     Orthopedic Precautions:N/A     Braces: N/A    Rehab Prognosis: Fair; patient would benefit from acute skilled PT services to address these deficits and reach maximum level of function.      History:     Past Medical  History:   Diagnosis Date    Aortic valve stenosis     Arthritis     BPH (benign prostatic hyperplasia)     Carotid artery stenosis     Chronic diastolic heart failure     ETOH abuse     Exposure to COVID-19 virus 01/07/2022    Hyperchloremia     Kidney stones     Murmur     Polymyalgia rheumatica     PVD (peripheral vascular disease)     Renal artery stenosis        Past Surgical History:   Procedure Laterality Date    A-V CARDIAC PACEMAKER INSERTION N/A 10/6/2023    Procedure: INSERTION, CARDIAC PACEMAKER, DUAL CHAMBER;  Surgeon: Douglas Salguero MD;  Location: [facility] CATH;  Service: Cardiology;  Laterality: N/A;    ANGIOGRAM, CAROTID Left 8/25/2023    Procedure: ANGIOGRAM, CAROTID;  Surgeon: Nick Box MD;  Location: [facility] CATH;  Service: Cardiology;  Laterality: Left;    CATARACT EXTRACTION, BILATERAL      ECHOCARDIOGRAM,TRANSESOPHAGEAL N/A 10/3/2023    Procedure: Transesophageal echo (JOSE ARMANDO) intra-procedure log documentation;  Surgeon: Nick Box MD;  Location: [facility] OR;  Service: Cardiology;  Laterality: N/A;    HAND SURGERY      INSERTION OF STENT INTO PERIPHERAL VESSEL N/A 1/30/2025    Procedure: INSERTION, STENT, VESSEL, PERIPHERAL;  Surgeon: Harsha Salcedo MD;  Location: [facility] CATH;  Service: Cardiology;  Laterality: N/A;    PERCUTANEOUS TRANSCATHETER AORTIC VALVE REPLACEMENT (TAVR) Left 10/3/2023    Procedure: REPLACEMENT, AORTIC VALVE, PERCUTANEOUS, TRANSCATHETER;  Surgeon: Nick Box MD;  Location: [facility] OR;  Service: Cardiology;  Laterality: Left;    PERCUTANEOUS TRANSCATHETER AORTIC VALVE REPLACEMENT (TAVR) Left 10/3/2023    Procedure: REPLACEMENT, AORTIC VALVE, PERCUTANEOUS, TRANSCATHETER carotid access;  Surgeon: Jun Brito MD;  Location: [facility] OR;  Service: Cardiovascular;  Laterality: Left;    PERCUTANEOUS TRANSLUMINAL ANGIOPLASTY N/A 8/25/2023    Procedure: ANGIOPLASTY-PERCUTANEOUS TRANSLUMINAL (PTA);  Surgeon: Nick Box MD;  Location: [facility] CATH;  Service: Cardiology;   Laterality: N/A;    SHOULDER SURGERY Left     total shoulder replacement    WOUND EXPLORATION N/A 10/3/2023    Procedure: EXPLORATION, WOUND;  Surgeon: Jun Brito MD;  Location: [facility] OR;  Service: Cardiology;  Laterality: N/A;       Subjective     Chief Complaint: back pain    Respiratory Status: Room air    Patients cultural, spiritual, Episcopalian conflicts given the current situation: no      Objective:     Communicated with pt prior to session.  Patient found supine with peripheral IV, willams catheter  upon PT entry to room.    Pt is Oriented to place and Oriented to situation and Alert.    Vitals   Vitals at Rest  /57 mmHg   HR 84 bpm   O2 Sat 95%   Pain 7-8/10     Vitals With Activity  BP N/a   HR N/a   O2 Sat N/a   Pain 9/10       Functional Mobility:   Bed Mobility:     Supine to Sit: Partial/moderate assistance   Sit to Supine: Supervision or touching assistance   Transfers:     Sit to Stand: Supervision or touching assistance  with rolling walker  Chair to mat: Supervision or touching assistance  with  rolling walker  using  Step Transfer  Gait: Pt ambulates 120ft from dining room to PT gym, 200ft, then 120ft back to the dining room before concluding the session. with RW with Supervision or touching assistance .   Impairments contributing to gait deviations include impaired balance, impaired coordination, pain, and decreased strength.     Current   Status  Discharge   Goal   Functional Area: Care Score:    Roll Left and Right   Independent   Sit to Lying 4 Independent   Lying to Sitting on Side of Bed 3 Independent   Sit to Stand 4 Set-up/clean-up   Chair/Bed-to-Chair Transfer 4 Independent   Car Transfer 10 Supervision or touching assistance   Walk 10 Feet 4 Independent   Walk 50 Feet with Two Turns 4 Set-up/clean-up   Walk 150 Feet 4 Set-up/clean-up   Walk 10 Feet Uneven Surface 88 Independent   1 Step (Curb) 88 Independent   4 Steps 88 Set-up/clean-up   12 Steps 88 Set-up/clean-up    Picking Up Object 88 Set-up/clean-up   Wheel 50 Feet with Two Turns       Wheel 150 Feet           Therapeutic Activities and Exercises:  Bed mobility, transfer training, gait training, therapeutic exercise, modalities, static sitting balance, diaphragmatic breathing    Therex 2 x 10 reps bilaterally with verbal/tactile cues as needed to prevent compensations and encourage optimal technique   SLRs   Heel Slides   Quad sets   SAQs   Ankle pumps   Supine Hip abduction    Hot pack placed on lower lumbar spine during activity to patient's tolerance.     Activity Tolerance: Poor    Patient left up in chair with all lines intact and call button in reach.    Education provided: roles and goals of PT/PTA, transfer training, bed mob, gait training, assistive device, and strengthening exercises    Expected compliance: Moderate compliance    GOALS:   Multidisciplinary Problems       Physical Therapy Goals          Problem: Physical Therapy    Goal Priority Disciplines Outcome Interventions   Physical Therapy Goal     PT, PT/OT Progressing    Description: Physical Therapy Goal     PT, PT/OT Progressing    Description:   Short Term Goals: 03/20/2025  Long Term Goals: 3/28/25     Transfers Status     Sit to Stand  Short Term Goal: Complete sit to stand with Supervision or Set-up Assistance using Rolling Walker with no verbal cues (MET dated 3/17/2025)   Long Term Goal:  Complete sit to stand with Independent using Rolling Walker with no  verbal cues     Stand Step  Short Term Goal: Complete stand step with  Set-Up Clean Up  using Rolling Walker with no verbal cues (On going dated 3/17/2025)   Long Term Goal:  Complete stand step with  Modified Independent  using Rolling Walker with no  verbal cues     Supine <> Sit  Short Term Goal: Complete supine <> sit with Modified [place]  with  minimal verbal cues rails prn(On going dated 3/17/2025)     Long Term Goal: Complete supine <> sit with Independent  With no  verbal cues         Balance/Coordination     Dynamic Sitting  Short Term Goal: Complete dynamic sitting with Set Up Assistance with minimal  verbal cues  minimal to moderate excursions(On going dated 3/17/2025)     Long Term Goal: Complete dynamic sitting with [place]  with  no verbal cues moderate to maximal excursions     Dynamic Standing  Short Term Goal: Complete dynamic standing with Stand-by Assistance  with minimal  verbal cues minimal to moderate excursions with RW(On going dated 3/17/2025)     Long Term Goal: Complete dynamic standing with Modified [place] with no  verbal cues and moderate to maximal excursions with RW     Endurance     Short Term Goal: (MET  dated 3/17/2025)     Physical Therapy: 2 times a day, 30-45 minutes  Rest periods: multiple  Sitting: 3 hours/day     Long Term Goal:  Physical Therapy: 2 times a day, 45 minutes  Rest periods: minimal  Sittin-8 hours/day     Mobility/Gait  Short Term Goal: Will ambulate with Stand-by Assistance  Using Rolling Walker with no  verbal cues x 200 ft(MET dated 3/17/2025)     Long Term Goal: Will ambulate with Modified Independent  using Rolling Walker with no verbal cues x 500 ft     Stairs Assistance  Short Term Goal: Patient will ascend/descend 4 stairs/steps using bilateral handrails reciprocal  steps with Stand-by Assistance and minimal verbal cues(MET  dated 3/17/2025)     Long Term Goal: Patient will ascend/descend 12 stairs/steps using no handrails  reciprocal steps with [place] and minimal verbal cues           Ramp/Uneven 10 feet surface  Long  Term Goal; Patient will be able to navigate a 10 inch uneven surface with proper A.D. with  Modified [place]        2-6  inch curb  Long  Term Goal; Patient will be able to navigate a  2 to 6 inch curb with proper A.D. with independence     Picking up object   Long  Term Goal; Patient will be able to  a small object from the floor  with proper A.D. with  independence     Car transfers  Long   Term Goal; Patient will be able to get in and out of a vehicle  with proper A.D. with  independence     Education  Long Term Goals:  Patient/family/caregivers trained on physical mobility and precautions with Supervision.     Patient/family/caregivers trained on safety awareness with Supervision.     Order and obtain all appropriate equipment.                           Plan:     During this hospitalization, patient to be seen 5 x/week to address the identified rehab impairments via gait training, therapeutic activities, therapeutic exercises, neuromuscular re-education and progress toward the following goals:    Plan of Care Expires:  03/28/25  PT Next Visit Date: 03/20/25  Plan of Care reviewed with: patient    Additional Information:         Time Tracking:     Therapy Time  PT Received On: 03/19/25  PT Start Time: 0805  PT Stop Time: 0935  PT Total Time (min): 90 min   PT Individual: 65  PT Concurrent: 25    Billable Minutes: Gait Training 15, Therapeutic Activity 30, and Therapeutic Exercise 45    03/19/2025

## 2025-03-19 NOTE — SUBJECTIVE & OBJECTIVE
Interval History: Patient seen and examined.     Review of Systems   Constitutional:  Positive for activity change, appetite change and fatigue. Negative for chills and fever.   HENT:  Positive for postnasal drip, sinus pressure and sinus pain. Negative for ear pain, mouth sores, nosebleeds and sore throat.    Eyes:  Negative for visual disturbance.   Respiratory:  Positive for cough, shortness of breath and wheezing. Negative for apnea.    Cardiovascular:  Negative for chest pain, palpitations and leg swelling.   Gastrointestinal:  Positive for constipation. Negative for abdominal distention, abdominal pain, blood in stool, diarrhea, nausea and vomiting.   Endocrine: Positive for cold intolerance. Negative for polyphagia.   Musculoskeletal:  Positive for arthralgias and back pain.   Skin:  Negative for rash.   Neurological:  Positive for tremors and weakness. Negative for seizures, syncope, facial asymmetry and speech difficulty.   Hematological:  Negative for adenopathy. Bruises/bleeds easily.   Psychiatric/Behavioral:  Positive for confusion. Negative for agitation and hallucinations. The patient is nervous/anxious.      Objective:     Vital Signs (Most Recent):  Temp: 97.6 °F (36.4 °C) (03/19/25 0745)  Pulse: 64 (03/19/25 0745)  Resp: 18 (03/19/25 0911)  BP: (!) 142/63 (03/19/25 0745)  SpO2: 97 % (03/19/25 0745) Vital Signs (24h Range):  Temp:  [97.5 °F (36.4 °C)-97.6 °F (36.4 °C)] 97.6 °F (36.4 °C)  Pulse:  [64-71] 64  Resp:  [18-20] 18  SpO2:  [94 %-99 %] 97 %  BP: (130-142)/(63) 142/63     Weight: 81.6 kg (179 lb 12.8 oz)  Body mass index is 26.55 kg/m².    Intake/Output Summary (Last 24 hours) at 3/19/2025 0941  Last data filed at 3/19/2025 0352  Gross per 24 hour   Intake 1280 ml   Output 1600 ml   Net -320 ml         Physical Exam  Vitals and nursing note reviewed.   Constitutional:       General: He is awake. He is not in acute distress.     Appearance: He is ill-appearing. He is not toxic-appearing.    HENT:      Head: Normocephalic and atraumatic.      Nose: Congestion and rhinorrhea present.      Mouth/Throat:      Mouth: Mucous membranes are moist.      Pharynx: Oropharynx is clear. No oropharyngeal exudate or posterior oropharyngeal erythema.   Eyes:      General: No scleral icterus.        Right eye: No discharge.         Left eye: No discharge.      Extraocular Movements: Extraocular movements intact.   Neck:      Thyroid: No thyroid mass or thyromegaly.      Vascular: No carotid bruit.      Meningeal: Brudzinski's sign and Kernig's sign absent.   Cardiovascular:      Rate and Rhythm: Normal rate and regular rhythm.      Chest Wall: PMI is not displaced. No thrill.      Heart sounds: Murmur heard.   Pulmonary:      Effort: No tachypnea, accessory muscle usage, prolonged expiration or respiratory distress.      Breath sounds: No decreased air movement. Rhonchi and rales present. No wheezing.   Abdominal:      General: Bowel sounds are normal. There is no distension.      Palpations: Abdomen is soft. There is no hepatomegaly or splenomegaly.      Tenderness: There is no abdominal tenderness. There is no guarding or rebound.   Musculoskeletal:      Cervical back: Neck supple. No rigidity. No muscular tenderness.      Right lower leg: No edema.      Left lower leg: No edema.   Lymphadenopathy:      Cervical: No cervical adenopathy.   Skin:     General: Skin is warm.      Capillary Refill: Capillary refill takes less than 2 seconds.      Coloration: Skin is not cyanotic, jaundiced or pale.      Findings: No erythema or petechiae.   Neurological:      Mental Status: He is alert and oriented to person, place, and time.      Cranial Nerves: No cranial nerve deficit, dysarthria or facial asymmetry.      Motor: Weakness present. No tremor.      Gait: Gait abnormal.   Psychiatric:         Attention and Perception: He does not perceive auditory hallucinations.         Mood and Affect: Mood is anxious. Mood is not  depressed. Affect is not flat.         Speech: Speech is not rapid and pressured or slurred.         Behavior: Behavior normal. Behavior is not agitated, aggressive or combative.         Thought Content: Thought content normal. Thought content is not paranoid or delusional.         Cognition and Memory: Cognition is impaired.               Significant Labs: All pertinent labs within the past 24 hours have been reviewed.  Recent Lab Results       None            Significant Imaging: I have reviewed all pertinent imaging results/findings within the past 24 hours.

## 2025-03-19 NOTE — PROGRESS NOTES
[facility] Medicine  Progress Note    Patient Name: Tobi Liz Jr.  MRN: [id number]  Patient Class: IP- Rehab   Admission Date: 3/13/2025  Length of Stay: 5 days  Attending Physician: Dave Reyes III, MD  Primary Care Provider: Luis Enrique Guzman Jr., MD        Subjective     Principal Problem:Myopathy        HPI:  Tobi Liz Jr. is a 89 y.o. male with a PMHx of has a past medical history of Aortic valve stenosis, Arthritis, BPH (benign prostatic hyperplasia), Carotid artery stenosis, Chronic diastolic heart failure, ETOH abuse, Exposure to COVID-19 virus (01/07/2022), Hyperchloremia, Kidney stones, Murmur, Polymyalgia rheumatica, PVD (peripheral vascular disease), and Renal artery stenosis., presents complaining of shaking after standing from a seated position that started yesterday, he was aware and talking during the episode so not likely a seizure. Labs show he was severely anemic in the ED with Hgb of 5. He had hematuria as well on UA along with bacteria. Ucx pending. His family states that he has a history of anemia in the past. He otherwise feels fine. He received 3 units of pRBCs in the ED with appropriate response.     Patient overall significantly improved from admission. H&H were stable yesterday. Awaiting updated CBC this morning. Otherwise renal function stable and no new electrolyte abnormalities noted. Patient accepted to inpatient rehab and we will discharging.    Patient is seen and examined after admission and patient is complaining of respiratory congestion wheezing and dyspnea.  Patient's currently on Bactrim and sensitivities noted for his urinary tract infection as well as the other antibiotics that maybe appropriate we will switch to Rocephin as we will get better respiratory coverage as the patient does seem to be battling a bronchitis and possibly a pneumonia.  Repeat chest x-ray pending.  Patient is motivated to improve he is very eager to return  home patient's significantly decline in his overall functional state and unable to complete ADLs safely at this time.  He has developed a myopathy as well as some confusion and encephalopathy agree with inpatient rehab patient meets current Medicare criteria.       Patient requires acute inpatient rehab admission with 24-hour nursing and active physician oversight to monitor and manage acute medical comorbid conditions, labs, pain, and functional deficits. Patient/family will also require teaching and integration of improving functional skills into daily living. Patient will also require an individualized, interdisciplinary approach to their care, receiving PT, OT services 3 hours per day, 5 days per week. Required care cannot be provided at a lower level of care. Patient is anticipated to require approximately 10-14 days LOS with expected discharge home  with  services.    Impairment group (IGC):   Neuromuscular Disorders 03.8 Etiologic diagnosis/description:   G72.9: Myopathy  D64.9: Anemia      Date of onset:  3/8/2025 Date of surgery: N/A   Allergies: Patient has no known allergies.   Comorbid condition:   HTN, HLD, BPH, CAD, Valvular heart disease, CHF class II, Occlusion of right iliac artery   Medical/functional conditions requiring inpatient rehabilitation:      This patient requires medical management/24-hour nursing of complex co morbidities HTN, HLD, BPH, CAD, Valvular heart disease, CHF class II, Occlusion of right iliac artery, labs, medications (see medications list), pain, sleep hygiene, anticoagulation, nutrition, hydration, neurological, pulmonary, cardiac status, and preventive healthcare.     This patient requires intense therapy and an integrated, interdisciplinary approach to address safety, impaired mobility, impaired ADLs (dressing, toileting, grooming, showering), judgment, and memory, communication, pulmonary insufficiency, bowel/bladder problems,preventive healthcare, medication  management, integration of functional skills into daily living, and home caregiver support and training.    Risk for medical/clinical complications:      This patient is at risk for the following complications: DVT/PE, pneumonia, malnutrition, neurological decline, respiratory insufficiency, worsening activity intolerance, complications from anticoagulation, skin breakdown, inadequate sleep, and constipation.        Overview/Hospital Course:  3/16 AA, weekend crosscover, no acute events reported, blood pressure stable, continue PT OT efforts    3/17 DL:  Patient doing well this morning.  He is sitting up in wheelchair in the dining room and is actively participating in therapy.  No acute events reported.  No acute distress noted.  Mild hyponatremia noted.  Patient encouraged to increase p.o. fluid intake.  Renal function stable.  Vital signs stable.  Patient reports therapy going well.  Pain.  Encouraged patient to continue therapy efforts.    3/18 FM:  Patient's respiratory symptoms are much improved but still having occasional cough and wheeze.  We will add low-dose budesonide.  Patient's cognition is improved Bolton catheter in place patient's pain improved.  Patient completing time needs in therapy and overall doing well    Interval History: Patient seen and examined.     Review of Systems   Constitutional:  Positive for activity change, appetite change and fatigue. Negative for chills and fever.   HENT:  Positive for postnasal drip, sinus pressure and sinus pain. Negative for ear pain, mouth sores, nosebleeds and sore throat.    Eyes:  Negative for visual disturbance.   Respiratory:  Positive for cough, shortness of breath and wheezing. Negative for apnea.    Cardiovascular:  Negative for chest pain, palpitations and leg swelling.   Gastrointestinal:  Positive for constipation. Negative for abdominal distention, abdominal pain, blood in stool, diarrhea, nausea and vomiting.   Endocrine: Positive for cold  intolerance. Negative for polyphagia.   Musculoskeletal:  Positive for arthralgias and back pain.   Skin:  Negative for rash.   Neurological:  Positive for tremors and weakness. Negative for seizures, syncope, facial asymmetry and speech difficulty.   Hematological:  Negative for adenopathy. Bruises/bleeds easily.   Psychiatric/Behavioral:  Positive for confusion. Negative for agitation and hallucinations. The patient is nervous/anxious.      Objective:     Vital Signs (Most Recent):  Temp: 97.5 °F (36.4 °C) (03/18/25 1914)  Pulse: 68 (03/18/25 1919)  Resp: 18 (03/18/25 1919)  BP: 130/63 (03/18/25 1914)  SpO2: 97 % (03/18/25 1919) Vital Signs (24h Range):  Temp:  [97.5 °F (36.4 °C)-97.7 °F (36.5 °C)] 97.5 °F (36.4 °C)  Pulse:  [65-81] 68  Resp:  [12-20] 18  SpO2:  [94 %-100 %] 97 %  BP: (129-130)/(62-63) 130/63     Weight: 81.6 kg (179 lb 12.8 oz)  Body mass index is 26.55 kg/m².    Intake/Output Summary (Last 24 hours) at 3/18/2025 2021  Last data filed at 3/18/2025 1713  Gross per 24 hour   Intake 2120 ml   Output 1700 ml   Net 420 ml         Physical Exam  Vitals and nursing note reviewed.   Constitutional:       General: He is awake. He is not in acute distress.     Appearance: He is ill-appearing. He is not toxic-appearing.   HENT:      Head: Normocephalic and atraumatic.      Nose: Congestion and rhinorrhea present.      Mouth/Throat:      Mouth: Mucous membranes are moist.      Pharynx: Oropharynx is clear. No oropharyngeal exudate or posterior oropharyngeal erythema.   Eyes:      General: No scleral icterus.        Right eye: No discharge.         Left eye: No discharge.      Extraocular Movements: Extraocular movements intact.   Neck:      Thyroid: No thyroid mass or thyromegaly.      Vascular: No carotid bruit.      Meningeal: Brudzinski's sign and Kernig's sign absent.   Cardiovascular:      Rate and Rhythm: Normal rate and regular rhythm.      Chest Wall: PMI is not displaced. No thrill.      Heart  sounds: Murmur heard.   Pulmonary:      Effort: No tachypnea, accessory muscle usage, prolonged expiration or respiratory distress.      Breath sounds: No decreased air movement. Rhonchi and rales present. No wheezing.   Abdominal:      General: Bowel sounds are normal. There is no distension.      Palpations: Abdomen is soft. There is no hepatomegaly or splenomegaly.      Tenderness: There is no abdominal tenderness. There is no guarding or rebound.   Musculoskeletal:      Cervical back: Neck supple. No rigidity. No muscular tenderness.      Right lower leg: No edema.      Left lower leg: No edema.   Lymphadenopathy:      Cervical: No cervical adenopathy.   Skin:     General: Skin is warm.      Capillary Refill: Capillary refill takes less than 2 seconds.      Coloration: Skin is not cyanotic, jaundiced or pale.      Findings: No erythema or petechiae.   Neurological:      Mental Status: He is alert and oriented to person, place, and time.      Cranial Nerves: No cranial nerve deficit, dysarthria or facial asymmetry.      Motor: Weakness present. No tremor.      Gait: Gait abnormal.   Psychiatric:         Attention and Perception: He does not perceive auditory hallucinations.         Mood and Affect: Mood is anxious. Mood is not depressed. Affect is not flat.         Speech: Speech is not rapid and pressured or slurred.         Behavior: Behavior normal. Behavior is not agitated, aggressive or combative.         Thought Content: Thought content normal. Thought content is not paranoid or delusional.         Cognition and Memory: Cognition is impaired.               Significant Labs: All pertinent labs within the past 24 hours have been reviewed.  Recent Lab Results       None            Significant Imaging: I have reviewed all pertinent imaging results/findings within the past 24 hours.      Assessment & Plan  Myopathy  Continue PT/OT efforts.  Hypertension  Patient's blood pressure range in the last 24 hours was:  "BP  Min: 129/62  Max: 130/63.The patient's inpatient anti-hypertensive regimen is listed below:  Current Antihypertensives       Plan  - BP is controlled, no changes needed to their regimen  Hyperlipidemia  Recheck labs.    BPH (benign prostatic hyperplasia)  Willams in place.    Atherosclerosis of native artery of both lower extremities with rest pain  Noted.  Continue current medications.    Congestive heart failure, NYHA class II  Patient has Combined Systolic and Diastolic heart failure that is Chronic. On presentation their CHF was well compensated. Most recent BNP and echo results are listed below.  No results for input(s): "BNP" in the last 72 hours.  Latest ECHO  No results found for this or any previous visit.    Current Heart Failure Medications       Plan  - Monitor strict I&Os and daily weights.    - Place on telemetry  - Low sodium diet      S/P TAVR (transcatheter aortic valve replacement)  Has done well post procedure.    Insomnia  Monitor, PRN meds.    Anemia  Anemia is likely due to chronic disease due to Chronic Kidney Disease. Most recent hemoglobin and hematocrit are listed below.  Recent Labs     03/17/25  0559   HGB 11.1*   HCT 34.4*     Plan  - Monitor serial CBC:  q72hrs  - Transfuse PRBC if patient becomes hemodynamically unstable, symptomatic or H/H drops below 7/21.  - Patient has received 3 units of PRBCs  - Patient's anemia is currently stable  Acute cystitis with hematuria  Abx changed 2nd dual resp infection.    Acute bronchitis  As above.    Urinary obstruction  Willams in place, plan on DC with willams.    VTE Risk Mitigation (From admission, onward)           Ordered     IP VTE HIGH RISK PATIENT  Once         03/13/25 1349     Place sequential compression device  Until discontinued         03/13/25 1349                    Discharge Planning   ADONAY:      Code Status: Full Code   Medical Readiness for Discharge Date:   Discharge Plan A: Home                Please place Justification for " DME        Dave Reyes III, MD  Department of Hospital Medicine   [facility]     98.2

## 2025-03-20 LAB
ALBUMIN SERPL BCP-MCNC: 2.8 G/DL (ref 3.5–5.2)
ALP SERPL-CCNC: 48 U/L (ref 55–135)
ALT SERPL W/O P-5'-P-CCNC: 26 U/L (ref 10–44)
ANION GAP SERPL CALC-SCNC: 4 MMOL/L (ref 8–16)
AST SERPL-CCNC: 19 U/L (ref 10–40)
BASOPHILS # BLD AUTO: 0.01 K/UL (ref 0–0.2)
BASOPHILS NFR BLD: 0.1 % (ref 0–1.9)
BILIRUB SERPL-MCNC: 0.5 MG/DL (ref 0.1–1)
BUN SERPL-MCNC: 24 MG/DL (ref 10–30)
CALCIUM SERPL-MCNC: 7.8 MG/DL (ref 8.7–10.5)
CHLORIDE SERPL-SCNC: 106 MMOL/L (ref 95–110)
CO2 SERPL-SCNC: 26 MMOL/L (ref 23–29)
CREAT SERPL-MCNC: 0.9 MG/DL (ref 0.5–1.4)
DIFFERENTIAL METHOD BLD: ABNORMAL
EOSINOPHIL # BLD AUTO: 0.2 K/UL (ref 0–0.5)
EOSINOPHIL NFR BLD: 2.9 % (ref 0–8)
ERYTHROCYTE [DISTWIDTH] IN BLOOD BY AUTOMATED COUNT: 15.3 % (ref 11.5–14.5)
EST. GFR  (NO RACE VARIABLE): >60 ML/MIN/1.73 M^2
GLUCOSE SERPL-MCNC: 88 MG/DL (ref 70–110)
HCT VFR BLD AUTO: 33.7 % (ref 40–54)
HGB BLD-MCNC: 11 G/DL (ref 14–18)
IMM GRANULOCYTES # BLD AUTO: 0.09 K/UL (ref 0–0.04)
IMM GRANULOCYTES NFR BLD AUTO: 1.3 % (ref 0–0.5)
LYMPHOCYTES # BLD AUTO: 0.9 K/UL (ref 1–4.8)
LYMPHOCYTES NFR BLD: 12.3 % (ref 18–48)
MAGNESIUM SERPL-MCNC: 2 MG/DL (ref 1.6–2.6)
MCH RBC QN AUTO: 32 PG (ref 27–31)
MCHC RBC AUTO-ENTMCNC: 32.6 G/DL (ref 32–36)
MCV RBC AUTO: 98 FL (ref 82–98)
MONOCYTES # BLD AUTO: 0.5 K/UL (ref 0.3–1)
MONOCYTES NFR BLD: 6.9 % (ref 4–15)
NEUTROPHILS # BLD AUTO: 5.5 K/UL (ref 1.8–7.7)
NEUTROPHILS NFR BLD: 76.5 % (ref 38–73)
NRBC BLD-RTO: 0 /100 WBC
PLATELET # BLD AUTO: 193 K/UL (ref 150–450)
PMV BLD AUTO: 9.6 FL (ref 9.2–12.9)
POTASSIUM SERPL-SCNC: 4.1 MMOL/L (ref 3.5–5.1)
PROT SERPL-MCNC: 5.6 G/DL (ref 6–8.4)
RBC # BLD AUTO: 3.44 M/UL (ref 4.6–6.2)
SODIUM SERPL-SCNC: 136 MMOL/L (ref 136–145)
WBC # BLD AUTO: 7.14 K/UL (ref 3.9–12.7)

## 2025-03-20 PROCEDURE — 99900035 HC TECH TIME PER 15 MIN (STAT)

## 2025-03-20 PROCEDURE — 97530 THERAPEUTIC ACTIVITIES: CPT

## 2025-03-20 PROCEDURE — 83735 ASSAY OF MAGNESIUM: CPT | Performed by: INTERNAL MEDICINE

## 2025-03-20 PROCEDURE — 99900031 HC PATIENT EDUCATION (STAT)

## 2025-03-20 PROCEDURE — 85025 COMPLETE CBC W/AUTO DIFF WBC: CPT | Performed by: INTERNAL MEDICINE

## 2025-03-20 PROCEDURE — 80053 COMPREHEN METABOLIC PANEL: CPT | Performed by: INTERNAL MEDICINE

## 2025-03-20 PROCEDURE — 94761 N-INVAS EAR/PLS OXIMETRY MLT: CPT

## 2025-03-20 PROCEDURE — 97110 THERAPEUTIC EXERCISES: CPT

## 2025-03-20 PROCEDURE — 25000242 PHARM REV CODE 250 ALT 637 W/ HCPCS: Performed by: INTERNAL MEDICINE

## 2025-03-20 PROCEDURE — 25000003 PHARM REV CODE 250: Performed by: INTERNAL MEDICINE

## 2025-03-20 PROCEDURE — 97535 SELF CARE MNGMENT TRAINING: CPT

## 2025-03-20 PROCEDURE — 94640 AIRWAY INHALATION TREATMENT: CPT

## 2025-03-20 PROCEDURE — 97116 GAIT TRAINING THERAPY: CPT

## 2025-03-20 PROCEDURE — 36415 COLL VENOUS BLD VENIPUNCTURE: CPT | Performed by: INTERNAL MEDICINE

## 2025-03-20 PROCEDURE — 11800000 HC REHAB PRIVATE ROOM

## 2025-03-20 PROCEDURE — 63600175 PHARM REV CODE 636 W HCPCS: Performed by: INTERNAL MEDICINE

## 2025-03-20 PROCEDURE — 25000003 PHARM REV CODE 250: Performed by: NURSE PRACTITIONER

## 2025-03-20 RX ADMIN — Medication 6 MG: at 08:03

## 2025-03-20 RX ADMIN — GUAIFENESIN 1200 MG: 600 TABLET, EXTENDED RELEASE ORAL at 08:03

## 2025-03-20 RX ADMIN — HYDROCODONE BITARTRATE AND ACETAMINOPHEN 1 TABLET: 5; 325 TABLET ORAL at 08:03

## 2025-03-20 RX ADMIN — MICONAZOLE NITRATE: 20 POWDER TOPICAL at 08:03

## 2025-03-20 RX ADMIN — CLARITHROMYCIN 500 MG: 500 TABLET, FILM COATED ORAL at 08:03

## 2025-03-20 RX ADMIN — BUDESONIDE 0.25 MG: 0.25 INHALANT RESPIRATORY (INHALATION) at 08:03

## 2025-03-20 RX ADMIN — LEVALBUTEROL 1.25 MG: 1.25 SOLUTION, CONCENTRATE RESPIRATORY (INHALATION) at 02:03

## 2025-03-20 RX ADMIN — POLYETHYLENE GLYCOL (3350) 17 G: 17 POWDER, FOR SOLUTION ORAL at 09:03

## 2025-03-20 RX ADMIN — FINASTERIDE 5 MG: 5 TABLET, FILM COATED ORAL at 08:03

## 2025-03-20 RX ADMIN — HYDROCODONE BITARTRATE AND ACETAMINOPHEN 1 TABLET: 5; 325 TABLET ORAL at 02:03

## 2025-03-20 RX ADMIN — CEFTRIAXONE SODIUM 2 G: 2 INJECTION, POWDER, FOR SOLUTION INTRAMUSCULAR; INTRAVENOUS at 02:03

## 2025-03-20 RX ADMIN — TRAMADOL HYDROCHLORIDE 50 MG: 50 TABLET, COATED ORAL at 11:03

## 2025-03-20 RX ADMIN — ACETAMINOPHEN 650 MG: 325 TABLET ORAL at 10:03

## 2025-03-20 RX ADMIN — OXYBUTYNIN CHLORIDE 5 MG: 5 TABLET, EXTENDED RELEASE ORAL at 08:03

## 2025-03-20 RX ADMIN — FAMOTIDINE 20 MG: 20 TABLET, FILM COATED ORAL at 08:03

## 2025-03-20 RX ADMIN — LEVALBUTEROL 1.25 MG: 1.25 SOLUTION, CONCENTRATE RESPIRATORY (INHALATION) at 07:03

## 2025-03-20 RX ADMIN — ATORVASTATIN CALCIUM 20 MG: 20 TABLET, FILM COATED ORAL at 08:03

## 2025-03-20 RX ADMIN — TAMSULOSIN HYDROCHLORIDE 0.4 MG: 0.4 CAPSULE ORAL at 08:03

## 2025-03-20 RX ADMIN — LEVALBUTEROL 1.25 MG: 1.25 SOLUTION, CONCENTRATE RESPIRATORY (INHALATION) at 08:03

## 2025-03-20 RX ADMIN — POLYETHYLENE GLYCOL (3350) 17 G: 17 POWDER, FOR SOLUTION ORAL at 08:03

## 2025-03-20 RX ADMIN — CARBOXYMETHYLCELLULOSE SODIUM 1 DROP: 5 SOLUTION/ DROPS OPHTHALMIC at 08:03

## 2025-03-20 RX ADMIN — GENTIAN VIOLET 2% 0.5 ML: 20 LIQUID TOPICAL at 11:03

## 2025-03-20 NOTE — PLAN OF CARE
Problem: Physical Therapy  Goal: Physical Therapy Goal  Description: Physical Therapy Goal     PT, PT/OT Progressing    Description:   Short Term Goals: 2025  Long Term Goals: 3/28/25     Transfers Status     Sit to Stand  Short Term Goal: Complete sit to stand with Supervision or Set-up Assistance using Rolling Walker with no verbal cues (MET dated 3/17/2025)   Long Term Goal:  Complete sit to stand with Independent using Rolling Walker with no  verbal cues     Stand Step  Short Term Goal: Complete stand step with  Set-Up Clean Up  using Rolling Walker with no verbal cues (On going dated 3/17/2025)   Long Term Goal:  Complete stand step with  Modified Independent  using Rolling Walker with no  verbal cues     Supine <> Sit  Short Term Goal: Complete supine <> sit with Modified [place]  with  minimal verbal cues rails prn(On going dated 3/17/2025)     Long Term Goal: Complete supine <> sit with Independent  With no  verbal cues        Balance/Coordination     Dynamic Sitting  Short Term Goal: Complete dynamic sitting with Set Up Assistance with minimal  verbal cues  minimal to moderate excursions(On going dated 3/17/2025)     Long Term Goal: Complete dynamic sitting with [place]  with  no verbal cues moderate to maximal excursions     Dynamic Standing  Short Term Goal: Complete dynamic standing with Stand-by Assistance  with minimal  verbal cues minimal to moderate excursions with RW(On going dated 3/17/2025)     Long Term Goal: Complete dynamic standing with Modified [place] with no  verbal cues and moderate to maximal excursions with RW     Endurance     Short Term Goal: (MET  dated 3/17/2025)     Physical Therapy: 2 times a day, 30-45 minutes  Rest periods: multiple  Sitting: 3 hours/day     Long Term Goal:  Physical Therapy: 2 times a day, 45 minutes  Rest periods: minimal  Sittin-8 hours/day     Mobility/Gait  Short Term Goal: Will ambulate with Stand-by Assistance  Using Rolling  Walker with no  verbal cues x 200 ft(MET dated 3/17/2025)     Long Term Goal: Will ambulate with Modified Independent  using Rolling Walker with no verbal cues x 500 ft     Stairs Assistance  Short Term Goal: Patient will ascend/descend 4 stairs/steps using bilateral handrails reciprocal  steps with Stand-by Assistance and minimal verbal cues(MET  dated 3/17/2025)     Long Term Goal: Patient will ascend/descend 12 stairs/steps using no handrails  reciprocal steps with [place] and minimal verbal cues           Ramp/Uneven 10 feet surface  Long  Term Goal; Patient will be able to navigate a 10 inch uneven surface with proper A.D. with  Modified [place]        2-6  inch curb  Long  Term Goal; Patient will be able to navigate a  2 to 6 inch curb with proper A.D. with independence     Picking up object   Long  Term Goal; Patient will be able to  a small object from the floor  with proper A.D. with  independence     Car transfers  Long  Term Goal; Patient will be able to get in and out of a vehicle  with proper A.D. with  independence     Education  Long Term Goals:  Patient/family/caregivers trained on physical mobility and precautions with Supervision.     Patient/family/caregivers trained on safety awareness with Supervision.     Order and obtain all appropriate equipment.      Outcome: Progressing, patient was initially refusing therapy but agreed after encouragement; bed mobility is limited by back pain which he attributes to his kidney stones

## 2025-03-20 NOTE — ASSESSMENT & PLAN NOTE
Patient's blood pressure range in the last 24 hours was: BP  Min: 104/51  Max: 167/67.The patient's inpatient anti-hypertensive regimen is listed below:  Current Antihypertensives       Plan  - BP is controlled, no changes needed to their regimen

## 2025-03-20 NOTE — PT/OT/SLP PROGRESS
Physical Therapy Inpatient Rehab Treatment    Patient Name:  Tobi Liz Jr.   MRN:  [id number]    Recommendations:     Discharge Recommendations:  Low Intensity Therapy   Discharge Equipment Recommendations: walker, rolling   Barriers to discharge: None    Assessment:     Tobi Liz Jr. is a 89 y.o. male admitted with a medical diagnosis of Myopathy.  He presents with the following impairments/functional limitations:    weakness, impaired endurance, impaired self care skills, impaired functional mobility, gait instability, impaired balance, decreased coordination, decreased upper extremity function, decreased lower extremity function, decreased safety awareness, pain, decreased ROM, , impaired skin, edema, impaired cardiopulmonary response to activity, impaired joint extensibility,  and impaired muscle length.    Patient was initially refusing therapy but agreed after encouragement; bed mobility is limited by back pain which he attributes to his kidney stones.  Multiple rest breaks needed during therapy session.     Rehab Diagnosis:  Myopathy     Recent Surgery: * No surgery found *      General Precautions: Standard, fall     Orthopedic Precautions:N/A     Braces: N/A    Rehab Prognosis: Fair; patient would benefit from acute skilled PT services to address these deficits and reach maximum level of function.      History:     Past Medical History:   Diagnosis Date    Aortic valve stenosis     Arthritis     BPH (benign prostatic hyperplasia)     Carotid artery stenosis     Chronic diastolic heart failure     ETOH abuse     Exposure to COVID-19 virus 01/07/2022    Hyperchloremia     Kidney stones     Murmur     Polymyalgia rheumatica     PVD (peripheral vascular disease)     Renal artery stenosis        Past Surgical History:   Procedure Laterality Date    A-V CARDIAC PACEMAKER INSERTION N/A 10/6/2023    Procedure: INSERTION, CARDIAC PACEMAKER, DUAL CHAMBER;  Surgeon: Douglas Salguero MD;  Location:  "[facility] CATH;  Service: Cardiology;  Laterality: N/A;    ANGIOGRAM, CAROTID Left 8/25/2023    Procedure: ANGIOGRAM, CAROTID;  Surgeon: Nick Box MD;  Location: [facility] CATH;  Service: Cardiology;  Laterality: Left;    CATARACT EXTRACTION, BILATERAL      ECHOCARDIOGRAM,TRANSESOPHAGEAL N/A 10/3/2023    Procedure: Transesophageal echo (JOSE ARMANDO) intra-procedure log documentation;  Surgeon: Nick Box MD;  Location: [facility] OR;  Service: Cardiology;  Laterality: N/A;    HAND SURGERY      INSERTION OF STENT INTO PERIPHERAL VESSEL N/A 1/30/2025    Procedure: INSERTION, STENT, VESSEL, PERIPHERAL;  Surgeon: Harsha Salcedo MD;  Location: [facility] CATH;  Service: Cardiology;  Laterality: N/A;    PERCUTANEOUS TRANSCATHETER AORTIC VALVE REPLACEMENT (TAVR) Left 10/3/2023    Procedure: REPLACEMENT, AORTIC VALVE, PERCUTANEOUS, TRANSCATHETER;  Surgeon: Nick Box MD;  Location: [facility] OR;  Service: Cardiology;  Laterality: Left;    PERCUTANEOUS TRANSCATHETER AORTIC VALVE REPLACEMENT (TAVR) Left 10/3/2023    Procedure: REPLACEMENT, AORTIC VALVE, PERCUTANEOUS, TRANSCATHETER carotid access;  Surgeon: Jun Brito MD;  Location: [facility] OR;  Service: Cardiovascular;  Laterality: Left;    PERCUTANEOUS TRANSLUMINAL ANGIOPLASTY N/A 8/25/2023    Procedure: ANGIOPLASTY-PERCUTANEOUS TRANSLUMINAL (PTA);  Surgeon: Nick Box MD;  Location: [facility] CATH;  Service: Cardiology;  Laterality: N/A;    SHOULDER SURGERY Left     total shoulder replacement    WOUND EXPLORATION N/A 10/3/2023    Procedure: EXPLORATION, WOUND;  Surgeon: Jun Brito MD;  Location: [facility] OR;  Service: Cardiology;  Laterality: N/A;       Subjective     Chief Complaint: "My back still hurts, it is my kidney stones."     Respiratory Status: Room air    Patients cultural, spiritual, Orthodox conflicts given the current situation: no      Objective:     Communicated with nurse and patient  prior to session.  Patient found up in chair with peripheral екатерина PLUMMER " catheter  upon PT entry to room.    Pt is Oriented x3, Oriented to place, Oriented to situation, and Oriented to time and Alert and Cooperative.    Vitals   Vitals at Rest  /53 mmHg    HR 91 bpm    O2 Sat 96%    Pain Back pain      Functional Mobility:   Bed Mobility:     Rolling Left:  Partial/moderate assistance   Rolling Right: Partial/moderate assistance   Scooting: Supervision or touching assistance   Bridging: Supervision or touching assistance   Supine to Sit: Partial/moderate assistance   Sit to Supine: Partial/moderate assistance   Transfers:     Sit to Stand: Supervision or touching assistance  with rolling walker  Chair to mat: Supervision or touching assistance  with  rolling walker  using  Step Transfer  Gait: Pt ambulates 150 feet x 2 trials, 25 feet x 2 trials  with RW with Set-up or clean-up assistance .   4 steps (6 inch)  stairs with bilateral handrails with Supervision or touching assistance   2 inch and 6 inch  curb with rolling walker with Supervision or touching assistance   Ambulate 10  feet on uneven surfaces/ramps with rolling walker with Supervision or touching assistance    object from ground in standing position with reacher with rolling walker with Supervision or touching assistance      Current   Status  Discharge   Goal   Functional Area: Care Score:    Roll Left and Right 3 Independent   Sit to Lying 3 Independent   Lying to Sitting on Side of Bed 3 Independent   Sit to Stand 4 Set-up/clean-up   Chair/Bed-to-Chair Transfer 4 Independent   Car Transfer 10 Supervision or touching assistance   Walk 10 Feet 5 Independent   Walk 50 Feet with Two Turns 5 Set-up/clean-up   Walk 150 Feet 5 Set-up/clean-up   Walk 10 Feet Uneven Surface 4 Independent   1 Step (Curb) 4 Independent   4 Steps 4 Set-up/clean-up   12 Steps 88 Set-up/clean-up   Picking Up Object 5 Set-up/clean-up   Wheel 50 Feet with Two Turns 9     Wheel 150 Feet 9         Therapeutic Activities and Exercises:  Gait  training with RW on flat level surface   Stair training with both side rails, non-reciprocal steps   Curb negotiation with RW  Ramp negotiation with RW  Picking up a small object from the floor with RW and reacher   Sit <> stand with both UE support  Stand step transfer with a RW   Sit <> supine  Rolling side <> side  Standing balance  with RW      SUPINE Both Lower Extremity   Active ROM Sets / Reps / Resistance / Etc.   Ankle PF and DF 2 x 15   Short Arc Quads 2 x 15    Lower trunk rotation 3 x 10    Single knee to chest  3 x 10 on each leg    Clams  3 x 10    Bridging 3 x 6   SLR  3 x 10    Hip abd/add 3 x 10    Hip and knee flexion  2 x 15         Activity Tolerance: Fair    Patient left up in chair with call button in reach and house supervisor  present.    Education provided: roles and goals of PT, transfer training, bed mob, gait training, stair training, balance training, safety awareness, body mechanics, assistive device, strengthening exercises, and fall prevention    Expected compliance: Moderate compliance and Low compliance    GOALS:   Multidisciplinary Problems       Physical Therapy Goals          Problem: Physical Therapy    Goal Priority Disciplines Outcome Interventions   Physical Therapy Goal     PT, PT/OT Progressing    Description: Physical Therapy Goal     PT, PT/OT Progressing    Description:   Short Term Goals: 03/20/2025  Long Term Goals: 3/28/25     Transfers Status     Sit to Stand  Short Term Goal: Complete sit to stand with Supervision or Set-up Assistance using Rolling Walker with no verbal cues (MET dated 3/17/2025)   Long Term Goal:  Complete sit to stand with Independent using Rolling Walker with no  verbal cues     Stand Step  Short Term Goal: Complete stand step with  Set-Up Clean Up  using Rolling Walker with no verbal cues (On going dated 3/17/2025)   Long Term Goal:  Complete stand step with  Modified Independent  using Rolling Walker with no  verbal cues     Supine <>  Sit  Short Term Goal: Complete supine <> sit with Modified Avant  with  minimal verbal cues rails prn(On going dated 3/17/2025)     Long Term Goal: Complete supine <> sit with Independent  With no  verbal cues        Balance/Coordination     Dynamic Sitting  Short Term Goal: Complete dynamic sitting with Set Up Assistance with minimal  verbal cues  minimal to moderate excursions(On going dated 3/17/2025)     Long Term Goal: Complete dynamic sitting with Avant  with  no verbal cues moderate to maximal excursions     Dynamic Standing  Short Term Goal: Complete dynamic standing with Stand-by Assistance  with minimal  verbal cues minimal to moderate excursions with RW(On going dated 3/17/2025)     Long Term Goal: Complete dynamic standing with Modified Avant with no  verbal cues and moderate to maximal excursions with RW     Endurance     Short Term Goal: (MET  dated 3/17/2025)     Physical Therapy: 2 times a day, 30-45 minutes  Rest periods: multiple  Sitting: 3 hours/day     Long Term Goal:  Physical Therapy: 2 times a day, 45 minutes  Rest periods: minimal  Sittin-8 hours/day     Mobility/Gait  Short Term Goal: Will ambulate with Stand-by Assistance  Using Rolling Walker with no  verbal cues x 200 ft(MET dated 3/17/2025)     Long Term Goal: Will ambulate with Modified Independent  using Rolling Walker with no verbal cues x 500 ft     Stairs Assistance  Short Term Goal: Patient will ascend/descend 4 stairs/steps using bilateral handrails reciprocal  steps with Stand-by Assistance and minimal verbal cues(MET  dated 3/17/2025)     Long Term Goal: Patient will ascend/descend 12 stairs/steps using no handrails  reciprocal steps with Avant and minimal verbal cues           Ramp/Uneven 10 feet surface  Long  Term Goal; Patient will be able to navigate a 10 inch uneven surface with proper A.D. with  Modified Avant        2-6  inch curb  Long  Term Goal; Patient will be able to navigate  a  2 to 6 inch curb with proper A.D. with independence     Picking up object   Long  Term Goal; Patient will be able to  a small object from the floor  with proper A.D. with  independence     Car transfers  Long  Term Goal; Patient will be able to get in and out of a vehicle  with proper A.D. with  independence     Education  Long Term Goals:  Patient/family/caregivers trained on physical mobility and precautions with Supervision.     Patient/family/caregivers trained on safety awareness with Supervision.     Order and obtain all appropriate equipment.                           Plan:     During this hospitalization, patient to be seen 5 x/week to address the identified rehab impairments via gait training, therapeutic activities, therapeutic exercises, neuromuscular re-education and progress toward the following goals:    Plan of Care Expires:  03/28/25  PT Next Visit Date: 03/21/25  Plan of Care reviewed with: patient    Additional Information:         Time Tracking:     Therapy Time  PT Received On: 03/20/25  PT Start Time: 1300  PT Stop Time: 1430  PT Total Time (min): 90 min   PT Individual: 90      Billable Minutes: Gait Training 30, Therapeutic Activity 15, and Therapeutic Exercise 45    03/20/2025

## 2025-03-20 NOTE — ASSESSMENT & PLAN NOTE
Anemia is likely due to chronic disease due to Chronic Kidney Disease. Most recent hemoglobin and hematocrit are listed below.  Recent Labs     03/20/25  0559   HGB 11.0*   HCT 33.7*     Plan  - Monitor serial CBC: q72hrs  - Transfuse PRBC if patient becomes hemodynamically unstable, symptomatic or H/H drops below 7/21.  - Patient has received 3 units of PRBCs  - Patient's anemia is currently stable

## 2025-03-20 NOTE — PROGRESS NOTES
[facility] Medicine  Progress Note    Patient Name: Tobi Liz Jr.  MRN: [id number]  Patient Class: IP- Rehab   Admission Date: 3/13/2025  Length of Stay: 7 days  Attending Physician: Dave Reyes III, MD  Primary Care Provider: Luis Enrique Guzman Jr., MD        Subjective     Principal Problem:Myopathy        HPI:  Tobi Liz Jr. is a 89 y.o. male with a PMHx of has a past medical history of Aortic valve stenosis, Arthritis, BPH (benign prostatic hyperplasia), Carotid artery stenosis, Chronic diastolic heart failure, ETOH abuse, Exposure to COVID-19 virus (01/07/2022), Hyperchloremia, Kidney stones, Murmur, Polymyalgia rheumatica, PVD (peripheral vascular disease), and Renal artery stenosis., presents complaining of shaking after standing from a seated position that started yesterday, he was aware and talking during the episode so not likely a seizure. Labs show he was severely anemic in the ED with Hgb of 5. He had hematuria as well on UA along with bacteria. Ucx pending. His family states that he has a history of anemia in the past. He otherwise feels fine. He received 3 units of pRBCs in the ED with appropriate response.     Patient overall significantly improved from admission. H&H were stable yesterday. Awaiting updated CBC this morning. Otherwise renal function stable and no new electrolyte abnormalities noted. Patient accepted to inpatient rehab and we will discharging.    Patient is seen and examined after admission and patient is complaining of respiratory congestion wheezing and dyspnea.  Patient's currently on Bactrim and sensitivities noted for his urinary tract infection as well as the other antibiotics that maybe appropriate we will switch to Rocephin as we will get better respiratory coverage as the patient does seem to be battling a bronchitis and possibly a pneumonia.  Repeat chest x-ray pending.  Patient is motivated to improve he is very eager to return  Progress Note    she is a 27y.o. year old female who presents for evalution. Chief Complaint   Patient presents with    Weight Management     f/up    Medication Refill         Assessment/ Plan:   Diagnoses and all orders for this visit:    1. Class 3 severe obesity due to excess calories without serious comorbidity with body mass index (BMI) of 50.0 to 59.9 in adult [facility])  Assessment & Plan:   uncontrolled, lifestyle modifications recommended  Has increased activity and small dietary changes in the last month  Track intake and activity. Set weekly goals. Discussed use of GLP-1 agonist, patient wishes to hold off at this time  Close follow-up recommended      2. Weight loss counseling, encounter for    3. Essential hypertension  Assessment & Plan:   well controlled, continue current medications    Orders:  -     hydroCHLOROthiazide (HYDRODIURIL) 25 mg tablet; Take 1 Tablet by mouth daily. 4. Allergic rhinitis due to pollen, unspecified seasonality  -     levocetirizine (XYZAL) 5 mg tablet; Take 1 Tablet by mouth daily. Follow-up and Dispositions    · Return in about 4 weeks (around 10/26/2021) for follow-up weight 4-6 weeks. I have discussed the diagnosis with the patient and the intended plan as seen in the above orders. The patient has received an after-visit summary and questions were answered concerning future plans. Pt conveyed understanding of plan. Medication Side Effects and Warnings were discussed with patient        Subjective:     Chief Complaint   Patient presents with    Weight Management     f/up    Medication Refill       Has started running beginning of September. Reports it has been nahomi, some issues with pain in knees initially. Now been fine, sprinting some. 30 minutes per day running. Made soup the other day, then added noodles. Chicken detox soup. Bread is biggest weakness. And kids snacks. Herbalife in the mornings, tea and shakes.   Avoiding home patient's significantly decline in his overall functional state and unable to complete ADLs safely at this time.  He has developed a myopathy as well as some confusion and encephalopathy agree with inpatient rehab patient meets current Medicare criteria.       Patient requires acute inpatient rehab admission with 24-hour nursing and active physician oversight to monitor and manage acute medical comorbid conditions, labs, pain, and functional deficits. Patient/family will also require teaching and integration of improving functional skills into daily living. Patient will also require an individualized, interdisciplinary approach to their care, receiving PT, OT services 3 hours per day, 5 days per week. Required care cannot be provided at a lower level of care. Patient is anticipated to require approximately 10-14 days LOS with expected discharge home  with  services.    Impairment group (IGC):   Neuromuscular Disorders 03.8 Etiologic diagnosis/description:   G72.9: Myopathy  D64.9: Anemia      Date of onset:  3/8/2025 Date of surgery: N/A   Allergies: Patient has no known allergies.   Comorbid condition:   HTN, HLD, BPH, CAD, Valvular heart disease, CHF class II, Occlusion of right iliac artery   Medical/functional conditions requiring inpatient rehabilitation:      This patient requires medical management/24-hour nursing of complex co morbidities HTN, HLD, BPH, CAD, Valvular heart disease, CHF class II, Occlusion of right iliac artery, labs, medications (see medications list), pain, sleep hygiene, anticoagulation, nutrition, hydration, neurological, pulmonary, cardiac status, and preventive healthcare.     This patient requires intense therapy and an integrated, interdisciplinary approach to address safety, impaired mobility, impaired ADLs (dressing, toileting, grooming, showering), judgment, and memory, communication, pulmonary insufficiency, bowel/bladder problems,preventive healthcare, medication  noodle packs and soup cans in the last month. Not tracking food. Reviewed PmHx, RxHx, FmHx, SocHx, AllgHx and updated and dated in the chart. Review of Systems - negative except as listed above in the HPI    Objective:     Vitals:    09/28/21 1538   BP: 129/87   Pulse: 74   Temp: 98.6 °F (37 °C)   SpO2: 100%   Weight: 291 lb 11.2 oz (132.3 kg)   Height: 5' 3\" (1.6 m)       Current Outpatient Medications   Medication Sig    levocetirizine (XYZAL) 5 mg tablet Take 1 Tablet by mouth daily.  hydroCHLOROthiazide (HYDRODIURIL) 25 mg tablet Take 1 Tablet by mouth daily.  cyclobenzaprine (FLEXERIL) 5 mg tablet Take 1-2 Tablets by mouth three (3) times daily as needed for Muscle Spasm(s).  medroxyPROGESTERone (DEPO-PROVERA) 150 mg/mL syrg 150 mg by IntraMUSCular route once.  fluticasone propionate (FLONASE) 50 mcg/actuation nasal spray SPRAY 2 SPRAYS INTO EACH NOSTRIL EVERY DAY     No current facility-administered medications for this visit. Physical Exam  Vitals and nursing note reviewed. Constitutional:       General: She is not in acute distress. Appearance: Normal appearance. She is obese. She is not ill-appearing, toxic-appearing or diaphoretic. HENT:      Head: Normocephalic and atraumatic. Eyes:      General: No scleral icterus. Right eye: No discharge. Left eye: No discharge. Conjunctiva/sclera: Conjunctivae normal.   Pulmonary:      Effort: Pulmonary effort is normal. No respiratory distress. Musculoskeletal:      Cervical back: No rigidity. Skin:     Coloration: Skin is not jaundiced. Findings: No bruising or rash (over visualized areas). Neurological:      General: No focal deficit present. Mental Status: She is alert. Psychiatric:         Mood and Affect: Mood normal.         Behavior: Behavior normal.         Thought Content:  Thought content normal.         Judgment: Judgment normal.              Vanessa Pack MD management, integration of functional skills into daily living, and home caregiver support and training.    Risk for medical/clinical complications:      This patient is at risk for the following complications: DVT/PE, pneumonia, malnutrition, neurological decline, respiratory insufficiency, worsening activity intolerance, complications from anticoagulation, skin breakdown, inadequate sleep, and constipation.        Overview/Hospital Course:  3/16 AA, weekend crosscover, no acute events reported, blood pressure stable, continue PT OT efforts    3/17 DL:  Patient doing well this morning.  He is sitting up in wheelchair in the dining room and is actively participating in therapy.  No acute events reported.  No acute distress noted.  Mild hyponatremia noted.  Patient encouraged to increase p.o. fluid intake.  Renal function stable.  Vital signs stable.  Patient reports therapy going well.  Pain.  Encouraged patient to continue therapy efforts.    3/18 FM:  Patient's respiratory symptoms are much improved but still having occasional cough and wheeze.  We will add low-dose budesonide.  Patient's cognition is improved Bolton catheter in place patient's pain improved.  Patient completing time needs in therapy and overall doing well    3/19 DL:  Patient doing well this morning.  He is in dining room and is actively participating in therapy.  Patient denies pain.  Vital signs stable.  Patient continues to work well with therapy.  Encouraged patient to continue therapy efforts.    3/20 DL:  Patient doing well this morning.  He is in the dining room and is actively participating in therapy.  Patient is without complaint this morning.  Labs and vital signs stable.  Patient reports therapy going well.  He states he feels he is making functional gains.  Encouraged patient to continue therapy efforts.    Interval History: Patient seen and examined.     Review of Systems   Constitutional:  Positive for activity change, appetite  change and fatigue. Negative for chills and fever.   HENT:  Positive for postnasal drip, sinus pressure and sinus pain. Negative for ear pain, mouth sores, nosebleeds and sore throat.    Eyes:  Negative for visual disturbance.   Respiratory:  Positive for cough, shortness of breath and wheezing. Negative for apnea.    Cardiovascular:  Negative for chest pain, palpitations and leg swelling.   Gastrointestinal:  Positive for constipation. Negative for abdominal distention, abdominal pain, blood in stool, diarrhea, nausea and vomiting.   Endocrine: Positive for cold intolerance. Negative for polyphagia.   Musculoskeletal:  Positive for arthralgias and back pain.   Skin:  Negative for rash.   Neurological:  Positive for tremors and weakness. Negative for seizures, syncope, facial asymmetry and speech difficulty.   Hematological:  Negative for adenopathy. Bruises/bleeds easily.   Psychiatric/Behavioral:  Positive for confusion. Negative for agitation and hallucinations. The patient is nervous/anxious.      Objective:     Vital Signs (Most Recent):  Temp: (!) 8.3 °F (-13.2 °C) (03/20/25 0737)  Pulse: 78 (03/20/25 0829)  Resp: 18 (03/20/25 0829)  BP: (!) 167/67 (03/20/25 0737)  SpO2: 97 % (03/20/25 0829) Vital Signs (24h Range):  Temp:  [8.3 °F (-13.2 °C)-97.2 °F (36.2 °C)] 8.3 °F (-13.2 °C)  Pulse:  [69-81] 78  Resp:  [16-18] 18  SpO2:  [97 %-98 %] 97 %  BP: (104-167)/(51-67) 167/67     Weight: 81.6 kg (179 lb 12.8 oz)  Body mass index is 26.55 kg/m².    Intake/Output Summary (Last 24 hours) at 3/20/2025 1024  Last data filed at 3/20/2025 0941  Gross per 24 hour   Intake 840 ml   Output 1600 ml   Net -760 ml         Physical Exam  Vitals and nursing note reviewed.   Constitutional:       General: He is awake. He is not in acute distress.     Appearance: He is ill-appearing. He is not toxic-appearing.   HENT:      Head: Normocephalic and atraumatic.      Nose: Congestion and rhinorrhea present.      Mouth/Throat:       Mouth: Mucous membranes are moist.      Pharynx: Oropharynx is clear. No oropharyngeal exudate or posterior oropharyngeal erythema.   Eyes:      General: No scleral icterus.        Right eye: No discharge.         Left eye: No discharge.      Extraocular Movements: Extraocular movements intact.   Neck:      Thyroid: No thyroid mass or thyromegaly.      Vascular: No carotid bruit.      Meningeal: Brudzinski's sign and Kernig's sign absent.   Cardiovascular:      Rate and Rhythm: Normal rate and regular rhythm.      Chest Wall: PMI is not displaced. No thrill.      Heart sounds: Murmur heard.   Pulmonary:      Effort: No tachypnea, accessory muscle usage, prolonged expiration or respiratory distress.      Breath sounds: No decreased air movement. Rhonchi and rales present. No wheezing.   Abdominal:      General: Bowel sounds are normal. There is no distension.      Palpations: Abdomen is soft. There is no hepatomegaly or splenomegaly.      Tenderness: There is no abdominal tenderness. There is no guarding or rebound.   Musculoskeletal:      Cervical back: Neck supple. No rigidity. No muscular tenderness.      Right lower leg: No edema.      Left lower leg: No edema.   Lymphadenopathy:      Cervical: No cervical adenopathy.   Skin:     General: Skin is warm.      Capillary Refill: Capillary refill takes less than 2 seconds.      Coloration: Skin is not cyanotic, jaundiced or pale.      Findings: No erythema or petechiae.   Neurological:      Mental Status: He is alert and oriented to person, place, and time.      Cranial Nerves: No cranial nerve deficit, dysarthria or facial asymmetry.      Motor: Weakness present. No tremor.      Gait: Gait abnormal.   Psychiatric:         Attention and Perception: He does not perceive auditory hallucinations.         Mood and Affect: Mood is anxious. Mood is not depressed. Affect is not flat.         Speech: Speech is not rapid and pressured or slurred.         Behavior: Behavior  normal. Behavior is not agitated, aggressive or combative.         Thought Content: Thought content normal. Thought content is not paranoid or delusional.         Cognition and Memory: Cognition is impaired.               Significant Labs: All pertinent labs within the past 24 hours have been reviewed.  Recent Lab Results         03/20/25  0559        Albumin 2.8       ALP 48       ALT 26       Anion Gap 4       AST 19       Baso # 0.01       Basophil % 0.1       BILIRUBIN TOTAL 0.5  Comment: For infants and newborns, interpretation of results should be based  on gestational age, weight and in agreement with clinical  observations.    Premature Infant recommended reference ranges:  Up to 24 hours.............<8.0 mg/dL  Up to 48 hours............<12.0 mg/dL  3-5 days..................<15.0 mg/dL  6-29 days.................<15.0 mg/dL         BUN 24       Calcium 7.8       Chloride 106       CO2 26       Creatinine 0.9       Differential Method Automated       eGFR >60.0       Eos # 0.2       Eos % 2.9       Glucose 88       Gran # (ANC) 5.5       Gran % 76.5       Hematocrit 33.7       Hemoglobin 11.0       Immature Grans (Abs) 0.09  Comment: Mild elevation in immature granulocytes is non specific and   can be seen in a variety of conditions including stress response,   acute inflammation, trauma and pregnancy. Correlation with other   laboratory and clinical findings is essential.         Immature Granulocytes 1.3       Lymph # 0.9       Lymph % 12.3       Magnesium  2.0       MCH 32.0       MCHC 32.6       MCV 98       Mono # 0.5       Mono % 6.9       MPV 9.6       nRBC 0       Platelet Count 193       Potassium 4.1       PROTEIN TOTAL 5.6       RBC 3.44       RDW 15.3       Sodium 136       WBC 7.14               Significant Imaging: I have reviewed all pertinent imaging results/findings within the past 24 hours.      Assessment & Plan  Myopathy  Continue PT/OT efforts.  Hypertension  Patient's blood pressure  "range in the last 24 hours was: BP  Min: 104/51  Max: 167/67.The patient's inpatient anti-hypertensive regimen is listed below:  Current Antihypertensives       Plan  - BP is controlled, no changes needed to their regimen  Hyperlipidemia  Recheck labs.    BPH (benign prostatic hyperplasia)  Willams in place.    Atherosclerosis of native artery of both lower extremities with rest pain  Noted.  Continue current medications.    Congestive heart failure, NYHA class II  Patient has Combined Systolic and Diastolic heart failure that is Chronic. On presentation their CHF was well compensated. Most recent BNP and echo results are listed below.  No results for input(s): "BNP" in the last 72 hours.  Latest ECHO  No results found for this or any previous visit.    Current Heart Failure Medications       Plan  - Monitor strict I&Os and daily weights.    - Place on telemetry  - Low sodium diet      S/P TAVR (transcatheter aortic valve replacement)  Has done well post procedure.    Insomnia  Monitor, PRN meds.    Anemia  Anemia is likely due to chronic disease due to Chronic Kidney Disease. Most recent hemoglobin and hematocrit are listed below.  Recent Labs     03/20/25  0559   HGB 11.0*   HCT 33.7*     Plan  - Monitor serial CBC:  q72hrs  - Transfuse PRBC if patient becomes hemodynamically unstable, symptomatic or H/H drops below 7/21.  - Patient has received 3 units of PRBCs  - Patient's anemia is currently stable  Acute cystitis with hematuria  Abx changed 2nd dual resp infection.    Acute bronchitis  As above.    Urinary obstruction  Willams in place, plan on DC with willams.      VTE Risk Mitigation (From admission, onward)           Ordered     IP VTE HIGH RISK PATIENT  Once         03/13/25 1349     Place sequential compression device  Until discontinued         03/13/25 1349                    Discharge Planning   ADONAY:      Code Status: Full Code   Medical Readiness for Discharge Date:   Discharge Plan A: Home    "             Please place Justification for DME        Zoya Lee NP  Department of Hospital Medicine   [facility])

## 2025-03-20 NOTE — PT/OT/SLP PROGRESS
Occupational Therapy Inpatient Rehab Treatment    Name: Tobi Liz Jr.  MRN: [id number]    Assessment:  Tobi Liz Jr. is a 89 y.o. male admitted with a medical diagnosis of Myopathy.  He presents with the following impairments/functional limitations:  weakness, impaired endurance, impaired self care skills, impaired functional mobility, gait instability, impaired balance, impaired cognition, decreased coordination, decreased upper extremity function, decreased lower extremity function, decreased safety awareness, pain, decreased ROM, impaired fine motor, impaired skin, edema, impaired cardiopulmonary response to activity, impaired joint extensibility, impaired muscle length.    Pt demonstrated improved functional performance with toileting as noted by supervision in which patient able to perform perineal hygiene and clothing management without steadying assist or tactile cueing on this date, but min verbal cueing for safety awareness and technique.     General Precautions: Standard, fall     Orthopedic Precautions:N/A     Braces: N/A    Rehab Prognosis: Good; patient would benefit from acute skilled OT services to address these deficits and reach maximum level of function.      History:     Past Medical History:   Diagnosis Date    Aortic valve stenosis     Arthritis     BPH (benign prostatic hyperplasia)     Carotid artery stenosis     Chronic diastolic heart failure     ETOH abuse     Exposure to COVID-19 virus 01/07/2022    Hyperchloremia     Kidney stones     Murmur     Polymyalgia rheumatica     PVD (peripheral vascular disease)     Renal artery stenosis        Past Surgical History:   Procedure Laterality Date    A-V CARDIAC PACEMAKER INSERTION N/A 10/6/2023    Procedure: INSERTION, CARDIAC PACEMAKER, DUAL CHAMBER;  Surgeon: Douglas Salguero MD;  Location: [facility] CATH;  Service: Cardiology;  Laterality: N/A;    ANGIOGRAM, CAROTID Left 8/25/2023    Procedure: ANGIOGRAM, CAROTID;  Surgeon: Charu  Nick WHITE MD;  [facility];  Service: Cardiology;  Laterality: Left;    CATARACT EXTRACTION, BILATERAL      ECHOCARDIOGRAM,TRANSESOPHAGEAL N/A 10/3/2023    Procedure: Transesophageal echo (JOSE ARMANDO) intra-procedure log documentation;  Surgeon: Nick Box MD;  [facility];  Service: Cardiology;  Laterality: N/A;    HAND SURGERY      INSERTION OF STENT INTO PERIPHERAL VESSEL N/A 1/30/2025    Procedure: INSERTION, STENT, VESSEL, PERIPHERAL;  Surgeon: Harsha aSlcedo MD;  [facility];  Service: Cardiology;  Laterality: N/A;    PERCUTANEOUS TRANSCATHETER AORTIC VALVE REPLACEMENT (TAVR) Left 10/3/2023    Procedure: REPLACEMENT, AORTIC VALVE, PERCUTANEOUS, TRANSCATHETER;  Surgeon: Nick Box MD;  [facility];  Service: Cardiology;  Laterality: Left;    PERCUTANEOUS TRANSCATHETER AORTIC VALVE REPLACEMENT (TAVR) Left 10/3/2023    Procedure: REPLACEMENT, AORTIC VALVE, PERCUTANEOUS, TRANSCATHETER carotid access;  Surgeon: Jun Brito MD;  [facility];  Service: Cardiovascular;  Laterality: Left;    PERCUTANEOUS TRANSLUMINAL ANGIOPLASTY N/A 8/25/2023    Procedure: ANGIOPLASTY-PERCUTANEOUS TRANSLUMINAL (PTA);  Surgeon: Nick Box MD;  [facility];  Service: Cardiology;  Laterality: N/A;    SHOULDER SURGERY Left     total shoulder replacement    WOUND EXPLORATION N/A 10/3/2023    Procedure: EXPLORATION, WOUND;  Surgeon: Jun Brito MD;  [facility];  Service: Cardiology;  Laterality: N/A;       Subjective     Orientation: Oriented x4     Chief Complaint: weakness and decreased independence      Patient/Family Comments/goals: Pt would like to regain independence with ADL's including functional mobility in order to return home safely with spouse.      Respiratory Status: Room air     Patients cultural, spiritual, Sikhism conflicts given the current situation: no       Objective:     Patient found up in chair with willams catheter, peripheral IV  upon OT  entry to room.    Mobility   Patient completed:  Sit to Stand Transfer with supervision with rolling walker  Bed to Chair Transfer using Step Transfer technique with supervision with rolling walker  Toilet Transfer Step Transfer technique with supervision with  rolling walker and grab bars    Functional Mobility  Pt ambulated greater than 200' between surfaces requiring supervision utilizing RW.     ADLs   Current Status   Eating     Oral Hygiene     Shower, Bathe Self     Upper Body Dressing     Lower Body Dressing     Toileting Hygiene 4   Toilet Transfer 4   Putting On, Taking Off Footwear       Limiting Factors for ADLs: endurance, limited ROM, balance, weakness, coordination, cognition, safety awareness, and pain     Therapeutic Activities  He participated in therapeutic activity addressing functional reaching, gross motor coordination, static / dynamic standing balance, standing tolerance, and energy for task / endurance challenging him to retrieve, lift, stabilize, manipulate, and place various items needed to perform functional tasks of ADL's while standing utilizing bilateral UE requiring continuous verbal and tactile cueing to facilitate optimal movements patterns, positioning within RW, and proper weight shifting utilizing forward, diagonal, and lateral steps with intermittent steadying assist when reaching. In addition, he participated in therapeutic activity addressing trunk mobility, trunk control, dynamic sitting balance, trunk strength, and active ROM in bilateral shoulders with flexion and abduction challenging him to perform trunk flexion, extension, and lateral flexion utilizing large stability ball requiring verbal and tactile cueing to facilitate optimal movement patterns and increased range per trial in order to improve active ROM impacting performance with functional tasks below waist.      Therapeutic Exercise  Pt then participated in therapeutic exercise performing 3x15 bilateral UE proximal  strengthening exercises utilizing moderate to heavy resistance theraband with scapular retraction, shoulder extension, shoulder adduction, horizontal abduction, shoulder flexion in plane of scaption, internal rotation, and external rotation requiring mod verbal and tactile cueing for technique emphasizing quality of movement.      Additional Treatments: Also, he participated in ADL retraining as further noted above emphasizing fall prevention, and use of compensatory strategies and assistive devices providing extra time requiring mod verbal and tactile cueing for technique.     LifeStyle Change and Education:             Patient left up in chair with all lines intact and nurse notified.     Education provided: Roles and goals of OT, ADLs, transfer training, bed mobility, body mechanics, assistive device, safety precautions, fall prevention, equipment recommendations, and home safety    Multidisciplinary Problems       Occupational Therapy Goals          Problem: Occupational Therapy    Goal Priority Disciplines Outcome Interventions   Occupational Therapy Goal     OT, PT/OT Progressing    Description: Long Term Goals to be met by: 03/27/25     Patient will increase functional independence with ADLs by performing:    Feeding with [place].  UE Dressing with Modified [place].  LE Dressing with Modified [place].  Grooming while standing at sink with Modified [place].  Toileting from toilet with Modified [place] for hygiene and clothing management.   Bathing from  shower chair/bench with Modified [place].  Toilet transfer to toilet with Modified [place].  Increased functional strength to 4+ to 5/5 for bilateral UE's.                         Time Tracking     OT Received On: 03/20/25  Time In 0830     Time Out 1000  Total Time 90 min  Therapy Time: OT Individual: 90  Missed Time:    Missed Time Reason:      Billable Minutes: Self Care/Home Management 15, Therapeutic Activity 40, and  Therapeutic Exercise 35    03/20/2025

## 2025-03-20 NOTE — PLAN OF CARE
Problem: Occupational Therapy  Goal: Occupational Therapy Goal  Description: Long Term Goals to be met by: 03/27/25     Patient will increase functional independence with ADLs by performing:    Feeding with [place].  UE Dressing with Modified [place].  LE Dressing with Modified [place].  Grooming while standing at sink with Modified [place].  Toileting from toilet with Modified [place] for hygiene and clothing management.   Bathing from  shower chair/bench with Modified [place].  Toilet transfer to toilet with Modified [place].  Increased functional strength to 4+ to 5/5 for bilateral UE's.    Outcome: Progressing

## 2025-03-20 NOTE — SUBJECTIVE & OBJECTIVE
Interval History: Patient seen and examined.     Review of Systems   Constitutional:  Positive for activity change, appetite change and fatigue. Negative for chills and fever.   HENT:  Positive for postnasal drip, sinus pressure and sinus pain. Negative for ear pain, mouth sores, nosebleeds and sore throat.    Eyes:  Negative for visual disturbance.   Respiratory:  Positive for cough, shortness of breath and wheezing. Negative for apnea.    Cardiovascular:  Negative for chest pain, palpitations and leg swelling.   Gastrointestinal:  Positive for constipation. Negative for abdominal distention, abdominal pain, blood in stool, diarrhea, nausea and vomiting.   Endocrine: Positive for cold intolerance. Negative for polyphagia.   Musculoskeletal:  Positive for arthralgias and back pain.   Skin:  Negative for rash.   Neurological:  Positive for tremors and weakness. Negative for seizures, syncope, facial asymmetry and speech difficulty.   Hematological:  Negative for adenopathy. Bruises/bleeds easily.   Psychiatric/Behavioral:  Positive for confusion. Negative for agitation and hallucinations. The patient is nervous/anxious.      Objective:     Vital Signs (Most Recent):  Temp: (!) 8.3 °F (-13.2 °C) (03/20/25 0737)  Pulse: 78 (03/20/25 0829)  Resp: 18 (03/20/25 0829)  BP: (!) 167/67 (03/20/25 0737)  SpO2: 97 % (03/20/25 0829) Vital Signs (24h Range):  Temp:  [8.3 °F (-13.2 °C)-97.2 °F (36.2 °C)] 8.3 °F (-13.2 °C)  Pulse:  [69-81] 78  Resp:  [16-18] 18  SpO2:  [97 %-98 %] 97 %  BP: (104-167)/(51-67) 167/67     Weight: 81.6 kg (179 lb 12.8 oz)  Body mass index is 26.55 kg/m².    Intake/Output Summary (Last 24 hours) at 3/20/2025 1024  Last data filed at 3/20/2025 0941  Gross per 24 hour   Intake 840 ml   Output 1600 ml   Net -760 ml         Physical Exam  Vitals and nursing note reviewed.   Constitutional:       General: He is awake. He is not in acute distress.     Appearance: He is ill-appearing. He is not  toxic-appearing.   HENT:      Head: Normocephalic and atraumatic.      Nose: Congestion and rhinorrhea present.      Mouth/Throat:      Mouth: Mucous membranes are moist.      Pharynx: Oropharynx is clear. No oropharyngeal exudate or posterior oropharyngeal erythema.   Eyes:      General: No scleral icterus.        Right eye: No discharge.         Left eye: No discharge.      Extraocular Movements: Extraocular movements intact.   Neck:      Thyroid: No thyroid mass or thyromegaly.      Vascular: No carotid bruit.      Meningeal: Brudzinski's sign and Kernig's sign absent.   Cardiovascular:      Rate and Rhythm: Normal rate and regular rhythm.      Chest Wall: PMI is not displaced. No thrill.      Heart sounds: Murmur heard.   Pulmonary:      Effort: No tachypnea, accessory muscle usage, prolonged expiration or respiratory distress.      Breath sounds: No decreased air movement. Rhonchi and rales present. No wheezing.   Abdominal:      General: Bowel sounds are normal. There is no distension.      Palpations: Abdomen is soft. There is no hepatomegaly or splenomegaly.      Tenderness: There is no abdominal tenderness. There is no guarding or rebound.   Musculoskeletal:      Cervical back: Neck supple. No rigidity. No muscular tenderness.      Right lower leg: No edema.      Left lower leg: No edema.   Lymphadenopathy:      Cervical: No cervical adenopathy.   Skin:     General: Skin is warm.      Capillary Refill: Capillary refill takes less than 2 seconds.      Coloration: Skin is not cyanotic, jaundiced or pale.      Findings: No erythema or petechiae.   Neurological:      Mental Status: He is alert and oriented to person, place, and time.      Cranial Nerves: No cranial nerve deficit, dysarthria or facial asymmetry.      Motor: Weakness present. No tremor.      Gait: Gait abnormal.   Psychiatric:         Attention and Perception: He does not perceive auditory hallucinations.         Mood and Affect: Mood is anxious.  Mood is not depressed. Affect is not flat.         Speech: Speech is not rapid and pressured or slurred.         Behavior: Behavior normal. Behavior is not agitated, aggressive or combative.         Thought Content: Thought content normal. Thought content is not paranoid or delusional.         Cognition and Memory: Cognition is impaired.               Significant Labs: All pertinent labs within the past 24 hours have been reviewed.  Recent Lab Results         03/20/25  0559        Albumin 2.8       ALP 48       ALT 26       Anion Gap 4       AST 19       Baso # 0.01       Basophil % 0.1       BILIRUBIN TOTAL 0.5  Comment: For infants and newborns, interpretation of results should be based  on gestational age, weight and in agreement with clinical  observations.    Premature Infant recommended reference ranges:  Up to 24 hours.............<8.0 mg/dL  Up to 48 hours............<12.0 mg/dL  3-5 days..................<15.0 mg/dL  6-29 days.................<15.0 mg/dL         BUN 24       Calcium 7.8       Chloride 106       CO2 26       Creatinine 0.9       Differential Method Automated       eGFR >60.0       Eos # 0.2       Eos % 2.9       Glucose 88       Gran # (ANC) 5.5       Gran % 76.5       Hematocrit 33.7       Hemoglobin 11.0       Immature Grans (Abs) 0.09  Comment: Mild elevation in immature granulocytes is non specific and   can be seen in a variety of conditions including stress response,   acute inflammation, trauma and pregnancy. Correlation with other   laboratory and clinical findings is essential.         Immature Granulocytes 1.3       Lymph # 0.9       Lymph % 12.3       Magnesium  2.0       MCH 32.0       MCHC 32.6       MCV 98       Mono # 0.5       Mono % 6.9       MPV 9.6       nRBC 0       Platelet Count 193       Potassium 4.1       PROTEIN TOTAL 5.6       RBC 3.44       RDW 15.3       Sodium 136       WBC 7.14               Significant Imaging: I have reviewed all pertinent imaging  results/findings within the past 24 hours.

## 2025-03-21 PROCEDURE — 94761 N-INVAS EAR/PLS OXIMETRY MLT: CPT

## 2025-03-21 PROCEDURE — 99900035 HC TECH TIME PER 15 MIN (STAT)

## 2025-03-21 PROCEDURE — 63600175 PHARM REV CODE 636 W HCPCS: Performed by: INTERNAL MEDICINE

## 2025-03-21 PROCEDURE — 25000242 PHARM REV CODE 250 ALT 637 W/ HCPCS: Performed by: INTERNAL MEDICINE

## 2025-03-21 PROCEDURE — 97530 THERAPEUTIC ACTIVITIES: CPT

## 2025-03-21 PROCEDURE — 97110 THERAPEUTIC EXERCISES: CPT

## 2025-03-21 PROCEDURE — 97535 SELF CARE MNGMENT TRAINING: CPT

## 2025-03-21 PROCEDURE — 11800000 HC REHAB PRIVATE ROOM

## 2025-03-21 PROCEDURE — 25000003 PHARM REV CODE 250: Performed by: NURSE PRACTITIONER

## 2025-03-21 PROCEDURE — 25000003 PHARM REV CODE 250: Performed by: INTERNAL MEDICINE

## 2025-03-21 PROCEDURE — 97116 GAIT TRAINING THERAPY: CPT

## 2025-03-21 PROCEDURE — 99900031 HC PATIENT EDUCATION (STAT)

## 2025-03-21 PROCEDURE — 25000003 PHARM REV CODE 250

## 2025-03-21 PROCEDURE — 94640 AIRWAY INHALATION TREATMENT: CPT

## 2025-03-21 RX ORDER — METHOCARBAMOL 500 MG/1
500 TABLET, FILM COATED ORAL 3 TIMES DAILY
Status: DISCONTINUED | OUTPATIENT
Start: 2025-03-21 | End: 2025-03-25 | Stop reason: HOSPADM

## 2025-03-21 RX ORDER — POLYETHYLENE GLYCOL 3350 17 G/17G
17 POWDER, FOR SOLUTION ORAL 2 TIMES DAILY
Status: DISCONTINUED | OUTPATIENT
Start: 2025-03-21 | End: 2025-03-25 | Stop reason: HOSPADM

## 2025-03-21 RX ORDER — DICLOFENAC SODIUM 10 MG/G
4 GEL TOPICAL 2 TIMES DAILY
Status: DISCONTINUED | OUTPATIENT
Start: 2025-03-21 | End: 2025-03-25 | Stop reason: HOSPADM

## 2025-03-21 RX ADMIN — ATORVASTATIN CALCIUM 20 MG: 20 TABLET, FILM COATED ORAL at 08:03

## 2025-03-21 RX ADMIN — TRAMADOL HYDROCHLORIDE 50 MG: 50 TABLET, COATED ORAL at 09:03

## 2025-03-21 RX ADMIN — CLARITHROMYCIN 500 MG: 500 TABLET, FILM COATED ORAL at 08:03

## 2025-03-21 RX ADMIN — METHOCARBAMOL 500 MG: 500 TABLET ORAL at 02:03

## 2025-03-21 RX ADMIN — LEVALBUTEROL 1.25 MG: 1.25 SOLUTION, CONCENTRATE RESPIRATORY (INHALATION) at 07:03

## 2025-03-21 RX ADMIN — POLYETHYLENE GLYCOL (3350) 17 G: 17 POWDER, FOR SOLUTION ORAL at 08:03

## 2025-03-21 RX ADMIN — HYDROCODONE BITARTRATE AND ACETAMINOPHEN 1 TABLET: 5; 325 TABLET ORAL at 08:03

## 2025-03-21 RX ADMIN — LEVALBUTEROL 1.25 MG: 1.25 SOLUTION, CONCENTRATE RESPIRATORY (INHALATION) at 01:03

## 2025-03-21 RX ADMIN — GENTIAN VIOLET 2% 0.5 ML: 20 LIQUID TOPICAL at 08:03

## 2025-03-21 RX ADMIN — METHOCARBAMOL 500 MG: 500 TABLET ORAL at 08:03

## 2025-03-21 RX ADMIN — GUAIFENESIN 1200 MG: 600 TABLET, EXTENDED RELEASE ORAL at 08:03

## 2025-03-21 RX ADMIN — MICONAZOLE NITRATE: 20 POWDER TOPICAL at 08:03

## 2025-03-21 RX ADMIN — TRAMADOL HYDROCHLORIDE 50 MG: 50 TABLET, COATED ORAL at 05:03

## 2025-03-21 RX ADMIN — DICLOFENAC SODIUM 4 G: 10 GEL TOPICAL at 08:03

## 2025-03-21 RX ADMIN — HYDROCODONE BITARTRATE AND ACETAMINOPHEN 1 TABLET: 5; 325 TABLET ORAL at 02:03

## 2025-03-21 RX ADMIN — FAMOTIDINE 20 MG: 20 TABLET, FILM COATED ORAL at 08:03

## 2025-03-21 RX ADMIN — BUDESONIDE 0.25 MG: 0.25 INHALANT RESPIRATORY (INHALATION) at 07:03

## 2025-03-21 RX ADMIN — Medication 6 MG: at 08:03

## 2025-03-21 RX ADMIN — HYDROCODONE BITARTRATE AND ACETAMINOPHEN 1 TABLET: 5; 325 TABLET ORAL at 06:03

## 2025-03-21 RX ADMIN — TAMSULOSIN HYDROCHLORIDE 0.4 MG: 0.4 CAPSULE ORAL at 08:03

## 2025-03-21 RX ADMIN — MICONAZOLE NITRATE: 20 POWDER TOPICAL at 09:03

## 2025-03-21 RX ADMIN — OXYBUTYNIN CHLORIDE 5 MG: 5 TABLET, EXTENDED RELEASE ORAL at 08:03

## 2025-03-21 RX ADMIN — CEFTRIAXONE SODIUM 2 G: 2 INJECTION, POWDER, FOR SOLUTION INTRAMUSCULAR; INTRAVENOUS at 04:03

## 2025-03-21 RX ADMIN — FINASTERIDE 5 MG: 5 TABLET, FILM COATED ORAL at 08:03

## 2025-03-21 RX ADMIN — TRAMADOL HYDROCHLORIDE 50 MG: 50 TABLET, COATED ORAL at 01:03

## 2025-03-21 NOTE — ASSESSMENT & PLAN NOTE
Patient's blood pressure range in the last 24 hours was: BP  Min: 135/63  Max: 159/80.The patient's inpatient anti-hypertensive regimen is listed below:  Current Antihypertensives       Plan  - BP is controlled, no changes needed to their regimen

## 2025-03-21 NOTE — PT/OT/SLP PROGRESS
Physical Therapy Inpatient Rehab Treatment    Patient Name:  Tobi Liz Jr.   MRN:  [id number]    Recommendations:     Discharge Recommendations:  Low Intensity Therapy   Discharge Equipment Recommendations: walker, rolling   Barriers to discharge: None    Assessment:     Tobi Liz Jr. is a 89 y.o. male admitted with a medical diagnosis of Myopathy.  He presents with the following impairments/functional limitations:    weakness, impaired endurance, impaired self care skills, impaired functional mobility, gait instability, impaired balance, decreased coordination, decreased upper extremity function, decreased lower extremity function, decreased safety awareness, pain, decreased ROM, , impaired skin, edema, impaired cardiopulmonary response to activity, impaired joint extensibility,  and impaired muscle length.    Unable to progress due to c/o back pain; refused to do stairs, curb and ramp negotiation; only agreeable to supine therapeutic exercises with multiple rest breaks in between. Multiple spasms on lower back while in supine. Negative Conti's punch sign.    Rehab Diagnosis:  Myopathy     Recent Surgery: * No surgery found *      General Precautions: Standard, fall     Orthopedic Precautions:N/A     Braces: N/A    Rehab Prognosis: Fair; patient would benefit from acute skilled PT services to address these deficits and reach maximum level of function.      History:     Past Medical History:   Diagnosis Date    Aortic valve stenosis     Arthritis     BPH (benign prostatic hyperplasia)     Carotid artery stenosis     Chronic diastolic heart failure     ETOH abuse     Exposure to COVID-19 virus 01/07/2022    Hyperchloremia     Kidney stones     Murmur     Polymyalgia rheumatica     PVD (peripheral vascular disease)     Renal artery stenosis        Past Surgical History:   Procedure Laterality Date    A-V CARDIAC PACEMAKER INSERTION N/A 10/6/2023    Procedure: INSERTION, CARDIAC PACEMAKER, DUAL  "CHAMBER;  Surgeon: Douglas Salguero MD;  Location: [facility] CATH;  Service: Cardiology;  Laterality: N/A;    ANGIOGRAM, CAROTID Left 8/25/2023    Procedure: ANGIOGRAM, CAROTID;  Surgeon: Nick Box MD;  Location: [facility] CATH;  Service: Cardiology;  Laterality: Left;    CATARACT EXTRACTION, BILATERAL      ECHOCARDIOGRAM,TRANSESOPHAGEAL N/A 10/3/2023    Procedure: Transesophageal echo (JOSE ARMANDO) intra-procedure log documentation;  Surgeon: Nick Box MD;  Location: [facility] OR;  Service: Cardiology;  Laterality: N/A;    HAND SURGERY      INSERTION OF STENT INTO PERIPHERAL VESSEL N/A 1/30/2025    Procedure: INSERTION, STENT, VESSEL, PERIPHERAL;  Surgeon: Harsha Salcedo MD;  Location: [facility] CATH;  Service: Cardiology;  Laterality: N/A;    PERCUTANEOUS TRANSCATHETER AORTIC VALVE REPLACEMENT (TAVR) Left 10/3/2023    Procedure: REPLACEMENT, AORTIC VALVE, PERCUTANEOUS, TRANSCATHETER;  Surgeon: Nick Box MD;  Location: [facility] OR;  Service: Cardiology;  Laterality: Left;    PERCUTANEOUS TRANSCATHETER AORTIC VALVE REPLACEMENT (TAVR) Left 10/3/2023    Procedure: REPLACEMENT, AORTIC VALVE, PERCUTANEOUS, TRANSCATHETER carotid access;  Surgeon: Jun Brito MD;  Location: [facility] OR;  Service: Cardiovascular;  Laterality: Left;    PERCUTANEOUS TRANSLUMINAL ANGIOPLASTY N/A 8/25/2023    Procedure: ANGIOPLASTY-PERCUTANEOUS TRANSLUMINAL (PTA);  Surgeon: Nick Box MD;  Location: [facility] CATH;  Service: Cardiology;  Laterality: N/A;    SHOULDER SURGERY Left     total shoulder replacement    WOUND EXPLORATION N/A 10/3/2023    Procedure: EXPLORATION, WOUND;  Surgeon: Jun Brito MD;  Location: [facility] OR;  Service: Cardiology;  Laterality: N/A;       Subjective     Chief Complaint: "No, I do not want to do any of those today, I only  want to lay down."     Respiratory Status: Room air    Patients cultural, spiritual, Holiness conflicts given the current situation: no      Objective:     Communicated with nurse and patient "  prior to session.  Patient found up in chair with peripheral IV, willams catheter  upon PT entry to room.    Pt is Oriented x3, Oriented to place, Oriented to situation, and Oriented to time and Alert and Cooperative.    Vitals   Vitals at Rest  /59 mmHg    HR 88 bpm    O2 Sat 95%   Pain 7/10 lower back      Vitals With Activity  Pain 7/10       Functional Mobility:   Bed Mobility:     Rolling Left:  Partial/moderate assistance   Rolling Right: Partial/moderate assistance   Scooting: Supervision or touching assistance   Bridging: Supervision or touching assistance   Supine to Sit: Partial/moderate assistance   Sit to Supine: Partial/moderate assistance   Transfers:     Sit to Stand: Supervision or touching assistance  with rolling walker  Chair to mat: Supervision or touching assistance  with  rolling walker  using  Step Transfer  Gait: Pt ambulates 150 feet x 2 trials  with RW with Set-up or clean-up assistance .    object from ground in standing position with reacher with rolling walker with Supervision or touching assistance     GG quality indicator     Advance gait activities  .   Going up and down a ramp (10 feet uneven surface) Activity did not occur due to patient's refusal    4 inch curb  Activity did not occur due to patient's refusal   Stairs  Activity did not occur due to patient's refusal          Current   Status  Discharge   Goal   Functional Area: Care Score:    Roll Left and Right 3 Independent   Sit to Lying 3 Independent   Lying to Sitting on Side of Bed 3 Independent   Sit to Stand 4 Set-up/clean-up   Chair/Bed-to-Chair Transfer 4 Independent   Car Transfer 10 Supervision or touching assistance   Walk 10 Feet 5 Independent   Walk 50 Feet with Two Turns 5 Set-up/clean-up   Walk 150 Feet 5 Set-up/clean-up   Walk 10 Feet Uneven Surface 7 Independent   1 Step (Curb) 7 Independent   4 Steps 7 Set-up/clean-up   12 Steps 7 Set-up/clean-up   Picking Up Object 5 Set-up/clean-up   Wheel 50 Feet  with Two Turns 9     Wheel 150 Feet 9         Therapeutic Activities and Exercises:  Gait training with RW on flat level surface   Picking up a small object from the floor with RW and reacher   Sit <> stand with both UE support  Stand step transfer with a RW   Sit <> supine x 4 attempts   Rolling side <> side  Standing balance  with RW        SUPINE Both Lower Extremity   Active assisted ROM Sets / Reps / Resistance / Etc.   Ankle PF and DF 2 x 15   Short Arc Quads 2 x 15    Lower trunk rotation 3 x 10    Single knee to chest  3 x 10 on each leg    Clams  3 x 10    Bridging 3 x 6   SLR  3 x 10    Hip abd/add 3 x 10    Hip and knee flexion  2 x 15       Activity Tolerance: Fair and Poor    Patient left up in chair with call button in reach, nurse  notified, and OT present.    Education provided: roles and goals of PT/PTA, transfer training, bed mob, safety awareness, assistive device, strengthening exercises, and fall prevention    Expected compliance: Low compliance    GOALS:   Multidisciplinary Problems       Physical Therapy Goals          Problem: Physical Therapy    Goal Priority Disciplines Outcome Interventions   Physical Therapy Goal     PT, PT/OT Not Progressing    Description: Physical Therapy Goal     PT, PT/OT Progressing    Description:   Short Term Goals: 03/20/2025  Long Term Goals: 3/28/25     Transfers Status     Sit to Stand  Short Term Goal: Complete sit to stand with Supervision or Set-up Assistance using Rolling Walker with no verbal cues (MET dated 3/17/2025)   Long Term Goal:  Complete sit to stand with Independent using Rolling Walker with no  verbal cues     Stand Step  Short Term Goal: Complete stand step with  Set-Up Clean Up  using Rolling Walker with no verbal cues (On going dated 3/17/2025)   Long Term Goal:  Complete stand step with  Modified Independent  using Rolling Walker with no  verbal cues     Supine <> Sit  Short Term Goal: Complete supine <> sit with Modified [place]  with   minimal verbal cues rails prn(On going dated 3/17/2025)     Long Term Goal: Complete supine <> sit with Independent  With no  verbal cues        Balance/Coordination     Dynamic Sitting  Short Term Goal: Complete dynamic sitting with Set Up Assistance with minimal  verbal cues  minimal to moderate excursions(On going dated 3/17/2025)     Long Term Goal: Complete dynamic sitting with [place]  with  no verbal cues moderate to maximal excursions     Dynamic Standing  Short Term Goal: Complete dynamic standing with Stand-by Assistance  with minimal  verbal cues minimal to moderate excursions with RW(On going dated 3/17/2025)     Long Term Goal: Complete dynamic standing with Modified [place] with no  verbal cues and moderate to maximal excursions with RW     Endurance     Short Term Goal: (MET  dated 3/17/2025)     Physical Therapy: 2 times a day, 30-45 minutes  Rest periods: multiple  Sitting: 3 hours/day     Long Term Goal:  Physical Therapy: 2 times a day, 45 minutes  Rest periods: minimal  Sittin-8 hours/day     Mobility/Gait  Short Term Goal: Will ambulate with Stand-by Assistance  Using Rolling Walker with no  verbal cues x 200 ft(MET dated 3/17/2025)     Long Term Goal: Will ambulate with Modified Independent  using Rolling Walker with no verbal cues x 500 ft     Stairs Assistance  Short Term Goal: Patient will ascend/descend 4 stairs/steps using bilateral handrails reciprocal  steps with Stand-by Assistance and minimal verbal cues(MET  dated 3/17/2025)     Long Term Goal: Patient will ascend/descend 12 stairs/steps using no handrails  reciprocal steps with [place] and minimal verbal cues           Ramp/Uneven 10 feet surface  Long  Term Goal; Patient will be able to navigate a 10 inch uneven surface with proper A.D. with  Modified [place]        2-6  inch curb  Long  Term Goal; Patient will be able to navigate a  2 to 6 inch curb with proper A.D. with independence     Picking up object    Long  Term Goal; Patient will be able to  a small object from the floor  with proper A.D. with  independence     Car transfers  Long  Term Goal; Patient will be able to get in and out of a vehicle  with proper A.D. with  independence     Education  Long Term Goals:  Patient/family/caregivers trained on physical mobility and precautions with Supervision.     Patient/family/caregivers trained on safety awareness with Supervision.     Order and obtain all appropriate equipment.                           Plan:     During this hospitalization, patient to be seen 5 x/week to address the identified rehab impairments via gait training, therapeutic activities, therapeutic exercises, neuromuscular re-education and progress toward the following goals:    Plan of Care Expires:  03/28/25  PT Next Visit Date: 03/24/25  Plan of Care reviewed with: patient    Additional Information:     Utilizing concurrent therapy to address goals related to safe bed mobility, transfers and gait   Time Tracking:     Therapy Time  PT Received On: 03/21/25  PT Start Time: 0800  PT Stop Time: 0930  PT Total Time (min): 90 min   PT Individual: 60  PT Concurrent: 30      Billable Minutes: Gait Training 30, Therapeutic Activity 25, and Therapeutic Exercise 35    03/21/2025

## 2025-03-21 NOTE — PROGRESS NOTES
[facility] Medicine  Progress Note    Patient Name: Tobi Liz Jr.  MRN: [id number]  Patient Class: IP- Rehab   Admission Date: 3/13/2025  Length of Stay: 8 days  Attending Physician: Dave Reyes III, MD  Primary Care Provider: Luis Enrique Guzman Jr., MD        Subjective     Principal Problem:Myopathy        HPI:  Tobi Liz Jr. is a 89 y.o. male with a PMHx of has a past medical history of Aortic valve stenosis, Arthritis, BPH (benign prostatic hyperplasia), Carotid artery stenosis, Chronic diastolic heart failure, ETOH abuse, Exposure to COVID-19 virus (01/07/2022), Hyperchloremia, Kidney stones, Murmur, Polymyalgia rheumatica, PVD (peripheral vascular disease), and Renal artery stenosis., presents complaining of shaking after standing from a seated position that started yesterday, he was aware and talking during the episode so not likely a seizure. Labs show he was severely anemic in the ED with Hgb of 5. He had hematuria as well on UA along with bacteria. Ucx pending. His family states that he has a history of anemia in the past. He otherwise feels fine. He received 3 units of pRBCs in the ED with appropriate response.     Patient overall significantly improved from admission. H&H were stable yesterday. Awaiting updated CBC this morning. Otherwise renal function stable and no new electrolyte abnormalities noted. Patient accepted to inpatient rehab and we will discharging.    Patient is seen and examined after admission and patient is complaining of respiratory congestion wheezing and dyspnea.  Patient's currently on Bactrim and sensitivities noted for his urinary tract infection as well as the other antibiotics that maybe appropriate we will switch to Rocephin as we will get better respiratory coverage as the patient does seem to be battling a bronchitis and possibly a pneumonia.  Repeat chest x-ray pending.  Patient is motivated to improve he is very eager to return  home patient's significantly decline in his overall functional state and unable to complete ADLs safely at this time.  He has developed a myopathy as well as some confusion and encephalopathy agree with inpatient rehab patient meets current Medicare criteria.       Patient requires acute inpatient rehab admission with 24-hour nursing and active physician oversight to monitor and manage acute medical comorbid conditions, labs, pain, and functional deficits. Patient/family will also require teaching and integration of improving functional skills into daily living. Patient will also require an individualized, interdisciplinary approach to their care, receiving PT, OT services 3 hours per day, 5 days per week. Required care cannot be provided at a lower level of care. Patient is anticipated to require approximately 10-14 days LOS with expected discharge home  with  services.    Impairment group (IGC):   Neuromuscular Disorders 03.8 Etiologic diagnosis/description:   G72.9: Myopathy  D64.9: Anemia      Date of onset:  3/8/2025 Date of surgery: N/A   Allergies: Patient has no known allergies.   Comorbid condition:   HTN, HLD, BPH, CAD, Valvular heart disease, CHF class II, Occlusion of right iliac artery   Medical/functional conditions requiring inpatient rehabilitation:      This patient requires medical management/24-hour nursing of complex co morbidities HTN, HLD, BPH, CAD, Valvular heart disease, CHF class II, Occlusion of right iliac artery, labs, medications (see medications list), pain, sleep hygiene, anticoagulation, nutrition, hydration, neurological, pulmonary, cardiac status, and preventive healthcare.     This patient requires intense therapy and an integrated, interdisciplinary approach to address safety, impaired mobility, impaired ADLs (dressing, toileting, grooming, showering), judgment, and memory, communication, pulmonary insufficiency, bowel/bladder problems,preventive healthcare, medication  management, integration of functional skills into daily living, and home caregiver support and training.    Risk for medical/clinical complications:      This patient is at risk for the following complications: DVT/PE, pneumonia, malnutrition, neurological decline, respiratory insufficiency, worsening activity intolerance, complications from anticoagulation, skin breakdown, inadequate sleep, and constipation.        Overview/Hospital Course:  3/16 AA, weekend crosscover, no acute events reported, blood pressure stable, continue PT OT efforts    3/17 DL:  Patient doing well this morning.  He is sitting up in wheelchair in the dining room and is actively participating in therapy.  No acute events reported.  No acute distress noted.  Mild hyponatremia noted.  Patient encouraged to increase p.o. fluid intake.  Renal function stable.  Vital signs stable.  Patient reports therapy going well.  Pain.  Encouraged patient to continue therapy efforts.    3/18 FM:  Patient's respiratory symptoms are much improved but still having occasional cough and wheeze.  We will add low-dose budesonide.  Patient's cognition is improved Bolton catheter in place patient's pain improved.  Patient completing time needs in therapy and overall doing well    3/19 DL:  Patient doing well this morning.  He is in dining room and is actively participating in therapy.  Patient denies pain.  Vital signs stable.  Patient continues to work well with therapy.  Encouraged patient to continue therapy efforts.    3/20 DL:  Patient doing well this morning.  He is in the dining room and is actively participating in therapy.  Patient is without complaint this morning.  Labs and vital signs stable.  Patient reports therapy going well.  He states he feels he is making functional gains.  Encouraged patient to continue therapy efforts.    3/21 DL:  Patient reports increased right sided hip/lower back pain that is a barrier to progress.  Medications adjusted.  We  will obtain x-rays of L-spine and right hip and pelvis.  Patient educated on importance of notifying staff of pain and requesting medications as needed.  Encouraged patient to continue therapy efforts.    Interval History: Patient seen and examined.     Review of Systems   Constitutional:  Positive for activity change, appetite change and fatigue. Negative for chills and fever.   HENT:  Positive for postnasal drip, sinus pressure and sinus pain. Negative for ear pain, mouth sores, nosebleeds and sore throat.    Eyes:  Negative for visual disturbance.   Respiratory:  Positive for cough, shortness of breath and wheezing. Negative for apnea.    Cardiovascular:  Negative for chest pain, palpitations and leg swelling.   Gastrointestinal:  Positive for constipation. Negative for abdominal distention, abdominal pain, blood in stool, diarrhea, nausea and vomiting.   Endocrine: Positive for cold intolerance. Negative for polyphagia.   Musculoskeletal:  Positive for arthralgias and back pain.   Skin:  Negative for rash.   Neurological:  Positive for tremors and weakness. Negative for seizures, syncope, facial asymmetry and speech difficulty.   Hematological:  Negative for adenopathy. Bruises/bleeds easily.   Psychiatric/Behavioral:  Positive for confusion. Negative for agitation and hallucinations. The patient is nervous/anxious.      Objective:     Vital Signs (Most Recent):  Temp: 98.1 °F (36.7 °C) (03/21/25 0713)  Pulse: 85 (03/21/25 0713)  Resp: 18 (03/21/25 0713)  BP: (!) 159/80 (03/21/25 0713)  SpO2: 98 % (03/21/25 0713) Vital Signs (24h Range):  Temp:  [97.5 °F (36.4 °C)-98.1 °F (36.7 °C)] 98.1 °F (36.7 °C)  Pulse:  [72-87] 85  Resp:  [18] 18  SpO2:  [97 %-100 %] 98 %  BP: (135-159)/(63-80) 159/80     Weight: 81.6 kg (179 lb 12.8 oz)  Body mass index is 26.55 kg/m².    Intake/Output Summary (Last 24 hours) at 3/21/2025 2149  Last data filed at 3/21/2025 0898  Gross per 24 hour   Intake 2550 ml   Output 1325 ml   Net  1225 ml         Physical Exam  Vitals and nursing note reviewed.   Constitutional:       General: He is awake. He is not in acute distress.     Appearance: He is ill-appearing. He is not toxic-appearing.   HENT:      Head: Normocephalic and atraumatic.      Nose: Congestion and rhinorrhea present.      Mouth/Throat:      Mouth: Mucous membranes are moist.      Pharynx: Oropharynx is clear. No oropharyngeal exudate or posterior oropharyngeal erythema.   Eyes:      General: No scleral icterus.        Right eye: No discharge.         Left eye: No discharge.      Extraocular Movements: Extraocular movements intact.   Neck:      Thyroid: No thyroid mass or thyromegaly.      Vascular: No carotid bruit.      Meningeal: Brudzinski's sign and Kernig's sign absent.   Cardiovascular:      Rate and Rhythm: Normal rate and regular rhythm.      Chest Wall: PMI is not displaced. No thrill.      Heart sounds: Murmur heard.   Pulmonary:      Effort: No tachypnea, accessory muscle usage, prolonged expiration or respiratory distress.      Breath sounds: No decreased air movement. Rhonchi and rales present. No wheezing.   Abdominal:      General: Bowel sounds are normal. There is no distension.      Palpations: Abdomen is soft. There is no hepatomegaly or splenomegaly.      Tenderness: There is no abdominal tenderness. There is no guarding or rebound.   Musculoskeletal:      Cervical back: Neck supple. No rigidity. No muscular tenderness.      Right lower leg: No edema.      Left lower leg: No edema.   Lymphadenopathy:      Cervical: No cervical adenopathy.   Skin:     General: Skin is warm.      Capillary Refill: Capillary refill takes less than 2 seconds.      Coloration: Skin is not cyanotic, jaundiced or pale.      Findings: No erythema or petechiae.   Neurological:      Mental Status: He is alert and oriented to person, place, and time.      Cranial Nerves: No cranial nerve deficit, dysarthria or facial asymmetry.      Motor:  "Weakness present. No tremor.      Gait: Gait abnormal.   Psychiatric:         Attention and Perception: He does not perceive auditory hallucinations.         Mood and Affect: Mood is anxious. Mood is not depressed. Affect is not flat.         Speech: Speech is not rapid and pressured or slurred.         Behavior: Behavior normal. Behavior is not agitated, aggressive or combative.         Thought Content: Thought content normal. Thought content is not paranoid or delusional.         Cognition and Memory: Cognition is impaired.               Significant Labs: All pertinent labs within the past 24 hours have been reviewed.  Recent Lab Results       None            Significant Imaging: I have reviewed all pertinent imaging results/findings within the past 24 hours.      Assessment & Plan  Myopathy  Continue PT/OT efforts.  Hypertension  Patient's blood pressure range in the last 24 hours was: BP  Min: 135/63  Max: 159/80.The patient's inpatient anti-hypertensive regimen is listed below:  Current Antihypertensives       Plan  - BP is controlled, no changes needed to their regimen  Hyperlipidemia  Recheck labs.    BPH (benign prostatic hyperplasia)  Bolton in place.    Atherosclerosis of native artery of both lower extremities with rest pain  Noted.  Continue current medications.    Congestive heart failure, NYHA class II  Patient has Combined Systolic and Diastolic heart failure that is Chronic. On presentation their CHF was well compensated. Most recent BNP and echo results are listed below.  No results for input(s): "BNP" in the last 72 hours.  Latest ECHO  No results found for this or any previous visit.    Current Heart Failure Medications       Plan  - Monitor strict I&Os and daily weights.    - Place on telemetry  - Low sodium diet      S/P TAVR (transcatheter aortic valve replacement)  Has done well post procedure.    Insomnia  Monitor, PRN meds.    Anemia  Anemia is likely due to chronic disease due to Chronic " Kidney Disease. Most recent hemoglobin and hematocrit are listed below.  Recent Labs     03/20/25  0559   HGB 11.0*   HCT 33.7*     Plan  - Monitor serial CBC:  q72hrs  - Transfuse PRBC if patient becomes hemodynamically unstable, symptomatic or H/H drops below 7/21.  - Patient has received 3 units of PRBCs  - Patient's anemia is currently stable  Acute cystitis with hematuria  Abx changed 2nd dual resp infection.    Acute bronchitis  As above.    Urinary obstruction  Willams in place, plan on DC with willams.      VTE Risk Mitigation (From admission, onward)           Ordered     IP VTE HIGH RISK PATIENT  Once         03/13/25 1349     Place sequential compression device  Until discontinued         03/13/25 1349                    Discharge Planning   ADONAY:      Code Status: Full Code   Medical Readiness for Discharge Date:   Discharge Plan A: Home                Please place Justification for DME        Zoya Lee NP  Department of Hospital Medicine   [facility] ([facility])

## 2025-03-21 NOTE — NURSING
Patient has not had a BM since March 14. GONSALO Younger NP notified when making rounds. Received order to change Miralax BID. See new order in the computer. Will continue to monitor.

## 2025-03-21 NOTE — PLAN OF CARE
Problem: Occupational Therapy  Goal: Occupational Therapy Goal  Description: Long Term Goals to be met by: 03/27/25     Patient will increase functional independence with ADLs by performing:    Feeding with [place].  UE Dressing with Modified [place].  LE Dressing with Modified [place].  Grooming while standing at sink with Modified [place].  Toileting from toilet with Modified [place] for hygiene and clothing management.   Bathing from  shower chair/bench with Modified [place].  Toilet transfer to toilet with Modified [place].  Increased functional strength to 4+ to 5/5 for bilateral UE's.    Outcome: Progressing

## 2025-03-21 NOTE — PLAN OF CARE
Problem: Physical Therapy  Goal: Physical Therapy Goal  Description: Physical Therapy Goal     PT, PT/OT Progressing    Description:   Short Term Goals: 2025  Long Term Goals: 3/28/25     Transfers Status     Sit to Stand  Short Term Goal: Complete sit to stand with Supervision or Set-up Assistance using Rolling Walker with no verbal cues (MET dated 3/17/2025)   Long Term Goal:  Complete sit to stand with Independent using Rolling Walker with no  verbal cues     Stand Step  Short Term Goal: Complete stand step with  Set-Up Clean Up  using Rolling Walker with no verbal cues (On going dated 3/17/2025)   Long Term Goal:  Complete stand step with  Modified Independent  using Rolling Walker with no  verbal cues     Supine <> Sit  Short Term Goal: Complete supine <> sit with Modified [place]  with  minimal verbal cues rails prn(On going dated 3/17/2025)     Long Term Goal: Complete supine <> sit with Independent  With no  verbal cues        Balance/Coordination     Dynamic Sitting  Short Term Goal: Complete dynamic sitting with Set Up Assistance with minimal  verbal cues  minimal to moderate excursions(On going dated 3/17/2025)     Long Term Goal: Complete dynamic sitting with [place]  with  no verbal cues moderate to maximal excursions     Dynamic Standing  Short Term Goal: Complete dynamic standing with Stand-by Assistance  with minimal  verbal cues minimal to moderate excursions with RW(On going dated 3/17/2025)     Long Term Goal: Complete dynamic standing with Modified [place] with no  verbal cues and moderate to maximal excursions with RW     Endurance     Short Term Goal: (MET  dated 3/17/2025)     Physical Therapy: 2 times a day, 30-45 minutes  Rest periods: multiple  Sitting: 3 hours/day     Long Term Goal:  Physical Therapy: 2 times a day, 45 minutes  Rest periods: minimal  Sittin-8 hours/day     Mobility/Gait  Short Term Goal: Will ambulate with Stand-by Assistance  Using Rolling  Walker with no  verbal cues x 200 ft(MET dated 3/17/2025)     Long Term Goal: Will ambulate with Modified Independent  using Rolling Walker with no verbal cues x 500 ft     Stairs Assistance  Short Term Goal: Patient will ascend/descend 4 stairs/steps using bilateral handrails reciprocal  steps with Stand-by Assistance and minimal verbal cues(MET  dated 3/17/2025)     Long Term Goal: Patient will ascend/descend 12 stairs/steps using no handrails  reciprocal steps with [place] and minimal verbal cues           Ramp/Uneven 10 feet surface  Long  Term Goal; Patient will be able to navigate a 10 inch uneven surface with proper A.D. with  Modified [place]        2-6  inch curb  Long  Term Goal; Patient will be able to navigate a  2 to 6 inch curb with proper A.D. with independence     Picking up object   Long  Term Goal; Patient will be able to  a small object from the floor  with proper A.D. with  independence     Car transfers  Long  Term Goal; Patient will be able to get in and out of a vehicle  with proper A.D. with  independence     Education  Long Term Goals:  Patient/family/caregivers trained on physical mobility and precautions with Supervision.     Patient/family/caregivers trained on safety awareness with Supervision.     Order and obtain all appropriate equipment.      Outcome: unable to progress due to c/o back pain; refused to do stairs, curb and ramp negotiation; only agreeable to supine therapeutic exercises with multiple rest breaks in between.

## 2025-03-21 NOTE — SUBJECTIVE & OBJECTIVE
Interval History: Patient seen and examined.     Review of Systems   Constitutional:  Positive for activity change, appetite change and fatigue. Negative for chills and fever.   HENT:  Positive for postnasal drip, sinus pressure and sinus pain. Negative for ear pain, mouth sores, nosebleeds and sore throat.    Eyes:  Negative for visual disturbance.   Respiratory:  Positive for cough, shortness of breath and wheezing. Negative for apnea.    Cardiovascular:  Negative for chest pain, palpitations and leg swelling.   Gastrointestinal:  Positive for constipation. Negative for abdominal distention, abdominal pain, blood in stool, diarrhea, nausea and vomiting.   Endocrine: Positive for cold intolerance. Negative for polyphagia.   Musculoskeletal:  Positive for arthralgias and back pain.   Skin:  Negative for rash.   Neurological:  Positive for tremors and weakness. Negative for seizures, syncope, facial asymmetry and speech difficulty.   Hematological:  Negative for adenopathy. Bruises/bleeds easily.   Psychiatric/Behavioral:  Positive for confusion. Negative for agitation and hallucinations. The patient is nervous/anxious.      Objective:     Vital Signs (Most Recent):  Temp: 98.1 °F (36.7 °C) (03/21/25 0713)  Pulse: 85 (03/21/25 0713)  Resp: 18 (03/21/25 0713)  BP: (!) 159/80 (03/21/25 0713)  SpO2: 98 % (03/21/25 0713) Vital Signs (24h Range):  Temp:  [97.5 °F (36.4 °C)-98.1 °F (36.7 °C)] 98.1 °F (36.7 °C)  Pulse:  [72-87] 85  Resp:  [18] 18  SpO2:  [97 %-100 %] 98 %  BP: (135-159)/(63-80) 159/80     Weight: 81.6 kg (179 lb 12.8 oz)  Body mass index is 26.55 kg/m².    Intake/Output Summary (Last 24 hours) at 3/21/2025 0901  Last data filed at 3/21/2025 0833  Gross per 24 hour   Intake 2550 ml   Output 1325 ml   Net 1225 ml         Physical Exam  Vitals and nursing note reviewed.   Constitutional:       General: He is awake. He is not in acute distress.     Appearance: He is ill-appearing. He is not toxic-appearing.    HENT:      Head: Normocephalic and atraumatic.      Nose: Congestion and rhinorrhea present.      Mouth/Throat:      Mouth: Mucous membranes are moist.      Pharynx: Oropharynx is clear. No oropharyngeal exudate or posterior oropharyngeal erythema.   Eyes:      General: No scleral icterus.        Right eye: No discharge.         Left eye: No discharge.      Extraocular Movements: Extraocular movements intact.   Neck:      Thyroid: No thyroid mass or thyromegaly.      Vascular: No carotid bruit.      Meningeal: Brudzinski's sign and Kernig's sign absent.   Cardiovascular:      Rate and Rhythm: Normal rate and regular rhythm.      Chest Wall: PMI is not displaced. No thrill.      Heart sounds: Murmur heard.   Pulmonary:      Effort: No tachypnea, accessory muscle usage, prolonged expiration or respiratory distress.      Breath sounds: No decreased air movement. Rhonchi and rales present. No wheezing.   Abdominal:      General: Bowel sounds are normal. There is no distension.      Palpations: Abdomen is soft. There is no hepatomegaly or splenomegaly.      Tenderness: There is no abdominal tenderness. There is no guarding or rebound.   Musculoskeletal:      Cervical back: Neck supple. No rigidity. No muscular tenderness.      Right lower leg: No edema.      Left lower leg: No edema.   Lymphadenopathy:      Cervical: No cervical adenopathy.   Skin:     General: Skin is warm.      Capillary Refill: Capillary refill takes less than 2 seconds.      Coloration: Skin is not cyanotic, jaundiced or pale.      Findings: No erythema or petechiae.   Neurological:      Mental Status: He is alert and oriented to person, place, and time.      Cranial Nerves: No cranial nerve deficit, dysarthria or facial asymmetry.      Motor: Weakness present. No tremor.      Gait: Gait abnormal.   Psychiatric:         Attention and Perception: He does not perceive auditory hallucinations.         Mood and Affect: Mood is anxious. Mood is not  depressed. Affect is not flat.         Speech: Speech is not rapid and pressured or slurred.         Behavior: Behavior normal. Behavior is not agitated, aggressive or combative.         Thought Content: Thought content normal. Thought content is not paranoid or delusional.         Cognition and Memory: Cognition is impaired.               Significant Labs: All pertinent labs within the past 24 hours have been reviewed.  Recent Lab Results       None            Significant Imaging: I have reviewed all pertinent imaging results/findings within the past 24 hours.

## 2025-03-21 NOTE — PT/OT/SLP PROGRESS
Occupational Therapy Inpatient Rehab Treatment    Name: Tobi Liz Jr.  MRN: [id number]    Assessment:  Tobi Liz Jr. is a 89 y.o. male admitted with a medical diagnosis of Myopathy.  He presents with the following impairments/functional limitations:  weakness, impaired endurance, impaired self care skills, impaired functional mobility, gait instability, impaired balance, impaired cognition, decreased coordination, decreased upper extremity function, decreased lower extremity function, decreased safety awareness, pain, decreased ROM, impaired fine motor, impaired skin, edema, impaired cardiopulmonary response to activity, impaired joint extensibility, impaired muscle length.    Pt demonstrated improved functional performance with toileting as noted by setup assistance in which patient able to perform clothing management and perineal hygiene with setup of AD and cueing prior to functional tasks.     General Precautions: Standard, fall     Orthopedic Precautions:N/A     Braces: N/A    Rehab Prognosis: Good; patient would benefit from acute skilled OT services to address these deficits and reach maximum level of function.      History:     Past Medical History:   Diagnosis Date    Aortic valve stenosis     Arthritis     BPH (benign prostatic hyperplasia)     Carotid artery stenosis     Chronic diastolic heart failure     ETOH abuse     Exposure to COVID-19 virus 01/07/2022    Hyperchloremia     Kidney stones     Murmur     Polymyalgia rheumatica     PVD (peripheral vascular disease)     Renal artery stenosis        Past Surgical History:   Procedure Laterality Date    A-V CARDIAC PACEMAKER INSERTION N/A 10/6/2023    Procedure: INSERTION, CARDIAC PACEMAKER, DUAL CHAMBER;  Surgeon: Douglas Salguero MD;  Location: [facility] CATH;  Service: Cardiology;  Laterality: N/A;    ANGIOGRAM, CAROTID Left 8/25/2023    Procedure: ANGIOGRAM, CAROTID;  Surgeon: Nick Box MD;  Location: [facility] CATH;  Service: Cardiology;   Laterality: Left;    CATARACT EXTRACTION, BILATERAL      ECHOCARDIOGRAM,TRANSESOPHAGEAL N/A 10/3/2023    Procedure: Transesophageal echo (JOSE ARMANDO) intra-procedure log documentation;  Surgeon: Nick Box MD;  Location: [facility] OR;  Service: Cardiology;  Laterality: N/A;    HAND SURGERY      INSERTION OF STENT INTO PERIPHERAL VESSEL N/A 1/30/2025    Procedure: INSERTION, STENT, VESSEL, PERIPHERAL;  Surgeon: Harsha Salcedo MD;  Location: [facility] CATH;  Service: Cardiology;  Laterality: N/A;    PERCUTANEOUS TRANSCATHETER AORTIC VALVE REPLACEMENT (TAVR) Left 10/3/2023    Procedure: REPLACEMENT, AORTIC VALVE, PERCUTANEOUS, TRANSCATHETER;  Surgeon: Nick Box MD;  Location: [facility] OR;  Service: Cardiology;  Laterality: Left;    PERCUTANEOUS TRANSCATHETER AORTIC VALVE REPLACEMENT (TAVR) Left 10/3/2023    Procedure: REPLACEMENT, AORTIC VALVE, PERCUTANEOUS, TRANSCATHETER carotid access;  Surgeon: Jun Brito MD;  Location: [facility] OR;  Service: Cardiovascular;  Laterality: Left;    PERCUTANEOUS TRANSLUMINAL ANGIOPLASTY N/A 8/25/2023    Procedure: ANGIOPLASTY-PERCUTANEOUS TRANSLUMINAL (PTA);  Surgeon: Nick Box MD;  Location: [facility] CATH;  Service: Cardiology;  Laterality: N/A;    SHOULDER SURGERY Left     total shoulder replacement    WOUND EXPLORATION N/A 10/3/2023    Procedure: EXPLORATION, WOUND;  Surgeon: Jun Brito MD;  Location: [facility] OR;  Service: Cardiology;  Laterality: N/A;       Subjective     Orientation: Oriented x4     Chief Complaint: weakness and decreased independence      Patient/Family Comments/goals: Pt would like to regain independence with ADL's including functional mobility in order to return home safely with spouse.      Respiratory Status: Room air     Patients cultural, spiritual, Sikh conflicts given the current situation: no       Objective:     Patient found up in chair with willams catheter, peripheral IV  upon OT entry to room.    Mobility   Patient completed:  Sit to  Stand Transfer with supervision with rolling walker  Bed to Chair Transfer using Step Transfer technique with supervision with rolling walker  Toilet Transfer Step Transfer technique with setup assistance with  rolling walker    Functional Mobility  Pt ambulated greater than 200' between surfaces requiring supervision utilizing RW.     ADLs   Current Status   Eating     Oral Hygiene     Shower, Bathe Self     Upper Body Dressing     Lower Body Dressing     Toileting Hygiene 5   Toilet Transfer 5   Putting On, Taking Off Footwear       Limiting Factors for ADLs: endurance, limited ROM, balance, weakness, coordination, cognition, safety awareness, and pain     Therapeutic Activities  Pt participated in therapeutic activity addressing functional reaching, gross motor coordination, static / dynamic standing balance, standing tolerance, and energy for task / endurance challenging him to retrieve, lift, stabilize, manipulate, and place various items needed to perform functional tasks of ADL's while standing utilizing bilateral UE requiring mod verbal and tactile cueing to facilitate optimal movements patterns, positioning within RW, and proper weight shifting utilizing forward, diagonal, and lateral steps with intermittent steadying assist when challenged.     Therapeutic Exercise  Pt participated in therapeutic exercise performing 5x15 seated pushups requiring verbal and tactile cueing for technique challenging him to lift buttocks off seated surface to end range elbow extension in order to strengthen triceps brachii to improve performance with sit <> stand transitions particularly from lower surfaces. Also, he participated in therapeutic exercise performing 3x15 bilateral UE proximal strengthening exercises utilizing moderate to heavy resistance theraband with scapular retraction, shoulder extension, shoulder adduction, horizontal abduction, shoulder flexion in plane of scaption, internal rotation, and external rotation  requiring mod verbal and tactile cueing for technique emphasizing quality of movement.      Additional Treatments: Pt participated in ADL retraining as further noted above emphasizing fall prevention, and use of compensatory strategies and assistive devices providing extra time.     LifeStyle Change and Education:             Patient left up in chair with  nurse notified.     Education provided: Roles and goals of OT, ADLs, transfer training, bed mobility, body mechanics, assistive device, safety precautions, fall prevention, equipment recommendations, and home safety    Multidisciplinary Problems       Occupational Therapy Goals          Problem: Occupational Therapy    Goal Priority Disciplines Outcome Interventions   Occupational Therapy Goal     OT, PT/OT Progressing    Description: Long Term Goals to be met by: 03/27/25     Patient will increase functional independence with ADLs by performing:    Feeding with [place].  UE Dressing with Modified [place].  LE Dressing with Modified [place].  Grooming while standing at sink with Modified [place].  Toileting from toilet with Modified [place] for hygiene and clothing management.   Bathing from  shower chair/bench with Modified [place].  Toilet transfer to toilet with Modified [place].  Increased functional strength to 4+ to 5/5 for bilateral UE's.                         Time Tracking     OT Received On: 03/21/25  Time In 1015     Time Out 1145  Total Time 90 min  Therapy Time: OT Individual: 60  OT Concurrent: 30  Missed Time:    Missed Time Reason:      Billable Minutes: Self Care/Home Management 10, Therapeutic Activity 35, and Therapeutic Exercise 45    03/21/2025

## 2025-03-22 PROCEDURE — 99900035 HC TECH TIME PER 15 MIN (STAT)

## 2025-03-22 PROCEDURE — 99900031 HC PATIENT EDUCATION (STAT)

## 2025-03-22 PROCEDURE — A4216 STERILE WATER/SALINE, 10 ML: HCPCS | Performed by: INTERNAL MEDICINE

## 2025-03-22 PROCEDURE — 25000242 PHARM REV CODE 250 ALT 637 W/ HCPCS: Performed by: INTERNAL MEDICINE

## 2025-03-22 PROCEDURE — 63600175 PHARM REV CODE 636 W HCPCS: Performed by: INTERNAL MEDICINE

## 2025-03-22 PROCEDURE — 94761 N-INVAS EAR/PLS OXIMETRY MLT: CPT

## 2025-03-22 PROCEDURE — 25000003 PHARM REV CODE 250: Performed by: INTERNAL MEDICINE

## 2025-03-22 PROCEDURE — 25000003 PHARM REV CODE 250: Performed by: NURSE PRACTITIONER

## 2025-03-22 PROCEDURE — 25000003 PHARM REV CODE 250

## 2025-03-22 PROCEDURE — 87070 CULTURE OTHR SPECIMN AEROBIC: CPT | Performed by: INTERNAL MEDICINE

## 2025-03-22 PROCEDURE — 94640 AIRWAY INHALATION TREATMENT: CPT

## 2025-03-22 PROCEDURE — 11800000 HC REHAB PRIVATE ROOM

## 2025-03-22 RX ADMIN — MICONAZOLE NITRATE: 20 POWDER TOPICAL at 08:03

## 2025-03-22 RX ADMIN — GENTIAN VIOLET 2% 0.5 ML: 20 LIQUID TOPICAL at 09:03

## 2025-03-22 RX ADMIN — CLARITHROMYCIN 500 MG: 500 TABLET, FILM COATED ORAL at 08:03

## 2025-03-22 RX ADMIN — Medication 6 MG: at 08:03

## 2025-03-22 RX ADMIN — DICLOFENAC SODIUM 4 G: 10 GEL TOPICAL at 08:03

## 2025-03-22 RX ADMIN — MICONAZOLE NITRATE: 20 POWDER TOPICAL at 09:03

## 2025-03-22 RX ADMIN — ATORVASTATIN CALCIUM 20 MG: 20 TABLET, FILM COATED ORAL at 08:03

## 2025-03-22 RX ADMIN — ACETAMINOPHEN 650 MG: 325 TABLET ORAL at 11:03

## 2025-03-22 RX ADMIN — CEFTRIAXONE SODIUM 2 G: 2 INJECTION, POWDER, FOR SOLUTION INTRAMUSCULAR; INTRAVENOUS at 01:03

## 2025-03-22 RX ADMIN — LEVALBUTEROL 1.25 MG: 1.25 SOLUTION, CONCENTRATE RESPIRATORY (INHALATION) at 02:03

## 2025-03-22 RX ADMIN — TRAMADOL HYDROCHLORIDE 50 MG: 50 TABLET, COATED ORAL at 07:03

## 2025-03-22 RX ADMIN — HYDROCODONE BITARTRATE AND ACETAMINOPHEN 1 TABLET: 5; 325 TABLET ORAL at 08:03

## 2025-03-22 RX ADMIN — TAMSULOSIN HYDROCHLORIDE 0.4 MG: 0.4 CAPSULE ORAL at 08:03

## 2025-03-22 RX ADMIN — HYDROCODONE BITARTRATE AND ACETAMINOPHEN 1 TABLET: 5; 325 TABLET ORAL at 03:03

## 2025-03-22 RX ADMIN — OXYBUTYNIN CHLORIDE 5 MG: 5 TABLET, EXTENDED RELEASE ORAL at 08:03

## 2025-03-22 RX ADMIN — METHOCARBAMOL 500 MG: 500 TABLET ORAL at 02:03

## 2025-03-22 RX ADMIN — POLYETHYLENE GLYCOL (3350) 17 G: 17 POWDER, FOR SOLUTION ORAL at 08:03

## 2025-03-22 RX ADMIN — FAMOTIDINE 20 MG: 20 TABLET, FILM COATED ORAL at 08:03

## 2025-03-22 RX ADMIN — METHOCARBAMOL 500 MG: 500 TABLET ORAL at 08:03

## 2025-03-22 RX ADMIN — LEVALBUTEROL 1.25 MG: 1.25 SOLUTION, CONCENTRATE RESPIRATORY (INHALATION) at 07:03

## 2025-03-22 RX ADMIN — GUAIFENESIN 1200 MG: 600 TABLET, EXTENDED RELEASE ORAL at 08:03

## 2025-03-22 RX ADMIN — BUDESONIDE 0.25 MG: 0.25 INHALANT RESPIRATORY (INHALATION) at 07:03

## 2025-03-22 RX ADMIN — ACETAMINOPHEN 650 MG: 325 TABLET ORAL at 06:03

## 2025-03-22 RX ADMIN — Medication 10 ML: at 08:03

## 2025-03-22 RX ADMIN — FINASTERIDE 5 MG: 5 TABLET, FILM COATED ORAL at 08:03

## 2025-03-22 RX ADMIN — CARBOXYMETHYLCELLULOSE SODIUM 1 DROP: 5 SOLUTION/ DROPS OPHTHALMIC at 08:03

## 2025-03-22 NOTE — PLAN OF CARE
Problem: Rehabilitation ([facility]) Plan of Care  Goal: Plan of Care Review  3/22/2025 1744 by Maddie Garner RN  Outcome: Progressing  3/22/2025 1109 by Maddie Garner RN  Outcome: Progressing  Goal: Patient-Specific Goal (Individualized)  3/22/2025 1744 by Maddie Garner RN  Outcome: Progressing  3/22/2025 1109 by Maddie Garner RN  Outcome: Progressing  Goal: Absence of New-Onset Illness or Injury  3/22/2025 1744 by Maddie Garner RN  Outcome: Progressing  3/22/2025 1109 by Maddie Garner RN  Outcome: Progressing  Goal: Optimal Comfort and Wellbeing  3/22/2025 1744 by Maddie Garner RN  Outcome: Progressing  3/22/2025 1109 by Maddie Garner RN  Outcome: Progressing  Goal: Home and Community Transition Plan Established  3/22/2025 1744 by Maddie Garner RN  Outcome: Progressing  3/22/2025 1109 by Maddie Garner RN  Outcome: Progressing     Problem: Infection  Goal: Absence of Infection Signs and Symptoms  3/22/2025 1744 by Maddie Garner RN  Outcome: Progressing  3/22/2025 1109 by Maddie Garner RN  Outcome: Progressing     Problem: Wound  Goal: Optimal Coping  3/22/2025 1744 by Maddie Garner RN  Outcome: Progressing  3/22/2025 1109 by Maddie Garner RN  Outcome: Progressing  Goal: Optimal Functional Ability  3/22/2025 1744 by Maddie Garner RN  Outcome: Progressing  3/22/2025 1109 by Maddie Garner RN  Outcome: Progressing  Goal: Absence of Infection Signs and Symptoms  3/22/2025 1744 by Maddie Garner RN  Outcome: Progressing  3/22/2025 1109 by Maddie Garner RN  Outcome: Progressing  Goal: Improved Oral Intake  3/22/2025 1744 by Maddie Garner RN  Outcome: Progressing  3/22/2025 1109 by Maddie Garner RN  Outcome: Progressing  Goal: Optimal Pain Control and Function  3/22/2025 1744 by Maddie Garner RN  Outcome: Progressing  3/22/2025 1109 by Maddie Garner RN  Outcome: Progressing  Goal: Skin Health and Integrity  3/22/2025 1744 by Maddie Garner RN  Outcome:  Progressing  3/22/2025 1109 by Maddie Garner RN  Outcome: Progressing  Goal: Optimal Wound Healing  3/22/2025 1744 by Maddie Garner RN  Outcome: Progressing  3/22/2025 1109 by Maddie Garner RN  Outcome: Progressing     Problem: Fall Injury Risk  Goal: Absence of Fall and Fall-Related Injury  3/22/2025 1744 by Maddie Garner RN  Outcome: Progressing  3/22/2025 1109 by Maddie Garner RN  Outcome: Progressing     Problem: Mobility Impairment  Goal: Optimal Mobility  3/22/2025 1744 by Maddie Garner RN  Outcome: Progressing  3/22/2025 1109 by Maddie Garner RN  Outcome: Progressing     Problem: Pain Acute  Goal: Optimal Pain Control and Function  3/22/2025 1744 by Maddie Garner RN  Outcome: Progressing  3/22/2025 1109 by Maddie Garner RN  Outcome: Progressing     Problem: Skin Injury Risk Increased  Goal: Skin Health and Integrity  3/22/2025 1744 by Maddie Garner RN  Outcome: Progressing  3/22/2025 1109 by Maddie Garner RN  Outcome: Progressing   Will continue to monitor.

## 2025-03-22 NOTE — ASSESSMENT & PLAN NOTE
Patient's blood pressure range in the last 24 hours was: BP  Min: 140/60  Max: 151/61.The patient's inpatient anti-hypertensive regimen is listed below:  Current Antihypertensives       Plan  - BP is controlled, no changes needed to their regimen

## 2025-03-22 NOTE — SUBJECTIVE & OBJECTIVE
Interval History: pt seen and examined    Review of Systems   Constitutional:  Positive for activity change, appetite change and fatigue. Negative for chills and fever.   HENT:  Positive for postnasal drip, sinus pressure and sinus pain. Negative for ear pain, mouth sores, nosebleeds and sore throat.    Eyes:  Negative for visual disturbance.   Respiratory:  Positive for cough, shortness of breath and wheezing. Negative for apnea.    Cardiovascular:  Negative for chest pain, palpitations and leg swelling.   Gastrointestinal:  Positive for constipation. Negative for abdominal distention, abdominal pain, blood in stool, diarrhea, nausea and vomiting.   Endocrine: Positive for cold intolerance. Negative for polyphagia.   Musculoskeletal:  Positive for arthralgias and back pain.   Skin:  Negative for rash.   Neurological:  Positive for tremors and weakness. Negative for seizures, syncope, facial asymmetry and speech difficulty.   Hematological:  Negative for adenopathy. Bruises/bleeds easily.   Psychiatric/Behavioral:  Positive for confusion. Negative for agitation and hallucinations. The patient is nervous/anxious.      Objective:     Vital Signs (Most Recent):  Temp: 97.7 °F (36.5 °C) (03/22/25 0718)  Pulse: 71 (03/22/25 0718)  Resp: 18 (03/22/25 0737)  BP: (!) 151/61 (03/22/25 0718)  SpO2: 97 % (03/22/25 0718) Vital Signs (24h Range):  Temp:  [97.7 °F (36.5 °C)-98 °F (36.7 °C)] 97.7 °F (36.5 °C)  Pulse:  [71-86] 71  Resp:  [16-18] 18  SpO2:  [96 %-99 %] 97 %  BP: (140-151)/(60-61) 151/61     Weight: 81.6 kg (179 lb 12.8 oz)  Body mass index is 26.55 kg/m².    Intake/Output Summary (Last 24 hours) at 3/22/2025 0841  Last data filed at 3/22/2025 0551  Gross per 24 hour   Intake 1510 ml   Output 2100 ml   Net -590 ml         Physical Exam  Vitals and nursing note reviewed.   Constitutional:       General: He is awake. He is not in acute distress.     Appearance: He is ill-appearing. He is not toxic-appearing.   HENT:       Head: Normocephalic and atraumatic.      Nose: Congestion and rhinorrhea present.      Mouth/Throat:      Mouth: Mucous membranes are moist.      Pharynx: Oropharynx is clear. No oropharyngeal exudate or posterior oropharyngeal erythema.   Eyes:      General: No scleral icterus.        Right eye: No discharge.         Left eye: No discharge.      Extraocular Movements: Extraocular movements intact.   Neck:      Thyroid: No thyroid mass or thyromegaly.      Vascular: No carotid bruit.      Meningeal: Brudzinski's sign and Kernig's sign absent.   Cardiovascular:      Rate and Rhythm: Normal rate and regular rhythm.      Chest Wall: PMI is not displaced. No thrill.      Heart sounds: Murmur heard.   Pulmonary:      Effort: No tachypnea, accessory muscle usage, prolonged expiration or respiratory distress.      Breath sounds: No decreased air movement. Rhonchi and rales present. No wheezing.   Abdominal:      General: Bowel sounds are normal. There is no distension.      Palpations: Abdomen is soft. There is no hepatomegaly or splenomegaly.      Tenderness: There is no abdominal tenderness. There is no guarding or rebound.   Musculoskeletal:      Cervical back: Neck supple. No rigidity. No muscular tenderness.      Right lower leg: No edema.      Left lower leg: No edema.   Lymphadenopathy:      Cervical: No cervical adenopathy.   Skin:     General: Skin is warm.      Capillary Refill: Capillary refill takes less than 2 seconds.      Coloration: Skin is not cyanotic, jaundiced or pale.      Findings: No erythema or petechiae.   Neurological:      Mental Status: He is alert and oriented to person, place, and time.      Cranial Nerves: No cranial nerve deficit, dysarthria or facial asymmetry.      Motor: Weakness present. No tremor.      Gait: Gait abnormal.   Psychiatric:         Attention and Perception: He does not perceive auditory hallucinations.         Mood and Affect: Mood is anxious. Mood is not depressed.  "Affect is not flat.         Speech: Speech is not rapid and pressured or slurred.         Behavior: Behavior normal. Behavior is not agitated, aggressive or combative.         Thought Content: Thought content normal. Thought content is not paranoid or delusional.         Cognition and Memory: Cognition is impaired.               Recent Labs   Lab 03/17/25  0559 03/20/25  0559   WBC 9.69 7.14   HGB 11.1* 11.0*   HCT 34.4* 33.7*   MCV 98 98   RBC 3.50* 3.44*   MCH 31.7* 32.0*   MCHC 32.3 32.6   RDW 15.3* 15.3*    193   MPV 9.5 9.6   GRAN 81.3*  7.9* 76.5*  5.5   LYMPH 11.2*  1.1 12.3*  0.9*   MONO 5.7  0.6 6.9  0.5   EOSINOPHIL 1.0 2.9   BASOPHIL 0.0 0.1       Recent Labs   Lab 03/17/25  0559 03/20/25  0559   * 136   K 4.2 4.1    106   CO2 24 26   BUN 24 24   CREATININE 1.0 0.9   GLU 96 88   CALCIUM 7.7* 7.8*   PROT 5.9* 5.6*   ALBUMIN 2.8* 2.8*   ALKPHOS 47* 48*   BILITOT 0.5 0.5   ALT 37 26   AST 15 19   ANIONGAP 5* 4*   MG 2.1 2.0       No results for input(s): "COLORU", "APPEARANCEUA", "PHUR", "SPECGRAV", "PROTEINUA", "GLUCUA", "KETONESU", "BILIRUBINUA", "OCCULTUA", "UROBILINOGEN", "NITRITE", "LEUKOCYTESUR", "RBCUA", "WBCUA", "BACTERIA", "SQUAMEPITHEL", "HYALINECASTS", "GRANULARCAST", "MICROCMT" in the last 168 hours.    Invalid input(s): "SPECIMENU", "AMORPHOUSU"    No results for input(s): "PH", "PCO2", "PO2", "HCO3", "POCSATURATED", "BE" in the last 168 hours.    No results for input(s): "TROPONINI", "CPK", "CPKMB" in the last 168 hours.    No results for input(s): "PT", "INR", "APTT" in the last 168 hours.    Lab Results   Component Value Date    HGBA1C 5.8 (H) 11/11/2024       No results for input(s): "TSH", "Y5TETWZ", "V1YEJPM", "THYROIDAB", "FREET4" in the last 168 hours.    Microbiology Results (last 7 days)       Procedure Component Value Units Date/Time    Culture, Respiratory with Gram Stain [[id number]]     Order Status: Sent Specimen: Respiratory from Sputum, Expectorated     " Culture, Respiratory with Gram Stain [[id number]] Collected: 03/22/25 0800    Order Status: Sent Specimen: Sputum, Expectorated             X-Ray Hip 2 or 3 views Right with Pelvis when performed  Result Date: 3/21/2025  EXAMINATION: XR HIP WITH PELVIS WHEN PERFORMED 2 OR 3 VIEWS RIGHT CLINICAL HISTORY: Right hip pain; COMPARISON: 03/04/2025 FINDINGS: The right femoroacetabular joint is intact and exhibits mild degenerative change.  No fracture identified.  Vascular calcifications noted.     No acute finding. Electronically signed by: Gerry Banuelos MD Date:    03/21/2025 Time:    14:40    X-Ray Lumbar Spine 2 Or 3 Views  Result Date: 3/21/2025  EXAMINATION: XR LUMBAR SPINE 2 OR 3 VIEWS CLINICAL HISTORY: Low back pain; COMPARISON: 12/19/2020 FINDINGS: There is a grade 1 anterolisthesis of L4 over L5, unchanged.  A mild compression deformity of the L3 body is similar to the previous exam.  A moderate compression deformity of the L1 body is new from the previous study and age indeterminate.  There is mild multilevel disc space loss and anterior osteophyte formation as well as multilevel facet arthrosis.  Abdominal aorta is diffusely calcified.     Moderate age-indeterminate compression deformity of the L1 body, new from the previous exam. Mild chronic compression deformity of the L3 body. Multilevel lumbar spondylosis. Electronically signed by: Gerry Banuelos MD Date:    03/21/2025 Time:    14:38    X-Ray Chest AP Portable  Result Date: 3/14/2025  EXAMINATION: XR CHEST AP PORTABLE CLINICAL HISTORY: cough; COMPARISON: Chest x-ray 02/26/2024. FINDINGS: Prominent AP cardiac silhouette.  Aortic valve stent graft and left sided cardiac pacemaker present.  Lungs clear.  No pleural fluid.     No acute finding. Electronically signed by: Roderick Gustafson MD Date:    03/14/2025 Time:    10:33    X-Ray Femur Ap/Lat Right  Result Date: 3/9/2025  EXAMINATION: XR FEMUR 2 VIEW RIGHT CLINICAL HISTORY: Pain, unspecified FINDINGS: No right femur  shaft fracture or suspicious bone lesion detected.  Arterial calcifications.  Degenerative changes within the hip and knee     As above Electronically signed by: Yasmine Flores MD Date:    03/09/2025 Time:    13:50    X-Ray Chest 1 View  Result Date: 3/9/2025  EXAMINATION: XR CHEST 1 VIEW CLINICAL HISTORY: Anemia, unspecified COMPARISON: 02/26/2024 FINDINGS: Clear lungs.  No signs of CHF.  Left pacemaker.  Prior TAVR     No acute findings Electronically signed by: Yasmine Flores MD Date:    03/09/2025 Time:    13:49    CT Abdomen Pelvis With IV Contrast NO Oral Contrast  Result Date: 3/9/2025  EXAMINATION: CT ABDOMEN PELVIS WITH IV CONTRAST CLINICAL HISTORY: Anemia; CT/Cardiac Nuclear exams in prior 12 months: 1 TECHNIQUE: CT abdomen and pelvis with IV contrast.  Coronal reformats prepared.  Iterative reconstruction utilized. COMPARISON: CT without IV contrast of 03/04/2025 FINDINGS: Thickened, inflamed appearing proximal duodenum suggests duodenitis/peptic ulcer disease.  No free air. Catheter within an empty diffusely thickened urinary bladder.  Mildly enlarged prostate gland.  Segmental mild left hydroureter with areas of uroepithelial thickening suggest urinary tract infection.  Bilateral renal stones.  No hydronephrosis.  No ureteral stones. Unremarkable liver, adrenals, spleen and pancreas.  No bowel obstruction.  No free air.  Extensive aortoiliac system atherosclerotic plaque.  Prior TAVR.     1. Thickened inflamed appearing proximal duodenum suggests duodenitis/peptic ulcer disease.  No evidence of perforation. 2. Diffuse thick-walled urinary bladder, possibly chronic outlet obstruction and/or cystitis.  Left uroepithelial thickening suggest urinary tract infection 3. Bilateral nephrolithiasis 4. Otherwise, please see above Electronically signed by: Yasmine Flores MD Date:    03/09/2025 Time:    10:14    CT Hip Without Contrast Right  Result Date: 3/4/2025  EXAMINATION: CT HIP WITHOUT CONTRAST RIGHT  CLINICAL HISTORY: Hip pain, stress fracture suspected, neg xray; TECHNIQUE: Axial CT images were obtained. Iterative reconstruction technique was used.  CT/cardiac nuclear exam/s in prior 12 months: 1. COMPARISON: CT abdomen pelvis without IV contrast 03/04/2025. FINDINGS: No right hip fracture or dislocation.  Mild joint space narrowing right hip joint with hypertrophic change.  No soft tissue abnormality.     Mild osteoarthrosis right hip.  No fracture. Electronically signed by: Luis Enrique Baig MD Date:    03/04/2025 Time:    14:07    CT Abdomen Pelvis  Without Contrast  Result Date: 3/4/2025  EXAMINATION: CT ABDOMEN PELVIS WITHOUT CONTRAST CLINICAL HISTORY: Flank pain, kidney stone suspected;right flank and lower back pain; TECHNIQUE: Axial CT images were obtained. Iterative reconstruction technique was used. CT/cardiac nuclear exam/s in prior 12 months: 1. COMPARISON: None. FINDINGS: No hepatic abnormality.  No gallstones.  Spleen, pancreas, adrenal glands demonstrate no abnormality.  5 mm stone lower pole right kidney.  No right ureteral stone or hydronephrosis.  Multiple left renal stones measuring 2 mm each.  No left ureteral stone or hydronephrosis.  No gross gastric abnormality.  Small hiatal hernia.  No dilated bowel.  Moderate amount of stool in the colon and rectum.  The appendix is normal.  There is no free fluid or free air.  There is diffuse urinary bladder wall thickening with a Bolton catheter in place.  The prostate is enlarged.  There is atherosclerotic disease of the abdominal aorta.  No aneurysm.  No lymph node enlargement.  Visualized lung bases are clear.  Chronic compression fracture of L3, stable from CT of 12/19/2020.     1. Bilateral nephrolithiasis without hydronephrosis. 2. Evidence of constipation.  No bowel dilatation. 3. No acute findings. Electronically signed by: Luis Enrique Baig MD Date:    03/04/2025 Time:    11:33    X-Ray Hip 2 or 3 views Right with Pelvis when performed  Result  Date: 3/4/2025  EXAMINATION: XR HIP WITH PELVIS WHEN PERFORMED 2 OR 3 VIEWS RIGHT CLINICAL HISTORY: Unspecified fall, initial encounter COMPARISON: None FINDINGS: Right hip radiographs, two views, demonstrate no fracture or dislocation.  No focal soft tissue abnormality.     No fracture. Electronically signed by: Luis Enrique Baig MD Date:    03/04/2025 Time:    11:29

## 2025-03-22 NOTE — PROGRESS NOTES
[facility]  Progress Note    Patient Name: Tobi Liz Jr.  MRN: [id number]  Patient Class: IP- Rehab   Admission Date: 3/13/2025  Length of Stay: 9 days  Attending Physician: Dave Reyes III, MD  Primary Care Provider: Luis Enrique Guzman Jr., MD        Subjective     Principal Problem:Myopathy        HPI:  Tobi Liz Jr. is a 89 y.o. male with a PMHx of has a past medical history of Aortic valve stenosis, Arthritis, BPH (benign prostatic hyperplasia), Carotid artery stenosis, Chronic diastolic heart failure, ETOH abuse, Exposure to COVID-19 virus (01/07/2022), Hyperchloremia, Kidney stones, Murmur, Polymyalgia rheumatica, PVD (peripheral vascular disease), and Renal artery stenosis., presents complaining of shaking after standing from a seated position that started yesterday, he was aware and talking during the episode so not likely a seizure. Labs show he was severely anemic in the ED with Hgb of 5. He had hematuria as well on UA along with bacteria. Ucx pending. His family states that he has a history of anemia in the past. He otherwise feels fine. He received 3 units of pRBCs in the ED with appropriate response.     Patient overall significantly improved from admission. H&H were stable yesterday. Awaiting updated CBC this morning. Otherwise renal function stable and no new electrolyte abnormalities noted. Patient accepted to inpatient rehab and we will discharging.    Patient is seen and examined after admission and patient is complaining of respiratory congestion wheezing and dyspnea.  Patient's currently on Bactrim and sensitivities noted for his urinary tract infection as well as the other antibiotics that maybe appropriate we will switch to Rocephin as we will get better respiratory coverage as the patient does seem to be battling a bronchitis and possibly a pneumonia.  Repeat chest x-ray pending.  Patient is motivated to improve he is very eager to return  home patient's significantly decline in his overall functional state and unable to complete ADLs safely at this time.  He has developed a myopathy as well as some confusion and encephalopathy agree with inpatient rehab patient meets current Medicare criteria.       Patient requires acute inpatient rehab admission with 24-hour nursing and active physician oversight to monitor and manage acute medical comorbid conditions, labs, pain, and functional deficits. Patient/family will also require teaching and integration of improving functional skills into daily living. Patient will also require an individualized, interdisciplinary approach to their care, receiving PT, OT services 3 hours per day, 5 days per week. Required care cannot be provided at a lower level of care. Patient is anticipated to require approximately 10-14 days LOS with expected discharge home  with  services.    Impairment group (IGC):   Neuromuscular Disorders 03.8 Etiologic diagnosis/description:   G72.9: Myopathy  D64.9: Anemia      Date of onset:  3/8/2025 Date of surgery: N/A   Allergies: Patient has no known allergies.   Comorbid condition:   HTN, HLD, BPH, CAD, Valvular heart disease, CHF class II, Occlusion of right iliac artery   Medical/functional conditions requiring inpatient rehabilitation:      This patient requires medical management/24-hour nursing of complex co morbidities HTN, HLD, BPH, CAD, Valvular heart disease, CHF class II, Occlusion of right iliac artery, labs, medications (see medications list), pain, sleep hygiene, anticoagulation, nutrition, hydration, neurological, pulmonary, cardiac status, and preventive healthcare.     This patient requires intense therapy and an integrated, interdisciplinary approach to address safety, impaired mobility, impaired ADLs (dressing, toileting, grooming, showering), judgment, and memory, communication, pulmonary insufficiency, bowel/bladder problems,preventive healthcare, medication  management, integration of functional skills into daily living, and home caregiver support and training.    Risk for medical/clinical complications:      This patient is at risk for the following complications: DVT/PE, pneumonia, malnutrition, neurological decline, respiratory insufficiency, worsening activity intolerance, complications from anticoagulation, skin breakdown, inadequate sleep, and constipation.        Overview/Hospital Course:  3/16 AA, weekend crosscover, no acute events reported, blood pressure stable, continue PT OT efforts    3/17 DL:  Patient doing well this morning.  He is sitting up in wheelchair in the dining room and is actively participating in therapy.  No acute events reported.  No acute distress noted.  Mild hyponatremia noted.  Patient encouraged to increase p.o. fluid intake.  Renal function stable.  Vital signs stable.  Patient reports therapy going well.  Pain.  Encouraged patient to continue therapy efforts.    3/18 FM:  Patient's respiratory symptoms are much improved but still having occasional cough and wheeze.  We will add low-dose budesonide.  Patient's cognition is improved Bolton catheter in place patient's pain improved.  Patient completing time needs in therapy and overall doing well    3/19 DL:  Patient doing well this morning.  He is in dining room and is actively participating in therapy.  Patient denies pain.  Vital signs stable.  Patient continues to work well with therapy.  Encouraged patient to continue therapy efforts.    3/20 DL:  Patient doing well this morning.  He is in the dining room and is actively participating in therapy.  Patient is without complaint this morning.  Labs and vital signs stable.  Patient reports therapy going well.  He states he feels he is making functional gains.  Encouraged patient to continue therapy efforts.    3/21 DL:  Patient reports increased right sided hip/lower back pain that is a barrier to progress.  Medications adjusted.  We  will obtain x-rays of L-spine and right hip and pelvis.  Patient educated on importance of notifying staff of pain and requesting medications as needed.  Encouraged patient to continue therapy efforts.    3/22 SH: weekend crossover- VSS, patient having trouble in participating in PT de to pain imaging revealed compression deformity of the L1 body, new from the previous exam, mild chronic compression deformity of the L3 body and multilevel lumbar spondylosis. Pain control on board        Interval History: pt seen and examined    Review of Systems   Constitutional:  Positive for activity change, appetite change and fatigue. Negative for chills and fever.   HENT:  Positive for postnasal drip, sinus pressure and sinus pain. Negative for ear pain, mouth sores, nosebleeds and sore throat.    Eyes:  Negative for visual disturbance.   Respiratory:  Positive for cough, shortness of breath and wheezing. Negative for apnea.    Cardiovascular:  Negative for chest pain, palpitations and leg swelling.   Gastrointestinal:  Positive for constipation. Negative for abdominal distention, abdominal pain, blood in stool, diarrhea, nausea and vomiting.   Endocrine: Positive for cold intolerance. Negative for polyphagia.   Musculoskeletal:  Positive for arthralgias and back pain.   Skin:  Negative for rash.   Neurological:  Positive for tremors and weakness. Negative for seizures, syncope, facial asymmetry and speech difficulty.   Hematological:  Negative for adenopathy. Bruises/bleeds easily.   Psychiatric/Behavioral:  Positive for confusion. Negative for agitation and hallucinations. The patient is nervous/anxious.      Objective:     Vital Signs (Most Recent):  Temp: 97.7 °F (36.5 °C) (03/22/25 0718)  Pulse: 71 (03/22/25 0718)  Resp: 18 (03/22/25 0737)  BP: (!) 151/61 (03/22/25 0718)  SpO2: 97 % (03/22/25 0718) Vital Signs (24h Range):  Temp:  [97.7 °F (36.5 °C)-98 °F (36.7 °C)] 97.7 °F (36.5 °C)  Pulse:  [71-86] 71  Resp:  [16-18]  18  SpO2:  [96 %-99 %] 97 %  BP: (140-151)/(60-61) 151/61     Weight: 81.6 kg (179 lb 12.8 oz)  Body mass index is 26.55 kg/m².    Intake/Output Summary (Last 24 hours) at 3/22/2025 0841  Last data filed at 3/22/2025 0551  Gross per 24 hour   Intake 1510 ml   Output 2100 ml   Net -590 ml         Physical Exam  Vitals and nursing note reviewed.   Constitutional:       General: He is awake. He is not in acute distress.     Appearance: He is ill-appearing. He is not toxic-appearing.   HENT:      Head: Normocephalic and atraumatic.      Nose: Congestion and rhinorrhea present.      Mouth/Throat:      Mouth: Mucous membranes are moist.      Pharynx: Oropharynx is clear. No oropharyngeal exudate or posterior oropharyngeal erythema.   Eyes:      General: No scleral icterus.        Right eye: No discharge.         Left eye: No discharge.      Extraocular Movements: Extraocular movements intact.   Neck:      Thyroid: No thyroid mass or thyromegaly.      Vascular: No carotid bruit.      Meningeal: Brudzinski's sign and Kernig's sign absent.   Cardiovascular:      Rate and Rhythm: Normal rate and regular rhythm.      Chest Wall: PMI is not displaced. No thrill.      Heart sounds: Murmur heard.   Pulmonary:      Effort: No tachypnea, accessory muscle usage, prolonged expiration or respiratory distress.      Breath sounds: No decreased air movement. Rhonchi and rales present. No wheezing.   Abdominal:      General: Bowel sounds are normal. There is no distension.      Palpations: Abdomen is soft. There is no hepatomegaly or splenomegaly.      Tenderness: There is no abdominal tenderness. There is no guarding or rebound.   Musculoskeletal:      Cervical back: Neck supple. No rigidity. No muscular tenderness.      Right lower leg: No edema.      Left lower leg: No edema.   Lymphadenopathy:      Cervical: No cervical adenopathy.   Skin:     General: Skin is warm.      Capillary Refill: Capillary refill takes less than 2 seconds.     "  Coloration: Skin is not cyanotic, jaundiced or pale.      Findings: No erythema or petechiae.   Neurological:      Mental Status: He is alert and oriented to person, place, and time.      Cranial Nerves: No cranial nerve deficit, dysarthria or facial asymmetry.      Motor: Weakness present. No tremor.      Gait: Gait abnormal.   Psychiatric:         Attention and Perception: He does not perceive auditory hallucinations.         Mood and Affect: Mood is anxious. Mood is not depressed. Affect is not flat.         Speech: Speech is not rapid and pressured or slurred.         Behavior: Behavior normal. Behavior is not agitated, aggressive or combative.         Thought Content: Thought content normal. Thought content is not paranoid or delusional.         Cognition and Memory: Cognition is impaired.               Recent Labs   Lab 03/17/25  0559 03/20/25  0559   WBC 9.69 7.14   HGB 11.1* 11.0*   HCT 34.4* 33.7*   MCV 98 98   RBC 3.50* 3.44*   MCH 31.7* 32.0*   MCHC 32.3 32.6   RDW 15.3* 15.3*    193   MPV 9.5 9.6   GRAN 81.3*  7.9* 76.5*  5.5   LYMPH 11.2*  1.1 12.3*  0.9*   MONO 5.7  0.6 6.9  0.5   EOSINOPHIL 1.0 2.9   BASOPHIL 0.0 0.1       Recent Labs   Lab 03/17/25  0559 03/20/25  0559   * 136   K 4.2 4.1    106   CO2 24 26   BUN 24 24   CREATININE 1.0 0.9   GLU 96 88   CALCIUM 7.7* 7.8*   PROT 5.9* 5.6*   ALBUMIN 2.8* 2.8*   ALKPHOS 47* 48*   BILITOT 0.5 0.5   ALT 37 26   AST 15 19   ANIONGAP 5* 4*   MG 2.1 2.0       No results for input(s): "COLORU", "APPEARANCEUA", "PHUR", "SPECGRAV", "PROTEINUA", "GLUCUA", "KETONESU", "BILIRUBINUA", "OCCULTUA", "UROBILINOGEN", "NITRITE", "LEUKOCYTESUR", "RBCUA", "WBCUA", "BACTERIA", "SQUAMEPITHEL", "HYALINECASTS", "GRANULARCAST", "MICROCMT" in the last 168 hours.    Invalid input(s): "SPECIMENU", "AMORPHOUSU"    No results for input(s): "PH", "PCO2", "PO2", "HCO3", "POCSATURATED", "BE" in the last 168 hours.    No results for input(s): "TROPONINI", " ""CPK", "CPKMB" in the last 168 hours.    No results for input(s): "PT", "INR", "APTT" in the last 168 hours.    Lab Results   Component Value Date    HGBA1C 5.8 (H) 11/11/2024       No results for input(s): "TSH", "K8MKYAQ", "C4SUGCQ", "THYROIDAB", "FREET4" in the last 168 hours.    Microbiology Results (last 7 days)       Procedure Component Value Units Date/Time    Culture, Respiratory with Gram Stain [[id number]]     Order Status: Sent Specimen: Respiratory from Sputum, Expectorated     Culture, Respiratory with Gram Stain [[id number]] Collected: 03/22/25 0800    Order Status: Sent Specimen: Sputum, Expectorated             X-Ray Hip 2 or 3 views Right with Pelvis when performed  Result Date: 3/21/2025  EXAMINATION: XR HIP WITH PELVIS WHEN PERFORMED 2 OR 3 VIEWS RIGHT CLINICAL HISTORY: Right hip pain; COMPARISON: 03/04/2025 FINDINGS: The right femoroacetabular joint is intact and exhibits mild degenerative change.  No fracture identified.  Vascular calcifications noted.     No acute finding. Electronically signed by: Gerry Banuelos MD Date:    03/21/2025 Time:    14:40    X-Ray Lumbar Spine 2 Or 3 Views  Result Date: 3/21/2025  EXAMINATION: XR LUMBAR SPINE 2 OR 3 VIEWS CLINICAL HISTORY: Low back pain; COMPARISON: 12/19/2020 FINDINGS: There is a grade 1 anterolisthesis of L4 over L5, unchanged.  A mild compression deformity of the L3 body is similar to the previous exam.  A moderate compression deformity of the L1 body is new from the previous study and age indeterminate.  There is mild multilevel disc space loss and anterior osteophyte formation as well as multilevel facet arthrosis.  Abdominal aorta is diffusely calcified.     Moderate age-indeterminate compression deformity of the L1 body, new from the previous exam. Mild chronic compression deformity of the L3 body. Multilevel lumbar spondylosis. Electronically signed by: Gerry Banuelos MD Date:    03/21/2025 Time:    14:38    X-Ray Chest AP Portable  Result Date: " 3/14/2025  EXAMINATION: XR CHEST AP PORTABLE CLINICAL HISTORY: cough; COMPARISON: Chest x-ray 02/26/2024. FINDINGS: Prominent AP cardiac silhouette.  Aortic valve stent graft and left sided cardiac pacemaker present.  Lungs clear.  No pleural fluid.     No acute finding. Electronically signed by: Roderick Gustafson MD Date:    03/14/2025 Time:    10:33    X-Ray Femur Ap/Lat Right  Result Date: 3/9/2025  EXAMINATION: XR FEMUR 2 VIEW RIGHT CLINICAL HISTORY: Pain, unspecified FINDINGS: No right femur shaft fracture or suspicious bone lesion detected.  Arterial calcifications.  Degenerative changes within the hip and knee     As above Electronically signed by: Yasmine Flores MD Date:    03/09/2025 Time:    13:50    X-Ray Chest 1 View  Result Date: 3/9/2025  EXAMINATION: XR CHEST 1 VIEW CLINICAL HISTORY: Anemia, unspecified COMPARISON: 02/26/2024 FINDINGS: Clear lungs.  No signs of CHF.  Left pacemaker.  Prior TAVR     No acute findings Electronically signed by: Yasmine Flores MD Date:    03/09/2025 Time:    13:49    CT Abdomen Pelvis With IV Contrast NO Oral Contrast  Result Date: 3/9/2025  EXAMINATION: CT ABDOMEN PELVIS WITH IV CONTRAST CLINICAL HISTORY: Anemia; CT/Cardiac Nuclear exams in prior 12 months: 1 TECHNIQUE: CT abdomen and pelvis with IV contrast.  Coronal reformats prepared.  Iterative reconstruction utilized. COMPARISON: CT without IV contrast of 03/04/2025 FINDINGS: Thickened, inflamed appearing proximal duodenum suggests duodenitis/peptic ulcer disease.  No free air. Catheter within an empty diffusely thickened urinary bladder.  Mildly enlarged prostate gland.  Segmental mild left hydroureter with areas of uroepithelial thickening suggest urinary tract infection.  Bilateral renal stones.  No hydronephrosis.  No ureteral stones. Unremarkable liver, adrenals, spleen and pancreas.  No bowel obstruction.  No free air.  Extensive aortoiliac system atherosclerotic plaque.  Prior TAVR.     1. Thickened inflamed  appearing proximal duodenum suggests duodenitis/peptic ulcer disease.  No evidence of perforation. 2. Diffuse thick-walled urinary bladder, possibly chronic outlet obstruction and/or cystitis.  Left uroepithelial thickening suggest urinary tract infection 3. Bilateral nephrolithiasis 4. Otherwise, please see above Electronically signed by: Yasmine Flores MD Date:    03/09/2025 Time:    10:14    CT Hip Without Contrast Right  Result Date: 3/4/2025  EXAMINATION: CT HIP WITHOUT CONTRAST RIGHT CLINICAL HISTORY: Hip pain, stress fracture suspected, neg xray; TECHNIQUE: Axial CT images were obtained. Iterative reconstruction technique was used.  CT/cardiac nuclear exam/s in prior 12 months: 1. COMPARISON: CT abdomen pelvis without IV contrast 03/04/2025. FINDINGS: No right hip fracture or dislocation.  Mild joint space narrowing right hip joint with hypertrophic change.  No soft tissue abnormality.     Mild osteoarthrosis right hip.  No fracture. Electronically signed by: Luis Enrique Baig MD Date:    03/04/2025 Time:    14:07    CT Abdomen Pelvis  Without Contrast  Result Date: 3/4/2025  EXAMINATION: CT ABDOMEN PELVIS WITHOUT CONTRAST CLINICAL HISTORY: Flank pain, kidney stone suspected;right flank and lower back pain; TECHNIQUE: Axial CT images were obtained. Iterative reconstruction technique was used. CT/cardiac nuclear exam/s in prior 12 months: 1. COMPARISON: None. FINDINGS: No hepatic abnormality.  No gallstones.  Spleen, pancreas, adrenal glands demonstrate no abnormality.  5 mm stone lower pole right kidney.  No right ureteral stone or hydronephrosis.  Multiple left renal stones measuring 2 mm each.  No left ureteral stone or hydronephrosis.  No gross gastric abnormality.  Small hiatal hernia.  No dilated bowel.  Moderate amount of stool in the colon and rectum.  The appendix is normal.  There is no free fluid or free air.  There is diffuse urinary bladder wall thickening with a Bolton catheter in place.  The  "prostate is enlarged.  There is atherosclerotic disease of the abdominal aorta.  No aneurysm.  No lymph node enlargement.  Visualized lung bases are clear.  Chronic compression fracture of L3, stable from CT of 12/19/2020.     1. Bilateral nephrolithiasis without hydronephrosis. 2. Evidence of constipation.  No bowel dilatation. 3. No acute findings. Electronically signed by: Luis Enrique Baig MD Date:    03/04/2025 Time:    11:33    X-Ray Hip 2 or 3 views Right with Pelvis when performed  Result Date: 3/4/2025  EXAMINATION: XR HIP WITH PELVIS WHEN PERFORMED 2 OR 3 VIEWS RIGHT CLINICAL HISTORY: Unspecified fall, initial encounter COMPARISON: None FINDINGS: Right hip radiographs, two views, demonstrate no fracture or dislocation.  No focal soft tissue abnormality.     No fracture. Electronically signed by: Luis Enrique Baig MD Date:    03/04/2025 Time:    11:29         Assessment & Plan  Myopathy  Continue PT/OT efforts.  Hypertension  Patient's blood pressure range in the last 24 hours was: BP  Min: 140/60  Max: 151/61.The patient's inpatient anti-hypertensive regimen is listed below:  Current Antihypertensives       Plan  - BP is controlled, no changes needed to their regimen  Hyperlipidemia  Recheck labs.    BPH (benign prostatic hyperplasia)  Bolton in place.    Atherosclerosis of native artery of both lower extremities with rest pain  Noted.  Continue current medications.    Congestive heart failure, NYHA class II  Patient has Combined Systolic and Diastolic heart failure that is Chronic. On presentation their CHF was well compensated. Most recent BNP and echo results are listed below.  No results for input(s): "BNP" in the last 72 hours.  Latest ECHO  No results found for this or any previous visit.    Current Heart Failure Medications       Plan  - Monitor strict I&Os and daily weights.    - Place on telemetry  - Low sodium diet      S/P TAVR (transcatheter aortic valve replacement)  Has done well post " procedure.    Insomnia  Monitor, PRN meds.    Anemia  Anemia is likely due to chronic disease due to Chronic Kidney Disease. Most recent hemoglobin and hematocrit are listed below.  Recent Labs     03/20/25  0559   HGB 11.0*   HCT 33.7*     Plan  - Monitor serial CBC:  q72hrs  - Transfuse PRBC if patient becomes hemodynamically unstable, symptomatic or H/H drops below 7/21.  - Patient has received 3 units of PRBCs  - Patient's anemia is currently stable  Acute cystitis with hematuria  Abx changed 2nd dual resp infection.    Acute bronchitis  As above.    Urinary obstruction  Willams in place, plan on DC with willams.    VTE Risk Mitigation (From admission, onward)           Ordered     IP VTE HIGH RISK PATIENT  Once         03/13/25 1349     Place sequential compression device  Until discontinued         03/13/25 1349                    Discharge Planning   ADONAY:      Code Status: Full Code   Medical Readiness for Discharge Date:   Discharge Plan A: Home                Please place Justification for DME        Gabriel Venegas MD  Department of Hospital Medicine   [facility] ([facility])

## 2025-03-22 NOTE — PT/OT/SLP PROGRESS
Occupational Therapy      Patient Name:  Tobi Liz .   MRN:  [id number]    Patient not seen today secondary to patient refused, reported that he had been up walking up and down the halls all morning.  Nursing verified his statement.      Amount of therapy minutes missed: 0    Will follow-up 3/24/2025.  Tonia Trinidad OT      3/22/2025

## 2025-03-23 LAB
ABSOLUTE EOSINOPHIL (OHS): 0.27 K/UL
ABSOLUTE MONOCYTE (OHS): 0.58 K/UL (ref 0.3–1)
ABSOLUTE NEUTROPHIL COUNT (OHS): 6.22 K/UL (ref 1.8–7.7)
ALBUMIN SERPL BCP-MCNC: 2.9 G/DL (ref 3.5–5.2)
ALP SERPL-CCNC: 55 UNIT/L (ref 40–150)
ALT SERPL W/O P-5'-P-CCNC: 18 UNIT/L (ref 10–44)
ANION GAP (OHS): 5 MMOL/L (ref 8–16)
AST SERPL-CCNC: 20 UNIT/L (ref 11–45)
BASOPHILS # BLD AUTO: 0.01 K/UL
BASOPHILS NFR BLD AUTO: 0.1 %
BILIRUB SERPL-MCNC: 0.7 MG/DL (ref 0.1–1)
BUN SERPL-MCNC: 18 MG/DL (ref 10–30)
CALCIUM SERPL-MCNC: 8.3 MG/DL (ref 8.7–10.5)
CHLORIDE SERPL-SCNC: 104 MMOL/L (ref 95–110)
CO2 SERPL-SCNC: 27 MMOL/L (ref 23–29)
CREAT SERPL-MCNC: 0.9 MG/DL (ref 0.5–1.4)
ERYTHROCYTE [DISTWIDTH] IN BLOOD BY AUTOMATED COUNT: 15.3 % (ref 11.5–14.5)
GFR SERPLBLD CREATININE-BSD FMLA CKD-EPI: >60 ML/MIN/1.73/M2
GLUCOSE SERPL-MCNC: 103 MG/DL (ref 70–110)
HCT VFR BLD AUTO: 33.9 % (ref 40–54)
HGB BLD-MCNC: 10.8 GM/DL (ref 14–18)
IMM GRANULOCYTES # BLD AUTO: 0.03 K/UL (ref 0–0.04)
IMM GRANULOCYTES NFR BLD AUTO: 0.4 % (ref 0–0.5)
LYMPHOCYTES # BLD AUTO: 0.62 K/UL (ref 1–4.8)
MAGNESIUM SERPL-MCNC: 1.9 MG/DL (ref 1.6–2.6)
MCH RBC QN AUTO: 31.7 PG (ref 27–50)
MCHC RBC AUTO-ENTMCNC: 31.9 G/DL (ref 32–36)
MCV RBC AUTO: 99 FL (ref 82–98)
NUCLEATED RBC (/100WBC) (OHS): 0 /100 WBC
PLATELET # BLD AUTO: 181 K/UL (ref 150–450)
PMV BLD AUTO: 8.9 FL (ref 9.2–12.9)
POTASSIUM SERPL-SCNC: 4.3 MMOL/L (ref 3.5–5.1)
PROT SERPL-MCNC: 5.7 GM/DL (ref 6–8.4)
RBC # BLD AUTO: 3.41 M/UL (ref 4.6–6.2)
RELATIVE EOSINOPHIL (OHS): 3.5 %
RELATIVE LYMPHOCYTE (OHS): 8 % (ref 18–48)
RELATIVE MONOCYTE (OHS): 7.5 % (ref 4–15)
RELATIVE NEUTROPHIL (OHS): 80.5 % (ref 38–73)
SODIUM SERPL-SCNC: 136 MMOL/L (ref 136–145)
WBC # BLD AUTO: 7.73 K/UL (ref 3.9–12.7)

## 2025-03-23 PROCEDURE — 99900031 HC PATIENT EDUCATION (STAT)

## 2025-03-23 PROCEDURE — 25000003 PHARM REV CODE 250

## 2025-03-23 PROCEDURE — 25000003 PHARM REV CODE 250: Performed by: NURSE PRACTITIONER

## 2025-03-23 PROCEDURE — 94640 AIRWAY INHALATION TREATMENT: CPT

## 2025-03-23 PROCEDURE — 80053 COMPREHEN METABOLIC PANEL: CPT | Performed by: INTERNAL MEDICINE

## 2025-03-23 PROCEDURE — 94761 N-INVAS EAR/PLS OXIMETRY MLT: CPT

## 2025-03-23 PROCEDURE — 11800000 HC REHAB PRIVATE ROOM

## 2025-03-23 PROCEDURE — 83735 ASSAY OF MAGNESIUM: CPT | Performed by: INTERNAL MEDICINE

## 2025-03-23 PROCEDURE — 25000003 PHARM REV CODE 250: Performed by: INTERNAL MEDICINE

## 2025-03-23 PROCEDURE — 25000242 PHARM REV CODE 250 ALT 637 W/ HCPCS: Performed by: INTERNAL MEDICINE

## 2025-03-23 PROCEDURE — 85025 COMPLETE CBC W/AUTO DIFF WBC: CPT | Performed by: INTERNAL MEDICINE

## 2025-03-23 PROCEDURE — 36415 COLL VENOUS BLD VENIPUNCTURE: CPT | Performed by: INTERNAL MEDICINE

## 2025-03-23 PROCEDURE — 63600175 PHARM REV CODE 636 W HCPCS: Performed by: INTERNAL MEDICINE

## 2025-03-23 PROCEDURE — A4216 STERILE WATER/SALINE, 10 ML: HCPCS | Performed by: INTERNAL MEDICINE

## 2025-03-23 PROCEDURE — 99900035 HC TECH TIME PER 15 MIN (STAT)

## 2025-03-23 RX ADMIN — FAMOTIDINE 20 MG: 20 TABLET, FILM COATED ORAL at 08:03

## 2025-03-23 RX ADMIN — DICLOFENAC SODIUM 4 G: 10 GEL TOPICAL at 09:03

## 2025-03-23 RX ADMIN — TAMSULOSIN HYDROCHLORIDE 0.4 MG: 0.4 CAPSULE ORAL at 08:03

## 2025-03-23 RX ADMIN — TRAMADOL HYDROCHLORIDE 50 MG: 50 TABLET, COATED ORAL at 02:03

## 2025-03-23 RX ADMIN — CLARITHROMYCIN 500 MG: 500 TABLET, FILM COATED ORAL at 08:03

## 2025-03-23 RX ADMIN — LEVALBUTEROL 1.25 MG: 1.25 SOLUTION, CONCENTRATE RESPIRATORY (INHALATION) at 06:03

## 2025-03-23 RX ADMIN — GENTIAN VIOLET 2% 0.5 ML: 20 LIQUID TOPICAL at 09:03

## 2025-03-23 RX ADMIN — METHOCARBAMOL 500 MG: 500 TABLET ORAL at 02:03

## 2025-03-23 RX ADMIN — ACETAMINOPHEN 650 MG: 325 TABLET ORAL at 05:03

## 2025-03-23 RX ADMIN — METHOCARBAMOL 500 MG: 500 TABLET ORAL at 08:03

## 2025-03-23 RX ADMIN — GUAIFENESIN 1200 MG: 600 TABLET, EXTENDED RELEASE ORAL at 08:03

## 2025-03-23 RX ADMIN — CARBOXYMETHYLCELLULOSE SODIUM 1 DROP: 5 SOLUTION/ DROPS OPHTHALMIC at 08:03

## 2025-03-23 RX ADMIN — Medication 6 MG: at 08:03

## 2025-03-23 RX ADMIN — LEVALBUTEROL 1.25 MG: 1.25 SOLUTION, CONCENTRATE RESPIRATORY (INHALATION) at 07:03

## 2025-03-23 RX ADMIN — Medication 10 ML: at 08:03

## 2025-03-23 RX ADMIN — LEVALBUTEROL 1.25 MG: 1.25 SOLUTION, CONCENTRATE RESPIRATORY (INHALATION) at 02:03

## 2025-03-23 RX ADMIN — POLYETHYLENE GLYCOL (3350) 17 G: 17 POWDER, FOR SOLUTION ORAL at 08:03

## 2025-03-23 RX ADMIN — BUDESONIDE 0.25 MG: 0.25 INHALANT RESPIRATORY (INHALATION) at 07:03

## 2025-03-23 RX ADMIN — CEFTRIAXONE SODIUM 2 G: 2 INJECTION, POWDER, FOR SOLUTION INTRAMUSCULAR; INTRAVENOUS at 01:03

## 2025-03-23 RX ADMIN — MICONAZOLE NITRATE: 20 POWDER TOPICAL at 08:03

## 2025-03-23 RX ADMIN — ACETAMINOPHEN 650 MG: 325 TABLET ORAL at 08:03

## 2025-03-23 RX ADMIN — OXYBUTYNIN CHLORIDE 5 MG: 5 TABLET, EXTENDED RELEASE ORAL at 08:03

## 2025-03-23 RX ADMIN — FINASTERIDE 5 MG: 5 TABLET, FILM COATED ORAL at 09:03

## 2025-03-23 RX ADMIN — MICONAZOLE NITRATE: 20 POWDER TOPICAL at 09:03

## 2025-03-23 RX ADMIN — ATORVASTATIN CALCIUM 20 MG: 20 TABLET, FILM COATED ORAL at 08:03

## 2025-03-23 RX ADMIN — DICLOFENAC SODIUM 4 G: 10 GEL TOPICAL at 08:03

## 2025-03-23 NOTE — PLAN OF CARE
Problem: Rehabilitation (IRF) Plan of Care  Goal: Plan of Care Review  3/23/2025 1543 by Maddie Garner RN  Outcome: Progressing  3/23/2025 1542 by Maddie Garenr RN  Outcome: Progressing  3/23/2025 1524 by Maddie Graner RN  Outcome: Progressing  Goal: Patient-Specific Goal (Individualized)  3/23/2025 1543 by Maddie Garner RN  Outcome: Progressing  3/23/2025 1542 by Maddie Garner RN  Outcome: Progressing  3/23/2025 1524 by Maddie Garner RN  Outcome: Progressing  Goal: Absence of New-Onset Illness or Injury  3/23/2025 1543 by Maddie Garner RN  Outcome: Progressing  3/23/2025 1542 by Maddie Garner RN  Outcome: Progressing  3/23/2025 1524 by Maddie Garner RN  Outcome: Progressing  Goal: Optimal Comfort and Wellbeing  3/23/2025 1543 by Maddie Garner RN  Outcome: Progressing  3/23/2025 1542 by Maddie Garner RN  Outcome: Progressing  3/23/2025 1524 by Maddie Garner RN  Outcome: Progressing  Goal: Home and Community Transition Plan Established  3/23/2025 1543 by Maddie Garner RN  Outcome: Progressing  3/23/2025 1542 by Maddie Garner RN  Outcome: Progressing  3/23/2025 1524 by Maddie Garner RN  Outcome: Progressing     Problem: Infection  Goal: Absence of Infection Signs and Symptoms  3/23/2025 1543 by Maddie Garner RN  Outcome: Progressing  3/23/2025 1542 by Maddie Garner RN  Outcome: Progressing  3/23/2025 1524 by Maddie Garner RN  Outcome: Progressing     Problem: Wound  Goal: Optimal Coping  3/23/2025 1543 by Maddie Garner RN  Outcome: Progressing  3/23/2025 1542 by Mdadie Garner RN  Outcome: Progressing  3/23/2025 1524 by Maddie Garner RN  Outcome: Progressing  Goal: Optimal Functional Ability  3/23/2025 1543 by Maddie Garner RN  Outcome: Progressing  3/23/2025 1542 by Maddie Garner RN  Outcome: Progressing  3/23/2025 1524 by aMddie Garner RN  Outcome: Progressing  Goal: Absence of Infection Signs and Symptoms  3/23/2025 1543 by Maddie Garner,  RN  Outcome: Progressing  3/23/2025 1542 by Maddie Garner RN  Outcome: Progressing  3/23/2025 1524 by Maddie Garner RN  Outcome: Progressing  Goal: Improved Oral Intake  3/23/2025 1543 by Maddie Garner RN  Outcome: Progressing  3/23/2025 1542 by Maddie Garner RN  Outcome: Progressing  3/23/2025 1524 by Maddie Garner RN  Outcome: Progressing  Goal: Optimal Pain Control and Function  3/23/2025 1543 by Maddie Garner RN  Outcome: Progressing  3/23/2025 1542 by Maddie Garner RN  Outcome: Progressing  3/23/2025 1524 by Maddie Garner RN  Outcome: Progressing  Goal: Skin Health and Integrity  3/23/2025 1543 by Maddie Garner RN  Outcome: Progressing  3/23/2025 1542 by Maddie Garner RN  Outcome: Progressing  3/23/2025 1524 by Maddie Garner RN  Outcome: Progressing  Goal: Optimal Wound Healing  3/23/2025 1543 by Maddie Garner RN  Outcome: Progressing  3/23/2025 1542 by Maddie Garner RN  Outcome: Progressing  3/23/2025 1524 by Maddie Garner RN  Outcome: Progressing     Problem: Fall Injury Risk  Goal: Absence of Fall and Fall-Related Injury  3/23/2025 1543 by Maddie Garner RN  Outcome: Progressing  3/23/2025 1542 by Maddie Garner RN  Outcome: Progressing  3/23/2025 1524 by Maddie Garner RN  Outcome: Progressing     Problem: Mobility Impairment  Goal: Optimal Mobility  3/23/2025 1543 by Maddie Garner RN  Outcome: Progressing  3/23/2025 1542 by Maddie Garner RN  Outcome: Progressing  3/23/2025 1524 by Maddie Garner RN  Outcome: Progressing     Problem: Pain Acute  Goal: Optimal Pain Control and Function  3/23/2025 1543 by Maddie Garner RN  Outcome: Progressing  3/23/2025 1542 by Maddie Garner RN  Outcome: Progressing  3/23/2025 1524 by Maddie Garner RN  Outcome: Progressing     Problem: Skin Injury Risk Increased  Goal: Skin Health and Integrity  3/23/2025 1543 by Cutting, Maddie, RN  Outcome: Progressing  3/23/2025 1542 by Maddie Garner RN  Outcome:  Progressing  3/23/2025 1524 by Maddie Garner RN  Outcome: Progressing   Will continue to monitor.

## 2025-03-23 NOTE — PLAN OF CARE
Problem: Rehabilitation (IRF) Plan of Care  Goal: Plan of Care Review  Outcome: Progressing     Problem: Rehabilitation (IRF) Plan of Care  Goal: Patient-Specific Goal (Individualized)  Outcome: Progressing     Problem: Rehabilitation (IRF) Plan of Care  Goal: Absence of New-Onset Illness or Injury  Outcome: Progressing     Problem: Infection  Goal: Absence of Infection Signs and Symptoms  Outcome: Progressing     Problem: Wound  Goal: Optimal Coping  Outcome: Progressing  Goal: Optimal Functional Ability  Outcome: Progressing  Goal: Absence of Infection Signs and Symptoms  Outcome: Progressing  Goal: Improved Oral Intake  Outcome: Progressing  Goal: Optimal Pain Control and Function  Outcome: Progressing  Goal: Skin Health and Integrity  Outcome: Progressing  Goal: Optimal Wound Healing  Outcome: Progressing     Problem: Fall Injury Risk  Goal: Absence of Fall and Fall-Related Injury  Outcome: Progressing     Problem: Mobility Impairment  Goal: Optimal Mobility  Outcome: Progressing     Problem: Pain Acute  Goal: Optimal Pain Control and Function  Outcome: Progressing     Problem: Skin Injury Risk Increased  Goal: Skin Health and Integrity  Outcome: Progressing

## 2025-03-23 NOTE — PLAN OF CARE
Problem: Rehabilitation (IRF) Plan of Care  Goal: Plan of Care Review  Outcome: Progressing     Problem: Rehabilitation (IRF) Plan of Care  Goal: Patient-Specific Goal (Individualized)  Outcome: Progressing     Problem: Rehabilitation (IRF) Plan of Care  Goal: Absence of New-Onset Illness or Injury  Outcome: Progressing     Problem: Rehabilitation (IRF) Plan of Care  Goal: Optimal Comfort and Wellbeing  Outcome: Progressing     Problem: Wound  Goal: Optimal Functional Ability  Outcome: Progressing     Problem: Wound  Goal: Absence of Infection Signs and Symptoms  Outcome: Progressing     Problem: Fall Injury Risk  Goal: Absence of Fall and Fall-Related Injury  Outcome: Progressing     Problem: Mobility Impairment  Goal: Optimal Mobility  Outcome: Progressing     Problem: Pain Acute  Goal: Optimal Pain Control and Function  Outcome: Progressing     Problem: Skin Injury Risk Increased  Goal: Skin Health and Integrity  Outcome: Progressing

## 2025-03-23 NOTE — PLAN OF CARE
Problem: Rehabilitation (IRF) Plan of Care  Goal: Plan of Care Review  3/23/2025 1542 by Maddie Garner RN  Outcome: Progressing  3/23/2025 1524 by Maddie Garner RN  Outcome: Progressing  Goal: Patient-Specific Goal (Individualized)  3/23/2025 1542 by Maddie Garner RN  Outcome: Progressing  3/23/2025 1524 by Maddie Garner RN  Outcome: Progressing  Goal: Absence of New-Onset Illness or Injury  3/23/2025 1542 by Maddie Garner RN  Outcome: Progressing  3/23/2025 1524 by Maddie Garner RN  Outcome: Progressing  Goal: Optimal Comfort and Wellbeing  3/23/2025 1542 by Maddie Garner RN  Outcome: Progressing  3/23/2025 1524 by Maddie Garner RN  Outcome: Progressing  Goal: Home and Community Transition Plan Established  3/23/2025 1542 by Maddie Garner RN  Outcome: Progressing  3/23/2025 1524 by Maddie Garner RN  Outcome: Progressing     Problem: Infection  Goal: Absence of Infection Signs and Symptoms  3/23/2025 1542 by Maddie Garner RN  Outcome: Progressing  3/23/2025 1524 by Maddie Garner RN  Outcome: Progressing     Problem: Wound  Goal: Optimal Coping  3/23/2025 1542 by Maddie Garner RN  Outcome: Progressing  3/23/2025 1524 by Maddie Garner RN  Outcome: Progressing  Goal: Optimal Functional Ability  3/23/2025 1542 by Maddie Garner RN  Outcome: Progressing  3/23/2025 1524 by Maddie Garner RN  Outcome: Progressing  Goal: Absence of Infection Signs and Symptoms  3/23/2025 1542 by Maddie Garner RN  Outcome: Progressing  3/23/2025 1524 by Maddie Garner RN  Outcome: Progressing  Goal: Improved Oral Intake  3/23/2025 1542 by Maddie Garner RN  Outcome: Progressing  3/23/2025 1524 by Maddie Garner RN  Outcome: Progressing  Goal: Optimal Pain Control and Function  3/23/2025 1542 by Maddie Garner RN  Outcome: Progressing  3/23/2025 1524 by Maddie Garner RN  Outcome: Progressing  Goal: Skin Health and Integrity  3/23/2025 1542 by Maddie Garner RN  Outcome:  Progressing  3/23/2025 1524 by Maddie Garner RN  Outcome: Progressing  Goal: Optimal Wound Healing  3/23/2025 1542 by Maddie Garner RN  Outcome: Progressing  3/23/2025 1524 by Maddie Garner RN  Outcome: Progressing     Problem: Fall Injury Risk  Goal: Absence of Fall and Fall-Related Injury  3/23/2025 1542 by Maddie Garner RN  Outcome: Progressing  3/23/2025 1524 by Maddie Garner RN  Outcome: Progressing     Problem: Mobility Impairment  Goal: Optimal Mobility  3/23/2025 1542 by Maddie Garner RN  Outcome: Progressing  3/23/2025 1524 by Maddie Garner RN  Outcome: Progressing     Problem: Pain Acute  Goal: Optimal Pain Control and Function  3/23/2025 1542 by Maddie Garner RN  Outcome: Progressing  3/23/2025 1524 by Maddie Garner RN  Outcome: Progressing     Problem: Skin Injury Risk Increased  Goal: Skin Health and Integrity  3/23/2025 1542 by Maddie Garner RN  Outcome: Progressing  3/23/2025 1524 by Maddie Garner RN  Outcome: Progressing   Will continue to monitor and will maintain a safe environment. Left lower leg is healed. Right leg wound bed is dry and is 50 % better than a week ago.

## 2025-03-24 PROCEDURE — 63600175 PHARM REV CODE 636 W HCPCS: Performed by: INTERNAL MEDICINE

## 2025-03-24 PROCEDURE — 97116 GAIT TRAINING THERAPY: CPT

## 2025-03-24 PROCEDURE — 25000242 PHARM REV CODE 250 ALT 637 W/ HCPCS: Performed by: INTERNAL MEDICINE

## 2025-03-24 PROCEDURE — 25000003 PHARM REV CODE 250

## 2025-03-24 PROCEDURE — 97110 THERAPEUTIC EXERCISES: CPT

## 2025-03-24 PROCEDURE — 99900035 HC TECH TIME PER 15 MIN (STAT)

## 2025-03-24 PROCEDURE — 11800000 HC REHAB PRIVATE ROOM

## 2025-03-24 PROCEDURE — 25000003 PHARM REV CODE 250: Performed by: INTERNAL MEDICINE

## 2025-03-24 PROCEDURE — 99900031 HC PATIENT EDUCATION (STAT)

## 2025-03-24 PROCEDURE — 25000003 PHARM REV CODE 250: Performed by: NURSE PRACTITIONER

## 2025-03-24 PROCEDURE — 94761 N-INVAS EAR/PLS OXIMETRY MLT: CPT

## 2025-03-24 PROCEDURE — 97530 THERAPEUTIC ACTIVITIES: CPT

## 2025-03-24 PROCEDURE — 97535 SELF CARE MNGMENT TRAINING: CPT

## 2025-03-24 PROCEDURE — 94640 AIRWAY INHALATION TREATMENT: CPT

## 2025-03-24 RX ADMIN — LEVALBUTEROL 1.25 MG: 1.25 SOLUTION, CONCENTRATE RESPIRATORY (INHALATION) at 07:03

## 2025-03-24 RX ADMIN — BUDESONIDE 0.25 MG: 0.25 INHALANT RESPIRATORY (INHALATION) at 07:03

## 2025-03-24 RX ADMIN — FINASTERIDE 5 MG: 5 TABLET, FILM COATED ORAL at 08:03

## 2025-03-24 RX ADMIN — GUAIFENESIN 1200 MG: 600 TABLET, EXTENDED RELEASE ORAL at 08:03

## 2025-03-24 RX ADMIN — METHOCARBAMOL 500 MG: 500 TABLET ORAL at 08:03

## 2025-03-24 RX ADMIN — Medication 6 MG: at 08:03

## 2025-03-24 RX ADMIN — MICONAZOLE NITRATE: 20 POWDER TOPICAL at 08:03

## 2025-03-24 RX ADMIN — CEFTRIAXONE SODIUM 2 G: 2 INJECTION, POWDER, FOR SOLUTION INTRAMUSCULAR; INTRAVENOUS at 02:03

## 2025-03-24 RX ADMIN — CARBOXYMETHYLCELLULOSE SODIUM 1 DROP: 5 SOLUTION/ DROPS OPHTHALMIC at 02:03

## 2025-03-24 RX ADMIN — DICLOFENAC SODIUM 4 G: 10 GEL TOPICAL at 08:03

## 2025-03-24 RX ADMIN — LEVALBUTEROL 1.25 MG: 1.25 SOLUTION, CONCENTRATE RESPIRATORY (INHALATION) at 02:03

## 2025-03-24 RX ADMIN — LEVALBUTEROL 1.25 MG: 1.25 SOLUTION, CONCENTRATE RESPIRATORY (INHALATION) at 06:03

## 2025-03-24 RX ADMIN — TRAMADOL HYDROCHLORIDE 50 MG: 50 TABLET, COATED ORAL at 10:03

## 2025-03-24 RX ADMIN — GENTIAN VIOLET 2% 0.5 ML: 20 LIQUID TOPICAL at 08:03

## 2025-03-24 RX ADMIN — FAMOTIDINE 20 MG: 20 TABLET, FILM COATED ORAL at 08:03

## 2025-03-24 RX ADMIN — ATORVASTATIN CALCIUM 20 MG: 20 TABLET, FILM COATED ORAL at 08:03

## 2025-03-24 RX ADMIN — CARBOXYMETHYLCELLULOSE SODIUM 1 DROP: 5 SOLUTION/ DROPS OPHTHALMIC at 08:03

## 2025-03-24 RX ADMIN — OXYBUTYNIN CHLORIDE 5 MG: 5 TABLET, EXTENDED RELEASE ORAL at 08:03

## 2025-03-24 RX ADMIN — METHOCARBAMOL 500 MG: 500 TABLET ORAL at 02:03

## 2025-03-24 RX ADMIN — ACETAMINOPHEN 650 MG: 325 TABLET ORAL at 08:03

## 2025-03-24 RX ADMIN — HYDROCODONE BITARTRATE AND ACETAMINOPHEN 1 TABLET: 5; 325 TABLET ORAL at 08:03

## 2025-03-24 RX ADMIN — TAMSULOSIN HYDROCHLORIDE 0.4 MG: 0.4 CAPSULE ORAL at 08:03

## 2025-03-24 NOTE — PT/OT/SLP PROGRESS
Physical Therapy Inpatient Rehab Treatment    Patient Name:  Tobi Liz Jr.   MRN:  [id number]    Recommendations:     Discharge Recommendations:  Low Intensity Therapy   Discharge Equipment Recommendations: walker, rolling   Barriers to discharge: None    Assessment:     Tobi Liz Jr. is a 89 y.o. male admitted with a medical diagnosis of Myopathy.  He presents with the following impairments/functional limitations:    weakness, impaired endurance, impaired self care skills, impaired functional mobility, gait instability, impaired balance, decreased coordination, decreased upper extremity function, decreased lower extremity function, decreased safety awareness, pain, decreased ROM, , impaired skin, edema, impaired cardiopulmonary response to activity, impaired joint extensibility,  and impaired muscle length.    Pt presents with continued c/o of lower back pain which is limiting functional mobility and participation in therapy. Pt performed supine therapeutic exercise to maintain comfort and tolerated well. Increase in pain with supine>sit requiring ModA for trunk management and Herman for sit>supine for LE management. Pt's family training is planned later today at 1300 (03/24/2025). Pt only completed 60 minutes this morning and the rest of time will be completed with PM.     Rehab Diagnosis:  Myopathy    Recent Surgery: * No surgery found *      General Precautions: Standard, fall     Orthopedic Precautions:N/A     Braces: N/A    Rehab Prognosis: Fair; patient would benefit from acute skilled PT services to address these deficits and reach maximum level of function.      History:     Past Medical History:   Diagnosis Date    Aortic valve stenosis     Arthritis     BPH (benign prostatic hyperplasia)     Carotid artery stenosis     Chronic diastolic heart failure     ETOH abuse     Exposure to COVID-19 virus 01/07/2022    Hyperchloremia     Kidney stones     Murmur     Polymyalgia rheumatica     PVD  "(peripheral vascular disease)     Renal artery stenosis        Past Surgical History:   Procedure Laterality Date    A-V CARDIAC PACEMAKER INSERTION N/A 10/6/2023    Procedure: INSERTION, CARDIAC PACEMAKER, DUAL CHAMBER;  Surgeon: Douglas Salguero MD;  Location: [facility] CATH;  Service: Cardiology;  Laterality: N/A;    ANGIOGRAM, CAROTID Left 8/25/2023    Procedure: ANGIOGRAM, CAROTID;  Surgeon: Nick Box MD;  Location: [facility] CATH;  Service: Cardiology;  Laterality: Left;    CATARACT EXTRACTION, BILATERAL      ECHOCARDIOGRAM,TRANSESOPHAGEAL N/A 10/3/2023    Procedure: Transesophageal echo (JOSE ARMANDO) intra-procedure log documentation;  Surgeon: Nick Box MD;  Location: [facility] OR;  Service: Cardiology;  Laterality: N/A;    HAND SURGERY      INSERTION OF STENT INTO PERIPHERAL VESSEL N/A 1/30/2025    Procedure: INSERTION, STENT, VESSEL, PERIPHERAL;  Surgeon: Harsha Salcedo MD;  Location: [facility] CATH;  Service: Cardiology;  Laterality: N/A;    PERCUTANEOUS TRANSCATHETER AORTIC VALVE REPLACEMENT (TAVR) Left 10/3/2023    Procedure: REPLACEMENT, AORTIC VALVE, PERCUTANEOUS, TRANSCATHETER;  Surgeon: Nick Box MD;  Location: [facility] OR;  Service: Cardiology;  Laterality: Left;    PERCUTANEOUS TRANSCATHETER AORTIC VALVE REPLACEMENT (TAVR) Left 10/3/2023    Procedure: REPLACEMENT, AORTIC VALVE, PERCUTANEOUS, TRANSCATHETER carotid access;  Surgeon: Jun Brito MD;  Location: [facility] OR;  Service: Cardiovascular;  Laterality: Left;    PERCUTANEOUS TRANSLUMINAL ANGIOPLASTY N/A 8/25/2023    Procedure: ANGIOPLASTY-PERCUTANEOUS TRANSLUMINAL (PTA);  Surgeon: Nick Box MD;  Location: [facility] CATH;  Service: Cardiology;  Laterality: N/A;    SHOULDER SURGERY Left     total shoulder replacement    WOUND EXPLORATION N/A 10/3/2023    Procedure: EXPLORATION, WOUND;  Surgeon: Jun Brito MD;  Location: [facility] OR;  Service: Cardiology;  Laterality: N/A;       Subjective     Chief Complaint: back pain, "I need to lay down." "     Respiratory Status: Room air    Patients cultural, spiritual, Worship conflicts given the current situation: no      Objective:     Communicated with pt prior to session.  Patient found supine with peripheral IV, willams catheter  upon PT entry to room.    Pt is Oriented to place and Oriented to situation and Alert.    Vitals   Vitals at Rest  /53 mmhg   HR 57 bpm   O2 Sat N/a    Pain 2/10 pain increasing to 5/10 at end of session       Functional Mobility:   Bed Mobility:  Supine to sit: partial/moderate assistance   Sit to supine: partial/moderate assistance   Transfers:     Sit to Stand: Set-up or clean-up assistance  with rolling walker  Chair to mat: Set-up or clean-up assistance  with  rolling walker  using  Step Transfer  Gait: Pt ambulates 120ft x 2 to/from dining room and 200ft during session with RW with Set-up or clean-up assistance .   Impairments contributing to gait deviations include impaired balance, impaired coordination, pain, decreased ROM, and decreased strength.  8 steps with bilateral handrails with Supervision or touching assistance      Current   Status  Discharge   Goal   Functional Area: Care Score:    Roll Left and Right   Independent   Sit to Lying 3 Independent   Lying to Sitting on Side of Bed 3 Independent   Sit to Stand 5 Set-up/clean-up   Chair/Bed-to-Chair Transfer 5 Independent   Car Transfer 10 Supervision or touching assistance   Walk 10 Feet 5 Independent   Walk 50 Feet with Two Turns 5 Set-up/clean-up   Walk 150 Feet 5 Set-up/clean-up   Walk 10 Feet Uneven Surface 7 Independent   1 Step (Curb) 7 Independent   4 Steps 4 Set-up/clean-up   12 Steps 7 Set-up/clean-up   Picking Up Object   Set-up/clean-up   Wheel 50 Feet with Two Turns 9 Not applicable   Wheel 150 Feet 9 Not applicable       Therapeutic Activities and Exercises:  Therapeutic exercise, bed mobility, transfer training, gait training, stair negotiation    Bilateral Supine TE 3 x 10 reps consisting of: Ankle  pumps, SLRs, heel slides, quad sets, dead bugs (LE portion only). Verbal/tactile cues provided as needed to prevent compensations and optimize technique/form.      Activity Tolerance: Fair and Poor    Patient left up in chair with all lines intact and call button in reach.    Education provided: roles and goals of PT/PTA, transfer training, gait training, stair training, assistive device, strengthening exercises, and fall prevention    Expected compliance: Moderate compliance    GOALS:   Multidisciplinary Problems       Physical Therapy Goals          Problem: Physical Therapy    Goal Priority Disciplines Outcome Interventions   Physical Therapy Goal     PT, PT/OT Progressing    Description: Physical Therapy Goal     PT, PT/OT Progressing    Description:   Short Term Goals: 03/20/2025  Long Term Goals: 3/28/25     Transfers Status     Sit to Stand  Short Term Goal: Complete sit to stand with Supervision or Set-up Assistance using Rolling Walker with no verbal cues (MET dated 3/17/2025)   Long Term Goal:  Complete sit to stand with Independent using Rolling Walker with no  verbal cues (Partially met, pt is set-up/clean-up with RW; dated 03/24/2025)     Stand Step  Short Term Goal: Complete stand step with  Set-Up Clean Up  using Rolling Walker with no verbal cues (On going dated 3/17/2025)   Long Term Goal:  Complete stand step with  Modified Independent  using Rolling Walker with no  verbal cues (Partially met, Pt is set-up/clean-up with RW dated 03/24/2025)     Supine <> Sit  Short Term Goal: Complete supine <> sit with Modified [place]  with  minimal verbal cues rails prn(On going dated 3/17/2025)     Long Term Goal: Complete supine <> sit with Independent  With no  verbal cues (Not Met, pt is ModA for supine>sit and Herman for sit>supine due to back pain dated 03/24/2025)       Balance/Coordination     Dynamic Sitting  Short Term Goal: Complete dynamic sitting with Set Up Assistance with minimal  verbal cues   minimal to moderate excursions(On going dated 3/17/2025)     Long Term Goal: Complete dynamic sitting with [place]  with  no verbal cues moderate to maximal excursions     Dynamic Standing  Short Term Goal: Complete dynamic standing with Stand-by Assistance  with minimal  verbal cues minimal to moderate excursions with RW(On going dated 3/17/2025)     Long Term Goal: Complete dynamic standing with Modified [place] with no  verbal cues and moderate to maximal excursions with RW     Endurance     Short Term Goal: (MET  dated 3/17/2025)     Physical Therapy: 2 times a day, 30-45 minutes  Rest periods: multiple  Sitting: 3 hours/day     Long Term Goal: (MET dated 2025)  Physical Therapy: 2 times a day, 45 minutes  Rest periods: minimal  Sittin-8 hours/day     Mobility/Gait  Short Term Goal: Will ambulate with Stand-by Assistance  Using Rolling Walker with no  verbal cues x 200 ft(MET dated 3/17/2025)     Long Term Goal: Will ambulate with Modified Independent  using Rolling Walker with no verbal cues x 500 ft (Partially Met, Pt is set-up/clean-up while ambulating with RW x 200ft)      Stairs Assistance  Short Term Goal: Patient will ascend/descend 4 stairs/steps using bilateral handrails reciprocal  steps with Stand-by Assistance and minimal verbal cues(MET  dated 3/17/2025)     Long Term Goal: Patient will ascend/descend 12 stairs/steps using no handrails  reciprocal steps with [place] and minimal verbal cues. (Partially Met, pt is supervision for stair negotiation ascending/descending x 8 steps with bilateral handrails and reciprocal steps 2025)            Ramp/Uneven 10 feet surface  Long  Term Goal; Patient will be able to navigate a 10 inch uneven surface with proper A.D. with  Modified [place]        2-6  inch curb  Long  Term Goal; Patient will be able to navigate a  2 to 6 inch curb with proper A.D. with independence     Picking up object   Long  Term Goal; Patient will be  able to  a small object from the floor  with proper A.D. with  independence     Car transfers  Long  Term Goal; Patient will be able to get in and out of a vehicle  with proper A.D. with  independence     Education  Long Term Goals:  Patient/family/caregivers trained on physical mobility and precautions with Supervision.     Patient/family/caregivers trained on safety awareness with Supervision.     Order and obtain all appropriate equipment.                           Plan:     During this hospitalization, patient to be seen 5 x/week to address the identified rehab impairments via gait training, therapeutic activities, therapeutic exercises, neuromuscular re-education and progress toward the following goals:    Plan of Care Expires:  03/28/25  PT Next Visit Date: 03/24/25  Plan of Care reviewed with: patient    Additional Information:         Time Tracking:     Therapy Time  PT Received On: 03/24/25  PT Start Time: 0925  PT Stop Time: 1025  PT Total Time (min): 60 min   PT Concurrent: 15  PT Group: 45    Billable Minutes: Gait Training 15, Therapeutic Activity 15, and Therapeutic Exercise 30    03/24/2025

## 2025-03-24 NOTE — SUBJECTIVE & OBJECTIVE
Interval History: pt seen and examined    Review of Systems   Constitutional:  Positive for activity change, appetite change and fatigue. Negative for chills and fever.   HENT:  Positive for postnasal drip, sinus pressure and sinus pain. Negative for ear pain, mouth sores, nosebleeds and sore throat.    Eyes:  Negative for visual disturbance.   Respiratory:  Positive for cough, shortness of breath and wheezing. Negative for apnea.    Cardiovascular:  Negative for chest pain, palpitations and leg swelling.   Gastrointestinal:  Positive for constipation. Negative for abdominal distention, abdominal pain, blood in stool, diarrhea, nausea and vomiting.   Endocrine: Positive for cold intolerance. Negative for polyphagia.   Musculoskeletal:  Positive for arthralgias and back pain.   Skin:  Negative for rash.   Neurological:  Positive for tremors and weakness. Negative for seizures, syncope, facial asymmetry and speech difficulty.   Hematological:  Negative for adenopathy. Bruises/bleeds easily.   Psychiatric/Behavioral:  Positive for confusion. Negative for agitation and hallucinations. The patient is nervous/anxious.      Objective:     Vital Signs (Most Recent):  Temp: 99.3 °F (37.4 °C) (03/24/25 0653)  Pulse: 70 (03/24/25 0714)  Resp: 18 (03/24/25 0714)  BP: (!) 161/74 (03/24/25 0653)  SpO2: (!) 91 % (03/24/25 0714) Vital Signs (24h Range):  Temp:  [98.2 °F (36.8 °C)-99.3 °F (37.4 °C)] 99.3 °F (37.4 °C)  Pulse:  [70-86] 70  Resp:  [16-18] 18  SpO2:  [91 %-98 %] 91 %  BP: (127-161)/(61-74) 161/74     Weight: 81.6 kg (179 lb 12.8 oz)  Body mass index is 26.55 kg/m².    Intake/Output Summary (Last 24 hours) at 3/24/2025 0933  Last data filed at 3/24/2025 0844  Gross per 24 hour   Intake 1720 ml   Output 1300 ml   Net 420 ml         Physical Exam  Vitals and nursing note reviewed.   Constitutional:       General: He is awake. He is not in acute distress.     Appearance: He is ill-appearing. He is not toxic-appearing.    HENT:      Head: Normocephalic and atraumatic.      Nose: Congestion and rhinorrhea present.      Mouth/Throat:      Mouth: Mucous membranes are moist.      Pharynx: Oropharynx is clear. No oropharyngeal exudate or posterior oropharyngeal erythema.   Eyes:      General: No scleral icterus.        Right eye: No discharge.         Left eye: No discharge.      Extraocular Movements: Extraocular movements intact.   Neck:      Thyroid: No thyroid mass or thyromegaly.      Vascular: No carotid bruit.      Meningeal: Brudzinski's sign and Kernig's sign absent.   Cardiovascular:      Rate and Rhythm: Normal rate and regular rhythm.      Chest Wall: PMI is not displaced. No thrill.      Heart sounds: Murmur heard.   Pulmonary:      Effort: No tachypnea, accessory muscle usage, prolonged expiration or respiratory distress.      Breath sounds: No decreased air movement. Rhonchi and rales present. No wheezing.   Abdominal:      General: Bowel sounds are normal. There is no distension.      Palpations: Abdomen is soft. There is no hepatomegaly or splenomegaly.      Tenderness: There is no abdominal tenderness. There is no guarding or rebound.   Musculoskeletal:      Cervical back: Neck supple. No rigidity. No muscular tenderness.      Right lower leg: No edema.      Left lower leg: No edema.   Lymphadenopathy:      Cervical: No cervical adenopathy.   Skin:     General: Skin is warm.      Capillary Refill: Capillary refill takes less than 2 seconds.      Coloration: Skin is not cyanotic, jaundiced or pale.      Findings: No erythema or petechiae.   Neurological:      Mental Status: He is alert and oriented to person, place, and time.      Cranial Nerves: No cranial nerve deficit, dysarthria or facial asymmetry.      Motor: Weakness present. No tremor.      Gait: Gait abnormal.   Psychiatric:         Attention and Perception: He does not perceive auditory hallucinations.         Mood and Affect: Mood is anxious. Mood is not  "depressed. Affect is not flat.         Speech: Speech is not rapid and pressured or slurred.         Behavior: Behavior normal. Behavior is not agitated, aggressive or combative.         Thought Content: Thought content normal. Thought content is not paranoid or delusional.         Cognition and Memory: Cognition is impaired.               Recent Labs   Lab 03/20/25  0559 03/23/25  0556   WBC 7.14 7.73   HGB 11.0* 10.8*   HCT 33.7* 33.9*   MCV 98 99*   RBC 3.44* 3.41*   MCH 32.0* 31.7   MCHC 32.6 31.9*   RDW 15.3* 15.3*    181   MPV 9.6 8.9*   GRAN 76.5*  5.5  --    LYMPH 12.3*  0.9* 0.62*  8.0*   MONO 6.9  0.5 7.5  0.58   EOSINOPHIL 2.9  --    BASOPHIL 0.1 0.1  0.01       Recent Labs   Lab 03/20/25  0559 03/23/25  0556    136   K 4.1 4.3    104   CO2 26 27   BUN 24 18   CREATININE 0.9 0.9   GLU 88  --    CALCIUM 7.8* 8.3*   PROT 5.6*  --    ALBUMIN 2.8* 2.9*   ALKPHOS 48* 55   BILITOT 0.5 0.7   ALT 26 18   AST 19 20   ANIONGAP 4* 5*   MG 2.0 1.9       No results for input(s): "COLORU", "APPEARANCEUA", "PHUR", "SPECGRAV", "PROTEINUA", "GLUCUA", "KETONESU", "BILIRUBINUA", "OCCULTUA", "UROBILINOGEN", "NITRITE", "LEUKOCYTESUR", "RBCUA", "WBCUA", "BACTERIA", "SQUAMEPITHEL", "HYALINECASTS", "GRANULARCAST", "MICROCMT" in the last 168 hours.    Invalid input(s): "SPECIMENU", "AMORPHOUSU"    No results for input(s): "PH", "PCO2", "PO2", "HCO3", "POCSATURATED", "BE" in the last 168 hours.    No results for input(s): "TROPONINI", "CPK", "CPKMB" in the last 168 hours.    No results for input(s): "PT", "INR", "APTT" in the last 168 hours.    Lab Results   Component Value Date    HGBA1C 5.8 (H) 11/11/2024       No results for input(s): "TSH", "T8HHWEK", "I9IAHPG", "THYROIDAB", "FREET4" in the last 168 hours.    Microbiology Results (last 7 days)       Procedure Component Value Units Date/Time    Culture, Respiratory with Gram Stain [[id number]] Collected: 03/22/25 0828    Order Status: Completed " Specimen: Respiratory from Sputum, Expectorated Updated: 03/24/25 0848     Respiratory Culture Normal respiratory micheline    Culture, Respiratory with Gram Stain [[id number]] Collected: 03/22/25 0800    Order Status: Sent Specimen: Sputum, Expectorated             X-Ray Hip 2 or 3 views Right with Pelvis when performed  Result Date: 3/21/2025  EXAMINATION: XR HIP WITH PELVIS WHEN PERFORMED 2 OR 3 VIEWS RIGHT CLINICAL HISTORY: Right hip pain; COMPARISON: 03/04/2025 FINDINGS: The right femoroacetabular joint is intact and exhibits mild degenerative change.  No fracture identified.  Vascular calcifications noted.     No acute finding. Electronically signed by: Gerry Banuelos MD Date:    03/21/2025 Time:    14:40    X-Ray Lumbar Spine 2 Or 3 Views  Result Date: 3/21/2025  EXAMINATION: XR LUMBAR SPINE 2 OR 3 VIEWS CLINICAL HISTORY: Low back pain; COMPARISON: 12/19/2020 FINDINGS: There is a grade 1 anterolisthesis of L4 over L5, unchanged.  A mild compression deformity of the L3 body is similar to the previous exam.  A moderate compression deformity of the L1 body is new from the previous study and age indeterminate.  There is mild multilevel disc space loss and anterior osteophyte formation as well as multilevel facet arthrosis.  Abdominal aorta is diffusely calcified.     Moderate age-indeterminate compression deformity of the L1 body, new from the previous exam. Mild chronic compression deformity of the L3 body. Multilevel lumbar spondylosis. Electronically signed by: Gerry Banuelos MD Date:    03/21/2025 Time:    14:38    X-Ray Chest AP Portable  Result Date: 3/14/2025  EXAMINATION: XR CHEST AP PORTABLE CLINICAL HISTORY: cough; COMPARISON: Chest x-ray 02/26/2024. FINDINGS: Prominent AP cardiac silhouette.  Aortic valve stent graft and left sided cardiac pacemaker present.  Lungs clear.  No pleural fluid.     No acute finding. Electronically signed by: Roderick Gustafson MD Date:    03/14/2025 Time:    10:33    X-Ray Femur Ap/Lat  Right  Result Date: 3/9/2025  EXAMINATION: XR FEMUR 2 VIEW RIGHT CLINICAL HISTORY: Pain, unspecified FINDINGS: No right femur shaft fracture or suspicious bone lesion detected.  Arterial calcifications.  Degenerative changes within the hip and knee     As above Electronically signed by: Yasmine Flores MD Date:    03/09/2025 Time:    13:50    X-Ray Chest 1 View  Result Date: 3/9/2025  EXAMINATION: XR CHEST 1 VIEW CLINICAL HISTORY: Anemia, unspecified COMPARISON: 02/26/2024 FINDINGS: Clear lungs.  No signs of CHF.  Left pacemaker.  Prior TAVR     No acute findings Electronically signed by: Yasmine Flores MD Date:    03/09/2025 Time:    13:49    CT Abdomen Pelvis With IV Contrast NO Oral Contrast  Result Date: 3/9/2025  EXAMINATION: CT ABDOMEN PELVIS WITH IV CONTRAST CLINICAL HISTORY: Anemia; CT/Cardiac Nuclear exams in prior 12 months: 1 TECHNIQUE: CT abdomen and pelvis with IV contrast.  Coronal reformats prepared.  Iterative reconstruction utilized. COMPARISON: CT without IV contrast of 03/04/2025 FINDINGS: Thickened, inflamed appearing proximal duodenum suggests duodenitis/peptic ulcer disease.  No free air. Catheter within an empty diffusely thickened urinary bladder.  Mildly enlarged prostate gland.  Segmental mild left hydroureter with areas of uroepithelial thickening suggest urinary tract infection.  Bilateral renal stones.  No hydronephrosis.  No ureteral stones. Unremarkable liver, adrenals, spleen and pancreas.  No bowel obstruction.  No free air.  Extensive aortoiliac system atherosclerotic plaque.  Prior TAVR.     1. Thickened inflamed appearing proximal duodenum suggests duodenitis/peptic ulcer disease.  No evidence of perforation. 2. Diffuse thick-walled urinary bladder, possibly chronic outlet obstruction and/or cystitis.  Left uroepithelial thickening suggest urinary tract infection 3. Bilateral nephrolithiasis 4. Otherwise, please see above Electronically signed by: Yasmine Flores MD  Date:    03/09/2025 Time:    10:14    CT Hip Without Contrast Right  Result Date: 3/4/2025  EXAMINATION: CT HIP WITHOUT CONTRAST RIGHT CLINICAL HISTORY: Hip pain, stress fracture suspected, neg xray; TECHNIQUE: Axial CT images were obtained. Iterative reconstruction technique was used.  CT/cardiac nuclear exam/s in prior 12 months: 1. COMPARISON: CT abdomen pelvis without IV contrast 03/04/2025. FINDINGS: No right hip fracture or dislocation.  Mild joint space narrowing right hip joint with hypertrophic change.  No soft tissue abnormality.     Mild osteoarthrosis right hip.  No fracture. Electronically signed by: Luis Enrique Baig MD Date:    03/04/2025 Time:    14:07    CT Abdomen Pelvis  Without Contrast  Result Date: 3/4/2025  EXAMINATION: CT ABDOMEN PELVIS WITHOUT CONTRAST CLINICAL HISTORY: Flank pain, kidney stone suspected;right flank and lower back pain; TECHNIQUE: Axial CT images were obtained. Iterative reconstruction technique was used. CT/cardiac nuclear exam/s in prior 12 months: 1. COMPARISON: None. FINDINGS: No hepatic abnormality.  No gallstones.  Spleen, pancreas, adrenal glands demonstrate no abnormality.  5 mm stone lower pole right kidney.  No right ureteral stone or hydronephrosis.  Multiple left renal stones measuring 2 mm each.  No left ureteral stone or hydronephrosis.  No gross gastric abnormality.  Small hiatal hernia.  No dilated bowel.  Moderate amount of stool in the colon and rectum.  The appendix is normal.  There is no free fluid or free air.  There is diffuse urinary bladder wall thickening with a Bolton catheter in place.  The prostate is enlarged.  There is atherosclerotic disease of the abdominal aorta.  No aneurysm.  No lymph node enlargement.  Visualized lung bases are clear.  Chronic compression fracture of L3, stable from CT of 12/19/2020.     1. Bilateral nephrolithiasis without hydronephrosis. 2. Evidence of constipation.  No bowel dilatation. 3. No acute findings.  Electronically signed by: Luis Enrique Baig MD Date:    03/04/2025 Time:    11:33    X-Ray Hip 2 or 3 views Right with Pelvis when performed  Result Date: 3/4/2025  EXAMINATION: XR HIP WITH PELVIS WHEN PERFORMED 2 OR 3 VIEWS RIGHT CLINICAL HISTORY: Unspecified fall, initial encounter COMPARISON: None FINDINGS: Right hip radiographs, two views, demonstrate no fracture or dislocation.  No focal soft tissue abnormality.     No fracture. Electronically signed by: Luis Enrique Baig MD Date:    03/04/2025 Time:    11:29

## 2025-03-24 NOTE — PLAN OF CARE
Spoke to patient's son, Mateo about discharge planning. Patient to be discharged tomorrow 3/25 to [facility]. Spoke to Ann Marie with [facility] who states they can provide daily wound care for 2 weeks and then family will need to assist in wound care. Mr. Galindo also requesting patient does not continue wound care clinic as he would rather patient just has home health. Orders for home health obtained and submitted to [facility].

## 2025-03-24 NOTE — PLAN OF CARE
Problem: Rehabilitation ([facility]) Plan of Care  Goal: Plan of Care Review  Outcome: Progressing     Problem: Rehabilitation ([facility]) Plan of Care  Goal: Patient-Specific Goal (Individualized)  Outcome: Progressing     Problem: Rehabilitation ([facility]) Plan of Care  Goal: Absence of New-Onset Illness or Injury  Outcome: Progressing     Problem: Infection  Goal: Absence of Infection Signs and Symptoms  Outcome: Progressing     Problem: Wound  Goal: Absence of Infection Signs and Symptoms  Outcome: Progressing     Problem: Fall Injury Risk  Goal: Absence of Fall and Fall-Related Injury  Outcome: Progressing     Problem: Skin Injury Risk Increased  Goal: Skin Health and Integrity  Outcome: Progressing

## 2025-03-24 NOTE — PT/OT/SLP PROGRESS
Physical Therapy Inpatient Rehab Treatment    Patient Name:  Tobi Liz Jr.   MRN:  [id number]    Recommendations:     Discharge Recommendations:  Low Intensity Therapy   Discharge Equipment Recommendations: walker, rolling   Barriers to discharge: None    Assessment:     Tobi Liz Jr. is a 89 y.o. male admitted with a medical diagnosis of Myopathy.  He presents with the following impairments/functional limitations:    weakness, impaired endurance, impaired self care skills, impaired functional mobility, gait instability, impaired balance, decreased coordination, decreased upper extremity function, decreased lower extremity function, decreased safety awareness, pain, decreased ROM, , impaired skin, edema, impaired cardiopulmonary response to activity, impaired joint extensibility,  and impaired muscle length.     Family training completed with family (daughters, son and wife)   addressing  safety concerns, questions and concerns addressed. DME needs (RW)  , follow up therapy (home health P.T.)  and the need for supervision to  insure safe mobility within the home  assistance were discussed at length. Family was able to  observe and perform hands-on treatment including gait with RW,  car transfers, sit <> stand with UE support, stand step transfers, stair training, curb and ramp negotiation, and HEP.  Patient and family expressed understanding and all are pleased with patient's progress in therapy .Reinforced the importance of continuing to encourage the patient to walk and not use the wheelchair within the assisted living facility as long as he has set up supervision. Family also reports that patient baker snot sleep on a bed, he sleeps on the recliner lift chair, advised not to use the lift component of the chair to prevent decline in function.      Rehab Diagnosis:  Myopathy     Recent Surgery: * No surgery found *      General Precautions: Standard, fall     Orthopedic Precautions:N/A     Braces:  N/A    Rehab Prognosis: Fair; patient would benefit from acute skilled PT services to address these deficits and reach maximum level of function.      History:     Past Medical History:   Diagnosis Date    Aortic valve stenosis     Arthritis     BPH (benign prostatic hyperplasia)     Carotid artery stenosis     Chronic diastolic heart failure     ETOH abuse     Exposure to COVID-19 virus 01/07/2022    Hyperchloremia     Kidney stones     Murmur     Polymyalgia rheumatica     PVD (peripheral vascular disease)     Renal artery stenosis        Past Surgical History:   Procedure Laterality Date    A-V CARDIAC PACEMAKER INSERTION N/A 10/6/2023    Procedure: INSERTION, CARDIAC PACEMAKER, DUAL CHAMBER;  Surgeon: Douglas Salguero MD;  Location: [facility] CATH;  Service: Cardiology;  Laterality: N/A;    ANGIOGRAM, CAROTID Left 8/25/2023    Procedure: ANGIOGRAM, CAROTID;  Surgeon: Nick Box MD;  Location: [facility] CATH;  Service: Cardiology;  Laterality: Left;    CATARACT EXTRACTION, BILATERAL      ECHOCARDIOGRAM,TRANSESOPHAGEAL N/A 10/3/2023    Procedure: Transesophageal echo (JOSE ARMANDO) intra-procedure log documentation;  Surgeon: Nick Box MD;  Location: [facility] OR;  Service: Cardiology;  Laterality: N/A;    HAND SURGERY      INSERTION OF STENT INTO PERIPHERAL VESSEL N/A 1/30/2025    Procedure: INSERTION, STENT, VESSEL, PERIPHERAL;  Surgeon: Harsha Salcedo MD;  Location: [facility] CATH;  Service: Cardiology;  Laterality: N/A;    PERCUTANEOUS TRANSCATHETER AORTIC VALVE REPLACEMENT (TAVR) Left 10/3/2023    Procedure: REPLACEMENT, AORTIC VALVE, PERCUTANEOUS, TRANSCATHETER;  Surgeon: Nick Box MD;  Location: [facility] OR;  Service: Cardiology;  Laterality: Left;    PERCUTANEOUS TRANSCATHETER AORTIC VALVE REPLACEMENT (TAVR) Left 10/3/2023    Procedure: REPLACEMENT, AORTIC VALVE, PERCUTANEOUS, TRANSCATHETER carotid access;  Surgeon: Jun Brito MD;  Location: [facility] OR;  Service: Cardiovascular;  Laterality: Left;    PERCUTANEOUS  TRANSLUMINAL ANGIOPLASTY N/A 8/25/2023    Procedure: ANGIOPLASTY-PERCUTANEOUS TRANSLUMINAL (PTA);  Surgeon: Nick Box MD;  Location: [facility] CATH;  Service: Cardiology;  Laterality: N/A;    SHOULDER SURGERY Left     total shoulder replacement    WOUND EXPLORATION N/A 10/3/2023    Procedure: EXPLORATION, WOUND;  Surgeon: Jun Brito MD;  Location: [facility] OR;  Service: Cardiology;  Laterality: N/A;       Subjective     Chief Complaint: Back pain     Respiratory Status: Room air    Patients cultural, spiritual, Mormon conflicts given the current situation: no      Objective:     Communicated with nurse, patient, and  family  prior to session.  Patient found up in chair with peripheral IV, willams catheter  upon PT entry to room.    Pt is Oriented x3, Oriented to place, and Oriented to situation and Alert and Cooperative.      Pain Back        Functional Mobility:   Transfers:     Sit to Stand: Set-up or clean-up assistance  with rolling walker  Chair to mat: Set-up or clean-up assistance  with  rolling walker  using  Step Transfer  Car Transfer: Supervision or touching assistance  with  rolling walker  using  Step Transfer  Gait: Pt ambulates 400 feet  and 150 feet  with RW with Set-up or clean-up assistance .   4 steps (6 inch)  stairs with bilateral handrails with Supervision or touching assistance   2 inch and 4 inch  curb with rolling walker with Supervision or touching assistance   Ambulate 10 feet on uneven surfaces/ramps with rolling walker with Supervision or touching assistance    object from ground in standing position with reacher with rolling walker with Set-up or clean-up assistance      Current   Status  Discharge   Goal   Functional Area: Care Score:    Roll Left and Right 3 Independent   Sit to Lying 3 Independent   Lying to Sitting on Side of Bed 3 Independent   Sit to Stand 5 Set-up/clean-up   Chair/Bed-to-Chair Transfer 5 Independent   Car Transfer 4 Supervision or touching  assistance   Walk 10 Feet 5 Independent   Walk 50 Feet with Two Turns 5 Set-up/clean-up   Walk 150 Feet 5 Set-up/clean-up   Walk 10 Feet Uneven Surface 4 Independent   1 Step (Curb) 4 Independent   4 Steps 4 Set-up/clean-up   12 Steps 7 Set-up/clean-up   Picking Up Object 5 Set-up/clean-up   Wheel 50 Feet with Two Turns 9 Not applicable   Wheel 150 Feet 9 Not applicable       Therapeutic Activities and Exercises:  Family training   Gait training with RW on flat level surface   Stair training with both side rails, non-reciprocal steps   Curb negotiation with RW  Ramp negotiation with RW  Picking up a small object from the floor with RW and reacher   Sit <> stand with both UE support  Stand step transfer with a RW   Car transfers     Activity Tolerance: Fair    Patient left up in chair with call button in reach, nurse  notified, and family and OT  present.    Education provided: roles and goals of PT/PTA, transfer training, gait training, stair training, balance training, safety awareness, body mechanics, assistive device, fall prevention, and family training     Expected compliance: Moderate compliance    GOALS:   Multidisciplinary Problems       Physical Therapy Goals          Problem: Physical Therapy    Goal Priority Disciplines Outcome Interventions   Physical Therapy Goal     PT, PT/OT Adequate for Care Transition    Description: Physical Therapy Goal     PT, PT/OT Progressing    Description:   Short Term Goals: 03/20/2025  Long Term Goals: 3/28/25     Transfers Status     Sit to Stand  Short Term Goal: Complete sit to stand with Supervision or Set-up Assistance using Rolling Walker with no verbal cues (MET dated 3/17/2025)   Long Term Goal:  Complete sit to stand with Independent using Rolling Walker with no  verbal cues (Partially met, pt is set-up/clean-up with RW; dated 03/24/2025)     Stand Step  Short Term Goal: Complete stand step with  Set-Up Clean Up  using Rolling Walker with no verbal cues (MET  dated  3/24/2025)   Long Term Goal:  Complete stand step with  Modified Independent  using Rolling Walker with no  verbal cues (Partially met, Pt is set-up/clean-up with RW dated 2025)     Supine <> Sit  Short Term Goal: Complete supine <> sit with Modified [place]  with  minimal verbal cues rails prn (NOT MET dated 3/24/25, patient required min assist of 1 due to back pain; patient sleeps on a recliner chair.)     Long Term Goal: Complete supine <> sit with Independent  With no  verbal cues (Not Met, pt is ModA for supine>sit and Herman for sit>supine due to back pain dated 2025)       Balance/Coordination     Dynamic Sitting  Short Term Goal: Complete dynamic sitting with Set Up Assistance with minimal  verbal cues  minimal to moderate excursions(MET dated 3/24/2025)     Long Term Goal: Complete dynamic sitting with [place]  with  no verbal cues moderate to maximal excursions(MET dated 3/24/2025)      Dynamic Standing  Short Term Goal: Complete dynamic standing with Stand-by Assistance  with minimal  verbal cues minimal to moderate excursions with RW (MET dated 3/24/2025)     Long Term Goal: Complete dynamic standing with Modified [place] with no  verbal cues and moderate to maximal excursions with RW(NOT MET dated 3/24/2025)      Endurance     Short Term Goal: (MET  dated 3/17/2025)     Physical Therapy: 2 times a day, 30-45 minutes  Rest periods: multiple  Sitting: 3 hours/day     Long Term Goal: (MET dated 2025)  Physical Therapy: 2 times a day, 45 minutes  Rest periods: minimal  Sittin-8 hours/day     Mobility/Gait  Short Term Goal: Will ambulate with Stand-by Assistance  Using Rolling Walker with no  verbal cues x 200 ft(MET dated 3/17/2025)     Long Term Goal: Will ambulate with Modified Independent  using Rolling Walker with no verbal cues x 500 ft (Partially Met, Pt is set-up/clean-up while ambulating with RW x 200ft)      Stairs Assistance  Short Term Goal: Patient will  ascend/descend 4 stairs/steps using bilateral handrails reciprocal  steps with Stand-by Assistance and minimal verbal cues(MET  dated 3/17/2025)     Long Term Goal: Patient will ascend/descend 12 stairs/steps using no handrails  reciprocal steps with [place] and minimal verbal cues. (Partially Met, pt is supervision for stair negotiation ascending/descending x 8 steps with bilateral handrails and reciprocal steps 03/24/2025)            Ramp/Uneven 10 feet surface  Long  Term Goal; Patient will be able to navigate a 10 inch uneven surface with proper A.D. with  Modified [place](NOT MET dated 3/24/2025)         2-6  inch curb  Long  Term Goal; Patient will be able to navigate a  2 to 6 inch curb with proper A.D. with independence(NOT MET dated 3/24/2025)        Picking up object   Long  Term Goal; Patient will be able to  a small object from the floor  with proper A.D. with  independence(NOT MET dated 3/24/2025)        Car transfers  Long  Term Goal; Patient will be able to get in and out of a vehicle  with proper A.D. with  independence(NOT MET dated 3/24/2025)        Education  Long Term Goals:  Patient/family/caregivers trained on physical mobility and precautions with Supervision.(MET dated 3/24/2025)        Patient/family/caregivers trained on safety awareness with Supervision.(MET dated 3/24/2025)      Order and obtain all appropriate equipment.(MET dated 3/24/2025)                            Plan:     During this hospitalization, patient to be seen 5 x/week to address the identified rehab impairments via gait training, therapeutic activities, therapeutic exercises, neuromuscular re-education and progress toward the following goals:    Plan of Care Expires:  03/28/25  PT Next Visit Date: 03/24/25  Plan of Care reviewed with: patient, spouse, son, daughter, family    Additional Information:         Time Tracking:     Therapy Time  PT Received On: 03/24/25  PT Start Time: 1300  PT Stop Time:  1345  PT Total Time (min): 45 min   PT Individual: 45      Billable Minutes: Gait Training 15 and Therapeutic Activity 30    03/24/2025

## 2025-03-24 NOTE — PLAN OF CARE
03/24/25 0832   Post-Acute Status   Post-Acute Authorization Placement;Home Health;HME   Post-Acute Placement Status Set-up Complete/Auth obtained   HME Status Referrals Sent   Home Health Status Referrals Sent   Hospital Resources/Appts/Education Provided Appointments scheduled and added to AVS   Discharge Delays None known at this time   Discharge Plan   Discharge Plan A Home Health;Assisted Living   Discharge Plan B Home Health;Assisted Living       Met with patient and family to review discharge recommendation of Assisted living and home health and is agreeable to plan.  Patient/family provided list of facilities in-netowrk with patient's payor plan. Providers that are owned, operated, or affilitated with [facility] are included on the list.  Notified that referral sent to below listed facilities from in-network list based on proximity to home/family support.    Home Health  [facility]  2.  3.  4.  5. (Can senf more than 5)  Patient/family instructed to identify preference.  Preferred Facility: (If more than 1, listed in order of descending preference).  [facility]    Assisted Living  [facility]      If an additional preferred facility not listed above is identified, additional referral to be sent. If above facilities unable to accept, will send additional referrals to in-network providers.     CM spoke to family and family is inquiring about daily wound care needs. CM informed them that I would contact [facility] and inquire if they can provide wound care daily for patient. CM did speak to Ann Marie with [facility] who states they will provide daily wound care for 2 weeks and after family will have to assist with wound care. KRISTI did give this information to patient's son Mateo and son in law.    Addendum @ 0913: Contacted [facility] and spoke to Nahed about new referral for RW. Per Nahed, they have received the referral and RW to be delivered to hospital today 3/24.

## 2025-03-24 NOTE — PLAN OF CARE
Problem: Physical Therapy  Goal: Physical Therapy Goal  Description: Physical Therapy Goal     PT, PT/OT Progressing    Description:   Short Term Goals: 03/20/2025  Long Term Goals: 3/28/25     Transfers Status     Sit to Stand  Short Term Goal: Complete sit to stand with Supervision or Set-up Assistance using Rolling Walker with no verbal cues (MET dated 3/17/2025)   Long Term Goal:  Complete sit to stand with Independent using Rolling Walker with no  verbal cues (Partially met, pt is set-up/clean-up with RW; dated 03/24/2025)     Stand Step  Short Term Goal: Complete stand step with  Set-Up Clean Up  using Rolling Walker with no verbal cues (MET  dated 3/24/2025)   Long Term Goal:  Complete stand step with  Modified Independent  using Rolling Walker with no  verbal cues (Partially met, Pt is set-up/clean-up with RW dated 03/24/2025)     Supine <> Sit  Short Term Goal: Complete supine <> sit with Modified [place]  with  minimal verbal cues rails prn (NOT MET dated 3/24/25, patient required min assist of 1 due to back pain; patient sleeps on a recliner chair.)     Long Term Goal: Complete supine <> sit with Independent  With no  verbal cues (Not Met, pt is ModA for supine>sit and Herman for sit>supine due to back pain dated 03/24/2025)       Balance/Coordination     Dynamic Sitting  Short Term Goal: Complete dynamic sitting with Set Up Assistance with minimal  verbal cues  minimal to moderate excursions(MET dated 3/24/2025)     Long Term Goal: Complete dynamic sitting with [place]  with  no verbal cues moderate to maximal excursions(MET dated 3/24/2025)      Dynamic Standing  Short Term Goal: Complete dynamic standing with Stand-by Assistance  with minimal  verbal cues minimal to moderate excursions with RW (MET dated 3/24/2025)     Long Term Goal: Complete dynamic standing with Modified [place] with no  verbal cues and moderate to maximal excursions with RW(NOT MET dated 3/24/2025)      Endurance      Short Term Goal: (MET  dated 3/17/2025)     Physical Therapy: 2 times a day, 30-45 minutes  Rest periods: multiple  Sitting: 3 hours/day     Long Term Goal: (MET dated 2025)  Physical Therapy: 2 times a day, 45 minutes  Rest periods: minimal  Sittin-8 hours/day     Mobility/Gait  Short Term Goal: Will ambulate with Stand-by Assistance  Using Rolling Walker with no  verbal cues x 200 ft(MET dated 3/17/2025)     Long Term Goal: Will ambulate with Modified Independent  using Rolling Walker with no verbal cues x 500 ft (Partially Met, Pt is set-up/clean-up while ambulating with RW x 200ft)      Stairs Assistance  Short Term Goal: Patient will ascend/descend 4 stairs/steps using bilateral handrails reciprocal  steps with Stand-by Assistance and minimal verbal cues(MET  dated 3/17/2025)     Long Term Goal: Patient will ascend/descend 12 stairs/steps using no handrails  reciprocal steps with [place] and minimal verbal cues. (Partially Met, pt is supervision for stair negotiation ascending/descending x 8 steps with bilateral handrails and reciprocal steps 2025)            Ramp/Uneven 10 feet surface  Long  Term Goal; Patient will be able to navigate a 10 inch uneven surface with proper A.D. with  Modified [place](NOT MET dated 3/24/2025)         2-6  inch curb  Long  Term Goal; Patient will be able to navigate a  2 to 6 inch curb with proper A.D. with independence(NOT MET dated 3/24/2025)        Picking up object   Long  Term Goal; Patient will be able to  a small object from the floor  with proper A.D. with  independence(NOT MET dated 3/24/2025)        Car transfers  Long  Term Goal; Patient will be able to get in and out of a vehicle  with proper A.D. with  independence(NOT MET dated 3/24/2025)        Education  Long Term Goals:  Patient/family/caregivers trained on physical mobility and precautions with Supervision.(MET dated 3/24/2025)        Patient/family/caregivers trained on safety  awareness with Supervision.(MET dated 3/24/2025)      Order and obtain all appropriate equipment.(MET dated 3/24/2025)       3/24/2025 1449 by Aga Munguia, PT  Outcome: Adequate for Care Transition, goals partially met, back pain limits bed mobility.  Family reports that patient sleeps on a recliner chair at home.

## 2025-03-24 NOTE — PT/OT/SLP PROGRESS
Occupational Therapy Inpatient Rehab Treatment    Name: Tobi Liz Jr.  MRN: [id number]    Assessment:  Tobi Liz Jr. is a 89 y.o. male admitted with a medical diagnosis of Myopathy.  He presents with the following impairments/functional limitations:  weakness, impaired endurance, impaired self care skills, impaired functional mobility, gait instability, impaired balance, impaired cognition, decreased coordination, decreased upper extremity function, decreased lower extremity function, decreased safety awareness, pain, decreased ROM, impaired fine motor, impaired skin, edema, impaired cardiopulmonary response to activity, impaired joint extensibility, impaired muscle length.    Pt demonstrated progress with functional goals as noted below by continued changes in functional QI scores in which he achieved 7/7 STG's and 2/8 LTG's. Pt now supervision to modified independent with ADL's including functional mobility with extra time and use of assistive devices. All questions and concerns of patient and his family were addressed prior to conclusion of session. Pt now ready to transition to assisted living facility with spouse.     General Precautions: Standard, fall     Orthopedic Precautions:N/A     Braces: N/A    Rehab Prognosis: Good and Fair; patient would benefit from acute skilled OT services to address these deficits and reach maximum level of function.      History:     Past Medical History:   Diagnosis Date    Aortic valve stenosis     Arthritis     BPH (benign prostatic hyperplasia)     Carotid artery stenosis     Chronic diastolic heart failure     ETOH abuse     Exposure to COVID-19 virus 01/07/2022    Hyperchloremia     Kidney stones     Murmur     Polymyalgia rheumatica     PVD (peripheral vascular disease)     Renal artery stenosis        Past Surgical History:   Procedure Laterality Date    A-V CARDIAC PACEMAKER INSERTION N/A 10/6/2023    Procedure: INSERTION, CARDIAC PACEMAKER, DUAL CHAMBER;   Surgeon: Douglas Salguero MD;  Location: [facility] CATH;  Service: Cardiology;  Laterality: N/A;    ANGIOGRAM, CAROTID Left 8/25/2023    Procedure: ANGIOGRAM, CAROTID;  Surgeon: Nick Box MD;  Location: [facility] CATH;  Service: Cardiology;  Laterality: Left;    CATARACT EXTRACTION, BILATERAL      ECHOCARDIOGRAM,TRANSESOPHAGEAL N/A 10/3/2023    Procedure: Transesophageal echo (JOSE ARMANDO) intra-procedure log documentation;  Surgeon: Nick Box MD;  Location: [facility] OR;  Service: Cardiology;  Laterality: N/A;    HAND SURGERY      INSERTION OF STENT INTO PERIPHERAL VESSEL N/A 1/30/2025    Procedure: INSERTION, STENT, VESSEL, PERIPHERAL;  Surgeon: Harsha Salcedo MD;  Location: [facility] CATH;  Service: Cardiology;  Laterality: N/A;    PERCUTANEOUS TRANSCATHETER AORTIC VALVE REPLACEMENT (TAVR) Left 10/3/2023    Procedure: REPLACEMENT, AORTIC VALVE, PERCUTANEOUS, TRANSCATHETER;  Surgeon: Nick Box MD;  Location: [facility] OR;  Service: Cardiology;  Laterality: Left;    PERCUTANEOUS TRANSCATHETER AORTIC VALVE REPLACEMENT (TAVR) Left 10/3/2023    Procedure: REPLACEMENT, AORTIC VALVE, PERCUTANEOUS, TRANSCATHETER carotid access;  Surgeon: Jun Brito MD;  Location: [facility] OR;  Service: Cardiovascular;  Laterality: Left;    PERCUTANEOUS TRANSLUMINAL ANGIOPLASTY N/A 8/25/2023    Procedure: ANGIOPLASTY-PERCUTANEOUS TRANSLUMINAL (PTA);  Surgeon: Nick Box MD;  Location: [facility] CATH;  Service: Cardiology;  Laterality: N/A;    SHOULDER SURGERY Left     total shoulder replacement    WOUND EXPLORATION N/A 10/3/2023    Procedure: EXPLORATION, WOUND;  Surgeon: Jun Brito MD;  Location: [facility] OR;  Service: Cardiology;  Laterality: N/A;       Subjective     Orientation: Oriented x4     Chief Complaint: weakness and decreased independence      Patient/Family Comments/goals: Pt would like to regain independence with ADL's including functional mobility in order to return home safely with spouse.      Respiratory Status: Room  air     Patients cultural, spiritual, [employer] conflicts given the current situation: no       Objective:     Patient found up in chair with willams catheter, peripheral IV  upon OT entry to room.    Mobility   Patient completed:  Sit to Stand Transfer with setup assistance with rolling walker  Bed to Chair Transfer using Step Transfer technique with setup assistance with rolling walker  Toilet Transfer Step Transfer technique with setup assistance with  grab bars  Shower Transfer Step Transfer technique with supervision with grab bars and shower chair.    Functional Mobility  Pt ambulated greater than 200' between surfaces requiring supervision utilizing RW.     ADLs   Current Status   Eating 6   Oral Hygiene 6   Shower, Bathe Self 4   Upper Body Dressing 5   Lower Body Dressing 5   Toileting Hygiene 5   Toilet Transfer 5   Putting On, Taking Off Footwear 5     Limiting Factors for ADLs: endurance, limited ROM, balance, weakness, coordination, cognition, safety awareness, and pain     Additional Treatments: Pt and his family participated in extensive patient / family training addressing current functional status, fall prevention, use of adaptive equipment / assistive devices, activity tolerance, home exercise program, continuation of daily routine following discharge, and ADL's with use of compensatory strategies while addressing all questions and concerns of patient or family on this date prior to conclusion of session.     LifeStyle Change and Education:     Patient left up in chair with  nurse notified.     Education provided: Roles and goals of OT, ADLs, transfer training, bed mobility, body mechanics, assistive device, safety precautions, fall prevention, equipment recommendations, and home safety    Short Term Goals to be met by: 03/20/25      Patient will increase functional independence with ADLs by performing:     UE Dressing with Set-up Assistance. MET  LE Dressing with Minimal Assistance. MET  Grooming  while standing at sink with Set-up Assistance. MET  Toileting from toilet with Contact Guard Assistance for hygiene and clothing management. MET  Bathing from  shower chair/bench with Minimal Assistance. MET  Toilet transfer to toilet with Stand-by Assistance. MET  Increased functional strength to 4 to 4+/5 for bilateral UE's. MET     Long Term Goals to be met by: 03/27/25      Patient will increase functional independence with ADLs by performing:     Feeding with [place]. MET  UE Dressing with Modified [place]. NOT MET  LE Dressing with Modified [place]. NOT MET  Grooming while standing at sink with Modified [place]. MET  Toileting from toilet with Modified [place] for hygiene and clothing management. NOT MET  Bathing from  shower chair/bench with Modified [place]. NOT MET  Toilet transfer to toilet with Modified [place]. NOT MET  Increased functional strength to 4+ to 5/5 for bilateral UE's. NOT MET      Time Tracking     OT Received On: 03/24/25  Time In 1345     Time Out 1515  Total Time 90 min  Therapy Time: OT Individual: 60  OT Concurrent: 30  Missed Time:    Missed Time Reason:      Billable Minutes: Self Care/Home Management 40, Therapeutic Activity 20, and Therapeutic Exercise 30    03/24/2025

## 2025-03-24 NOTE — PLAN OF CARE
Problem: Physical Therapy  Goal: Physical Therapy Goal  Description: Physical Therapy Goal     PT, PT/OT Progressing    Description:   Short Term Goals: 03/20/2025  Long Term Goals: 3/28/25     Transfers Status     Sit to Stand  Short Term Goal: Complete sit to stand with Supervision or Set-up Assistance using Rolling Walker with no verbal cues (MET dated 3/17/2025)   Long Term Goal:  Complete sit to stand with Independent using Rolling Walker with no  verbal cues (Partially met, pt is set-up/clean-up with RW; dated 03/24/2025)     Stand Step  Short Term Goal: Complete stand step with  Set-Up Clean Up  using Rolling Walker with no verbal cues (On going dated 3/17/2025)   Long Term Goal:  Complete stand step with  Modified Independent  using Rolling Walker with no  verbal cues (Partially met, Pt is set-up/clean-up with RW dated 03/24/2025)     Supine <> Sit  Short Term Goal: Complete supine <> sit with Modified [place]  with  minimal verbal cues rails prn(On going dated 3/17/2025)     Long Term Goal: Complete supine <> sit with Independent  With no  verbal cues (Not Met, pt is ModA for supine>sit and Herman for sit>supine due to back pain dated 03/24/2025)       Balance/Coordination     Dynamic Sitting  Short Term Goal: Complete dynamic sitting with Set Up Assistance with minimal  verbal cues  minimal to moderate excursions(On going dated 3/17/2025)     Long Term Goal: Complete dynamic sitting with [place]  with  no verbal cues moderate to maximal excursions     Dynamic Standing  Short Term Goal: Complete dynamic standing with Stand-by Assistance  with minimal  verbal cues minimal to moderate excursions with RW(On going dated 3/17/2025)     Long Term Goal: Complete dynamic standing with Modified [place] with no  verbal cues and moderate to maximal excursions with RW     Endurance     Short Term Goal: (MET  dated 3/17/2025)     Physical Therapy: 2 times a day, 30-45 minutes  Rest periods:  multiple  Sitting: 3 hours/day     Long Term Goal: (MET dated 2025)  Physical Therapy: 2 times a day, 45 minutes  Rest periods: minimal  Sittin-8 hours/day     Mobility/Gait  Short Term Goal: Will ambulate with Stand-by Assistance  Using Rolling Walker with no  verbal cues x 200 ft(MET dated 3/17/2025)     Long Term Goal: Will ambulate with Modified Independent  using Rolling Walker with no verbal cues x 500 ft (Partially Met, Pt is set-up/clean-up while ambulating with RW x 200ft)      Stairs Assistance  Short Term Goal: Patient will ascend/descend 4 stairs/steps using bilateral handrails reciprocal  steps with Stand-by Assistance and minimal verbal cues(MET  dated 3/17/2025)     Long Term Goal: Patient will ascend/descend 12 stairs/steps using no handrails  reciprocal steps with [place] and minimal verbal cues. (Partially Met, pt is supervision for stair negotiation ascending/descending x 8 steps with bilateral handrails and reciprocal steps 2025)            Ramp/Uneven 10 feet surface  Long  Term Goal; Patient will be able to navigate a 10 inch uneven surface with proper A.D. with  Modified [place]        2-6  inch curb  Long  Term Goal; Patient will be able to navigate a  2 to 6 inch curb with proper A.D. with independence     Picking up object   Long  Term Goal; Patient will be able to  a small object from the floor  with proper A.D. with  independence     Car transfers  Long  Term Goal; Patient will be able to get in and out of a vehicle  with proper A.D. with  independence     Education  Long Term Goals:  Patient/family/caregivers trained on physical mobility and precautions with Supervision.     Patient/family/caregivers trained on safety awareness with Supervision.     Order and obtain all appropriate equipment.      Outcome: Progressing, Limited functional mobility due to back pain.  Family Training scheduled today at 1300.

## 2025-03-24 NOTE — PLAN OF CARE
Assessment: The patient was calm and approachable. The patient's mother was at bedside. Intervention:  engaged in active listening.  asked if they would like prayer and informed them chaplains are available 24/7. Outcome: They engaged in the conversation. Chaplains will remain available to offer spiritual and emotional support as needed. 05/03/21 0031   Encounter Summary   Services provided to: Patient   Referral/Consult From: [facility]   Support System Parent   Continue Visiting   (05/02/21)   Complexity of Encounter Moderate   Length of Encounter 15 minutes   Spiritual Assessment Completed Yes   Routine   Type Initial   Assessment Calm; Approachable   Intervention Active listening   Outcome Engaged in conversation No issues reported overnight.  Problem: Rehabilitation ([facility]) Plan of Care  Goal: Plan of Care Review  Outcome: Progressing  Goal: Patient-Specific Goal (Individualized)  Outcome: Progressing  Goal: Absence of New-Onset Illness or Injury  Outcome: Progressing  Goal: Optimal Comfort and Wellbeing  Outcome: Progressing  Goal: Home and Community Transition Plan Established  Outcome: Progressing     Problem: Infection  Goal: Absence of Infection Signs and Symptoms  Outcome: Progressing     Problem: Wound  Goal: Optimal Coping  Outcome: Progressing  Goal: Optimal Functional Ability  Outcome: Progressing  Goal: Absence of Infection Signs and Symptoms  Outcome: Progressing  Goal: Improved Oral Intake  Outcome: Progressing  Goal: Optimal Pain Control and Function  Outcome: Progressing  Goal: Skin Health and Integrity  Outcome: Progressing  Goal: Optimal Wound Healing  Outcome: Progressing     Problem: Fall Injury Risk  Goal: Absence of Fall and Fall-Related Injury  Outcome: Progressing     Problem: Mobility Impairment  Goal: Optimal Mobility  Outcome: Progressing     Problem: Pain Acute  Goal: Optimal Pain Control and Function  Outcome: Progressing     Problem: Skin Injury Risk Increased  Goal: Skin Health and Integrity  Outcome: Progressing

## 2025-03-24 NOTE — ASSESSMENT & PLAN NOTE
Anemia is likely due to chronic disease due to Chronic Kidney Disease. Most recent hemoglobin and hematocrit are listed below.  Recent Labs     03/23/25  0556   HGB 10.8*   HCT 33.9*     Plan  - Monitor serial CBC: q72hrs  - Transfuse PRBC if patient becomes hemodynamically unstable, symptomatic or H/H drops below 7/21.  - Patient has received 3 units of PRBCs  - Patient's anemia is currently stable

## 2025-03-24 NOTE — PROGRESS NOTES
[facility] Medicine  Progress Note    Patient Name: Tobi Liz Jr.  MRN: [id number]  Patient Class: IP- Rehab   Admission Date: 3/13/2025  Length of Stay: 11 days  Attending Physician: Dave Reyes III, MD  Primary Care Provider: Luis Enrique Guzman Jr., MD        Subjective     Principal Problem:Myopathy        HPI:  Tobi Liz Jr. is a 89 y.o. male with a PMHx of has a past medical history of Aortic valve stenosis, Arthritis, BPH (benign prostatic hyperplasia), Carotid artery stenosis, Chronic diastolic heart failure, ETOH abuse, Exposure to COVID-19 virus (01/07/2022), Hyperchloremia, Kidney stones, Murmur, Polymyalgia rheumatica, PVD (peripheral vascular disease), and Renal artery stenosis., presents complaining of shaking after standing from a seated position that started yesterday, he was aware and talking during the episode so not likely a seizure. Labs show he was severely anemic in the ED with Hgb of 5. He had hematuria as well on UA along with bacteria. Ucx pending. His family states that he has a history of anemia in the past. He otherwise feels fine. He received 3 units of pRBCs in the ED with appropriate response.     Patient overall significantly improved from admission. H&H were stable yesterday. Awaiting updated CBC this morning. Otherwise renal function stable and no new electrolyte abnormalities noted. Patient accepted to inpatient rehab and we will discharging.    Patient is seen and examined after admission and patient is complaining of respiratory congestion wheezing and dyspnea.  Patient's currently on Bactrim and sensitivities noted for his urinary tract infection as well as the other antibiotics that maybe appropriate we will switch to Rocephin as we will get better respiratory coverage as the patient does seem to be battling a bronchitis and possibly a pneumonia.  Repeat chest x-ray pending.  Patient is motivated to improve he is very eager to  return home patient's significantly decline in his overall functional state and unable to complete ADLs safely at this time.  He has developed a myopathy as well as some confusion and encephalopathy agree with inpatient rehab patient meets current Medicare criteria.       Patient requires acute inpatient rehab admission with 24-hour nursing and active physician oversight to monitor and manage acute medical comorbid conditions, labs, pain, and functional deficits. Patient/family will also require teaching and integration of improving functional skills into daily living. Patient will also require an individualized, interdisciplinary approach to their care, receiving PT, OT services 3 hours per day, 5 days per week. Required care cannot be provided at a lower level of care. Patient is anticipated to require approximately 10-14 days LOS with expected discharge home  with  services.    Impairment group (IGC):   Neuromuscular Disorders 03.8 Etiologic diagnosis/description:   G72.9: Myopathy  D64.9: Anemia      Date of onset:  3/8/2025 Date of surgery: N/A   Allergies: Patient has no known allergies.   Comorbid condition:   HTN, HLD, BPH, CAD, Valvular heart disease, CHF class II, Occlusion of right iliac artery   Medical/functional conditions requiring inpatient rehabilitation:      This patient requires medical management/24-hour nursing of complex co morbidities HTN, HLD, BPH, CAD, Valvular heart disease, CHF class II, Occlusion of right iliac artery, labs, medications (see medications list), pain, sleep hygiene, anticoagulation, nutrition, hydration, neurological, pulmonary, cardiac status, and preventive healthcare.     This patient requires intense therapy and an integrated, interdisciplinary approach to address safety, impaired mobility, impaired ADLs (dressing, toileting, grooming, showering), judgment, and memory, communication, pulmonary insufficiency, bowel/bladder problems,preventive healthcare, medication  management, integration of functional skills into daily living, and home caregiver support and training.    Risk for medical/clinical complications:      This patient is at risk for the following complications: DVT/PE, pneumonia, malnutrition, neurological decline, respiratory insufficiency, worsening activity intolerance, complications from anticoagulation, skin breakdown, inadequate sleep, and constipation.        Overview/Hospital Course:  3/16 AA, weekend crosscover, no acute events reported, blood pressure stable, continue PT OT efforts    3/17 DL:  Patient doing well this morning.  He is sitting up in wheelchair in the dining room and is actively participating in therapy.  No acute events reported.  No acute distress noted.  Mild hyponatremia noted.  Patient encouraged to increase p.o. fluid intake.  Renal function stable.  Vital signs stable.  Patient reports therapy going well.  Pain.  Encouraged patient to continue therapy efforts.    3/18 FM:  Patient's respiratory symptoms are much improved but still having occasional cough and wheeze.  We will add low-dose budesonide.  Patient's cognition is improved Bolton catheter in place patient's pain improved.  Patient completing time needs in therapy and overall doing well    3/19 DL:  Patient doing well this morning.  He is in dining room and is actively participating in therapy.  Patient denies pain.  Vital signs stable.  Patient continues to work well with therapy.  Encouraged patient to continue therapy efforts.    3/20 DL:  Patient doing well this morning.  He is in the dining room and is actively participating in therapy.  Patient is without complaint this morning.  Labs and vital signs stable.  Patient reports therapy going well.  He states he feels he is making functional gains.  Encouraged patient to continue therapy efforts.    3/21 DL:  Patient reports increased right sided hip/lower back pain that is a barrier to progress.  Medications adjusted.  We  will obtain x-rays of L-spine and right hip and pelvis.  Patient educated on importance of notifying staff of pain and requesting medications as needed.  Encouraged patient to continue therapy efforts.    3/22 SH: weekend crossover- VSS, patient having trouble in participating in PT de to pain imaging revealed compression deformity of the L1 body, new from the previous exam, mild chronic compression deformity of the L3 body and multilevel lumbar spondylosis. Pain control on board    3/24 DL:  Patient doing well this morning.  He is ambulating in [place] with therapy.  Patient reports pain well-controlled with current regimen.  No acute distress noted.  Vital signs stable.  Patient continues to work well with therapy and remains highly motivated.  Patient states he has looking forward to discharge in the morning.  Encouraged patient to continue therapy efforts.    Interval History: pt seen and examined    Review of Systems   Constitutional:  Positive for activity change, appetite change and fatigue. Negative for chills and fever.   HENT:  Positive for postnasal drip, sinus pressure and sinus pain. Negative for ear pain, mouth sores, nosebleeds and sore throat.    Eyes:  Negative for visual disturbance.   Respiratory:  Positive for cough, shortness of breath and wheezing. Negative for apnea.    Cardiovascular:  Negative for chest pain, palpitations and leg swelling.   Gastrointestinal:  Positive for constipation. Negative for abdominal distention, abdominal pain, blood in stool, diarrhea, nausea and vomiting.   Endocrine: Positive for cold intolerance. Negative for polyphagia.   Musculoskeletal:  Positive for arthralgias and back pain.   Skin:  Negative for rash.   Neurological:  Positive for tremors and weakness. Negative for seizures, syncope, facial asymmetry and speech difficulty.   Hematological:  Negative for adenopathy. Bruises/bleeds easily.   Psychiatric/Behavioral:  Positive for confusion. Negative for  agitation and hallucinations. The patient is nervous/anxious.      Objective:     Vital Signs (Most Recent):  Temp: 99.3 °F (37.4 °C) (03/24/25 0653)  Pulse: 70 (03/24/25 0714)  Resp: 18 (03/24/25 0714)  BP: (!) 161/74 (03/24/25 0653)  SpO2: (!) 91 % (03/24/25 0714) Vital Signs (24h Range):  Temp:  [98.2 °F (36.8 °C)-99.3 °F (37.4 °C)] 99.3 °F (37.4 °C)  Pulse:  [70-86] 70  Resp:  [16-18] 18  SpO2:  [91 %-98 %] 91 %  BP: (127-161)/(61-74) 161/74     Weight: 81.6 kg (179 lb 12.8 oz)  Body mass index is 26.55 kg/m².    Intake/Output Summary (Last 24 hours) at 3/24/2025 0949  Last data filed at 3/24/2025 0844  Gross per 24 hour   Intake 1720 ml   Output 1300 ml   Net 420 ml         Physical Exam  Vitals and nursing note reviewed.   Constitutional:       General: He is awake. He is not in acute distress.     Appearance: He is ill-appearing. He is not toxic-appearing.   HENT:      Head: Normocephalic and atraumatic.      Nose: Congestion and rhinorrhea present.      Mouth/Throat:      Mouth: Mucous membranes are moist.      Pharynx: Oropharynx is clear. No oropharyngeal exudate or posterior oropharyngeal erythema.   Eyes:      General: No scleral icterus.        Right eye: No discharge.         Left eye: No discharge.      Extraocular Movements: Extraocular movements intact.   Neck:      Thyroid: No thyroid mass or thyromegaly.      Vascular: No carotid bruit.      Meningeal: Brudzinski's sign and Kernig's sign absent.   Cardiovascular:      Rate and Rhythm: Normal rate and regular rhythm.      Chest Wall: PMI is not displaced. No thrill.      Heart sounds: Murmur heard.   Pulmonary:      Effort: No tachypnea, accessory muscle usage, prolonged expiration or respiratory distress.      Breath sounds: No decreased air movement. Rhonchi and rales present. No wheezing.   Abdominal:      General: Bowel sounds are normal. There is no distension.      Palpations: Abdomen is soft. There is no hepatomegaly or splenomegaly.       "Tenderness: There is no abdominal tenderness. There is no guarding or rebound.   Musculoskeletal:      Cervical back: Neck supple. No rigidity. No muscular tenderness.      Right lower leg: No edema.      Left lower leg: No edema.   Lymphadenopathy:      Cervical: No cervical adenopathy.   Skin:     General: Skin is warm.      Capillary Refill: Capillary refill takes less than 2 seconds.      Coloration: Skin is not cyanotic, jaundiced or pale.      Findings: No erythema or petechiae.   Neurological:      Mental Status: He is alert and oriented to person, place, and time.      Cranial Nerves: No cranial nerve deficit, dysarthria or facial asymmetry.      Motor: Weakness present. No tremor.      Gait: Gait abnormal.   Psychiatric:         Attention and Perception: He does not perceive auditory hallucinations.         Mood and Affect: Mood is anxious. Mood is not depressed. Affect is not flat.         Speech: Speech is not rapid and pressured or slurred.         Behavior: Behavior normal. Behavior is not agitated, aggressive or combative.         Thought Content: Thought content normal. Thought content is not paranoid or delusional.         Cognition and Memory: Cognition is impaired.               Recent Labs   Lab 03/20/25  0559 03/23/25  0556   WBC 7.14 7.73   HGB 11.0* 10.8*   HCT 33.7* 33.9*   MCV 98 99*   RBC 3.44* 3.41*   MCH 32.0* 31.7   MCHC 32.6 31.9*   RDW 15.3* 15.3*    181   MPV 9.6 8.9*   GRAN 76.5*  5.5  --    LYMPH 12.3*  0.9* 0.62*  8.0*   MONO 6.9  0.5 7.5  0.58   EOSINOPHIL 2.9  --    BASOPHIL 0.1 0.1  0.01       Recent Labs   Lab 03/20/25  0559 03/23/25  0556    136   K 4.1 4.3    104   CO2 26 27   BUN 24 18   CREATININE 0.9 0.9   GLU 88  --    CALCIUM 7.8* 8.3*   PROT 5.6*  --    ALBUMIN 2.8* 2.9*   ALKPHOS 48* 55   BILITOT 0.5 0.7   ALT 26 18   AST 19 20   ANIONGAP 4* 5*   MG 2.0 1.9       No results for input(s): "COLORU", "APPEARANCEUA", "PHUR", "SPECGRAV", "PROTEINUA", " ""GLUCUA", "KETONESU", "BILIRUBINUA", "OCCULTUA", "UROBILINOGEN", "NITRITE", "LEUKOCYTESUR", "RBCUA", "WBCUA", "BACTERIA", "SQUAMEPITHEL", "HYALINECASTS", "GRANULARCAST", "MICROCMT" in the last 168 hours.    Invalid input(s): "SPECIMENU", "AMORPHOUSU"    No results for input(s): "PH", "PCO2", "PO2", "HCO3", "POCSATURATED", "BE" in the last 168 hours.    No results for input(s): "TROPONINI", "CPK", "CPKMB" in the last 168 hours.    No results for input(s): "PT", "INR", "APTT" in the last 168 hours.    Lab Results   Component Value Date    HGBA1C 5.8 (H) 11/11/2024       No results for input(s): "TSH", "M1LEUGW", "G9DUJIO", "THYROIDAB", "FREET4" in the last 168 hours.    Microbiology Results (last 7 days)       Procedure Component Value Units Date/Time    Culture, Respiratory with Gram Stain [[id number]] Collected: 03/22/25 0828    Order Status: Completed Specimen: Respiratory from Sputum, Expectorated Updated: 03/24/25 0848     Respiratory Culture Normal respiratory micheline    Culture, Respiratory with Gram Stain [[id number]] Collected: 03/22/25 0800    Order Status: Sent Specimen: Sputum, Expectorated             X-Ray Hip 2 or 3 views Right with Pelvis when performed  Result Date: 3/21/2025  EXAMINATION: XR HIP WITH PELVIS WHEN PERFORMED 2 OR 3 VIEWS RIGHT CLINICAL HISTORY: Right hip pain; COMPARISON: 03/04/2025 FINDINGS: The right femoroacetabular joint is intact and exhibits mild degenerative change.  No fracture identified.  Vascular calcifications noted.     No acute finding. Electronically signed by: Gerry Banuelos MD Date:    03/21/2025 Time:    14:40    X-Ray Lumbar Spine 2 Or 3 Views  Result Date: 3/21/2025  EXAMINATION: XR LUMBAR SPINE 2 OR 3 VIEWS CLINICAL HISTORY: Low back pain; COMPARISON: 12/19/2020 FINDINGS: There is a grade 1 anterolisthesis of L4 over L5, unchanged.  A mild compression deformity of the L3 body is similar to the previous exam.  A moderate compression deformity of the L1 body is new from the " previous study and age indeterminate.  There is mild multilevel disc space loss and anterior osteophyte formation as well as multilevel facet arthrosis.  Abdominal aorta is diffusely calcified.     Moderate age-indeterminate compression deformity of the L1 body, new from the previous exam. Mild chronic compression deformity of the L3 body. Multilevel lumbar spondylosis. Electronically signed by: Gerry Banuelos MD Date:    03/21/2025 Time:    14:38    X-Ray Chest AP Portable  Result Date: 3/14/2025  EXAMINATION: XR CHEST AP PORTABLE CLINICAL HISTORY: cough; COMPARISON: Chest x-ray 02/26/2024. FINDINGS: Prominent AP cardiac silhouette.  Aortic valve stent graft and left sided cardiac pacemaker present.  Lungs clear.  No pleural fluid.     No acute finding. Electronically signed by: Roderick Gustafson MD Date:    03/14/2025 Time:    10:33    X-Ray Femur Ap/Lat Right  Result Date: 3/9/2025  EXAMINATION: XR FEMUR 2 VIEW RIGHT CLINICAL HISTORY: Pain, unspecified FINDINGS: No right femur shaft fracture or suspicious bone lesion detected.  Arterial calcifications.  Degenerative changes within the hip and knee     As above Electronically signed by: Yasmine Flores MD Date:    03/09/2025 Time:    13:50    X-Ray Chest 1 View  Result Date: 3/9/2025  EXAMINATION: XR CHEST 1 VIEW CLINICAL HISTORY: Anemia, unspecified COMPARISON: 02/26/2024 FINDINGS: Clear lungs.  No signs of CHF.  Left pacemaker.  Prior TAVR     No acute findings Electronically signed by: Yasmine Flores MD Date:    03/09/2025 Time:    13:49    CT Abdomen Pelvis With IV Contrast NO Oral Contrast  Result Date: 3/9/2025  EXAMINATION: CT ABDOMEN PELVIS WITH IV CONTRAST CLINICAL HISTORY: Anemia; CT/Cardiac Nuclear exams in prior 12 months: 1 TECHNIQUE: CT abdomen and pelvis with IV contrast.  Coronal reformats prepared.  Iterative reconstruction utilized. COMPARISON: CT without IV contrast of 03/04/2025 FINDINGS: Thickened, inflamed appearing proximal duodenum suggests  duodenitis/peptic ulcer disease.  No free air. Catheter within an empty diffusely thickened urinary bladder.  Mildly enlarged prostate gland.  Segmental mild left hydroureter with areas of uroepithelial thickening suggest urinary tract infection.  Bilateral renal stones.  No hydronephrosis.  No ureteral stones. Unremarkable liver, adrenals, spleen and pancreas.  No bowel obstruction.  No free air.  Extensive aortoiliac system atherosclerotic plaque.  Prior TAVR.     1. Thickened inflamed appearing proximal duodenum suggests duodenitis/peptic ulcer disease.  No evidence of perforation. 2. Diffuse thick-walled urinary bladder, possibly chronic outlet obstruction and/or cystitis.  Left uroepithelial thickening suggest urinary tract infection 3. Bilateral nephrolithiasis 4. Otherwise, please see above Electronically signed by: Yasmine Flores MD Date:    03/09/2025 Time:    10:14    CT Hip Without Contrast Right  Result Date: 3/4/2025  EXAMINATION: CT HIP WITHOUT CONTRAST RIGHT CLINICAL HISTORY: Hip pain, stress fracture suspected, neg xray; TECHNIQUE: Axial CT images were obtained. Iterative reconstruction technique was used.  CT/cardiac nuclear exam/s in prior 12 months: 1. COMPARISON: CT abdomen pelvis without IV contrast 03/04/2025. FINDINGS: No right hip fracture or dislocation.  Mild joint space narrowing right hip joint with hypertrophic change.  No soft tissue abnormality.     Mild osteoarthrosis right hip.  No fracture. Electronically signed by: Luis Enrique Baig MD Date:    03/04/2025 Time:    14:07    CT Abdomen Pelvis  Without Contrast  Result Date: 3/4/2025  EXAMINATION: CT ABDOMEN PELVIS WITHOUT CONTRAST CLINICAL HISTORY: Flank pain, kidney stone suspected;right flank and lower back pain; TECHNIQUE: Axial CT images were obtained. Iterative reconstruction technique was used. CT/cardiac nuclear exam/s in prior 12 months: 1. COMPARISON: None. FINDINGS: No hepatic abnormality.  No gallstones.  Spleen, pancreas,  adrenal glands demonstrate no abnormality.  5 mm stone lower pole right kidney.  No right ureteral stone or hydronephrosis.  Multiple left renal stones measuring 2 mm each.  No left ureteral stone or hydronephrosis.  No gross gastric abnormality.  Small hiatal hernia.  No dilated bowel.  Moderate amount of stool in the colon and rectum.  The appendix is normal.  There is no free fluid or free air.  There is diffuse urinary bladder wall thickening with a Bolton catheter in place.  The prostate is enlarged.  There is atherosclerotic disease of the abdominal aorta.  No aneurysm.  No lymph node enlargement.  Visualized lung bases are clear.  Chronic compression fracture of L3, stable from CT of 12/19/2020.     1. Bilateral nephrolithiasis without hydronephrosis. 2. Evidence of constipation.  No bowel dilatation. 3. No acute findings. Electronically signed by: Luis Enrique Baig MD Date:    03/04/2025 Time:    11:33    X-Ray Hip 2 or 3 views Right with Pelvis when performed  Result Date: 3/4/2025  EXAMINATION: XR HIP WITH PELVIS WHEN PERFORMED 2 OR 3 VIEWS RIGHT CLINICAL HISTORY: Unspecified fall, initial encounter COMPARISON: None FINDINGS: Right hip radiographs, two views, demonstrate no fracture or dislocation.  No focal soft tissue abnormality.     No fracture. Electronically signed by: Luis Enrique Baig MD Date:    03/04/2025 Time:    11:29         Assessment & Plan  Myopathy  Continue PT/OT efforts.  Hypertension  Patient's blood pressure range in the last 24 hours was: BP  Min: 127/61  Max: 161/74.The patient's inpatient anti-hypertensive regimen is listed below:  Current Antihypertensives       Plan  - BP is controlled, no changes needed to their regimen  Hyperlipidemia  Recheck labs.    BPH (benign prostatic hyperplasia)  Bolton in place.    Atherosclerosis of native artery of both lower extremities with rest pain  Noted.  Continue current medications.    Congestive heart failure, NYHA class II  Patient has Combined  "Systolic and Diastolic heart failure that is Chronic. On presentation their CHF was well compensated. Most recent BNP and echo results are listed below.  No results for input(s): "BNP" in the last 72 hours.  Latest ECHO  No results found for this or any previous visit.    Current Heart Failure Medications       Plan  - Monitor strict I&Os and daily weights.    - Place on telemetry  - Low sodium diet      S/P TAVR (transcatheter aortic valve replacement)  Has done well post procedure.    Insomnia  Monitor, PRN meds.    Anemia  Anemia is likely due to chronic disease due to Chronic Kidney Disease. Most recent hemoglobin and hematocrit are listed below.  Recent Labs     03/23/25  0556   HGB 10.8*   HCT 33.9*     Plan  - Monitor serial CBC:  q72hrs  - Transfuse PRBC if patient becomes hemodynamically unstable, symptomatic or H/H drops below 7/21.  - Patient has received 3 units of PRBCs  - Patient's anemia is currently stable  Acute cystitis with hematuria  Abx changed 2nd dual resp infection.    Acute bronchitis  As above.    Urinary obstruction  Willams in place, plan on DC with willams.      VTE Risk Mitigation (From admission, onward)           Ordered     IP VTE HIGH RISK PATIENT  Once         03/13/25 1349     Place sequential compression device  Until discontinued         03/13/25 1349                    Discharge Planning   ADONAY:      Code Status: Full Code   Medical Readiness for Discharge Date:   Discharge Plan A: Home Health, Assisted Living   Discharge Delays: None known at this time            Please place Justification for ASMIA Lee NP  Department of Hospital Medicine   [facility] ([facility]    "

## 2025-03-24 NOTE — PLAN OF CARE
03/24/25 0805   Medicare Message   Important Message from Medicare regarding Discharge Appeal Rights Given to patient/caregiver;Explained to patient/caregiver;Signed/date by patient/caregiver   Date IMM was signed 03/24/25   Time IMM was signed [id number]

## 2025-03-24 NOTE — ASSESSMENT & PLAN NOTE
Patient's blood pressure range in the last 24 hours was: BP  Min: 127/61  Max: 161/74.The patient's inpatient anti-hypertensive regimen is listed below:  Current Antihypertensives       Plan  - BP is controlled, no changes needed to their regimen

## 2025-03-25 VITALS
WEIGHT: 179.81 LBS | DIASTOLIC BLOOD PRESSURE: 68 MMHG | HEART RATE: 71 BPM | RESPIRATION RATE: 18 BRPM | BODY MASS INDEX: 26.63 KG/M2 | TEMPERATURE: 97 F | OXYGEN SATURATION: 95 % | HEIGHT: 69 IN | SYSTOLIC BLOOD PRESSURE: 158 MMHG

## 2025-03-25 LAB — BACTERIA SPEC CULT: NORMAL

## 2025-03-25 PROCEDURE — 99900035 HC TECH TIME PER 15 MIN (STAT)

## 2025-03-25 PROCEDURE — 25000003 PHARM REV CODE 250: Performed by: INTERNAL MEDICINE

## 2025-03-25 PROCEDURE — 25000003 PHARM REV CODE 250

## 2025-03-25 PROCEDURE — 25000242 PHARM REV CODE 250 ALT 637 W/ HCPCS: Performed by: INTERNAL MEDICINE

## 2025-03-25 PROCEDURE — 99900031 HC PATIENT EDUCATION (STAT)

## 2025-03-25 PROCEDURE — 94761 N-INVAS EAR/PLS OXIMETRY MLT: CPT

## 2025-03-25 PROCEDURE — 25000003 PHARM REV CODE 250: Performed by: NURSE PRACTITIONER

## 2025-03-25 PROCEDURE — 94640 AIRWAY INHALATION TREATMENT: CPT

## 2025-03-25 RX ORDER — DICLOFENAC SODIUM 10 MG/G
4 GEL TOPICAL 3 TIMES DAILY PRN
Qty: 500 G | Refills: 1 | Status: SHIPPED | OUTPATIENT
Start: 2025-03-25

## 2025-03-25 RX ORDER — CARBOXYMETHYLCELLULOSE SODIUM 5 MG/ML
1 SOLUTION/ DROPS OPHTHALMIC EVERY 4 HOURS PRN
COMMUNITY
Start: 2025-03-25

## 2025-03-25 RX ORDER — ROSUVASTATIN CALCIUM 10 MG/1
10 TABLET, COATED ORAL DAILY
Qty: 90 TABLET | Refills: 0 | Status: SHIPPED | OUTPATIENT
Start: 2025-03-25

## 2025-03-25 RX ORDER — ACETAMINOPHEN 325 MG/1
650 TABLET ORAL EVERY 6 HOURS PRN
COMMUNITY
Start: 2025-03-25

## 2025-03-25 RX ORDER — METHOCARBAMOL 500 MG/1
500 TABLET, FILM COATED ORAL 3 TIMES DAILY PRN
Qty: 30 TABLET | Refills: 1 | Status: SHIPPED | OUTPATIENT
Start: 2025-03-25

## 2025-03-25 RX ORDER — HYDROCODONE BITARTRATE AND ACETAMINOPHEN 5; 325 MG/1; MG/1
1 TABLET ORAL 3 TIMES DAILY PRN
Qty: 21 TABLET | Refills: 0 | Status: SHIPPED | OUTPATIENT
Start: 2025-03-25 | End: 2025-04-01

## 2025-03-25 RX ORDER — LEVALBUTEROL TARTRATE 45 UG/1
2 AEROSOL, METERED ORAL EVERY 4 HOURS PRN
Qty: 15 G | Refills: 1 | Status: SHIPPED | OUTPATIENT
Start: 2025-03-25 | End: 2025-03-27 | Stop reason: ALTCHOICE

## 2025-03-25 RX ORDER — AMLODIPINE BESYLATE 5 MG/1
5 TABLET ORAL DAILY
Qty: 90 TABLET | Refills: 0 | Status: SHIPPED | OUTPATIENT
Start: 2025-03-25

## 2025-03-25 RX ORDER — FAMOTIDINE 20 MG/1
20 TABLET, FILM COATED ORAL DAILY
Qty: 30 TABLET | Refills: 1 | Status: SHIPPED | OUTPATIENT
Start: 2025-03-26

## 2025-03-25 RX ORDER — MICONAZOLE NITRATE 2 G/100G
POWDER TOPICAL 2 TIMES DAILY
Qty: 85 G | Refills: 1 | Status: SHIPPED | OUTPATIENT
Start: 2025-03-25

## 2025-03-25 RX ORDER — POLYETHYLENE GLYCOL 3350 17 G/17G
17 POWDER, FOR SOLUTION ORAL DAILY
Qty: 30 EACH | Refills: 1 | Status: SHIPPED | OUTPATIENT
Start: 2025-03-25

## 2025-03-25 RX ADMIN — GENTIAN VIOLET 2% 0.5 ML: 20 LIQUID TOPICAL at 08:03

## 2025-03-25 RX ADMIN — FINASTERIDE 5 MG: 5 TABLET, FILM COATED ORAL at 08:03

## 2025-03-25 RX ADMIN — DICLOFENAC SODIUM 4 G: 10 GEL TOPICAL at 08:03

## 2025-03-25 RX ADMIN — METHOCARBAMOL 500 MG: 500 TABLET ORAL at 08:03

## 2025-03-25 RX ADMIN — HYDROCODONE BITARTRATE AND ACETAMINOPHEN 1 TABLET: 5; 325 TABLET ORAL at 01:03

## 2025-03-25 RX ADMIN — BUDESONIDE 0.25 MG: 0.25 INHALANT RESPIRATORY (INHALATION) at 07:03

## 2025-03-25 RX ADMIN — OXYBUTYNIN CHLORIDE 5 MG: 5 TABLET, EXTENDED RELEASE ORAL at 08:03

## 2025-03-25 RX ADMIN — CARBOXYMETHYLCELLULOSE SODIUM 1 DROP: 5 SOLUTION/ DROPS OPHTHALMIC at 08:03

## 2025-03-25 RX ADMIN — LEVALBUTEROL 1.25 MG: 1.25 SOLUTION, CONCENTRATE RESPIRATORY (INHALATION) at 07:03

## 2025-03-25 RX ADMIN — GUAIFENESIN 1200 MG: 600 TABLET, EXTENDED RELEASE ORAL at 08:03

## 2025-03-25 RX ADMIN — TAMSULOSIN HYDROCHLORIDE 0.4 MG: 0.4 CAPSULE ORAL at 08:03

## 2025-03-25 RX ADMIN — MICONAZOLE NITRATE: 20 POWDER TOPICAL at 08:03

## 2025-03-25 RX ADMIN — FAMOTIDINE 20 MG: 20 TABLET, FILM COATED ORAL at 08:03

## 2025-03-25 RX ADMIN — ACETAMINOPHEN 650 MG: 325 TABLET ORAL at 07:03

## 2025-03-25 RX ADMIN — ATORVASTATIN CALCIUM 20 MG: 20 TABLET, FILM COATED ORAL at 08:03

## 2025-03-25 NOTE — PLAN OF CARE
Problem: Physical Therapy  Goal: Physical Therapy Goal  Description: Physical Therapy Goal     PT, PT/OT Progressing    Description:   Short Term Goals: 03/20/2025  Long Term Goals: 3/28/25     Transfers Status     Sit to Stand  Short Term Goal: Complete sit to stand with Supervision or Set-up Assistance using Rolling Walker with no verbal cues (MET dated 3/17/2025)   Long Term Goal:  Complete sit to stand with Independent using Rolling Walker with no  verbal cues (Partially met, pt is set-up/clean-up with RW; dated 03/24/2025)     Stand Step  Short Term Goal: Complete stand step with  Set-Up Clean Up  using Rolling Walker with no verbal cues (MET  dated 3/24/2025)   Long Term Goal:  Complete stand step with  Modified Independent  using Rolling Walker with no  verbal cues (Partially met, Pt is set-up/clean-up with RW dated 03/24/2025)     Supine <> Sit  Short Term Goal: Complete supine <> sit with Modified [place]  with  minimal verbal cues rails prn (NOT MET dated 3/24/25, patient required min assist of 1 due to back pain; patient sleeps on a recliner chair.)     Long Term Goal: Complete supine <> sit with Independent  With no  verbal cues (Not Met, pt is ModA for supine>sit and Herman for sit>supine due to back pain dated 03/24/2025)       Balance/Coordination     Dynamic Sitting  Short Term Goal: Complete dynamic sitting with Set Up Assistance with minimal  verbal cues  minimal to moderate excursions(MET dated 3/24/2025)     Long Term Goal: Complete dynamic sitting with [place]  with  no verbal cues moderate to maximal excursions(MET dated 3/24/2025)      Dynamic Standing  Short Term Goal: Complete dynamic standing with Stand-by Assistance  with minimal  verbal cues minimal to moderate excursions with RW (MET dated 3/24/2025)     Long Term Goal: Complete dynamic standing with Modified [place] with no  verbal cues and moderate to maximal excursions with RW(NOT MET dated 3/24/2025)      Endurance      Short Term Goal: (MET  dated 3/17/2025)     Physical Therapy: 2 times a day, 30-45 minutes  Rest periods: multiple  Sitting: 3 hours/day     Long Term Goal: (MET dated 2025)  Physical Therapy: 2 times a day, 45 minutes  Rest periods: minimal  Sittin-8 hours/day     Mobility/Gait  Short Term Goal: Will ambulate with Stand-by Assistance  Using Rolling Walker with no  verbal cues x 200 ft(MET dated 3/17/2025)     Long Term Goal: Will ambulate with Modified Independent  using Rolling Walker with no verbal cues x 500 ft (Partially Met, Pt is set-up/clean-up while ambulating with RW x 200ft)      Stairs Assistance  Short Term Goal: Patient will ascend/descend 4 stairs/steps using bilateral handrails reciprocal  steps with Stand-by Assistance and minimal verbal cues(MET  dated 3/17/2025)     Long Term Goal: Patient will ascend/descend 12 stairs/steps using no handrails  reciprocal steps with [place] and minimal verbal cues. (Partially Met, pt is supervision for stair negotiation ascending/descending x 8 steps with bilateral handrails and reciprocal steps 2025)            Ramp/Uneven 10 feet surface  Long  Term Goal; Patient will be able to navigate a 10 inch uneven surface with proper A.D. with  Modified [place](NOT MET dated 3/24/2025)         2-6  inch curb  Long  Term Goal; Patient will be able to navigate a  2 to 6 inch curb with proper A.D. with independence(NOT MET dated 3/24/2025)        Picking up object   Long  Term Goal; Patient will be able to  a small object from the floor  with proper A.D. with  independence(NOT MET dated 3/24/2025)        Car transfers  Long  Term Goal; Patient will be able to get in and out of a vehicle  with proper A.D. with  independence(NOT MET dated 3/24/2025)        Education  Long Term Goals:  Patient/family/caregivers trained on physical mobility and precautions with Supervision.(MET dated 3/24/2025)        Patient/family/caregivers trained on safety  awareness with Supervision.(MET dated 3/24/2025)      Order and obtain all appropriate equipment.(MET dated 3/24/2025)       Outcome: Adequate for Care Transition, goals partially met, back pain limits bed mobility.  Family reports that patient sleeps on a recliner chair at home.

## 2025-03-25 NOTE — PT/OT/SLP DISCHARGE
Occupational Therapy Discharge Summary    Tobi Liz Jr.  MRN: [id number]   Principal Problem: Myopathy      Patient Discharged from inpatient Occupational Therapy on 03/25/25.  Please refer to prior OT note dated 03/24/25 for functional status.    Assessment:     Pt was cooperative and motivated with minimal verbal encouragement while exhibiting positive affect during OT sessions including ADL training, Functional mobility training, Strengthening exercises, Energy conservation techniques, Endurance training, Coordination training, Balance training, Self care, Home management, Transfer training, Therapeutic exercise, Therapeutic activity, Cognitive training, Wheelchair mobility training, Community reintegration, and Patient/family education allowing him to progress with functional goals in which he achieved  7/7 STG's and 2/8 LTG's. Pt now supervision to modified independent with ADL's including functional mobility with extra time and use of assistive devices. All questions and concerns of patient and his family were addressed prior to conclusion of session. Pt now ready to transition to assisted living facility with spouse.     Objective:     GOALS:   Short Term Goals to be met by: 03/20/25      Patient will increase functional independence with ADLs by performing:     UE Dressing with Set-up Assistance. MET  LE Dressing with Minimal Assistance. MET  Grooming while standing at sink with Set-up Assistance. MET  Toileting from toilet with Contact Guard Assistance for hygiene and clothing management. MET  Bathing from  shower chair/bench with Minimal Assistance. MET  Toilet transfer to toilet with Stand-by Assistance. MET  Increased functional strength to 4 to 4+/5 for bilateral UE's. MET     Long Term Goals to be met by: 03/27/25      Patient will increase functional independence with ADLs by performing:     Feeding with [place]. MET  UE Dressing with Modified [place]. NOT MET  LE Dressing with  Modified [place]. NOT MET  Grooming while standing at sink with Modified [place]. MET  Toileting from toilet with Modified [place] for hygiene and clothing management. NOT MET  Bathing from  shower chair/bench with Modified [place]. NOT MET  Toilet transfer to toilet with Modified [place]. NOT MET  Increased functional strength to 4+ to 5/5 for bilateral UE's. NOT MET      Reasons for Discontinuation of Therapy Services  Satisfactory goal achievement.      Plan:     Patient Discharged to: Home with Home Health Service    3/25/2025

## 2025-03-25 NOTE — PT/OT/SLP DISCHARGE
Physical Therapy Discharge Summary    Name: Tobi Liz Jr.  MRN: [id number]   Principal Problem: Myopathy     Patient Discharged from in-patient rehab  Physical Therapy on 3/25/2025.  Please refer to prior PT note dated  3/24/2025  for functional status.     Assessment:     Goals partially met. Patient appropriate for care in another setting.He presents with the following impairments/functional limitations:    weakness, impaired endurance, impaired self care skills, impaired functional mobility, gait instability, impaired balance, decreased coordination, decreased upper extremity function, decreased lower extremity function, decreased safety awareness, pain, decreased ROM, , impaired skin, edema, impaired cardiopulmonary response to activity, impaired joint extensibility,  and impaired muscle length.     Family training done, DME ordered includes a RW. Reinforced the importance of continuing to encourage the patient to walk and not use the wheelchair within the assisted living facility as long as he has set up supervision. Family also reports that patient does not sleep on a bed, he sleeps on the recliner lift chair, advised not to use the lift component of the chair to prevent decline in function.      Objective:     GOALS:   Multidisciplinary Problems       Physical Therapy Goals          Problem: Physical Therapy    Goal Priority Disciplines Outcome Interventions   Physical Therapy Goal     PT, PT/OT Adequate for Care Transition    Description: Physical Therapy Goal     PT, PT/OT Progressing    Description:   Short Term Goals: 03/20/2025  Long Term Goals: 3/28/25     Transfers Status     Sit to Stand  Short Term Goal: Complete sit to stand with Supervision or Set-up Assistance using Rolling Walker with no verbal cues (MET dated 3/17/2025)   Long Term Goal:  Complete sit to stand with Independent using Rolling Walker with no  verbal cues (Partially met, pt is set-up/clean-up with RW; dated 03/24/2025)      Stand Step  Short Term Goal: Complete stand step with  Set-Up Clean Up  using Rolling Walker with no verbal cues (MET  dated 3/24/2025)   Long Term Goal:  Complete stand step with  Modified Independent  using Rolling Walker with no  verbal cues (Partially met, Pt is set-up/clean-up with RW dated 2025)     Supine <> Sit  Short Term Goal: Complete supine <> sit with Modified [place]  with  minimal verbal cues rails prn (NOT MET dated 3/24/25, patient required min assist of 1 due to back pain; patient sleeps on a recliner chair.)     Long Term Goal: Complete supine <> sit with Independent  With no  verbal cues (Not Met, pt is ModA for supine>sit and Herman for sit>supine due to back pain dated 2025)       Balance/Coordination     Dynamic Sitting  Short Term Goal: Complete dynamic sitting with Set Up Assistance with minimal  verbal cues  minimal to moderate excursions(MET dated 3/24/2025)     Long Term Goal: Complete dynamic sitting with [place]  with  no verbal cues moderate to maximal excursions(MET dated 3/24/2025)      Dynamic Standing  Short Term Goal: Complete dynamic standing with Stand-by Assistance  with minimal  verbal cues minimal to moderate excursions with RW (MET dated 3/24/2025)     Long Term Goal: Complete dynamic standing with Modified [place] with no  verbal cues and moderate to maximal excursions with RW(NOT MET dated 3/24/2025)      Endurance     Short Term Goal: (MET  dated 3/17/2025)     Physical Therapy: 2 times a day, 30-45 minutes  Rest periods: multiple  Sitting: 3 hours/day     Long Term Goal: (MET dated 2025)  Physical Therapy: 2 times a day, 45 minutes  Rest periods: minimal  Sittin-8 hours/day     Mobility/Gait  Short Term Goal: Will ambulate with Stand-by Assistance  Using Rolling Walker with no  verbal cues x 200 ft(MET dated 3/17/2025)     Long Term Goal: Will ambulate with Modified Independent  using Rolling Walker with no verbal cues x 500 ft  (Partially Met, Pt is set-up/clean-up while ambulating with RW x 200ft)      Stairs Assistance  Short Term Goal: Patient will ascend/descend 4 stairs/steps using bilateral handrails reciprocal  steps with Stand-by Assistance and minimal verbal cues(MET  dated 3/17/2025)     Long Term Goal: Patient will ascend/descend 12 stairs/steps using no handrails  reciprocal steps with [place] and minimal verbal cues. (Partially Met, pt is supervision for stair negotiation ascending/descending x 8 steps with bilateral handrails and reciprocal steps 03/24/2025)            Ramp/Uneven 10 feet surface  Long  Term Goal; Patient will be able to navigate a 10 inch uneven surface with proper A.D. with  Modified [place](NOT MET dated 3/24/2025)         2-6  inch curb  Long  Term Goal; Patient will be able to navigate a  2 to 6 inch curb with proper A.D. with independence(NOT MET dated 3/24/2025)        Picking up object   Long  Term Goal; Patient will be able to  a small object from the floor  with proper A.D. with  independence(NOT MET dated 3/24/2025)        Car transfers  Long  Term Goal; Patient will be able to get in and out of a vehicle  with proper A.D. with  independence(NOT MET dated 3/24/2025)        Education  Long Term Goals:  Patient/family/caregivers trained on physical mobility and precautions with Supervision.(MET dated 3/24/2025)        Patient/family/caregivers trained on safety awareness with Supervision.(MET dated 3/24/2025)      Order and obtain all appropriate equipment.(MET dated 3/24/2025)                            Care Scores:   Admission Assessment Current   Status  Discharge   Goal   Functional Area: Care Score:  Care Score:    Roll Left and Right 5 3 Independent   Sit to Lying 5 3 Independent   Lying to Sitting on Side of Bed 5 3 Independent   Sit to Stand 4 5 Set-up/clean-up   Chair/Bed-to-Chair Transfer 4 5 Independent   Car Transfer 10 4 Supervision or touching assistance   Walk 10 Feet  4 5 Independent   Walk 50 Feet with Two Turns 4 5 Set-up/clean-up   Walk 150 Feet 4 5 Set-up/clean-up   Walk 10 Feet Uneven Surface 4 4 Independent   1 Step (Curb) 4 4 Independent   4 Steps 4 4 Set-up/clean-up   12 Steps 4 7 Set-up/clean-up   Picking Up Object 4 5 Set-up/clean-up   Wheel 50 Feet with Two Turns 9 9 Not applicable   Wheel 150 Feet 9 9 Not applicable       Reasons for Discontinuation of Therapy Services  Transfer to alternate level of care.      Plan:     Patient Discharged to: Home with Home Health Service.    3/25/2025

## 2025-03-25 NOTE — PLAN OF CARE
Patient adequate for discharge today.  Problem: Rehabilitation (IRF) Plan of Care  Goal: Plan of Care Review  Outcome: Progressing  Goal: Patient-Specific Goal (Individualized)  Outcome: Progressing  Goal: Absence of New-Onset Illness or Injury  Outcome: Progressing  Goal: Optimal Comfort and Wellbeing  Outcome: Progressing  Goal: Home and Community Transition Plan Established  Outcome: Progressing     Problem: Infection  Goal: Absence of Infection Signs and Symptoms  Outcome: Progressing     Problem: Wound  Goal: Optimal Coping  Outcome: Progressing  Goal: Optimal Functional Ability  Outcome: Progressing  Goal: Absence of Infection Signs and Symptoms  Outcome: Progressing  Goal: Improved Oral Intake  Outcome: Progressing  Goal: Optimal Pain Control and Function  Outcome: Progressing  Goal: Skin Health and Integrity  Outcome: Progressing  Goal: Optimal Wound Healing  Outcome: Progressing     Problem: Fall Injury Risk  Goal: Absence of Fall and Fall-Related Injury  Outcome: Progressing     Problem: Mobility Impairment  Goal: Optimal Mobility  Outcome: Progressing     Problem: Pain Acute  Goal: Optimal Pain Control and Function  Outcome: Progressing     Problem: Skin Injury Risk Increased  Goal: Skin Health and Integrity  Outcome: Progressing

## 2025-03-25 NOTE — CONSULTS
Set up complete. Patient discharging to [facility] to room 112. Home health services provided by [facility] and RW delivered to patient by [facility].

## 2025-03-25 NOTE — PLAN OF CARE
[facility])  Discharge Final Note    Primary Care Provider: Luis Enrique Guzman Jr., MD    Expected Discharge Date:     Final Discharge Note (most recent)       Final Note - 03/25/25 0722          Final Note    Assessment Type Final Discharge Note     Anticipated Discharge Disposition Home-Health Care Sv     What phone number can be called within the next 1-3 days to see how you are doing after discharge? [phone]     Hospital Resources/Appts/Education Provided Appointments scheduled and added to AVS        Post-Acute Status    Post-Acute Authorization Placement;Home Health;HME     Post-Acute Placement Status Set-up Complete/Auth obtained     HME Status Set-up Complete/Auth obtained     Home Health Status Set-up Complete/Auth obtained     Patient choice form signed by patient/caregiver List from System Post-Acute Care     Discharge Delays None known at this time                     Important Message from Medicare  Important Message from Medicare regarding Discharge Appeal Rights: Given to patient/caregiver, Explained to patient/caregiver, Signed/date by patient/caregiver     Date IMM was signed: 03/24/25  Time IMM was signed: 0750     Follow-up providers       Houston Navarrete MD   Specialty: Urology    [address] [place] [id number]   Phone: [phone]       Next Steps: Follow up on 4/1/2025    Instructions: Follow up with Dr. Navarrete on 4/1/2025 @ 10:00 a.mLuis Enrique Fields Jr., MD   Specialty: Internal Medicine   Relationship: PCP - General    [address] [place] [address]   Phone: [phone]       Next Steps: Follow up on 4/14/2025    Instructions: Follow up with Dr. Guzman on April 14, 2025 @ 11:15 a.mHarsha Herrera MD   Specialty: Interventional Cardiology, Cardiology    [address] [facility] [address]   Phone: [phone]       Next Steps: Follow up    Instructions: Call for appt. following clearance  from urology              After-discharge care                Destination       [facility]   Service: Assisted Living    [address] [facility] [address]   Phone: [phone]                 Durable Medical Equipment       [facility]   Service: Durable Medical Equipment    [address]  [place] [address]   Phone: [phone]                 Home Medical Care       [facility]   Service: Home Nursing, Home Rehabilitation    [address] [facility] [address]   Phone: [phone]

## 2025-03-25 NOTE — PLAN OF CARE
Problem: Rehabilitation (IRF) Plan of Care  Goal: Plan of Care Review  3/25/2025 0746 by Sarai Pierson RN  Outcome: Met  3/25/2025 0732 by Sarai Pierson RN  Outcome: Progressing  Goal: Patient-Specific Goal (Individualized)  3/25/2025 0746 by Sarai Pierson RN  Outcome: Met  3/25/2025 0732 by Sarai Pierson RN  Outcome: Progressing  Goal: Absence of New-Onset Illness or Injury  3/25/2025 0746 by Sarai Pierson RN  Outcome: Met  3/25/2025 0732 by Sarai Pierson RN  Outcome: Progressing  Goal: Optimal Comfort and Wellbeing  3/25/2025 0746 by Sarai Pierson RN  Outcome: Met  3/25/2025 0732 by Sarai Pierson RN  Outcome: Progressing  Goal: Home and Community Transition Plan Established  3/25/2025 0746 by Sarai Pierson RN  Outcome: Met  3/25/2025 0732 by Sarai Pierson RN  Outcome: Progressing     Problem: Infection  Goal: Absence of Infection Signs and Symptoms  3/25/2025 0746 by Sarai Pierson RN  Outcome: Met  3/25/2025 0732 by Sarai Pierson RN  Outcome: Progressing     Problem: Wound  Goal: Optimal Coping  3/25/2025 0746 by Sarai Pierson RN  Outcome: Met  3/25/2025 0732 by Sarai Pierson RN  Outcome: Progressing  Goal: Optimal Functional Ability  3/25/2025 0746 by Sarai Pierson RN  Outcome: Met  3/25/2025 0732 by Sarai Pierson RN  Outcome: Progressing  Goal: Absence of Infection Signs and Symptoms  3/25/2025 0746 by Sarai Pierson RN  Outcome: Met  3/25/2025 0732 by Sarai Pierson RN  Outcome: Progressing  Goal: Improved Oral Intake  3/25/2025 0746 by Sarai Pierson RN  Outcome: Met  3/25/2025 0732 by Kenna, Sarai, RN  Outcome: Progressing  Goal: Optimal Pain Control and Function  3/25/2025 0746 by Sarai Pierson RN  Outcome: Met  3/25/2025 0732 by Sarai Pierson RN  Outcome: Progressing  Goal: Skin Health and Integrity  3/25/2025 0746 by Sarai Pierson RN  Outcome:  Met  3/25/2025 0732 by Sarai Pierson RN  Outcome: Progressing  Goal: Optimal Wound Healing  3/25/2025 0746 by Sarai Pierson RN  Outcome: Met  3/25/2025 0732 by Sarai Pierson RN  Outcome: Progressing     Problem: Fall Injury Risk  Goal: Absence of Fall and Fall-Related Injury  3/25/2025 0746 by Sarai Pierson RN  Outcome: Met  3/25/2025 0732 by Sarai Pierson RN  Outcome: Progressing     Problem: Mobility Impairment  Goal: Optimal Mobility  3/25/2025 0746 by Sarai Pierson RN  Outcome: Met  3/25/2025 0732 by Sarai Pierson RN  Outcome: Progressing     Problem: Pain Acute  Goal: Optimal Pain Control and Function  3/25/2025 0746 by Sarai Pierson RN  Outcome: Met  3/25/2025 0732 by Sarai Pierson RN  Outcome: Progressing     Problem: Skin Injury Risk Increased  Goal: Skin Health and Integrity  3/25/2025 0746 by Sarai Pierson RN  Outcome: Met  3/25/2025 0732 by Sarai Pierson RN  Outcome: Progressing   Adequate for discharge.

## 2025-03-25 NOTE — NURSING
Patient dc to [facility] today with daughter. I went over DC paperwork with patient and daughter Eugenio with both voicing understanding. All patient's belongings were packed and sent home with patient. Patient was brought downstairs to daughters car via  by hospital staff.     Report called into [facility] spoke with CLINT Parkinson. Also call report into [facility] spoke with Geni.

## 2025-03-26 ENCOUNTER — PATIENT OUTREACH (OUTPATIENT)
Dept: ADMINISTRATIVE | Facility: CLINIC | Age: OVER 89
End: 2025-03-26
Payer: MEDICARE

## 2025-03-27 NOTE — PHYSICIAN QUERY
Please document your best medical opinion regarding the etiology of diagnosis:  Anemia due to hematuria (please specify )

## 2025-03-27 NOTE — PHYSICIAN QUERY
Please clarify if there is any clinical correlation between UTI and urinary device.    Due to or associated with each other

## 2025-04-02 NOTE — DISCHARGE SUMMARY
[facility] Medicine  Discharge Summary      Patient Name: Tobi Liz Jr.  MRN: [id number]  [facility] ([place]): [id number]  Patient Class: IP- Rehab  Admission Date: 3/13/2025  Hospital Length of Stay: 12 days  Discharge Date and Time: 3/25/2025  9:59 AM  Attending Physician: Thelma att. providers found   Discharging Provider: Dave Reyes III, MD  Primary Care Provider: Luis Enrique Guzman Jr., MD    Primary Care Team: Networked reference to record PCT     HPI:   Tobi Liz Jr. is a 89 y.o. male with a PMHx of has a past medical history of Aortic valve stenosis, Arthritis, BPH (benign prostatic hyperplasia), Carotid artery stenosis, Chronic diastolic heart failure, ETOH abuse, Exposure to COVID-19 virus (01/07/2022), Hyperchloremia, Kidney stones, Murmur, Polymyalgia rheumatica, PVD (peripheral vascular disease), and Renal artery stenosis., presents complaining of shaking after standing from a seated position that started yesterday, he was aware and talking during the episode so not likely a seizure. Labs show he was severely anemic in the ED with Hgb of 5. He had hematuria as well on UA along with bacteria. Ucx pending. His family states that he has a history of anemia in the past. He otherwise feels fine. He received 3 units of pRBCs in the ED with appropriate response.     Patient overall significantly improved from admission. H&H were stable yesterday. Awaiting updated CBC this morning. Otherwise renal function stable and no new electrolyte abnormalities noted. Patient accepted to inpatient rehab and we will discharging.    Patient is seen and examined after admission and patient is complaining of respiratory congestion wheezing and dyspnea.  Patient's currently on Bactrim and sensitivities noted for his urinary tract infection as well as the other antibiotics that maybe appropriate we will switch to Rocephin as we will get better respiratory coverage as the patient does seem to be  battling a bronchitis and possibly a pneumonia.  Repeat chest x-ray pending.  Patient is motivated to improve he is very eager to return home patient's significantly decline in his overall functional state and unable to complete ADLs safely at this time.  He has developed a myopathy as well as some confusion and encephalopathy agree with inpatient rehab patient meets current Medicare criteria.       Patient requires acute inpatient rehab admission with 24-hour nursing and active physician oversight to monitor and manage acute medical comorbid conditions, labs, pain, and functional deficits. Patient/family will also require teaching and integration of improving functional skills into daily living. Patient will also require an individualized, interdisciplinary approach to their care, receiving PT, OT services 3 hours per day, 5 days per week. Required care cannot be provided at a lower level of care. Patient is anticipated to require approximately 10-14 days LOS with expected discharge home  with  services.    Impairment group (IGC):   Neuromuscular Disorders 03.8 Etiologic diagnosis/description:   G72.9: Myopathy  D64.9: Anemia      Date of onset:  3/8/2025 Date of surgery: N/A   Allergies: Patient has no known allergies.   Comorbid condition:   HTN, HLD, BPH, CAD, Valvular heart disease, CHF class II, Occlusion of right iliac artery   Medical/functional conditions requiring inpatient rehabilitation:      This patient requires medical management/24-hour nursing of complex co morbidities HTN, HLD, BPH, CAD, Valvular heart disease, CHF class II, Occlusion of right iliac artery, labs, medications (see medications list), pain, sleep hygiene, anticoagulation, nutrition, hydration, neurological, pulmonary, cardiac status, and preventive healthcare.     This patient requires intense therapy and an integrated, interdisciplinary approach to address safety, impaired mobility, impaired ADLs (dressing, toileting, grooming,  showering), judgment, and memory, communication, pulmonary insufficiency, bowel/bladder problems,preventive healthcare, medication management, integration of functional skills into daily living, and home caregiver support and training.    Risk for medical/clinical complications:      This patient is at risk for the following complications: DVT/PE, pneumonia, malnutrition, neurological decline, respiratory insufficiency, worsening activity intolerance, complications from anticoagulation, skin breakdown, inadequate sleep, and constipation.        * No surgery found *      Hospital Course:   3/16 AA, weekend crosscover, no acute events reported, blood pressure stable, continue PT OT efforts    3/17 DL:  Patient doing well this morning.  He is sitting up in wheelchair in the dining room and is actively participating in therapy.  No acute events reported.  No acute distress noted.  Mild hyponatremia noted.  Patient encouraged to increase p.o. fluid intake.  Renal function stable.  Vital signs stable.  Patient reports therapy going well.  Pain.  Encouraged patient to continue therapy efforts.    3/18 FM:  Patient's respiratory symptoms are much improved but still having occasional cough and wheeze.  We will add low-dose budesonide.  Patient's cognition is improved Bolton catheter in place patient's pain improved.  Patient completing time needs in therapy and overall doing well    3/19 DL:  Patient doing well this morning.  He is in dining room and is actively participating in therapy.  Patient denies pain.  Vital signs stable.  Patient continues to work well with therapy.  Encouraged patient to continue therapy efforts.    3/20 DL:  Patient doing well this morning.  He is in the dining room and is actively participating in therapy.  Patient is without complaint this morning.  Labs and vital signs stable.  Patient reports therapy going well.  He states he feels he is making functional gains.  Encouraged patient to continue  therapy efforts.    3/21 DL:  Patient reports increased right sided hip/lower back pain that is a barrier to progress.  Medications adjusted.  We will obtain x-rays of L-spine and right hip and pelvis.  Patient educated on importance of notifying staff of pain and requesting medications as needed.  Encouraged patient to continue therapy efforts.    3/22 SH: weekend crossover- VSS, patient having trouble in participating in PT de to pain imaging revealed compression deformity of the L1 body, new from the previous exam, mild chronic compression deformity of the L3 body and multilevel lumbar spondylosis. Pain control on board    3/24 DL:  Patient doing well this morning.  He is ambulating in [place] with therapy.  Patient reports pain well-controlled with current regimen.  No acute distress noted.  Vital signs stable.  Patient continues to work well with therapy and remains highly motivated.  Patient states he has looking forward to discharge in the morning.  Encouraged patient to continue therapy efforts.    Discharge Note:  Patient had a good functional recovery.  Patient was started on nebulized treatments and antibiotics at the beginning of his rehab stay and made progressive improvement in his pulmonary complaints and was able to put in good effort and met all turn requirements.  Patient made functional gains across the board with strength endurance pain control patient did have exacerbation of his back pain see x-rays for details.  Otherwise medications were reconciled at the time of discharge and discharge care coordinated with family.  Patient will follow-up with his primary team and we have encouraged him to continue working with therapy.     Goals of Care Treatment Preferences:  Code Status: Full Code      SDOH Screening:  The patient was screened for utility difficulties, food insecurity, transport difficulties, housing insecurity, and interpersonal safety and there were no concerns identified this admission.    "  Consults:   Consults (From admission, onward)          Status Ordering Provider     Inpatient consult to Social Work/Case Management  Once        Provider:  (Not yet assigned)    Completed RAMSEY CORDOVA III     Inpatient consult to Registered Dietitian/Nutritionist  Once        Provider:  (Not yet assigned)    Completed RAMSEY CORDOVA III     Inpatient consult to Registered Dietitian/Nutritionist  Once        Provider:  (Not yet assigned)    Completed RAMSEY CORDOVA III     Inpatient consult to Social Work/Case Management  Once        Provider:  (Not yet assigned)    Completed ARPAN AMANDA            Assessment & Plan  Myopathy  Continue PT/OT efforts.  Hypertension  Patient's blood pressure range in the last 24 hours was: No data recorded.The patient's inpatient anti-hypertensive regimen is listed below:  Current Antihypertensives  amlodipine (NORVASC) tablet, Daily, Oral    Plan  - BP is controlled, no changes needed to their regimen  Hyperlipidemia  Recheck labs.    BPH (benign prostatic hyperplasia)  Bolton in place.    Atherosclerosis of native artery of both lower extremities with rest pain  Noted.  Continue current medications.    Congestive heart failure, NYHA class II  Patient has Combined Systolic and Diastolic heart failure that is Chronic. On presentation their CHF was well compensated. Most recent BNP and echo results are listed below.  No results for input(s): "BNP" in the last 72 hours.  Latest ECHO  No results found for this or any previous visit.    Current Heart Failure Medications       Plan  - Monitor strict I&Os and daily weights.    - Place on telemetry  - Low sodium diet      S/P TAVR (transcatheter aortic valve replacement)  Has done well post procedure.    Insomnia  Monitor, PRN meds.    Anemia  Anemia is likely due to chronic disease due to Chronic Kidney Disease. Most recent hemoglobin and hematocrit are listed below.  No results for input(s): "HGB", "HCT" in the last 72 " hours.    Plan  - Monitor serial CBC:  q72hrs  - Transfuse PRBC if patient becomes hemodynamically unstable, symptomatic or H/H drops below 7/21.  - Patient has received 3 units of PRBCs  - Patient's anemia is currently stable    DC Note - Hold all blood thinners.  Acute cystitis with hematuria  Abx changed 2nd dual resp infection.    Acute bronchitis  As above.    DC Note - Resolved.  Urinary obstruction  Willams in place, plan on DC with willams.    Final Active Diagnoses:    Diagnosis Date Noted POA    PRINCIPAL PROBLEM:  Myopathy [G72.9] 03/13/2025 Yes    Acute bronchitis [J20.9] 03/14/2025 Yes    Urinary obstruction [N13.9] 03/14/2025 Yes    Anemia [D64.9] 03/09/2025 Yes    Acute cystitis with hematuria [N30.01] 03/09/2025 Yes    Insomnia [G47.00] 04/05/2024 Yes    S/P TAVR (transcatheter aortic valve replacement) [Z95.3] 10/04/2023 Not Applicable    Congestive heart failure, NYHA class II [I50.9] 10/03/2023 Yes    Atherosclerosis of native artery of both lower extremities with rest pain [I70.223] 08/25/2023 Yes    BPH (benign prostatic hyperplasia) [N40.0] 05/26/2021 Yes    Hyperlipidemia [E78.5] 05/26/2021 Yes    Hypertension [I10] 05/26/2021 Yes      Problems Resolved During this Admission:       Discharged Condition: stable    Disposition: Home-Health Care Svc    Follow Up:   Contact information for follow-up providers       Houston Navarrete MD Follow up on 4/1/2025.    Specialty: Urology  Why: Follow up with Dr. Navarrete on 4/1/2025 @ 10:00 a.m.  Contact information:  [address] [place] [id number]  [phone]               Luis Enrique Guzman Jr., MD Follow up on 4/14/2025.    Specialty: Internal Medicine  Why: Follow up with Dr. Guzman on April 14, 2025 @ 11:15 a.m.  Contact information:  [address] [place] [address]  [phone]               Harsha Salcedo MD Follow up.    Specialties: Interventional Cardiology, Cardiology  Why: Call  "for appt. following clearance from urology  Contact information:  [address]  [facility] [address]  [phone]                       Contact information for after-discharge care       Destination       [facility]a Jardin [facility] .    Service: Assisted Living  Contact information:  [address]  [facility] [address]  [phone]                     Durable Medical Equipment       [facility] .    Service: Durable Medical Equipment  Contact information:  [address]  [facility] [address]  [phone]                     Home Medical Care       [facility] .    Services: Home Nursing, Home Rehabilitation  Contact information:  [address]  [facility] [address]  [phone]                                 Patient Instructions:      WALKER FOR HOME USE     Order Specific Question Answer Comments   Type of Walker: Adult (5'4"-6'6")    With wheels? Yes    Height: 5' 9" (1.753 m)    Weight: 81.6 kg (179 lb 12.8 oz)    Length of need (1-99 months): 99    Does patient have medical equipment at home? cane, straight    Does patient have medical equipment at home? wheelchair    Does patient have medical equipment at home? walker, rolling    Does patient have medical equipment at home? urinary catheter supplies    Please check all that apply: Patient's condition impairs ambulation.    Please check all that apply: Patient is unable to safely ambulate without equipment.    Please check all that apply: Patient needs help to get in and out of chair.    Please check all that apply: Walker will be used for gait training.      Ambulatory referral/consult to Home Health   Standing Status: Future   Referral Priority: Routine Referral Type: Home Health   Referral Reason: Specialty Services Required   Referred to Provider: [facility] Requested Specialty: Home Health Services   Number of Visits Requested: 1     Diet Cardiac     Activity as tolerated "       Significant Diagnostic Studies: N/A    Pending Diagnostic Studies:       Procedure Component Value Units Date/Time    RESPIRATORY PATHOGENS PANEL (TEM-PCR) Sputum [[id number]] Collected: 03/22/25 0800    Order Status: Sent Lab Status: No result     Specimen: Sputum            Medications:  Reconciled Home Medications:      Medication List        START taking these medications      acetaminophen 325 MG tablet  Commonly known as: TYLENOL  Take 2 tablets (650 mg total) by mouth every 6 (six) hours as needed.     carboxymethylcellulose sodium 0.5 % Drop  Apply 1 drop to eye every 4 (four) hours as needed (dry eyes).     diclofenac sodium 1 % Gel  Commonly known as: VOLTAREN  Apply 4 g topically 3 (three) times daily as needed (joint / back pain).     famotidine 20 MG tablet  Commonly known as: PEPCID  Take 1 tablet (20 mg total) by mouth once daily.     methocarbamoL 500 MG Tab  Commonly known as: Robaxin  Take 1 tablet (500 mg total) by mouth 3 (three) times daily as needed (spasms).     miconazole NITRATE 2 % 2 % top powder  Commonly known as: MICOTIN  Apply topically 2 (two) times daily.     polyethylene glycol 17 gram Pwpk  Commonly known as: GLYCOLAX  Take 17 g by mouth once daily.            CHANGE how you take these medications      amLODIPine 5 MG tablet  Commonly known as: NORVASC  Take 1 tablet (5 mg total) by mouth once daily. Prescribed by Dr. Box  What changed: medication strength     rosuvastatin 10 MG tablet  Commonly known as: CRESTOR  Take 1 tablet (10 mg total) by mouth once daily.  What changed:   medication strength  how much to take            STOP taking these medications      aspirin 81 MG EC tablet  Commonly known as: ECOTRIN     clopidogreL 75 mg tablet  Commonly known as: PLAVIX            ASK your doctor about these medications      HYDROcodone-acetaminophen 5-325 mg per tablet  Commonly known as: NORCO  Take 1 tablet by mouth 3 (three) times daily as needed for Pain.  Ask about: Should  I take this medication?              Indwelling Lines/Drains at time of discharge:   Lines/Drains/Airways       None                   Time spent on the discharge of patient: 35 minutes         Dave Reyes III, MD  Department of Hospital Medicine  [facility] ([facility])

## 2025-04-02 NOTE — ASSESSMENT & PLAN NOTE
"Anemia is likely due to chronic disease due to Chronic Kidney Disease. Most recent hemoglobin and hematocrit are listed below.  No results for input(s): "HGB", "HCT" in the last 72 hours.    Plan  - Monitor serial CBC: q72hrs  - Transfuse PRBC if patient becomes hemodynamically unstable, symptomatic or H/H drops below 7/21.  - Patient has received 3 units of PRBCs  - Patient's anemia is currently stable    DC Note - Hold all blood thinners.  "

## 2025-04-02 NOTE — ASSESSMENT & PLAN NOTE
Patient's blood pressure range in the last 24 hours was: No data recorded.The patient's inpatient anti-hypertensive regimen is listed below:  Current Antihypertensives  amlodipine (NORVASC) tablet, Daily, Oral    Plan  - BP is controlled, no changes needed to their regimen

## 2025-04-10 PROBLEM — I70.222 CRITICAL LIMB ISCHEMIA OF LEFT LOWER EXTREMITY: Status: ACTIVE | Noted: 2025-04-10

## 2025-04-27 ENCOUNTER — HOSPITAL ENCOUNTER (EMERGENCY)
Facility: HOSPITAL | Age: OVER 89
Discharge: HOME | End: 2025-04-27
Attending: EMERGENCY MEDICINE
Payer: MEDICARE

## 2025-04-27 VITALS
DIASTOLIC BLOOD PRESSURE: 66 MMHG | WEIGHT: 185 LBS | TEMPERATURE: 98 F | OXYGEN SATURATION: 100 % | HEIGHT: 69 IN | SYSTOLIC BLOOD PRESSURE: 145 MMHG | HEART RATE: 79 BPM | RESPIRATION RATE: 16 BRPM | BODY MASS INDEX: 27.4 KG/M2

## 2025-04-27 DIAGNOSIS — S32.010A COMPRESSION FRACTURE OF L1 VERTEBRA, INITIAL ENCOUNTER: ICD-10-CM
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
## 2025-04-27 DIAGNOSIS — S32.030A COMPRESSION FRACTURE OF L3 VERTEBRA, INITIAL ENCOUNTER: ICD-10-CM

## 2025-04-27 DIAGNOSIS — S09.90XA CLOSED HEAD INJURY, INITIAL ENCOUNTER: Primary | ICD-10-CM

## 2025-04-27 PROCEDURE — 25000003 PHARM REV CODE 250: Performed by: EMERGENCY MEDICINE

## 2025-04-27 PROCEDURE — 99284 EMERGENCY DEPT VISIT MOD MDM: CPT | Mod: 25

## 2025-04-27 RX ORDER — ACETAMINOPHEN 500 MG
1000 TABLET ORAL
Status: DISCONTINUED | OUTPATIENT
Start: 2025-04-27 | End: 2025-04-27 | Stop reason: HOSPADM

## 2025-04-27 NOTE — ED PROVIDER NOTES
EMERGENCY DEPARTMENT HISTORY AND PHYSICAL EXAM     This note is dictated on M*Modal word recognition program.  There are word recognition mistakes and grammatical errors that are occasionally missed on review.     Date: 4/27/2025   Patient Name: Tobi Liz Jr.       History of Presenting Illness      Chief Complaint   Patient presents with    Fall     Family reports patient had a fall this morning. Family states fall was unwitnessed. Patient denies any LOC or blood thinners. Patient reports hitting his head on the table.         0940   Tobi Liz Jr. is a 89 y.o. male with PMHX of hypertension, aortic valve stenosis, CAD, PVD who presents to the emergency department C/O fall.    Patient says he had a nonsyncopal fall this morning.  Patient was standing up about to eat breakfast.  He says he turned and lost his balance and fell down.  Did not lose consciousness.  Did not tried to get up on his own because he said he felt like he would not be able to.  Patient with small hematoma to left temporal.  Family state that has no objects on the floor but thinks he might have hit a table on his way down.  Patient mentating appropriately.  Had a fall in the shower two days ago and landed on his buttocks and was seen at [facility].  He is not on anticoagulation but takes 81mg ASA.    PCP: Luis Enrique Guzman Jr., MD      Current Medications[1]        Past History     Past Medical History:   Past Medical History:   Diagnosis Date    Aortic valve stenosis     Arthritis     BPH (benign prostatic hyperplasia)     Carotid artery stenosis     Chronic diastolic heart failure     ETOH abuse     Exposure to COVID-19 virus 01/07/2022    Hyperchloremia     Hypertension     Kidney stones     Murmur     Pacemaker     Polymyalgia rheumatica     PVD (peripheral vascular disease)     Renal artery stenosis         Past Surgical History:   Past Surgical History:   Procedure Laterality Date    A-V CARDIAC PACEMAKER  INSERTION N/A 10/6/2023    Procedure: INSERTION, CARDIAC PACEMAKER, DUAL CHAMBER;  Surgeon: Douglas Salguero MD;  Location: [facility] CATH;  Service: Cardiology;  Laterality: N/A;    ANGIOGRAM, CAROTID Left 8/25/2023    Procedure: ANGIOGRAM, CAROTID;  Surgeon: Nick Box MD;  Location: [facility] CATH;  Service: Cardiology;  Laterality: Left;    CATARACT EXTRACTION, BILATERAL      ECHOCARDIOGRAM,TRANSESOPHAGEAL N/A 10/3/2023    Procedure: Transesophageal echo (JOSE ARMANDO) intra-procedure log documentation;  Surgeon: Nick Box MD;  Location: [facility] OR;  Service: Cardiology;  Laterality: N/A;    HAND SURGERY      INSERTION OF STENT INTO PERIPHERAL VESSEL N/A 1/30/2025    Procedure: INSERTION, STENT, VESSEL, PERIPHERAL;  Surgeon: Harsha Salcedo MD;  Location: [facility] CATH;  Service: Cardiology;  Laterality: N/A;    PERCUTANEOUS TRANSCATHETER AORTIC VALVE REPLACEMENT (TAVR) Left 10/3/2023    Procedure: REPLACEMENT, AORTIC VALVE, PERCUTANEOUS, TRANSCATHETER;  Surgeon: Nick Box MD;  Location: [facility] OR;  Service: Cardiology;  Laterality: Left;    PERCUTANEOUS TRANSCATHETER AORTIC VALVE REPLACEMENT (TAVR) Left 10/3/2023    Procedure: REPLACEMENT, AORTIC VALVE, PERCUTANEOUS, TRANSCATHETER carotid access;  Surgeon: Jun Brito MD;  Location: [facility] OR;  Service: Cardiovascular;  Laterality: Left;    PERCUTANEOUS TRANSLUMINAL ANGIOPLASTY N/A 8/25/2023    Procedure: ANGIOPLASTY-PERCUTANEOUS TRANSLUMINAL (PTA);  Surgeon: Nick Box MD;  Location: [facility] CATH;  Service: Cardiology;  Laterality: N/A;    PERCUTANEOUS TRANSLUMINAL ANGIOPLASTY (PTA) OF PERIPHERAL VESSEL Left 4/10/2025    Procedure: PTA, PERIPHERAL VESSEL;  Surgeon: Harsha Salcedo MD;  Location: [facility] CATH;  Service: Cardiology;  Laterality: Left;  L LE intervention via left antegrade approach Harsha Salcedo at [facility] under [facility]  *PACEMAKER*    SHOULDER SURGERY Left     total shoulder replacement    WOUND EXPLORATION N/A 10/3/2023    Procedure:  "EXPLORATION, WOUND;  Surgeon: Jun Brito MD;  Location: [facility] OR;  Service: Cardiology;  Laterality: N/A;        Family History:   Family History   Problem Relation Name Age of Onset    Cancer Mother      Stroke Father          Social History:   Social History[2]     Allergies:   Review of patient's allergies indicates:  No Known Allergies       Review of Systems   Review of Systems   See HPI for pertinent positives and negatives       Physical Exam     Vitals:    04/27/25 0914 04/27/25 1123   BP: (!) 145/66    Pulse: 82 79   Resp: 18 16   Temp: 97.9 °F (36.6 °C)    SpO2: 99% 100%   Weight: 83.9 kg (185 lb)    Height: 5' 9" (1.753 m)       Physical Exam  Vitals and nursing note reviewed.   Constitutional:       General: He is not in acute distress.     Appearance: Normal appearance. He is well-developed. He is not ill-appearing.   HENT:      Head: Normocephalic.      Comments: Small bruise to left temple  Eyes:      Extraocular Movements: Extraocular movements intact.      Conjunctiva/sclera: Conjunctivae normal.   Pulmonary:      Effort: Pulmonary effort is normal. No respiratory distress.   Abdominal:      Palpations: Abdomen is soft.      Tenderness: There is no abdominal tenderness. There is no guarding.   Musculoskeletal:         General: No deformity or signs of injury. Normal range of motion.      Cervical back: Normal range of motion and neck supple. No rigidity or tenderness.   Skin:     General: Skin is dry.      Coloration: Skin is not pale.      Findings: No rash.   Neurological:      General: No focal deficit present.      Mental Status: He is alert and oriented to person, place, and time.      Cranial Nerves: No cranial nerve deficit.      Motor: No weakness.      Coordination: Coordination normal.              Diagnostic Study Results      Labs -   No results found for this or any previous visit (from the past 12 hours).     Radiologic Studies -    X-Ray Lumbar Spine 2 Or 3 Views   Final " Result      Severe L1 compression fracture with progressive loss of vertebral body height anteriorly since 03/21/2025.      Otherwise, multilevel degenerative changes, and mild L3 compression fracture are similar to the prior radiograph         Electronically signed by: Yasmine Flores MD   Date:    04/27/2025   Time:    11:17      CT Head Without Contrast   Final Result      No acute intracranial abnormalities         Electronically signed by: Yasmine Flores MD   Date:    04/27/2025   Time:    10:43           Medications given in the ED-   Medications   acetaminophen tablet 1,000 mg (1,000 mg Oral Not Given 4/27/25 0939)           Medical Decision Making    I am the first provider for this patient.     I reviewed the vital signs, available nursing notes, past medical history, past surgical history, family history and social history.     Vital Signs:  Reviewed the patient's vital signs.     Pulse Oximetry Analysis and Interpretation:    99% on Room Air, normal        External Test Results (Pertinent to encounter):    Records Reviewed: Nursing Notes, Current Prescription Medications, and Old Medical Records    History Obtained By: Patient, Spouse, and Children    Provider Notes: Tobi Liz Jr. is a 89 y.o. male with fall    Co-morbidities Considered:  Age    Differential Diagnosis: fall, [facility], contusion, [facility],      ED Course:    Patient presents after a nonsyncopal fall at home.  Small ecchymosis to left temple.  Patient at baseline.  Denies complaints.  Took Tylenol prior to arrival and states he does not pain currently.  CT head was negative for acute findings.  Family were requesting lumbar imaging as patient has chronic low back pain.  This demonstrated chronic compression fractures of L3 and L1.  Discussed these findings with patient, his wife, and other family members.  Discussed fall precautions and management of low back pain.  No heavy lifting or bending to  objects.  Advised follow up with his  primary care.  Can use acetaminophen and topical treatments as needed for pain.  Reasons to return to ED discussed.         Problems Addressed:  Fall, closed head injury    Procedures:   Procedures       Diagnosis and Disposition     Critical Care:      DISCHARGE NOTE:       Tobi Liz Jr.'s  results have been reviewed with him.  He has been counseled regarding his diagnosis, treatment, and plan.  He verbally conveys understanding and agreement of the signs, symptoms, diagnosis, treatment and prognosis and additionally agrees to follow up as discussed.  He also agrees with the care-plan and conveys that all of his questions have been answered.  I have also provided discharge instructions for him that include: educational information regarding their diagnosis and treatment, and list of reasons why they would want to return to the ED prior to their follow-up appointment, should his condition change. He has been provided with education for proper emergency department utilization.         CLINICAL IMPRESSION:         1. Closed head injury, initial encounter    2. Fall, initial encounter    3. Compression fracture of L1 vertebra, initial encounter    4. Compression fracture of L3 vertebra, initial encounter              PLAN:   1. Discharge Home  2.      Medication List        ASK your doctor about these medications      amLODIPine 5 MG tablet  Commonly known as: NORVASC  Take 1 tablet (5 mg total) by mouth once daily. Prescribed by Dr. Box     aspirin 81 MG Chew     clopidogreL 75 mg tablet  Commonly known as: PLAVIX  Take 1 tablet (75 mg total) by mouth once daily.     rosuvastatin 10 MG tablet  Commonly known as: CRESTOR  Take 1 tablet (10 mg total) by mouth once daily.     solifenacin 10 MG tablet  Commonly known as: VESICARE             3. Luis Enrique Guzman Jr., MD  [address] [address]  [phone]    Schedule an appointment as soon as possible for a visit in 3 days      [facility]  Emergency Department  [address] [id number] [phone]  Go to   If symptoms worsen       _______________________________     Please note that this dictation was completed with [employer], the computer voice recognition software.  Quite often unanticipated grammatical, syntax, homophones, and other interpretive errors are inadvertently transcribed by the computer software.  Please disregard these errors.  Please excuse any errors that have escaped final proofreading.               [1]   Current Facility-Administered Medications   Medication Dose Route Frequency Provider Last Rate Last Admin    acetaminophen tablet 1,000 mg  1,000 mg Oral ED 1 Time Nabil Garza MD         Current Outpatient Medications   Medication Sig Dispense Refill    amLODIPine (NORVASC) 5 MG tablet Take 1 tablet (5 mg total) by mouth once daily. Prescribed by Dr. Box 90 tablet 0    aspirin 81 MG Chew Take 81 mg by mouth once daily.      clopidogreL (PLAVIX) 75 mg tablet Take 1 tablet (75 mg total) by mouth once daily. 30 tablet 11    rosuvastatin (CRESTOR) 10 MG tablet Take 1 tablet (10 mg total) by mouth once daily. 90 tablet 0    solifenacin (VESICARE) 10 MG tablet Take 10 mg by mouth once daily.     [2]   Social History  Tobacco Use    Smoking status: Former     Types: Cigars     Passive exposure: Past    Smokeless tobacco: Never   Substance Use Topics    Alcohol use: Yes     Alcohol/week: 3.0 standard drinks of alcohol     Types: 3 Shots of liquor per week     Comment: 1.5 oz a day    Drug use: Never        Nabil Garza MD  04/27/25 1146

## 2025-05-09 ENCOUNTER — LAB REQUISITION (OUTPATIENT)
Dept: LAB | Facility: HOSPITAL | Age: OVER 89
End: 2025-05-09
Payer: MEDICARE

## 2025-05-09 DIAGNOSIS — R39.9 UNSPECIFIED SYMPTOMS AND SIGNS INVOLVING THE GENITOURINARY SYSTEM: ICD-10-CM

## 2025-05-09 LAB
BACTERIA #/AREA URNS AUTO: ABNORMAL /HPF
BILIRUB UR QL STRIP.AUTO: NEGATIVE
CAOX CRY URNS QL MICRO: ABNORMAL
CLARITY UR: ABNORMAL
COLOR UR AUTO: YELLOW
GLUCOSE UR QL STRIP: NEGATIVE
GRAN CASTS #/AREA URNS LPF: 1 /LPF (ref ?–0)
HGB UR QL STRIP: NEGATIVE
HOLD SPECIMEN: NORMAL
HYALINE CASTS UR QL AUTO: 1 /LPF (ref 0–1)
KETONES UR QL STRIP: NEGATIVE
LEUKOCYTE ESTERASE UR QL STRIP: ABNORMAL
MICROSCOPIC COMMENT: ABNORMAL
NITRITE UR QL STRIP: NEGATIVE
PH UR STRIP: >8 [PH]
PROT UR QL STRIP: ABNORMAL
RBC #/AREA URNS AUTO: 5 /HPF (ref 0–4)
SP GR UR STRIP: 1.02
SQUAMOUS #/AREA URNS AUTO: 3 /HPF
TRI-PHOS CRY URNS QL MICRO: ABNORMAL
UROBILINOGEN UR STRIP-ACNC: 1 EU/DL
WBC #/AREA URNS AUTO: >100 /HPF (ref 0–5)
YEAST URNS QL MICRO: ABNORMAL /HPF

## 2025-05-09 PROCEDURE — 87086 URINE CULTURE/COLONY COUNT: CPT

## 2025-05-09 PROCEDURE — 81003 URINALYSIS AUTO W/O SCOPE: CPT

## 2025-05-11 LAB
BACTERIA UR CULT: ABNORMAL
BACTERIA UR CULT: ABNORMAL

## 2025-05-30 ENCOUNTER — HOSPITAL ENCOUNTER (INPATIENT)
Facility: HOSPITAL | Age: OVER 89
LOS: 4 days | Discharge: NURSING, REHAB OR LONG-TERM CARE | DRG: 690 | End: 2025-06-03
Attending: INTERNAL MEDICINE | Admitting: INTERNAL MEDICINE
Payer: MEDICARE

## 2025-05-30 DIAGNOSIS — G72.9 MYOPATHY: ICD-10-CM

## 2025-05-30 DIAGNOSIS — N39.0 URINARY TRACT INFECTION WITHOUT HEMATURIA, SITE UNSPECIFIED: Primary | ICD-10-CM

## 2025-05-30 DIAGNOSIS — N40.0 BENIGN PROSTATIC HYPERPLASIA, UNSPECIFIED WHETHER LOWER URINARY TRACT SYMPTOMS PRESENT: ICD-10-CM

## 2025-05-30 DIAGNOSIS — R41.0 DELIRIUM: ICD-10-CM

## 2025-05-30 LAB
ABSOLUTE EOSINOPHIL (OHS): 0.21 K/UL
ABSOLUTE MONOCYTE (OHS): 0.54 K/UL (ref 0.3–1)
ABSOLUTE NEUTROPHIL COUNT (OHS): 5.07 K/UL (ref 1.8–7.7)
ALBUMIN SERPL BCP-MCNC: 3.5 G/DL (ref 3.5–5.2)
ALP SERPL-CCNC: 64 UNIT/L (ref 40–150)
ALT SERPL W/O P-5'-P-CCNC: 11 UNIT/L (ref 10–44)
ANION GAP (OHS): 7 MMOL/L (ref 8–16)
AST SERPL-CCNC: 25 UNIT/L (ref 11–45)
BACTERIA #/AREA URNS AUTO: ABNORMAL /HPF
BASOPHILS # BLD AUTO: 0.02 K/UL
BASOPHILS NFR BLD AUTO: 0.3 %
BILIRUB SERPL-MCNC: 0.8 MG/DL (ref 0.1–1)
BILIRUB UR QL STRIP.AUTO: NEGATIVE
BUN SERPL-MCNC: 14 MG/DL (ref 10–30)
CALCIUM SERPL-MCNC: 8.6 MG/DL (ref 8.7–10.5)
CHLORIDE SERPL-SCNC: 107 MMOL/L (ref 95–110)
CLARITY UR: ABNORMAL
CO2 SERPL-SCNC: 24 MMOL/L (ref 23–29)
COLOR UR AUTO: YELLOW
CREAT SERPL-MCNC: 0.9 MG/DL (ref 0.5–1.4)
ERYTHROCYTE [DISTWIDTH] IN BLOOD BY AUTOMATED COUNT: 14.3 % (ref 11.5–14.5)
GFR SERPLBLD CREATININE-BSD FMLA CKD-EPI: >60 ML/MIN/1.73/M2
GLUCOSE SERPL-MCNC: 98 MG/DL (ref 70–110)
GLUCOSE UR QL STRIP: NEGATIVE
HCT VFR BLD AUTO: 30.8 % (ref 40–54)
HGB BLD-MCNC: 9.9 GM/DL (ref 14–18)
HGB UR QL STRIP: ABNORMAL
HYALINE CASTS UR QL AUTO: 10 /LPF (ref 0–1)
IMM GRANULOCYTES # BLD AUTO: 0.03 K/UL (ref 0–0.04)
IMM GRANULOCYTES NFR BLD AUTO: 0.5 % (ref 0–0.5)
KETONES UR QL STRIP: NEGATIVE
LEUKOCYTE ESTERASE UR QL STRIP: ABNORMAL
LYMPHOCYTES # BLD AUTO: 0.46 K/UL (ref 1–4.8)
MCH RBC QN AUTO: 30.8 PG (ref 27–31)
MCHC RBC AUTO-ENTMCNC: 32.1 G/DL (ref 32–36)
MCV RBC AUTO: 96 FL (ref 82–98)
MICROSCOPIC COMMENT: ABNORMAL
NITRITE UR QL STRIP: NEGATIVE
NUCLEATED RBC (/100WBC) (OHS): 0 /100 WBC
PH UR STRIP: 7 [PH]
PLATELET # BLD AUTO: 251 K/UL (ref 150–450)
PMV BLD AUTO: 8.9 FL (ref 9.2–12.9)
POTASSIUM SERPL-SCNC: 4.2 MMOL/L (ref 3.5–5.1)
PROT SERPL-MCNC: 6.8 GM/DL (ref 6–8.4)
PROT UR QL STRIP: ABNORMAL
RBC # BLD AUTO: 3.21 M/UL (ref 4.6–6.2)
RBC #/AREA URNS AUTO: 40 /HPF (ref 0–4)
RELATIVE EOSINOPHIL (OHS): 3.3 %
RELATIVE LYMPHOCYTE (OHS): 7.3 % (ref 18–48)
RELATIVE MONOCYTE (OHS): 8.5 % (ref 4–15)
RELATIVE NEUTROPHIL (OHS): 80.1 % (ref 38–73)
SODIUM SERPL-SCNC: 138 MMOL/L (ref 136–145)
SP GR UR STRIP: 1.02
SQUAMOUS #/AREA URNS AUTO: 1 /HPF
UROBILINOGEN UR STRIP-ACNC: 1 EU/DL
WBC # BLD AUTO: 6.33 K/UL (ref 3.9–12.7)
WBC #/AREA URNS AUTO: >100 /HPF (ref 0–5)

## 2025-05-30 PROCEDURE — 81001 URINALYSIS AUTO W/SCOPE: CPT | Performed by: INTERNAL MEDICINE

## 2025-05-30 PROCEDURE — 63600175 PHARM REV CODE 636 W HCPCS: Performed by: INTERNAL MEDICINE

## 2025-05-30 PROCEDURE — P9612 CATHETERIZE FOR URINE SPEC: HCPCS

## 2025-05-30 PROCEDURE — 96374 THER/PROPH/DIAG INJ IV PUSH: CPT

## 2025-05-30 PROCEDURE — 99285 EMERGENCY DEPT VISIT HI MDM: CPT

## 2025-05-30 PROCEDURE — 36415 COLL VENOUS BLD VENIPUNCTURE: CPT | Performed by: INTERNAL MEDICINE

## 2025-05-30 PROCEDURE — 82040 ASSAY OF SERUM ALBUMIN: CPT | Performed by: INTERNAL MEDICINE

## 2025-05-30 PROCEDURE — 11000001 HC ACUTE MED/SURG PRIVATE ROOM

## 2025-05-30 PROCEDURE — 85025 COMPLETE CBC W/AUTO DIFF WBC: CPT | Performed by: INTERNAL MEDICINE

## 2025-05-30 PROCEDURE — 87086 URINE CULTURE/COLONY COUNT: CPT | Performed by: INTERNAL MEDICINE

## 2025-05-30 PROCEDURE — 51702 INSERT TEMP BLADDER CATH: CPT

## 2025-05-30 PROCEDURE — 25000003 PHARM REV CODE 250: Performed by: INTERNAL MEDICINE

## 2025-05-30 RX ORDER — TALC
6 POWDER (GRAM) TOPICAL NIGHTLY PRN
Status: DISCONTINUED | OUTPATIENT
Start: 2025-05-30 | End: 2025-06-03 | Stop reason: HOSPADM

## 2025-05-30 RX ORDER — CEFTRIAXONE 1 G/1
1 INJECTION, POWDER, FOR SOLUTION INTRAMUSCULAR; INTRAVENOUS
Status: COMPLETED | OUTPATIENT
Start: 2025-05-30 | End: 2025-05-30

## 2025-05-30 RX ORDER — SODIUM CHLORIDE 0.9 % (FLUSH) 0.9 %
10 SYRINGE (ML) INJECTION
Status: DISCONTINUED | OUTPATIENT
Start: 2025-05-30 | End: 2025-06-03 | Stop reason: HOSPADM

## 2025-05-30 RX ORDER — CEFTRIAXONE 1 G/1
1 INJECTION, POWDER, FOR SOLUTION INTRAMUSCULAR; INTRAVENOUS
Status: DISCONTINUED | OUTPATIENT
Start: 2025-05-31 | End: 2025-06-03 | Stop reason: HOSPADM

## 2025-05-30 RX ORDER — LEVOFLOXACIN 5 MG/ML
500 INJECTION, SOLUTION INTRAVENOUS
Status: DISCONTINUED | OUTPATIENT
Start: 2025-05-30 | End: 2025-05-30

## 2025-05-30 RX ORDER — CEFTRIAXONE 1 G/1
1 INJECTION, POWDER, FOR SOLUTION INTRAMUSCULAR; INTRAVENOUS
Status: DISCONTINUED | OUTPATIENT
Start: 2025-05-30 | End: 2025-05-30

## 2025-05-30 RX ORDER — CEFDINIR 300 MG/1
300 CAPSULE ORAL 2 TIMES DAILY
Qty: 20 CAPSULE | Refills: 0 | Status: ON HOLD | OUTPATIENT
Start: 2025-05-30 | End: 2025-06-03

## 2025-05-30 RX ORDER — LIDOCAINE HYDROCHLORIDE 20 MG/ML
JELLY TOPICAL
Status: COMPLETED | OUTPATIENT
Start: 2025-05-30 | End: 2025-05-30

## 2025-05-30 RX ADMIN — CEFTRIAXONE SODIUM 1 G: 1 INJECTION, POWDER, FOR SOLUTION INTRAMUSCULAR; INTRAVENOUS at 08:05

## 2025-05-30 RX ADMIN — LIDOCAINE HYDROCHLORIDE 10 ML: 20 JELLY TOPICAL at 07:05

## 2025-05-30 NOTE — PLAN OF CARE
[facility]  Initial Discharge Assessment       Primary Care Provider: Luis Enrique Guzman Jr., MD    Admission Diagnosis: Delirium [R41.0]  Urinary tract infection without hematuria, site unspecified [N39.0]    Admission Date: 5/30/2025  Expected Discharge Date: 6/2/2025         Payor: MEDICARE / Plan: MEDICARE PART A & B / Product Type: Government /     Extended Emergency Contact Information  Primary Emergency Contact: Eugenio Hemphill  Mobile Phone: [phone]  Relation: Daughter  Preferred language: English   needed? No  Secondary Emergency Contact: Therese Liz  Home Phone: [phone]  Relation: Spouse  Preferred language: English   needed? No    Discharge Plan A: Rehab  Discharge Plan B: Assisted Living, Home Health      [place] [address]  [address] [address]  Phone: [phone] Fax: [phone]      Initial Assessment (most recent)       Adult Discharge Assessment - 05/30/25 1222          Discharge Assessment    Assessment Type Discharge Planning Assessment     Confirmed/corrected address, phone number and insurance Yes     Confirmed Demographics Correct on Facesheet     Source of Information family     If unable to respond/provide information was family/caregiver contacted? Yes     Contact Name/Number Eugenio (Daughter)  [phone] (Mobile)     People in Home facility resident     Name(s) of People in Home Therese (spouse) [phone]     Facility Arrived From: [facility]     Do you expect to return to your current living situation? Other (see comments)     Do you have help at home or someone to help you manage your care at home? Yes   The patient's daughter reports that someone comes to assist the patient with his showers    Prior to hospitilization cognitive status: Unable to Assess     Current cognitive status: Unable to Assess     Walking or Climbing Stairs Difficulty yes     Walking or  Climbing Stairs ambulation difficulty, requires equipment;ambulation difficulty, assistance 1 person;stair climbing difficulty, assistance 1 person;transferring difficulty, assistance 1 person     Mobility Management rolling walker or wheelchair     Dressing/Bathing Difficulty yes     Dressing/Bathing bathing difficulty, requires equipment;bathing difficulty, assistance 1 person;dressing difficulty, assistance 1 person     Dressing/Bathing Management shower chair     Home Accessibility wheelchair accessible     Home Layout Able to live on 1st floor     Equipment Currently Used at Home wheelchair;walker, rolling;shower chair;urinary catheter supplies     Readmission within 30 days? No     Patient currently being followed by outpatient case management? No     Do you currently have service(s) that help you manage your care at home? Yes     Name and Contact number of agency [facility] [phone] and [facility] with [facility]. Burgos     Is the pt/caregiver preference to resume services with current agency Yes     Do you take prescription medications? Yes   The patient's daughters assist him with his medications.    Do you have prescription coverage? Yes     Do you have any problems affording any of your prescribed medications? TBD     Is the patient taking medications as prescribed? yes     Who is going to help you get home at discharge? family     How do you get to doctors appointments? family or friend will provide     Are you on dialysis? No     Do you take coumadin? No     Discharge Plan A Rehab     Discharge Plan B Assisted Living;Home Health     DME Needed Upon Discharge  other (see comments)   TBD    Discharge Plan discussed with: Adult children                 Discharge assessment completed with the patient's daughter, Eugenio. I attempted to speak with the patient, but he was asleep. The patient's chart also indicates that the patient was confused prior to being admitted. The patient is from  [facility]. He requires assistance and durable medical equipment with his care. The patient has home health with [facility].     Eugenio would like to see if the patient would be a candidate for inpatient rehab. The patient currently does not have any therapy orders. Discharge planning checklist was left at the patient's bedside with instructions to contact  for any needs.  SW will follow as needed.

## 2025-05-30 NOTE — ED NOTES
Pts daughter does not know if she wants the willams removed or not. Home health is unable to see him today.

## 2025-05-30 NOTE — ED PROVIDER NOTES
"05/30/2025         7:05 AM    Source of History:  History obtained from family and EMS.     Chief complaint:  From Nurse Triage:  Altered Mental Status (Pt to ED from [facility] via POV - family stated that pt was "walking halls last night" - with increased confusion - removed willams cath. Concerned for UTI. )    HISTORY OF PRESENT ILLNES:  Tobi Liz Jr. is a 89 y.o. male  has a past medical history of Aortic valve stenosis, Arthritis, BPH (benign prostatic hyperplasia), Carotid artery stenosis, Chronic diastolic heart failure, ETOH abuse, Exposure to COVID-19 virus (01/07/2022), Hyperchloremia, Hypertension, Kidney stones, Murmur, Pacemaker, Polymyalgia rheumatica, PVD (peripheral vascular disease), and Renal artery stenosis. presenting with Altered Mental Status (Pt to ED from [facility] via POV - family stated that pt was "walking halls last night" - with increased confusion - removed willams cath. Concerned for UTI. )      REVIEW OF SYSTEMS:   Constitutional symptoms:     Skin symptoms:      Eye symptoms:     ENMT symptoms:      Respiratory symptoms:      Cardiovascular symptoms:     Gastrointestinal symptoms:      Genitourinary symptoms:     Musculoskeletal symptoms:      Neurologic symptoms:      Psychiatric symptoms:               Additional review of systems information: Patient Denies Any Other Complaints.    All Other Systems Reviewed With Patient And Negative.    ALLEGIES:  Review of patient's allergies indicates:  No Known Allergies    MEDICINE LIST:  Current Outpatient Medications   Medication Instructions    amLODIPine (NORVASC) 5 mg, Oral, Daily, Prescribed by Dr. Box    aspirin 81 mg, Daily    cefdinir (OMNICEF) 300 mg, Oral, 2 times daily    clopidogreL (PLAVIX) 75 mg, Oral, Daily    rosuvastatin (CRESTOR) 10 mg, Oral, Daily    solifenacin (VESICARE) 10 mg, Daily    sulfamethoxazole-trimethoprim 800-160mg (BACTRIM DS) 800-160 mg Tab 1 tablet, Oral, 2 times daily        PMH:  As per HPI " and below:    Reviewed and updated in chart.    PAST MEDICAL HISTORY:  Past Medical History:   Diagnosis Date    Aortic valve stenosis     Arthritis     BPH (benign prostatic hyperplasia)     Carotid artery stenosis     Chronic diastolic heart failure     ETOH abuse     Exposure to COVID-19 virus 01/07/2022    Hyperchloremia     Hypertension     Kidney stones     Murmur     Pacemaker     Polymyalgia rheumatica     PVD (peripheral vascular disease)     Renal artery stenosis         PAST SURGICAL HISTORY:  Past Surgical History:   Procedure Laterality Date    A-V CARDIAC PACEMAKER INSERTION N/A 10/6/2023    Procedure: INSERTION, CARDIAC PACEMAKER, DUAL CHAMBER;  Surgeon: Douglas Salguero MD;  Location: [facility] CATH;  Service: Cardiology;  Laterality: N/A;    ANGIOGRAM, CAROTID Left 8/25/2023    Procedure: ANGIOGRAM, CAROTID;  Surgeon: Nick Box MD;  Location: [facility] CATH;  Service: Cardiology;  Laterality: Left;    CATARACT EXTRACTION, BILATERAL      ECHOCARDIOGRAM,TRANSESOPHAGEAL N/A 10/3/2023    Procedure: Transesophageal echo (JOSE ARMANDO) intra-procedure log documentation;  Surgeon: Nick Box MD;  Location: [facility] OR;  Service: Cardiology;  Laterality: N/A;    HAND SURGERY      INSERTION OF STENT INTO PERIPHERAL VESSEL N/A 1/30/2025    Procedure: INSERTION, STENT, VESSEL, PERIPHERAL;  Surgeon: Harsha Salcedo MD;  Location: [facility] CATH;  Service: Cardiology;  Laterality: N/A;    PERCUTANEOUS TRANSCATHETER AORTIC VALVE REPLACEMENT (TAVR) Left 10/3/2023    Procedure: REPLACEMENT, AORTIC VALVE, PERCUTANEOUS, TRANSCATHETER;  Surgeon: Nick Box MD;  Location: [facility] OR;  Service: Cardiology;  Laterality: Left;    PERCUTANEOUS TRANSCATHETER AORTIC VALVE REPLACEMENT (TAVR) Left 10/3/2023    Procedure: REPLACEMENT, AORTIC VALVE, PERCUTANEOUS, TRANSCATHETER carotid access;  Surgeon: Jun Brito MD;  Location: [facility] OR;  Service: Cardiovascular;  Laterality: Left;    PERCUTANEOUS TRANSLUMINAL ANGIOPLASTY N/A  8/25/2023    Procedure: ANGIOPLASTY-PERCUTANEOUS TRANSLUMINAL (PTA);  Surgeon: Nick Box MD;  Location: [facility] CATH;  Service: Cardiology;  Laterality: N/A;    PERCUTANEOUS TRANSLUMINAL ANGIOPLASTY (PTA) OF PERIPHERAL VESSEL Left 4/10/2025    Procedure: PTA, PERIPHERAL VESSEL;  Surgeon: Harsha Salcedo MD;  Location: [facility] CATH;  Service: Cardiology;  Laterality: Left;  L LE intervention via left antegrade approach [facility]ha Sal[facility]o at [facility] in Hybrid OR under MAC  *PACEMAKER*    SHOULDER SURGERY Left     total shoulder replacement    WOUND EXPLORATION N/A 10/3/2023    Procedure: EXPLORATION, WOUND;  Surgeon: Jun Brito MD;  Location: [facility] OR;  Service: Cardiology;  Laterality: N/A;       SOCIAL HISTORY:  Social History[1]    FAMILY HISTORY:  Family History   Problem Relation Name Age of Onset    Cancer Mother      Stroke Father          PROBLEM LIST:  Problem List[2]     PHYSICAL EXAM:      ED Triage Vitals [05/30/25 0657]   BP (!) 147/65   Pulse 68   Resp 18   Temp 97.8 °F (36.6 °C)   SpO2 98 %        Vital Signs: Reviewed As In Chart.  General:  Alert, No Cardiorespiratory Distress Noted.  Head: Normocephalic   Eye:  Pupils equal and reactive to light and accomodation. Extraocular Movements Are Intact.   ENT: Mucus membranes are moist.   Neck: Supple  Cardiovascular:  Regular Rate And Rhythm     Respiratory:  Clear to auscultation bilaterally    Gastrointestinal:  Soft, Non Distended, Non Tenderness, Normal Bowel Sounds.    Neurological:  Alert And Oriented To Person, Place, Time, And Situation, Normal Motor Observed, Normal Speech Observed.  Back: Normal range of motion  Musculoskeletal:  No Gross Deformity Noted.   Genital: deferred    Psychiatric:  Cooperative.  Skin: warm, dry  Lymphatic: No lymphadenopathy  Bolton catheter broken off at the meatus.  No bleeding some leakage of urine.  Bolton removed completely      ED WORKUP FOR MEDICAL DECISION MAKING:    ED ORDERS:  Orders Placed This Encounter    Procedures    CBC auto differential    Comprehensive metabolic panel    Urinalysis    CBC with Differential    Urinalysis Microscopic    Straight Cath (In and Out) - do not wait for patient to void    Insert Saline lock IV    Place in Observation       ED MEDICINES:  Medications   LIDOcaine HCl 2% urojet (10 mLs Mucous Membrane Given 5/30/25 0715)   cefTRIAXone injection 1 g (1 g Intravenous Given 5/30/25 0842)                ED LABS ORDERED AND REVIEWED:  Admission on 05/30/2025   Component Date Value Ref Range Status    Sodium 05/30/2025 138  136 - 145 mmol/L Final    Potassium 05/30/2025 4.2  3.5 - 5.1 mmol/L Final    Chloride 05/30/2025 107  95 - 110 mmol/L Final    CO2 05/30/2025 24  23 - 29 mmol/L Final    Glucose 05/30/2025 98  70 - 110 mg/dL Final    BUN 05/30/2025 14  10 - 30 mg/dL Final    Creatinine 05/30/2025 0.9  0.5 - 1.4 mg/dL Final    Calcium 05/30/2025 8.6 (L)  8.7 - 10.5 mg/dL Final    Protein Total 05/30/2025 6.8  6.0 - 8.4 gm/dL Final    Albumin 05/30/2025 3.5  3.5 - 5.2 g/dL Final    Bilirubin Total 05/30/2025 0.8  0.1 - 1.0 mg/dL Final    ALP 05/30/2025 64  40 - 150 unit/L Final    AST 05/30/2025 25  11 - 45 unit/L Final    ALT 05/30/2025 11  10 - 44 unit/L Final    Anion Gap 05/30/2025 7 (L)  8 - 16 mmol/L Final    eGFR 05/30/2025 >60  >60 mL/min/1.73/m2 Final    Color, UA 05/30/2025 Yellow  Straw, Shira, Yellow, Light-Orange Final    Appearance, UA 05/30/2025 Hazy (A)  Clear Final    pH, UA 05/30/2025 7.0  5.0 - 8.0 Final    Spec Grav UA 05/30/2025 1.020  1.005 - 1.030 Final    Protein, UA 05/30/2025 1+ (A)  Negative Final    Glucose, UA 05/30/2025 Negative  Negative Final    Ketones, UA 05/30/2025 Negative  Negative Final    Bilirubin, UA 05/30/2025 Negative  Negative Final    Blood, UA 05/30/2025 Trace (A)  Negative Final    Nitrites, UA 05/30/2025 Negative  Negative Final    Urobilinogen, UA 05/30/2025 1.0  <2.0 EU/dL Final    Leukocyte Esterase, UA 05/30/2025 3+ (A)  Negative Final     WBC 05/30/2025 6.33  3.90 - 12.70 K/uL Final    RBC 05/30/2025 3.21 (L)  4.60 - 6.20 M/uL Final    HGB 05/30/2025 9.9 (L)  14.0 - 18.0 gm/dL Final    HCT 05/30/2025 30.8 (L)  40.0 - 54.0 % Final    MCV 05/30/2025 96  82 - 98 fL Final    MCH 05/30/2025 30.8  27.0 - 31.0 pg Final    MCHC 05/30/2025 32.1  32.0 - 36.0 g/dL Final    RDW 05/30/2025 14.3  11.5 - 14.5 % Final    Platelet Count 05/30/2025 251  150 - 450 K/uL Final    MPV 05/30/2025 8.9 (L)  9.2 - 12.9 fL Final    Nucleated RBC 05/30/2025 0  <=0 /100 WBC Final    Neut % 05/30/2025 80.1 (H)  38 - 73 % Final    Lymph % 05/30/2025 7.3 (L)  18 - 48 % Final    Mono % 05/30/2025 8.5  4 - 15 % Final    Eos % 05/30/2025 3.3  <=8 % Final    Basophil % 05/30/2025 0.3  <=1.9 % Final    Imm Grans % 05/30/2025 0.5  0.0 - 0.5 % Final    Neut # 05/30/2025 5.07  1.8 - 7.7 K/uL Final    Lymph # 05/30/2025 0.46 (L)  1 - 4.8 K/uL Final    Mono # 05/30/2025 0.54  0.3 - 1 K/uL Final    Eos # 05/30/2025 0.21  <=0.5 K/uL Final    Baso # 05/30/2025 0.02  <=0.2 K/uL Final    Imm Grans # 05/30/2025 0.03  0.00 - 0.04 K/uL Final    RBC, UA 05/30/2025 40 (H)  0 - 4 /HPF Final    WBC, UA 05/30/2025 >100 (H)  0 - 5 /HPF Final    Bacteria, UA 05/30/2025 None  None, Rare, Occasional /HPF Final    Squamous Epithelial Cells, UA 05/30/2025 1  /HPF Final    Hyaline Casts, UA 05/30/2025 10 (H)  0 - 1 /LPF Final    Microscopic Comment 05/30/2025    Final       RADIOLOGY STUDIES ORDERED AND REVIEWED:  Imaging Results    None         MEDICAL DECISION MAKING:    Tobi Covarrubiasnemesio Dooley is 89 y.o. male who  has a past medical history of Aortic valve stenosis, Arthritis, BPH (benign prostatic hyperplasia), Carotid artery stenosis, Chronic diastolic heart failure, ETOH abuse, Exposure to COVID-19 virus (01/07/2022), Hyperchloremia, Hypertension, Kidney stones, Murmur, Pacemaker, Polymyalgia rheumatica, PVD (peripheral vascular disease), and Renal artery stenosis. arrives in ER with c/o Altered Mental  "Status (Pt to ED from [facility] via POV - family stated that pt was "walking halls last night" - with increased confusion - removed willams cath. Concerned for UTI. )      Reviewed Nurses Note. Reviewed Vital Signs.     Reviewed Pertinent old records, History and updated as necessary.    Vitals:    05/30/25 0702   BP: 132/61   Pulse: 64   Resp:    Temp:         Medical Decision Making  Differential diagnosis includes but is not limited to confusion, dementia, CVA, TIA, hypoglycemia, UTI, pneumonia, alcohol intoxication, drug abuse, delirium, dehydration, electrolyte imbalance, depression, anxiety and intracranial hemorrhage.    Amount and/or Complexity of Data Reviewed  Labs: ordered.     Details: Urine WBCs and bacteria    Risk  Prescription drug management.                     Family wishes to remove catheter because they feel like this is what is causing his urinary tract infections.  Discussed with Dr. Guzman who agrees with admit.  We will remove catheter in schedule bladder scans to see if he is having retention.      PROCEDURES PERFORMED IN ED:  Procedures    DIAGNOSTIC IMPRESSION:        ICD-10-CM ICD-9-CM   1. Urinary tract infection without hematuria, site unspecified  N39.0 599.0   2. Delirium  R41.0 780.09         ED Disposition Condition    Observation                Medication List        START taking these medications      cefdinir 300 MG capsule  Commonly known as: OMNICEF  Take 1 capsule (300 mg total) by mouth 2 (two) times daily. for 10 days            ASK your doctor about these medications      amLODIPine 5 MG tablet  Commonly known as: NORVASC  Take 1 tablet (5 mg total) by mouth once daily. Prescribed by Dr. Box     aspirin 81 MG Chew     clopidogreL 75 mg tablet  Commonly known as: PLAVIX  Take 1 tablet (75 mg total) by mouth once daily.     rosuvastatin 10 MG tablet  Commonly known as: CRESTOR  Take 1 tablet (10 mg total) by mouth once daily.     solifenacin 10 MG tablet  Commonly known " as: VESICARE     sulfamethoxazole-trimethoprim 800-160mg 800-160 mg Tab  Commonly known as: BACTRIM DS  Take 1 tablet by mouth 2 (two) times daily.               Where to Get Your Medications        These medications were sent to [address] [facility]  [address] [facility] [address], [facility] [address]      Phone: [phone]   cefdinir 300 MG capsule                            Trevon Bear MD  05/30/25 5974         [1]   Social History  Tobacco Use    Smoking status: Former     Types: Cigars     Passive exposure: Past    Smokeless tobacco: Never   Substance Use Topics    Alcohol use: Yes     Alcohol/week: 3.0 standard drinks of alcohol     Types: 3 Shots of liquor per week     Comment: 1.5 oz a day    Drug use: Never   [2]   Patient Active Problem List  Diagnosis    Hypertension    Hyperlipidemia    Dizziness    Carotid artery stenosis    BPH (benign prostatic hyperplasia)    IFG (impaired fasting glucose)    Vitamin D deficiency    Valvular heart disease    Atherosclerosis of native artery of both lower extremities with rest pain    Stenosis of left subclavian artery    Congestive heart failure, NYHA class II    S/P TAVR (transcatheter aortic valve replacement)    CHB (complete heart block)    Polymyalgia rheumatica    Occlusion of right iliac artery    Lower extremity cellulitis    Insomnia    Localized edema    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

## 2025-05-30 NOTE — ED NOTES
In and out catheterization perform. Urine output was less then 2 mLs. RN placed a leg bag in order to collect more urine for a sample. MD notified.

## 2025-05-31 PROBLEM — R41.0 DELIRIUM: Status: ACTIVE | Noted: 2025-05-31

## 2025-05-31 PROBLEM — N39.0 URINARY TRACT INFECTION WITHOUT HEMATURIA: Status: ACTIVE | Noted: 2025-05-31

## 2025-05-31 LAB
ABSOLUTE EOSINOPHIL (OHS): 0.26 K/UL
ABSOLUTE MONOCYTE (OHS): 0.41 K/UL (ref 0.3–1)
ABSOLUTE NEUTROPHIL COUNT (OHS): 4.59 K/UL (ref 1.8–7.7)
ALBUMIN SERPL BCP-MCNC: 3.4 G/DL (ref 3.5–5.2)
ALP SERPL-CCNC: 66 UNIT/L (ref 40–150)
ALT SERPL W/O P-5'-P-CCNC: 9 UNIT/L (ref 10–44)
ANION GAP (OHS): 7 MMOL/L (ref 8–16)
AST SERPL-CCNC: 21 UNIT/L (ref 11–45)
BASOPHILS # BLD AUTO: 0.01 K/UL
BASOPHILS NFR BLD AUTO: 0.2 %
BILIRUB SERPL-MCNC: 0.6 MG/DL (ref 0.1–1)
BUN SERPL-MCNC: 11 MG/DL (ref 10–30)
CALCIUM SERPL-MCNC: 8.6 MG/DL (ref 8.7–10.5)
CHLORIDE SERPL-SCNC: 107 MMOL/L (ref 95–110)
CO2 SERPL-SCNC: 26 MMOL/L (ref 23–29)
CREAT SERPL-MCNC: 0.8 MG/DL (ref 0.5–1.4)
ERYTHROCYTE [DISTWIDTH] IN BLOOD BY AUTOMATED COUNT: 14.5 % (ref 11.5–14.5)
GFR SERPLBLD CREATININE-BSD FMLA CKD-EPI: >60 ML/MIN/1.73/M2
GLUCOSE SERPL-MCNC: 121 MG/DL (ref 70–110)
HCT VFR BLD AUTO: 33.4 % (ref 40–54)
HGB BLD-MCNC: 10.6 GM/DL (ref 14–18)
IMM GRANULOCYTES # BLD AUTO: 0.02 K/UL (ref 0–0.04)
IMM GRANULOCYTES NFR BLD AUTO: 0.3 % (ref 0–0.5)
LYMPHOCYTES # BLD AUTO: 0.51 K/UL (ref 1–4.8)
MCH RBC QN AUTO: 30.7 PG (ref 27–31)
MCHC RBC AUTO-ENTMCNC: 31.7 G/DL (ref 32–36)
MCV RBC AUTO: 97 FL (ref 82–98)
NUCLEATED RBC (/100WBC) (OHS): 0 /100 WBC
PLATELET # BLD AUTO: 243 K/UL (ref 150–450)
PMV BLD AUTO: 9 FL (ref 9.2–12.9)
POTASSIUM SERPL-SCNC: 4 MMOL/L (ref 3.5–5.1)
PROT SERPL-MCNC: 6.7 GM/DL (ref 6–8.4)
RBC # BLD AUTO: 3.45 M/UL (ref 4.6–6.2)
RELATIVE EOSINOPHIL (OHS): 4.5 %
RELATIVE LYMPHOCYTE (OHS): 8.8 % (ref 18–48)
RELATIVE MONOCYTE (OHS): 7.1 % (ref 4–15)
RELATIVE NEUTROPHIL (OHS): 79.1 % (ref 38–73)
SODIUM SERPL-SCNC: 140 MMOL/L (ref 136–145)
WBC # BLD AUTO: 5.8 K/UL (ref 3.9–12.7)

## 2025-05-31 PROCEDURE — 25000003 PHARM REV CODE 250: Performed by: INTERNAL MEDICINE

## 2025-05-31 PROCEDURE — 85025 COMPLETE CBC W/AUTO DIFF WBC: CPT | Performed by: INTERNAL MEDICINE

## 2025-05-31 PROCEDURE — 11000001 HC ACUTE MED/SURG PRIVATE ROOM

## 2025-05-31 PROCEDURE — 97165 OT EVAL LOW COMPLEX 30 MIN: CPT

## 2025-05-31 PROCEDURE — 63600175 PHARM REV CODE 636 W HCPCS: Performed by: INTERNAL MEDICINE

## 2025-05-31 PROCEDURE — 36415 COLL VENOUS BLD VENIPUNCTURE: CPT | Performed by: INTERNAL MEDICINE

## 2025-05-31 PROCEDURE — 97535 SELF CARE MNGMENT TRAINING: CPT

## 2025-05-31 PROCEDURE — 80053 COMPREHEN METABOLIC PANEL: CPT | Performed by: INTERNAL MEDICINE

## 2025-05-31 RX ORDER — MUPIROCIN 20 MG/G
OINTMENT TOPICAL 2 TIMES DAILY
Status: DISCONTINUED | OUTPATIENT
Start: 2025-05-31 | End: 2025-06-03 | Stop reason: HOSPADM

## 2025-05-31 RX ORDER — NAPROXEN SODIUM 220 MG/1
81 TABLET, FILM COATED ORAL DAILY
Status: DISCONTINUED | OUTPATIENT
Start: 2025-05-31 | End: 2025-06-03 | Stop reason: HOSPADM

## 2025-05-31 RX ORDER — AMLODIPINE BESYLATE 5 MG/1
5 TABLET ORAL DAILY
Status: DISCONTINUED | OUTPATIENT
Start: 2025-05-31 | End: 2025-06-03 | Stop reason: HOSPADM

## 2025-05-31 RX ORDER — CLOPIDOGREL BISULFATE 75 MG/1
75 TABLET ORAL DAILY
Status: DISCONTINUED | OUTPATIENT
Start: 2025-05-31 | End: 2025-06-03 | Stop reason: HOSPADM

## 2025-05-31 RX ADMIN — CEFTRIAXONE SODIUM 1 G: 1 INJECTION, POWDER, FOR SOLUTION INTRAMUSCULAR; INTRAVENOUS at 09:05

## 2025-05-31 RX ADMIN — AMLODIPINE BESYLATE 5 MG: 5 TABLET ORAL at 09:05

## 2025-05-31 RX ADMIN — ASPIRIN 81 MG CHEWABLE TABLET 81 MG: 81 TABLET CHEWABLE at 09:05

## 2025-05-31 NOTE — PLAN OF CARE
Plan of care reviewed and ongoing with patient. 20 gauge IV to L AC saline locked, room air tolerating well. Bolton catheter in place, draining properly into drainage device, free of loops/kinks. Call light and personal items within reach of patient during the night.       Problem: Skin Injury Risk Increased  Goal: Skin Health and Integrity  Outcome: Progressing     Problem: Adult Inpatient Plan of Care  Goal: Plan of Care Review  Outcome: Progressing  Goal: Patient-Specific Goal (Individualized)  Outcome: Progressing  Goal: Absence of Hospital-Acquired Illness or Injury  Outcome: Progressing  Goal: Optimal Comfort and Wellbeing  Outcome: Progressing  Goal: Readiness for Transition of Care  Outcome: Progressing     Problem: Infection  Goal: Absence of Infection Signs and Symptoms  Outcome: Progressing     Problem: Wound  Goal: Optimal Coping  Outcome: Progressing  Goal: Optimal Functional Ability  Outcome: Progressing  Goal: Absence of Infection Signs and Symptoms  Outcome: Progressing  Goal: Improved Oral Intake  Outcome: Progressing  Goal: Optimal Pain Control and Function  Outcome: Progressing  Goal: Skin Health and Integrity  Outcome: Progressing  Goal: Optimal Wound Healing  Outcome: Progressing     Problem: Fall Injury Risk  Goal: Absence of Fall and Fall-Related Injury  Outcome: Progressing

## 2025-05-31 NOTE — ASSESSMENT & PLAN NOTE
Patient's blood pressure range in the last 24 hours was: BP  Min: 121/58  Max: 164/70.The patient's inpatient anti-hypertensive regimen is listed below:  Current Antihypertensives  amLODIPine tablet 5 mg, Daily, Oral    Plan  - BP is controlled, no changes needed to their regimen

## 2025-05-31 NOTE — PLAN OF CARE
Problem: Occupational Therapy  Goal: Occupational Therapy Goal  Description: Goals to be met by: 6/6/2025     Patient will increase functional independence with ADLs by performing:    Toileting from toilet with Set-up Assistance for hygiene and clothing management.   Supine to sit with Set-up Assistance.  Toilet transfer to toilet with Supervision.    Outcome: Progressing   Tonia Trinidad, OT

## 2025-05-31 NOTE — SUBJECTIVE & OBJECTIVE
Past Medical History:   Diagnosis Date    Aortic valve stenosis     Arthritis     BPH (benign prostatic hyperplasia)     Carotid artery stenosis     Chronic diastolic heart failure     ETOH abuse     Exposure to COVID-19 virus 01/07/2022    Hyperchloremia     Hypertension     Kidney stones     Murmur     Pacemaker     Polymyalgia rheumatica     PVD (peripheral vascular disease)     Renal artery stenosis        Past Surgical History:   Procedure Laterality Date    A-V CARDIAC PACEMAKER INSERTION N/A 10/6/2023    Procedure: INSERTION, CARDIAC PACEMAKER, DUAL CHAMBER;  Surgeon: Douglas Salguero MD;  Location: [facility] CATH;  Service: Cardiology;  Laterality: N/A;    ANGIOGRAM, CAROTID Left 8/25/2023    Procedure: ANGIOGRAM, CAROTID;  Surgeon: Nick Box MD;  Location: [facility] CATH;  Service: Cardiology;  Laterality: Left;    CATARACT EXTRACTION, BILATERAL      ECHOCARDIOGRAM,TRANSESOPHAGEAL N/A 10/3/2023    Procedure: Transesophageal echo (JOSE ARMANDO) intra-procedure log documentation;  Surgeon: Nick Box MD;  Location: [facility] OR;  Service: Cardiology;  Laterality: N/A;    HAND SURGERY      INSERTION OF STENT INTO PERIPHERAL VESSEL N/A 1/30/2025    Procedure: INSERTION, STENT, VESSEL, PERIPHERAL;  Surgeon: Harsha Salcedo MD;  Location: [facility] CATH;  Service: Cardiology;  Laterality: N/A;    PERCUTANEOUS TRANSCATHETER AORTIC VALVE REPLACEMENT (TAVR) Left 10/3/2023    Procedure: REPLACEMENT, AORTIC VALVE, PERCUTANEOUS, TRANSCATHETER;  Surgeon: Nick Box MD;  Location: [facility] OR;  Service: Cardiology;  Laterality: Left;    PERCUTANEOUS TRANSCATHETER AORTIC VALVE REPLACEMENT (TAVR) Left 10/3/2023    Procedure: REPLACEMENT, AORTIC VALVE, PERCUTANEOUS, TRANSCATHETER carotid access;  Surgeon: Jun Brito MD;  Location: [facility] OR;  Service: Cardiovascular;  Laterality: Left;    PERCUTANEOUS TRANSLUMINAL ANGIOPLASTY N/A 8/25/2023    Procedure: ANGIOPLASTY-PERCUTANEOUS TRANSLUMINAL (PTA);  Surgeon: Nick Box MD;   Location: [facility] CATH;  Service: Cardiology;  Laterality: N/A;    PERCUTANEOUS TRANSLUMINAL ANGIOPLASTY (PTA) OF PERIPHERAL VESSEL Left 4/10/2025    Procedure: PTA, PERIPHERAL VESSEL;  Surgeon: Harsha Salcedo MD;  Location: [facility] CATH;  Service: Cardiology;  Laterality: Left;  L LE intervention via left antegrade approach Harsah Salcedo at [facility] in Hybrid OR under MAC  *PACEMAKER*    SHOULDER SURGERY Left     total shoulder replacement    WOUND EXPLORATION N/A 10/3/2023    Procedure: EXPLORATION, WOUND;  Surgeon: Jun Brito MD;  Location: [facility] OR;  Service: Cardiology;  Laterality: N/A;       Review of patient's allergies indicates:  No Known Allergies    No current facility-administered medications on file prior to encounter.     Current Outpatient Medications on File Prior to Encounter   Medication Sig    amLODIPine (NORVASC) 5 MG tablet Take 1 tablet (5 mg total) by mouth once daily. Prescribed by Dr. Box    aspirin 81 MG Chew Take 81 mg by mouth once daily.    clopidogreL (PLAVIX) 75 mg tablet Take 1 tablet (75 mg total) by mouth once daily.    rosuvastatin (CRESTOR) 10 MG tablet Take 1 tablet (10 mg total) by mouth once daily.    solifenacin (VESICARE) 10 MG tablet Take 10 mg by mouth once daily.    sulfamethoxazole-trimethoprim 800-160mg (BACTRIM DS) 800-160 mg Tab Take 1 tablet by mouth 2 (two) times daily. (Patient not taking: Reported on 5/30/2025)     Family History       Problem Relation (Age of Onset)    Cancer Mother    Stroke Father          Tobacco Use    Smoking status: Former     Types: Cigars     Passive exposure: Past    Smokeless tobacco: Never   Substance and Sexual Activity    Alcohol use: Yes     Alcohol/week: 3.0 standard drinks of alcohol     Types: 3 Shots of liquor per week     Comment: 1.5 oz a day    Drug use: Never    Sexual activity: Not Currently     Review of Systems   Constitutional:  Positive for activity change. Negative for chills and fever.   HENT:  Negative for sinus  pain and sore throat.    Respiratory:  Negative for cough and shortness of breath.    Cardiovascular:  Negative for chest pain.   Gastrointestinal:  Negative for abdominal pain, diarrhea, nausea and vomiting.   Neurological:  Positive for weakness.   Psychiatric/Behavioral:  Positive for confusion.      Objective:     Vital Signs (Most Recent):  Temp: 97.6 °F (36.4 °C) (05/31/25 0838)  Pulse: 65 (05/31/25 0838)  Resp: 18 (05/31/25 0838)  BP: (!) 121/58 (05/31/25 0838)  SpO2: 95 % (05/31/25 0838) Vital Signs (24h Range):  Temp:  [97.6 °F (36.4 °C)-97.7 °F (36.5 °C)] 97.6 °F (36.4 °C)  Pulse:  [65-70] 65  Resp:  [18-20] 18  SpO2:  [95 %-99 %] 95 %  BP: (121-164)/(58-70) 121/58     Weight: 83.9 kg (185 lb)  Body mass index is 27.32 kg/m².     Physical Exam  Constitutional:       Appearance: He is ill-appearing.   HENT:      Mouth/Throat:      Mouth: Mucous membranes are moist.   Eyes:      Extraocular Movements: Extraocular movements intact.      Pupils: Pupils are equal, round, and reactive to light.   Cardiovascular:      Rate and Rhythm: Normal rate and regular rhythm.   Pulmonary:      Effort: Pulmonary effort is normal.      Breath sounds: Normal breath sounds.   Abdominal:      General: Bowel sounds are normal.      Palpations: Abdomen is soft.   Genitourinary:     Comments: Bolton with dark yellow urine  Musculoskeletal:         General: Normal range of motion.   Skin:     General: Skin is warm and dry.      Capillary Refill: Capillary refill takes less than 2 seconds.   Neurological:      General: No focal deficit present.      Mental Status: He is alert.   Psychiatric:         Mood and Affect: Mood normal.              CRANIAL NERVES     CN III, IV, VI   Pupils are equal, round, and reactive to light.       Significant Labs: All pertinent labs within the past 24 hours have been reviewed.  Recent Lab Results         05/31/25 0828        Albumin 3.4       ALP 66       ALT 9       Anion Gap 7       AST 21        Baso # 0.01       Basophil % 0.2       BILIRUBIN TOTAL 0.6  Comment: For infants and newborns, interpretation of results should be based   on gestational age, weight and in agreement with clinical   observations.    Premature Infant recommended reference ranges:   0-24 hours:  <8.0 mg/dL   24-48 hours: <12.0 mg/dL   3-5 days:    <15.0 mg/dL   6-29 days:   <15.0 mg/dL       BUN 11       Calcium 8.6       Chloride 107       CO2 26       Creatinine 0.8       eGFR >60  Comment: Estimated GFR calculated using the CKD-EPI creatinine (2021) equation.       Eos # 0.26       Eos % 4.5       Glucose 121       Gran # (ANC) 4.59       Hematocrit 33.4       Hemoglobin 10.6       Immature Grans (Abs) 0.02  Comment: Mild elevation in immature granulocytes is non specific and can be seen in a variety of conditions including stress response, acute inflammation, trauma and pregnancy. Correlation with other laboratory and clinical findings is essential.       Immature Granulocytes 0.3       Lymph # 0.51       Lymph % 8.8       MCH 30.7       MCHC 31.7       MCV 97       Mono # 0.41       Mono % 7.1       MPV 9.0       Neut % 79.1       nRBC 0       Platelet Count 243       Potassium 4.0       PROTEIN TOTAL 6.7       RBC 3.45       RDW 14.5       Sodium 140       WBC 5.80               Significant Imaging: I have reviewed all pertinent imaging results/findings within the past 24 hours.

## 2025-05-31 NOTE — HPI
"Tobi Liz Jr. is a 89 y.o. male  has a past medical history of Aortic valve stenosis, Arthritis, BPH (benign prostatic hyperplasia), Carotid artery stenosis, Chronic diastolic heart failure, ETOH abuse, Exposure to COVID-19 virus (01/07/2022), Hyperchloremia, Hypertension, Kidney stones, Murmur, Pacemaker, Polymyalgia rheumatica, PVD (peripheral vascular disease), and Renal artery stenosis. presenting with Altered Mental Status (Pt to ED from [facility] via [facility] - family stated that pt was "walking halls last night" - with increased confusion - removed willams cath. Concerned for UTI.     Family repotrs he is more to his baslein this am.  Pt has been aggravated and annoyed by his willams catheter and family has noticed that he has had more utis since havign catheter.  Urologist has given options for catheter removal; suprapubic cath and continuation of willams.  Family wishes to dc willams while in hospital and see how he does  "

## 2025-05-31 NOTE — H&P
"  [facility] Medicine  History & Physical    Patient Name: Tobi Liz Jr.  MRN: [id number]  Patient Class: IP- Inpatient  Admission Date: 5/30/2025  Attending Physician: Luis Enrique Guzman Jr., MD   Primary Care Provider: Luis Enrique Guzman Jr., MD         Patient information was obtained from patient, relative(s), and ER records.     Subjective:     Principal Problem:Delirium    Chief Complaint:   Chief Complaint   Patient presents with    Altered Mental Status     Pt to ED from [facility] via POV - family stated that pt was "walking halls last night" - with increased confusion - removed willams cath. Concerned for UTI.         HPI: Tobi Liz Jr. is a 89 y.o. male  has a past medical history of Aortic valve stenosis, Arthritis, BPH (benign prostatic hyperplasia), Carotid artery stenosis, Chronic diastolic heart failure, ETOH abuse, Exposure to COVID-19 virus (01/07/2022), Hyperchloremia, Hypertension, Kidney stones, Murmur, Pacemaker, Polymyalgia rheumatica, PVD (peripheral vascular disease), and Renal artery stenosis. presenting with Altered Mental Status (Pt to ED from [facility] via POV - family stated that pt was "walking halls last night" - with increased confusion - removed willams cath. Concerned for UTI.     Family repotrs he is more to his baslein this am.  Pt has been aggravated and annoyed by his willams catheter and family has noticed that he has had more utis since havign catheter.  Urologist has given options for catheter removal; suprapubic cath and continuation of willams.  Family wishes to dc willams while in hospital and see how he does    Past Medical History:   Diagnosis Date    Aortic valve stenosis     Arthritis     BPH (benign prostatic hyperplasia)     Carotid artery stenosis     Chronic diastolic heart failure     ETOH abuse     Exposure to COVID-19 virus 01/07/2022    Hyperchloremia     Hypertension     Kidney stones     Murmur     Pacemaker     Polymyalgia " rheumatica     PVD (peripheral vascular disease)     Renal artery stenosis        Past Surgical History:   Procedure Laterality Date    A-V CARDIAC PACEMAKER INSERTION N/A 10/6/2023    Procedure: INSERTION, CARDIAC PACEMAKER, DUAL CHAMBER;  Surgeon: Douglas Salguero MD;  Location: [facility] CATH;  Service: Cardiology;  Laterality: N/A;    ANGIOGRAM, CAROTID Left 8/25/2023    Procedure: ANGIOGRAM, CAROTID;  Surgeon: Nick Box MD;  Location: [facility] CATH;  Service: Cardiology;  Laterality: Left;    CATARACT EXTRACTION, BILATERAL      ECHOCARDIOGRAM,TRANSESOPHAGEAL N/A 10/3/2023    Procedure: Transesophageal echo (JOSE ARMANDO) intra-procedure log documentation;  Surgeon: Nick Box MD;  Location: [facility] OR;  Service: Cardiology;  Laterality: N/A;    HAND SURGERY      INSERTION OF STENT INTO PERIPHERAL VESSEL N/A 1/30/2025    Procedure: INSERTION, STENT, VESSEL, PERIPHERAL;  Surgeon: Harsha Salcedo MD;  Location: [facility] CATH;  Service: Cardiology;  Laterality: N/A;    PERCUTANEOUS TRANSCATHETER AORTIC VALVE REPLACEMENT (TAVR) Left 10/3/2023    Procedure: REPLACEMENT, AORTIC VALVE, PERCUTANEOUS, TRANSCATHETER;  Surgeon: Nick Box MD;  Location: [facility] OR;  Service: Cardiology;  Laterality: Left;    PERCUTANEOUS TRANSCATHETER AORTIC VALVE REPLACEMENT (TAVR) Left 10/3/2023    Procedure: REPLACEMENT, AORTIC VALVE, PERCUTANEOUS, TRANSCATHETER carotid access;  Surgeon: Jun Brito MD;  Location: [facility] OR;  Service: Cardiovascular;  Laterality: Left;    PERCUTANEOUS TRANSLUMINAL ANGIOPLASTY N/A 8/25/2023    Procedure: ANGIOPLASTY-PERCUTANEOUS TRANSLUMINAL (PTA);  Surgeon: Nick Box MD;  Location: [facility] CATH;  Service: Cardiology;  Laterality: N/A;    PERCUTANEOUS TRANSLUMINAL ANGIOPLASTY (PTA) OF PERIPHERAL VESSEL Left 4/10/2025    Procedure: PTA, PERIPHERAL VESSEL;  Surgeon: Harsha Salcedo MD;  Location: [facility] CATH;  Service: Cardiology;  Laterality: Left;  L LE intervention via left antegrade approach Harsha Salcedo  at [facility] in Hybrid OR under MAC  *PACEMAKER*    SHOULDER SURGERY Left     total shoulder replacement    WOUND EXPLORATION N/A 10/3/2023    Procedure: EXPLORATION, WOUND;  Surgeon: Jun Brito MD;  Location: [facility] OR;  Service: Cardiology;  Laterality: N/A;       Review of patient's allergies indicates:  No Known Allergies    No current facility-administered medications on file prior to encounter.     Current Outpatient Medications on File Prior to Encounter   Medication Sig    amLODIPine (NORVASC) 5 MG tablet Take 1 tablet (5 mg total) by mouth once daily. Prescribed by Dr. Box    aspirin 81 MG Chew Take 81 mg by mouth once daily.    clopidogreL (PLAVIX) 75 mg tablet Take 1 tablet (75 mg total) by mouth once daily.    rosuvastatin (CRESTOR) 10 MG tablet Take 1 tablet (10 mg total) by mouth once daily.    solifenacin (VESICARE) 10 MG tablet Take 10 mg by mouth once daily.    sulfamethoxazole-trimethoprim 800-160mg (BACTRIM DS) 800-160 mg Tab Take 1 tablet by mouth 2 (two) times daily. (Patient not taking: Reported on 5/30/2025)     Family History       Problem Relation (Age of Onset)    Cancer Mother    Stroke Father          Tobacco Use    Smoking status: Former     Types: Cigars     Passive exposure: Past    Smokeless tobacco: Never   Substance and Sexual Activity    Alcohol use: Yes     Alcohol/week: 3.0 standard drinks of alcohol     Types: 3 Shots of liquor per week     Comment: 1.5 oz a day    Drug use: Never    Sexual activity: Not Currently     Review of Systems   Constitutional:  Positive for activity change. Negative for chills and fever.   HENT:  Negative for sinus pain and sore throat.    Respiratory:  Negative for cough and shortness of breath.    Cardiovascular:  Negative for chest pain.   Gastrointestinal:  Negative for abdominal pain, diarrhea, nausea and vomiting.   Neurological:  Positive for weakness.   Psychiatric/Behavioral:  Positive for confusion.      Objective:     Vital Signs (Most  Recent):  Temp: 97.6 °F (36.4 °C) (05/31/25 0838)  Pulse: 65 (05/31/25 0838)  Resp: 18 (05/31/25 0838)  BP: (!) 121/58 (05/31/25 0838)  SpO2: 95 % (05/31/25 0838) Vital Signs (24h Range):  Temp:  [97.6 °F (36.4 °C)-97.7 °F (36.5 °C)] 97.6 °F (36.4 °C)  Pulse:  [65-70] 65  Resp:  [18-20] 18  SpO2:  [95 %-99 %] 95 %  BP: (121-164)/(58-70) 121/58     Weight: 83.9 kg (185 lb)  Body mass index is 27.32 kg/m².     Physical Exam  Constitutional:       Appearance: He is ill-appearing.   HENT:      Mouth/Throat:      Mouth: Mucous membranes are moist.   Eyes:      Extraocular Movements: Extraocular movements intact.      Pupils: Pupils are equal, round, and reactive to light.   Cardiovascular:      Rate and Rhythm: Normal rate and regular rhythm.   Pulmonary:      Effort: Pulmonary effort is normal.      Breath sounds: Normal breath sounds.   Abdominal:      General: Bowel sounds are normal.      Palpations: Abdomen is soft.   Genitourinary:     Comments: [place] with dark yellow urine  Musculoskeletal:         General: Normal range of motion.   Skin:     General: Skin is warm and dry.      Capillary Refill: Capillary refill takes less than 2 seconds.   Neurological:      General: No focal deficit present.      Mental Status: He is alert.   Psychiatric:         Mood and Affect: Mood normal.              CRANIAL NERVES     CN III, IV, VI   Pupils are equal, round, and reactive to light.       Significant Labs: All pertinent labs within the past 24 hours have been reviewed.  Recent Lab Results         05/31/25  0828        Albumin 3.4       ALP 66       ALT 9       Anion Gap 7       AST 21       Baso # 0.01       Basophil % 0.2       BILIRUBIN TOTAL 0.6  Comment: For infants and newborns, interpretation of results should be based   on gestational age, weight and in agreement with clinical   observations.    Premature Infant recommended reference ranges:   0-24 hours:  <8.0 mg/dL   24-48 hours: <12.0 mg/dL   3-5 days:    <15.0  mg/dL   6-29 days:   <15.0 mg/dL       BUN 11       Calcium 8.6       Chloride 107       CO2 26       Creatinine 0.8       eGFR >60  Comment: Estimated GFR calculated using the CKD-EPI creatinine (2021) equation.       Eos # 0.26       Eos % 4.5       Glucose 121       Gran # (ANC) 4.59       Hematocrit 33.4       Hemoglobin 10.6       Immature Grans (Abs) 0.02  Comment: Mild elevation in immature granulocytes is non specific and can be seen in a variety of conditions including stress response, acute inflammation, trauma and pregnancy. Correlation with other laboratory and clinical findings is essential.       Immature Granulocytes 0.3       Lymph # 0.51       Lymph % 8.8       MCH 30.7       MCHC 31.7       MCV 97       Mono # 0.41       Mono % 7.1       MPV 9.0       Neut % 79.1       nRBC 0       Platelet Count 243       Potassium 4.0       PROTEIN TOTAL 6.7       RBC 3.45       RDW 14.5       Sodium 140       WBC 5.80               Significant Imaging: I have reviewed all pertinent imaging results/findings within the past 24 hours.  Assessment/Plan:     Assessment & Plan  Delirium  Improved with treatign uti    Hypertension  Patient's blood pressure range in the last 24 hours was: BP  Min: 121/58  Max: 164/70.The patient's inpatient anti-hypertensive regimen is listed below:  Current Antihypertensives  amLODIPine tablet 5 mg, Daily, Oral    Plan  - BP is controlled, no changes needed to their regimen    Hyperlipidemia      BPH (benign prostatic hyperplasia)  Followed by urologist  Will aura willams today and see how pt does    Urinary tract infection without hematuria  Cont iv rocephin  Urine culture obtained    VTE Risk Mitigation (From admission, onward)           Ordered     IP VTE HIGH RISK PATIENT  Once         05/30/25 1003     Place sequential compression device  Until discontinued         05/30/25 1003                                    Lisa Santos MD  Department of Hospital Medicine  [facility]  Surg

## 2025-05-31 NOTE — PT/OT/SLP EVAL
"Occupational Therapy   Evaluation    Name: Tobi Liz Jr.  MRN: [id number]  Admitting Diagnosis: Delirium  Recent Surgery: * No surgery found *      Recommendations:     Discharge Recommendations: Moderate Intensity Therapy  Discharge Equipment Recommendations:  none (lives at [facility])  Barriers to discharge:  None    Assessment:     Tobi Liz Jr. is a 89 y.o. male with a medical diagnosis of Delirium.  He presents supine in bed, agreeable to participate.  Pt needed to use restroom, ambulated with RW to bathroom in room with MINIMAL ASSISTANCE/CONTACT GUARD ASSISTANCE  with 3-in-1 over the toilet.  Pt able to perform tacos care with Set-up A and required assistance to don brief.     Performance deficits affecting function: weakness, impaired endurance, impaired functional mobility, impaired self care skills, decreased safety awareness, impaired cardiopulmonary response to activity.      Rehab Prognosis: Good; patient would benefit from acute skilled OT services to address these deficits and reach maximum level of function.       Plan:     Patient to be seen 5 x/week to address the above listed problems via self-care/home management, community/work re-entry, therapeutic activities, therapeutic exercises  Plan of Care Expires: 06/06/25  Plan of Care Reviewed with: patient, spouse    Subjective     Chief Complaint: "I need to go to the bathroom"  Patient/Family Comments/goals: PLOF    Occupational Profile:  Living Environment: Assisted living facility [facility]  Previous level of function: MOD I with incidental assistance from staff   Roles and Routines: pt lived with spouse in [facility]  Equipment Used at Home: walker, rolling, bath bench  Assistance upon Discharge: staff and spouse    Pain/Comfort:  Pain Rating 1: 0/10    Patients cultural, spiritual, Restorationism conflicts given the current situation:      Objective:     Communicated with: nursing prior to session.  Patient found supine with " willams catheter upon OT entry to room.    General Precautions: Standard, fall  Orthopedic Precautions: N/A  Braces: N/A  Respiratory Status: Room air    Occupational Performance:    Bed Mobility:    Patient completed Rolling/Turning to Left with  supervision  Patient completed Rolling/Turning to Right with supervision  Patient completed Scooting/Bridging with supervision  Patient completed Supine to Sit with contact guard assistance  Patient completed Sit to Supine with contact guard assistance    Functional Mobility/Transfers:  Patient completed Sit <> Stand Transfer with minimum assistance  with  rolling walker   Patient completed Toilet Transfer Step Transfer technique with stand by assistance and contact guard assistance with  rolling walker  Functional Mobility: pt ambulated ~20 feet in room with RW, CONTACT GUARD ASSISTANCE     Activities of Daily Living:  Lower Body Dressing: minimum assistance don brief  Toileting: minimum assistance don brief    Cognitive/Visual Perceptual:  Cognitive/Psychosocial Skills:     -       Oriented to: Person and Place   -       Follows Commands/attention:Easily distracted  -       Communication: clear/fluent    Physical Exam:  Balance:    -       Static Stand:  Good-  Upper Extremity Range of Motion:     -       Right Upper Extremity: WFL  -       Left Upper Extremity: WFL  Upper Extremity Strength:    -       Right Upper Extremity: WFL  -       Left Upper Extremity: WFL   Strength:    -       Right Upper Extremity: WFL  -       Left Upper Extremity: WFL    AMPAC 6 Click ADL:  AMPAC Total Score: 19    Treatment & Education:  Educated pt and spouse on therapy expectations and to use call light for assistance with transfers.  Pt and spouse verbalized understanding.  Both may need reinforcement.      Patient left supine with all lines intact, call button in reach, and spouse present    GOALS:   Multidisciplinary Problems       Occupational Therapy Goals          Problem:  Occupational Therapy    Goal Priority Disciplines Outcome Interventions   Occupational Therapy Goal     OT, PT/OT Progressing    Description: Goals to be met by: 6/6/2025     Patient will increase functional independence with ADLs by performing:    Toileting from toilet with Set-up Assistance for hygiene and clothing management.   Supine to sit with Set-up Assistance.  Toilet transfer to toilet with Supervision.                         DME Justifications:  No DME recommended requiring DME justifications    History:     Past Medical History:   Diagnosis Date    Aortic valve stenosis     Arthritis     BPH (benign prostatic hyperplasia)     Carotid artery stenosis     Chronic diastolic heart failure     ETOH abuse     Exposure to COVID-19 virus 01/07/2022    Hyperchloremia     Hypertension     Kidney stones     Murmur     Pacemaker     Polymyalgia rheumatica     PVD (peripheral vascular disease)     Renal artery stenosis          Past Surgical History:   Procedure Laterality Date    A-V CARDIAC PACEMAKER INSERTION N/A 10/6/2023    Procedure: INSERTION, CARDIAC PACEMAKER, DUAL CHAMBER;  Surgeon: Douglas Salguero MD;  Location: [facility] CATH;  Service: Cardiology;  Laterality: N/A;    ANGIOGRAM, CAROTID Left 8/25/2023    Procedure: ANGIOGRAM, CAROTID;  Surgeon: Nick Box MD;  Location: [facility] CATH;  Service: Cardiology;  Laterality: Left;    CATARACT EXTRACTION, BILATERAL      ECHOCARDIOGRAM,TRANSESOPHAGEAL N/A 10/3/2023    Procedure: Transesophageal echo (JOSE ARMANDO) intra-procedure log documentation;  Surgeon: Nick Box MD;  Location: [facility] OR;  Service: Cardiology;  Laterality: N/A;    HAND SURGERY      INSERTION OF STENT INTO PERIPHERAL VESSEL N/A 1/30/2025    Procedure: INSERTION, STENT, VESSEL, PERIPHERAL;  Surgeon: Harsha Salcedo MD;  Location: [facility] CATH;  Service: Cardiology;  Laterality: N/A;    PERCUTANEOUS TRANSCATHETER AORTIC VALVE REPLACEMENT (TAVR) Left 10/3/2023    Procedure: REPLACEMENT, AORTIC VALVE,  PERCUTANEOUS, TRANSCATHETER;  Surgeon: Nick Box MD;  Location: [facility] OR;  Service: Cardiology;  Laterality: Left;    PERCUTANEOUS TRANSCATHETER AORTIC VALVE REPLACEMENT (TAVR) Left 10/3/2023    Procedure: REPLACEMENT, AORTIC VALVE, PERCUTANEOUS, TRANSCATHETER carotid access;  Surgeon: Jun Brito MD;  Location: [facility] OR;  Service: Cardiovascular;  Laterality: Left;    PERCUTANEOUS TRANSLUMINAL ANGIOPLASTY N/A 8/25/2023    Procedure: ANGIOPLASTY-PERCUTANEOUS TRANSLUMINAL (PTA);  Surgeon: Nick Box MD;  Location: [facility] CATH;  Service: Cardiology;  Laterality: N/A;    PERCUTANEOUS TRANSLUMINAL ANGIOPLASTY (PTA) OF PERIPHERAL VESSEL Left 4/10/2025    Procedure: PTA, PERIPHERAL VESSEL;  Surgeon: Harsha Salcedo MD;  Location: [facility] CATH;  Service: Cardiology;  Laterality: Left;  L LE intervention via left antegrade approach Harsha Salcedo at [facility] in Hybrid OR under MAC  *PACEMAKER*    SHOULDER SURGERY Left     total shoulder replacement    WOUND EXPLORATION N/A 10/3/2023    Procedure: EXPLORATION, WOUND;  Surgeon: Jun Brito MD;  Location: [facility] OR;  Service: Cardiology;  Laterality: N/A;       Time Tracking:     OT Date of Treatment:    OT Start Time: 1425  OT Stop Time: 1450  OT Total Time (min): 25 min    Billable Minutes:Evaluation 15  Self Care/Home Management 10  Tonia Trinidad OT      5/31/2025

## 2025-06-01 LAB
ABSOLUTE EOSINOPHIL (OHS): 0.26 K/UL
ABSOLUTE MONOCYTE (OHS): 0.43 K/UL (ref 0.3–1)
ABSOLUTE NEUTROPHIL COUNT (OHS): 4.63 K/UL (ref 1.8–7.7)
ALBUMIN SERPL BCP-MCNC: 3.4 G/DL (ref 3.5–5.2)
ALP SERPL-CCNC: 68 UNIT/L (ref 40–150)
ALT SERPL W/O P-5'-P-CCNC: 9 UNIT/L (ref 10–44)
ANION GAP (OHS): 7 MMOL/L (ref 8–16)
AST SERPL-CCNC: 16 UNIT/L (ref 11–45)
BACTERIA UR CULT: NORMAL
BASOPHILS # BLD AUTO: 0.01 K/UL
BASOPHILS NFR BLD AUTO: 0.2 %
BILIRUB SERPL-MCNC: 0.5 MG/DL (ref 0.1–1)
BUN SERPL-MCNC: 12 MG/DL (ref 10–30)
CALCIUM SERPL-MCNC: 8.7 MG/DL (ref 8.7–10.5)
CHLORIDE SERPL-SCNC: 106 MMOL/L (ref 95–110)
CO2 SERPL-SCNC: 24 MMOL/L (ref 23–29)
CREAT SERPL-MCNC: 0.8 MG/DL (ref 0.5–1.4)
ERYTHROCYTE [DISTWIDTH] IN BLOOD BY AUTOMATED COUNT: 14.4 % (ref 11.5–14.5)
GFR SERPLBLD CREATININE-BSD FMLA CKD-EPI: >60 ML/MIN/1.73/M2
GLUCOSE SERPL-MCNC: 107 MG/DL (ref 70–110)
HCT VFR BLD AUTO: 35 % (ref 40–54)
HGB BLD-MCNC: 11 GM/DL (ref 14–18)
IMM GRANULOCYTES # BLD AUTO: 0.02 K/UL (ref 0–0.04)
IMM GRANULOCYTES NFR BLD AUTO: 0.3 % (ref 0–0.5)
LYMPHOCYTES # BLD AUTO: 0.57 K/UL (ref 1–4.8)
MCH RBC QN AUTO: 30.6 PG (ref 27–31)
MCHC RBC AUTO-ENTMCNC: 31.4 G/DL (ref 32–36)
MCV RBC AUTO: 98 FL (ref 82–98)
NUCLEATED RBC (/100WBC) (OHS): 0 /100 WBC
PLATELET # BLD AUTO: 271 K/UL (ref 150–450)
PMV BLD AUTO: 9.1 FL (ref 9.2–12.9)
POTASSIUM SERPL-SCNC: 4.1 MMOL/L (ref 3.5–5.1)
PROT SERPL-MCNC: 7 GM/DL (ref 6–8.4)
RBC # BLD AUTO: 3.59 M/UL (ref 4.6–6.2)
RELATIVE EOSINOPHIL (OHS): 4.4 %
RELATIVE LYMPHOCYTE (OHS): 9.6 % (ref 18–48)
RELATIVE MONOCYTE (OHS): 7.3 % (ref 4–15)
RELATIVE NEUTROPHIL (OHS): 78.2 % (ref 38–73)
SODIUM SERPL-SCNC: 137 MMOL/L (ref 136–145)
WBC # BLD AUTO: 5.92 K/UL (ref 3.9–12.7)

## 2025-06-01 PROCEDURE — 36415 COLL VENOUS BLD VENIPUNCTURE: CPT | Performed by: INTERNAL MEDICINE

## 2025-06-01 PROCEDURE — 85025 COMPLETE CBC W/AUTO DIFF WBC: CPT | Performed by: INTERNAL MEDICINE

## 2025-06-01 PROCEDURE — 51798 US URINE CAPACITY MEASURE: CPT

## 2025-06-01 PROCEDURE — 25000003 PHARM REV CODE 250: Performed by: INTERNAL MEDICINE

## 2025-06-01 PROCEDURE — 80053 COMPREHEN METABOLIC PANEL: CPT | Performed by: INTERNAL MEDICINE

## 2025-06-01 PROCEDURE — 63600175 PHARM REV CODE 636 W HCPCS: Performed by: INTERNAL MEDICINE

## 2025-06-01 PROCEDURE — 11000001 HC ACUTE MED/SURG PRIVATE ROOM

## 2025-06-01 RX ADMIN — CLOPIDOGREL 75 MG: 75 TABLET ORAL at 09:06

## 2025-06-01 RX ADMIN — AMLODIPINE BESYLATE 5 MG: 5 TABLET ORAL at 09:06

## 2025-06-01 RX ADMIN — CEFTRIAXONE SODIUM 1 G: 1 INJECTION, POWDER, FOR SOLUTION INTRAMUSCULAR; INTRAVENOUS at 09:06

## 2025-06-01 RX ADMIN — ASPIRIN 81 MG CHEWABLE TABLET 81 MG: 81 TABLET CHEWABLE at 09:06

## 2025-06-01 NOTE — SUBJECTIVE & OBJECTIVE
Past Medical History:   Diagnosis Date    Aortic valve stenosis     Arthritis     BPH (benign prostatic hyperplasia)     Carotid artery stenosis     Chronic diastolic heart failure     ETOH abuse     Exposure to COVID-19 virus 01/07/2022    Hyperchloremia     Hypertension     Kidney stones     Murmur     Pacemaker     Polymyalgia rheumatica     PVD (peripheral vascular disease)     Renal artery stenosis        Past Surgical History:   Procedure Laterality Date    A-V CARDIAC PACEMAKER INSERTION N/A 10/6/2023    Procedure: INSERTION, CARDIAC PACEMAKER, DUAL CHAMBER;  Surgeon: Douglas Salguero MD;  Location: [facility] CATH;  Service: Cardiology;  Laterality: N/A;    ANGIOGRAM, CAROTID Left 8/25/2023    Procedure: ANGIOGRAM, CAROTID;  Surgeon: Nick Box MD;  Location: [facility] CATH;  Service: Cardiology;  Laterality: Left;    CATARACT EXTRACTION, BILATERAL      ECHOCARDIOGRAM,TRANSESOPHAGEAL N/A 10/3/2023    Procedure: Transesophageal echo (JOSE ARMANDO) intra-procedure log documentation;  Surgeon: Nick Box MD;  Location: [facility] OR;  Service: Cardiology;  Laterality: N/A;    HAND SURGERY      INSERTION OF STENT INTO PERIPHERAL VESSEL N/A 1/30/2025    Procedure: INSERTION, STENT, VESSEL, PERIPHERAL;  Surgeon: Harsha Salcedo MD;  Location: [facility] CATH;  Service: Cardiology;  Laterality: N/A;    PERCUTANEOUS TRANSCATHETER AORTIC VALVE REPLACEMENT (TAVR) Left 10/3/2023    Procedure: REPLACEMENT, AORTIC VALVE, PERCUTANEOUS, TRANSCATHETER;  Surgeon: Nick Box MD;  Location: [facility] OR;  Service: Cardiology;  Laterality: Left;    PERCUTANEOUS TRANSCATHETER AORTIC VALVE REPLACEMENT (TAVR) Left 10/3/2023    Procedure: REPLACEMENT, AORTIC VALVE, PERCUTANEOUS, TRANSCATHETER carotid access;  Surgeon: Jun Brito MD;  Location: [facility] OR;  Service: Cardiovascular;  Laterality: Left;    PERCUTANEOUS TRANSLUMINAL ANGIOPLASTY N/A 8/25/2023    Procedure: ANGIOPLASTY-PERCUTANEOUS TRANSLUMINAL (PTA);  Surgeon: Nick Box MD;   Location: [facility] CATH;  Service: Cardiology;  Laterality: N/A;    PERCUTANEOUS TRANSLUMINAL ANGIOPLASTY (PTA) OF PERIPHERAL VESSEL Left 4/10/2025    Procedure: PTA, PERIPHERAL VESSEL;  Surgeon: Harsha Salcedo MD;  Location: [facility] CATH;  Service: Cardiology;  Laterality: Left;  L LE intervention via left antegrade approach Harsha Salcedo at [facility] in [facility] OR under MAC  *PACEMAKER*    SHOULDER SURGERY Left     total shoulder replacement    WOUND EXPLORATION N/A 10/3/2023    Procedure: EXPLORATION, WOUND;  Surgeon: Jun Brito MD;  Location: [facility] OR;  Service: Cardiology;  Laterality: N/A;       Review of patient's allergies indicates:  No Known Allergies    No current facility-administered medications on file prior to encounter.     Current Outpatient Medications on File Prior to Encounter   Medication Sig    amLODIPine (NORVASC) 5 MG tablet Take 1 tablet (5 mg total) by mouth once daily. Prescribed by Dr. Box    aspirin 81 MG Chew Take 81 mg by mouth once daily.    clopidogreL (PLAVIX) 75 mg tablet Take 1 tablet (75 mg total) by mouth once daily.    rosuvastatin (CRESTOR) 10 MG tablet Take 1 tablet (10 mg total) by mouth once daily.    solifenacin (VESICARE) 10 MG tablet Take 10 mg by mouth once daily.    sulfamethoxazole-trimethoprim 800-160mg (BACTRIM DS) 800-160 mg Tab Take 1 tablet by mouth 2 (two) times daily. (Patient not taking: Reported on 5/30/2025)     Family History       Problem Relation (Age of Onset)    Cancer Mother    Stroke Father          Tobacco Use    Smoking status: Former     Types: Cigars     Passive exposure: Past    Smokeless tobacco: Never   Substance and Sexual Activity    Alcohol use: Yes     Alcohol/week: 3.0 standard drinks of alcohol     Types: 3 Shots of liquor per week     Comment: 1.5 oz a day    Drug use: Never    Sexual activity: Not Currently     Review of Systems   Constitutional:  Positive for activity change. Negative for chills and fever.   HENT:  Negative for sinus  pain and sore throat.    Respiratory:  Negative for cough and shortness of breath.    Cardiovascular:  Negative for chest pain.   Gastrointestinal:  Negative for abdominal pain, diarrhea, nausea and vomiting.   Neurological:  Positive for weakness.   Psychiatric/Behavioral:  Positive for confusion.      Objective:     Vital Signs (Most Recent):  Temp: 97.5 °F (36.4 °C) (06/01/25 0754)  Pulse: 63 (06/01/25 0754)  Resp: 16 (06/01/25 0506)  BP: 107/62 (06/01/25 0754)  SpO2: 100 % (06/01/25 0754) Vital Signs (24h Range):  Temp:  [97.5 °F (36.4 °C)-97.9 °F (36.6 °C)] 97.5 °F (36.4 °C)  Pulse:  [63-76] 63  Resp:  [16-18] 16  SpO2:  [93 %-100 %] 100 %  BP: (107-142)/(59-65) 107/62     Weight: 83.9 kg (185 lb)  Body mass index is 27.32 kg/m².     Physical Exam  Constitutional:       Appearance: He is ill-appearing.   HENT:      Mouth/Throat:      Mouth: Mucous membranes are moist.   Eyes:      Extraocular Movements: Extraocular movements intact.      Pupils: Pupils are equal, round, and reactive to light.   Cardiovascular:      Rate and Rhythm: Normal rate and regular rhythm.   Pulmonary:      Effort: Pulmonary effort is normal.      Breath sounds: Normal breath sounds.   Abdominal:      General: Bowel sounds are normal.      Palpations: Abdomen is soft.   Musculoskeletal:         General: Normal range of motion.   Skin:     General: Skin is warm and dry.      Capillary Refill: Capillary refill takes less than 2 seconds.   Neurological:      General: No focal deficit present.      Mental Status: He is alert.   Psychiatric:         Mood and Affect: Mood normal.              CRANIAL NERVES     CN III, IV, VI   Pupils are equal, round, and reactive to light.       Significant Labs: All pertinent labs within the past 24 hours have been reviewed.  Recent Lab Results         06/01/25  0523        Albumin 3.4       ALP 68       ALT 9       Anion Gap 7       AST 16       Baso # 0.01       Basophil % 0.2       BILIRUBIN TOTAL  0.5  Comment: For infants and newborns, interpretation of results should be based   on gestational age, weight and in agreement with clinical   observations.    Premature Infant recommended reference ranges:   0-24 hours:  <8.0 mg/dL   24-48 hours: <12.0 mg/dL   3-5 days:    <15.0 mg/dL   6-29 days:   <15.0 mg/dL       BUN 12       Calcium 8.7       Chloride 106       CO2 24       Creatinine 0.8       eGFR >60  Comment: Estimated GFR calculated using the CKD-EPI creatinine (2021) equation.       Eos # 0.26       Eos % 4.4       Glucose 107       Gran # (ANC) 4.63       Hematocrit 35.0       Hemoglobin 11.0       Immature Grans (Abs) 0.02  Comment: Mild elevation in immature granulocytes is non specific and can be seen in a variety of conditions including stress response, acute inflammation, trauma and pregnancy. Correlation with other laboratory and clinical findings is essential.       Immature Granulocytes 0.3       Lymph # 0.57       Lymph % 9.6       MCH 30.6       MCHC 31.4       MCV 98       Mono # 0.43       Mono % 7.3       MPV 9.1       Neut % 78.2       nRBC 0       Platelet Count 271       Potassium 4.1       PROTEIN TOTAL 7.0       RBC 3.59       RDW 14.4       Sodium 137       WBC 5.92               Significant Imaging: I have reviewed all pertinent imaging results/findings within the past 24 hours.

## 2025-06-01 NOTE — PROGRESS NOTES
"[facility] Medicine  Progress Note    Patient Name: Tobi Liz Jr.  MRN: [id number]  Patient Class: IP- Inpatient   Admission Date: 5/30/2025  Length of Stay: 2 days  Attending Physician: Luis Enrique Guzman Jr., MD  Primary Care Provider: Luis Enrique Guzman Jr., MD        Subjective     Principal Problem:Delirium        HPI:  Tobi Liz Jr. is a 89 y.o. male  has a past medical history of Aortic valve stenosis, Arthritis, BPH (benign prostatic hyperplasia), Carotid artery stenosis, Chronic diastolic heart failure, ETOH abuse, Exposure to COVID-19 virus (01/07/2022), Hyperchloremia, Hypertension, Kidney stones, Murmur, Pacemaker, Polymyalgia rheumatica, PVD (peripheral vascular disease), and Renal artery stenosis. presenting with Altered Mental Status (Pt to ED from [facility] via [facility] - family stated that pt was "walking halls last night" - with increased confusion - removed willams cath. Concerned for UTI.     Family repotrs he is more to his baslein this am.  Pt has been aggravated and annoyed by his willams catheter and family has noticed that he has had more utis since havign catheter.  Urologist has given options for catheter removal; suprapubic cath and continuation of willams.  Family wishes to dc willams while in hospital and see how he does    Overview/Hospital Course:  6/1 ND Pt doing ok - sp willams removal yesterday - awaiting urine culture    Past Medical History:   Diagnosis Date    Aortic valve stenosis     Arthritis     BPH (benign prostatic hyperplasia)     Carotid artery stenosis     Chronic diastolic heart failure     ETOH abuse     Exposure to COVID-19 virus 01/07/2022    Hyperchloremia     Hypertension     Kidney stones     Murmur     Pacemaker     Polymyalgia rheumatica     PVD (peripheral vascular disease)     Renal artery stenosis        Past Surgical History:   Procedure Laterality Date    A-V CARDIAC PACEMAKER INSERTION N/A 10/6/2023    Procedure: INSERTION, CARDIAC " PACEMAKER, DUAL CHAMBER;  Surgeon: Douglas Salguero MD;  Location: [facility] CATH;  Service: Cardiology;  Laterality: N/A;    ANGIOGRAM, CAROTID Left 8/25/2023    Procedure: ANGIOGRAM, CAROTID;  Surgeon: Nick Box MD;  Location: [facility] CATH;  Service: Cardiology;  Laterality: Left;    CATARACT EXTRACTION, BILATERAL      ECHOCARDIOGRAM,TRANSESOPHAGEAL N/A 10/3/2023    Procedure: Transesophageal echo (JOSE ARMANDO) intra-procedure log documentation;  Surgeon: Nick Box MD;  Location: [facility] OR;  Service: Cardiology;  Laterality: N/A;    HAND SURGERY      INSERTION OF STENT INTO PERIPHERAL VESSEL N/A 1/30/2025    Procedure: INSERTION, STENT, VESSEL, PERIPHERAL;  Surgeon: Harsha Salcedo MD;  Location: [facility] CATH;  Service: Cardiology;  Laterality: N/A;    PERCUTANEOUS TRANSCATHETER AORTIC VALVE REPLACEMENT (TAVR) Left 10/3/2023    Procedure: REPLACEMENT, AORTIC VALVE, PERCUTANEOUS, TRANSCATHETER;  Surgeon: Nick Box MD;  Location: [facility] OR;  Service: Cardiology;  Laterality: Left;    PERCUTANEOUS TRANSCATHETER AORTIC VALVE REPLACEMENT (TAVR) Left 10/3/2023    Procedure: REPLACEMENT, AORTIC VALVE, PERCUTANEOUS, TRANSCATHETER carotid access;  Surgeon: Jun Brito MD;  Location: [facility] OR;  Service: Cardiovascular;  Laterality: Left;    PERCUTANEOUS TRANSLUMINAL ANGIOPLASTY N/A 8/25/2023    Procedure: ANGIOPLASTY-PERCUTANEOUS TRANSLUMINAL (PTA);  Surgeon: Nick Box MD;  Location: [facility] CATH;  Service: Cardiology;  Laterality: N/A;    PERCUTANEOUS TRANSLUMINAL ANGIOPLASTY (PTA) OF PERIPHERAL VESSEL Left 4/10/2025    Procedure: PTA, PERIPHERAL VESSEL;  Surgeon: Harsha Salcedo MD;  Location: [facility] CATH;  Service: Cardiology;  Laterality: Left;  L LE intervention via left antegrade approach Harsha Salcedo at [facility] in [facility] OR under MAC  *PACEMAKER*    SHOULDER SURGERY Left     total shoulder replacement    WOUND EXPLORATION N/A 10/3/2023    Procedure: EXPLORATION, WOUND;  Surgeon: Jun Brito MD;   Location: [facility] OR;  Service: Cardiology;  Laterality: N/A;       Review of patient's allergies indicates:  No Known Allergies    No current facility-administered medications on file prior to encounter.     Current Outpatient Medications on File Prior to Encounter   Medication Sig    amLODIPine (NORVASC) 5 MG tablet Take 1 tablet (5 mg total) by mouth once daily. Prescribed by Dr. Box    aspirin 81 MG Chew Take 81 mg by mouth once daily.    clopidogreL (PLAVIX) 75 mg tablet Take 1 tablet (75 mg total) by mouth once daily.    rosuvastatin (CRESTOR) 10 MG tablet Take 1 tablet (10 mg total) by mouth once daily.    solifenacin (VESICARE) 10 MG tablet Take 10 mg by mouth once daily.    sulfamethoxazole-trimethoprim 800-160mg (BACTRIM DS) 800-160 mg Tab Take 1 tablet by mouth 2 (two) times daily. (Patient not taking: Reported on 5/30/2025)     Family History       Problem Relation (Age of Onset)    Cancer Mother    Stroke Father          Tobacco Use    Smoking status: Former     Types: Cigars     Passive exposure: Past    Smokeless tobacco: Never   Substance and Sexual Activity    Alcohol use: Yes     Alcohol/week: 3.0 standard drinks of alcohol     Types: 3 Shots of liquor per week     Comment: 1.5 oz a day    Drug use: Never    Sexual activity: Not Currently     Review of Systems   Constitutional:  Positive for activity change. Negative for chills and fever.   HENT:  Negative for sinus pain and sore throat.    Respiratory:  Negative for cough and shortness of breath.    Cardiovascular:  Negative for chest pain.   Gastrointestinal:  Negative for abdominal pain, diarrhea, nausea and vomiting.   Neurological:  Positive for weakness.   Psychiatric/Behavioral:  Positive for confusion.      Objective:     Vital Signs (Most Recent):  Temp: 97.5 °F (36.4 °C) (06/01/25 0754)  Pulse: 63 (06/01/25 0754)  Resp: 16 (06/01/25 0506)  BP: 107/62 (06/01/25 0754)  SpO2: 100 % (06/01/25 0754) Vital Signs (24h Range):  Temp:  [97.5 °F  (36.4 °C)-97.9 °F (36.6 °C)] 97.5 °F (36.4 °C)  Pulse:  [63-76] 63  Resp:  [16-18] 16  SpO2:  [93 %-100 %] 100 %  BP: (107-142)/(59-65) 107/62     Weight: 83.9 kg (185 lb)  Body mass index is 27.32 kg/m².     Physical Exam  Constitutional:       Appearance: He is ill-appearing.   HENT:      Mouth/Throat:      Mouth: Mucous membranes are moist.   Eyes:      Extraocular Movements: Extraocular movements intact.      Pupils: Pupils are equal, round, and reactive to light.   Cardiovascular:      Rate and Rhythm: Normal rate and regular rhythm.   Pulmonary:      Effort: Pulmonary effort is normal.      Breath sounds: Normal breath sounds.   Abdominal:      General: Bowel sounds are normal.      Palpations: Abdomen is soft.   Musculoskeletal:         General: Normal range of motion.   Skin:     General: Skin is warm and dry.      Capillary Refill: Capillary refill takes less than 2 seconds.   Neurological:      General: No focal deficit present.      Mental Status: He is alert.   Psychiatric:         Mood and Affect: Mood normal.              CRANIAL NERVES     CN III, IV, VI   Pupils are equal, round, and reactive to light.       Significant Labs: All pertinent labs within the past 24 hours have been reviewed.  Recent Lab Results         06/01/25  0523        Albumin 3.4       ALP 68       ALT 9       Anion Gap 7       AST 16       Baso # 0.01       Basophil % 0.2       BILIRUBIN TOTAL 0.5  Comment: For infants and newborns, interpretation of results should be based   on gestational age, weight and in agreement with clinical   observations.    Premature Infant recommended reference ranges:   0-24 hours:  <8.0 mg/dL   24-48 hours: <12.0 mg/dL   3-5 days:    <15.0 mg/dL   6-29 days:   <15.0 mg/dL       BUN 12       Calcium 8.7       Chloride 106       CO2 24       Creatinine 0.8       eGFR >60  Comment: Estimated GFR calculated using the CKD-EPI creatinine (2021) equation.       Eos # 0.26       Eos % 4.4       Glucose  107       Gran # (ANC) 4.63       Hematocrit 35.0       Hemoglobin 11.0       Immature Grans (Abs) 0.02  Comment: Mild elevation in immature granulocytes is non specific and can be seen in a variety of conditions including stress response, acute inflammation, trauma and pregnancy. Correlation with other laboratory and clinical findings is essential.       Immature Granulocytes 0.3       Lymph # 0.57       Lymph % 9.6       MCH 30.6       MCHC 31.4       MCV 98       Mono # 0.43       Mono % 7.3       MPV 9.1       Neut % 78.2       nRBC 0       Platelet Count 271       Potassium 4.1       PROTEIN TOTAL 7.0       RBC 3.59       RDW 14.4       Sodium 137       WBC 5.92               Significant Imaging: I have reviewed all pertinent imaging results/findings within the past 24 hours.      Assessment & Plan  Delirium  Improved with treatign uti  6/1 mental status improved  Hypertension  Patient's blood pressure range in the last 24 hours was: BP  Min: 107/62  Max: 142/65.The patient's inpatient anti-hypertensive regimen is listed below:  Current Antihypertensives  amLODIPine tablet 5 mg, Daily, Oral    Plan  - BP is controlled, no changes needed to their regimen    Hyperlipidemia      BPH (benign prostatic hyperplasia)  Followed by urologist  Will dc willams today and see how pt does  6/1 willams dc'd - monitor    Urinary tract infection without hematuria  Cont iv rocephin  Urine culture obtained    Myopathy  Pt/ot eval - consider ipr evla    VTE Risk Mitigation (From admission, onward)           Ordered     IP VTE HIGH RISK PATIENT  Once         05/30/25 1003     Place sequential compression device  Until discontinued         05/30/25 1003                    Discharge Planning   ADONAY: 6/2/2025     Code Status: Full Code   Medical Readiness for Discharge Date:   Discharge Plan A: Rehab                Please place Justification for DME        Lisa Santos MD  Department of Hospital Medicine   [facility]

## 2025-06-01 NOTE — NURSING
Bladder scan per post willams removal protocol, 107 mL resulted from scan. Educated pt on importance of maintaining oral hydration, ensured water was in reach of patient. Pt verbalized understanding. Call light and personal items within reach of patient.

## 2025-06-01 NOTE — PLAN OF CARE
Plan of care reviewed with the patient. Patient bladder scanned during shift with 638mL. Dr. Santos notified stated to allow 1 more our to see if patient will urinate on his own, patient did not. Dr. Santos gave the okay to in and out cath. In and out cath out was 580mL. Dr. Santos notified, no new orders at this time. Patient able to use walker to ambulate to the bedside commode.

## 2025-06-01 NOTE — ASSESSMENT & PLAN NOTE
Patient's blood pressure range in the last 24 hours was: BP  Min: 107/62  Max: 142/65.The patient's inpatient anti-hypertensive regimen is listed below:  Current Antihypertensives  amLODIPine tablet 5 mg, Daily, Oral    Plan  - BP is controlled, no changes needed to their regimen

## 2025-06-01 NOTE — PLAN OF CARE
Problem: Skin Injury Risk Increased  Goal: Skin Health and Integrity  Outcome: Progressing     Problem: Adult Inpatient Plan of Care  Goal: Plan of Care Review  Outcome: Progressing  Goal: Patient-Specific Goal (Individualized)  Outcome: Progressing  Goal: Absence of Hospital-Acquired Illness or Injury  Outcome: Progressing  Goal: Optimal Comfort and Wellbeing  Outcome: Progressing  Goal: Readiness for Transition of Care  Outcome: Progressing     Problem: Infection  Goal: Absence of Infection Signs and Symptoms  Outcome: Progressing     Problem: Wound  Goal: Optimal Coping  Outcome: Progressing  Goal: Optimal Functional Ability  Outcome: Progressing  Goal: Absence of Infection Signs and Symptoms  Outcome: Progressing  Goal: Improved Oral Intake  Outcome: Progressing  Goal: Optimal Pain Control and Function  Outcome: Progressing  Goal: Skin Health and Integrity  Outcome: Progressing  Goal: Optimal Wound Healing  Outcome: Progressing     Problem: Fall Injury Risk  Goal: Absence of Fall and Fall-Related Injury  Outcome: Progressing

## 2025-06-01 NOTE — HOSPITAL COURSE
"Chief Complaint:        Chief Complaint   Patient presents with    Altered Mental Status       Pt to ED from [facility] via POV - family stated that pt was "walking halls last night" - with increased confusion - removed willams cath. Concerned for UTI.          HPI: Tobi Liz Jr. is a 89 y.o. male  has a past medical history of Aortic valve stenosis, Arthritis, BPH (benign prostatic hyperplasia), Carotid artery stenosis, Chronic diastolic heart failure, ETOH abuse, Exposure to COVID-19 virus (01/07/2022), Hyperchloremia, Hypertension, Kidney stones, Murmur, Pacemaker, Polymyalgia rheumatica, PVD (peripheral vascular disease), and Renal artery stenosis. presenting with Altered Mental Status (Pt to ED from [facility] via POV - family stated that pt was "walking halls last night" - with increased confusion - removed willams cath. Concerned for UTI.      Family repotrs he is more to his baslein this am.  Pt has been aggravated and annoyed by his willams catheter and family has noticed that he has had more utis since havign catheter.  Urologist has given options for catheter removal; suprapubic cath and continuation of willams.  Family wishes to dc willams while in hospital and see how he does    6/1 ND Pt doing ok - sp willams removal yesterday - awaiting urine culture    6/2/25: Pending IPR evaluation.  Willams removed and pending bladder scan this morning. Labs reviewed and overall stable.     6/03/2025:  Patient's bladder scan yesterday showed over 300 mL in the bladder and Willams catheter was reinserted.  Labs this morning relatively stable.  Cleared for discharge to inpatient rehab today.  "

## 2025-06-01 NOTE — NURSING
Pt unsuccessful with voiding during the night. Q6H bladder scanner check per orders resulted to 400 mL. NP Zoya made aware and ordered In and Out cath once now. In and Out completed with PCT at bedside. Education provided prior to being completed, emotional support provided during. 430 mL emptied into collection device, pt tolerated well. Pt cleaned and repositioned in bed. Call light and personal items within reach of patient.

## 2025-06-02 LAB
ABSOLUTE EOSINOPHIL (OHS): 0.41 K/UL
ABSOLUTE MONOCYTE (OHS): 0.52 K/UL (ref 0.3–1)
ABSOLUTE NEUTROPHIL COUNT (OHS): 4.29 K/UL (ref 1.8–7.7)
ALBUMIN SERPL BCP-MCNC: 3.1 G/DL (ref 3.5–5.2)
ALP SERPL-CCNC: 62 UNIT/L (ref 40–150)
ALT SERPL W/O P-5'-P-CCNC: 10 UNIT/L (ref 10–44)
ANION GAP (OHS): 6 MMOL/L (ref 8–16)
AST SERPL-CCNC: 16 UNIT/L (ref 11–45)
BASOPHILS # BLD AUTO: 0.01 K/UL
BASOPHILS NFR BLD AUTO: 0.2 %
BILIRUB SERPL-MCNC: 0.5 MG/DL (ref 0.1–1)
BUN SERPL-MCNC: 13 MG/DL (ref 10–30)
CALCIUM SERPL-MCNC: 8.4 MG/DL (ref 8.7–10.5)
CHLORIDE SERPL-SCNC: 107 MMOL/L (ref 95–110)
CO2 SERPL-SCNC: 24 MMOL/L (ref 23–29)
CREAT SERPL-MCNC: 0.8 MG/DL (ref 0.5–1.4)
ERYTHROCYTE [DISTWIDTH] IN BLOOD BY AUTOMATED COUNT: 14.3 % (ref 11.5–14.5)
GFR SERPLBLD CREATININE-BSD FMLA CKD-EPI: >60 ML/MIN/1.73/M2
GLUCOSE SERPL-MCNC: 105 MG/DL (ref 70–110)
HCT VFR BLD AUTO: 31.7 % (ref 40–54)
HGB BLD-MCNC: 10.2 GM/DL (ref 14–18)
IMM GRANULOCYTES # BLD AUTO: 0.01 K/UL (ref 0–0.04)
IMM GRANULOCYTES NFR BLD AUTO: 0.2 % (ref 0–0.5)
LYMPHOCYTES # BLD AUTO: 0.57 K/UL (ref 1–4.8)
MCH RBC QN AUTO: 30.9 PG (ref 27–31)
MCHC RBC AUTO-ENTMCNC: 32.2 G/DL (ref 32–36)
MCV RBC AUTO: 96 FL (ref 82–98)
NUCLEATED RBC (/100WBC) (OHS): 0 /100 WBC
PLATELET # BLD AUTO: 248 K/UL (ref 150–450)
PMV BLD AUTO: 9 FL (ref 9.2–12.9)
POTASSIUM SERPL-SCNC: 3.8 MMOL/L (ref 3.5–5.1)
PROT SERPL-MCNC: 6.3 GM/DL (ref 6–8.4)
RBC # BLD AUTO: 3.3 M/UL (ref 4.6–6.2)
RELATIVE EOSINOPHIL (OHS): 7.1 %
RELATIVE LYMPHOCYTE (OHS): 9.8 % (ref 18–48)
RELATIVE MONOCYTE (OHS): 9 % (ref 4–15)
RELATIVE NEUTROPHIL (OHS): 73.7 % (ref 38–73)
SODIUM SERPL-SCNC: 137 MMOL/L (ref 136–145)
WBC # BLD AUTO: 5.81 K/UL (ref 3.9–12.7)

## 2025-06-02 PROCEDURE — 25000003 PHARM REV CODE 250: Performed by: INTERNAL MEDICINE

## 2025-06-02 PROCEDURE — 11000001 HC ACUTE MED/SURG PRIVATE ROOM

## 2025-06-02 PROCEDURE — 63600175 PHARM REV CODE 636 W HCPCS: Performed by: INTERNAL MEDICINE

## 2025-06-02 PROCEDURE — 36415 COLL VENOUS BLD VENIPUNCTURE: CPT | Performed by: INTERNAL MEDICINE

## 2025-06-02 PROCEDURE — 80053 COMPREHEN METABOLIC PANEL: CPT | Performed by: INTERNAL MEDICINE

## 2025-06-02 PROCEDURE — 85025 COMPLETE CBC W/AUTO DIFF WBC: CPT | Performed by: INTERNAL MEDICINE

## 2025-06-02 PROCEDURE — 97535 SELF CARE MNGMENT TRAINING: CPT

## 2025-06-02 PROCEDURE — 97530 THERAPEUTIC ACTIVITIES: CPT

## 2025-06-02 PROCEDURE — 97161 PT EVAL LOW COMPLEX 20 MIN: CPT

## 2025-06-02 RX ADMIN — MUPIROCIN: 20 OINTMENT TOPICAL at 09:06

## 2025-06-02 RX ADMIN — ASPIRIN 81 MG CHEWABLE TABLET 81 MG: 81 TABLET CHEWABLE at 09:06

## 2025-06-02 RX ADMIN — AMLODIPINE BESYLATE 5 MG: 5 TABLET ORAL at 09:06

## 2025-06-02 RX ADMIN — Medication 6 MG: at 09:06

## 2025-06-02 RX ADMIN — CLOPIDOGREL 75 MG: 75 TABLET ORAL at 09:06

## 2025-06-02 RX ADMIN — CEFTRIAXONE SODIUM 1 G: 1 INJECTION, POWDER, FOR SOLUTION INTRAMUSCULAR; INTRAVENOUS at 09:06

## 2025-06-02 NOTE — ASSESSMENT & PLAN NOTE
Patient's blood pressure range in the last 24 hours was: BP  Min: 137/87  Max: 166/72.The patient's inpatient anti-hypertensive regimen is listed below:  Current Antihypertensives  amLODIPine tablet 5 mg, Daily, Oral    Plan  - BP is controlled, no changes needed to their regimen

## 2025-06-02 NOTE — NURSING
16 Fr. Coude catheter placed by [facility] students. Patient tolerated well. 300mL of urine collected upon insertion.

## 2025-06-02 NOTE — PLAN OF CARE
Problem: Occupational Therapy  Goal: Occupational Therapy Goal  Description: Goals to be met by: 6/6/2025     Patient will increase functional independence with ADLs by performing:    Toileting from toilet with Set-up Assistance for hygiene and clothing management.   Supine to sit with Set-up Assistance.  Toilet transfer to toilet with Supervision.    Outcome: Progressing

## 2025-06-02 NOTE — ASSESSMENT & PLAN NOTE
Followed by urologist  Will dc willams today and see how pt does  6/1 willams dc'd - monitor    6/2/25:  Bladder scan today.

## 2025-06-02 NOTE — PLAN OF CARE
[facility] Surg  Discharge Reassessment    Primary Care Provider: Luis Enrique Guzman Jr., MD    Expected Discharge Date: 6/2/2025    Reassessment (most recent)       Discharge Reassessment - 06/02/25 0939          Discharge Reassessment    Assessment Type Discharge Planning Reassessment     Did the patient's condition or plan change since previous assessment? Yes     Discharge Plan discussed with: Patient   The patient's son-in-law was present at his bedside.    Discharge Plan A Rehab     Discharge Plan B Assisted Living;Home Health     DME Needed Upon Discharge  other (see comments)   TBD    Transition of Care Barriers Mobility     Why the patient remains in the hospital Requires continued medical care        Post-Acute Status    Post-Acute Authorization Placement     Post-Acute Placement Status Referrals Sent     Discharge Delays None known at this time                 Discharge re-assessment is completed. Spoke to the patient in his room. I spoke to the patient about his discharge plan of care. The patient expressed that he would like to go upstairs to inpatient rehab. The Patient's Choice Form was signed for [facility].

## 2025-06-02 NOTE — PROGRESS NOTES
"[facility] Medicine  Progress Note    Patient Name: Tobi Liz Jr.  MRN: [id number]  Patient Class: IP- Inpatient   Admission Date: 5/30/2025  Length of Stay: 3 days  Attending Physician: Luis Enrique Guzman Jr., MD  Primary Care Provider: Luis Enrique Guzman Jr., MD        Subjective     Principal Problem:Delirium        HPI:  Tobi Liz Jr. is a 89 y.o. male  has a past medical history of Aortic valve stenosis, Arthritis, BPH (benign prostatic hyperplasia), Carotid artery stenosis, Chronic diastolic heart failure, ETOH abuse, Exposure to COVID-19 virus (01/07/2022), Hyperchloremia, Hypertension, Kidney stones, Murmur, Pacemaker, Polymyalgia rheumatica, PVD (peripheral vascular disease), and Renal artery stenosis. presenting with Altered Mental Status (Pt to ED from [facility] via POV - family stated that pt was "walking halls last night" - with increased confusion - removed willams cath. Concerned for UTI.     Family repotrs he is more to his baslein this am.  Pt has been aggravated and annoyed by his willams catheter and family has noticed that he has had more utis since havign catheter.  Urologist has given options for catheter removal; suprapubic cath and continuation of willams.  Family wishes to dc willams while in hospital and see how he does    Overview/Hospital Course:  6/1 ND Pt doing ok - sp willams removal yesterday - awaiting urine culture    6/2/25: Pending IPR evaluation.  Willams removed and pending bladder scan this morning. Labs reviewed and overall stable.         Review of Systems   Constitutional:  Positive for activity change. Negative for chills and fever.   HENT:  Negative for sinus pain and sore throat.    Respiratory:  Negative for cough and shortness of breath.    Cardiovascular:  Negative for chest pain.   Gastrointestinal:  Negative for abdominal pain, diarrhea, nausea and vomiting.   Neurological:  Positive for weakness.   Psychiatric/Behavioral:  Positive for " confusion.      Objective:     Vital Signs (Most Recent):  Temp: 97.6 °F (36.4 °C) (06/02/25 0726)  Pulse: 72 (06/02/25 0726)  Resp: 18 (06/02/25 0726)  BP: (!) 166/72 (06/02/25 0726)  SpO2: 98 % (06/02/25 0726) Vital Signs (24h Range):  Temp:  [97.6 °F (36.4 °C)-97.9 °F (36.6 °C)] 97.6 °F (36.4 °C)  Pulse:  [68-84] 72  Resp:  [18] 18  SpO2:  [96 %-100 %] 98 %  BP: (137-166)/(56-87) 166/72     Weight: 83.9 kg (185 lb)  Body mass index is 27.32 kg/m².    Intake/Output Summary (Last 24 hours) at 6/2/2025 0823  Last data filed at 6/2/2025 0430  Gross per 24 hour   Intake 720 ml   Output 1415 ml   Net -695 ml         Physical Exam  Constitutional:       Appearance: He is ill-appearing.   HENT:      Mouth/Throat:      Mouth: Mucous membranes are moist.   Eyes:      Extraocular Movements: Extraocular movements intact.      Pupils: Pupils are equal, round, and reactive to light.   Cardiovascular:      Rate and Rhythm: Normal rate and regular rhythm.   Pulmonary:      Effort: Pulmonary effort is normal.      Breath sounds: Normal breath sounds.   Abdominal:      General: Bowel sounds are normal.      Palpations: Abdomen is soft.   Musculoskeletal:         General: Normal range of motion.   Skin:     General: Skin is warm and dry.      Capillary Refill: Capillary refill takes less than 2 seconds.   Neurological:      General: No focal deficit present.      Mental Status: He is alert.   Psychiatric:         Mood and Affect: Mood normal.               Significant Labs: All pertinent labs within the past 24 hours have been reviewed.    Significant Imaging: I have reviewed all pertinent imaging results/findings within the past 24 hours.      Assessment & Plan  Delirium  Improved with treatign uti  6/1 mental status improved  Hypertension  Patient's blood pressure range in the last 24 hours was: BP  Min: 137/87  Max: 166/72.The patient's inpatient anti-hypertensive regimen is listed below:  Current Antihypertensives  amLODIPine  tablet 5 mg, Daily, Oral    Plan  - BP is controlled, no changes needed to their regimen    Hyperlipidemia      BPH (benign prostatic hyperplasia)  Followed by urologist  Will dc willams today and see how pt does  6/1 willams dc'd - monitor    6/2/25:  Bladder scan today.   Urinary tract infection without hematuria  Cont iv rocephin  Urine culture obtained    6/2/25:  Multiple organisms on culture, continue rocephin for now.   Myopathy  Pt/ot eval - consider ipr evla      VTE Risk Mitigation (From admission, onward)           Ordered     IP VTE HIGH RISK PATIENT  Once         05/30/25 1003     Place sequential compression device  Until discontinued         05/30/25 1003                    Discharge Planning   ADONAY: 6/2/2025     Code Status: Full Code   Medical Readiness for Discharge Date:   Discharge Plan A: Rehab                Please place Justification for DME        TALHA Luna  Department of Hospital Medicine   [facility]

## 2025-06-02 NOTE — SUBJECTIVE & OBJECTIVE
Review of Systems   Constitutional:  Positive for activity change. Negative for chills and fever.   HENT:  Negative for sinus pain and sore throat.    Respiratory:  Negative for cough and shortness of breath.    Cardiovascular:  Negative for chest pain.   Gastrointestinal:  Negative for abdominal pain, diarrhea, nausea and vomiting.   Neurological:  Positive for weakness.   Psychiatric/Behavioral:  Positive for confusion.      Objective:     Vital Signs (Most Recent):  Temp: 97.6 °F (36.4 °C) (06/02/25 0726)  Pulse: 72 (06/02/25 0726)  Resp: 18 (06/02/25 0726)  BP: (!) 166/72 (06/02/25 0726)  SpO2: 98 % (06/02/25 0726) Vital Signs (24h Range):  Temp:  [97.6 °F (36.4 °C)-97.9 °F (36.6 °C)] 97.6 °F (36.4 °C)  Pulse:  [68-84] 72  Resp:  [18] 18  SpO2:  [96 %-100 %] 98 %  BP: (137-166)/(56-87) 166/72     Weight: 83.9 kg (185 lb)  Body mass index is 27.32 kg/m².    Intake/Output Summary (Last 24 hours) at 6/2/2025 0823  Last data filed at 6/2/2025 0430  Gross per 24 hour   Intake 720 ml   Output 1415 ml   Net -695 ml         Physical Exam  Constitutional:       Appearance: He is ill-appearing.   HENT:      Mouth/Throat:      Mouth: Mucous membranes are moist.   Eyes:      Extraocular Movements: Extraocular movements intact.      Pupils: Pupils are equal, round, and reactive to light.   Cardiovascular:      Rate and Rhythm: Normal rate and regular rhythm.   Pulmonary:      Effort: Pulmonary effort is normal.      Breath sounds: Normal breath sounds.   Abdominal:      General: Bowel sounds are normal.      Palpations: Abdomen is soft.   Musculoskeletal:         General: Normal range of motion.   Skin:     General: Skin is warm and dry.      Capillary Refill: Capillary refill takes less than 2 seconds.   Neurological:      General: No focal deficit present.      Mental Status: He is alert.   Psychiatric:         Mood and Affect: Mood normal.               Significant Labs: All pertinent labs within the past 24 hours  have been reviewed.    Significant Imaging: I have reviewed all pertinent imaging results/findings within the past 24 hours.

## 2025-06-02 NOTE — ASSESSMENT & PLAN NOTE
Cont iv rocephin  Urine culture obtained    6/2/25:  Multiple organisms on culture, continue rocephin for now.

## 2025-06-02 NOTE — PT/OT/SLP EVAL
"Physical Therapy Evaluation    Patient Name:  Tobi Liz Jr.   MRN:  [id number]    Recommendations:     Discharge Recommendations: High Intensity Therapy   Discharge Equipment Recommendations: none ([employer] will provide)   Barriers to discharge: None    Assessment:     Tobi Liz Jr. is a 89 y.o. male admitted with a medical diagnosis of Delirium.  He presents with the following impairments/functional limitations: weakness, gait instability, impaired endurance, impaired balance, decreased lower extremity function, decreased safety awareness, impaired self care skills, impaired functional mobility. Patient presents with good motivation to improve his functional mobility. Patient is able to ambulate 40 feet this visit with RW and requires CGA for safety. Patient is able to sit EOB this visit, but he requires Min A for supine to sit. Patient is requesting inpatient rehab at this time secondary to his weakness and functional deficits. Patient will benefit from high intensity skilled rehabilitation with medical oversight needed.     Rehab Prognosis: Good; patient would benefit from acute skilled PT services to address these deficits and reach maximum level of function.    Recent Surgery: * No surgery found *      Plan:     During this hospitalization, patient to be seen 5 x/week to address the identified rehab impairments via gait training, therapeutic activities, therapeutic exercises, neuromuscular re-education and progress toward the following goals:    Plan of Care Expires:  06/13/25    Subjective     Chief Complaint: Weakness   Patient/Family Comments/goals: "I would like to go to inpatient rehab"  Pain/Comfort:  Pain Rating 1: 0/10    Patients cultural, spiritual, Anglican conflicts given the current situation: no    Living Environment:  Patient lives at assisted living with spouse   Prior to admission, patients level of function was requiring some assistance for functional activities but Mod I for " the most part.  Equipment used at home: bath bench, walker, rolling, wheelchair.  DME owned (not currently used): none.  Upon discharge, patient will have assistance from assisted living.    Objective:     Communicated with nursing prior to session.  Patient found HOB elevated with peripheral IV, willams catheter  upon PT entry to room.    General Precautions: Standard, fall  Orthopedic Precautions:N/A   Braces: N/A  Respiratory Status: Room air    Exams:  Cognitive Exam:  Patient is oriented to Person, Place, Time, and Situation  Gross Motor Coordination:  WFL  RLE ROM: WFL  RLE Strength: Deficits: deficits noted in weight bearing with hip flexion and knee flexion with ambulation   LLE ROM: WFL  LLE Strength: Deficits: deficits noted in weight bearing with hip flexion and knee flexion with ambulation     Functional Mobility:  Bed Mobility:     Rolling Left:  stand by assistance  Rolling Right: stand by assistance  Scooting: minimum assistance  Bridging: minimum assistance  Supine to Sit: minimum assistance  Sit to Supine: contact guard assistance  Transfers:     Sit to Stand:  minimum assistance with rolling walker  Gait: 40 feet with RW and CGA   Balance: posterior lean noted with sit<>stand and posterior lean noted with ambulation with changing directions       AM-PAC 6 CLICK MOBILITY  Total Score:17       Treatment & Education:  Cuing required for proper gait pattern and proper step length and to avoid posterior lean. Patient and wife educated on need for physical therapy in order to improve functional mobility and strength.     Patient left HOB elevated with all lines intact, call button in reach, nursing notified, and nursing and spouse present.    GOALS:   Multidisciplinary Problems       Physical Therapy Goals          Problem: Physical Therapy    Goal Priority Disciplines Outcome Interventions   Physical Therapy Goal     PT, PT/OT Progressing    Description: Patient will increase functional independence with  mobility by performin. Supine to sit with Supervision or Set-up Assistance  2. Sit to supine with Supervision or Set-up Assistance  3. Bed to chair transfer with Supervision or Set-up Assistancewith or without rolling walker using Stand Pivot TECHNIQUE  4. Gait  x 150 feet  feet with Contact Guard Assistance with or without rolling walker  5. Lower extremity exercise program x10 reps per handout, with assistance as needed                          History:     Past Medical History:   Diagnosis Date    Aortic valve stenosis     Arthritis     BPH (benign prostatic hyperplasia)     Carotid artery stenosis     Chronic diastolic heart failure     ETOH abuse     Exposure to COVID-19 virus 2022    Hyperchloremia     Hypertension     Kidney stones     Murmur     Pacemaker     Polymyalgia rheumatica     PVD (peripheral vascular disease)     Renal artery stenosis        Past Surgical History:   Procedure Laterality Date    A-V CARDIAC PACEMAKER INSERTION N/A 10/6/2023    Procedure: INSERTION, CARDIAC PACEMAKER, DUAL CHAMBER;  Surgeon: Douglas Salguero MD;  Location: [facility] CATH;  Service: Cardiology;  Laterality: N/A;    ANGIOGRAM, CAROTID Left 2023    Procedure: ANGIOGRAM, CAROTID;  Surgeon: Nick Box MD;  Location: [facility] CATH;  Service: Cardiology;  Laterality: Left;    CATARACT EXTRACTION, BILATERAL      ECHOCARDIOGRAM,TRANSESOPHAGEAL N/A 10/3/2023    Procedure: Transesophageal echo (JOSE ARMANDO) intra-procedure log documentation;  Surgeon: Nick Box MD;  Location: [facility] OR;  Service: Cardiology;  Laterality: N/A;    HAND SURGERY      INSERTION OF STENT INTO PERIPHERAL VESSEL N/A 2025    Procedure: INSERTION, STENT, VESSEL, PERIPHERAL;  Surgeon: Harsha Salcedo MD;  Location: [facility] CATH;  Service: Cardiology;  Laterality: N/A;    PERCUTANEOUS TRANSCATHETER AORTIC VALVE REPLACEMENT (TAVR) Left 10/3/2023    Procedure: REPLACEMENT, AORTIC VALVE, PERCUTANEOUS, TRANSCATHETER;  Surgeon: Nick Box MD;   Location: [facility] OR;  Service: Cardiology;  Laterality: Left;    PERCUTANEOUS TRANSCATHETER AORTIC VALVE REPLACEMENT (TAVR) Left 10/3/2023    Procedure: REPLACEMENT, AORTIC VALVE, PERCUTANEOUS, TRANSCATHETER carotid access;  Surgeon: Jun Brito MD;  Location: [facility] OR;  Service: Cardiovascular;  Laterality: Left;    PERCUTANEOUS TRANSLUMINAL ANGIOPLASTY N/A 8/25/2023    Procedure: ANGIOPLASTY-PERCUTANEOUS TRANSLUMINAL (PTA);  Surgeon: Nick Box MD;  Location: [facility] CATH;  Service: Cardiology;  Laterality: N/A;    PERCUTANEOUS TRANSLUMINAL ANGIOPLASTY (PTA) OF PERIPHERAL VESSEL Left 4/10/2025    Procedure: PTA, PERIPHERAL VESSEL;  Surgeon: Harsha Salcedo MD;  Location: [facility] CATH;  Service: Cardiology;  Laterality: Left;  L LE intervention via left antegrade approach Harsha Salcedo at [facility] OR under MAC  *PACEMAKER*    SHOULDER SURGERY Left     total shoulder replacement    WOUND EXPLORATION N/A 10/3/2023    Procedure: EXPLORATION, WOUND;  Surgeon: Jun Brito MD;  Location: [facility] OR;  Service: Cardiology;  Laterality: N/A;       Time Tracking:     PT Received On: 06/02/25  PT Start Time: 1059     PT Stop Time: 1114  PT Total Time (min): 15 min     Billable Minutes: Evaluation 15      06/02/2025

## 2025-06-02 NOTE — PT/OT/SLP PROGRESS
Occupational Therapy   Treatment    Name: Tobi Liz Jr.  MRN: [id number]  Admitting Diagnosis:  Delirium       Recommendations:     Discharge Recommendations: High Intensity Therapy  Discharge Equipment Recommendations:  none  Barriers to discharge:  Other (Comment) (Medical and functional status)    Assessment:     Tobi Liz Jr. is a 89 y.o. male with a medical diagnosis of Delirium.  He presents with functional deficits impacting independence with ADL's including functional mobility. Performance deficits affecting function are weakness, impaired endurance, impaired functional mobility, impaired self care skills, decreased safety awareness, impaired cardiopulmonary response to activity.     Rehab Prognosis:  Good and Fair; patient would benefit from acute skilled OT services to address these deficits and reach maximum level of function.       Plan:     Patient to be seen 5 x/week to address the above listed problems via self-care/home management, community/work re-entry, therapeutic activities, therapeutic exercises  Plan of Care Expires: 06/06/25  Plan of Care Reviewed with: patient, spouse    Subjective     Chief Complaint: weakness and confusion  Patient/Family Comments/goals: Pt would like to regain independence with ADL's including functional mobility in order to safely return home to [facility] with least amount of financial burden due to assistance.   Pain/Comfort:  Pain Rating 1: 0/10  Pain Rating Post-Intervention 1: 0/10    Objective:     Communicated with: nurse prior to session.  Patient found HOB elevated with peripheral IV, willams catheter upon OT entry to room.    General Precautions: Standard, fall    Orthopedic Precautions:N/A  Braces: N/A  Respiratory Status: Room air     Occupational Performance:     Bed Mobility:    Patient completed Rolling/Turning to Left with  stand by assistance  Patient completed Rolling/Turning to Right with stand by assistance  Patient completed  Scooting/Bridging with minimum assistance  Patient completed Supine to Sit with minimum assistance  Patient completed Sit to Supine with minimum assistance     Functional Mobility/Transfers:  Patient completed Sit <> Stand Transfer with minimum assistance  with  rolling walker   Patient completed Toilet Transfer Step Transfer technique with moderate assistance with  grab bars  Functional Mobility: Pt ambulated 158' between surfaces requiring steadying assist utilizing RW.     Activities of Daily Living:  Feeding:  supervision    Grooming: minimum assistance    Bathing: moderate assistance    Upper Body Dressing: moderate assistance    Lower Body Dressing: maximal assistance    Toileting: moderate assistance        AMPA 6 Click ADL: 18    Treatment & Education:  Pt was cooperative and highly motivated without verbal encouragement, but exhibited some confusion throughout session. He performed bed mobility requiring min assist to SBA secondary to some stabilization of trunk with tactile cueing for technique. Pt then participated in ADL retraining as noted above for reassessment of functional status requiring moderate verbal cueing for safety awareness, sequencing, and technique with use of assistive devices including RW and grab bars. Also, he participated in functional transfer retraining emphasizing fall prevention providing extra time with repetition requiring min to mod assist secondary to mostly steadying assist with lifting assist from lower surfaces including toilet with additional verbal and tactile cueing for safety and technique. Pt ambulated 158' between surfaces requiring steadying assist utilizing RW.     Patient left HOB elevated with all lines intact, call button in reach, and nurse notified    GOALS:   Multidisciplinary Problems       Occupational Therapy Goals          Problem: Occupational Therapy    Goal Priority Disciplines Outcome Interventions   Occupational Therapy Goal     OT, PT/OT Progressing     Description: Goals to be met by: 6/6/2025     Patient will increase functional independence with ADLs by performing:    Toileting from toilet with Set-up Assistance for hygiene and clothing management.   Supine to sit with Set-up Assistance.  Toilet transfer to toilet with Supervision.                         DME Justifications:  No DME recommended requiring DME justifications    Time Tracking:     OT Date of Treatment: 06/02/25  OT Start Time: 1525  OT Stop Time: 1608  OT Total Time (min): 43 min    Billable Minutes:Self Care/Home Management 28  Therapeutic Activity 15    OT/JOHNATHON: OT          6/2/2025

## 2025-06-02 NOTE — PLAN OF CARE
Problem: Physical Therapy  Goal: Physical Therapy Goal  Description: Patient will increase functional independence with mobility by performin. Supine to sit with Supervision or Set-up Assistance  2. Sit to supine with Supervision or Set-up Assistance  3. Bed to chair transfer with Supervision or Set-up Assistancewith or without rolling walker using Stand Pivot TECHNIQUE  4. Gait  x 150 feet  feet with Contact Guard Assistance with or without rolling walker  5. Lower extremity exercise program x10 reps per handout, with assistance as needed   Outcome: Progressing

## 2025-06-02 NOTE — PLAN OF CARE
Plan of care reviewed and ongoing with patient. 20 gauge IV to L AC saline locked, room air tolerating well. Pt unable to void during the night. Bladder scanned and In and Out cath pt per protocols during the night; 835 mL of urine emptied. Call light and personal items within reach of patient during the night.        Problem: Skin Injury Risk Increased  Goal: Skin Health and Integrity  Outcome: Not Progressing     Problem: Adult Inpatient Plan of Care  Goal: Plan of Care Review  Outcome: Not Progressing  Goal: Patient-Specific Goal (Individualized)  Outcome: Not Progressing  Goal: Absence of Hospital-Acquired Illness or Injury  Outcome: Not Progressing  Goal: Optimal Comfort and Wellbeing  Outcome: Not Progressing  Goal: Readiness for Transition of Care  Outcome: Not Progressing     Problem: Infection  Goal: Absence of Infection Signs and Symptoms  Outcome: Not Progressing     Problem: Wound  Goal: Optimal Coping  Outcome: Not Progressing  Goal: Optimal Functional Ability  Outcome: Not Progressing  Goal: Absence of Infection Signs and Symptoms  Outcome: Not Progressing  Goal: Improved Oral Intake  Outcome: Not Progressing  Goal: Optimal Pain Control and Function  Outcome: Not Progressing  Goal: Skin Health and Integrity  Outcome: Not Progressing  Goal: Optimal Wound Healing  Outcome: Not Progressing     Problem: Fall Injury Risk  Goal: Absence of Fall and Fall-Related Injury  Outcome: Not Progressing

## 2025-06-02 NOTE — PRE ADMISSION SCREENING
[facility] Surg  Inpatient Rehab Prescreen    PATIENT INFORMATION     Assessment date/time:  06/02/2025 @ 1350    Name: Tobi Liz Jr. Phone: [phone]   Address:   [address] [place] [id number] SSN:    YOB: 1929 Age: 89 y.o. Gender: male   Race: White   Marital Status:    Advance Directives: Full code     COVERAGE INFORMATION     Patient Medicare #: [id number]    Primary Insurance Type: Medicare/Medicare Part a & B Secondary Insurance Type: Blue Cross Blue Shield/Medicare Supplement [facility]   Policy #: [id number] Policy #: [id number]   Insurance contact name/number:  Insurance contact name/number:    Authorization #:  Authorization #:    Verified Coverage: Financial department   Pending Coverage:    Prescription Coverage:  Insurance details/comments:      PHYSICIAN/REFERRAL INFORMATION     Primary Care Physician: Luis Enrique Guzman Jr., MD Attending Physician:  Dave Reyes III, MD   Consulting physician/specialist:  Referring Physician:  Luis Enrique Guzman Jr., MD   Referring facility:  [facility] Referring contact name/phone:  GAYLE Rowe [phone]   Physician details/comments:  Admit to IPR     CONTACT INFORMATION   Extended Emergency Contact Information  Primary Emergency Contact: Eugenio Hemphill  Mobile Phone: [phone]  Relation: Daughter  Preferred language: English   needed? No  Secondary Emergency Contact: Therese Liz  [place] Phone: [phone]  Relation: Spouse  Preferred language: English   needed? No    PRIOR LIVING SITUATION      Patient lives at [facility]   Bedroom location/setup:  First floor   Bathroom location/setup:  walk in shower   Equipment at home:  WC, RW, shower chair   Prior device use:  All above     PRIOR LEVEL OF FUNCTION     Did a helper need to assist with the following activities prior to the current illness, exacerbation, or injury?   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 is a 89 y.o. male  has a past medical history of Aortic valve stenosis, Arthritis, BPH (benign prostatic hyperplasia), Carotid artery stenosis, Chronic diastolic heart failure, ETOH abuse, Exposure to COVID-19 virus (01/07/2022), Hyperchloremia, Hypertension, Kidney stones, Murmur, Pacemaker, Polymyalgia rheumatica, PVD (peripheral vascular disease), and Renal artery stenosis. presenting with Altered Mental Status (Pt to ED from [facility] via POV - family stated that pt was "walking halls last night" - with increased confusion - removed willams cath. Concerned for UTI.      Family reports he is more to his baseline this am.  Pt has been aggravated and annoyed by his willams catheter and family has noticed that he has had more utis since having catheter.  Urologist has given options for catheter removal; suprapubic cath and continuation of willams.  Family wishes to dc willams while in hospital and see how he does.    Willams removed and pending bladder scan this morning. Labs reviewed and overall stable.     Patient requires acute inpatient " rehab admission with 24-hour nursing and active physician oversight to monitor and manage acute medical comorbid conditions, labs, pain, and functional deficits. Patient/family will also require teaching and integration of improving functional skills into daily living. Patient will also require an individualized, interdisciplinary approach to their care, receiving PT, OT services 3 hours per day, 5 days per week. Required care cannot be provided at a lower level of care. Patient is anticipated to require approximately 10-14 days LOS with expected discharge home  with  services.        Impairment group (IGC):   Debility (Non-Cardiac/Non-Pulmonary) 16 Etiologic diagnosis/description:   N39.0: UTI       Date of onset:  5/30/2025 Date of surgery:  N/A   Allergies: Patient has no known allergies.   Comorbid condition: HTN, HLD, BPH, CAD, Valvular Heart Disease, CHF Class II, Occlusion of right Iliac artery, Urinary obstruction, Anemia, Acute bronchitis   Medical/functional conditions requiring inpatient rehabilitation:     This patient requires medical management/24-hour nursing of complex co morbidities HTN, HLD, BPH, CAD, Valvular Heart Disease, CHF Class II, Occlusion of right Iliac artery, Urinary obstruction, Anemia, Acute bronchitis, labs, medications (see medications list), pain, sleep hygiene, anticoagulation, nutrition, hydration, neurological, pulmonary, cardiac status, and preventive healthcare.    This patient requires intense therapy and an integrated, interdisciplinary approach to address safety, impaired mobility, impaired ADLs (dressing, toileting, grooming, showering), impaired cognition, judgment, and memory, communication, pulmonary insufficiency, bowel/bladder problems,preventive healthcare, medication management, integration of functional skills into daily living, and home caregiver support and training.    Risk for medical/clinical complications:     This patient is at risk for the following  complications: DVT/PE, pneumonia, malnutrition, neurological decline, respiratory insufficiency, worsening activity intolerance, complications from anticoagulation, skin breakdown, inadequate sleep, and constipation.     SPECIAL REHABILITATION NEEDS     IV: PIV Special equipment: willams catheter and pace maker to left chest wall and Skin: Venous stasis ulcers        Peripheral IV - Single Lumen 05/30/25 0841 20 G Left Antecubital (Active)   Site Assessment Clean;Dry;Intact;No redness;No swelling;No warmth;No drainage 06/02/25 1200   Line Securement Device Antimicrobial Adhesive 06/01/25 1930   Extremity Assessment Distal to IV No warmth;No swelling;No redness;No abnormal discoloration 06/01/25 1930   Line Status Saline locked;Flushed 06/02/25 1200   Dressing Status Clean;Dry;Intact 06/02/25 1200   Dressing Intervention Integrity maintained 06/02/25 1200   Dressing Change Due 06/03/25 05/31/25 1930   Site Change Due 06/03/25 05/31/25 1930   Reason Not Rotated Not due 06/01/25 1930          PRECAUTIONS     Cardiac precautions: hx of CHF, Diet: cardiac, Safety/fall precautions: Recent history of falls, High fall risk, Decreased balance, Do not leave alone in the bathroom, and Cognitive impairment, and Skin: Venous stasis ulcers BLE     PAST MEDICAL, SOCIAL, FAMILY HISTORY     Pertinent past medical history:   Past Medical History:   Diagnosis Date    Aortic valve stenosis     Arthritis     BPH (benign prostatic hyperplasia)     Carotid artery stenosis     Chronic diastolic heart failure     ETOH abuse     Exposure to COVID-19 virus 01/07/2022    Hyperchloremia     Hypertension     Kidney stones     Murmur     Pacemaker     Polymyalgia rheumatica     PVD (peripheral vascular disease)     Renal artery stenosis       Has the patient had two or more falls in the past year or any fall with injury in the past year: Yes   Has the patient had major surgery during the 100 days prior to admission: 4/10/2025: Peripheral Angiogram,  "IVL SFA (shockwave), PTA/stent SFA x 2    Family Medical History:   Family History   Problem Relation Name Age of Onset    Cancer Mother      Stroke Father        Substance use history:   Social History     Substance and Sexual Activity   Alcohol Use Yes    Alcohol/week: 3.0 standard drinks of alcohol    Types: 3 Shots of liquor per week    Comment: 1.5 oz a day     Tobacco Use History[1]  Social History     Substance and Sexual Activity   Drug Use Never      VITALS   BP:  (!) 171/70     Temp: (!) 94 °F (34.4 °C)     HR: 72     Resp: 12     SpO2: 99 %     Height: 5' 9" (1.753 m)     Weight: 83.9 kg (185 lb)     BMI: 27.3          MED/LABS/DIAGNOSITICS   Current Medications[2]   Pertinent lab results:   Lab Results   Component Value Date    WBC 5.81 06/02/2025    HGB 10.2 (L) 06/02/2025    HCT 31.7 (L) 06/02/2025     06/02/2025     06/02/2025    K 3.8 06/02/2025    BUN 13 06/02/2025    CO2 24 06/02/2025     06/02/2025      Pertinent diagnostic studies:    Additional labs and diagnostic studies required prior to admission:      QI: GG Care Tool     QI: GG Care Tool  Therapy Evaluation   Swallowing/  Nutritional Status   Diet/Feeding/  Swallowing    Eating       Oral Hygiene   Grooming    Toileting Hygiene       Shower/Bathe   Bathing    Upper Body Dressing   Dressing Upper    Lower Body Dressing   Dressing Lower minimum assistance don brief    Putting On/Taking Off Footwear       Toilet Transfer   Toileting minimum assistance don brief    Bladder Continence Status   Bladder Bolton Catheter   Bowel Continence Status   Bowel Incontinent   Roll Left and Right   Bed Mobility Rolling Left:  stand by assistance  Rolling Right: stand by assistance  Scooting: minimum assistance  Bridging: minimum assistance  Supine to Sit: minimum assistance  Sit to Supine: contact guard assistance   Sit to Lying       Lying to Sitting on Side of Bed       Sit to Stand       Chair/Bed-to- Chair   Transfers   Sit to Stand:  " minimum assistance with rolling walker   Car Transfer       Walk 10 Feet   Equipment RW     Walk 50 Feet with Two Turns   Balance posterior lean noted with sit<>stand and posterior lean noted with ambulation with changing directions    Walk 150 Feet   Endurance Impaired   Walk 10 Feet on Uneven Surfaces   Gait 40 feet with RW and CGA    Picking up an Object       Wheel 50 Feet with Two Turns   Wheelchair Not attempted   Wheel 150 Feet       1 Step (Curb)   Stairs Not attempted   4 Steps       12 Steps       Expression of Ideas and Wants   Communication Clear/fluent   Understanding  Verbal and Non-Verbal Content       Cognitive Patterns   Cognition Oriented to: Person and Place   Follows Commands/attention:Easily distracted      Safety Precautions Fall  Standard      Lower Extremity      Strength Right: deficits noted in weight bearing with hip flexion and knee flexion with ambulation   Left:  deficits noted in weight bearing with hip flexion and knee flexion with ambulation       ROM Right: WFL  Left: WFL      Upper Extremity      Strength Right: WFL  Left: WFL      ROM Right: WFL  Left: WFL     Care Score Value Definitions  6: Independent. [place] provides no assistance with tasks. A device may or may not have been used.  5: Set-up or clean-up assistance. [place] sets up or cleans up, but does not assist with tasks. [place] may have assisted prior to or following the activity.  4: Supervision or touching assistance. [place] provides verbal cues or touching/steadying or contact guard assistance. Assistance may be provided throughout the activity or intermittently.  3: Partial/moderate assistance. [place] does less than half the effort. [place] lifts, holds, or supports trunk or limbs, but provides less than half the effort.  2: Substantial/maximal assistance. [place] does more than half the effort. [place] lifts or holds trunk or limbs, and provides more than half the effort.  1: Dependent. [place] does all of the effort, or  the assistance of two or more helpers is required for the patient to complete the activity.  Care Score Activity Not Attempted Value Definitions  7: Patient refused.  9: Not applicable. Not attempted and the patient did not perform this activity prior to the current illness, exacerbation, or injury.  10: Not attempted due to environmental limitations (e.g., lack of equipment, weather constraints).  88: Not attempted due to medical condition or safety concerns.    DISCHARGE GOALS/ANTICPATED INTERVENTIONS/SERVICES     Expected Level of Improvement for Safe Discharge: Patient/caregivers anticipate discharge home at or near baseline level of functioning and/or decreased burden of care.    Patient/Family/Caregiver Goals:  Return to baseline.   Required Treatments/Services: Rehabilitation Nursing, Dietitian/nutrition, and Case Management   Required Therapy Therapy Type Min/Day Days/Week Duration of Therapy   Physical Therapy 90 5 10 - 14 days   Occupational Therapy 90 5 10 - 14 days   Speech and Language Pathology 30 5 10 - 14 days   Prosthetic/Orthotics      Recreational Therapy      Anticipated Services upon Discharge:   Home Health   Additional rehabilitation needs: Dietary needs: Education on cardiac diet   Expected Discharge Destination:  Assisted Living   Barriers to Discharge: Requires caregiver assist   Discharge Support: Patient has a caregiver available, Discharge plan has been verified with patient's caregiver, and Caregiver is in agreement with the discharge plan   Patient/Family/Caregiver Orientation: Patient/family/caregiver oriented and agreeable to inpatient rehabilitation plan   Estimated Length of Stay: 10 - 14 days   Projected Admission Date: 06/03/2025   Medical Prognosis: good   Physicians Review and Admission Determination:  Patient's chart and prescreen reviewed and I agree that patient is an excellent candidate for further recovery on our inpatient rehab unit.  Patient meets current Medicare  criteria as he needs close physician oversight and multiple modalities of therapy in an aid in his recovery and transition back home.            [1]   Social History  Tobacco Use   Smoking Status Former    Types: Cigars    Passive exposure: Past   Smokeless Tobacco Never   [2]   Current Facility-Administered Medications   Medication Dose Route Frequency Provider Last Rate Last Admin    amLODIPine tablet 5 mg  5 mg Oral Daily Lisa Santos MD   5 mg at 06/02/25 0944    aspirin chewable tablet 81 mg  81 mg Oral Daily Lisa Santos MD   81 mg at 06/02/25 0944    cefTRIAXone injection 1 g  1 g Intravenous Q24H Janessa Santos FNP   1 g at 06/02/25 0944    clopidogreL tablet 75 mg  75 mg Oral Daily Lisa Santos MD   75 mg at 06/02/25 0944    melatonin tablet 6 mg  6 mg Oral Nightly PRN Trevon Bear MD        mupirocin 2 % ointment   Nasal BID Luis Enrique Guzman Jr., MD   Given at 06/02/25 0944    sodium chloride 0.9% flush 10 mL  10 mL Intravenous PRN Trevon Bear MD

## 2025-06-03 ENCOUNTER — HOSPITAL ENCOUNTER (INPATIENT)
Facility: HOSPITAL | Age: OVER 89
LOS: 2 days | Discharge: NURSING, REHAB OR LONG-TERM CARE | DRG: 065 | End: 2025-06-05
Attending: INTERNAL MEDICINE | Admitting: INTERNAL MEDICINE
Payer: MEDICARE

## 2025-06-03 ENCOUNTER — HOSPITAL ENCOUNTER (INPATIENT)
Facility: HOSPITAL | Age: OVER 89
LOS: 1 days | Discharge: ANOTHER HOSPITAL | DRG: 949 | End: 2025-06-03
Attending: INTERNAL MEDICINE | Admitting: INTERNAL MEDICINE
Payer: MEDICARE

## 2025-06-03 VITALS
TEMPERATURE: 97 F | BODY MASS INDEX: 27.4 KG/M2 | WEIGHT: 185 LBS | HEART RATE: 69 BPM | OXYGEN SATURATION: 97 % | DIASTOLIC BLOOD PRESSURE: 66 MMHG | HEIGHT: 69 IN | RESPIRATION RATE: 16 BRPM | SYSTOLIC BLOOD PRESSURE: 155 MMHG

## 2025-06-03 VITALS
OXYGEN SATURATION: 98 % | DIASTOLIC BLOOD PRESSURE: 61 MMHG | RESPIRATION RATE: 18 BRPM | HEIGHT: 69 IN | BODY MASS INDEX: 25.5 KG/M2 | WEIGHT: 172.19 LBS | HEART RATE: 84 BPM | SYSTOLIC BLOOD PRESSURE: 131 MMHG | TEMPERATURE: 98 F

## 2025-06-03 DIAGNOSIS — G72.9 MYOPATHY: ICD-10-CM

## 2025-06-03 DIAGNOSIS — I60.9 SUBARACHNOID BLEED: Primary | ICD-10-CM

## 2025-06-03 DIAGNOSIS — S06.5XAA SUBDURAL HEMATOMA: ICD-10-CM

## 2025-06-03 DIAGNOSIS — R53.81 DEBILITY: ICD-10-CM

## 2025-06-03 DIAGNOSIS — N30.00 ACUTE CYSTITIS WITHOUT HEMATURIA: ICD-10-CM

## 2025-06-03 LAB
ABSOLUTE EOSINOPHIL (OHS): 0.41 K/UL
ABSOLUTE MONOCYTE (OHS): 0.48 K/UL (ref 0.3–1)
ABSOLUTE NEUTROPHIL COUNT (OHS): 4.12 K/UL (ref 1.8–7.7)
ALBUMIN SERPL BCP-MCNC: 3.2 G/DL (ref 3.5–5.2)
ALP SERPL-CCNC: 62 UNIT/L (ref 40–150)
ALT SERPL W/O P-5'-P-CCNC: 8 UNIT/L (ref 10–44)
ANION GAP (OHS): 5 MMOL/L (ref 8–16)
AST SERPL-CCNC: 14 UNIT/L (ref 11–45)
BASOPHILS # BLD AUTO: 0.02 K/UL
BASOPHILS NFR BLD AUTO: 0.4 %
BILIRUB SERPL-MCNC: 0.5 MG/DL (ref 0.1–1)
BUN SERPL-MCNC: 14 MG/DL (ref 10–30)
CALCIUM SERPL-MCNC: 8.5 MG/DL (ref 8.7–10.5)
CHLORIDE SERPL-SCNC: 107 MMOL/L (ref 95–110)
CO2 SERPL-SCNC: 25 MMOL/L (ref 23–29)
CREAT SERPL-MCNC: 0.9 MG/DL (ref 0.5–1.4)
ERYTHROCYTE [DISTWIDTH] IN BLOOD BY AUTOMATED COUNT: 14.3 % (ref 11.5–14.5)
GFR SERPLBLD CREATININE-BSD FMLA CKD-EPI: >60 ML/MIN/1.73/M2
GLUCOSE SERPL-MCNC: 107 MG/DL (ref 70–110)
HCT VFR BLD AUTO: 32.9 % (ref 40–54)
HGB BLD-MCNC: 10.2 GM/DL (ref 14–18)
IMM GRANULOCYTES # BLD AUTO: 0.02 K/UL (ref 0–0.04)
IMM GRANULOCYTES NFR BLD AUTO: 0.4 % (ref 0–0.5)
LYMPHOCYTES # BLD AUTO: 0.58 K/UL (ref 1–4.8)
MCH RBC QN AUTO: 30.2 PG (ref 27–31)
MCHC RBC AUTO-ENTMCNC: 31 G/DL (ref 32–36)
MCV RBC AUTO: 97 FL (ref 82–98)
NUCLEATED RBC (/100WBC) (OHS): 0 /100 WBC
PLATELET # BLD AUTO: 269 K/UL (ref 150–450)
PMV BLD AUTO: 9.1 FL (ref 9.2–12.9)
POTASSIUM SERPL-SCNC: 4 MMOL/L (ref 3.5–5.1)
PROT SERPL-MCNC: 6.4 GM/DL (ref 6–8.4)
RBC # BLD AUTO: 3.38 M/UL (ref 4.6–6.2)
RELATIVE EOSINOPHIL (OHS): 7.3 %
RELATIVE LYMPHOCYTE (OHS): 10.3 % (ref 18–48)
RELATIVE MONOCYTE (OHS): 8.5 % (ref 4–15)
RELATIVE NEUTROPHIL (OHS): 73.1 % (ref 38–73)
SODIUM SERPL-SCNC: 137 MMOL/L (ref 136–145)
WBC # BLD AUTO: 5.63 K/UL (ref 3.9–12.7)

## 2025-06-03 PROCEDURE — 92523 SPEECH SOUND LANG COMPREHEN: CPT

## 2025-06-03 PROCEDURE — 36415 COLL VENOUS BLD VENIPUNCTURE: CPT | Performed by: INTERNAL MEDICINE

## 2025-06-03 PROCEDURE — 97161 PT EVAL LOW COMPLEX 20 MIN: CPT

## 2025-06-03 PROCEDURE — 80053 COMPREHEN METABOLIC PANEL: CPT | Performed by: INTERNAL MEDICINE

## 2025-06-03 PROCEDURE — 85025 COMPLETE CBC W/AUTO DIFF WBC: CPT | Performed by: INTERNAL MEDICINE

## 2025-06-03 PROCEDURE — 63600175 PHARM REV CODE 636 W HCPCS: Performed by: INTERNAL MEDICINE

## 2025-06-03 PROCEDURE — 20000000 HC ICU ROOM

## 2025-06-03 PROCEDURE — 25000003 PHARM REV CODE 250: Performed by: INTERNAL MEDICINE

## 2025-06-03 PROCEDURE — 63600175 PHARM REV CODE 636 W HCPCS: Performed by: NURSE PRACTITIONER

## 2025-06-03 PROCEDURE — 11800000 HC REHAB PRIVATE ROOM

## 2025-06-03 PROCEDURE — 92610 EVALUATE SWALLOWING FUNCTION: CPT

## 2025-06-03 RX ORDER — FLUCONAZOLE 2 MG/ML
200 INJECTION, SOLUTION INTRAVENOUS
Status: DISCONTINUED | OUTPATIENT
Start: 2025-06-03 | End: 2025-06-05

## 2025-06-03 RX ORDER — CLOPIDOGREL BISULFATE 75 MG/1
75 TABLET ORAL DAILY
Status: DISCONTINUED | OUTPATIENT
Start: 2025-06-04 | End: 2025-06-03

## 2025-06-03 RX ORDER — CLOPIDOGREL BISULFATE 75 MG/1
75 TABLET ORAL DAILY
Status: CANCELLED | OUTPATIENT
Start: 2025-06-03

## 2025-06-03 RX ORDER — SODIUM CHLORIDE 0.9 % (FLUSH) 0.9 %
10 SYRINGE (ML) INJECTION
Status: DISCONTINUED | OUTPATIENT
Start: 2025-06-03 | End: 2025-06-05 | Stop reason: HOSPADM

## 2025-06-03 RX ORDER — AMLODIPINE BESYLATE 5 MG/1
5 TABLET ORAL DAILY
Status: CANCELLED | OUTPATIENT
Start: 2025-06-04

## 2025-06-03 RX ORDER — MUPIROCIN 20 MG/G
OINTMENT TOPICAL 2 TIMES DAILY
Status: COMPLETED | OUTPATIENT
Start: 2025-06-03 | End: 2025-06-05

## 2025-06-03 RX ORDER — FLUCONAZOLE 2 MG/ML
200 INJECTION, SOLUTION INTRAVENOUS
Status: DISCONTINUED | OUTPATIENT
Start: 2025-06-03 | End: 2025-06-03

## 2025-06-03 RX ORDER — FLUCONAZOLE 2 MG/ML
200 INJECTION, SOLUTION INTRAVENOUS
Status: CANCELLED | OUTPATIENT
Start: 2025-06-03 | End: 2025-06-10

## 2025-06-03 RX ORDER — CEFTRIAXONE 1 G/1
1 INJECTION, POWDER, FOR SOLUTION INTRAMUSCULAR; INTRAVENOUS
Status: CANCELLED | OUTPATIENT
Start: 2025-06-03 | End: 2025-06-07

## 2025-06-03 RX ORDER — TALC
6 POWDER (GRAM) TOPICAL NIGHTLY PRN
Status: CANCELLED | OUTPATIENT
Start: 2025-06-03

## 2025-06-03 RX ORDER — MUPIROCIN 20 MG/G
OINTMENT TOPICAL 2 TIMES DAILY
Status: CANCELLED | OUTPATIENT
Start: 2025-06-03 | End: 2025-06-05

## 2025-06-03 RX ORDER — AMLODIPINE BESYLATE 5 MG/1
5 TABLET ORAL DAILY
Status: DISCONTINUED | OUTPATIENT
Start: 2025-06-04 | End: 2025-06-05 | Stop reason: HOSPADM

## 2025-06-03 RX ORDER — TALC
6 POWDER (GRAM) TOPICAL NIGHTLY PRN
Status: DISCONTINUED | OUTPATIENT
Start: 2025-06-03 | End: 2025-06-03 | Stop reason: HOSPADM

## 2025-06-03 RX ORDER — AMLODIPINE BESYLATE 5 MG/1
5 TABLET ORAL DAILY
Status: CANCELLED | OUTPATIENT
Start: 2025-06-03

## 2025-06-03 RX ORDER — TALC
6 POWDER (GRAM) TOPICAL NIGHTLY PRN
Status: DISCONTINUED | OUTPATIENT
Start: 2025-06-03 | End: 2025-06-05 | Stop reason: HOSPADM

## 2025-06-03 RX ORDER — NAPROXEN SODIUM 220 MG/1
81 TABLET, FILM COATED ORAL DAILY
Status: CANCELLED | OUTPATIENT
Start: 2025-06-03

## 2025-06-03 RX ORDER — ATORVASTATIN CALCIUM 20 MG/1
20 TABLET, FILM COATED ORAL DAILY
Status: DISCONTINUED | OUTPATIENT
Start: 2025-06-04 | End: 2025-06-03 | Stop reason: HOSPADM

## 2025-06-03 RX ORDER — SODIUM CHLORIDE 0.9 % (FLUSH) 0.9 %
10 SYRINGE (ML) INJECTION
Status: DISCONTINUED | OUTPATIENT
Start: 2025-06-03 | End: 2025-06-03 | Stop reason: HOSPADM

## 2025-06-03 RX ORDER — CEFTRIAXONE 1 G/1
1 INJECTION, POWDER, FOR SOLUTION INTRAMUSCULAR; INTRAVENOUS
Status: DISCONTINUED | OUTPATIENT
Start: 2025-06-04 | End: 2025-06-05 | Stop reason: HOSPADM

## 2025-06-03 RX ORDER — CEFTRIAXONE 1 G/1
1 INJECTION, POWDER, FOR SOLUTION INTRAMUSCULAR; INTRAVENOUS
Status: CANCELLED | OUTPATIENT
Start: 2025-06-04 | End: 2025-06-07

## 2025-06-03 RX ORDER — ATORVASTATIN CALCIUM 20 MG/1
20 TABLET, FILM COATED ORAL DAILY
Status: DISCONTINUED | OUTPATIENT
Start: 2025-06-04 | End: 2025-06-05 | Stop reason: HOSPADM

## 2025-06-03 RX ORDER — SODIUM CHLORIDE 0.9 % (FLUSH) 0.9 %
10 SYRINGE (ML) INJECTION
Status: CANCELLED | OUTPATIENT
Start: 2025-06-03

## 2025-06-03 RX ORDER — ATORVASTATIN CALCIUM 20 MG/1
20 TABLET, FILM COATED ORAL DAILY
Status: DISCONTINUED | OUTPATIENT
Start: 2025-06-03 | End: 2025-06-03

## 2025-06-03 RX ORDER — CEFTRIAXONE 1 G/1
1 INJECTION, POWDER, FOR SOLUTION INTRAMUSCULAR; INTRAVENOUS
Status: DISCONTINUED | OUTPATIENT
Start: 2025-06-04 | End: 2025-06-03 | Stop reason: HOSPADM

## 2025-06-03 RX ORDER — MUPIROCIN 20 MG/G
OINTMENT TOPICAL 2 TIMES DAILY
Status: DISCONTINUED | OUTPATIENT
Start: 2025-06-03 | End: 2025-06-03 | Stop reason: HOSPADM

## 2025-06-03 RX ORDER — NAPROXEN SODIUM 220 MG/1
81 TABLET, FILM COATED ORAL DAILY
Status: DISCONTINUED | OUTPATIENT
Start: 2025-06-04 | End: 2025-06-03

## 2025-06-03 RX ORDER — ATORVASTATIN CALCIUM 20 MG/1
20 TABLET, FILM COATED ORAL DAILY
Status: CANCELLED | OUTPATIENT
Start: 2025-06-04

## 2025-06-03 RX ORDER — AMLODIPINE BESYLATE 5 MG/1
5 TABLET ORAL DAILY
Status: DISCONTINUED | OUTPATIENT
Start: 2025-06-04 | End: 2025-06-03 | Stop reason: HOSPADM

## 2025-06-03 RX ORDER — SOLIFENACIN SUCCINATE 10 MG/1
10 TABLET, FILM COATED ORAL DAILY
COMMUNITY

## 2025-06-03 RX ORDER — FLUCONAZOLE 2 MG/ML
200 INJECTION, SOLUTION INTRAVENOUS
Status: DISCONTINUED | OUTPATIENT
Start: 2025-06-03 | End: 2025-06-03 | Stop reason: HOSPADM

## 2025-06-03 RX ADMIN — CLOPIDOGREL 75 MG: 75 TABLET ORAL at 08:06

## 2025-06-03 RX ADMIN — ASPIRIN 81 MG CHEWABLE TABLET 81 MG: 81 TABLET CHEWABLE at 08:06

## 2025-06-03 RX ADMIN — AMLODIPINE BESYLATE 5 MG: 5 TABLET ORAL at 08:06

## 2025-06-03 RX ADMIN — FLUCONAZOLE 200 MG: 2 INJECTION, SOLUTION INTRAVENOUS at 06:06

## 2025-06-03 RX ADMIN — CEFTRIAXONE SODIUM 1 G: 1 INJECTION, POWDER, FOR SOLUTION INTRAMUSCULAR; INTRAVENOUS at 08:06

## 2025-06-03 RX ADMIN — MUPIROCIN: 20 OINTMENT TOPICAL at 09:06

## 2025-06-03 RX ADMIN — Medication 6 MG: at 09:06

## 2025-06-03 NOTE — PLAN OF CARE
Problem: SLP  Goal: SLP Goal  Description:   1. Pt will complete word-finding tasks w/ 80% acc given mod cues.  2. Pt will complete STM immediate recall tasks w/ 80% acc given mod cues.  3. Pt will complete STM delayed recall tasks w/ 80% acc given mod cues.  4. Pt will answer orientation questions w/ 80% acc given mod cues.  5. Pt will appropriately answer open-ended questions in conversation in 80% opportunities given mod cues.    LT. Pt will use appropriate memory strategies to recall functional information and to maintain safety and participate socially in functional living environment.   2. Pt will develop functional cognitive-linguistic based skills and utilize compensatory strategies to communicate wants and needs effectively to different conversational partners, maintain safety, and participate socially in functional living environment.    3. Pt will comprehend communication related to basic medical and social needs and utilize compensatory strategies to maintain safety and to participate socially in functional living environment.    4. He will be appropriately oriented to time, place, and situation with cues for external aid to improve safety and awareness in functional living environment.  Outcome: Unable to Meet 2/2 pt w/ recent discovery of brain bleed; pt d/c from skilled ST services at this time 2/2 transfer of care

## 2025-06-03 NOTE — H&P
"  [facility]  History & Physical    Patient Name: Tobi Liz Jr.  MRN: [id number]  Patient Class: IP- Inpatient  Admission Date: 6/3/2025  Attending Physician: Luis Enrique Guzman Jr., MD   Primary Care Provider: Luis Enrique Guzman Jr., MD         Patient information was obtained from ER records.     Subjective:     Principal Problem:<principal problem not specified>    Chief Complaint: No chief complaint on file.       HPI: Principal Problem:Subdural hematoma     Chief Complaint:       Chief Complaint   Patient presents with    Weakness         HPI:       History of present illness:      Tobi Liz Jr. is a 89 y.o. male  has a past medical history of Aortic valve stenosis, Arthritis, BPH (benign prostatic hyperplasia), Carotid artery stenosis, Chronic diastolic heart failure, ETOH abuse, Exposure to COVID-19 virus (01/07/2022), Hyperchloremia, Hypertension, Kidney stones, Murmur, Pacemaker, Polymyalgia rheumatica, PVD (peripheral vascular disease), and Renal artery stenosis. presenting with Altered Mental Status (Pt to ED from [facility] via P[facility] - family stated that pt was "walking halls last night" - with increased confusion - removed willams cath. Concerned for UTI.      Family reports he is more to his baseline this am.  Pt has been aggravated and annoyed by his willams catheter and family has noticed that he has had more utis since having catheter.  Urologist has given options for catheter removal; suprapubic cath and continuation of willams.  Family wishes to dc willams while in hospital and see how he does.     Willams removed and pending bladder scan this morning. Labs reviewed and overall stable.      6/3/25 FM:  Patient was seen and examined today after team conference and had a change in his neurological exam.  Patient had word-finding confusion him complained of persistent dizziness and lightheadedness despite having normal vital signs.  We performed a stat CT a " subdural with midline shift I spoke to the patient's primary care team and we are transferring back down to [facility] as he will require a Neurosurgery consult and transfer and we can not complete this on our inpatient rehab unit.  We will notify family is soon as we can get in touch with them.     Patient requires acute inpatient rehab admission with 24-hour nursing and active physician oversight to monitor and manage acute medical comorbid conditions, labs, pain, and functional deficits. Patient/family will also require teaching and integration of improving functional skills into daily living. Patient will also require an individualized, interdisciplinary approach to their care, receiving PT, OT services 3 hours per day, 5 days per week. Required care cannot be provided at a lower level of care. Patient is anticipated to require approximately 10-14 days LOS with expected discharge home  with  services.         Patient seen on inpatient rehab unit to pursue therapy services.  This morning prior to transition to rehab the patient was coherent and answering questions appropriately.  She has chronic dizziness which is his baseline.  Upon assessment by physical therapy he was noted to have worsening word-finding capabilities in addition to his ongoing dizziness.  A CT scan was performed which revealed a subdural hematoma.  He was transition [facility] unit for further evaluation and management.  Neurosurgery/neurology will be consulted to the transfer center for further recommendations.    Past Medical History:   Diagnosis Date    Aortic valve stenosis     Arthritis     BPH (benign prostatic hyperplasia)     Carotid artery stenosis     Chronic diastolic heart failure     ETOH abuse     Exposure to COVID-19 virus 01/07/2022    Hyperchloremia     Hypertension     Kidney stones     Murmur     Pacemaker     Polymyalgia rheumatica     PVD (peripheral vascular disease)     Renal artery stenosis        Past Surgical  History:   Procedure Laterality Date    A-V CARDIAC PACEMAKER INSERTION N/A 10/06/2023    Procedure: INSERTION, CARDIAC PACEMAKER, DUAL CHAMBER;  Surgeon: Douglas Salguero MD;  Location: [facility] CATH;  Service: Cardiology;  Laterality: N/A;    ANGIOGRAM, CAROTID Left 08/25/2023    Procedure: ANGIOGRAM, CAROTID;  Surgeon: Nick Box MD;  Location: [facility] CATH;  Service: Cardiology;  Laterality: Left;    CATARACT EXTRACTION, BILATERAL      ECHOCARDIOGRAM,TRANSESOPHAGEAL N/A 10/03/2023    Procedure: Transesophageal echo (JOSE ARMANDO) intra-procedure log documentation;  Surgeon: Nick Box MD;  Location: [facility] OR;  Service: Cardiology;  Laterality: N/A;    HAND SURGERY      INSERTION OF STENT INTO PERIPHERAL VESSEL N/A 01/30/2025    Procedure: INSERTION, STENT, VESSEL, PERIPHERAL;  Surgeon: Harsha Salcedo MD;  Location: [facility] CATH;  Service: Cardiology;  Laterality: N/A;    PERCUTANEOUS TRANSCATHETER AORTIC VALVE REPLACEMENT (TAVR) Left 10/03/2023    Procedure: REPLACEMENT, AORTIC VALVE, PERCUTANEOUS, TRANSCATHETER;  Surgeon: Nick Box MD;  Location: [facility] OR;  Service: Cardiology;  Laterality: Left;    PERCUTANEOUS TRANSCATHETER AORTIC VALVE REPLACEMENT (TAVR) Left 10/03/2023    Procedure: REPLACEMENT, AORTIC VALVE, PERCUTANEOUS, TRANSCATHETER carotid access;  Surgeon: Jun Brito MD;  Location: [facility] OR;  Service: Cardiovascular;  Laterality: Left;    PERCUTANEOUS TRANSLUMINAL ANGIOPLASTY N/A 08/25/2023    Procedure: ANGIOPLASTY-PERCUTANEOUS TRANSLUMINAL (PTA);  Surgeon: Nick Box MD;  Location: [facility] CATH;  Service: Cardiology;  Laterality: N/A;    PERCUTANEOUS TRANSLUMINAL ANGIOPLASTY (PTA) OF PERIPHERAL VESSEL Left 04/10/2025    Procedure: PTA, PERIPHERAL VESSEL;  Surgeon: Harsha Salcedo MD;  Location: [facility] CATH;  Service: Cardiology;  Laterality: Left;  L LE intervention via left antegrade approach Harsha Salcedo at [facility] in Hybrid OR under MAC  *PACEMAKER*    SHOULDER SURGERY Left     total shoulder  replacement    WOUND EXPLORATION N/A 10/03/2023    Procedure: EXPLORATION, WOUND;  Surgeon: Jun Brito MD;  Location: [facility];  Service: Cardiology;  Laterality: N/A;       Review of patient's allergies indicates:  No Known Allergies    Current Facility-Administered Medications on File Prior to Encounter   Medication    [DISCONTINUED] amLODIPine tablet 5 mg    [DISCONTINUED] amLODIPine tablet 5 mg    [DISCONTINUED] aspirin chewable tablet 81 mg    [DISCONTINUED] aspirin chewable tablet 81 mg    [DISCONTINUED] atorvastatin tablet 20 mg    [DISCONTINUED] atorvastatin tablet 20 mg    [DISCONTINUED] cefTRIAXone injection 1 g    [DISCONTINUED] cefTRIAXone injection 1 g    [DISCONTINUED] clopidogreL tablet 75 mg    [DISCONTINUED] clopidogreL tablet 75 mg    [DISCONTINUED] fluconazole (DIFLUCAN) IVPB 200 mg    [DISCONTINUED] fluconazole (DIFLUCAN) IVPB 200 mg    [DISCONTINUED] melatonin tablet 6 mg    [DISCONTINUED] melatonin tablet 6 mg    [DISCONTINUED] mupirocin 2 % ointment    [DISCONTINUED] mupirocin 2 % ointment    [DISCONTINUED] sodium chloride 0.9% flush 10 mL    [DISCONTINUED] sodium chloride 0.9% flush 10 mL     Current Outpatient Medications on File Prior to Encounter   Medication Sig    amLODIPine (NORVASC) 5 MG tablet Take 1 tablet (5 mg total) by mouth once daily. Prescribed by Dr. Box    aspirin 81 MG Chew Take 81 mg by mouth once daily.    clopidogreL (PLAVIX) 75 mg tablet Take 1 tablet (75 mg total) by mouth once daily.    rosuvastatin (CRESTOR) 10 MG tablet Take 1 tablet (10 mg total) by mouth once daily.    solifenacin (VESICARE) 10 MG tablet Take 10 mg by mouth once daily. Patient is still taking    [DISCONTINUED] cefdinir (OMNICEF) 300 MG capsule Take 1 capsule (300 mg total) by mouth 2 (two) times daily. for 10 days    [DISCONTINUED] solifenacin (VESICARE) 10 MG tablet Take 10 mg by mouth once daily.    [DISCONTINUED] sulfamethoxazole-trimethoprim 800-160mg (BACTRIM DS) 800-160 mg Tab  Take 1 tablet by mouth 2 (two) times daily. (Patient not taking: Reported on 5/30/2025)     Family History       Problem Relation (Age of Onset)    Cancer Mother    Stroke Father          Tobacco Use    Smoking status: Former     Types: Cigars     Passive exposure: Past    Smokeless tobacco: Never   Vaping Use    Vaping status: Never Used   Substance and Sexual Activity    Alcohol use: Yes     Alcohol/week: 3.0 standard drinks of alcohol     Types: 3 Shots of liquor per week     Comment: 1.5 oz a day    Drug use: Never    Sexual activity: Not Currently     Review of Systems   Unable to perform ROS: Mental status change     Objective:     Vital Signs (Most Recent):  Temp: 98.1 °F (36.7 °C) (06/03/25 1615)  Pulse: 80 (06/03/25 1615)  Resp: (!) 21 (06/03/25 1615)  BP: (!) 173/75 (06/03/25 1615)  SpO2: 100 % (06/03/25 1615) Vital Signs (24h Range):  Temp:  [96.9 °F (36.1 °C)-98.8 °F (37.1 °C)] 98.1 °F (36.7 °C)  Pulse:  [63-84] 80  Resp:  [12-21] 21  SpO2:  [97 %-100 %] 100 %  BP: (131-173)/(59-75) 173/75        There is no height or weight on file to calculate BMI.     Physical Exam  Vitals and nursing note reviewed.   Constitutional:       General: He is not in acute distress.     Appearance: He is obese. He is ill-appearing.      Comments: Confusion, o x 1, c/o lightheaded   HENT:      Head: Normocephalic and atraumatic.      Nose: Nose normal. No congestion.      Mouth/Throat:      Mouth: Mucous membranes are dry.      Pharynx: No oropharyngeal exudate.   Eyes:      General: No scleral icterus.     Extraocular Movements: Extraocular movements intact.   Neck:      Vascular: No carotid bruit.   Cardiovascular:      Rate and Rhythm: Normal rate and regular rhythm.      Heart sounds: Normal heart sounds. No murmur heard.     No friction rub. No gallop.   Pulmonary:      Effort: No respiratory distress.      Breath sounds: No stridor. Rhonchi present. No wheezing or rales.   Chest:      Chest wall: No tenderness.    Abdominal:      General: There is no distension.      Palpations: Abdomen is soft. There is no mass.      Tenderness: There is no abdominal tenderness. There is no right CVA tenderness, left CVA tenderness, guarding or rebound.      Hernia: No hernia is present.   Musculoskeletal:      Cervical back: No rigidity.      Right lower leg: No edema.      Left lower leg: No edema.   Lymphadenopathy:      Cervical: No cervical adenopathy.   Skin:     Capillary Refill: Capillary refill takes less than 2 seconds.      Coloration: Skin is not jaundiced or pale.   Neurological:      Mental Status: He is disoriented.      Motor: Weakness present.      Gait: Gait abnormal.   Psychiatric:      Comments: Confusion, word finding, change from admit this am                Significant Labs: All pertinent labs within the past 24 hours have been reviewed.    Significant Imaging: I have reviewed all pertinent imaging results/findings within the past 24 hours.  Assessment/Plan:     Assessment & Plan  Hypertension  Patient's blood pressure range in the last 24 hours was: BP  Min: 131/61  Max: 173/75.The patient's inpatient anti-hypertensive regimen is listed below:  Current Antihypertensives  amLODIPine tablet 5 mg, Daily, Oral    Plan  - BP is controlled, no changes needed to their regimen  -   Dizziness  This has a chronic ongoing issue likely exacerbated by subdural.    Subdural hematoma  Patient's hemorrhage is due to unknown likely trauma, this bleeding is associated with an antiplatelet agent, the antiplatelet agent is Select Antiplatelet Agent(s): Aspirin and Clopidogrel. Patients most recent Hgb, Hct, platelets, and INR are listed below.  Recent Labs     06/01/25  0523 06/02/25  0344 06/03/25  0404   HGB 11.0* 10.2* 10.2*   HCT 35.0* 31.7* 32.9*    248 269     Plan  - Will trend hemoglobin/hematocrit Daily  - Will monitor and correct any coagulation defects  - Will transfuse if Hgb is <7g/dl (<8g/dl in cases of active ACS)  or if patient has rapid bleeding leading to hemodynamic instability  - consulted transfer center for recommendations.  The patient may require neurosurgery evaluation.  For now will hold antiplatelets and anticoagulants.  Family has been updated.  VTE Risk Mitigation (From admission, onward)           Ordered     IP VTE HIGH RISK PATIENT  Once         06/03/25 1620     Place sequential compression device  Until discontinued         06/03/25 1620                                    Luis Enrique Guzman Jr, MD  Department of Hospital Medicine  [place] [facility]

## 2025-06-03 NOTE — PT/OT/SLP EVAL
Physical Therapy Rehab Evaluation    Patient Name:  Tobi Liz Jr.   MRN:  [id number]    Recommendations:     Discharge Recommendations:  Low Intensity Therapy   Discharge Equipment Recommendations: none   Barriers to discharge: None    Assessment:     Tobi Liz Jr. is a 89 y.o. male admitted with a medical diagnosis of <principal problem not specified>. Debility  due to UTI  He presents with the following impairments/functional limitations:     weakness, impaired endurance, impaired self care skills, impaired functional mobility, gait instability, impaired balance, decreased coordination, decreased upper extremity function, decreased lower extremity function, decreased safety awareness,  decreased ROM,  edema, impaired cardiopulmonary response to activity, impaired joint extensibility, impaired cognition, advance age  and impaired muscle length..     Patient was here in this in-patient rehab unit from 3/13/2025 to 3/25/2025, discharge to [facility] with recommended 24/7 supervision, continuing to encourage the patient to walk and not use the wheelchair within the assisted living facility as long as he has set up supervision, and advised not to use the lift component of the chair to prevent decline in function  during the family training dated 3/24/2025.     Patient is re-admitted to this in-patient rehab facility again for medical management due to increase confusion. No significant change in  functional mobility on this evaluation compared to when he was discharge on 3/25/2025 but definite change in cognitive status since discharge. He seems confused and disoriented.     Rehab Diagnosis:  Debility  due to UTI     Recent Surgery: * No surgery found *      General Precautions: Standard, fall, other (see comments) (confusion)     Orthopedic Precautions: N/A     Braces: N/A    Rehab Prognosis: Fair; patient would benefit from acute skilled PT services to address these deficits and reach maximum  level of function.      History:     Past Medical History:   Diagnosis Date    Aortic valve stenosis     Arthritis     BPH (benign prostatic hyperplasia)     Carotid artery stenosis     Chronic diastolic heart failure     ETOH abuse     Exposure to COVID-19 virus 01/07/2022    Hyperchloremia     Hypertension     Kidney stones     Murmur     Pacemaker     Polymyalgia rheumatica     PVD (peripheral vascular disease)     Renal artery stenosis        Past Surgical History:   Procedure Laterality Date    A-V CARDIAC PACEMAKER INSERTION N/A 10/06/2023    Procedure: INSERTION, CARDIAC PACEMAKER, DUAL CHAMBER;  Surgeon: Douglas Salguero MD;  Location: [facility] CATH;  Service: Cardiology;  Laterality: N/A;    ANGIOGRAM, CAROTID Left 08/25/2023    Procedure: ANGIOGRAM, CAROTID;  Surgeon: Nick Box MD;  Location: [facility] CATH;  Service: Cardiology;  Laterality: Left;    CATARACT EXTRACTION, BILATERAL      ECHOCARDIOGRAM,TRANSESOPHAGEAL N/A 10/03/2023    Procedure: Transesophageal echo (JOSE ARMANDO) intra-procedure log documentation;  Surgeon: Nick Box MD;  Location: [facility] OR;  Service: Cardiology;  Laterality: N/A;    HAND SURGERY      INSERTION OF STENT INTO PERIPHERAL VESSEL N/A 01/30/2025    Procedure: INSERTION, STENT, VESSEL, PERIPHERAL;  Surgeon: Harsha Salcedo MD;  Location: [facility] CATH;  Service: Cardiology;  Laterality: N/A;    PERCUTANEOUS TRANSCATHETER AORTIC VALVE REPLACEMENT (TAVR) Left 10/03/2023    Procedure: REPLACEMENT, AORTIC VALVE, PERCUTANEOUS, TRANSCATHETER;  Surgeon: Nick Box MD;  Location: [facility] OR;  Service: Cardiology;  Laterality: Left;    PERCUTANEOUS TRANSCATHETER AORTIC VALVE REPLACEMENT (TAVR) Left 10/03/2023    Procedure: REPLACEMENT, AORTIC VALVE, PERCUTANEOUS, TRANSCATHETER carotid access;  Surgeon: Jun Brito MD;  Location: [facility] OR;  Service: Cardiovascular;  Laterality: Left;    PERCUTANEOUS TRANSLUMINAL ANGIOPLASTY N/A 08/25/2023    Procedure: ANGIOPLASTY-PERCUTANEOUS  "TRANSLUMINAL (PTA);  Surgeon: Nick Box MD;  Location: [facility] CATH;  Service: Cardiology;  Laterality: N/A;    PERCUTANEOUS TRANSLUMINAL ANGIOPLASTY (PTA) OF PERIPHERAL VESSEL Left 04/10/2025    Procedure: PTA, PERIPHERAL VESSEL;  Surgeon: Harsha Salcedo MD;  Location: [facility] CATH;  Service: Cardiology;  Laterality: Left;  L LE intervention via left antegrade approach Harsha Salcedo at [facility] in [facility] OR under [facility]  *PACEMAKER*    SHOULDER SURGERY Left     total shoulder replacement    WOUND EXPLORATION N/A 10/03/2023    Procedure: EXPLORATION, WOUND;  Surgeon: Jun Brito MD;  Location: [facility] OR;  Service: Cardiology;  Laterality: N/A;       Subjective     Chief Complaint: "Where's my wife? I want to go to her."   Patient/Family Comments/goals: See his wife. Family was requesting in-patient rehab.     Patients cultural, spiritual, Faith conflicts given the current situation: no       Living Environment  People in Home: spouse  Name(s) of People in Home: Therese Liz  Living Arrangements: assisted living  Home Accessibility: wheelchair accessible  Stair Railings at Home: none  Home Layout: Able to live on 1st floor  Transportation Anticipated: agency, family or friend will provide  Living Environment Comment: Patient lives in an assisted living facility; needs assistance with basic ADL's and functional mobility using a RW due to advance age and cognitive defiicts; sleeps on a recliner chair; uses a wheelchair to get around the assisted living facility  Equipment Currently Used at Home: walker, rolling, wheelchair, urinary catheter supplies    Prior Level of Function  Ambulation Skills: needs device and assist  Stairs: needs device and assist  ADL Skills: needs device and assist  Work/Leisure Activity: needs device and assist  Cognitive Communication: needs assist    Equipment used at home: walker, rolling, wheelchair, urinary catheter supplies.  DME owned (not currently used): none.      Upon " discharge, patient will have assistance from facility staff and family .    Objective:     Communicated with nurse and patient  and ST prior to session.  Patient found up in chair with willams catheter  upon PT entry to room.    Vitals   Vitals at Rest  BP  146/65 mmHg    HR  95 bpm    O2 Sat  97%    Pain Pain Rating 1: 0/10  Pain Rating Post-Intervention 1: 0/10     Respiratory Status: Room air    Exams  Cognitive Exam:  Patient is oriented to Person only;  not to place, time and situation  Postural Exam:  Patient presented with the following abnormalities:    -       Rounded shoulders  -       Forward head  Sensation:   unable to  assess accurately due to cognitive deficits, unable to comprehend the        Skin Integrity/Edema:     -       Skin integrity: Visible skin intact  -       Edema: minimal on both LE, both LE wrapped with ace wrap to control swelling   RLE ROM:  decrease on hip, knee and ankle done active assistedly         RLE Strength: grossly graded 3/5, unable to assess accurately due to cognitive deficits          LLE ROM:   decrease on hip, knee and ankle done active assistedly           LLE Strength:    grossly graded 3/5, unable to assess accurately due to cognitive deficits           Functional Mobility  Bed Mobility:     Rolling Left:  Supervision or touching assistance   Rolling Right: Supervision or touching assistance   Scooting: Supervision or touching assistance   Bridging: Supervision or touching assistance   Supine to Sit: Supervision or touching assistance   Sit to Supine: Supervision or touching assistance   Transfers:     Sit to Stand: Supervision or touching assistance  with rolling walker  Chair to mat: Supervision or touching assistance  with  rolling walker  using  Step Transfer  Toilet Transfer: Supervision or touching assistance  with  rolling walker  using  Step Transfer  Gait: Pt ambulates 200 feet, 25 feet x 2, 10 feet x 2 and 150 feet  with RW with Supervision or touching  assistance .   Impairments contributing to gait deviations include impaired balance, impaired coordination, decreased flexibility, impaired motor control, abnormal muscle tone, decreased ROM, and decreased strength.  Balance: Static Sitting/Standing  Patient performed static standing on level surface using rolling walker with Supervision or touching assistance  and minimal verbal cues.    Static Sit: GOOD: Takes MODERATE challenges from all directions  Static Stand: FAIR+: Takes MINIMAL challenges from all directions    Dynamic Sitting/Standing  Patient performed dynamic standing on level surface using rolling walker with Supervision or touching assistance  and minimal verbal cues during minimal excursions.    Dynamic Sit: GOOD-: Incosistently Maintains balance through MODERATE excursions of active trunk movement,     Dynamic Stand: FAIR+: Needs CLOSE SUPERVISION during gait and is able to right self with minor LOB  4 steps (6 inch)  stairs with bilateral handrails with Supervision or touching assistance   2 inch and 4 inch  curb with rolling walker with Supervision or touching assistance   Ambulate 10 feet on uneven surfaces/ramps with rolling walker with Supervision or touching assistance    object from ground in standing position with reacher with rolling walker with Supervision or touching assistance   Wheelchair Propulsion:  Pt propelled Standard wheelchair x 200 feet on Level tile with  Bilateral upper extremity and Bilateral lower extremity with Independent.     GGs   Admit Current   Status Goal   Functional Area: Care Score: Care Score: Care Score:   Roll Left and Right 4 4 Set-up/clean-up   Sit to Lying 4 4 Set-up/clean-up   Lying to Sitting on Side of Bed 4 4 Set-up/clean-up   Sit to Stand 4 4 Set-up/clean-up   Chair/Bed-to-Chair Transfer 4 4 Set-up/clean-up   Car Transfer 10 10 Supervision or touching assistance   Walk 10 Feet 4 4 Set-up/clean-up   Walk 50 Feet with Two Turns 4 4 Set-up/clean-up    Walk 150 Feet 4 4 Set-up/clean-up   Walk 10 Feet Uneven Surface 4 4 Supervision or touching assistance   1 Step (Curb) 4 4 Supervision or touching assistance   4 Steps 4 4 Supervision or touching assistance   12 Steps 9 9 Not applicable   Picking Up Object 4 4 Set-up/clean-up   Wheel 50 Feet with Two Turns 6 6     Wheel 150 Feet 6 6       Therapeutic Activities and Exercises:  Role and goals of PT, PT plan of care, bed mobility, transfer training, gait training, LE exercises, balance training, safety awareness, assistive device use, wheelchair management, and fall prevention      Activity Tolerance: Fair    Patient left up in chair with call button in reach, chair alarm on, and nurse  notified.    Education Provided: roles and goals of PT/PTA, transfer training, bed mob, gait training, stair training, balance training, safety awareness, body mechanics, assistive device, wheelchair management, strengthening exercises, and fall prevention    Expected compliance: Low compliance    GOALS:   Multidisciplinary Problems       Physical Therapy Goals          Problem: Physical Therapy    Goal Priority Disciplines Outcome Interventions   Physical Therapy Goal     PT, PT/OT Progressing    Description:     Long Term Goals: 6/13/2025     Transfers Status    Sit to Stand  Long Term Goal:  Complete sit to stand with Supervision or Set-up Assistance using Rolling Walker with minimal   verbal cues    Stand Step  Long Term Goal:  Complete stand step with  Supervision or Set-up Assistance  using Rolling Walker with minimal   verbal cues    Supine <> Sit  Long Term Goal: Complete supine <> sit with Supervision or Set-up Assistance  With minimal   verbal cues rails prn    Rolling Right/Left  Long Term Goal: Complete rolling  right/left with Supervision or Set-up Assistance with minimal   verbal cues rails prn      Balance/Coordination    Static Sitting  Long Term Goal: Complete static sitting with Modified Independent  With no  verbal  cues    Dynamic Sitting  Long Term Goal: Complete dynamic sitting with Supervision or Set-up Assistance  with  minimal  verbal cues minimal excursions    Static Standing  Long Term Goal: Complete static standing with Supervision or Set-up Assistance  with   minimal   verbal cues and RW    Dynamic Standing  Long Term Goal: Complete dynamic standing with Supervision or Set-up Assistance with minimal   verbal cues and minimal  excursions with RW    Endurance    Long Term Goal:  Physical Therapy: 2 times a day, 45 minutes  Rest periods: minimal  Sittin-8 hours/day    Mobility/Gait  Long Term Goal: Will ambulate with Supervision or Set-up Assistance  using Rolling Walker with minimal  verbal cues x 300 ft    Stairs Assistance  Long Term Goal: Patient will ascend/descend 4 stairs/steps using both  handrails  nonreciprocal steps with Supervision or Set-up Assistance and minimal  verbal cues    Ramp/Uneven 10 feet surface  Long  Term Goal; Patient will be able to navigate a 10 inch uneven surface with RW  with   Supervision or Set-up Assistance and minimal  verbal cues      2-4  inch curb  Long  Term Goal; Patient will be able to navigate a  2 to 4 inch curb with proper A.D. with  Supervision or Set-up Assistance and minimal  verbal cues    Picking up object   Long  Term Goal; Patient will be able to  a small object from the floor  with proper A.D. with  Supervision or Set-up Assistance and minimal  verbal cues    Car transfers  Long  Term Goal; Patient will be able to get in and out of a vehicle  with RW with  stand by assistance                                     Plan:     During this hospitalization, patient to be seen 5 x/week to address the identified rehab impairments via gait training, therapeutic activities, therapeutic exercises, neuromuscular re-education and progress toward the following goals:    Plan of Care Expires:  25  PT Next Visit Date: 25  Plan of Care reviewed with:  patient      Additional Infomation:           Time Tracking:     Therapy Time   PT Received On: 06/03/25  PT Start Time: 1330  PT Stop Time: 1445  PT Total Time (min): 75 min  PT Individual: 60  PT Concurrent: 15      Billable Minutes: Evaluation low complexity     06/03/2025

## 2025-06-03 NOTE — ASSESSMENT & PLAN NOTE
Patient's blood pressure range in the last 24 hours was: BP  Min: 131/61  Max: 173/75.The patient's inpatient anti-hypertensive regimen is listed below:  Current Antihypertensives  amLODIPine tablet 5 mg, Daily, Oral    Plan  - BP is controlled, no changes needed to their regimen  -

## 2025-06-03 NOTE — PLAN OF CARE
Problem: Physical Therapy  Goal: Physical Therapy Goal  Description: Patient will increase functional independence with mobility by performin. Supine to sit with Supervision or Set-up Assistance  2. Sit to supine with Supervision or Set-up Assistance  3. Bed to chair transfer with Supervision or Set-up Assistancewith or without rolling walker using Stand Pivot TECHNIQUE  4. Gait  x 150 feet  feet with Contact Guard Assistance with or without rolling walker  5. Lower extremity exercise program x10 reps per handout, with assistance as needed   Outcome: Adequate for Care Transition

## 2025-06-03 NOTE — H&P
"  [facility] Medicine  History & Physical / GUTIERREZ    Patient Name: Tobi Liz Jr.  MRN: [id number]  Patient Class: IP- Rehab  Admission Date: 6/3/2025  Attending Physician: Dave Reyes III, MD  Primary Care Provider: Luis Enrique Guzman Jr., MD         Patient information was obtained from patient, caregiver / friend, past medical records, and ER records.     Subjective:     Principal Problem:Subdural hematoma    Chief Complaint:   Chief Complaint   Patient presents with    Weakness        HPI:   History of present illness:      Tobi Liz Jr. is a 89 y.o. male  has a past medical history of Aortic valve stenosis, Arthritis, BPH (benign prostatic hyperplasia), Carotid artery stenosis, Chronic diastolic heart failure, ETOH abuse, Exposure to COVID-19 virus (01/07/2022), Hyperchloremia, Hypertension, Kidney stones, Murmur, Pacemaker, Polymyalgia rheumatica, PVD (peripheral vascular disease), and Renal artery stenosis. presenting with Altered Mental Status (Pt to ED from [facility] via POV - family stated that pt was "walking halls last night" - with increased confusion - removed willams cath. Concerned for UTI.      Family reports he is more to his baseline this am.  Pt has been aggravated and annoyed by his willams catheter and family has noticed that he has had more utis since having catheter.  Urologist has given options for catheter removal; suprapubic cath and continuation of willams.  Family wishes to dc willams while in hospital and see how he does.     Willams removed and pending bladder scan this morning. Labs reviewed and overall stable.     6/3/25 FM:  Patient was seen and examined today after team conference and had a change in his neurological exam.  Patient had word-finding confusion him complained of persistent dizziness and lightheadedness despite having normal vital signs.  We performed a stat CT a subdural with midline shift I spoke to the patient's primary care team " and we are transferring back down to [facility] as he will require a Neurosurgery consult and transfer and we can not complete this on our inpatient rehab unit.  We will notify family is soon as we can get in touch with them.     Patient requires acute inpatient rehab admission with 24-hour nursing and active physician oversight to monitor and manage acute medical comorbid conditions, labs, pain, and functional deficits. Patient/family will also require teaching and integration of improving functional skills into daily living. Patient will also require an individualized, interdisciplinary approach to their care, receiving PT, OT services 3 hours per day, 5 days per week. Required care cannot be provided at a lower level of care. Patient is anticipated to require approximately 10-14 days LOS with expected discharge home  with  services.          Impairment group (IGC):   Debility (Non-Cardiac/Non-Pulmonary) 16 Etiologic diagnosis/description:   N39.0: UTI         Date of onset:  5/30/2025 Date of surgery:  N/A   Allergies: Patient has no known allergies.   Comorbid condition: HTN, HLD, BPH, CAD, Valvular Heart Disease, CHF Class II, Occlusion of right Iliac artery, Urinary obstruction, Anemia, Acute bronchitis   Medical/functional conditions requiring inpatient rehabilitation:      This patient requires medical management/24-hour nursing of complex co morbidities HTN, HLD, BPH, CAD, Valvular Heart Disease, CHF Class II, Occlusion of right Iliac artery, Urinary obstruction, Anemia, Acute bronchitis, labs, medications (see medications list), pain, sleep hygiene, anticoagulation, nutrition, hydration, neurological, pulmonary, cardiac status, and preventive healthcare.     This patient requires intense therapy and an integrated, interdisciplinary approach to address safety, impaired mobility, impaired ADLs (dressing, toileting, grooming, showering), impaired cognition, judgment, and memory, communication, pulmonary  insufficiency, bowel/bladder problems,preventive healthcare, medication management, integration of functional skills into daily living, and home caregiver support and training.    Risk for medical/clinical complications:      This patient is at risk for the following complications: DVT/PE, pneumonia, malnutrition, neurological decline, respiratory insufficiency, worsening activity intolerance, complications from anticoagulation, skin breakdown, inadequate sleep, and constipation.       Past Medical History:   Diagnosis Date    Aortic valve stenosis     Arthritis     BPH (benign prostatic hyperplasia)     Carotid artery stenosis     Chronic diastolic heart failure     ETOH abuse     Exposure to COVID-19 virus 01/07/2022    Hyperchloremia     Hypertension     Kidney stones     Murmur     Pacemaker     Polymyalgia rheumatica     PVD (peripheral vascular disease)     Renal artery stenosis        Past Surgical History:   Procedure Laterality Date    A-V CARDIAC PACEMAKER INSERTION N/A 10/06/2023    Procedure: INSERTION, CARDIAC PACEMAKER, DUAL CHAMBER;  Surgeon: Douglas Salguero MD;  Location: [facility] CATH;  Service: Cardiology;  Laterality: N/A;    ANGIOGRAM, CAROTID Left 08/25/2023    Procedure: ANGIOGRAM, CAROTID;  Surgeon: Nick Box MD;  Location: [facility] CATH;  Service: Cardiology;  Laterality: Left;    CATARACT EXTRACTION, BILATERAL      ECHOCARDIOGRAM,TRANSESOPHAGEAL N/A 10/03/2023    Procedure: Transesophageal echo (JOSE ARMANDO) intra-procedure log documentation;  Surgeon: Nick Box MD;  Location: [facility] OR;  Service: Cardiology;  Laterality: N/A;    HAND SURGERY      INSERTION OF STENT INTO PERIPHERAL VESSEL N/A 01/30/2025    Procedure: INSERTION, STENT, VESSEL, PERIPHERAL;  Surgeon: Harsha Salcedo MD;  Location: [facility] CATH;  Service: Cardiology;  Laterality: N/A;    PERCUTANEOUS TRANSCATHETER AORTIC VALVE REPLACEMENT (TAVR) Left 10/03/2023    Procedure: REPLACEMENT, AORTIC VALVE, PERCUTANEOUS, TRANSCATHETER;   Surgeon: Nick Box MD;  Location: [facility] OR;  Service: Cardiology;  Laterality: Left;    PERCUTANEOUS TRANSCATHETER AORTIC VALVE REPLACEMENT (TAVR) Left 10/03/2023    Procedure: REPLACEMENT, AORTIC VALVE, PERCUTANEOUS, TRANSCATHETER carotid access;  Surgeon: Jun Brito MD;  Location: [facility] OR;  Service: Cardiovascular;  Laterality: Left;    PERCUTANEOUS TRANSLUMINAL ANGIOPLASTY N/A 08/25/2023    Procedure: ANGIOPLASTY-PERCUTANEOUS TRANSLUMINAL (PTA);  Surgeon: Nick Box MD;  Location: [facility] CATH;  Service: Cardiology;  Laterality: N/A;    PERCUTANEOUS TRANSLUMINAL ANGIOPLASTY (PTA) OF PERIPHERAL VESSEL Left 04/10/2025    Procedure: PTA, PERIPHERAL VESSEL;  Surgeon: Harsha Salcedo MD;  Location: [facility] CATH;  Service: Cardiology;  Laterality: Left;  L LE intervention via left antegrade approach Harsha Salcedo at [facility] in Hybrid OR under MAC  *PACEMAKER*    SHOULDER SURGERY Left     total shoulder replacement    WOUND EXPLORATION N/A 10/03/2023    Procedure: EXPLORATION, WOUND;  Surgeon: Jun Brito MD;  Location: [facility] OR;  Service: Cardiology;  Laterality: N/A;       Review of patient's allergies indicates:  No Known Allergies    Current Facility-Administered Medications on File Prior to Encounter   Medication    [DISCONTINUED] amLODIPine tablet 5 mg    [DISCONTINUED] aspirin chewable tablet 81 mg    [DISCONTINUED] cefTRIAXone injection 1 g    [DISCONTINUED] clopidogreL tablet 75 mg    [DISCONTINUED] melatonin tablet 6 mg    [DISCONTINUED] mupirocin 2 % ointment    [DISCONTINUED] sodium chloride 0.9% flush 10 mL     Current Outpatient Medications on File Prior to Encounter   Medication Sig    amLODIPine (NORVASC) 5 MG tablet Take 1 tablet (5 mg total) by mouth once daily. Prescribed by Dr. Box    aspirin 81 MG Chew Take 81 mg by mouth once daily.    clopidogreL (PLAVIX) 75 mg tablet Take 1 tablet (75 mg total) by mouth once daily.    rosuvastatin (CRESTOR) 10 MG tablet Take 1 tablet (10 mg  total) by mouth once daily.    solifenacin (VESICARE) 10 MG tablet Take 10 mg by mouth once daily. Patient is still taking    [DISCONTINUED] solifenacin (VESICARE) 10 MG tablet Take 10 mg by mouth once daily.    [DISCONTINUED] cefdinir (OMNICEF) 300 MG capsule Take 1 capsule (300 mg total) by mouth 2 (two) times daily. for 10 days    [DISCONTINUED] sulfamethoxazole-trimethoprim 800-160mg (BACTRIM DS) 800-160 mg Tab Take 1 tablet by mouth 2 (two) times daily. (Patient not taking: Reported on 5/30/2025)     Family History       Problem Relation (Age of Onset)    Cancer Mother    Stroke Father          Tobacco Use    Smoking status: Former     Types: Cigars     Passive exposure: Past    Smokeless tobacco: Never   Vaping Use    Vaping status: Never Used   Substance and Sexual Activity    Alcohol use: Yes     Alcohol/week: 3.0 standard drinks of alcohol     Types: 3 Shots of liquor per week     Comment: 1.5 oz a day    Drug use: Never    Sexual activity: Not Currently     Review of Systems   Unable to perform ROS: Mental status change     Objective:     Vital Signs (Most Recent):  Temp: 97.8 °F (36.6 °C) (06/03/25 1504)  Pulse: 84 (06/03/25 1504)  Resp: 18 (06/03/25 1504)  BP: 131/61 (06/03/25 1504)  SpO2: 98 % (06/03/25 1504) Vital Signs (24h Range):  Temp:  [96.9 °F (36.1 °C)-98.8 °F (37.1 °C)] 97.8 °F (36.6 °C)  Pulse:  [63-84] 84  Resp:  [12-18] 18  SpO2:  [97 %-99 %] 98 %  BP: (131-155)/(59-66) 131/61     Weight: 78.1 kg (172 lb 2.9 oz)  Body mass index is 25.43 kg/m².     Physical Exam  Vitals and nursing note reviewed.   Constitutional:       General: He is not in acute distress.     Appearance: He is obese. He is ill-appearing.      Comments: Confusion, o x 1, c/o lightheaded   HENT:      Head: Normocephalic and atraumatic.      Nose: Nose normal. No congestion.      Mouth/Throat:      Mouth: Mucous membranes are dry.      Pharynx: No oropharyngeal exudate.   Eyes:      General: No scleral icterus.      Extraocular Movements: Extraocular movements intact.   Neck:      Vascular: No carotid bruit.   Cardiovascular:      Rate and Rhythm: Normal rate and regular rhythm.      Heart sounds: Normal heart sounds. No murmur heard.     No friction rub. No gallop.   Pulmonary:      Effort: No respiratory distress.      Breath sounds: No stridor. Rhonchi present. No wheezing or rales.   Chest:      Chest wall: No tenderness.   Abdominal:      General: There is no distension.      Palpations: Abdomen is soft. There is no mass.      Tenderness: There is no abdominal tenderness. There is no right CVA tenderness, left CVA tenderness, guarding or rebound.      Hernia: No hernia is present.   Musculoskeletal:      Cervical back: No rigidity.      Right lower leg: No edema.      Left lower leg: No edema.   Lymphadenopathy:      Cervical: No cervical adenopathy.   Skin:     Capillary Refill: Capillary refill takes less than 2 seconds.      Coloration: Skin is not jaundiced or pale.   Neurological:      Mental Status: He is disoriented.      Motor: Weakness present.      Gait: Gait abnormal.   Psychiatric:      Comments: Confusion, word finding, change from admit this am                Significant Labs:   Recent Lab Results         06/03/25  0404        Albumin 3.2       ALP 62       ALT 8       Anion Gap 5       AST 14       Baso # 0.02       Basophil % 0.4       BILIRUBIN TOTAL 0.5  Comment: For infants and newborns, interpretation of results should be based   on gestational age, weight and in agreement with clinical   observations.    Premature Infant recommended reference ranges:   0-24 hours:  <8.0 mg/dL   24-48 hours: <12.0 mg/dL   3-5 days:    <15.0 mg/dL   6-29 days:   <15.0 mg/dL       BUN 14       Calcium 8.5       Chloride 107       CO2 25       Creatinine 0.9       eGFR >60  Comment: Estimated GFR calculated using the CKD-EPI creatinine (2021) equation.       Eos # 0.41       Eos % 7.3       Glucose 107       Gran # (ANC)  4.12       Hematocrit 32.9       Hemoglobin 10.2       Immature Grans (Abs) 0.02  Comment: Mild elevation in immature granulocytes is non specific and can be seen in a variety of conditions including stress response, acute inflammation, trauma and pregnancy. Correlation with other laboratory and clinical findings is essential.       Immature Granulocytes 0.4       Lymph # 0.58       Lymph % 10.3       MCH 30.2       MCHC 31.0       MCV 97       Mono # 0.48       Mono % 8.5       MPV 9.1       Neut % 73.1       nRBC 0       Platelet Count 269       Potassium 4.0       PROTEIN TOTAL 6.4       RBC 3.38       RDW 14.3       Sodium 137       WBC 5.63               Significant Imaging: I have reviewed all pertinent imaging results/findings within the past 24 hours.  Assessment/Plan:     Assessment & Plan  Subdural hematoma  Patient's hemorrhage is due to trauma/laceration, this bleeding is not associated with a medication or a coagulopathy. Patients most recent Hgb, Hct, platelets, and INR are listed below.  Recent Labs     06/01/25  0523 06/02/25  0344 06/03/25  0404   HGB 11.0* 10.2* 10.2*   HCT 35.0* 31.7* 32.9*    248 269     Plan  - Will trend hemoglobin/hematocrit Daily  - Will monitor and correct any coagulation defects  - Will transfuse if Hgb is <7g/dl (<8g/dl in cases of active ACS) or if patient has rapid bleeding leading to hemodynamic instability  -     I spoke to family patient has had multiple falls over the last few weeks but denies any severe head trauma but he has hit his head, also has had a er visit.  Patient with a new found subdural hematoma after admission I spoke the patient's primary care physician we will transfer back down to our med surge unit where he arranged neurosurgical consultation and likely transfer.  Discontinued aspirin Plavix.  Hypertension  Patient's blood pressure range in the last 24 hours was: BP  Min: 131/61  Max: 155/66.The patient's inpatient anti-hypertensive regimen  is listed below:  Current Antihypertensives  amLODIPine tablet 5 mg, Daily, Oral    Plan  - BP is controlled, no changes needed to their regimen  Hyperlipidemia  Cont statin.    IFG (impaired fasting glucose)  CBG with SSI    Valvular heart disease  Stopping antiplatlets    Congestive heart failure, NYHA class II  Currently stable.  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    Urinary obstruction  Cont willams placement.    Urinary tract infection without hematuria    Cont abx, see below.  VTE Risk Mitigation (From admission, onward)           Ordered     IP VTE HIGH RISK PATIENT  Once         06/03/25 0936     Place sequential compression device  Until discontinued         06/03/25 0936                                    Dave Reyes III, MD  Department of Hospital Medicine  [facility] ([facility])

## 2025-06-03 NOTE — ASSESSMENT & PLAN NOTE
Patient's blood pressure range in the last 24 hours was: BP  Min: 131/61  Max: 155/66.The patient's inpatient anti-hypertensive regimen is listed below:  Current Antihypertensives  amLODIPine tablet 5 mg, Daily, Oral    Plan  - BP is controlled, no changes needed to their regimen

## 2025-06-03 NOTE — SUBJECTIVE & OBJECTIVE
Past Medical History:   Diagnosis Date    Aortic valve stenosis     Arthritis     BPH (benign prostatic hyperplasia)     Carotid artery stenosis     Chronic diastolic heart failure     ETOH abuse     Exposure to COVID-19 virus 01/07/2022    Hyperchloremia     Hypertension     Kidney stones     Murmur     Pacemaker     Polymyalgia rheumatica     PVD (peripheral vascular disease)     Renal artery stenosis        Past Surgical History:   Procedure Laterality Date    A-V CARDIAC PACEMAKER INSERTION N/A 10/06/2023    Procedure: INSERTION, CARDIAC PACEMAKER, DUAL CHAMBER;  Surgeon: Douglas Salguero MD;  Location: [facility] CATH;  Service: Cardiology;  Laterality: N/A;    ANGIOGRAM, CAROTID Left 08/25/2023    Procedure: ANGIOGRAM, CAROTID;  Surgeon: Nick Box MD;  Location: [facility] CATH;  Service: Cardiology;  Laterality: Left;    CATARACT EXTRACTION, BILATERAL      ECHOCARDIOGRAM,TRANSESOPHAGEAL N/A 10/03/2023    Procedure: Transesophageal echo (JOSE ARMANDO) intra-procedure log documentation;  Surgeon: Nick Box MD;  Location: [facility] OR;  Service: Cardiology;  Laterality: N/A;    HAND SURGERY      INSERTION OF STENT INTO PERIPHERAL VESSEL N/A 01/30/2025    Procedure: INSERTION, STENT, VESSEL, PERIPHERAL;  Surgeon: Harsha Salcedo MD;  Location: [facility] CATH;  Service: Cardiology;  Laterality: N/A;    PERCUTANEOUS TRANSCATHETER AORTIC VALVE REPLACEMENT (TAVR) Left 10/03/2023    Procedure: REPLACEMENT, AORTIC VALVE, PERCUTANEOUS, TRANSCATHETER;  Surgeon: Nick Box MD;  Location: [facility] OR;  Service: Cardiology;  Laterality: Left;    PERCUTANEOUS TRANSCATHETER AORTIC VALVE REPLACEMENT (TAVR) Left 10/03/2023    Procedure: REPLACEMENT, AORTIC VALVE, PERCUTANEOUS, TRANSCATHETER carotid access;  Surgeon: Jun Brito MD;  Location: [facility] OR;  Service: Cardiovascular;  Laterality: Left;    PERCUTANEOUS TRANSLUMINAL ANGIOPLASTY N/A 08/25/2023    Procedure: ANGIOPLASTY-PERCUTANEOUS TRANSLUMINAL (PTA);  Surgeon: Nick Box  MD;  Location: [facility] CATH;  Service: Cardiology;  Laterality: N/A;    PERCUTANEOUS TRANSLUMINAL ANGIOPLASTY (PTA) OF PERIPHERAL VESSEL Left 04/10/2025    Procedure: PTA, PERIPHERAL VESSEL;  Surgeon: Harsha Salcedo MD;  Location: [facility] CATH;  Service: Cardiology;  Laterality: Left;  L LE intervention via left antegrade approach Harsha Salcedo at [facility] in Hybrid OR under MAC  *PACEMAKER*    SHOULDER SURGERY Left     total shoulder replacement    WOUND EXPLORATION N/A 10/03/2023    Procedure: EXPLORATION, WOUND;  Surgeon: Jun Brito MD;  Location: [facility] OR;  Service: Cardiology;  Laterality: N/A;       Review of patient's allergies indicates:  No Known Allergies    Current Facility-Administered Medications on File Prior to Encounter   Medication    [DISCONTINUED] amLODIPine tablet 5 mg    [DISCONTINUED] aspirin chewable tablet 81 mg    [DISCONTINUED] cefTRIAXone injection 1 g    [DISCONTINUED] clopidogreL tablet 75 mg    [DISCONTINUED] melatonin tablet 6 mg    [DISCONTINUED] mupirocin 2 % ointment    [DISCONTINUED] sodium chloride 0.9% flush 10 mL     Current Outpatient Medications on File Prior to Encounter   Medication Sig    amLODIPine (NORVASC) 5 MG tablet Take 1 tablet (5 mg total) by mouth once daily. Prescribed by Dr. Box    aspirin 81 MG Chew Take 81 mg by mouth once daily.    clopidogreL (PLAVIX) 75 mg tablet Take 1 tablet (75 mg total) by mouth once daily.    rosuvastatin (CRESTOR) 10 MG tablet Take 1 tablet (10 mg total) by mouth once daily.    solifenacin (VESICARE) 10 MG tablet Take 10 mg by mouth once daily. Patient is still taking    [DISCONTINUED] solifenacin (VESICARE) 10 MG tablet Take 10 mg by mouth once daily.    [DISCONTINUED] cefdinir (OMNICEF) 300 MG capsule Take 1 capsule (300 mg total) by mouth 2 (two) times daily. for 10 days    [DISCONTINUED] sulfamethoxazole-trimethoprim 800-160mg (BACTRIM DS) 800-160 mg Tab Take 1 tablet by mouth 2 (two) times daily. (Patient not taking: Reported  on 5/30/2025)     Family History       Problem Relation (Age of Onset)    Cancer Mother    Stroke Father          Tobacco Use    Smoking status: Former     Types: Cigars     Passive exposure: Past    Smokeless tobacco: Never   Vaping Use    Vaping status: Never Used   Substance and Sexual Activity    Alcohol use: Yes     Alcohol/week: 3.0 standard drinks of alcohol     Types: 3 Shots of liquor per week     Comment: 1.5 oz a day    Drug use: Never    Sexual activity: Not Currently     Review of Systems   Unable to perform ROS: Mental status change     Objective:     Vital Signs (Most Recent):  Temp: 97.8 °F (36.6 °C) (06/03/25 1504)  Pulse: 84 (06/03/25 1504)  Resp: 18 (06/03/25 1504)  BP: 131/61 (06/03/25 1504)  SpO2: 98 % (06/03/25 1504) Vital Signs (24h Range):  Temp:  [96.9 °F (36.1 °C)-98.8 °F (37.1 °C)] 97.8 °F (36.6 °C)  Pulse:  [63-84] 84  Resp:  [12-18] 18  SpO2:  [97 %-99 %] 98 %  BP: (131-155)/(59-66) 131/61     Weight: 78.1 kg (172 lb 2.9 oz)  Body mass index is 25.43 kg/m².     Physical Exam  Vitals and nursing note reviewed.   Constitutional:       General: He is not in acute distress.     Appearance: He is obese. He is ill-appearing.      Comments: Confusion, o x 1, c/o lightheaded   HENT:      Head: Normocephalic and atraumatic.      Nose: Nose normal. No congestion.      Mouth/Throat:      Mouth: Mucous membranes are dry.      Pharynx: No oropharyngeal exudate.   Eyes:      General: No scleral icterus.     Extraocular Movements: Extraocular movements intact.   Neck:      Vascular: No carotid bruit.   Cardiovascular:      Rate and Rhythm: Normal rate and regular rhythm.      Heart sounds: Normal heart sounds. No murmur heard.     No friction rub. No gallop.   Pulmonary:      Effort: No respiratory distress.      Breath sounds: No stridor. Rhonchi present. No wheezing or rales.   Chest:      Chest wall: No tenderness.   Abdominal:      General: There is no distension.      Palpations: Abdomen is soft.  There is no mass.      Tenderness: There is no abdominal tenderness. There is no right CVA tenderness, left CVA tenderness, guarding or rebound.      Hernia: No hernia is present.   Musculoskeletal:      Cervical back: No rigidity.      Right lower leg: No edema.      Left lower leg: No edema.   Lymphadenopathy:      Cervical: No cervical adenopathy.   Skin:     Capillary Refill: Capillary refill takes less than 2 seconds.      Coloration: Skin is not jaundiced or pale.   Neurological:      Mental Status: He is disoriented.      Motor: Weakness present.      Gait: Gait abnormal.   Psychiatric:      Comments: Confusion, word finding, change from admit this am                Significant Labs:   Recent Lab Results         06/03/25  0404        Albumin 3.2       ALP 62       ALT 8       Anion Gap 5       AST 14       Baso # 0.02       Basophil % 0.4       BILIRUBIN TOTAL 0.5  Comment: For infants and newborns, interpretation of results should be based   on gestational age, weight and in agreement with clinical   observations.    Premature Infant recommended reference ranges:   0-24 hours:  <8.0 mg/dL   24-48 hours: <12.0 mg/dL   3-5 days:    <15.0 mg/dL   6-29 days:   <15.0 mg/dL       BUN 14       Calcium 8.5       Chloride 107       CO2 25       Creatinine 0.9       eGFR >60  Comment: Estimated GFR calculated using the CKD-EPI creatinine (2021) equation.       Eos # 0.41       Eos % 7.3       Glucose 107       Gran # (ANC) 4.12       Hematocrit 32.9       Hemoglobin 10.2       Immature Grans (Abs) 0.02  Comment: Mild elevation in immature granulocytes is non specific and can be seen in a variety of conditions including stress response, acute inflammation, trauma and pregnancy. Correlation with other laboratory and clinical findings is essential.       Immature Granulocytes 0.4       Lymph # 0.58       Lymph % 10.3       MCH 30.2       MCHC 31.0       MCV 97       Mono # 0.48       Mono % 8.5       MPV 9.1       Neut %  73.1       nRBC 0       Platelet Count 269       Potassium 4.0       PROTEIN TOTAL 6.4       RBC 3.38       RDW 14.3       Sodium 137       WBC 5.63               Significant Imaging: I have reviewed all pertinent imaging results/findings within the past 24 hours.

## 2025-06-03 NOTE — ASSESSMENT & PLAN NOTE
Pt/ot eval - consider ipr evla      6/3/25:  Discharge to [facility] today

## 2025-06-03 NOTE — EICU
Virtual ICU Admission    Admit Date: 6/3/2025  LOS: 0  Code Status: Full Code   : 1929  Bed:  A:     Diagnosis: <principal problem not specified>    Patient  has a past medical history of Aortic valve stenosis, Arthritis, BPH (benign prostatic hyperplasia), Carotid artery stenosis, Chronic diastolic heart failure, ETOH abuse, Exposure to COVID-19 virus, Hyperchloremia, Hypertension, Kidney stones, Murmur, Pacemaker, Polymyalgia rheumatica, PVD (peripheral vascular disease), and Renal artery stenosis.    Last VS: BP (!) 173/75   Pulse 80   Temp 98.1 °F (36.7 °C) (Oral)   Resp (!) 21   SpO2 100%       VICU Review    VICU nurse assessment :  [place] completed, LDA documentation reconciliation completed, and VTE prophylaxis review

## 2025-06-03 NOTE — HPI
"History of present illness:      Tobi Liz Jr. is a 89 y.o. male  has a past medical history of Aortic valve stenosis, Arthritis, BPH (benign prostatic hyperplasia), Carotid artery stenosis, Chronic diastolic heart failure, ETOH abuse, Exposure to COVID-19 virus (01/07/2022), Hyperchloremia, Hypertension, Kidney stones, Murmur, Pacemaker, Polymyalgia rheumatica, PVD (peripheral vascular disease), and Renal artery stenosis. presenting with Altered Mental Status (Pt to ED from [facility] via [facility] - family stated that pt was "walking halls last night" - with increased confusion - removed willams cath. Concerned for UTI.      Family reports he is more to his baseline this am.  Pt has been aggravated and annoyed by his willams catheter and family has noticed that he has had more utis since having catheter.  Urologist has given options for catheter removal; suprapubic cath and continuation of willams.  Family wishes to dc willams while in hospital and see how he does.     Willams removed and pending bladder scan this morning. Labs reviewed and overall stable.     6/3/25 FM:  Patient was seen and examined today after team conference and had a change in his neurological exam.  Patient had word-finding confusion him complained of persistent dizziness and lightheadedness despite having normal vital signs.  We performed a stat CT a subdural with midline shift I spoke to the patient's primary care team and we are transferring back down to [facility] as he will require a Neurosurgery consult and transfer and we can not complete this on our inpatient rehab unit.  We will notify family is soon as we can get in touch with them.     Patient requires acute inpatient rehab admission with 24-hour nursing and active physician oversight to monitor and manage acute medical comorbid conditions, labs, pain, and functional deficits. Patient/family will also require teaching and integration of improving functional skills into daily living. " Patient will also require an individualized, interdisciplinary approach to their care, receiving PT, OT services 3 hours per day, 5 days per week. Required care cannot be provided at a lower level of care. Patient is anticipated to require approximately 10-14 days LOS with expected discharge home  with  services.          Impairment group (IGC):   Debility (Non-Cardiac/Non-Pulmonary) 16 Etiologic diagnosis/description:   N39.0: UTI         Date of onset:  5/30/2025 Date of surgery:  N/A   Allergies: Patient has no known allergies.   Comorbid condition: HTN, HLD, BPH, CAD, Valvular Heart Disease, CHF Class II, Occlusion of right Iliac artery, Urinary obstruction, Anemia, Acute bronchitis   Medical/functional conditions requiring inpatient rehabilitation:      This patient requires medical management/24-hour nursing of complex co morbidities HTN, HLD, BPH, CAD, Valvular Heart Disease, CHF Class II, Occlusion of right Iliac artery, Urinary obstruction, Anemia, Acute bronchitis, labs, medications (see medications list), pain, sleep hygiene, anticoagulation, nutrition, hydration, neurological, pulmonary, cardiac status, and preventive healthcare.     This patient requires intense therapy and an integrated, interdisciplinary approach to address safety, impaired mobility, impaired ADLs (dressing, toileting, grooming, showering), impaired cognition, judgment, and memory, communication, pulmonary insufficiency, bowel/bladder problems,preventive healthcare, medication management, integration of functional skills into daily living, and home caregiver support and training.    Risk for medical/clinical complications:      This patient is at risk for the following complications: DVT/PE, pneumonia, malnutrition, neurological decline, respiratory insufficiency, worsening activity intolerance, complications from anticoagulation, skin breakdown, inadequate sleep, and constipation.

## 2025-06-03 NOTE — CONSULTS
[facility])  Adult Nutrition  Consult Note    SUMMARY     Recommendations  1. Rec'd Cardiac Diet.   2. Rec'd ONS: Ensure ENlive TID to provide additional nutrition.   3. Encourage PO intake.   4. RD to follow and make rec's accordingly.  Goals:   1. Pt will consume > 75% EEN/EPN via PO intake by next RD follow up.  Nutrition Goal Status: new  Communication of RD Recs: discussed on rounds    Nutrition Discharge Planning     Nutrition Discharge Planning: Too early to determine, pending clinical course    Assessment and Plan    Nutrition Problem  Inadequate energy intake     Related to (etiology):   Decreased appetite secondary to AMS    Signs and Symptoms (as evidenced by):    Intake <50% estimated needs x4 days    Interventions/Recommendations (treatment strategy):  1. Rec'd Cardiac Diet.   2. Rec'd ONS: Ensure ENlive TID to provide additional nutrition.   3. Encourage PO intake.   4. RD to follow and make rec's accordingly.    Nutrition Diagnosis Status:   New    Malnutrition Assessment  No NFPE at this time. PT does not meet positive malnutrition criteria.    Nutrition Related Social Determinants of Health  None identified at this time.    Reason for Assessment    Reason For Assessment: consult (inpatient rehab admission)  Diagnosis: other (see comments) (No active principal problem)  General Information Comments: RD consulted for new IPR admit. Spoke with pt about current appetite and PO intake. Pt was slightly confused at time of assessment. Pt reports appetite is not great but states that he has not been feeling good lately. Attempted to get food preferences from pt, but all he would give is his breakfast order. Will continue to work with pt on food preferences, dislikes, and daily meal selections. Encouraged good PO intake. Rec'd adding ONS to provide additional nutrition. RD to follow and make rec's accordingly.    Nutrition/Diet History    Nutrition Intake History: Regular Diet  Food  "Preferences: Likes: eggs, 2 strips of turkey carballo, and grits for breakfast everyday.  Spiritual, Cultural Beliefs, Gnosticist Practices, Values that Affect Care: no  Food Allergies: NKFA  Factors Affecting Nutritional Intake: decreased appetite    Anthropometrics    Height: 5' 9" (175.3 cm)  Height (inches): 69 in  Height Method: Stated  Weight: 78.1 kg (172 lb 2.9 oz)  Weight (lb): 172.18 lb  Weight Method: Standard Scale  Ideal Body Weight (IBW), Male: 160 lb  % Ideal Body Weight, Male (lb): 107.61 %  BMI (Calculated): 25.4  BMI Grade: 25 - 29.9 - overweight    Lab/Procedures/Meds    Pertinent Labs Reviewed: reviewed  Pertinent Medications Reviewed: reviewed    Estimated/Assessed Needs    Weight Used For Calorie Calculations: 78.1 kg (172 lb 2.9 oz)  Energy Calorie Requirements (kcal): 3619-4089 (20-25kcal/kg)  Energy Need Method: Kcal/kg  Protein Requirements: 78-93 (1.0-1.2g/kg)  Weight Used For Protein Calculations: 78.1 kg (172 lb 2.9 oz)  Fluid Requirements (mL): 6389-6347 (1mL/kcal)  Estimated Fluid Requirement Method: RDA Method  RDA Method (mL): 1562    Nutrition Prescription Ordered    Current Diet Order: Heart Healthy  Oral Nutrition Supplement: None    Evaluation of Received Nutrient/Fluid Intake    % Kcal Needs: 25%  % Protein Needs: 25%  Energy Calories Required: not meeting needs  Protein Required: not meeting needs  Tolerance: tolerating  % Intake of Estimated Energy Needs: 25 - 50 %  % Meal Intake: 25 - 50 %        Nutrition Risk    Level of Risk/Frequency of Follow-up: low     Monitor and Evaluation    Monitor and Evaluation: Energy intake, Food and beverage intake, Protein intake, Diet order, Food and nutrition knowledge, Weight, Beliefs and attitudes, Electrolyte and renal panel, Gastrointestinal profile, Inflammatory profile, Glucose/endocrine profile, Lipid profile     Nutrition Follow-Up    RD Follow-up?: Yes    "

## 2025-06-03 NOTE — PLAN OF CARE
[facility]  Discharge Final Note    Primary Care Provider: uLis Enrique Guzman Jr., MD    Expected Discharge Date: 6/3/2025    Final Discharge Note (most recent)       Final Note - 06/03/25 0841          Final Note    Assessment Type Final Discharge Note     Anticipated Discharge Disposition Rehab Facility     Hospital Resources/Appts/Education Provided Post-Acute resouces added to AVS        Post-Acute Status    Post-Acute Authorization Placement     Post-Acute Placement Status Patient List Provided     Patient choice form signed by patient/caregiver --   The patient is staying here at Rehab.    Discharge Delays None known at this time                     Important Message from Medicare  Important Message from Medicare regarding Discharge Appeal Rights: Signed/date by patient/caregiver, Other (comments) (Obtained by registration staff.)     Date IMM was signed: 05/31/25  Time IMM was signed: 1632    After-discharge care                Destination       [facility]   Service: Inpatient Rehabilitation    [address] [place] [id number]   Phone: [phone]                     Final Discharge Note Completed; patient will be attending Inpatient rehabilitation at [facility].

## 2025-06-03 NOTE — PLAN OF CARE
Recommendations  1. Rec'd Cardiac Diet.   2. Rec'd ONS: Ensure ENlive TID to provide additional nutrition.   3. Encourage PO intake.   4. RD to follow and make rec's accordingly.  Goals:   1. Pt will consume > 75% EEN/EPN via PO intake by next RD follow up.  Nutrition Goal Status: new

## 2025-06-03 NOTE — PT/OT/SLP DISCHARGE
Occupational Therapy Discharge Summary    Tobi Liz Jr.  MRN: [id number]   Principal Problem: Delirium      Patient Discharged from acute Occupational Therapy on 6/3/25.  Please refer to prior OT note dated 6/2/25 for functional status.    Assessment:      Patient appropriate for care in another setting.    Objective:     GOALS:   Multidisciplinary Problems       Occupational Therapy Goals          Problem: Occupational Therapy    Goal Priority Disciplines Outcome Interventions   Occupational Therapy Goal     OT, PT/OT Progressing    Description: Goals to be met by: 6/6/2025     Patient will increase functional independence with ADLs by performing:    Toileting from toilet with Set-up Assistance for hygiene and clothing management.   Supine to sit with Set-up Assistance.  Toilet transfer to toilet with Supervision.                         Reasons for Discontinuation of Therapy Services  Transfer to alternate level of care.      Plan:     Patient Discharged to: Inpatient Rehab    6/3/2025

## 2025-06-03 NOTE — PLAN OF CARE
Problem: Rehabilitation (IRF) Plan of Care  Goal: Plan of Care Review  Outcome: Progressing  Goal: Patient-Specific Goal (Individualized)  Outcome: Progressing  Goal: Absence of New-Onset Illness or Injury  Outcome: Progressing  Goal: Optimal Comfort and Wellbeing  Outcome: Progressing  Goal: Home and Community Transition Plan Established  Outcome: Progressing     Problem: Infection  Goal: Absence of Infection Signs and Symptoms  Outcome: Progressing     Problem: Wound  Goal: Optimal Coping  Outcome: Progressing  Goal: Optimal Functional Ability  Outcome: Progressing  Goal: Absence of Infection Signs and Symptoms  Outcome: Progressing  Goal: Improved Oral Intake  Outcome: Progressing  Goal: Optimal Pain Control and Function  Outcome: Progressing  Goal: Skin Health and Integrity  Outcome: Progressing  Goal: Optimal Wound Healing  Outcome: Progressing     Problem: Fall Injury Risk  Goal: Absence of Fall and Fall-Related Injury  Outcome: Progressing     Problem: Mobility Impairment  Goal: Optimal Mobility  Outcome: Progressing     Problem: Pain Acute  Goal: Optimal Pain Control and Function  Outcome: Progressing     Problem: Skin Injury Risk Increased  Goal: Skin Health and Integrity  Outcome: Progressing     Problem: UTI (Urinary Tract Infection)  Goal: Improved Infection Symptoms  Outcome: Progressing     Problem: Urinary Retention  Goal: Effective Urinary Elimination  Outcome: Progressing  Care plan reviewed with patient. Patient voiced understanding. Will continue to education throughout admit stay.

## 2025-06-03 NOTE — PT/OT/SLP DISCHARGE
Physical Therapy Discharge Summary    Name: Tobi Liz Jr.  MRN: [id number]   Principal Problem: Delirium     Patient Discharged from acute Physical Therapy on 6/3/2025 .  Please refer to prior PT note dated   2025  for functional status.based on the evaluation of Sebastian Curry PTAnastasia, DPT      Assessment:     Patient appropriate for care in another setting.    Objective:     GOALS:   Multidisciplinary Problems       Physical Therapy Goals          Problem: Physical Therapy    Goal Priority Disciplines Outcome Interventions   Physical Therapy Goal     PT, PT/OT Adequate for Care Transition    Description: Patient will increase functional independence with mobility by performin. Supine to sit with Supervision or Set-up Assistance  2. Sit to supine with Supervision or Set-up Assistance  3. Bed to chair transfer with Supervision or Set-up Assistancewith or without rolling walker using Stand Pivot TECHNIQUE  4. Gait  x 150 feet  feet with Contact Guard Assistance with or without rolling walker  5. Lower extremity exercise program x10 reps per handout, with assistance as needed                        Reasons for Discontinuation of Therapy Services  Transfer to alternate level of care.      Plan:     Patient Discharged to: Inpatient Rehab.      6/3/2025

## 2025-06-03 NOTE — HPI
"Principal Problem:Subdural hematoma     Chief Complaint:       Chief Complaint   Patient presents with    Weakness         HPI:       History of present illness:      Tobi Liz Jr. is a 89 y.o. male  has a past medical history of Aortic valve stenosis, Arthritis, BPH (benign prostatic hyperplasia), Carotid artery stenosis, Chronic diastolic heart failure, ETOH abuse, Exposure to COVID-19 virus (01/07/2022), Hyperchloremia, Hypertension, Kidney stones, Murmur, Pacemaker, Polymyalgia rheumatica, PVD (peripheral vascular disease), and Renal artery stenosis. presenting with Altered Mental Status (Pt to ED from [facility] via POV - family stated that pt was "walking halls last night" - with increased confusion - removed willams cath. Concerned for UTI.      Family reports he is more to his baseline this am.  Pt has been aggravated and annoyed by his willams catheter and family has noticed that he has had more utis since having catheter.  Urologist has given options for catheter removal; suprapubic cath and continuation of willams.  Family wishes to dc willams while in hospital and see how he does.     Willams removed and pending bladder scan this morning. Labs reviewed and overall stable.      6/3/25 FM:  Patient was seen and examined today after team conference and had a change in his neurological exam.  Patient had word-finding confusion him complained of persistent dizziness and lightheadedness despite having normal vital signs.  We performed a stat CT a subdural with midline shift I spoke to the patient's primary care team and we are transferring back down to [facility] as he will require a Neurosurgery consult and transfer and we can not complete this on our inpatient rehab unit.  We will notify family is soon as we can get in touch with them.     Patient requires acute inpatient rehab admission with 24-hour nursing and active physician oversight to monitor and manage acute medical comorbid conditions, labs, pain, " and functional deficits. Patient/family will also require teaching and integration of improving functional skills into daily living. Patient will also require an individualized, interdisciplinary approach to their care, receiving PT, OT services 3 hours per day, 5 days per week. Required care cannot be provided at a lower level of care. Patient is anticipated to require approximately 10-14 days LOS with expected discharge home  with  services.         Patient seen on inpatient rehab unit to pursue therapy services.  This morning prior to transition to rehab the patient was coherent and answering questions appropriately.  She has chronic dizziness which is his baseline.  Upon assessment by physical therapy he was noted to have worsening word-finding capabilities in addition to his ongoing dizziness.  A CT scan was performed which revealed a subdural hematoma.  He was transition Bactrim med surge unit for further evaluation and management.  Neurosurgery/neurology will be consulted to the transfer center for further recommendations.

## 2025-06-03 NOTE — NURSING
Admit skin assessment note: Patient had ulcer to RLE 2.9 x 2.3 that is pink and moist with no drainage, RLE 2.8 x 2.7  ulcer with moist bleding wound be with tan/brown scab to outer edges, RLE tan/ brown hard scab 2.5 x 1.3 with no drainage, RLE ulcer with pink moist wound bed with no drainage 0.9 x 1. LLE small maroon scab, BLE very dry, flaky and peeling.     Unable to assess the rest of patient's body due to dc patient to ICU. Reported to CLINT Davidson in ICU the areas of the body that was not assessed. See pictures from ICU to left hip and buttock area.

## 2025-06-03 NOTE — SUBJECTIVE & OBJECTIVE
Past Medical History:   Diagnosis Date    Aortic valve stenosis     Arthritis     BPH (benign prostatic hyperplasia)     Carotid artery stenosis     Chronic diastolic heart failure     ETOH abuse     Exposure to COVID-19 virus 01/07/2022    Hyperchloremia     Hypertension     Kidney stones     Murmur     Pacemaker     Polymyalgia rheumatica     PVD (peripheral vascular disease)     Renal artery stenosis        Past Surgical History:   Procedure Laterality Date    A-V CARDIAC PACEMAKER INSERTION N/A 10/06/2023    Procedure: INSERTION, CARDIAC PACEMAKER, DUAL CHAMBER;  Surgeon: Douglas Salguero MD;  Location: [facility] CATH;  Service: Cardiology;  Laterality: N/A;    ANGIOGRAM, CAROTID Left 08/25/2023    Procedure: ANGIOGRAM, CAROTID;  Surgeon: Nick Box MD;  Location: [facility] CATH;  Service: Cardiology;  Laterality: Left;    CATARACT EXTRACTION, BILATERAL      ECHOCARDIOGRAM,TRANSESOPHAGEAL N/A 10/03/2023    Procedure: Transesophageal echo (JOSE ARMANDO) intra-procedure log documentation;  Surgeon: Nick Box MD;  Location: [facility] OR;  Service: Cardiology;  Laterality: N/A;    HAND SURGERY      INSERTION OF STENT INTO PERIPHERAL VESSEL N/A 01/30/2025    Procedure: INSERTION, STENT, VESSEL, PERIPHERAL;  Surgeon: Harsha Salcedo MD;  Location: [facility] CATH;  Service: Cardiology;  Laterality: N/A;    PERCUTANEOUS TRANSCATHETER AORTIC VALVE REPLACEMENT (TAVR) Left 10/03/2023    Procedure: REPLACEMENT, AORTIC VALVE, PERCUTANEOUS, TRANSCATHETER;  Surgeon: Nick Box MD;  Location: [facility] OR;  Service: Cardiology;  Laterality: Left;    PERCUTANEOUS TRANSCATHETER AORTIC VALVE REPLACEMENT (TAVR) Left 10/03/2023    Procedure: REPLACEMENT, AORTIC VALVE, PERCUTANEOUS, TRANSCATHETER carotid access;  Surgeon: Jun Brito MD;  Location: [facility] OR;  Service: Cardiovascular;  Laterality: Left;    PERCUTANEOUS TRANSLUMINAL ANGIOPLASTY N/A 08/25/2023    Procedure: ANGIOPLASTY-PERCUTANEOUS TRANSLUMINAL (PTA);  Surgeon: Nick Box  MD;  Location: [facility] CATH;  Service: Cardiology;  Laterality: N/A;    PERCUTANEOUS TRANSLUMINAL ANGIOPLASTY (PTA) OF PERIPHERAL VESSEL Left 04/10/2025    Procedure: PTA, PERIPHERAL VESSEL;  Surgeon: Harsha Salcedo MD;  Location: [facility] CATH;  Service: Cardiology;  Laterality: Left;  L LE intervention via left antegrade approach Harsha Salcedo at [facility] in Hybrid OR under MAC  *PACEMAKER*    SHOULDER SURGERY Left     total shoulder replacement    WOUND EXPLORATION N/A 10/03/2023    Procedure: EXPLORATION, WOUND;  Surgeon: Jun Brito MD;  Location: [facility] OR;  Service: Cardiology;  Laterality: N/A;       Review of patient's allergies indicates:  No Known Allergies    Current Facility-Administered Medications on File Prior to Encounter   Medication    [DISCONTINUED] amLODIPine tablet 5 mg    [DISCONTINUED] amLODIPine tablet 5 mg    [DISCONTINUED] aspirin chewable tablet 81 mg    [DISCONTINUED] aspirin chewable tablet 81 mg    [DISCONTINUED] atorvastatin tablet 20 mg    [DISCONTINUED] atorvastatin tablet 20 mg    [DISCONTINUED] cefTRIAXone injection 1 g    [DISCONTINUED] cefTRIAXone injection 1 g    [DISCONTINUED] clopidogreL tablet 75 mg    [DISCONTINUED] clopidogreL tablet 75 mg    [DISCONTINUED] fluconazole (DIFLUCAN) IVPB 200 mg    [DISCONTINUED] fluconazole (DIFLUCAN) IVPB 200 mg    [DISCONTINUED] melatonin tablet 6 mg    [DISCONTINUED] melatonin tablet 6 mg    [DISCONTINUED] mupirocin 2 % ointment    [DISCONTINUED] mupirocin 2 % ointment    [DISCONTINUED] sodium chloride 0.9% flush 10 mL    [DISCONTINUED] sodium chloride 0.9% flush 10 mL     Current Outpatient Medications on File Prior to Encounter   Medication Sig    amLODIPine (NORVASC) 5 MG tablet Take 1 tablet (5 mg total) by mouth once daily. Prescribed by Dr. Box    aspirin 81 MG Chew Take 81 mg by mouth once daily.    clopidogreL (PLAVIX) 75 mg tablet Take 1 tablet (75 mg total) by mouth once daily.    rosuvastatin (CRESTOR) 10 MG tablet Take 1 tablet  (10 mg total) by mouth once daily.    solifenacin (VESICARE) 10 MG tablet Take 10 mg by mouth once daily. Patient is still taking    [DISCONTINUED] cefdinir (OMNICEF) 300 MG capsule Take 1 capsule (300 mg total) by mouth 2 (two) times daily. for 10 days    [DISCONTINUED] solifenacin (VESICARE) 10 MG tablet Take 10 mg by mouth once daily.    [DISCONTINUED] sulfamethoxazole-trimethoprim 800-160mg (BACTRIM DS) 800-160 mg Tab Take 1 tablet by mouth 2 (two) times daily. (Patient not taking: Reported on 5/30/2025)     Family History       Problem Relation (Age of Onset)    Cancer Mother    Stroke Father          Tobacco Use    Smoking status: Former     Types: Cigars     Passive exposure: Past    Smokeless tobacco: Never   Vaping Use    Vaping status: Never Used   Substance and Sexual Activity    Alcohol use: Yes     Alcohol/week: 3.0 standard drinks of alcohol     Types: 3 Shots of liquor per week     Comment: 1.5 oz a day    Drug use: Never    Sexual activity: Not Currently     Review of Systems   Unable to perform ROS: Mental status change     Objective:     Vital Signs (Most Recent):  Temp: 98.1 °F (36.7 °C) (06/03/25 1615)  Pulse: 80 (06/03/25 1615)  Resp: (!) 21 (06/03/25 1615)  BP: (!) 173/75 (06/03/25 1615)  SpO2: 100 % (06/03/25 1615) Vital Signs (24h Range):  Temp:  [96.9 °F (36.1 °C)-98.8 °F (37.1 °C)] 98.1 °F (36.7 °C)  Pulse:  [63-84] 80  Resp:  [12-21] 21  SpO2:  [97 %-100 %] 100 %  BP: (131-173)/(59-75) 173/75        There is no height or weight on file to calculate BMI.     Physical Exam  Vitals and nursing note reviewed.   Constitutional:       General: He is not in acute distress.     Appearance: He is obese. He is ill-appearing.      Comments: Confusion, o x 1, c/o lightheaded   HENT:      Head: Normocephalic and atraumatic.      Nose: Nose normal. No congestion.      Mouth/Throat:      Mouth: Mucous membranes are dry.      Pharynx: No oropharyngeal exudate.   Eyes:      General: No scleral icterus.      Extraocular Movements: Extraocular movements intact.   Neck:      Vascular: No carotid bruit.   Cardiovascular:      Rate and Rhythm: Normal rate and regular rhythm.      Heart sounds: Normal heart sounds. No murmur heard.     No friction rub. No gallop.   Pulmonary:      Effort: No respiratory distress.      Breath sounds: No stridor. Rhonchi present. No wheezing or rales.   Chest:      Chest wall: No tenderness.   Abdominal:      General: There is no distension.      Palpations: Abdomen is soft. There is no mass.      Tenderness: There is no abdominal tenderness. There is no right CVA tenderness, left CVA tenderness, guarding or rebound.      Hernia: No hernia is present.   Musculoskeletal:      Cervical back: No rigidity.      Right lower leg: No edema.      Left lower leg: No edema.   Lymphadenopathy:      Cervical: No cervical adenopathy.   Skin:     Capillary Refill: Capillary refill takes less than 2 seconds.      Coloration: Skin is not jaundiced or pale.   Neurological:      Mental Status: He is disoriented.      Motor: Weakness present.      Gait: Gait abnormal.   Psychiatric:      Comments: Confusion, word finding, change from admit this am                Significant Labs: All pertinent labs within the past 24 hours have been reviewed.    Significant Imaging: I have reviewed all pertinent imaging results/findings within the past 24 hours.

## 2025-06-03 NOTE — PLAN OF CARE
Problem: Physical Therapy  Goal: Physical Therapy Goal  Description:     Long Term Goals: 2025     Transfers Status    Sit to Stand  Long Term Goal:  Complete sit to stand with Supervision or Set-up Assistance using Rolling Walker with minimal   verbal cues    Stand Step  Long Term Goal:  Complete stand step with  Supervision or Set-up Assistance  using Rolling Walker with minimal   verbal cues    Supine <> Sit  Long Term Goal: Complete supine <> sit with Supervision or Set-up Assistance  With minimal   verbal cues rails prn    Rolling Right/Left  Long Term Goal: Complete rolling  right/left with Supervision or Set-up Assistance with minimal   verbal cues rails prn      Balance/Coordination    Static Sitting  Long Term Goal: Complete static sitting with Modified Independent  With no  verbal cues    Dynamic Sitting  Long Term Goal: Complete dynamic sitting with Supervision or Set-up Assistance  with  minimal  verbal cues minimal excursions    Static Standing  Long Term Goal: Complete static standing with Supervision or Set-up Assistance  with   minimal   verbal cues and RW    Dynamic Standing  Long Term Goal: Complete dynamic standing with Supervision or Set-up Assistance with minimal   verbal cues and minimal  excursions with RW    Endurance    Long Term Goal:  Physical Therapy: 2 times a day, 45 minutes  Rest periods: minimal  Sittin-8 hours/day    Mobility/Gait  Long Term Goal: Will ambulate with Supervision or Set-up Assistance  using Rolling Walker with minimal  verbal cues x 300 ft    Stairs Assistance  Long Term Goal: Patient will ascend/descend 4 stairs/steps using both  handrails  nonreciprocal steps with Supervision or Set-up Assistance and minimal  verbal cues    Ramp/Uneven 10 feet surface  Long  Term Goal; Patient will be able to navigate a 10 inch uneven surface with RW  with   Supervision or Set-up Assistance and minimal  verbal cues      2-4  inch curb  Long  Term Goal; Patient will be able  to navigate a  2 to 4 inch curb with proper A.D. with  Supervision or Set-up Assistance and minimal  verbal cues    Picking up object   Long  Term Goal; Patient will be able to  a small object from the floor  with proper A.D. with  Supervision or Set-up Assistance and minimal  verbal cues    Car transfers  Long  Term Goal; Patient will be able to get in and out of a vehicle  with RW with  stand by assistance                Outcome: Plan of care established

## 2025-06-03 NOTE — PT/OT/SLP DISCHARGE
"Physical Therapy Discharge Summary    Name: Tobi Liz Jr.  MRN: [id number]   Principal Problem: Subdural hematoma     Patient Discharged from in-patient rehab  Physical Therapy on 6/3/2025 .  Please refer to prior PT note dated   6/3/2025  for functional status.     Assessment:     Patient appropriate for care in another setting. 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 This is causing  mass effect on right lateral ventricle and mild midline shift from right to left of 4 mm." Based on the CT scan dated 6/3/2025.     Objective:     GOALS:   Multidisciplinary Problems       Physical Therapy Goals          Problem: Physical Therapy    Goal Priority Disciplines Outcome Interventions   Physical Therapy Goal     PT, PT/OT Progressing    Description:     Long Term Goals: 6/13/2025     Transfers Status    Sit to Stand  Long Term Goal:  Complete sit to stand with Supervision or Set-up Assistance using Rolling Walker with minimal   verbal cues    Stand Step  Long Term Goal:  Complete stand step with  Supervision or Set-up Assistance  using Rolling Walker with minimal   verbal cues    Supine <> Sit  Long Term Goal: Complete supine <> sit with Supervision or Set-up Assistance  With minimal   verbal cues rails prn    Rolling Right/Left  Long Term Goal: Complete rolling  right/left with Supervision or Set-up Assistance with minimal   verbal cues rails prn      Balance/Coordination    Static Sitting  Long Term Goal: Complete static sitting with Modified Independent  With no  verbal cues    Dynamic Sitting  Long Term Goal: Complete dynamic sitting with Supervision or Set-up Assistance  with  minimal  verbal cues minimal excursions    Static Standing  Long Term Goal: Complete static standing with Supervision or Set-up Assistance  with   minimal   verbal cues and " RW    Dynamic Standing  Long Term Goal: Complete dynamic standing with Supervision or Set-up Assistance with minimal   verbal cues and minimal  excursions with RW    Endurance    Long Term Goal:  Physical Therapy: 2 times a day, 45 minutes  Rest periods: minimal  Sittin-8 hours/day    Mobility/Gait  Long Term Goal: Will ambulate with Supervision or Set-up Assistance  using Rolling Walker with minimal  verbal cues x 300 ft    Stairs Assistance  Long Term Goal: Patient will ascend/descend 4 stairs/steps using both  handrails  nonreciprocal steps with Supervision or Set-up Assistance and minimal  verbal cues    Ramp/Uneven 10 feet surface  Long  Term Goal; Patient will be able to navigate a 10 inch uneven surface with RW  with   Supervision or Set-up Assistance and minimal  verbal cues      2-4  inch curb  Long  Term Goal; Patient will be able to navigate a  2 to 4 inch curb with proper A.D. with  Supervision or Set-up Assistance and minimal  verbal cues    Picking up object   Long  Term Goal; Patient will be able to  a small object from the floor  with proper A.D. with  Supervision or Set-up Assistance and minimal  verbal cues    Car transfers  Long  Term Goal; Patient will be able to get in and out of a vehicle  with RW with  stand by assistance                                     Care Scores:   Admission Assessment Current   Status  Discharge   Goal   Functional Area: Care Score:  Care Score:    Roll Left and Right 4 4 Set-up/clean-up   Sit to Lying 4 4 Set-up/clean-up   Lying to Sitting on Side of Bed 4 4 Set-up/clean-up   Sit to Stand 4 4 Set-up/clean-up   Chair/Bed-to-Chair Transfer 4 4 Set-up/clean-up   Car Transfer 10 10 Supervision or touching assistance   Walk 10 Feet 4 4 Set-up/clean-up   Walk 50 Feet with Two Turns 4 4 Set-up/clean-up   Walk 150 Feet 4 4 Set-up/clean-up   Walk 10 Feet Uneven Surface 4 4 Supervision or touching assistance   1 Step (Curb) 4 4 Supervision or touching assistance   4  Steps 4 4 Supervision or touching assistance   12 Steps 9 9 Not applicable   Picking Up Object 4 4 Set-up/clean-up   Wheel 50 Feet with Two Turns 6 6     Wheel 150 Feet 6 6         Reasons for Discontinuation of Therapy Services  Patient is unable to continue work toward goals because of new medical findings      Plan:     Patient Discharged to: acute for medical management .    6/3/2025

## 2025-06-03 NOTE — NURSING
See secure chat sent to Dr. Reyes below:    Patient is still complaining of dizziness. He said he can not do therapy because he is just too dizzy and he does not know what's going on with that. Family was with him when he came to floor. They said he was having some dizziness last time he was here, but resolved when he got to [facility].    Vs 131/61, pusle 84, resp 18, temp 97.8, pulse ox 98% RA      Received message back to order a CT of head. See new order in the computer. Will continue to monitor.

## 2025-06-03 NOTE — DISCHARGE SUMMARY
"[facility] Medicine  Discharge Summary      Patient Name: Tobi Liz Jr.  MRN: [id number]  RENATO: [id number]  Patient Class: IP- Inpatient  Admission Date: 5/30/2025  Hospital Length of Stay: 4 days  Discharge Date and Time: 06/03/2025 8:29 AM  Attending Physician: Luis Enrique Guzman Jr., MD   Discharging Provider: TALHA Luna  Primary Care Provider: Luis Enrique Guzman Jr., MD    Primary Care Team: Networked reference to record PCT     HPI:   Tobi Liz Jr. is a 89 y.o. male  has a past medical history of Aortic valve stenosis, Arthritis, BPH (benign prostatic hyperplasia), Carotid artery stenosis, Chronic diastolic heart failure, ETOH abuse, Exposure to COVID-19 virus (01/07/2022), Hyperchloremia, Hypertension, Kidney stones, Murmur, Pacemaker, Polymyalgia rheumatica, PVD (peripheral vascular disease), and Renal artery stenosis. presenting with Altered Mental Status (Pt to ED from [facility] via POV - family stated that pt was "walking halls last night" - with increased confusion - removed willams cath. Concerned for UTI.     Family repotrs he is more to his baslein this am.  Pt has been aggravated and annoyed by his willams catheter and family has noticed that he has had more utis since havign catheter.  Urologist has given options for catheter removal; suprapubic cath and continuation of willams.  Family wishes to dc willams while in hospital and see how he does    * No surgery found *      Hospital Course:   Chief Complaint:        Chief Complaint   Patient presents with    Altered Mental Status       Pt to ED from [facility] via POV - family stated that pt was "walking halls last night" - with increased confusion - removed willams cath. Concerned for UTI.          HPI: Tobi Liz Jr. is a 89 y.o. male  has a past medical history of Aortic valve stenosis, Arthritis, BPH (benign prostatic hyperplasia), Carotid artery stenosis, Chronic diastolic heart failure, ETOH abuse, " "Exposure to COVID-19 virus (01/07/2022), Hyperchloremia, Hypertension, Kidney stones, Murmur, Pacemaker, Polymyalgia rheumatica, PVD (peripheral vascular disease), and Renal artery stenosis. presenting with Altered Mental Status (Pt to ED from [facility] via POV - family stated that pt was "walking halls last night" - with increased confusion - removed willams cath. Concerned for UTI.      Family repotrs he is more to his baslein this am.  Pt has been aggravated and annoyed by his willams catheter and family has noticed that he has had more utis since havign catheter.  Urologist has given options for catheter removal; suprapubic cath and continuation of willams.  Family wishes to dc willams while in hospital and see how he does    6/1 ND Pt doing ok - sp willams removal yesterday - awaiting urine culture    6/2/25: Pending IPR evaluation.  Willams removed and pending bladder scan this morning. Labs reviewed and overall stable.     6/03/2025:  Patient's bladder scan yesterday showed over 300 mL in the bladder and Willams catheter was reinserted.  Labs this morning relatively stable.  Cleared for discharge to inpatient rehab today.     Goals of Care Treatment Preferences:  Code Status: Full Code      SDOH Screening:  The patient was screened for utility difficulties, food insecurity, transport difficulties, housing insecurity, and interpersonal safety and there were no concerns identified this admission.     Consults:     Assessment & Plan  Delirium  Improved with treatign uti  6/1 mental status improved  Hypertension  Patient's blood pressure range in the last 24 hours was: BP  Min: 134/59  Max: 171/70.The patient's inpatient anti-hypertensive regimen is listed below:  Current Antihypertensives  amLODIPine tablet 5 mg, Daily, Oral    Plan  - BP is controlled, no changes needed to their regimen    Hyperlipidemia      BPH (benign prostatic hyperplasia)  Followed by urologist  Will dc willams today and see how pt does  6/1 willams " dc'd - monitor    6/2/25:  Bladder scan today.   Urinary tract infection without hematuria  Cont iv rocephin  Urine culture obtained    6/2/25:  Multiple organisms on culture, continue rocephin for now.     6/3/25:  Continue rocephin for total 7 days   Myopathy  Pt/ot eval - consider ipr evla      6/3/25:  Discharge to [facility] today     Final Active Diagnoses:    Diagnosis Date Noted POA    PRINCIPAL PROBLEM:  Delirium [R41.0] 05/31/2025 Yes    Urinary tract infection without hematuria [N39.0] 05/31/2025 Yes    Myopathy [G72.9] 03/13/2025 Yes    Hypertension [I10] 05/26/2021 Yes    Hyperlipidemia [E78.5] 05/26/2021 Yes    BPH (benign prostatic hyperplasia) [N40.0] 05/26/2021 Yes      Problems Resolved During this Admission:       Discharged Condition: good    Disposition: Rehab Facility    Follow Up:   Contact information for after-discharge care       Destination       [facility] .    Service: Inpatient Rehabilitation  Contact information:  [address] [place] [id number] [phone]                                 Patient Instructions:      Ambulatory referral/consult to Outpatient Case Management   Referral Priority: Routine Referral Type: Consultation   Referral Reason: Specialty Services Required   Number of Visits Requested: 1       Significant Diagnostic Studies: Labs: All labs within the past 24 hours have been reviewed    Pending Diagnostic Studies:       None           Medications:  Transfer Medications (for Discharge Readmit only): Current Medications[1]    Indwelling Lines/Drains at time of discharge:   Lines/Drains/Airways       Drain  Duration                  Urethral Catheter 06/02/25 1015 Coude 16 Fr. <1 day                    Time spent on the discharge of patient: 30 minutes         TALHA Luna  Department of Hospital Medicine  [facility]       [1]   Current Facility-Administered Medications   Medication Dose Route Frequency Provider Last Rate  Last Admin    amLODIPine tablet 5 mg  5 mg Oral Daily Lisa Santos MD   5 mg at 06/02/25 0944    aspirin chewable tablet 81 mg  81 mg Oral Daily Lisa Santos MD   81 mg at 06/02/25 0944    cefTRIAXone injection 1 g  1 g Intravenous Q24H Janessa Santos FNP   1 g at 06/02/25 0944    clopidogreL tablet 75 mg  75 mg Oral Daily Lisa Santos MD   75 mg at 06/02/25 0944    melatonin tablet 6 mg  6 mg Oral Nightly PRN Trevon Bear MD   6 mg at 06/02/25 2150    mupirocin 2 % ointment   Nasal BID Luis Enrique Guzman Jr., MD   Given at 06/02/25 2150    sodium chloride 0.9% flush 10 mL  10 mL Intravenous PRN Trevon Bear MD

## 2025-06-03 NOTE — ASSESSMENT & PLAN NOTE
Patient's blood pressure range in the last 24 hours was: BP  Min: 134/59  Max: 171/70.The patient's inpatient anti-hypertensive regimen is listed below:  Current Antihypertensives  amLODIPine tablet 5 mg, Daily, Oral    Plan  - BP is controlled, no changes needed to their regimen

## 2025-06-03 NOTE — PLAN OF CARE
[facility]  Initial Discharge Assessment       Primary Care Provider: Luis Enrique Guzman Jr., MD    Admission Diagnosis: Debility [R53.81]    Admission Date: 6/3/2025  Expected Discharge Date: 6/17/2025    Transition of Care Barriers: None    Payor: MEDICARE / Plan: MEDICARE PART A & B / Product Type: Government /     Extended Emergency Contact Information  Primary Emergency Contact: Eugenio Hemphill  [employer] Phone: [phone]  Relation: Daughter  Preferred language: English   needed? No  Secondary Emergency Contact: Therese Liz  Home Phone: [phone]  Relation: Spouse  Preferred language: English   needed? No    Discharge Plan A: Home  Discharge Plan B: Home      [facility] [address] [place], [place] [address]  [facility] [address]  Phone: [phone] Fax: [phone]      Initial Assessment (most recent)       Adult Discharge Assessment - 06/03/25 1013          Discharge Assessment    Assessment Type Discharge Planning Assessment     Confirmed/corrected address, phone number and insurance Yes     Confirmed Demographics Correct on Facesheet     Source of Information patient     Communicated ADONAY with patient/caregiver Yes     People in Home spouse     Name(s) of People in Home Therese Liz     Facility Arrived From: [facility]     Do you expect to return to your current living situation? Yes     Do you have help at home or someone to help you manage your care at home? Yes     Who are your caregiver(s) and their phone number(s)? Spouse Therese     Prior to hospitilization cognitive status: Alert/Oriented     Current cognitive status: Alert/Oriented     Walking or Climbing Stairs ambulation difficulty, requires equipment     Mobility Management Requires RW for ambulation     Dressing/Bathing Difficulty yes     Dressing/Bathing bathing difficulty, assistance 1 person     Dressing/Bathing Management Paid caregiver that assists with  showers every other day.     Home Accessibility wheelchair accessible     Home Layout Able to live on 1st floor     Equipment Currently Used at Home walker, rolling;wheelchair;urinary catheter supplies     Readmission within 30 days? No     Patient currently being followed by outpatient case management? No     Do you currently have service(s) that help you manage your care at home? Yes     Name and Contact number of [facility] [phone]     Is the pt/caregiver preference to resume services with current agency Yes     Do you take prescription medications? Yes     Do you have prescription coverage? Yes     Do you have any problems affording any of your prescribed medications? No     Is the patient taking medications as prescribed? yes     Who is going to help you get home at discharge? Family     How do you get to doctors appointments? family or friend will provide     Are you on dialysis? No     Do you take coumadin? No     Discharge Plan A Home     Discharge Plan B Home     DME Needed Upon Discharge  none     Discharge Plan discussed with: Patient;Adult children     Transition of Care Barriers None                      DCP completed at bedside with patient and family. No social concerns noted. CM to remain available for any needs.           This document is complete and the patient is ready for discharge.

## 2025-06-03 NOTE — ASSESSMENT & PLAN NOTE
Patient's hemorrhage is due to trauma/laceration, this bleeding is not associated with a medication or a coagulopathy. Patients most recent Hgb, Hct, platelets, and INR are listed below.  Recent Labs     06/01/25  0523 06/02/25  0344 06/03/25  0404   HGB 11.0* 10.2* 10.2*   HCT 35.0* 31.7* 32.9*    248 269     Plan  - Will trend hemoglobin/hematocrit Daily  - Will monitor and correct any coagulation defects  - Will transfuse if Hgb is <7g/dl (<8g/dl in cases of active ACS) or if patient has rapid bleeding leading to hemodynamic instability  -     I spoke to family patient has had multiple falls over the last few weeks but denies any severe head trauma but he has hit his head, also has had a er visit.  Patient with a new found subdural hematoma after admission I spoke the patient's primary care physician we will transfer back down to our med surge unit where he arranged neurosurgical consultation and likely transfer.  Discontinued aspirin Plavix.

## 2025-06-03 NOTE — ASSESSMENT & PLAN NOTE
Cont iv rocephin  Urine culture obtained    6/2/25:  Multiple organisms on culture, continue rocephin for now.     6/3/25:  Continue rocephin for total 7 days

## 2025-06-04 LAB
ABSOLUTE EOSINOPHIL (OHS): 0.36 K/UL
ABSOLUTE EOSINOPHIL (OHS): 0.42 K/UL
ABSOLUTE MONOCYTE (OHS): 0.52 K/UL (ref 0.3–1)
ABSOLUTE MONOCYTE (OHS): 0.52 K/UL (ref 0.3–1)
ABSOLUTE NEUTROPHIL COUNT (OHS): 4.41 K/UL (ref 1.8–7.7)
ABSOLUTE NEUTROPHIL COUNT (OHS): 4.5 K/UL (ref 1.8–7.7)
ALBUMIN SERPL BCP-MCNC: 3.3 G/DL (ref 3.5–5.2)
ALBUMIN SERPL BCP-MCNC: 3.5 G/DL (ref 3.5–5.2)
ALP SERPL-CCNC: 67 UNIT/L (ref 40–150)
ALP SERPL-CCNC: 67 UNIT/L (ref 40–150)
ALT SERPL W/O P-5'-P-CCNC: 9 UNIT/L (ref 10–44)
ALT SERPL W/O P-5'-P-CCNC: 9 UNIT/L (ref 10–44)
ANION GAP (OHS): 8 MMOL/L (ref 8–16)
ANION GAP (OHS): 8 MMOL/L (ref 8–16)
AST SERPL-CCNC: 16 UNIT/L (ref 11–45)
AST SERPL-CCNC: 17 UNIT/L (ref 11–45)
BASOPHILS # BLD AUTO: 0.01 K/UL
BASOPHILS # BLD AUTO: 0.02 K/UL
BASOPHILS NFR BLD AUTO: 0.2 %
BASOPHILS NFR BLD AUTO: 0.3 %
BILIRUB SERPL-MCNC: 0.5 MG/DL (ref 0.1–1)
BILIRUB SERPL-MCNC: 0.5 MG/DL (ref 0.1–1)
BUN SERPL-MCNC: 13 MG/DL (ref 10–30)
BUN SERPL-MCNC: 14 MG/DL (ref 10–30)
CALCIUM SERPL-MCNC: 8.5 MG/DL (ref 8.7–10.5)
CALCIUM SERPL-MCNC: 8.7 MG/DL (ref 8.7–10.5)
CHLORIDE SERPL-SCNC: 107 MMOL/L (ref 95–110)
CHLORIDE SERPL-SCNC: 108 MMOL/L (ref 95–110)
CO2 SERPL-SCNC: 23 MMOL/L (ref 23–29)
CO2 SERPL-SCNC: 24 MMOL/L (ref 23–29)
CREAT SERPL-MCNC: 0.7 MG/DL (ref 0.5–1.4)
CREAT SERPL-MCNC: 0.7 MG/DL (ref 0.5–1.4)
ERYTHROCYTE [DISTWIDTH] IN BLOOD BY AUTOMATED COUNT: 14.6 % (ref 11.5–14.5)
ERYTHROCYTE [DISTWIDTH] IN BLOOD BY AUTOMATED COUNT: 14.6 % (ref 11.5–14.5)
GFR SERPLBLD CREATININE-BSD FMLA CKD-EPI: >60 ML/MIN/1.73/M2
GFR SERPLBLD CREATININE-BSD FMLA CKD-EPI: >60 ML/MIN/1.73/M2
GLUCOSE SERPL-MCNC: 105 MG/DL (ref 70–110)
GLUCOSE SERPL-MCNC: 128 MG/DL (ref 70–110)
HCT VFR BLD AUTO: 34 % (ref 40–54)
HCT VFR BLD AUTO: 35.1 % (ref 40–54)
HGB BLD-MCNC: 10.8 GM/DL (ref 14–18)
HGB BLD-MCNC: 11 GM/DL (ref 14–18)
IMM GRANULOCYTES # BLD AUTO: 0.02 K/UL (ref 0–0.04)
IMM GRANULOCYTES # BLD AUTO: 0.03 K/UL (ref 0–0.04)
IMM GRANULOCYTES NFR BLD AUTO: 0.3 % (ref 0–0.5)
IMM GRANULOCYTES NFR BLD AUTO: 0.5 % (ref 0–0.5)
LYMPHOCYTES # BLD AUTO: 0.48 K/UL (ref 1–4.8)
LYMPHOCYTES # BLD AUTO: 0.53 K/UL (ref 1–4.8)
MAGNESIUM SERPL-MCNC: 1.9 MG/DL (ref 1.6–2.6)
MAGNESIUM SERPL-MCNC: 2 MG/DL (ref 1.6–2.6)
MCH RBC QN AUTO: 30.6 PG (ref 27–31)
MCH RBC QN AUTO: 30.6 PG (ref 27–31)
MCHC RBC AUTO-ENTMCNC: 31.3 G/DL (ref 32–36)
MCHC RBC AUTO-ENTMCNC: 31.8 G/DL (ref 32–36)
MCV RBC AUTO: 96 FL (ref 82–98)
MCV RBC AUTO: 98 FL (ref 82–98)
NUCLEATED RBC (/100WBC) (OHS): 0 /100 WBC
NUCLEATED RBC (/100WBC) (OHS): 0 /100 WBC
PLATELET # BLD AUTO: 254 K/UL (ref 150–450)
PLATELET # BLD AUTO: 265 K/UL (ref 150–450)
PMV BLD AUTO: 8.9 FL (ref 9.2–12.9)
PMV BLD AUTO: 9.1 FL (ref 9.2–12.9)
POTASSIUM SERPL-SCNC: 3.9 MMOL/L (ref 3.5–5.1)
POTASSIUM SERPL-SCNC: 3.9 MMOL/L (ref 3.5–5.1)
PROT SERPL-MCNC: 6.5 GM/DL (ref 6–8.4)
PROT SERPL-MCNC: 6.8 GM/DL (ref 6–8.4)
RBC # BLD AUTO: 3.53 M/UL (ref 4.6–6.2)
RBC # BLD AUTO: 3.59 M/UL (ref 4.6–6.2)
RELATIVE EOSINOPHIL (OHS): 6.1 %
RELATIVE EOSINOPHIL (OHS): 7.1 %
RELATIVE LYMPHOCYTE (OHS): 8.1 % (ref 18–48)
RELATIVE LYMPHOCYTE (OHS): 9 % (ref 18–48)
RELATIVE MONOCYTE (OHS): 8.8 % (ref 4–15)
RELATIVE MONOCYTE (OHS): 8.8 % (ref 4–15)
RELATIVE NEUTROPHIL (OHS): 74.6 % (ref 38–73)
RELATIVE NEUTROPHIL (OHS): 76.2 % (ref 38–73)
SODIUM SERPL-SCNC: 139 MMOL/L (ref 136–145)
SODIUM SERPL-SCNC: 139 MMOL/L (ref 136–145)
WBC # BLD AUTO: 5.91 K/UL (ref 3.9–12.7)
WBC # BLD AUTO: 5.91 K/UL (ref 3.9–12.7)

## 2025-06-04 PROCEDURE — 36415 COLL VENOUS BLD VENIPUNCTURE: CPT | Performed by: NURSE PRACTITIONER

## 2025-06-04 PROCEDURE — 25000003 PHARM REV CODE 250: Performed by: INTERNAL MEDICINE

## 2025-06-04 PROCEDURE — 97162 PT EVAL MOD COMPLEX 30 MIN: CPT

## 2025-06-04 PROCEDURE — 85025 COMPLETE CBC W/AUTO DIFF WBC: CPT | Performed by: INTERNAL MEDICINE

## 2025-06-04 PROCEDURE — 97166 OT EVAL MOD COMPLEX 45 MIN: CPT

## 2025-06-04 PROCEDURE — 63600175 PHARM REV CODE 636 W HCPCS: Performed by: INTERNAL MEDICINE

## 2025-06-04 PROCEDURE — 80053 COMPREHEN METABOLIC PANEL: CPT | Performed by: NURSE PRACTITIONER

## 2025-06-04 PROCEDURE — 82040 ASSAY OF SERUM ALBUMIN: CPT | Performed by: INTERNAL MEDICINE

## 2025-06-04 PROCEDURE — 36415 COLL VENOUS BLD VENIPUNCTURE: CPT | Performed by: INTERNAL MEDICINE

## 2025-06-04 PROCEDURE — 85025 COMPLETE CBC W/AUTO DIFF WBC: CPT | Performed by: NURSE PRACTITIONER

## 2025-06-04 PROCEDURE — 83735 ASSAY OF MAGNESIUM: CPT | Performed by: NURSE PRACTITIONER

## 2025-06-04 PROCEDURE — 83735 ASSAY OF MAGNESIUM: CPT | Performed by: INTERNAL MEDICINE

## 2025-06-04 PROCEDURE — 21400001 HC TELEMETRY ROOM

## 2025-06-04 RX ADMIN — ATORVASTATIN CALCIUM 20 MG: 20 TABLET, FILM COATED ORAL at 08:06

## 2025-06-04 RX ADMIN — AMLODIPINE BESYLATE 5 MG: 5 TABLET ORAL at 08:06

## 2025-06-04 RX ADMIN — MUPIROCIN: 20 OINTMENT TOPICAL at 08:06

## 2025-06-04 RX ADMIN — CEFTRIAXONE SODIUM 1 G: 1 INJECTION, POWDER, FOR SOLUTION INTRAMUSCULAR; INTRAVENOUS at 08:06

## 2025-06-04 RX ADMIN — Medication 6 MG: at 07:06

## 2025-06-04 RX ADMIN — MUPIROCIN: 20 OINTMENT TOPICAL at 07:06

## 2025-06-04 RX ADMIN — FLUCONAZOLE 200 MG: 2 INJECTION, SOLUTION INTRAVENOUS at 07:06

## 2025-06-04 NOTE — PLAN OF CARE
No acute events overnight. Patient oriented to person, place and time, a little confusion at times to situation. Calm and cooperative. Restless the first half of the night, then rested quietly most of the second half after melatonin. No c/o voiced. VSS, afebrile. UOP 850ml per willams cath, no BM this shift. Patient repositions self well, frequent turns encouraged.

## 2025-06-04 NOTE — ASSESSMENT & PLAN NOTE
This has a chronic ongoing issue likely exacerbated by subdural.    6/4/25:  Poor surgical candidate per neurosurgery evaluation.  Continue PT/OT

## 2025-06-04 NOTE — PT/OT/SLP PROGRESS
Occupational Therapy   Evaluation    Name: Tobi Liz Jr.  MRN: [id number]  Admitting Diagnosis:  <principal problem not specified>       Recommendations:     Discharge Recommendations:  (To be further determined based on progress prior to discharge.)  Discharge Equipment Recommendations:  none  Barriers to discharge:  Other (Comment) (Medical and functional status)    Assessment:     Tobi Liz Jr. is a 89 y.o. male with a medical diagnosis of <principal problem not specified>.  He presents with functional deficits impacting independence with ADL's including functional mobility. Performance deficits affecting function are weakness, impaired endurance, impaired self care skills, impaired functional mobility, gait instability, impaired balance, visual deficits, impaired cognition, decreased upper extremity function, decreased coordination, decreased lower extremity function, decreased safety awareness, decreased ROM, impaired cardiopulmonary response to activity, impaired joint extensibility, impaired muscle length.     Rehab Prognosis:  Good and Fair; patient would benefit from acute skilled OT services to address these deficits and reach maximum level of function.       Plan:     Patient to be seen 3 x/week (3-5x/week) to address the above listed problems via self-care/home management, therapeutic activities, therapeutic exercises, neuromuscular re-education, cognitive retraining  Plan of Care Expires: 06/13/25  Plan of Care Reviewed with: patient, family    Subjective     Chief Complaint: weakness   Patient/Family Comments/goals: Pt would like to regain independence with ADL's including functional mobility in order to safely return home to [facility] with least amount of financial burden due to assistance.     Occupational Profile:  Living Environment: Pt lives with spouse at [facility]  Previous level of function: Setup Assistance to Modified Independent   Roles and Routines:  ADL's  Equipment Used at Home: walker, rolling, wheelchair  Assistance upon Discharge: Spouse ALBER    Pain/Comfort:  Pain Rating 1: 0/10  Pain Rating Post-Intervention 1: 0/10    Objective:     Communicated with: nurse prior to session.  Patient found HOB elevated with telemetry, peripheral IV, willams catheter, blood pressure cuff upon OT entry to room.    General Precautions: Standard, fall    Orthopedic Precautions:N/A  Braces: N/A  Respiratory Status: Room air     Occupational Performance:     Bed Mobility:    Patient completed Rolling/Turning to Left with  stand by assistance  Patient completed Rolling/Turning to Right with stand by assistance  Patient completed Scooting/Bridging with contact guard assistance  Patient completed Supine to Sit with contact guard assistance  Patient completed Sit to Supine with contact guard assistance     Functional Mobility/Transfers:  Patient completed Sit <> Stand Transfer with contact guard assistance  with  rolling walker   Patient completed Toilet Transfer Step Transfer technique with minimum assistance with  grab bars  Functional Mobility: Pt ambulated 139' between surfaces requiring CGA utilizing RW.     Activities of Daily Living:  Feeding:  supervision    Grooming: supervision    Bathing: moderate assistance    Upper Body Dressing: minimum assistance    Lower Body Dressing: maximal assistance    Toileting: moderate assistance      Physical Exam:  Balance:    -       Static Stand:  Good-  Upper Extremity Range of Motion:     -       Right Upper Extremity: WFL  -       Left Upper Extremity: WFL  Upper Extremity Strength:    -       Right Upper Extremity: 3+ to 4-/5  -       Left Upper Extremity: 3+ to 4-/5   Strength:    -       Right Upper Extremity: WFL  -       Left Upper Extremity: WFL  Postural Exam: Patient presented with the following abnormalities:    -       Rounded shoulders  -       Forward head  -       Kyphosis  Fine Motor Coordination:    -       WFL  Gross  Motor Coordination: Impaired bilateral UE hand eye coordination      [facility] 6 Click ADL: 15    Treatment & Education:  Pt was provided education / instruction regarding role of OT and established OT POC.     Patient left HOB elevated with all lines intact, call button in reach, and nurse notified    GOALS:   Goals to be met by: 06/13/25     Patient will increase functional independence with ADLs by performing:    Feeding with Modified [place].  UE Dressing with Set-up Assistance.  LE Dressing with Minimal Assistance.  Grooming while standing at sink with Set-up Assistance.  Toileting from toilet with Supervision for hygiene and clothing management.   Bathing from  shower chair/bench with Stand-by Assistance.  Toilet transfer to toilet with Supervision.        DME Justifications:  No DME recommended requiring DME justifications    Time Tracking:     OT Date of Treatment: 06/04/25  OT Start Time: 1235  OT Stop Time: 1309  OT Total Time (min): 34 min    Billable Minutes:Evaluation 34    OT/JOHNATHON: OT          6/4/2025

## 2025-06-04 NOTE — SUBJECTIVE & OBJECTIVE
Review of Systems   Unable to perform ROS: Mental status change     Objective:     Vital Signs (Most Recent):  Temp: 97.6 °F (36.4 °C) (06/04/25 0716)  Pulse: 60 (06/04/25 0700)  Resp: 12 (06/04/25 0700)  BP: 134/64 (06/04/25 0700)  SpO2: 96 % (06/04/25 0700) Vital Signs (24h Range):  Temp:  [97.6 °F (36.4 °C)-98.8 °F (37.1 °C)] 97.6 °F (36.4 °C)  Pulse:  [60-85] 60  Resp:  [9-31] 12  SpO2:  [86 %-100 %] 96 %  BP: (109-173)/(54-84) 134/64        There is no height or weight on file to calculate BMI.    Intake/Output Summary (Last 24 hours) at 6/4/2025 0811  Last data filed at 6/4/2025 0600  Gross per 24 hour   Intake 205.84 ml   Output 1200 ml   Net -994.16 ml         Physical Exam  Vitals and nursing note reviewed.   Constitutional:       General: He is not in acute distress.     Appearance: He is obese. He is ill-appearing.      Comments: Confusion, improved    HENT:      Head: Normocephalic and atraumatic.      Nose: Nose normal. No congestion.      Mouth/Throat:      Mouth: Mucous membranes are dry.      Pharynx: No oropharyngeal exudate.   Eyes:      General: No scleral icterus.     Extraocular Movements: Extraocular movements intact.   Neck:      Vascular: No carotid bruit.   Cardiovascular:      Rate and Rhythm: Normal rate and regular rhythm.      Heart sounds: Normal heart sounds. No murmur heard.     No friction rub. No gallop.   Pulmonary:      Effort: No respiratory distress.      Breath sounds: No stridor. No wheezing, rhonchi or rales.   Chest:      Chest wall: No tenderness.   Abdominal:      General: There is no distension.      Palpations: Abdomen is soft. There is no mass.      Tenderness: There is no abdominal tenderness. There is no right CVA tenderness, left CVA tenderness, guarding or rebound.      Hernia: No hernia is present.   Musculoskeletal:      Cervical back: No rigidity.      Right lower leg: No edema.      Left lower leg: No edema.   Lymphadenopathy:      Cervical: No cervical  adenopathy.   Skin:     Capillary Refill: Capillary refill takes less than 2 seconds.      Coloration: Skin is not jaundiced or pale.   Neurological:      Motor: Weakness present.      Gait: Gait abnormal.   Psychiatric:      Comments: Confusion, improving                Significant Labs: All pertinent labs within the past 24 hours have been reviewed.    Significant Imaging: I have reviewed all pertinent imaging results/findings within the past 24 hours.

## 2025-06-04 NOTE — PLAN OF CARE
Problem: Occupational Therapy  Goal: Occupational Therapy Goal  Description: Goals to be met by: 06/13/25     Patient will increase functional independence with ADLs by performing:    Feeding with Modified [place].  UE Dressing with Set-up Assistance.  LE Dressing with Minimal Assistance.  Grooming while standing at sink with Set-up Assistance.  Toileting from toilet with Supervision for hygiene and clothing management.   Bathing from  shower chair/bench with Stand-by Assistance.  Toilet transfer to toilet with Supervision.    Outcome: Established OT POC

## 2025-06-04 NOTE — PLAN OF CARE
Care plan reviewed and ongoing. AAOX3. Transfer on hold at present. Family aware and updated. Bolton cath draining without difficulty.

## 2025-06-04 NOTE — HOSPITAL COURSE
DC Note:  After admission and evaluation patient was found to have speech changes cognitive changes persistent lightheaded and dizziness on a CT of the head was performed which revealed a subdural hematoma.  Patient has a history of multiple falls unsure of the timing I have spoken to the patient's family and primary care team transitioning down to our acute care facility for neurosurgical input and possible transfer.  Once the patient stabilizes he can return for therapy.

## 2025-06-04 NOTE — EICU
Intervention Initiated From:  [facility] / EICU    Lanette intervened regarding:  Rounding (Video assessment)    VICU Night Rounds Checklist  24H Vital Sign Range:  Temp:  [96.9 °F (36.1 °C)-98.8 °F (37.1 °C)]   Pulse:  [63-84]   Resp:  [12-21]   BP: (109-173)/(55-75)   SpO2:  [97 %-100 %]     Video rounds and LDA reconciliation        Nursing orders placed : IP COURTNEY Peripheral IV Access

## 2025-06-04 NOTE — ASSESSMENT & PLAN NOTE
Patient's blood pressure range in the last 24 hours was: BP  Min: 109/57  Max: 173/75.The patient's inpatient anti-hypertensive regimen is listed below:  Current Antihypertensives       Plan  - BP is controlled, no changes needed to their regimen

## 2025-06-04 NOTE — NURSING
Patient brought up to med/surg unit at this time via wheelchair with Damian JARAMILLO present with patient. All belongings with patient. Pt in stable condition

## 2025-06-04 NOTE — ASSESSMENT & PLAN NOTE
Patient's hemorrhage is due to trauma/laceration, this bleeding is not associated with a medication or a coagulopathy. Patients most recent Hgb, Hct, platelets, and INR are listed below.  Recent Labs     06/02/25  0344 06/03/25  0404 06/04/25  0340   HGB 10.2* 10.2* 10.8*   HCT 31.7* 32.9* 34.0*    269 254     Plan  - Will trend hemoglobin/hematocrit Daily  - Will monitor and correct any coagulation defects  - Will transfuse if Hgb is <7g/dl (<8g/dl in cases of active ACS) or if patient has rapid bleeding leading to hemodynamic instability  -     I spoke to family patient has had multiple falls over the last few weeks but denies any severe head trauma but he has hit his head, also has had a er visit.  Patient with a new found subdural hematoma after admission I spoke the patient's primary care physician we will transfer back down to our med surge unit where he arranged neurosurgical consultation and likely transfer.  Discontinued aspirin Plavix.

## 2025-06-04 NOTE — ASSESSMENT & PLAN NOTE
Patient's hemorrhage is due to unknown likely trauma, this bleeding is associated with an antiplatelet agent, the antiplatelet agent is Select Antiplatelet Agent(s): Aspirin and Clopidogrel. Patients most recent Hgb, Hct, platelets, and INR are listed below.  Recent Labs     06/02/25  0344 06/03/25  0404 06/04/25  0340   HGB 10.2* 10.2* 10.8*   HCT 31.7* 32.9* 34.0*    269 254     Plan  - Will trend hemoglobin/hematocrit Daily  - Will monitor and correct any coagulation defects  - Will transfuse if Hgb is <7g/dl (<8g/dl in cases of active ACS) or if patient has rapid bleeding leading to hemodynamic instability  - consulted transfer center for recommendations.  The patient may require neurosurgery evaluation.  For now will hold antiplatelets and anticoagulants.  Family has been updated.    6/4/25:  Continue to hold antiplatelet/anticoagulants.  No indication for intervention at this time.

## 2025-06-04 NOTE — ASSESSMENT & PLAN NOTE
Patient's blood pressure range in the last 24 hours was: BP  Min: 109/57  Max: 173/75.The patient's inpatient anti-hypertensive regimen is listed below:  Current Antihypertensives  amLODIPine tablet 5 mg, Daily, Oral    Plan  - BP is controlled, no changes needed to their regimen

## 2025-06-04 NOTE — PROGRESS NOTES
"[facility] Medicine  Progress Note    Patient Name: Tobi Liz Jr.  MRN: [id number]  Patient Class: IP- Inpatient   Admission Date: 6/3/2025  Length of Stay: 1 days  Attending Physician: Luis Enrique Guzman Jr., MD  Primary Care Provider: Luis Enrique Guzman Jr., MD        Subjective     Principal Problem:<principal problem not specified>        HPI:  Principal Problem:Subdural hematoma     Chief Complaint:       Chief Complaint   Patient presents with    Weakness         HPI:       History of present illness:      Tobi Liz Jr. is a 89 y.o. male  has a past medical history of Aortic valve stenosis, Arthritis, BPH (benign prostatic hyperplasia), Carotid artery stenosis, Chronic diastolic heart failure, ETOH abuse, Exposure to COVID-19 virus (01/07/2022), Hyperchloremia, Hypertension, Kidney stones, Murmur, Pacemaker, Polymyalgia rheumatica, PVD (peripheral vascular disease), and Renal artery stenosis. presenting with Altered Mental Status (Pt to ED from [facility] via [facility] - family stated that pt was "walking halls last night" - with increased confusion - removed willams cath. Concerned for UTI.      Family reports he is more to his baseline this am.  Pt has been aggravated and annoyed by his willams catheter and family has noticed that he has had more utis since having catheter.  Urologist has given options for catheter removal; suprapubic cath and continuation of willams.  Family wishes to dc willams while in hospital and see how he does.     Willams removed and pending bladder scan this morning. Labs reviewed and overall stable.      6/3/25 FM:  Patient was seen and examined today after team conference and had a change in his neurological exam.  Patient had word-finding confusion him complained of persistent dizziness and lightheadedness despite having normal vital signs.  We performed a stat CT a subdural with midline shift I spoke to the patient's primary care team and we are " transferring back down to [facility] as he will require a Neurosurgery consult and transfer and we can not complete this on our inpatient rehab unit.  We will notify family is soon as we can get in touch with them.     Patient requires acute inpatient rehab admission with 24-hour nursing and active physician oversight to monitor and manage acute medical comorbid conditions, labs, pain, and functional deficits. Patient/family will also require teaching and integration of improving functional skills into daily living. Patient will also require an individualized, interdisciplinary approach to their care, receiving PT, OT services 3 hours per day, 5 days per week. Required care cannot be provided at a lower level of care. Patient is anticipated to require approximately 10-14 days LOS with expected discharge home  with  services.         Patient seen on inpatient rehab unit to pursue therapy services.  This morning prior to transition to rehab the patient was coherent and answering questions appropriately.  She has chronic dizziness which is his baseline.  Upon assessment by physical therapy he was noted to have worsening word-finding capabilities in addition to his ongoing dizziness.  A CT scan was performed which revealed a subdural hematoma.  He was transition [facility] unit for further evaluation and management.  Neurosurgery/neurology will be consulted to the transfer center for further recommendations.    Overview/Hospital Course:  6/4/25:  Patient doing well this morning, neurosurgery consult last night and recommendations for continued PT evaluation and if not improving, consider palliative care.  Not a good candidate for surgical intervention at this time.  Labs stable this morning.       Review of Systems   Unable to perform ROS: Mental status change     Objective:     Vital Signs (Most Recent):  Temp: 97.6 °F (36.4 °C) (06/04/25 0716)  Pulse: 60 (06/04/25 0700)  Resp: 12 (06/04/25 0700)  BP: 134/64  (06/04/25 0700)  SpO2: 96 % (06/04/25 0700) Vital Signs (24h Range):  Temp:  [97.6 °F (36.4 °C)-98.8 °F (37.1 °C)] 97.6 °F (36.4 °C)  Pulse:  [60-85] 60  Resp:  [9-31] 12  SpO2:  [86 %-100 %] 96 %  BP: (109-173)/(54-84) 134/64        There is no height or weight on file to calculate BMI.    Intake/Output Summary (Last 24 hours) at 6/4/2025 0811  Last data filed at 6/4/2025 0600  Gross per 24 hour   Intake 205.84 ml   Output 1200 ml   Net -994.16 ml         Physical Exam  Vitals and nursing note reviewed.   Constitutional:       General: He is not in acute distress.     Appearance: He is obese. He is ill-appearing.      Comments: Confusion, improved    HENT:      Head: Normocephalic and atraumatic.      Nose: Nose normal. No congestion.      Mouth/Throat:      Mouth: Mucous membranes are dry.      Pharynx: No oropharyngeal exudate.   Eyes:      General: No scleral icterus.     Extraocular Movements: Extraocular movements intact.   Neck:      Vascular: No carotid bruit.   Cardiovascular:      Rate and Rhythm: Normal rate and regular rhythm.      Heart sounds: Normal heart sounds. No murmur heard.     No friction rub. No gallop.   Pulmonary:      Effort: No respiratory distress.      Breath sounds: No stridor. No wheezing, rhonchi or rales.   Chest:      Chest wall: No tenderness.   Abdominal:      General: There is no distension.      Palpations: Abdomen is soft. There is no mass.      Tenderness: There is no abdominal tenderness. There is no right CVA tenderness, left CVA tenderness, guarding or rebound.      Hernia: No hernia is present.   Musculoskeletal:      Cervical back: No rigidity.      Right lower leg: No edema.      Left lower leg: No edema.   Lymphadenopathy:      Cervical: No cervical adenopathy.   Skin:     Capillary Refill: Capillary refill takes less than 2 seconds.      Coloration: Skin is not jaundiced or pale.   Neurological:      Motor: Weakness present.      Gait: Gait abnormal.   Psychiatric:       Comments: Confusion, improving                Significant Labs: All pertinent labs within the past 24 hours have been reviewed.    Significant Imaging: I have reviewed all pertinent imaging results/findings within the past 24 hours.      Assessment & Plan  Hypertension  Patient's blood pressure range in the last 24 hours was: BP  Min: 109/57  Max: 173/75.The patient's inpatient anti-hypertensive regimen is listed below:  Current Antihypertensives  amLODIPine tablet 5 mg, Daily, Oral    Plan  - BP is controlled, no changes needed to their regimen    Dizziness  This has a chronic ongoing issue likely exacerbated by subdural.    6/4/25:  Poor surgical candidate per neurosurgery evaluation.  Continue PT/OT   Subdural hematoma  Patient's hemorrhage is due to unknown likely trauma, this bleeding is associated with an antiplatelet agent, the antiplatelet agent is Select Antiplatelet Agent(s): Aspirin and Clopidogrel. Patients most recent Hgb, Hct, platelets, and INR are listed below.  Recent Labs     06/02/25  0344 06/03/25  0404 06/04/25  0340   HGB 10.2* 10.2* 10.8*   HCT 31.7* 32.9* 34.0*    269 254     Plan  - Will trend hemoglobin/hematocrit Daily  - Will monitor and correct any coagulation defects  - Will transfuse if Hgb is <7g/dl (<8g/dl in cases of active ACS) or if patient has rapid bleeding leading to hemodynamic instability  - consulted transfer center for recommendations.  The patient may require neurosurgery evaluation.  For now will hold antiplatelets and anticoagulants.  Family has been updated.    6/4/25:  Continue to hold antiplatelet/anticoagulants.  No indication for intervention at this time.     VTE Risk Mitigation (From admission, onward)           Ordered     IP VTE HIGH RISK PATIENT  Once         06/03/25 1620     Place sequential compression device  Until discontinued         06/03/25 1620                    Discharge Planning   ADONAY:      Code Status: Full Code   Medical Readiness for  Discharge Date:                    Please place Justification for DME        TALHA Luna  Department of Hospital Medicine   [facility] - Intensive Care

## 2025-06-04 NOTE — HOSPITAL COURSE
6/4/25:  Patient doing well this morning, neurosurgery consult last night and recommendations for continued PT evaluation and if not improving, consider palliative care.  Not a good candidate for surgical intervention at this time.  Labs stable this morning.     6/5/25:  Doing well overnight, no acute changes.  Labs stable overnight.  C/o insomnia and will add seroquel at night. Discharge to [facility] today.

## 2025-06-04 NOTE — DISCHARGE SUMMARY
"[facility]  Discharge Summary      Patient Name: Tobi Liz Jr.  MRN: [id number]  RENATO: [id number]  Patient Class: IP- Rehab  Admission Date: 6/3/2025  Hospital Length of Stay: 1 days  Discharge Date and Time: 6/3/2025  4:01 PM  Attending Physician: Thelma att. providers found   Discharging Provider: Dave Reyes III, MD  Primary Care Provider: Luis Enrique Guzman Jr., MD    Primary Care Team: Networked reference to record PCT     HPI:   History of present illness:      Tobi Liz Jr. is a 89 y.o. male  has a past medical history of Aortic valve stenosis, Arthritis, BPH (benign prostatic hyperplasia), Carotid artery stenosis, Chronic diastolic heart failure, ETOH abuse, Exposure to COVID-19 virus (01/07/2022), Hyperchloremia, Hypertension, Kidney stones, Murmur, Pacemaker, Polymyalgia rheumatica, PVD (peripheral vascular disease), and Renal artery stenosis. presenting with Altered Mental Status (Pt to ED from [facility] via [facility]OV - family stated that pt was "walking halls last night" - with increased confusion - removed willams cath. Concerned for UTI.      Family reports he is more to his baseline this am.  Pt has been aggravated and annoyed by his willams catheter and family has noticed that he has had more utis since having catheter.  Urologist has given options for catheter removal; suprapubic cath and continuation of willams.  Family wishes to dc willams while in hospital and see how he does.     Willams removed and pending bladder scan this morning. Labs reviewed and overall stable.     6/3/25 FM:  Patient was seen and examined today after team conference and had a change in his neurological exam.  Patient had word-finding confusion him complained of persistent dizziness and lightheadedness despite having normal vital signs.  We performed a stat CT a subdural with midline shift I spoke to the patient's primary care team and we are transferring back down to [facility] as he " will require a Neurosurgery consult and transfer and we can not complete this on our inpatient rehab unit.  We will notify family is soon as we can get in touch with them.     Patient requires acute inpatient rehab admission with 24-hour nursing and active physician oversight to monitor and manage acute medical comorbid conditions, labs, pain, and functional deficits. Patient/family will also require teaching and integration of improving functional skills into daily living. Patient will also require an individualized, interdisciplinary approach to their care, receiving PT, OT services 3 hours per day, 5 days per week. Required care cannot be provided at a lower level of care. Patient is anticipated to require approximately 10-14 days LOS with expected discharge home  with  services.          Impairment group (IGC):   Debility (Non-Cardiac/Non-Pulmonary) 16 Etiologic diagnosis/description:   N39.0: UTI         Date of onset:  5/30/2025 Date of surgery:  N/A   Allergies: Patient has no known allergies.   Comorbid condition: HTN, HLD, BPH, CAD, Valvular Heart Disease, CHF Class II, Occlusion of right Iliac artery, Urinary obstruction, Anemia, Acute bronchitis   Medical/functional conditions requiring inpatient rehabilitation:      This patient requires medical management/24-hour nursing of complex co morbidities HTN, HLD, BPH, CAD, Valvular Heart Disease, CHF Class II, Occlusion of right Iliac artery, Urinary obstruction, Anemia, Acute bronchitis, labs, medications (see medications list), pain, sleep hygiene, anticoagulation, nutrition, hydration, neurological, pulmonary, cardiac status, and preventive healthcare.     This patient requires intense therapy and an integrated, interdisciplinary approach to address safety, impaired mobility, impaired ADLs (dressing, toileting, grooming, showering), impaired cognition, judgment, and memory, communication, pulmonary insufficiency, bowel/bladder problems,preventive  healthcare, medication management, integration of functional skills into daily living, and home caregiver support and training.    Risk for medical/clinical complications:      This patient is at risk for the following complications: DVT/PE, pneumonia, malnutrition, neurological decline, respiratory insufficiency, worsening activity intolerance, complications from anticoagulation, skin breakdown, inadequate sleep, and constipation.       * No surgery found *      Hospital Course:   DC Note:  After admission and evaluation patient was found to have speech changes cognitive changes persistent lightheaded and dizziness on a CT of the head was performed which revealed a subdural hematoma.  Patient has a history of multiple falls unsure of the timing I have spoken to the patient's family and primary care team transitioning down to our acute care facility for neurosurgical input and possible transfer.  Once the patient stabilizes he can return for therapy.     Goals of Care Treatment Preferences:  Code Status: Full Code      SDOH Screening:  The patient was screened for utility difficulties, food insecurity, transport difficulties, housing insecurity, and interpersonal safety and there were no concerns identified this admission.     Consults:   Consults (From admission, onward)          Status Ordering Provider     Inpatient consult to Registered Dietitian/Nutritionist  Once        Provider:  (Not yet assigned)    Completed RAMSEY CORDOVA III            Assessment & Plan  Subdural hematoma  Patient's hemorrhage is due to trauma/laceration, this bleeding is not associated with a medication or a coagulopathy. Patients most recent Hgb, Hct, platelets, and INR are listed below.  Recent Labs     06/02/25  0344 06/03/25  0404 06/04/25  0340   HGB 10.2* 10.2* 10.8*   HCT 31.7* 32.9* 34.0*    269 254     Plan  - Will trend hemoglobin/hematocrit Daily  - Will monitor and correct any coagulation defects  - Will transfuse  if Hgb is <7g/dl (<8g/dl in cases of active ACS) or if patient has rapid bleeding leading to hemodynamic instability  -     I spoke to family patient has had multiple falls over the last few weeks but denies any severe head trauma but he has hit his head, also has had a er visit.  Patient with a new found subdural hematoma after admission I spoke the patient's primary care physician we will transfer back down to our med surge unit where he arranged neurosurgical consultation and likely transfer.  Discontinued aspirin Plavix.  Hypertension  Patient's blood pressure range in the last 24 hours was: BP  Min: 109/57  Max: 173/75.The patient's inpatient anti-hypertensive regimen is listed below:  Current Antihypertensives       Plan  - BP is controlled, no changes needed to their regimen  Hyperlipidemia  Cont statin.    IFG (impaired fasting glucose)  CBG with SSI    Valvular heart disease  Stopping antiplatlets    Congestive heart failure, NYHA class II  Currently stable.  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    Urinary obstruction  Cont willams placement.    Urinary tract infection without hematuria    Cont abx, see below.  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    Significant Diagnostic Studies: N/A    Pending Diagnostic Studies:       None           Medications:  Reconciled Home Medications:      Medication List        ASK your doctor about these medications      amLODIPine 5 MG tablet  Commonly known as: NORVASC  Take 1 tablet (5 mg total) by mouth once daily. Prescribed by Dr. Box     aspirin 81 MG Chew  Take 81 mg by mouth once daily.     clopidogreL 75 mg tablet  Commonly known as: PLAVIX  Take 1 tablet (75 mg total) by mouth once daily.     rosuvastatin 10 MG tablet  Commonly known as: CRESTOR  Take 1 tablet (10 mg total) by mouth once daily.     solifenacin 10 MG tablet  Commonly known as: VESICARE  Take 10 mg by mouth once daily. Patient is still taking  Ask about: Which instructions should I use?              Indwelling Lines/Drains at time of discharge:   Lines/Drains/Airways       Drain  Duration                  Urethral Catheter 06/02/25 1015 Coude 18 Fr. 1 day                    Time spent on the discharge of patient: 45 minutes         Dave Reyes III, MD  Department of Hospital Medicine  [facility])

## 2025-06-04 NOTE — PLAN OF CARE
[place] [facility]  Initial Discharge Assessment       Primary Care Provider: Luis Enrique Guzman Jr., MD    Admission Diagnosis: Subarachnoid bleed [I60.9]  Subdural hematoma [S06.5XAA]    Admission Date: 6/3/2025  Expected Discharge Date: 6/5/2025         Payor: MEDICARE / Plan: MEDICARE PART A & B / Product Type: Government /     Extended Emergency Contact Information  Primary Emergency Contact: Eugenio Hemphill  [employer] Phone: [phone]  Relation: Daughter  Preferred language: English   needed? No  Secondary Emergency Contact: Therese Liz  Home Phone: [phone]  Relation: Spouse  Preferred language: English   needed? No    Discharge Plan A: Rehab  Discharge Plan B: Assisted Living, Home Health      [facility] [address] [place] [address] [address] [address]  [address] [facility] [address]  [facility] [address]  Phone: [phone] Fax: [phone]      Initial Assessment (most recent)       Adult Discharge Assessment - 06/04/25 1055          Discharge Assessment    Assessment Type Discharge Planning Assessment     Confirmed/corrected address, phone number and insurance Yes     Confirmed Demographics Correct on Facesheet     Source of Information patient;family     People in Home facility resident     Name(s) of People in Home Therese (spouse)     [facility]     Do you expect to return to your current living situation? Other (see comments)   Patient would like to return back to inpatient rehab.    Do you have help at home or someone to help you manage your care at home? No   The patient has children who are involved in his care.    Prior to hospitilization cognitive status: Alert/Oriented     Current cognitive status: Alert/Oriented     Walking or Climbing Stairs Difficulty yes     Walking or Climbing Stairs ambulation difficulty, requires equipment     Mobility Management rolling walker and wheelchair     Dressing/Bathing Difficulty yes      "Dressing/Bathing bathing difficulty, requires equipment;dressing difficulty, assistance 1 person;bathing difficulty, assistance 1 person     Dressing/Bathing Management shower chair     Home Accessibility wheelchair accessible     Home Layout Able to live on 1st floor     Readmission within 30 days? Yes     Patient currently being followed by outpatient case management? Unable to determine (comments)     Do you currently have service(s) that help you manage your care at home? Yes     Name and Contact number of [facility] [phone] and [facility] with Dr. Burgos     Is the pt/caregiver preference to resume services with current agency Yes     Do you take prescription medications? Yes     Do you have prescription coverage? Yes     Do you have any problems affording any of your prescribed medications? TBD     Is the patient taking medications as prescribed? yes     Who is going to help you get home at discharge? family     How do you get to doctors appointments? family or friend will provide     Are you on dialysis? No     Discharge Plan A Rehab     Discharge Plan B Assisted Living;Home Health     DME Needed Upon Discharge  other (see comments)   TBD    Discharge Plan discussed with: Patient;Adult children                     Readmission Assessment (most recent)       Readmission Assessment - 06/04/25 1052          Readmission    Was this a planned readmission? No     Why were you hospitalized in the last 30 days? Urinary tract infection without hematuria, site unspecified     Why were you readmitted? Alarmed about signs/symptoms     When you left the hospital how did you feel? "I didn't feel like I wanted to do too much."     When you left the hospital where did you go? [facility]     When did you start not feeling well? Yesterday     Did you try to manage your symptoms your self? --   The patient was re-admitted to ICU from inpatient rehab. Per medical records, the patient was reported to " have dizziness.                Discharge assessment completed with the patient and his daughter, Eugenio via phone. The patient is a re-admit from inpatient rehab. I spoke to the patient. He was able to answer a few questions, but he had some confusion during our discussion.     The patient is from [facility]. He requires assistance and durable medical equipment with his care. The patient [facility] home [facility] with [facility].      Eugenio would like to see if the patient would be a candidate for inpatient rehab. I had a long discussion with Eugenio via phone regarding possible options for the patient. We had a discussion about possible skilled nursing home placement if the patient is in agreement.     Discharge planning checklist was left at the patient's bedside with instructions to contact  for any needs.  SW will follow as needed.

## 2025-06-04 NOTE — PLAN OF CARE
Problem: Physical Therapy  Goal: Physical Therapy Goal  Description: Goals to be met by: 2025     Patient will increase functional independence with mobility by performin. Supine to sit with Supervision or Set-up Assistance.  2. Sit to supine with Supervision or Set-up Assistance.  3. Bed to chair transfer with Supervision or Set-up Assistance with rolling walker using Step Transfer technique.  4. Sit to Stand with Supervision or Set-up Assistance with rolling walker.  5. Gait  x 500  feet with Supervision or Set-up Assistance with rolling walker.  6. Lower extremity exercise program x10 reps, with assistance as needed.     Outcome: Plan of care established

## 2025-06-04 NOTE — PT/OT/SLP EVAL
"Physical Therapy Evaluation     Patient Name: Tobi Liz Jr.   MRN: [id number]  Recent Surgery: * No surgery found *      Recommendations:     Discharge Recommendations: Low Intensity Therapy   Discharge Equipment Recommendations: none   Barriers to discharge: on going medical needs     Assessment:     Tobi Liz Jr. is a 89 y.o. male admitted with a medical diagnosis of <principal problem not specified>. He presents with the following impairments/functional limitations: weakness, gait instability, decreased safety awareness, impaired balance, impaired cardiopulmonary response to activity, impaired endurance, impaired self care skills, and  impaired functional mobility (dizziness). Patient was here in this  hospital's in-patient rehab unit from 3/13/2025 to 3/25/2025, discharge to [facility] with recommended 24/7 supervision, continuing to encourage the patient to walk and not use the wheelchair within the assisted living facility as long as he has set up supervision, and advised not to use the lift component of the chair to prevent decline in function  during the family training dated 3/24/2025.      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 This is causing  mass effect on right lateral ventricle and mild midline shift from right to left of 4 mm." Based on the CT scan dated 6/3/2025.     P.T. is re-consulted in acute care again to assist with functional mobility and strengthening.  Patient is currently in ICU being monitored.  He required SBA  with supine <> sit with use of side rail,  SBA with sit <> stand using a RW, ambulated ~398 feet  with RW with CGA/SBA on room air, minimal c/o dizziness.  " "Patient is still confused.      Rehab Prognosis: Fair; patient would benefit from acute PT services to address these deficits and reach maximum level of function.    Plan:     During this hospitalization, patient to be seen  (3-5x a week) to address the above listed problems via gait training, therapeutic activities, therapeutic exercises, neuromuscular re-education    Plan of Care Expires: 06/13/25    Subjective     Chief Complaint: "I remember I fell  but I did not tell any body."   Patient Comments/Goals: unstated   Pain/Comfort:  Pain Rating 1: 0/10  Pain Rating Post-Intervention 1: 0/10    Social History:  Living Environment: Patient lives with their spouse in an assisted living facility    Prior Level of Function: Prior to admission, patient ambulated household distances using rolling walker with  supervision, uses a wheelchair to get around the assisted living place and completed transfers via step transfer with stand by assistance using rolling walker  Equipment Used at Home: walker, rolling, wheelchair  DME owned (not currently used): none  Assistance Upon Discharge: facility staff and family     Objective:     Communicated with nurse, patient and family  prior to session. Patient found HOB elevated with blood pressure cuff, telemetry, pulse ox (continuous), willams catheter, peripheral IV upon PT entry to room.    General Precautions: Standard, fall, other (see comments) (confusion)   Orthopedic Precautions: N/A   Braces: N/A    Respiratory Status: Room air    Exams:  Cognition: Patient is oriented to Person, Not oriented to place, Not oriented to time, and Not oriented to situation  RLE ROM: WFL  RLE Strength: WFL  LLE ROM: WFL  LLE Strength: WFL  Fine Motor Coordination:    -       Intact  Gross Motor Coordination: WFL  Postural Exam: Patient presented with the following abnormalities:    -       Rounded shoulders  -       Forward head  -       Kyphosis  Sensation: -       Impaired  light/touch on both LE "   Skin Integrity/Edema:     -       Skin integrity: Visible skin intact  -       Edema: Mild on both LE     Functional Mobility:  Gait belt applied - Yes  Bed Mobility  Rolling Left: supervision  Rolling Right: supervision  Scooting: supervision  Supine to Sit: supervision for LE management and trunk management  Sit to Supine: supervision for LE management and trunk management  Transfers  Sit to Stand: stand by assistance with rolling walker and with cues for hand placement and foot placement  Gait  Patient ambulated 398 feet  with rolling walker and stand by assistance and contact guard assistance. Patient demonstrates steady gait. .. All lines remained intact throughout ambulation trail.  Balance  Sitting: supervision  Standing: stand by assistance      Therapeutic Activities and Exercises:   Patient educated on role of acute care PT and PT POC, safety while in hospital including calling nurse for mobility, and call light usage  Patient educated about importance of OOB mobility and remaining up in chair most of the day.  New finding on CT scan     AM-PAC 6 CLICK MOBILITY  Total Score:18    Patient left HOB elevated with all lines intact, call button in reach, RN notified, and family present.    GOALS:   Multidisciplinary Problems       Physical Therapy Goals          Problem: Physical Therapy    Goal Priority Disciplines Outcome Interventions   Physical Therapy Goal     PT, PT/OT Progressing    Description: Goals to be met by: 2025     Patient will increase functional independence with mobility by performin. Supine to sit with Supervision or Set-up Assistance.  2. Sit to supine with Supervision or Set-up Assistance.  3. Bed to chair transfer with Supervision or Set-up Assistance with rolling walker using Step Transfer technique.  4. Sit to Stand with Supervision or Set-up Assistance with rolling walker.  5. Gait  x 500  feet with Supervision or Set-up Assistance with rolling walker.  6. Lower extremity  exercise program x10 reps, with assistance as needed.                          DME Justifications:  No DME recommended requiring DME justifications    History:     Past Medical History:   Diagnosis Date    Aortic valve stenosis     Arthritis     BPH (benign prostatic hyperplasia)     Carotid artery stenosis     Chronic diastolic heart failure     ETOH abuse     Exposure to COVID-19 virus 01/07/2022    Hyperchloremia     Hypertension     Kidney stones     Murmur     Pacemaker     Polymyalgia rheumatica     PVD (peripheral vascular disease)     Renal artery stenosis        Past Surgical History:   Procedure Laterality Date    A-V CARDIAC PACEMAKER INSERTION N/A 10/06/2023    Procedure: INSERTION, CARDIAC PACEMAKER, DUAL CHAMBER;  Surgeon: Douglas Salguero MD;  Location: [facility] CATH;  Service: Cardiology;  Laterality: N/A;    ANGIOGRAM, CAROTID Left 08/25/2023    Procedure: ANGIOGRAM, CAROTID;  Surgeon: Nick Box MD;  Location: [facility] CATH;  Service: Cardiology;  Laterality: Left;    CATARACT EXTRACTION, BILATERAL      ECHOCARDIOGRAM,TRANSESOPHAGEAL N/A 10/03/2023    Procedure: Transesophageal echo (JOSE ARMANDO) intra-procedure log documentation;  Surgeon: Nick Box MD;  Location: [facility] OR;  Service: Cardiology;  Laterality: N/A;    HAND SURGERY      INSERTION OF STENT INTO PERIPHERAL VESSEL N/A 01/30/2025    Procedure: INSERTION, STENT, VESSEL, PERIPHERAL;  Surgeon: Harsha Salcedo MD;  Location: [facility] CATH;  Service: Cardiology;  Laterality: N/A;    PERCUTANEOUS TRANSCATHETER AORTIC VALVE REPLACEMENT (TAVR) Left 10/03/2023    Procedure: REPLACEMENT, AORTIC VALVE, PERCUTANEOUS, TRANSCATHETER;  Surgeon: Nick Box MD;  Location: [facility] OR;  Service: Cardiology;  Laterality: Left;    PERCUTANEOUS TRANSCATHETER AORTIC VALVE REPLACEMENT (TAVR) Left 10/03/2023    Procedure: REPLACEMENT, AORTIC VALVE, PERCUTANEOUS, TRANSCATHETER carotid access;  Surgeon: Jun Brito MD;  Location: [facility] OR;  Service:  Cardiovascular;  Laterality: Left;    PERCUTANEOUS TRANSLUMINAL ANGIOPLASTY N/A 08/25/2023    Procedure: ANGIOPLASTY-PERCUTANEOUS TRANSLUMINAL (PTA);  Surgeon: Nick Box MD;  [facility] CATH;  Service: Cardiology;  Laterality: N/A;    PERCUTANEOUS TRANSLUMINAL ANGIOPLASTY (PTA) OF PERIPHERAL VESSEL Left 04/10/2025    Procedure: PTA, PERIPHERAL VESSEL;  Surgeon: Harsha Salcedo MD;  [facility]: [facility] CATH;  Service: Cardiology;  Laterality: Left;  L LE intervention via left antegrade approach Harsha Salcedo at [facility] in [facility] OR under MAC  *PACEMAKER*    SHOULDER SURGERY Left     total shoulder replacement    WOUND EXPLORATION N/A 10/03/2023    Procedure: EXPLORATION, WOUND;  Surgeon: Jun Brito MD;  [facility] OR;  Service: Cardiology;  Laterality: N/A;       Time Tracking:     PT Received On: 06/04/25  PT Start Time: 1000  PT Stop Time: 1025  PT Total Time (min): 25 min     Billable Minutes: Evaluation moderate complexity     6/4/2025

## 2025-06-04 NOTE — PLAN OF CARE
[facility] (Hospital)  Discharge Final Note    Primary Care Provider: Luis Enrique Guzman Jr., MD    Expected Discharge Date: 6/3/2025    Final Discharge Note (most recent)       Final Note - 06/04/25 0743          Final Note    Assessment Type Final Discharge Note     Anticipated Discharge Disposition Short Term Hospital        Post-Acute Status    Discharge Delays None known at this time                     Important Message from Medicare      Patient transferred to [facility] ICU r/t subdural hematoma noted on same day of admissions.

## 2025-06-05 ENCOUNTER — HOSPITAL ENCOUNTER (INPATIENT)
Facility: HOSPITAL | Age: OVER 89
LOS: 8 days | Discharge: HOME HEALTH | DRG: 949 | End: 2025-06-13
Attending: INTERNAL MEDICINE | Admitting: INTERNAL MEDICINE
Payer: MEDICARE

## 2025-06-05 VITALS
DIASTOLIC BLOOD PRESSURE: 57 MMHG | HEART RATE: 71 BPM | BODY MASS INDEX: 25.5 KG/M2 | SYSTOLIC BLOOD PRESSURE: 113 MMHG | OXYGEN SATURATION: 93 % | HEIGHT: 69 IN | WEIGHT: 172.19 LBS | RESPIRATION RATE: 18 BRPM | TEMPERATURE: 98 F

## 2025-06-05 DIAGNOSIS — S06.5XAA SUBDURAL HEMATOMA: ICD-10-CM

## 2025-06-05 DIAGNOSIS — N30.00 ACUTE CYSTITIS WITHOUT HEMATURIA: ICD-10-CM

## 2025-06-05 DIAGNOSIS — G72.9 MYOPATHY: ICD-10-CM

## 2025-06-05 LAB
ABSOLUTE EOSINOPHIL (OHS): 0.41 K/UL
ABSOLUTE MONOCYTE (OHS): 0.54 K/UL (ref 0.3–1)
ABSOLUTE NEUTROPHIL COUNT (OHS): 4.3 K/UL (ref 1.8–7.7)
ALBUMIN SERPL BCP-MCNC: 3.4 G/DL (ref 3.5–5.2)
ALP SERPL-CCNC: 72 UNIT/L (ref 40–150)
ALT SERPL W/O P-5'-P-CCNC: 11 UNIT/L (ref 10–44)
ANION GAP (OHS): 6 MMOL/L (ref 8–16)
AST SERPL-CCNC: 17 UNIT/L (ref 11–45)
BASOPHILS # BLD AUTO: 0.03 K/UL
BASOPHILS NFR BLD AUTO: 0.5 %
BILIRUB SERPL-MCNC: 0.4 MG/DL (ref 0.1–1)
BUN SERPL-MCNC: 15 MG/DL (ref 10–30)
CALCIUM SERPL-MCNC: 8.6 MG/DL (ref 8.7–10.5)
CHLORIDE SERPL-SCNC: 108 MMOL/L (ref 95–110)
CO2 SERPL-SCNC: 24 MMOL/L (ref 23–29)
CREAT SERPL-MCNC: 0.8 MG/DL (ref 0.5–1.4)
ERYTHROCYTE [DISTWIDTH] IN BLOOD BY AUTOMATED COUNT: 14.6 % (ref 11.5–14.5)
GFR SERPLBLD CREATININE-BSD FMLA CKD-EPI: >60 ML/MIN/1.73/M2
GLUCOSE SERPL-MCNC: 103 MG/DL (ref 70–110)
HCT VFR BLD AUTO: 33.6 % (ref 40–54)
HGB BLD-MCNC: 10.7 GM/DL (ref 14–18)
IMM GRANULOCYTES # BLD AUTO: 0.03 K/UL (ref 0–0.04)
IMM GRANULOCYTES NFR BLD AUTO: 0.5 % (ref 0–0.5)
LYMPHOCYTES # BLD AUTO: 0.66 K/UL (ref 1–4.8)
MAGNESIUM SERPL-MCNC: 2 MG/DL (ref 1.6–2.6)
MCH RBC QN AUTO: 30.8 PG (ref 27–31)
MCHC RBC AUTO-ENTMCNC: 31.8 G/DL (ref 32–36)
MCV RBC AUTO: 97 FL (ref 82–98)
NUCLEATED RBC (/100WBC) (OHS): 0 /100 WBC
PLATELET # BLD AUTO: 240 K/UL (ref 150–450)
PMV BLD AUTO: 9.1 FL (ref 9.2–12.9)
POTASSIUM SERPL-SCNC: 4.2 MMOL/L (ref 3.5–5.1)
PROT SERPL-MCNC: 6.6 GM/DL (ref 6–8.4)
RBC # BLD AUTO: 3.47 M/UL (ref 4.6–6.2)
RELATIVE EOSINOPHIL (OHS): 6.9 %
RELATIVE LYMPHOCYTE (OHS): 11.1 % (ref 18–48)
RELATIVE MONOCYTE (OHS): 9 % (ref 4–15)
RELATIVE NEUTROPHIL (OHS): 72 % (ref 38–73)
SODIUM SERPL-SCNC: 138 MMOL/L (ref 136–145)
WBC # BLD AUTO: 5.97 K/UL (ref 3.9–12.7)

## 2025-06-05 PROCEDURE — 97116 GAIT TRAINING THERAPY: CPT

## 2025-06-05 PROCEDURE — 11800000 HC REHAB PRIVATE ROOM

## 2025-06-05 PROCEDURE — 25000003 PHARM REV CODE 250: Performed by: INTERNAL MEDICINE

## 2025-06-05 PROCEDURE — 85025 COMPLETE CBC W/AUTO DIFF WBC: CPT | Performed by: NURSE PRACTITIONER

## 2025-06-05 PROCEDURE — 97530 THERAPEUTIC ACTIVITIES: CPT

## 2025-06-05 PROCEDURE — 36415 COLL VENOUS BLD VENIPUNCTURE: CPT | Performed by: NURSE PRACTITIONER

## 2025-06-05 PROCEDURE — 63600175 PHARM REV CODE 636 W HCPCS: Performed by: INTERNAL MEDICINE

## 2025-06-05 PROCEDURE — 97166 OT EVAL MOD COMPLEX 45 MIN: CPT

## 2025-06-05 PROCEDURE — 25000003 PHARM REV CODE 250: Performed by: NURSE PRACTITIONER

## 2025-06-05 PROCEDURE — 82247 BILIRUBIN TOTAL: CPT | Performed by: NURSE PRACTITIONER

## 2025-06-05 PROCEDURE — 83735 ASSAY OF MAGNESIUM: CPT | Performed by: NURSE PRACTITIONER

## 2025-06-05 PROCEDURE — 97535 SELF CARE MNGMENT TRAINING: CPT

## 2025-06-05 RX ORDER — ACETAMINOPHEN 325 MG/1
650 TABLET ORAL EVERY 6 HOURS PRN
Status: DISCONTINUED | OUTPATIENT
Start: 2025-06-05 | End: 2025-06-13 | Stop reason: HOSPADM

## 2025-06-05 RX ORDER — FLUCONAZOLE 200 MG/1
200 TABLET ORAL DAILY
Status: DISCONTINUED | OUTPATIENT
Start: 2025-06-05 | End: 2025-06-13 | Stop reason: HOSPADM

## 2025-06-05 RX ORDER — QUETIAPINE FUMARATE 25 MG/1
25 TABLET, FILM COATED ORAL NIGHTLY
Status: DISCONTINUED | OUTPATIENT
Start: 2025-06-05 | End: 2025-06-05 | Stop reason: HOSPADM

## 2025-06-05 RX ORDER — ACETAMINOPHEN 325 MG/1
650 TABLET ORAL EVERY 6 HOURS PRN
Status: CANCELLED | OUTPATIENT
Start: 2025-06-05

## 2025-06-05 RX ORDER — AMLODIPINE BESYLATE 5 MG/1
5 TABLET ORAL DAILY
Status: DISCONTINUED | OUTPATIENT
Start: 2025-06-06 | End: 2025-06-13 | Stop reason: HOSPADM

## 2025-06-05 RX ORDER — FLUCONAZOLE 100 MG/1
200 TABLET ORAL DAILY
Status: DISCONTINUED | OUTPATIENT
Start: 2025-06-05 | End: 2025-06-05 | Stop reason: HOSPADM

## 2025-06-05 RX ORDER — ATORVASTATIN CALCIUM 20 MG/1
20 TABLET, FILM COATED ORAL DAILY
Status: CANCELLED | OUTPATIENT
Start: 2025-06-06

## 2025-06-05 RX ORDER — FLUCONAZOLE 2 MG/ML
200 INJECTION, SOLUTION INTRAVENOUS
Status: CANCELLED | OUTPATIENT
Start: 2025-06-05 | End: 2025-06-10

## 2025-06-05 RX ORDER — DEXAMETHASONE 4 MG/1
4 TABLET ORAL EVERY 12 HOURS
Status: DISCONTINUED | OUTPATIENT
Start: 2025-06-06 | End: 2025-06-09

## 2025-06-05 RX ORDER — TALC
6 POWDER (GRAM) TOPICAL NIGHTLY PRN
OUTPATIENT
Start: 2025-06-05

## 2025-06-05 RX ORDER — CEFTRIAXONE 1 G/1
1 INJECTION, POWDER, FOR SOLUTION INTRAMUSCULAR; INTRAVENOUS
Status: CANCELLED | OUTPATIENT
Start: 2025-06-06 | End: 2025-06-07

## 2025-06-05 RX ORDER — AMLODIPINE BESYLATE 5 MG/1
5 TABLET ORAL DAILY
Status: CANCELLED | OUTPATIENT
Start: 2025-06-06

## 2025-06-05 RX ORDER — ATORVASTATIN CALCIUM 20 MG/1
20 TABLET, FILM COATED ORAL DAILY
Status: DISCONTINUED | OUTPATIENT
Start: 2025-06-06 | End: 2025-06-13 | Stop reason: HOSPADM

## 2025-06-05 RX ORDER — CEFTRIAXONE 1 G/1
1 INJECTION, POWDER, FOR SOLUTION INTRAMUSCULAR; INTRAVENOUS
Status: COMPLETED | OUTPATIENT
Start: 2025-06-06 | End: 2025-06-06

## 2025-06-05 RX ORDER — QUETIAPINE FUMARATE 25 MG/1
25 TABLET, FILM COATED ORAL NIGHTLY
Status: CANCELLED | OUTPATIENT
Start: 2025-06-05

## 2025-06-05 RX ORDER — SODIUM CHLORIDE 0.9 % (FLUSH) 0.9 %
10 SYRINGE (ML) INJECTION
OUTPATIENT
Start: 2025-06-05

## 2025-06-05 RX ORDER — QUETIAPINE FUMARATE 25 MG/1
25 TABLET, FILM COATED ORAL NIGHTLY
Status: DISCONTINUED | OUTPATIENT
Start: 2025-06-05 | End: 2025-06-13 | Stop reason: HOSPADM

## 2025-06-05 RX ORDER — MUPIROCIN 20 MG/G
OINTMENT TOPICAL 2 TIMES DAILY
Status: COMPLETED | OUTPATIENT
Start: 2025-06-05 | End: 2025-06-10

## 2025-06-05 RX ORDER — ACETAMINOPHEN 325 MG/1
650 TABLET ORAL EVERY 6 HOURS PRN
Status: DISCONTINUED | OUTPATIENT
Start: 2025-06-05 | End: 2025-06-05 | Stop reason: HOSPADM

## 2025-06-05 RX ADMIN — MUPIROCIN: 20 OINTMENT TOPICAL at 09:06

## 2025-06-05 RX ADMIN — AMLODIPINE BESYLATE 5 MG: 5 TABLET ORAL at 09:06

## 2025-06-05 RX ADMIN — ACETAMINOPHEN 650 MG: 325 TABLET ORAL at 01:06

## 2025-06-05 RX ADMIN — ATORVASTATIN CALCIUM 20 MG: 20 TABLET, FILM COATED ORAL at 09:06

## 2025-06-05 RX ADMIN — CEFTRIAXONE SODIUM 1 G: 1 INJECTION, POWDER, FOR SOLUTION INTRAMUSCULAR; INTRAVENOUS at 09:06

## 2025-06-05 RX ADMIN — FLUCONAZOLE 200 MG: 200 TABLET ORAL at 07:06

## 2025-06-05 RX ADMIN — QUETIAPINE FUMARATE 25 MG: 25 TABLET ORAL at 08:06

## 2025-06-05 NOTE — PRE ADMISSION SCREENING
[facility] Surg  Inpatient Rehab Prescreen    PATIENT INFORMATION     Assessment date/time: 06/05/2025 @ 1148    Name: Tobi Liz Jr. Phone: [phone]   Address:   [address] [place] [id number] SSN:    YOB: 1929 Age: 89 y.o. Gender: male   Race: White   Marital Status:    Advance Directives: Full code     COVERAGE INFORMATION     Patient Medicare #: [id number]    Primary Insurance Type: Medicare/Medicare Part a & B Secondary Insurance Type: Blue Cross Blue Shield/Medicare Supplement [facility]   Policy #: [id number] Policy #: [id number]   Insurance contact name/number:  Insurance contact name/number:    Authorization #:  Authorization #:    Verified Coverage: Financial department   Pending Coverage:    Prescription Coverage:  Insurance details/comments:      PHYSICIAN/REFERRAL INFORMATION     Primary Care Physician: Luis Enrique Guzman Jr., MD Attending Physician: Dave Reyes III, MD   Consulting physician/specialist:  Referring Physician: Luis Enrique Guzman Jr., MD   Referring facility:  [facility] Referring contact name/phone: GAYLE Spann [phone]   Physician details/comments:      CONTACT INFORMATION   Extended Emergency Contact Information  Primary Emergency Contact: KanchanEugenio  Mobile Phone: [phone]  Relation: Daughter  Preferred language: English   needed? No  Secondary Emergency Contact: Therese Liz  [place] Phone: [phone]  Relation: Spouse  Preferred language: English   needed? No    PRIOR LIVING SITUATION      Patient lives at assisted living [facility]   Bedroom location/setup:  First Floor   Bathroom location/setup:  Walk in shower   Equipment at home:  WC, RW, Shower Chair   Prior device use:  All above     PRIOR LEVEL OF FUNCTION     Did a helper need to assist with the following activities prior to the current illness, exacerbation, or injury?   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 is a 89 y.o. male  has a past medical history of Aortic valve stenosis, Arthritis, BPH (benign prostatic hyperplasia), Carotid artery stenosis, Chronic diastolic heart failure, ETOH abuse, Exposure to COVID-19 virus (01/07/2022), Hyperchloremia, Hypertension, Kidney stones, Murmur, Pacemaker, Polymyalgia rheumatica, PVD (peripheral vascular disease), and Renal artery stenosis. presenting with Altered Mental Status (Pt to ED from [facility] via POV - family stated that pt was "walking halls last night" - with increased confusion - removed bolton cath. Concerned for UTI.      Family reports he is more to his baseline this am.  Pt has been aggravated and annoyed by his bolton catheter and family has noticed that he has had more utis since having catheter.  Urologist has given options for catheter removal; suprapubic cath and continuation of bolton.  Family wishes to dc bolton while in hospital and see how he does.     Bolton removed and pending bladder scan this morning. Labs reviewed and overall stable.      6/3/25 FM:  Patient was seen and examined today after " team conference and had a change in his neurological exam.  Patient had word-finding confusion him complained of persistent dizziness and lightheadedness despite having normal vital signs.  We performed a stat CT a subdural with midline shift I spoke to the patient's primary care team and we are transferring back down to [facility] as he will require a Neurosurgery consult and transfer and we can not complete this on our inpatient rehab unit.  We will notify family is soon as we can get in touch with them.    6/4/25:  Patient doing well this morning, neurosurgery consult last night and recommendations for continued PT evaluation and if not improving, consider palliative care.  Not a good candidate for surgical intervention at this time.  Labs stable this morning.      Patient reports visual disturbances have improved but still reports dizziness. Aspirin and Plavix have been discontinued.    Patient requires acute inpatient rehab admission with 24-hour nursing and active physician oversight to monitor and manage acute medical comorbid conditions, labs, pain, and functional deficits. Patient/family will also require teaching and integration of improving functional skills into daily living. Patient will also require an individualized, interdisciplinary approach to their care, receiving PT, OT services 3 hours per day, 5 days per week. Required care cannot be provided at a lower level of care. Patient is anticipated to require approximately 10-14 days LOS with expected discharge home  with  services.           Impairment group (IGC):   Other Stroke 01.9 Etiologic diagnosis/description:   S06.5XAA: Sudural Hematoma   Date of onset:  6/3/2025 Date of surgery: N/A   Allergies: Patient has no known allergies.   Comorbid condition: HTN, HLD, BPH, CAD, Valvular Heart Disease, CHF Class II, Occlusion of right iliac artery, Urinary Obstruction, Anemia, Acute bronchitis, Subdural hematoma   Medical/functional conditions  requiring inpatient rehabilitation:     This patient requires medical management/24-hour nursing of complex co morbidities HTN, HLD, BPH, CAD, Valvular Heart Disease, CHF Class II, Occlusion of right iliac artery, Urinary Obstruction, Anemia, Acute bronchitis, Subdural hematoma, labs, medications (see medications list), pain, sleep hygiene, anticoagulation, nutrition, hydration, neurological, pulmonary, cardiac status, and preventive healthcare.    This patient requires intense therapy and an integrated, interdisciplinary approach to address safety, impaired mobility, impaired ADLs (dressing, toileting, grooming, showering), impaired cognition, judgment, and memory, communication, pulmonary insufficiency, bowel/bladder problems,preventive healthcare, medication management, integration of functional skills into daily living, and home caregiver support and training.    Risk for medical/clinical complications:     This patient is at risk for the following complications: DVT/PE, pneumonia, malnutrition, neurological decline, respiratory insufficiency, worsening activity intolerance, complications from discontinuation of anticoagulation,  skin breakdown, inadequate sleep, recurring stroke, and constipation.     SPECIAL REHABILITATION NEEDS     IV: PIV Skin: Venous stasis ulcers and Other special needs: willams catheter and pace maker to left chest wall        Peripheral IV - Single Lumen 05/30/25 0841 20 G Left Antecubital (Active)   Site Assessment Clean;Dry;Intact 06/05/25 0800   Line Securement Device Antimicrobial Adhesive 06/04/25 1908   Extremity Assessment Distal to IV No warmth;No swelling;No redness;No abnormal discoloration 06/04/25 1908   Line Status Saline locked 06/04/25 1908   Dressing Status Clean;Dry;Intact 06/05/25 0800   Dressing Intervention Integrity maintained 06/05/25 0800   Dressing Change Due 06/03/25 06/04/25 1105   Site Change Due 06/03/25 06/04/25 0730   Reason Not Rotated Not due 06/04/25 1908  "         PRECAUTIONS     Cardiac precautions: hx of CHF, Diet: cardiac diet, Safety/fall precautions: Recent history of falls, High fall risk, Do not leave alone in the bathroom, and Cognitive impairment, and Skin: venous stasis ulcers     PAST MEDICAL, SOCIAL, FAMILY HISTORY     Pertinent past medical history:   Past Medical History:   Diagnosis Date    Aortic valve stenosis     Arthritis     BPH (benign prostatic hyperplasia)     Carotid artery stenosis     Chronic diastolic heart failure     ETOH abuse     Exposure to COVID-19 virus 01/07/2022    Hyperchloremia     Hypertension     Kidney stones     Murmur     Pacemaker     Polymyalgia rheumatica     PVD (peripheral vascular disease)     Renal artery stenosis       Has the patient had two or more falls in the past year or any fall with injury in the past year: yes   Has the patient had major surgery during the 100 days prior to admission: No   Family Medical History:   Family History   Problem Relation Name Age of Onset    Cancer Mother      Stroke Father        Substance use history:   Social History     Substance and Sexual Activity   Alcohol Use Yes    Alcohol/week: 3.0 standard drinks of alcohol    Types: 3 Shots of liquor per week    Comment: 1.5 oz a day     Tobacco Use History[1]  Social History     Substance and Sexual Activity   Drug Use Never      VITALS   BP:  (!) 113/57     Temp: 97.5 °F (36.4 °C)     HR: 68     Resp: 18     SpO2: (!) 93 %     Height: 5' 9" (1.753 m)     Weight: 78.1 kg (172 lb 2.9 oz)     BMI: 25.4          MED/LABS/DIAGNOSITICS   Current Medications[2]   Pertinent lab results:   Lab Results   Component Value Date    WBC 5.97 06/05/2025    HGB 10.7 (L) 06/05/2025    HCT 33.6 (L) 06/05/2025     06/05/2025     06/05/2025    K 4.2 06/05/2025    BUN 15 06/05/2025    CO2 24 06/05/2025     06/05/2025      Pertinent diagnostic studies:    Additional labs and diagnostic studies required prior to admission:      QI: GG Care " Tool     QI: GG Care Tool  Therapy Evaluation   Swallowing/  Nutritional Status   Diet/Feeding/  Swallowing Supervision   Eating       Oral Hygiene   Grooming Supervision   Toileting Hygiene       Shower/Bathe   Bathing Mod A   Upper Body Dressing   Dressing Upper Min A   Lower Body Dressing   Dressing Lower Max A   Putting On/Taking Off Footwear       Toilet Transfer   Toileting Mod A   Bladder Continence Status   Bladder Bolton catheter   Bowel Continence Status   Bowel Incontinent   Roll Left and Right   Bed Mobility Patient completed Rolling/Turning to Left with  stand by assistance  Patient completed Rolling/Turning to Right with stand by assistance  Patient completed Scooting/Bridging with contact guard assistance  Patient completed Supine to Sit with contact guard assistance  Patient completed Sit to Supine with contact guard assistance    Sit to Lying       Lying to Sitting on Side of Bed       Sit to Stand       Chair/Bed-to- Chair   Transfers Sit to Stand: stand by assistance with rolling walker and with cues for hand placement and foot placement     Car Transfer       Walk 10 Feet   Equipment RW  Gait belt     Walk 50 Feet with Two Turns   Balance Sitting: supervision  Standing: stand by assistance   Walk 150 Feet   Endurance Impaired   Walk 10 Feet on Uneven Surfaces   Gait Functional Mobility: Pt ambulated 139' between surfaces requiring CGA utilizing RW.    Picking up an Object       Wheel 50 Feet with Two Turns   Wheelchair Not attempted   Wheel 150 Feet       1 Step (Curb)   Stairs Not attempted   4 Steps       12 Steps       Expression of Ideas and Wants   Communication Clear/fluent   Understanding  Verbal and Non-Verbal Content       Cognitive Patterns   Cognition AAO x 3  Confused at times      Safety Precautions Standard  Fall      Lower Extremity      Strength Right: WFL  Left: WFL      ROM Right: WFL  Left: WFL      Upper Extremity      Strength Right: 3+ to 4-/5   Left: 3+ to 4-/5       ROM  "Right: WFL  Left: WFL     Care Score Value Definitions  6: Independent. [place] provides no assistance with tasks. A device may or may not have been used.  5: Set-up or clean-up assistance. [place] sets up or cleans up, but does not assist with tasks. [place] may have assisted prior to or following the activity.  4: Supervision or touching assistance. [place] provides verbal cues or touching/steadying or contact guard assistance. Assistance may be provided throughout the activity or intermittently.  3: Partial/moderate assistance. [place] does less than half the effort. [place] lifts, holds, or supports trunk or limbs, but provides less than half the effort.  2: Substantial/maximal assistance. [place] does more than half the effort. [place] lifts or holds trunk or limbs, and provides more than half the effort.  1: Dependent. [place] does all of the effort, or the assistance of two or more helpers is required for the patient to complete the activity.  Care Score Activity Not Attempted Value Definitions  7: Patient refused.  9: Not applicable. Not attempted and the patient did not perform this activity prior to the current illness, exacerbation, or injury.  10: Not attempted due to environmental limitations (e.g., lack of equipment, weather constraints).  88: Not attempted due to medical condition or safety concerns.    DISCHARGE GOALS/ANTICPATED INTERVENTIONS/SERVICES     Expected Level of Improvement for Safe Discharge:  Patient/caregivers anticipate discharge home at or near baseline level of functioning and/or decreased burden of care.    Patient/Family/Caregiver Goals: "Return to how I was before and be able to go back home with my wife."   Required Treatments/Services: Rehabilitation Nursing, Dietitian/nutrition, and Case Management   Required Therapy Therapy Type Min/Day Days/Week Duration of Therapy   Physical Therapy 90 5 10 - 14 days   Occupational Therapy 90 5 10 - 14 days   Speech and Language Pathology 30 5 10 - " 14 days   Prosthetic/Orthotics      Recreational Therapy      Anticipated Services upon Discharge:  Home health   Additional rehabilitation needs: Dietary needs: Education on cardiac diet   Expected Discharge Destination:  Home   Barriers to Discharge: Requires caregiver assist   Discharge Support: Patient has a caregiver available, Discharge plan has been verified with patient's caregiver, and Caregiver is in agreement with the discharge plan   Patient/Family/Caregiver Orientation: Patient/family/caregiver oriented and agreeable to inpatient rehabilitation plan   Estimated Length of Stay: 10 - 14 days   Projected Admission Date: 06/05/2025   Medical Prognosis: good   Physicians Review and Admission Determination:  Patient is seen and examined pre screen reviewed and chart updated, patient is an excellent candidate for further recovery on our inpatient rehab unit.  Patient meets current Medicare criteria as he needs close physician oversight and all modalities of therapy to aid in his recovery and transition back home with his wife.            [1]   Social History  Tobacco Use   Smoking Status Former    Types: Cigars    Passive exposure: Past   Smokeless Tobacco Never   [2]   Current Facility-Administered Medications   Medication Dose Route Frequency Provider Last Rate Last Admin    acetaminophen tablet 650 mg  650 mg Oral Q6H PRN Luis Enrique Guzman Jr., MD   650 mg at 06/05/25 0156    amLODIPine tablet 5 mg  5 mg Oral Daily Dave Reyes III, MD   5 mg at 06/05/25 0915    atorvastatin tablet 20 mg  20 mg Oral Daily Dave Reyes III, MD   20 mg at 06/05/25 0915    cefTRIAXone injection 1 g  1 g Intravenous Q24H Dave Reyes III, MD   1 g at 06/05/25 0915    fluconazole (DIFLUCAN) IVPB 200 mg  200 mg Intravenous Q24H Dave Reyes III, MD   Stopped at 06/04/25 2052    melatonin tablet 6 mg  6 mg Oral Nightly PRN Dave Reyes III, MD   6 mg at 06/04/25 1952    sodium chloride 0.9% flush 10 mL  10 mL  Intravenous PRN Dave Reyes III, MD

## 2025-06-05 NOTE — DISCHARGE SUMMARY
"[facility] Medicine  Discharge Summary      Patient Name: Tobi Liz Jr.  MRN: [id number]  RENATO: [id number]  Patient Class: IP- Inpatient  Admission Date: 6/3/2025  Hospital Length of Stay: 2 days  Discharge Date and Time: 06/05/2025 12:03 PM  Attending Physician: Luis Enrique Guzman Jr., MD   Discharging Provider: TALHA Luna  Primary Care Provider: Luis Enrique Guzman Jr., MD    Primary Care Team: Networked reference to record PCT     HPI:   Principal Problem:Subdural hematoma     Chief Complaint:       Chief Complaint   Patient presents with    Weakness         HPI:       History of present illness:      Tobi Liz Jr. is a 89 y.o. male  has a past medical history of Aortic valve stenosis, Arthritis, BPH (benign prostatic hyperplasia), Carotid artery stenosis, Chronic diastolic heart failure, ETOH abuse, Exposure to COVID-19 virus (01/07/2022), Hyperchloremia, Hypertension, Kidney stones, Murmur, Pacemaker, Polymyalgia rheumatica, PVD (peripheral vascular disease), and Renal artery stenosis. presenting with Altered Mental Status (Pt to ED from [facility] via POV - family stated that pt was "walking halls last night" - with increased confusion - removed willams cath. Concerned for UTI.      Family reports he is more to his baseline this am.  Pt has been aggravated and annoyed by his willams catheter and family has noticed that he has had more utis since having catheter.  Urologist has given options for catheter removal; suprapubic cath and continuation of willams.  Family wishes to dc willams while in hospital and see how he does.     Willams removed and pending bladder scan this morning. Labs reviewed and overall stable.      6/3/25 FM:  Patient was seen and examined today after team conference and had a change in his neurological exam.  Patient had word-finding confusion him complained of persistent dizziness and lightheadedness despite having normal vital signs.  We performed a stat " CT a subdural with midline shift I spoke to the patient's primary care team and we are transferring back down to [facility] as he will require a Neurosurgery consult and transfer and we can not complete this on our inpatient rehab unit.  We will notify family is soon as we can get in touch with them.     Patient requires acute inpatient rehab admission with 24-hour nursing and active physician oversight to monitor and manage acute medical comorbid conditions, labs, pain, and functional deficits. Patient/family will also require teaching and integration of improving functional skills into daily living. Patient will also require an individualized, interdisciplinary approach to their care, receiving PT, OT services 3 hours per day, 5 days per week. Required care cannot be provided at a lower level of care. Patient is anticipated to require approximately 10-14 days LOS with expected discharge home  with  services.         Patient seen on inpatient rehab unit to pursue therapy services.  This morning prior to transition to rehab the patient was coherent and answering questions appropriately.  She has chronic dizziness which is his baseline.  Upon assessment by physical therapy he was noted to have worsening word-finding capabilities in addition to his ongoing dizziness.  A CT scan was performed which revealed a subdural hematoma.  He was transition [facility] unit for further evaluation and management.  Neurosurgery/neurology will be consulted to the transfer center for further recommendations.    * No surgery found *      Hospital Course:   6/4/25:  Patient doing well this morning, neurosurgery consult last night and recommendations for continued PT evaluation and if not improving, consider palliative care.  Not a good candidate for surgical intervention at this time.  Labs stable this morning.     6/5/25:  Doing well overnight, no acute changes.  Labs stable overnight.  C/o insomnia and will add seroquel at  night. Discharge to [facility] today.      Goals of Care Treatment Preferences:  Code Status: Full Code      SDOH Screening:  The patient was screened for utility difficulties, food insecurity, transport difficulties, housing insecurity, and interpersonal safety and there were no concerns identified this admission.     Consults:   Consults (From admission, onward)          Status Ordering Provider     Inpatient consult to Social Work/Case Management  Once        Provider:  (Not yet assigned)    Acknowledged ARPAN AMANDA     Inpatient Consult to Neurology Services (General Neurology)  Once        Provider:  (Not yet assigned)    Acknowledged ARPAN AMANDA            Assessment & Plan  Hypertension  Patient's blood pressure range in the last 24 hours was: BP  Min: 113/57  Max: 169/72.The patient's inpatient anti-hypertensive regimen is listed below:  Current Antihypertensives  amLODIPine tablet 5 mg, Daily, Oral    Plan  - BP is controlled, no changes needed to their regimen    Dizziness  This has a chronic ongoing issue likely exacerbated by subdural.    6/4/25:  Poor surgical candidate per neurosurgery evaluation.  Continue PT/OT   Subdural hematoma  Patient's hemorrhage is due to unknown likely trauma, this bleeding is associated with an antiplatelet agent, the antiplatelet agent is Select Antiplatelet Agent(s): Aspirin and Clopidogrel. Patients most recent Hgb, Hct, platelets, and INR are listed below.  Recent Labs     06/04/25  0340 06/04/25  0822 06/05/25  0620   HGB 10.8* 11.0* 10.7*   HCT 34.0* 35.1* 33.6*    265 240     Plan  - Will trend hemoglobin/hematocrit Daily  - Will monitor and correct any coagulation defects  - Will transfuse if Hgb is <7g/dl (<8g/dl in cases of active ACS) or if patient has rapid bleeding leading to hemodynamic instability  - consulted transfer center for recommendations.  The patient may require neurosurgery evaluation.  For now will hold antiplatelets and anticoagulants.  Family  has been updated.    6/4/25:  Continue to hold antiplatelet/anticoagulants.  No indication for intervention at this time.   Myopathy  6/5/25:  D/c to [facility] today     Final Active Diagnoses:    Diagnosis Date Noted POA    PRINCIPAL PROBLEM:  Subdural hematoma [S06.5XAA] 06/03/2025 Yes    Myopathy [G72.9] 03/13/2025 Yes    Hypertension [I10] 05/26/2021 Yes    Dizziness [R42] 05/26/2021 Yes      Problems Resolved During this Admission:       Discharged Condition: good    Disposition: Rehab Facility    Follow Up:    Patient Instructions:   No discharge procedures on file.    Significant Diagnostic Studies: Labs: All labs within the past 24 hours have been reviewed    Pending Diagnostic Studies:       None           Medications:  Transfer Medications (for Discharge Readmit only): Current Medications[1]    Indwelling Lines/Drains at time of discharge:   Lines/Drains/Airways       Drain  Duration                  Urethral Catheter 06/02/25 1015 Coude 18 Fr. 3 days                    Time spent on the discharge of patient: 30 minutes         TALHA Luna  Department of Hospital Medicine  [facility]       [1]   Current Facility-Administered Medications   Medication Dose Route Frequency Provider Last Rate Last Admin    acetaminophen tablet 650 mg  650 mg Oral Q6H PRN Luis Enrique Guzman Jr., MD   650 mg at 06/05/25 0156    amLODIPine tablet 5 mg  5 mg Oral Daily Dave Reyes III, MD   5 mg at 06/05/25 0915    atorvastatin tablet 20 mg  20 mg Oral Daily Dave Reyes III, MD   20 mg at 06/05/25 0915    cefTRIAXone injection 1 g  1 g Intravenous Q24H Dave Reyes III, MD   1 g at 06/05/25 0915    fluconazole (DIFLUCAN) IVPB 200 mg  200 mg Intravenous Q24H Dave Reyes III, MD   Stopped at 06/04/25 2052    melatonin tablet 6 mg  6 mg Oral Nightly PRN Dave Reyes III, MD   6 mg at 06/04/25 1952    QUEtiapine tablet 25 mg  25 mg Oral Nightly Janessa Santos FNP        sodium chloride 0.9% flush 10 mL   10 mL Intravenous PRN Dave Reyes III, MD

## 2025-06-05 NOTE — PT/OT/SLP DISCHARGE
Physical Therapy Discharge Summary    Name: Tobi Liz Jr.  MRN: [id number]   Principal Problem: Subdural hematoma     Patient Discharged from acute Physical Therapy on 2025 .  Please refer to prior PT note dated  2025  for functional status.     Assessment:     Discharge to in-patient rehab unit per MD     Objective:     GOALS:   Multidisciplinary Problems       Physical Therapy Goals          Problem: Physical Therapy    Goal Priority Disciplines Outcome Interventions   Physical Therapy Goal     PT, PT/OT Adequate for Care Transition    Description: Goals to be met by: 2025     Patient will increase functional independence with mobility by performin. Supine to sit with Supervision or Set-up Assistance.  2. Sit to supine with Supervision or Set-up Assistance.  3. Bed to chair transfer with Supervision or Set-up Assistance with rolling walker using Step Transfer technique.  4. Sit to Stand with Supervision or Set-up Assistance with rolling walker.  5. Gait  x 500  feet with Supervision or Set-up Assistance with rolling walker.  6. Lower extremity exercise program x10 reps, with assistance as needed.                          Reasons for Discontinuation of Therapy Services  Transfer to alternate level of care.      Plan:     Patient Discharged to: Inpatient Rehab per MD .      2025

## 2025-06-05 NOTE — PLAN OF CARE
[place] [facility]  Discharge Final Note    Primary Care Provider: Luis Enrique Guzman Jr., MD    Expected Discharge Date: 6/5/2025    Final Discharge Note (most recent)       Final Note - 06/05/25 1251          Final Note    Anticipated Discharge Disposition Planned Readmission - Rehab Facility     Hospital Resources/Appts/Education Provided Provided education on problems/symptoms using teachback        Post-Acute Status    Post-Acute Authorization Other   Placement Clarification    Post-Acute Placement Status Set-up Complete/Auth obtained   The patient stated that he was worried that he may not be accepted due to having dizziness; patient was reassured he was accepted.    Other Status See Comments   Patient stated he is now ready for IP Rehab; reassured him.    Discharge Delays None known at this time                     Important Message from Medicare           Final Discharge Note Completed: Patient was informed by this counselor that his dizziness will not be a hindrance at this time. He stated that he believes six month ago it was a hindrance when he came here; patient was again reassured that he will soon be in Rehab and he again thanked this counselor.

## 2025-06-05 NOTE — PLAN OF CARE
[facility]  Discharge Final Note    Primary Care Provider: Luis Enrique Guzman Jr., MD    Expected Discharge Date: 6/5/2025    Final Discharge Note (most recent)       Final Note - 06/05/25 1218          Final Note    Assessment Type Final Discharge Note     Anticipated Discharge Disposition Rehab Facility        Post-Acute Status    Post-Acute Authorization Placement     Post-Acute Placement Status Set-up Complete/Auth obtained     Discharge Delays None known at this time                     Important Message from Medicare         Patient accepted and will be discharging to [facility]. Notified primary nurseRylee to call report.

## 2025-06-05 NOTE — PT/OT/SLP PROGRESS
"Physical Therapy Treatment    Patient Name:  Tobi Liz Jr.   MRN:  [id number]    Recommendations:     Discharge Recommendations: Low Intensity Therapy  Discharge Equipment Recommendations: none  Barriers to discharge: None    Assessment:     Tobi Liz Jr. is a 89 y.o. male admitted with a medical diagnosis of Subdural hematoma.  He presents with the following impairments/functional limitations: weakness, impaired joint extensibility, impaired endurance, impaired cognition, impaired muscle length, impaired self care skills, impaired functional mobility, decreased lower extremity function, decreased upper extremity function, gait instability, impaired balance, and  impaired cardiopulmonary response to activity .  Patient was received supine in bed with family at bedside, still c/o dizziness.  He required SBA with supine to sit, SBA with sit <> stand using a RW and ambulated ~383 feet with RW with SBA, left sitting on the recliner chair to eat breakfast with family present.     Rehab Prognosis: Fair; patient would benefit from acute skilled PT services to address these deficits and reach maximum level of function.    Recent Surgery: * No surgery found *      Plan:     During this hospitalization, patient to be seen  (3-5x a week) to address the identified rehab impairments via gait training, therapeutic activities, therapeutic exercises, neuromuscular re-education and progress toward the following goals:    Plan of Care Expires:  06/13/25    Subjective     Chief Complaint: "I am still dizzy."   Patient/Family Comments/goals: unstated   Pain/Comfort:  Pain Rating 1: 0/10  Pain Rating Post-Intervention 1: 0/10      Objective:     Communicated with nurse and patient and family  prior to session.  Patient found supine with peripheral IV, willams catheter, telemetry upon PT entry to room.     General Precautions: Standard, fall  Orthopedic Precautions: N/A  Braces: N/A  Respiratory Status: Room air   "   Functional Mobility:  Bed Mobility:     Rolling Left:  stand by assistance  Rolling Right: stand by assistance  Scooting: stand by assistance  Bridging: stand by assistance  Supine to Sit: stand by assistance  Transfers:     Sit to Stand:  stand by assistance with rolling walker  Gait:  ~383 feet with RW with SBA, l  Balance: set up with static sitting, SBA with static standing using a RW       AM-PAC 6 CLICK MOBILITY  Turning over in bed (including adjusting bedclothes, sheets and blankets)?: 3  Sitting down on and standing up from a chair with arms (e.g., wheelchair, bedside commode, etc.): 3  Moving from lying on back to sitting on the side of the bed?: 3  Moving to and from a bed to a chair (including a wheelchair)?: 3  Need to walk in hospital room?: 3  Climbing 3-5 steps with a railing?: 3  Basic Mobility Total Score: 18       Treatment & Education:  Rolling side <> side  Supine > sit  Scooting to the edge  of the bed   Sitting balance/tolerance  Sit <> stand with RW  Standing balance/tolerance   Out of bed  Line management/organization   Continuous verbal cues for situational/environmental awareness       Seated   Both Lower Extremity   Active ROM Sets / Reps / Resistance / Etc.   Hip Flexion 1 x 10    LAQ 1 x 10    Ankle DF/PF                1 x 10         PT discussed the  importance of patient's performance of Home Exercise Program (HEP)  and out of bed with patient verbalizing understanding.      Patient left up in chair with all lines intact, call button in reach, nurse  notified, and family  present..    GOALS:   Multidisciplinary Problems       Physical Therapy Goals          Problem: Physical Therapy    Goal Priority Disciplines Outcome Interventions   Physical Therapy Goal     PT, PT/OT Adequate for Care Transition    Description: Goals to be met by: 2025     Patient will increase functional independence with mobility by performin. Supine to sit with Supervision or Set-up  Assistance.  2. Sit to supine with Supervision or Set-up Assistance.  3. Bed to chair transfer with Supervision or Set-up Assistance with rolling walker using Step Transfer technique.  4. Sit to Stand with Supervision or Set-up Assistance with rolling walker.  5. Gait  x 500  feet with Supervision or Set-up Assistance with rolling walker.  6. Lower extremity exercise program x10 reps, with assistance as needed.                          DME Justifications:  No DME recommended requiring DME justifications    Time Tracking:     PT Received On: 06/05/25  PT Start Time: 0810     PT Stop Time: 0833  PT Total Time (min): 23 min     Billable Minutes: Gait Training 15 and Therapeutic Activity 8    Treatment Type: Treatment  PT/PTA: PT     Number of PTA visits since last PT visit: 0     06/05/2025

## 2025-06-05 NOTE — PLAN OF CARE
Problem: Physical Therapy  Goal: Physical Therapy Goal  Description: Goals to be met by: 2025     Patient will increase functional independence with mobility by performin. Supine to sit with Supervision or Set-up Assistance.  2. Sit to supine with Supervision or Set-up Assistance.  3. Bed to chair transfer with Supervision or Set-up Assistance with rolling walker using Step Transfer technique.  4. Sit to Stand with Supervision or Set-up Assistance with rolling walker.  5. Gait  x 500  feet with Supervision or Set-up Assistance with rolling walker.  6. Lower extremity exercise program x10 reps, with assistance as needed.     Outcome: discharge to in-patient rehab unit per MD

## 2025-06-05 NOTE — ASSESSMENT & PLAN NOTE
Patient's blood pressure range in the last 24 hours was: BP  Min: 113/57  Max: 169/72.The patient's inpatient anti-hypertensive regimen is listed below:  Current Antihypertensives  amLODIPine tablet 5 mg, Daily, Oral    Plan  - BP is controlled, no changes needed to their regimen

## 2025-06-05 NOTE — PT/OT/SLP PROGRESS
Occupational Therapy   Treatment    Name: Tobi Liz Jr.  MRN: [id number]  Admitting Diagnosis:  Subdural hematoma       Recommendations:     Discharge Recommendations:  (To be further determined based on progress prior to discharge.)  Discharge Equipment Recommendations:  none  Barriers to discharge:  Other (Comment) (Medical and functional status)    Assessment:     Tobi Liz Jr. is a 89 y.o. male with a medical diagnosis of Subdural hematoma.  He presents with decreased visual disturbances, but increased dizziness with activity on this date. Although, blood pressure 113/57 at rest and 115/65 with activity. Performance deficits affecting function are weakness, impaired endurance, impaired self care skills, impaired functional mobility, gait instability, impaired balance, visual deficits, impaired cognition, decreased upper extremity function, decreased coordination, decreased lower extremity function, decreased safety awareness, decreased ROM, impaired cardiopulmonary response to activity, impaired joint extensibility, impaired muscle length.     Rehab Prognosis:  Good and Fair; patient would benefit from acute skilled OT services to address these deficits and reach maximum level of function.       Plan:     Patient to be seen 3 x/week (3-5x/week) to address the above listed problems via self-care/home management, therapeutic activities, therapeutic exercises, neuromuscular re-education, cognitive retraining  Plan of Care Expires: 06/13/25  Plan of Care Reviewed with: patient, family    Subjective     Chief Complaint: dizziness and weakness  Patient/Family Comments/goals: Pt would like to regain independence with ADL's including functional mobility in order to safely return home to [facility] with least amount of financial burden due to assistance.   Pain/Comfort:  Pain Rating 1: 0/10  Pain Rating Post-Intervention 1: 0/10    Objective:     Communicated with: nurse prior to session.  Patient found  up in chair with telemetry, peripheral IV, willams catheter, blood pressure cuff upon OT entry to room.    General Precautions: Standard, fall    Orthopedic Precautions:N/A  Braces: N/A  Respiratory Status: Room air     Occupational Performance:     Bed Mobility:    Pt did not perform on this date.     Functional Mobility/Transfers:  Patient completed Sit <> Stand Transfer with contact guard assistance  with  rolling walker   Patient completed Toilet Transfer Step Transfer technique with minimum assistance with  rolling walker and grab bars  Functional Mobility: Pt ambulated 168' between surfaces requiring steadying assist utilizing RW.     Activities of Daily Living:  Toileting: moderate assistance secondary to assist with clothing management      [facility] 6 Click ADL:      Treatment & Education:  Pt was cooperative and motivated with minimal verbal encouragement while reporting increased dizziness when compared to previous day. He participated in functional transfer retraining to / from toilet providing extra time with repetition requiring min assist secondary to some lifting assist and steadying from lower surface of toilet with additional use of grab bar. Pt then participated in ADL retraining regarding toileting emphasizing fall prevention providing extra time requiring mod assist secondary to assist to pull / adjust clothing to / from waist with steadying, and verbal and tactile cueing for safety awareness and technique. Pt ambulated 168' between surfaces requiring steadying assist utilizing RW.    Patient left up in chair with all lines intact, call button in reach, and nurse notified    GOALS:   Multidisciplinary Problems       Occupational Therapy Goals          Problem: Occupational Therapy    Goal Priority Disciplines Outcome Interventions   Occupational Therapy Goal     OT, PT/OT Progressing    Description: Goals to be met by: 06/13/25     Patient will increase functional independence with ADLs by  performing:    Feeding with Modified [place].  UE Dressing with Set-up Assistance.  LE Dressing with Minimal Assistance.  Grooming while standing at sink with Set-up Assistance.  Toileting from toilet with Supervision for hygiene and clothing management.   Bathing from  shower chair/bench with Stand-by Assistance.  Toilet transfer to toilet with Supervision.                         DME Justifications:  No DME recommended requiring DME justifications    Time Tracking:     OT Date of Treatment: 06/05/25  OT Start Time: 1123  OT Stop Time: 1155  OT Total Time (min): 32 min    Billable Minutes: ADL Training 17 and Therapeutic Activity 15    OT/JOHNATHON: OT          6/5/2025

## 2025-06-05 NOTE — ASSESSMENT & PLAN NOTE
Patient's hemorrhage is due to unknown likely trauma, this bleeding is associated with an antiplatelet agent, the antiplatelet agent is Select Antiplatelet Agent(s): Aspirin and Clopidogrel. Patients most recent Hgb, Hct, platelets, and INR are listed below.  Recent Labs     06/04/25  0340 06/04/25  0822 06/05/25  0620   HGB 10.8* 11.0* 10.7*   HCT 34.0* 35.1* 33.6*    265 240     Plan  - Will trend hemoglobin/hematocrit Daily  - Will monitor and correct any coagulation defects  - Will transfuse if Hgb is <7g/dl (<8g/dl in cases of active ACS) or if patient has rapid bleeding leading to hemodynamic instability  - consulted transfer center for recommendations.  The patient may require neurosurgery evaluation.  For now will hold antiplatelets and anticoagulants.  Family has been updated.    6/4/25:  Continue to hold antiplatelet/anticoagulants.  No indication for intervention at this time.

## 2025-06-05 NOTE — PLAN OF CARE
Problem: Occupational Therapy  Goal: Occupational Therapy Goal  Description: Long Term Goals to be met by: 06/26/25     Patient will increase functional independence with ADLs by performing:    Feeding with Modified [place].  UE Dressing with Set-up Assistance.  LE Dressing with Supervision.  Grooming while seated at sink with Modified [place].  Toileting from toilet with Set-up Assistance for hygiene and clothing management.   Bathing from  shower chair/bench with Stand-by Assistance.  Toilet transfer to toilet with Set-up Assistance.  Increased functional strength to 4+/5 for bilateral UE's.    Outcome: Established OT POC

## 2025-06-05 NOTE — PLAN OF CARE
Problem: Occupational Therapy  Goal: Occupational Therapy Goal  Description: Goals to be met by: 06/13/25     Patient will increase functional independence with ADLs by performing:    Feeding with Modified [place].  UE Dressing with Set-up Assistance.  LE Dressing with Minimal Assistance.  Grooming while standing at sink with Set-up Assistance.  Toileting from toilet with Supervision for hygiene and clothing management.   Bathing from  shower chair/bench with Stand-by Assistance.  Toilet transfer to toilet with Supervision.    Outcome: Progressing

## 2025-06-05 NOTE — PROGRESS NOTES
Pharmacist Intervention IV to PO Note    Tobi Liz Jr. is a 89 y.o. male being treated with IV medication fluconazole    Patient Data:    Vital Signs (Most Recent):  Temp: 97.5 °F (36.4 °C) (06/05/25 0758)  Pulse: 68 (06/05/25 0758)  Resp: 18 (06/05/25 0758)  BP: (!) 113/57 (06/05/25 0930)  SpO2: (!) 93 % (06/05/25 0758) Vital Signs (72h Range):  Temp:  [96.9 °F (36.1 °C)-98.8 °F (37.1 °C)]   Pulse:  [60-85]   Resp:  [9-31]   BP: (109-173)/(53-92)   SpO2:  [86 %-100 %]      CBC:  Recent Labs   Lab 06/04/25  0340 06/04/25  0822 06/05/25  0620   WBC 5.91 5.91 5.97   RBC 3.53* 3.59* 3.47*   HGB 10.8* 11.0* 10.7*   HCT 34.0* 35.1* 33.6*    265 240   MCV 96 98 97   MCH 30.6 30.6 30.8   MCHC 31.8* 31.3* 31.8*     CMP:     Recent Labs   Lab 06/04/25  0340 06/04/25  0822 06/05/25  0620    128* 103   CALCIUM 8.5* 8.7 8.6*   ALBUMIN 3.3* 3.5 3.4*   PROT 6.5 6.8 6.6    139 138   K 3.9 3.9 4.2   CO2 23 24 24    107 108   BUN 14 13 15   CREATININE 0.7 0.7 0.8   ALKPHOS 67 67 72   ALT 9* 9* 11   AST 17 16 17   BILITOT 0.5 0.5 0.4       Dietary Orders:  Diet Orders            Diet Heart Healthy: Heart Healthy starting at 06/03 1621            Based on the following criteria, this patient qualifies for intravenous to oral conversion:  [x] The patients gastrointestinal tract is functioning (tolerating medications via oral or enteral route for 24 hours and tolerating food or enteral feeds for 24 hours).  [x] The patient is hemodynamically stable for 24 hours (heart rate <100 beats per minute, systolic blood pressure >99 mm Hg, and respiratory rate <20 breaths per minute).  [x] The patient shows clinical improvement (afebrile for at least 24 hours and white blood cell count downtrending or normalized). Additionally, the patient must be non-neutropenic (absolute neutrophil count >500 cells/mm3).  [x] For antimicrobials, the patient has received IV therapy for at least 24 hours.    IV medication  fluconazole will be changed to oral medication fluconazole    Pharmacist's Name: Dipika Lee  Pharmacist's Extension: [phone]

## 2025-06-05 NOTE — PT/OT/SLP EVAL
Occupational Therapy Inpatient Rehab Evaluation    Name: Tobi Liz Jr.  MRN: [id number]    Recommendations:     Discharge Recommendations:  Low Intensity Therapy   Discharge Equipment Recommendations: none   Barriers to discharge: Medical and functional status    Assessment:  Tobi Liz Jr. is a 89 y.o. male admitted with a medical diagnosis of <principal problem not specified>.  He presents with the following impairments/functional limitations:  weakness, impaired endurance, impaired self care skills, impaired functional mobility, gait instability, impaired balance, visual deficits, impaired cognition, decreased coordination, decreased upper extremity function, decreased lower extremity function, decreased safety awareness, decreased ROM, pain, impaired fine motor, impaired skin, edema, impaired cardiopulmonary response to activity, impaired joint extensibility, impaired muscle length.    General Precautions: Standard, fall, other (see comments) (Chronic dizziness)     Orthopedic Precautions:N/A     Braces: N/A    Rehab Prognosis: Good and Fair; patient would benefit from acute skilled OT services to address these deficits and reach maximum level of function.      History:     Past Medical History:   Diagnosis Date    Aortic valve stenosis     Arthritis     BPH (benign prostatic hyperplasia)     Carotid artery stenosis     Chronic diastolic heart failure     ETOH abuse     Exposure to COVID-19 virus 01/07/2022    Hyperchloremia     Hypertension     Kidney stones     Murmur     Pacemaker     Polymyalgia rheumatica     PVD (peripheral vascular disease)     Renal artery stenosis        Past Surgical History:   Procedure Laterality Date    A-V CARDIAC PACEMAKER INSERTION N/A 10/06/2023    Procedure: INSERTION, CARDIAC PACEMAKER, DUAL CHAMBER;  Surgeon: Douglas Salguero MD;  Location: [facility] CATH;  Service: Cardiology;  Laterality: N/A;    ANGIOGRAM, CAROTID Left 08/25/2023    Procedure: ANGIOGRAM,  CAROTID;  Surgeon: Nick Box MD;  Location: [facility] CATH;  Service: Cardiology;  Laterality: Left;    CATARACT EXTRACTION, BILATERAL      ECHOCARDIOGRAM,TRANSESOPHAGEAL N/A 10/03/2023    Procedure: Transesophageal echo (JOSE ARMANDO) intra-procedure log documentation;  Surgeon: Nick Box MD;  Location: [facility] OR;  Service: Cardiology;  Laterality: N/A;    HAND SURGERY      INSERTION OF STENT INTO PERIPHERAL VESSEL N/A 01/30/2025    Procedure: INSERTION, STENT, VESSEL, PERIPHERAL;  Surgeon: Harsha Salcedo MD;  Location: [facility] CATH;  Service: Cardiology;  Laterality: N/A;    PERCUTANEOUS TRANSCATHETER AORTIC VALVE REPLACEMENT (TAVR) Left 10/03/2023    Procedure: REPLACEMENT, AORTIC VALVE, PERCUTANEOUS, TRANSCATHETER;  Surgeon: Nick Box MD;  Location: [facility] OR;  Service: Cardiology;  Laterality: Left;    PERCUTANEOUS TRANSCATHETER AORTIC VALVE REPLACEMENT (TAVR) Left 10/03/2023    Procedure: REPLACEMENT, AORTIC VALVE, PERCUTANEOUS, TRANSCATHETER carotid access;  Surgeon: Jun Brito MD;  Location: [facility] OR;  Service: Cardiovascular;  Laterality: Left;    PERCUTANEOUS TRANSLUMINAL ANGIOPLASTY N/A 08/25/2023    Procedure: ANGIOPLASTY-PERCUTANEOUS TRANSLUMINAL (PTA);  Surgeon: Nick Box MD;  Location: [facility] CATH;  Service: Cardiology;  Laterality: N/A;    PERCUTANEOUS TRANSLUMINAL ANGIOPLASTY (PTA) OF PERIPHERAL VESSEL Left 04/10/2025    Procedure: PTA, PERIPHERAL VESSEL;  Surgeon: Harsha Salcedo MD;  Location: [facility] CATH;  Service: Cardiology;  Laterality: Left;  L LE intervention via left antegrade approach Harsha Salcedo at [facility] in Hybrid OR under MAC  *PACEMAKER*    SHOULDER SURGERY Left     total shoulder replacement    WOUND EXPLORATION N/A 10/03/2023    Procedure: EXPLORATION, WOUND;  Surgeon: Jun Brito MD;  Location: [facility] OR;  Service: Cardiology;  Laterality: N/A;       Subjective     Orientation: Oriented x4    Chief Complaint: dizziness and weakness    Patient/Family Comments/goals:  Pt would like to regain independence with ADL's including functional mobility in order to safely return home to [facility] with least amount of financial burden due to assistance.     Vitals  Vitals at Rest  BP  136/57   HR  71   O2 Sat  100   Pain Pain Rating 1: 0/10  Pain Rating Post-Intervention 1: 0/10     Vitals With Activity  BP     HR     O2 Sat     Pain Pain Rating 1: 0/10  Pain Rating Post-Intervention 1: 0/10     Respiratory Status: Room air    Patients cultural, spiritual, Pentecostalism conflicts given the current situation: no       Living Environment   Living Environment  People in Home: facility resident (Pt and his spouse live at [facility]  Home Accessibility: wheelchair accessible  Stair Railings at Home: none  Home Layout: Able to live on 1st floor  Transportation Anticipated: family or friend will provide, other (see comments) (Facility Transportation)  Living Environment Comment: Pt lives with spouse at assistive living facility in which he receives supervision / setup assistance with bathing due to cognitive deficits and advanced age.  Equipment Currently Used at Home: wheelchair, walker, rolling, grab bar, shower chair  Shower Setup: Walk-in shower    Prior Level of Function  BADL: Supervision or Set-up Assistance    IADL: Activity did not occur    Equipment used at home: wheelchair, walker, rolling, grab bar, shower chair.  DME owned (not currently used): wheelchair.      Upon discharge, patient will have assistance from spouse and [facility].    Objective:     Patient found up in chair with willams catheter  upon OT entry to room.    Mobility   Patient completed:  Sit to Stand Transfer with minimum assistance with rolling walker  Bed to Chair Transfer using Step Transfer technique with minimum assistance with rolling walker  Toilet Transfer Step Transfer technique with minimum assistance with  grab bars  Shower Transfer Step Transfer technique with minimum assistance with grab bars and shower  chair.    Functional Mobility   Pt ambulated 131' between surfaces requiring steadying assist utilizing RW.    ADLs     Current Status   Eating 4   Oral Hygiene 4   Shower, Bathe Self 3   Upper Body Dressing 3   Lower Body Dressing 2   Toileting Hygiene 3   Toilet Transfer 3   Putting On, Taking Off Footwear 2     Limiting Factors for ADLs: endurance, limited ROM, balance, weakness, body habitus, visual, perceptual, coordination, cognition, safety awareness, and pain    Exams     ROM: -       WFL    Hand Dominance: Right    ROM Hand  Left Hand: WFL  Right Hand: WFL    Strength  Overall Strength: -       Deficits, Left Upper    Strength Comment   Shoulder Flexion  3+/5    Shoulder Extension 3+/5    Shoulder Abduction  3+/5    Shoulder Adduction 4-/5    Shoulder External Rotation 3+/5    Shoulder Internal Rotation 4-/5    Elbow Flexion 4-/5    Elbow Extension  4-/5    Forearm Pronation 4-/5    Forearm Supination 4-/5    Wrist Flexion 4-/5    Wrist Extension 4-/5    MP Flexion     MP Extension     Thumb Flexion     Thumb Extension       -       Deficits, Right Upper    Strength Comment   Shoulder Flexion  3+/5    Shoulder Extension 3+/5    Shoulder Abduction  3+/5    Shoulder Adduction 4-/5    Shoulder External Rotation 3+/5    Shoulder Internal Rotation 4-/5    Elbow Flexion 4-/5    Elbow Extension  4-/5    Forearm Pronation 4-/5    Forearm Supination 4-/5    Wrist Flexion 4-/5    Wrist Extension 4-/5    MP Flexion     MP Extension     Thumb Flexion     Thumb Extension          Strength:   Left   Left  Strength Trial 1 Trial 2 Trail 3    Strength 43 42 43       Right   Right  Strength Trial 1 Trial 2 Trail 3    Strength 55 49 45       Sensation  Left: -       WNL  Right: -       WNL    Coordination:   -       Impaired  Left hand, finger to nose  , Right hand, finger to nose  , Left hand, manipulation of objects  , Right hand, manipulation of objects  , and Fine motor coordination tests 9-Hole Peg  Test    Tone  Left: WNL  Right: WNL    Visual/Perceptual  Impaired  acuity    Cognition:   Deficits, Short Term Memory    Balance    Sitting  Sitting Surface: EOB Unsupported  Static: No UE Support, Good (-)  Dynamic: No UE extremity support, Fair    Standing  Static: Bilateral UE Extremity Support, Fair  Dynamic: Bilateral UE extremity support, Poor (+)    Righting Reaction:   Deficits     Comment   Left Protective Reactions Delayed    Right Protective Reactions Delayed    Forward Protective Reactions Delayed    Backward Protective Reactions Delayed    Left Rightign Reactions Delayed    Right Righting Reactions Delayed    Forward Righting Reactions Delayed    Backward Righting Reactions Delayed        Posture/Deviations  Forward head, Rounded shoulders, and kyphosis    Additional Treatments   9-Hole Peg Test: (R) 34.43 seconds; (L) 41.23 seconds    LifeStyle Change and Education     Patient left up in chair with nurse notified.    Education provided: Roles and goals of OT, ADLs, transfer training, bed mobility, body mechanics, assistive device, safety precautions, fall prevention, equipment recommendations, and home safety    Short Term Goals to be met by: 06/16/25     Patient will increase functional independence with ADLs by performing:    Feeding with Set-up Assistance.  UE Dressing with Supervision.  LE Dressing with Moderate Assistance.  Grooming while seated at sink with Set-up Assistance.  Toileting from toilet with Minimal Assistance for hygiene and clothing management.   Bathing from  shower chair/bench with Minimal Assistance.  Toilet transfer to toilet with Contact Guard Assistance.  Increased functional strength to 4- to 4/5 for bilateral UE's.    Long Term Goals to be met by: 06/26/25     Patient will increase functional independence with ADLs by performing:    Feeding with Modified [place].  UE Dressing with Set-up Assistance.  LE Dressing with Supervision.  Grooming while seated at sink with  Modified [place].  Toileting from toilet with Set-up Assistance for hygiene and clothing management.   Bathing from  shower chair/bench with Stand-by Assistance.  Toilet transfer to toilet with Set-up Assistance.  Increased functional strength to 4+/5 for bilateral UE's.    Plan     During this hospitalization, patient to be seen 5 x/week (for 90 min per day, for 21 days) to address the identified rehab impairments via self-care/home management, community/work re-entry, therapeutic activities, therapeutic exercises, neuromuscular re-education, cognitive retraining, wheelchair management/training and progress toward the following goals:    Plan of Care Expires:  06/26/25    Time Tracking     OT Received On: 06/05/25  Time In 1545     Time Out 1715  Total Time 90 min  Therapy Time: OT Individual: 90  Missed Time:    Missed Time Reason:      Billable Minutes: Evaluation 90    06/05/2025

## 2025-06-06 LAB
ABSOLUTE EOSINOPHIL (OHS): 0.5 K/UL
ABSOLUTE MONOCYTE (OHS): 0.66 K/UL (ref 0.3–1)
ABSOLUTE NEUTROPHIL COUNT (OHS): 4.51 K/UL (ref 1.8–7.7)
ALBUMIN SERPL BCP-MCNC: 3.2 G/DL (ref 3.5–5.2)
ALP SERPL-CCNC: 69 UNIT/L (ref 40–150)
ALT SERPL W/O P-5'-P-CCNC: 10 UNIT/L (ref 10–44)
ANION GAP (OHS): 6 MMOL/L (ref 8–16)
AST SERPL-CCNC: 15 UNIT/L (ref 11–45)
BASOPHILS # BLD AUTO: 0.03 K/UL
BASOPHILS NFR BLD AUTO: 0.5 %
BILIRUB SERPL-MCNC: 0.4 MG/DL (ref 0.1–1)
BUN SERPL-MCNC: 14 MG/DL (ref 10–30)
CALCIUM SERPL-MCNC: 8.4 MG/DL (ref 8.7–10.5)
CHLORIDE SERPL-SCNC: 109 MMOL/L (ref 95–110)
CO2 SERPL-SCNC: 24 MMOL/L (ref 23–29)
CREAT SERPL-MCNC: 0.8 MG/DL (ref 0.5–1.4)
ERYTHROCYTE [DISTWIDTH] IN BLOOD BY AUTOMATED COUNT: 14.6 % (ref 11.5–14.5)
GFR SERPLBLD CREATININE-BSD FMLA CKD-EPI: >60 ML/MIN/1.73/M2
GLUCOSE SERPL-MCNC: 98 MG/DL (ref 70–110)
HCT VFR BLD AUTO: 31.6 % (ref 40–54)
HGB BLD-MCNC: 10.1 GM/DL (ref 14–18)
IMM GRANULOCYTES # BLD AUTO: 0.02 K/UL (ref 0–0.04)
IMM GRANULOCYTES NFR BLD AUTO: 0.3 % (ref 0–0.5)
LYMPHOCYTES # BLD AUTO: 0.79 K/UL (ref 1–4.8)
MAGNESIUM SERPL-MCNC: 2 MG/DL (ref 1.6–2.6)
MCH RBC QN AUTO: 31.1 PG (ref 27–31)
MCHC RBC AUTO-ENTMCNC: 32 G/DL (ref 32–36)
MCV RBC AUTO: 97 FL (ref 82–98)
NUCLEATED RBC (/100WBC) (OHS): 0 /100 WBC
PLATELET # BLD AUTO: 224 K/UL (ref 150–450)
PMV BLD AUTO: 8.9 FL (ref 9.2–12.9)
POTASSIUM SERPL-SCNC: 3.8 MMOL/L (ref 3.5–5.1)
PROT SERPL-MCNC: 6.1 GM/DL (ref 6–8.4)
RBC # BLD AUTO: 3.25 M/UL (ref 4.6–6.2)
RELATIVE EOSINOPHIL (OHS): 7.7 %
RELATIVE LYMPHOCYTE (OHS): 12.1 % (ref 18–48)
RELATIVE MONOCYTE (OHS): 10.1 % (ref 4–15)
RELATIVE NEUTROPHIL (OHS): 69.3 % (ref 38–73)
SODIUM SERPL-SCNC: 139 MMOL/L (ref 136–145)
WBC # BLD AUTO: 6.51 K/UL (ref 3.9–12.7)

## 2025-06-06 PROCEDURE — 36415 COLL VENOUS BLD VENIPUNCTURE: CPT | Performed by: INTERNAL MEDICINE

## 2025-06-06 PROCEDURE — 92610 EVALUATE SWALLOWING FUNCTION: CPT

## 2025-06-06 PROCEDURE — 11800000 HC REHAB PRIVATE ROOM

## 2025-06-06 PROCEDURE — 85025 COMPLETE CBC W/AUTO DIFF WBC: CPT | Performed by: INTERNAL MEDICINE

## 2025-06-06 PROCEDURE — 63600175 PHARM REV CODE 636 W HCPCS: Performed by: INTERNAL MEDICINE

## 2025-06-06 PROCEDURE — 97110 THERAPEUTIC EXERCISES: CPT

## 2025-06-06 PROCEDURE — 25000003 PHARM REV CODE 250: Performed by: INTERNAL MEDICINE

## 2025-06-06 PROCEDURE — 63600175 PHARM REV CODE 636 W HCPCS: Performed by: NURSE PRACTITIONER

## 2025-06-06 PROCEDURE — 97161 PT EVAL LOW COMPLEX 20 MIN: CPT

## 2025-06-06 PROCEDURE — 80053 COMPREHEN METABOLIC PANEL: CPT | Performed by: INTERNAL MEDICINE

## 2025-06-06 PROCEDURE — 25000003 PHARM REV CODE 250: Performed by: NURSE PRACTITIONER

## 2025-06-06 PROCEDURE — 92523 SPEECH SOUND LANG COMPREHEN: CPT

## 2025-06-06 PROCEDURE — 97535 SELF CARE MNGMENT TRAINING: CPT

## 2025-06-06 PROCEDURE — 83735 ASSAY OF MAGNESIUM: CPT | Performed by: INTERNAL MEDICINE

## 2025-06-06 RX ADMIN — FLUCONAZOLE 200 MG: 200 TABLET ORAL at 08:06

## 2025-06-06 RX ADMIN — QUETIAPINE FUMARATE 25 MG: 25 TABLET ORAL at 08:06

## 2025-06-06 RX ADMIN — DEXAMETHASONE 4 MG: 4 TABLET ORAL at 08:06

## 2025-06-06 RX ADMIN — MUPIROCIN: 20 OINTMENT TOPICAL at 08:06

## 2025-06-06 RX ADMIN — CEFTRIAXONE SODIUM 1 G: 1 INJECTION, POWDER, FOR SOLUTION INTRAMUSCULAR; INTRAVENOUS at 08:06

## 2025-06-06 RX ADMIN — AMLODIPINE BESYLATE 5 MG: 5 TABLET ORAL at 08:06

## 2025-06-06 RX ADMIN — ATORVASTATIN CALCIUM 20 MG: 20 TABLET, FILM COATED ORAL at 08:06

## 2025-06-06 NOTE — ASSESSMENT & PLAN NOTE
Patient's hemorrhage is due to trauma/laceration, this bleeding is associated with an antiplatelet agent, the antiplatelet agent is Select Antiplatelet Agent(s): Aspirin and Clopidogrel. Patients most recent Hgb, Hct, platelets, and INR are listed below.  Recent Labs     06/04/25  0340 06/04/25  0822 06/05/25  0620   HGB 10.8* 11.0* 10.7*   HCT 34.0* 35.1* 33.6*    265 240     Plan  - Will trend hemoglobin/hematocrit Daily  - Will monitor and correct any coagulation defects  - Will transfuse if Hgb is <7g/dl (<8g/dl in cases of active ACS) or if patient has rapid bleeding leading to hemodynamic instability

## 2025-06-06 NOTE — PLAN OF CARE
Recommendations  1. Rec'd Cardiac diet.   2. Rec'd ONS: Ensure Enlive or Boost Plus TID to provide additional nutrition.   3. Encourage PO intake and compliance with drinking ONS.   4. Rec'd Beneprotein TID.   5. RD to follow and make rec's accordingly.  Goals:   1. Pt will consume > 75% EEN/EPN via PO intake by next RD follow up.  Nutrition Goal Status: new

## 2025-06-06 NOTE — CONSULTS
[facility])  Adult Nutrition  Consult Note    SUMMARY     Recommendations  1. Rec'd Cardiac diet.   2. Rec'd ONS: Ensure Enlive or Boost Plus TID to provide additional nutrition.   3. Encourage PO intake and compliance with drinking ONS.   4. Rec'd Beneprotein TID.   5. RD to follow and make rec's accordingly.  Goals:   1. Pt will consume > 75% EEN/EPN via PO intake by next RD follow up.  Nutrition Goal Status: new  Communication of RD Recs: other (Documented in POC)    Nutrition Discharge Planning     Nutrition Discharge Planning: Therapeutic diet (comments), Oral supplement regimen (comments)  Therapeutic diet (comments): Cardiac Diet  Oral supplement regimen (comments): Ensure Enlive or Boost Plus TID as needed    Assessment and Plan    Nutrition Problem  Inadequate protein energy intake     Related to (etiology):    Increased demand for protein energy    Signs and Symptoms (as evidenced by):    Intake <50% estimated needs, decreased appetite x 1 week    Interventions/Recommendations (treatment strategy):  1. Rec'd Cardiac diet.   2. Rec'd ONS: Ensure Enlive or Boost Plus TID to provide additional nutrition.   3. Encourage PO intake and compliance with drinking ONS.   4. Rec'd Beneprotein TID.   5. RD to follow and make rec's accordingly.    Nutrition Diagnosis Status:   New    Malnutrition Assessment  NFPE not warranted at this time. Pt does not meet positive malnutrition criteria.    Nutrition Related Social Determinants of Health  None identified at this time.    Reason for Assessment    Reason For Assessment: consult  Diagnosis: other (see comments) (Subdural Hematoma)  General Information Comments: RD consulted for new IPR admit. Pt was admitted with a subdural hematoma. Pt has had a decreased appetite with poor PO intake for about 1 week. Pt states his appetite is slowly improving. Pt has increased nutrient needs due to current dx. Rec'd adding ONS to provide addtionalt nutrition and help  "meet increased needs. RD to follow and make rec's accordingly.    Nutrition/Diet History    Nutrition Intake History: Regular /CardiacDiet  Food Preferences: Likes: eggs, turkey carballo, and grits for breakfast everyday  Spiritual, Cultural Beliefs, Catholic Practices, Values that Affect Care: no  Food Allergies: NKFA  Factors Affecting Nutritional Intake: decreased appetite    Anthropometrics    Height: 5' 9" (175.3 cm)  Height (inches): 69 in  Height Method: Stated  Weight: 78.3 kg (172 lb 9.9 oz)  Weight (lb): 172.62 lb  Weight Method: Standard Scale  Ideal Body Weight (IBW), Male: 160 lb  % Ideal Body Weight, Male (lb): 107.89 %  BMI (Calculated): 25.5  BMI Grade: 25 - 29.9 - overweight  Usual Body Weight (UBW), k.11 kg  % Usual Body Weight: 101.76  % Weight Change From Usual Weight: 1.54 %    Lab/Procedures/Meds    Pertinent Labs Reviewed: reviewed  Pertinent Medications Reviewed: reviewed    Estimated/Assessed Needs    Weight Used For Calorie Calculations: 78.3 kg (172 lb 9.9 oz)  Energy Calorie Requirements (kcal): 7461-4313 (25-30 kcal/kg)  Energy Need Method: Kcal/kg  Protein Requirements:  (1.2-2.0g/kg)  Weight Used For Protein Calculations: 78.1 kg (172 lb 2.9 oz)  Fluid Requirements (mL): 6932-7869 (1mL/kcal)  Estimated Fluid Requirement Method: RDA Method  RDA Method (mL):     Nutrition Prescription Ordered    Current Diet Order: Heart Healthy  Oral Nutrition Supplement: None    Evaluation of Received Nutrient/Fluid Intake    % Kcal Needs: 25-50%  % Protein Needs: 25-50%  I/O: -725  Energy Calories Required: not meeting needs  Protein Required: not meeting needs  Tolerance: tolerating  % Intake of Estimated Energy Needs: 25 - 50 %  % Meal Intake: Other: 25-75%    Nutrition Risk    Level of Risk/Frequency of Follow-up: moderate     Monitor and Evaluation    Monitor and Evaluation: Energy intake, Food and beverage intake, Protein intake, Diet order, Weight, Food and nutrition knowledge, " Beliefs and attitudes, Electrolyte and renal panel, Gastrointestinal profile, Glucose/endocrine profile, Inflammatory profile, Lipid profile     Nutrition Follow-Up    RD Follow-up?: Yes

## 2025-06-06 NOTE — PLAN OF CARE
[facility] (Hospital)  Initial Discharge Assessment       Primary Care Provider: Luis Enrique Guzman Jr., MD    Admission Diagnosis: Subdural hematoma [S06.5XAA]    Admission Date: 6/5/2025  Expected Discharge Date:     Transition of Care Barriers: None    Payor: MEDICARE / Plan: MEDICARE PART A & B / Product Type: Government /     Extended Emergency Contact Information  Primary Emergency Contact: Eugenio Hemphill  Mobile Phone: [phone]  Relation: Daughter  Preferred language: English   needed? No  Secondary Emergency Contact: ShalondaTherese  Home Phone: [phone]  Relation: Spouse  Preferred language: English   needed? No    Discharge Plan A: Home  Discharge Plan B: Home      [facility] [address], [place] [address]  [address]  [facility] [address]  Phone: [phone] Fax: [phone]      Initial Assessment (most recent)       Adult Discharge Assessment - 06/06/25 0735          Discharge Assessment    Assessment Type Discharge Planning Assessment     Confirmed/corrected address, phone number and insurance Yes     Confirmed Demographics Correct on Facesheet     Source of Information patient;family     If unable to respond/provide information was family/caregiver contacted? Yes     Contact Name/Number Eugenio Hemphill (Daughter)  [phone]     Communicated ADONAY with patient/caregiver Yes     People in Home spouse     Name(s) of People in Home Therese Andres     Facility Arrived From: [facility]     Do you expect to return to your current living situation? Yes     Do you have help at home or someone to help you manage your care at home? Yes     Prior to hospitilization cognitive status: Unable to Assess     Current cognitive status: Not Oriented to Time     Walking or Climbing Stairs Difficulty yes     Walking or Climbing Stairs ambulation difficulty, requires equipment     Mobility Management Utilizes RW for ambulation     Dressing/Bathing  Difficulty yes     Dressing/Bathing bathing difficulty, assistance 1 person     Dressing/Bathing Management Patient has assistance from caregiver to assist with bathing     Home Accessibility wheelchair accessible     Home Layout Able to live on 1st floor     Equipment Currently Used at Home walker, rolling;wheelchair;shower chair     Readmission within 30 days? No     Patient currently being followed by outpatient case management? No     Do you currently have service(s) that help you manage your care at home? Yes     Name and Contact number of [facility] [phone]     Is the pt/caregiver preference to resume services with current agency Yes     Do you take prescription medications? Yes     Do you have prescription coverage? Yes     Do you have any problems affording any of your prescribed medications? TBD     Is the patient taking medications as prescribed? yes     Who is going to help you get home at discharge? Family     How do you get to doctors appointments? family or friend will provide     Are you on dialysis? No     Do you take coumadin? No     Discharge Plan A Home     Discharge Plan B Home     DME Needed Upon Discharge  none     Discharge Plan discussed with: Adult children     Transition of Care Barriers None                   DCP completed with patient and family. No social concerns noted. CM to remain available for any needs.

## 2025-06-06 NOTE — ASSESSMENT & PLAN NOTE
Patient's blood pressure range in the last 24 hours was: BP  Min: 113/57  Max: 136/57.The patient's inpatient anti-hypertensive regimen is listed below:  Current Antihypertensives  amLODIPine tablet 5 mg, Daily, Oral    Plan  - BP is controlled, no changes needed to their regimen

## 2025-06-06 NOTE — PLAN OF CARE
Problem: Physical Therapy  Goal: Physical Therapy Goal  Description: Problem: Physical Therapy  Goal: Physical Therapy Goal  Description:     Long Term Goals: 2025     Transfers Status    Sit to Stand  Long Term Goal:  Complete sit to stand with Set-up Assistance using Rolling Walker with no verbal cues    Stand Step  Long Term Goal:  Complete stand step with  Set-up Assistance  using Rolling Walker with no  verbal cues    Supine <> Sit  Long Term Goal: Complete supine <> sit with Set-up Assistance  With no verbal cues rails prn    Rolling Right/Left  Long Term Goal: Complete rolling  right/left with Set-up Assistance with no verbal cues rails prn      Balance/Coordination    Static Sitting  Long Term Goal: Complete static sitting with Modified Independent  With no  verbal cues    Dynamic Sitting  Long Term Goal: Complete dynamic sitting with  Set-up Assistance  with  no verbal cues minimal excursions    Static Standing  Long Term Goal: Complete static standing with Set-up Assistance  with   no verbal cues and RW    Dynamic Standing  Long Term Goal: Complete dynamic standing with  Set-up Assistance with no verbal cues and minimal  excursions with RW    Endurance    Long Term Goal:  Physical Therapy: 2 times a day, 45 minutes  Rest periods: minimal  Sittin-8 hours/day    Mobility/Gait  Long Term Goal: Will ambulate with Set-up Assistance  using Rolling Walker with no  verbal cues x 300 ft    Stairs Assistance  Long Term Goal: Patient will ascend/descend 4 stairs/steps using both  handrails  nonreciprocal steps with Supervision or Set-up Assistance and minimal  verbal cues    Ramp/Uneven 10 feet surface  Long  Term Goal; Patient will be able to navigate a 10 inch uneven surface with RW  with   Supervision or Set-up Assistance and minimal  verbal cues      2-4  inch curb  Long  Term Goal; Patient will be able to navigate a  2 to 4 inch curb with RW with  Supervision or Set-up Assistance and minimal  verbal  cues    Picking up object   Long  Term Goal; Patient will be able to  a small object from the floor  with proper A.D. with  Set-up Assistance and minimal  verbal cues    Car transfers  Long  Term Goal; Patient will be able to get in and out of a vehicle  with RW with  stand by assistance     Outcome: Plan of care re-established

## 2025-06-06 NOTE — PT/OT/SLP EVAL
"Physical Therapy Rehab Re-Evaluation    Patient Name:  Tobi Liz Jr.   MRN:  [id number]    Recommendations:     Discharge Recommendations:  Low Intensity Therapy   Discharge Equipment Recommendations: none   Barriers to discharge: None    Assessment:     Tobi Liz Jr. is a 89 y.o. male admitted with a medical diagnosis of Subdural hematoma.  He presents with the following impairments/functional limitations:    weakness, impaired endurance, impaired self care skills, impaired functional mobility, gait instability, impaired balance, decreased coordination, decreased upper extremity function, decreased lower extremity function, decreased safety awareness,  decreased ROM,  edema, impaired cardiopulmonary response to activity, impaired joint extensibility, impaired cognition, advance age, chronic back pain  and impaired muscle length..      Patient was here in this in-patient rehab unit from 3/13/2025 to 3/25/2025, discharge to [facility] with recommended 24/7 supervision, continuing to encourage the patient to walk and not use the wheelchair within the assisted living facility as long as he has set up supervision, and advised not to use the lift component of the chair to prevent decline in function  during the family training dated 3/24/2025.      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 This is causing  mass effect on right lateral ventricle and mild midline shift from right to left of 4 mm." Based on the CT scan dated 6/3/2025.     Patient is now re-admitted to the in-patient rehab unit after 2 days of monitoring in acute care, per progress notes of Janessa PALENCIA dated 6/4/2025 of  " " "neurosurgery consult last night and recommendations for continued PT evaluation and if not improving, consider palliative care. Not a good candidate for surgical intervention at this time."      No significant change in  functional mobility on this evaluation compared to when he was discharge on 3/25/2025.  We will continue to  monitor and assist with balance and strengthening while medical management is being done.     Rehab Diagnosis:  Sudural Hematoma     Recent Surgery: * No surgery found *      General Precautions: Standard, fall     Orthopedic Precautions: N/A     Braces: N/A    Rehab Prognosis: Fair; patient would benefit from acute skilled PT services to address these deficits and reach maximum level of function.      History:     Past Medical History:   Diagnosis Date    Aortic valve stenosis     Arthritis     BPH (benign prostatic hyperplasia)     Carotid artery stenosis     Chronic diastolic heart failure     ETOH abuse     Exposure to COVID-19 virus 01/07/2022    Hyperchloremia     Hypertension     Kidney stones     Murmur     Pacemaker     Polymyalgia rheumatica     PVD (peripheral vascular disease)     Renal artery stenosis        Past Surgical History:   Procedure Laterality Date    A-V CARDIAC PACEMAKER INSERTION N/A 10/06/2023    Procedure: INSERTION, CARDIAC PACEMAKER, DUAL CHAMBER;  Surgeon: Douglas Salguero MD;  Location: [facility] CATH;  Service: Cardiology;  Laterality: N/A;    ANGIOGRAM, CAROTID Left 08/25/2023    Procedure: ANGIOGRAM, CAROTID;  Surgeon: Nick Box MD;  Location: [facility] CATH;  Service: Cardiology;  Laterality: Left;    CATARACT EXTRACTION, BILATERAL      ECHOCARDIOGRAM,TRANSESOPHAGEAL N/A 10/03/2023    Procedure: Transesophageal echo (JOSE ARMANDO) intra-procedure log documentation;  Surgeon: Nick Box MD;  Location: [facility] OR;  Service: Cardiology;  Laterality: N/A;    HAND SURGERY      INSERTION OF STENT INTO PERIPHERAL VESSEL N/A 01/30/2025    Procedure: INSERTION, STENT, VESSEL, " "PERIPHERAL;  Surgeon: Harhsa Salcedo MD;  Location: [facility] CATH;  Service: Cardiology;  Laterality: N/A;    PERCUTANEOUS TRANSCATHETER AORTIC VALVE REPLACEMENT (TAVR) Left 10/03/2023    Procedure: REPLACEMENT, AORTIC VALVE, PERCUTANEOUS, TRANSCATHETER;  Surgeon: Nick Box MD;  Location: [facility] OR;  Service: Cardiology;  Laterality: Left;    PERCUTANEOUS TRANSCATHETER AORTIC VALVE REPLACEMENT (TAVR) Left 10/03/2023    Procedure: REPLACEMENT, AORTIC VALVE, PERCUTANEOUS, TRANSCATHETER carotid access;  Surgeon: Jun Brito MD;  Location: [facility] OR;  Service: Cardiovascular;  Laterality: Left;    PERCUTANEOUS TRANSLUMINAL ANGIOPLASTY N/A 08/25/2023    Procedure: ANGIOPLASTY-PERCUTANEOUS TRANSLUMINAL (PTA);  Surgeon: Nick Box MD;  Location: [facility] CATH;  Service: Cardiology;  Laterality: N/A;    PERCUTANEOUS TRANSLUMINAL ANGIOPLASTY (PTA) OF PERIPHERAL VESSEL Left 04/10/2025    Procedure: PTA, PERIPHERAL VESSEL;  Surgeon: Harsha Salcedo MD;  Location: [facility] CATH;  Service: Cardiology;  Laterality: Left;  L LE intervention via left antegrade approach Harsha Salcedo at [facility] in [facility] OR under MAC  *PACEMAKER*    SHOULDER SURGERY Left     total shoulder replacement    WOUND EXPLORATION N/A 10/03/2023    Procedure: EXPLORATION, WOUND;  Surgeon: Jun Brito MD;  Location: [facility] OR;  Service: Cardiology;  Laterality: N/A;       Subjective     Chief Complaint: "I am going back to my house, we already gave our notice at [facility]."   Patient/Family Comments/goals: "Go back to my house and hire help."    Patients cultural, spiritual, Uatsdin conflicts given the current situation: no       Living Environment  People in Home: facility resident (Pt and his spouse live at [facility])  Name(s) of People in Home: Therese Mckenna  Living Arrangements: assisted living  Home Accessibility: wheelchair accessible  Stair Railings at Home: none  Home Layout: Able to live on 1st floor  Transportation " Anticipated: family or friend will provide, other (see comments) (Facility Transportation)  Living Environment Comment: Pt lives with spouse at assistive living facility in which he receives supervision / setup assistance with bathing due to cognitive deficits and advanced age. sleeps on a recliner chair; uses a wheelchair to get around the assisted living facility   Equipment Currently Used at Home: wheelchair, walker, rolling, grab bar, shower chair    Prior Level of Function  Ambulation Skills: needs device and assist  Stairs: needs device and assist  Transfer Skills: needs device and assist  ADL Skills: needs device and assist  Work/Leisure Activity: needs device and assist  Cognitive Communication: needs assist    Equipment used at home: wheelchair, walker, rolling.  DME owned (not currently used): none.      Upon discharge, patient will have assistance from family and facility staff. .    Objective:     Communicated with nurse, OT and patient  prior to session.  Patient found up in chair with peripheral IV, willams catheter  upon PT entry to room.    Vitals   Vitals at Rest  BP  134/60 mmHg    HR  79 bpm    O2 Sat  99%    Pain Pain Rating 1: 0/10  Pain Rating Post-Intervention 1: 0/10   Respiratory Status: Room air    Exams  Cognitive Exam:  Patient is oriented to Person, Place, and Situation  Postural Exam:  Patient presented with the following abnormalities:    -       Rounded shoulders  -       Forward head  Sensation:    claims impaired sensation on both LE, can only feel 30% per patient       Skin Integrity/Edema:     -       Skin integrity: Dry and scaly skin with scabs on both LE   -       Edema: Mild on both LE   RLE ROM:  decrease on hip, knee and ankle done active assistedly          RLE Strength:    grossly graded 3/5  within available ROM       LLE ROM:   decrease on hip, knee and ankle done active assistedly         LLE Strength:    grossly graded 3/5  within available ROM            Tone:      normotonic      Coordination:    intact         Functional Mobility  Bed Mobility:     Rolling Left:  Supervision or touching assistance   Rolling Right: Supervision or touching assistance   Scooting: Supervision or touching assistance   Bridging: Supervision or touching assistance   Supine to Sit: Supervision or touching assistance   Sit to Supine: Supervision or touching assistance   Transfers:     Sit to Stand: Supervision or touching assistance  with rolling walker  Chair to mat: Supervision or touching assistance  with  rolling walker  using  Step Transfer  Gait: Pt ambulates 350 feet, 25 feet x 2, 10 feet x 2 and 150 feet  with RW with Supervision or touching assistance .   Impairments contributing to gait deviations include impaired balance, impaired coordination, decreased flexibility, impaired motor control, abnormal muscle tone, decreased ROM, and decreased strength.  Balance: Static Sitting/Standing  Patient performed static standing on level surface using rolling walker with Supervision or touching assistance  and minimal verbal cues.     Static Sit: GOOD: Takes MODERATE challenges from all directions  Static Stand: FAIR+: Takes MINIMAL challenges from all directions     Dynamic Sitting/Standing  Patient performed dynamic standing on level surface using rolling walker with Supervision or touching assistance  and minimal verbal cues during minimal excursions.     Dynamic Sit: GOOD-: Incosistently Maintains balance through MODERATE excursions of active trunk movement,     Dynamic Stand: FAIR+: Needs CLOSE SUPERVISION during gait and is able to right self with minor LOB  4 steps (6 inch)  stairs with bilateral handrails with Supervision or touching assistance   2 inch and 4 inch  curb with rolling walker with Supervision or touching assistance   Ambulate 10 feet on uneven surfaces/ramps with rolling walker with Supervision or touching assistance    object from ground in standing position with reacher  with rolling walker with Supervision or touching assistance   Wheelchair Propulsion:  Pt propelled Standard wheelchair x 200 feet on Level tile with  Bilateral upper extremity and Bilateral lower extremity with Independent.       GGs   Admit Current   Status Goal   Functional Area: Care Score: Care Score: Care Score:   Roll Left and Right 4 4 Set-up/clean-up   Sit to Lying 4 4 Set-up/clean-up   Lying to Sitting on Side of Bed 4 4 Set-up/clean-up   Sit to Stand 4 4 Set-up/clean-up   Chair/Bed-to-Chair Transfer 4 4 Set-up/clean-up   Car Transfer 10 10 Supervision or touching assistance   Walk 10 Feet 4 4 Set-up/clean-up   Walk 50 Feet with Two Turns 4 4 Set-up/clean-up   Walk 150 Feet 4 4     Walk 10 Feet Uneven Surface 4 4 Supervision or touching assistance   1 Step (Curb) 4 4 Supervision or touching assistance   4 Steps 4 4 Supervision or touching assistance   12 Steps 9 9 Not applicable   Picking Up Object 4 4 Set-up/clean-up   Wheel 50 Feet with Two Turns 6 6     Wheel 150 Feet 6 6       Therapeutic Activities and Exercises:  Role and goals of PT, PT plan of care, bed mobility, transfer training, gait training, LE exercises, balance training, safety awareness, assistive device use, wheelchair management, and fall prevention     Skin care by applying moisturizer on both LE due to dry scaly skin   SUPINE Both Lower Extremity   Active ROM Sets / Reps / Resistance / Etc.   Ankle PF and DF 3 x 10    Short Arc Quads 3 x 10    SLR 3 x 10    Hip Abduction/Adduction 3 x 10    Bridging 3 x 10         Activity Tolerance: Fair, patient fall asleep during therapy session     Patient left up in chair with all lines intact, call button in reach, nurse  notified, and PCT  present.    Education Provided: roles and goals of PT/PTA, transfer training, bed mob, gait training, stair training, balance training, safety awareness, body mechanics, assistive device, strengthening exercises, and fall prevention    Expected compliance:  Moderate compliance and Low compliance    GOALS:   Multidisciplinary Problems       Physical Therapy Goals          Problem: Physical Therapy    Goal Priority Disciplines Outcome Interventions   Physical Therapy Goal     PT, PT/OT Progressing    Description: Problem: Physical Therapy  Goal: Physical Therapy Goal  Description:     Long Term Goals: 2025     Transfers Status    Sit to Stand  Long Term Goal:  Complete sit to stand with Set-up Assistance using Rolling Walker with no verbal cues    Stand Step  Long Term Goal:  Complete stand step with  Set-up Assistance  using Rolling Walker with no  verbal cues    Supine <> Sit  Long Term Goal: Complete supine <> sit with Set-up Assistance  With no verbal cues rails prn    Rolling Right/Left  Long Term Goal: Complete rolling  right/left with Set-up Assistance with no verbal cues rails prn      Balance/Coordination    Static Sitting  Long Term Goal: Complete static sitting with Modified Independent  With no  verbal cues    Dynamic Sitting  Long Term Goal: Complete dynamic sitting with  Set-up Assistance  with  no verbal cues minimal excursions    Static Standing  Long Term Goal: Complete static standing with Set-up Assistance  with   no verbal cues and RW    Dynamic Standing  Long Term Goal: Complete dynamic standing with  Set-up Assistance with no verbal cues and minimal  excursions with RW    Endurance    Long Term Goal:  Physical Therapy: 2 times a day, 45 minutes  Rest periods: minimal  Sittin-8 hours/day    Mobility/Gait  Long Term Goal: Will ambulate with Set-up Assistance  using Rolling Walker with no  verbal cues x 300 ft    Stairs Assistance  Long Term Goal: Patient will ascend/descend 4 stairs/steps using both  handrails  nonreciprocal steps with Supervision or Set-up Assistance and minimal  verbal cues    Ramp/Uneven 10 feet surface  Long  Term Goal; Patient will be able to navigate a 10 inch uneven surface with RW  with   Supervision or Set-up  Assistance and minimal  verbal cues      2-4  inch curb  Long  Term Goal; Patient will be able to navigate a  2 to 4 inch curb with RW with  Supervision or Set-up Assistance and minimal  verbal cues    Picking up object   Long  Term Goal; Patient will be able to  a small object from the floor  with proper A.D. with  Set-up Assistance and minimal  verbal cues    Car transfers  Long  Term Goal; Patient will be able to get in and out of a vehicle  with RW with  stand by assistance                                            Plan:     During this hospitalization, patient to be seen 5 x/week to address the identified rehab impairments via gait training, therapeutic activities, therapeutic exercises, neuromuscular re-education and progress toward the following goals:    Plan of Care Expires:  06/18/25  PT Next Visit Date: 06/09/25  Plan of Care reviewed with: patient      Additional Infomation:           Time Tracking:     Therapy Time   PT Received On: 06/06/25  PT Start Time: 1000  PT Stop Time: 1145  PT Total Time (min): 105 min  PT Individual: 60  PT Concurrent: 45    Billable Minutes: Evaluation low complexity (60 minutes)  and Therapeutic Exercise 45    06/06/2025

## 2025-06-06 NOTE — PT/OT/SLP PROGRESS
Occupational Therapy Inpatient Rehab Treatment    Name: Tobi Liz Jr.  MRN: [id number]    Assessment:  Tobi Liz Jr. is a 89 y.o. male admitted with a medical diagnosis of Subdural hematoma.  He presents with the following impairments/functional limitations:  weakness, impaired endurance, impaired self care skills, impaired functional mobility, gait instability, impaired balance, visual deficits, impaired cognition, decreased coordination, decreased upper extremity function, decreased lower extremity function, decreased safety awareness, decreased ROM, pain, impaired fine motor, impaired skin, edema, impaired cardiopulmonary response to activity, impaired joint extensibility, impaired muscle length.    Pt demonstrated improved functional performance with bathing as noted by min assist in which patient able to wash / dry 8 of 10 parts with steadying assist and cueing for safety and technique requiring assist to wash / dry bilateral lower legs.     General Precautions: Standard, fall     Orthopedic Precautions:N/A     Braces: N/A    Rehab Prognosis: Good and Fair; patient would benefit from acute skilled OT services to address these deficits and reach maximum level of function.      History:     Past Medical History:   Diagnosis Date    Aortic valve stenosis     Arthritis     BPH (benign prostatic hyperplasia)     Carotid artery stenosis     Chronic diastolic heart failure     ETOH abuse     Exposure to COVID-19 virus 01/07/2022    Hyperchloremia     Hypertension     Kidney stones     Murmur     Pacemaker     Polymyalgia rheumatica     PVD (peripheral vascular disease)     Renal artery stenosis        Past Surgical History:   Procedure Laterality Date    A-V CARDIAC PACEMAKER INSERTION N/A 10/06/2023    Procedure: INSERTION, CARDIAC PACEMAKER, DUAL CHAMBER;  Surgeon: Douglas Salguero MD;  Location: [facility] CATH;  Service: Cardiology;  Laterality: N/A;    ANGIOGRAM, CAROTID Left 08/25/2023    Procedure:  ANGIOGRAM, CAROTID;  Surgeon: Nick Box MD;  Location: [facility] CATH;  Service: Cardiology;  Laterality: Left;    CATARACT EXTRACTION, BILATERAL      ECHOCARDIOGRAM,TRANSESOPHAGEAL N/A 10/03/2023    Procedure: Transesophageal echo (JOSE ARMANDO) intra-procedure log documentation;  Surgeon: Nick Box MD;  Location: [facility] OR;  Service: Cardiology;  Laterality: N/A;    HAND SURGERY      INSERTION OF STENT INTO PERIPHERAL VESSEL N/A 01/30/2025    Procedure: INSERTION, STENT, VESSEL, PERIPHERAL;  Surgeon: Harsha Salcedo MD;  Location: [facility] CATH;  Service: Cardiology;  Laterality: N/A;    PERCUTANEOUS TRANSCATHETER AORTIC VALVE REPLACEMENT (TAVR) Left 10/03/2023    Procedure: REPLACEMENT, AORTIC VALVE, PERCUTANEOUS, TRANSCATHETER;  Surgeon: Nick Box MD;  Location: [facility] OR;  Service: Cardiology;  Laterality: Left;    PERCUTANEOUS TRANSCATHETER AORTIC VALVE REPLACEMENT (TAVR) Left 10/03/2023    Procedure: REPLACEMENT, AORTIC VALVE, PERCUTANEOUS, TRANSCATHETER carotid access;  Surgeon: Jun Brito MD;  Location: [facility] OR;  Service: Cardiovascular;  Laterality: Left;    PERCUTANEOUS TRANSLUMINAL ANGIOPLASTY N/A 08/25/2023    Procedure: ANGIOPLASTY-PERCUTANEOUS TRANSLUMINAL (PTA);  Surgeon: Nick Box MD;  Location: [facility] CATH;  Service: Cardiology;  Laterality: N/A;    PERCUTANEOUS TRANSLUMINAL ANGIOPLASTY (PTA) OF PERIPHERAL VESSEL Left 04/10/2025    Procedure: PTA, PERIPHERAL VESSEL;  Surgeon: Harsha Salcedo MD;  Location: [facility] CATH;  Service: Cardiology;  Laterality: Left;  L LE intervention via left antegrade approach Harsha Salcedo at [facility] in [facility] OR under [facility]  *PACEMAKER*    SHOULDER SURGERY Left     total shoulder replacement    WOUND EXPLORATION N/A 10/03/2023    Procedure: EXPLORATION, WOUND;  Surgeon: Jun Brito MD;  Location: [facility] OR;  Service: Cardiology;  Laterality: N/A;       Subjective     Orientation: Identifies self and Not oriented to time    Chief Complaint: dizziness and  weakness     Patient/Family Comments/goals: Pt would like to regain independence with ADL's including functional mobility in order to safely return home to [facility] with least amount of financial burden due to assistance.     Vitals   Vitals at Rest  /64   HR 64   O2 Sat 96   Pain 0/10     Respiratory Status: Room air    Patients cultural, spiritual, Orthodox conflicts given the current situation: no       Objective:     Patient found up in chair with willams catheter  upon OT entry to room.    Mobility   Patient completed:  Sit to Stand Transfer with minimum assistance with rolling walker  Bed to Chair Transfer using Step Transfer technique with minimum assistance with rolling walker  Toilet Transfer Step Transfer technique with minimum assistance with  grab bars  Shower Transfer Step Transfer technique with minimum assistance with grab bars and shower chair.    Functional Mobility  Pt ambulated 153' between surfaces requiring steadying assist utilizing RW.     ADLs   Current Status   Eating     Oral Hygiene 4   Shower, Bathe Self 3   Upper Body Dressing 3   Lower Body Dressing 2   Toileting Hygiene 3   Toilet Transfer 3   Putting On, Taking Off Footwear 2     Limiting Factors for ADLs: endurance, limited ROM, balance, weakness, body habitus, visual, perceptual, coordination, cognition, safety awareness, and pain     Additional Treatments: Pt participated in extensive ADL retraining as further noted above emphasizing fall prevention, and use of assistive devices, adaptive equipment, and compensatory strategies including energy conservation techniques providing extra time with repetition requiring continuous verbal and tactile cueing for sequencing, technique, and overall safety awareness.     LifeStyle Change and Education:       Patient left up in chair with all lines intact and nurse notified.     Education provided: Roles and goals of OT, ADLs, transfer training, bed mobility, body mechanics, assistive  device, wheelchair precautions, sequencing, safety precautions, fall prevention, equipment recommendations, and home safety    Multidisciplinary Problems       Occupational Therapy Goals          Problem: Occupational Therapy    Goal Priority Disciplines Outcome Interventions   Occupational Therapy Goal     OT, PT/OT Progressing    Description: Long Term Goals to be met by: 06/26/25     Patient will increase functional independence with ADLs by performing:    Feeding with Modified [place].  UE Dressing with Set-up Assistance.  LE Dressing with Supervision.  Grooming while seated at sink with Modified [place].  Toileting from toilet with Set-up Assistance for hygiene and clothing management.   Bathing from  shower chair/bench with Stand-by Assistance.  Toilet transfer to toilet with Set-up Assistance.  Increased functional strength to 4+/5 for bilateral UE's.                         Time Tracking     OT Received On: 06/06/25  Time In 0830     Time Out 1000  Total Time 90 min  Therapy Time: OT Individual: 60  OT Concurrent: 30  Missed Time:    Missed Time Reason:      Billable Minutes: Self Care/Home Management 90    06/06/2025

## 2025-06-06 NOTE — H&P
"  [facility] Medicine  History & Physical / GUTIERREZ    Patient Name: Tobi Liz Jr.  MRN: [id number]  Patient Class: IP- Rehab  Admission Date: 6/5/2025  Attending Physician: Dave Reyes III, MD  Primary Care Provider: Luis Enrique Guzman Jr., MD         Patient information was obtained from patient, caregiver / friend, past medical records, and ER records.     Subjective:     Principal Problem:Subdural hematoma    Chief Complaint:   Chief Complaint   Patient presents with    I had a stroke        HPI:   Tobi Liz Jr. is a 89 y.o. male  has a past medical history of Aortic valve stenosis, Arthritis, BPH (benign prostatic hyperplasia), Carotid artery stenosis, Chronic diastolic heart failure, ETOH abuse, Exposure to COVID-19 virus (01/07/2022), Hyperchloremia, Hypertension, Kidney stones, Murmur, Pacemaker, Polymyalgia rheumatica, PVD (peripheral vascular disease), and Renal artery stenosis. presenting with Altered Mental Status (Pt to ED from [facility] via POV - family stated that pt was "walking halls last night" - with increased confusion - removed willams cath. Concerned for UTI.      Family reports he is more to his baseline this am.  Pt has been aggravated and annoyed by his willams catheter and family has noticed that he has had more utis since having catheter.  Urologist has given options for catheter removal; suprapubic cath and continuation of willams.  Family wishes to dc wilalms while in hospital and see how he does.     Willams removed and pending bladder scan this morning. Labs reviewed and overall stable.      6/3/25 FM:  Patient was seen and examined today after team conference and had a change in his neurological exam.  Patient had word-finding confusion him complained of persistent dizziness and lightheadedness despite having normal vital signs.  We performed a stat CT a subdural with midline shift I spoke to the patient's primary care team and we are transferring " back down to [facility] as he will require a Neurosurgery consult and transfer and we can not complete this on our inpatient rehab unit.  We will notify family is soon as we can get in touch with them.     6/4/25:  Patient doing well this morning, neurosurgery consult last night and recommendations for continued PT evaluation and if not improving, consider palliative care.  Not a good candidate for surgical intervention at this time.  Labs stable this morning.      Patient reports visual disturbances have improved but still reports dizziness. Aspirin and Plavix have been discontinued.    6/6 FM:  Patient was seen and examined after transitioned to inpatient rehab.  Patient was transferred down to our acute [facility] unit after finding a subdural hematoma.  Patient was seen by tele neuro services and deemed that this was stable and that observation and further therapy would be his only treatment in addition to discontinuing antiplatelet agents.  Patient was on Plavix aspirin prior to identifying the subdural.  Patient has had an improvement in his neurological exam over the last few days he is work with therapy patient's speech is much improved he is alert oriented x3 and eager to return home with his spouse.  We will resume aggressive therapeutic efforts in attempt to transition him home to a supervision/modified independent functional level.  Patient's still has a Bolton catheter in place which we will attempt to wean during this admission.  Chart and case reviewed starting low-dose steroids as there has been some midline shift.     Patient requires acute inpatient rehab admission with 24-hour nursing and active physician oversight to monitor and manage acute medical comorbid conditions, labs, pain, and functional deficits. Patient/family will also require teaching and integration of improving functional skills into daily living. Patient will also require an individualized, interdisciplinary approach to their care,  receiving PT, OT services 3 hours per day, 5 days per week. Required care cannot be provided at a lower level of care. Patient is anticipated to require approximately 10-14 days LOS with expected discharge home  with  services.             Impairment group (IGC):   Other Stroke 01.9 Etiologic diagnosis/description:   S06.5XAA: Sudural Hematoma   Date of onset:  6/3/2025 Date of surgery: N/A   Allergies: Patient has no known allergies.   Comorbid condition: HTN, HLD, BPH, CAD, Valvular Heart Disease, CHF Class II, Occlusion of right iliac artery, Urinary Obstruction, Anemia, Acute bronchitis, Subdural hematoma   Medical/functional conditions requiring inpatient rehabilitation:      This patient requires medical management/24-hour nursing of complex co morbidities HTN, HLD, BPH, CAD, Valvular Heart Disease, CHF Class II, Occlusion of right iliac artery, Urinary Obstruction, Anemia, Acute bronchitis, Subdural hematoma, labs, medications (see medications list), pain, sleep hygiene, anticoagulation, nutrition, hydration, neurological, pulmonary, cardiac status, and preventive healthcare.     This patient requires intense therapy and an integrated, interdisciplinary approach to address safety, impaired mobility, impaired ADLs (dressing, toileting, grooming, showering), impaired cognition, judgment, and memory, communication, pulmonary insufficiency, bowel/bladder problems,preventive healthcare, medication management, integration of functional skills into daily living, and home caregiver support and training.    Risk for medical/clinical complications:      This patient is at risk for the following complications: DVT/PE, pneumonia, malnutrition, neurological decline, respiratory insufficiency, worsening activity intolerance, complications from discontinuation of anticoagulation,  skin breakdown, inadequate sleep, recurring stroke, and constipation.       Past Medical History:   Diagnosis Date    Aortic valve stenosis      Arthritis     BPH (benign prostatic hyperplasia)     Carotid artery stenosis     Chronic diastolic heart failure     ETOH abuse     Exposure to COVID-19 virus 01/07/2022    Hyperchloremia     Hypertension     Kidney stones     Murmur     Pacemaker     Polymyalgia rheumatica     PVD (peripheral vascular disease)     Renal artery stenosis        Past Surgical History:   Procedure Laterality Date    A-V CARDIAC PACEMAKER INSERTION N/A 10/06/2023    Procedure: INSERTION, CARDIAC PACEMAKER, DUAL CHAMBER;  Surgeon: Douglas Salguero MD;  Location: [facility] CATH;  Service: Cardiology;  Laterality: N/A;    ANGIOGRAM, CAROTID Left 08/25/2023    Procedure: ANGIOGRAM, CAROTID;  Surgeon: Nick Box MD;  Location: [facility] CATH;  Service: Cardiology;  Laterality: Left;    CATARACT EXTRACTION, BILATERAL      ECHOCARDIOGRAM,TRANSESOPHAGEAL N/A 10/03/2023    Procedure: Transesophageal echo (JOSE ARMANDO) intra-procedure log documentation;  Surgeon: Nick Box MD;  Location: [facility] OR;  Service: Cardiology;  Laterality: N/A;    HAND SURGERY      INSERTION OF STENT INTO PERIPHERAL VESSEL N/A 01/30/2025    Procedure: INSERTION, STENT, VESSEL, PERIPHERAL;  Surgeon: Harsha Salcedo MD;  Location: [facility] CATH;  Service: Cardiology;  Laterality: N/A;    PERCUTANEOUS TRANSCATHETER AORTIC VALVE REPLACEMENT (TAVR) Left 10/03/2023    Procedure: REPLACEMENT, AORTIC VALVE, PERCUTANEOUS, TRANSCATHETER;  Surgeon: Nick Box MD;  Location: [facility] OR;  Service: Cardiology;  Laterality: Left;    PERCUTANEOUS TRANSCATHETER AORTIC VALVE REPLACEMENT (TAVR) Left 10/03/2023    Procedure: REPLACEMENT, AORTIC VALVE, PERCUTANEOUS, TRANSCATHETER carotid access;  Surgeon: Jun Brito MD;  Location: [facility] OR;  Service: Cardiovascular;  Laterality: Left;    PERCUTANEOUS TRANSLUMINAL ANGIOPLASTY N/A 08/25/2023    Procedure: ANGIOPLASTY-PERCUTANEOUS TRANSLUMINAL (PTA);  Surgeon: Nick Box MD;  Location: [facility] CATH;  Service: Cardiology;  Laterality: N/A;     PERCUTANEOUS TRANSLUMINAL ANGIOPLASTY (PTA) OF PERIPHERAL VESSEL Left 04/10/2025    Procedure: PTA, PERIPHERAL VESSEL;  Surgeon: Harsha Salcedo MD;  Location: [facility] CATH;  Service: Cardiology;  Laterality: Left;  L LE intervention via left antegrade approach Harsha Salcedo at [facility] in Hybrid OR under MAC  *PACEMAKER*    SHOULDER SURGERY Left     total shoulder replacement    WOUND EXPLORATION N/A 10/03/2023    Procedure: EXPLORATION, WOUND;  Surgeon: Jun Brito MD;  Location: [facility] OR;  Service: Cardiology;  Laterality: N/A;       Review of patient's allergies indicates:  No Known Allergies    Current Facility-Administered Medications on File Prior to Encounter   Medication    [COMPLETED] mupirocin 2 % ointment    [DISCONTINUED] acetaminophen tablet 650 mg    [DISCONTINUED] amLODIPine tablet 5 mg    [DISCONTINUED] atorvastatin tablet 20 mg    [DISCONTINUED] cefTRIAXone injection 1 g    [DISCONTINUED] fluconazole (DIFLUCAN) IVPB 200 mg    [DISCONTINUED] fluconazole tablet 200 mg    [DISCONTINUED] melatonin tablet 6 mg    [DISCONTINUED] QUEtiapine tablet 25 mg    [DISCONTINUED] sodium chloride 0.9% flush 10 mL     Current Outpatient Medications on File Prior to Encounter   Medication Sig    amLODIPine (NORVASC) 5 MG tablet Take 1 tablet (5 mg total) by mouth once daily. Prescribed by Dr. Box    aspirin 81 MG Chew Take 81 mg by mouth once daily.    clopidogreL (PLAVIX) 75 mg tablet Take 1 tablet (75 mg total) by mouth once daily.    rosuvastatin (CRESTOR) 10 MG tablet Take 1 tablet (10 mg total) by mouth once daily.    solifenacin (VESICARE) 10 MG tablet Take 10 mg by mouth once daily. Patient is still taking     Family History       Problem Relation (Age of Onset)    Cancer Mother    Stroke Father          Tobacco Use    Smoking status: Former     Types: Cigars     Passive exposure: Past    Smokeless tobacco: Never   Vaping Use    Vaping status: Never Used   Substance and Sexual Activity    Alcohol use:  Yes     Alcohol/week: 3.0 standard drinks of alcohol     Types: 3 Shots of liquor per week     Comment: 1.5 oz a day    Drug use: Never    Sexual activity: Not Currently     Review of Systems   Constitutional:  Positive for activity change, appetite change and fatigue. Negative for chills and fever.   HENT:  Negative for ear pain, mouth sores, nosebleeds and sore throat.    Eyes:  Negative for visual disturbance.   Respiratory:  Negative for apnea, cough, shortness of breath and wheezing.    Cardiovascular:  Positive for leg swelling. Negative for chest pain and palpitations.   Gastrointestinal:  Positive for constipation. Negative for abdominal distention, abdominal pain, blood in stool, diarrhea, nausea and vomiting.   Endocrine: Positive for cold intolerance. Negative for polyphagia.   Genitourinary:  Positive for difficulty urinating. Negative for flank pain.   Musculoskeletal:  Positive for arthralgias, back pain, gait problem and myalgias.   Skin:  Positive for pallor.   Neurological:  Positive for dizziness, speech difficulty and weakness. Negative for tremors, seizures, syncope, facial asymmetry and headaches.   Hematological:  Negative for adenopathy.   Psychiatric/Behavioral:  Positive for confusion. Negative for agitation and hallucinations. The patient is nervous/anxious.      Objective:     Vital Signs (Most Recent):  Temp: 97.7 °F (36.5 °C) (06/05/25 1939)  Pulse: 70 (06/05/25 2030)  Resp: 18 (06/05/25 1939)  BP: 114/73 (06/05/25 1939)  SpO2: 97 % (06/05/25 1939) Vital Signs (24h Range):  Temp:  [97.4 °F (36.3 °C)-97.7 °F (36.5 °C)] 97.7 °F (36.5 °C)  Pulse:  [] 70  Resp:  [18] 18  SpO2:  [93 %-100 %] 97 %  BP: (113-136)/(53-73) 114/73     Weight: 78.3 kg (172 lb 9.6 oz)  Body mass index is 25.49 kg/m².     Physical Exam  Vitals and nursing note reviewed.   Constitutional:       General: He is not in acute distress.     Appearance: He is obese. He is ill-appearing.      Comments: Improved since  last seen, able to stand with assistance   HENT:      Head: Normocephalic and atraumatic.      Nose: Nose normal. No congestion.      Mouth/Throat:      Mouth: Mucous membranes are moist.      Pharynx: No oropharyngeal exudate.   Eyes:      General: No scleral icterus.     Extraocular Movements: Extraocular movements intact.   Neck:      Vascular: No carotid bruit.   Cardiovascular:      Rate and Rhythm: Normal rate and regular rhythm.      Heart sounds: Normal heart sounds. No murmur heard.     No friction rub. No gallop.   Pulmonary:      Effort: No respiratory distress.      Breath sounds: No stridor. Rhonchi present. No wheezing or rales.   Chest:      Chest wall: No tenderness.   Abdominal:      General: There is no distension.      Palpations: Abdomen is soft. There is no mass.      Tenderness: There is no abdominal tenderness. There is no right CVA tenderness, left CVA tenderness, guarding or rebound.      Hernia: No hernia is present.   Genitourinary:     Comments: Bolton in carold  Musculoskeletal:      Cervical back: No rigidity.      Right lower leg: No edema.      Left lower leg: No edema.   Lymphadenopathy:      Cervical: No cervical adenopathy.   Skin:     Capillary Refill: Capillary refill takes less than 2 seconds.      Coloration: Skin is not jaundiced or pale.   Neurological:      Mental Status: He is disoriented.      Motor: Weakness present.      Gait: Gait abnormal.   Psychiatric:      Comments: More alert, o x 3, speech improved, + bilateral symmetric weakness, unsteady                Significant Labs:   Recent Lab Results         06/05/25  0620        Albumin 3.4       ALP 72       ALT 11       Anion Gap 6       AST 17       Baso # 0.03       Basophil % 0.5       BILIRUBIN TOTAL 0.4  Comment: For infants and newborns, interpretation of results should be based   on gestational age, weight and in agreement with clinical   observations.    Premature Infant recommended reference ranges:   0-24  hours:  <8.0 mg/dL   24-48 hours: <12.0 mg/dL   3-5 days:    <15.0 mg/dL   6-29 days:   <15.0 mg/dL       BUN 15       Calcium 8.6       Chloride 108       CO2 24       Creatinine 0.8       eGFR >60  Comment: Estimated GFR calculated using the CKD-EPI creatinine (2021) equation.       Eos # 0.41       Eos % 6.9       Glucose 103       Gran # (ANC) 4.30       Hematocrit 33.6       Hemoglobin 10.7       Immature Grans (Abs) 0.03  Comment: Mild elevation in immature granulocytes is non specific and can be seen in a variety of conditions including stress response, acute inflammation, trauma and pregnancy. Correlation with other laboratory and clinical findings is essential.       Immature Granulocytes 0.5       Lymph # 0.66       Lymph % 11.1       Magnesium  2.0       MCH 30.8       MCHC 31.8       MCV 97       Mono # 0.54       Mono % 9.0       MPV 9.1       Neut % 72.0       nRBC 0       Platelet Count 240       Potassium 4.2       PROTEIN TOTAL 6.6       RBC 3.47       RDW 14.6       Sodium 138       WBC 5.97               Significant Imaging: I have reviewed all pertinent imaging results/findings within the past 24 hours.  Assessment/Plan:     Assessment & Plan  Subdural hematoma  Patient's hemorrhage is due to trauma/laceration, this bleeding is associated with an antiplatelet agent, the antiplatelet agent is Select Antiplatelet Agent(s): Aspirin and Clopidogrel. Patients most recent Hgb, Hct, platelets, and INR are listed below.  Recent Labs     06/04/25  0340 06/04/25  0822 06/05/25  0620   HGB 10.8* 11.0* 10.7*   HCT 34.0* 35.1* 33.6*    265 240     Plan  - Will trend hemoglobin/hematocrit Daily  - Will monitor and correct any coagulation defects  - Will transfuse if Hgb is <7g/dl (<8g/dl in cases of active ACS) or if patient has rapid bleeding leading to hemodynamic instability  Hypertension  Patient's blood pressure range in the last 24 hours was: BP  Min: 113/57  Max: 136/57.The patient's inpatient  anti-hypertensive regimen is listed below:  Current Antihypertensives  amLODIPine tablet 5 mg, Daily, Oral    Plan  - BP is controlled, no changes needed to their regimen    Hyperlipidemia  Home meds.    BPH (benign prostatic hyperplasia)      Congestive heart failure, NYHA class II  @ baseline.  Monitor.       Plan  - Monitor strict I&Os and daily weights.    - Place on telemetry  - Low sodium diet  - Place on fluid restriction of 2 L.   - Cardiology has not been consulted  - The patient's volume status is at their baseline        Insomnia    Gentle txt.  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  Wound of lower extremity  Wound care    Myopathy  Generalized, / Trunk weakness, MF, + CVA    Urinary obstruction  As above    Urinary tract infection without hematuria  Cont abx, review studies, attempt to wean willams next week.    Delirium    Much improved  VTE Risk Mitigation (From admission, onward)           Ordered     IP VTE HIGH RISK PATIENT  Once         06/05/25 1538     Place sequential compression device  Until discontinued         06/05/25 1538                                    Dave Reyes III, MD  Department of Hospital Medicine  [place] [facility] ([facility])

## 2025-06-06 NOTE — PLAN OF CARE
Problem: Occupational Therapy  Goal: Occupational Therapy Goal  Description: Long Term Goals to be met by: 06/26/25     Patient will increase functional independence with ADLs by performing:    Feeding with Modified [place].  UE Dressing with Set-up Assistance.  LE Dressing with Supervision.  Grooming while seated at sink with Modified [place].  Toileting from toilet with Set-up Assistance for hygiene and clothing management.   Bathing from  shower chair/bench with Stand-by Assistance.  Toilet transfer to toilet with Set-up Assistance.  Increased functional strength to 4+/5 for bilateral UE's.    Outcome: Progressing

## 2025-06-06 NOTE — SUBJECTIVE & OBJECTIVE
Past Medical History:   Diagnosis Date    Aortic valve stenosis     Arthritis     BPH (benign prostatic hyperplasia)     Carotid artery stenosis     Chronic diastolic heart failure     ETOH abuse     Exposure to COVID-19 virus 01/07/2022    Hyperchloremia     Hypertension     Kidney stones     Murmur     Pacemaker     Polymyalgia rheumatica     PVD (peripheral vascular disease)     Renal artery stenosis        Past Surgical History:   Procedure Laterality Date    A-V CARDIAC PACEMAKER INSERTION N/A 10/06/2023    Procedure: INSERTION, CARDIAC PACEMAKER, DUAL CHAMBER;  Surgeon: Douglas Salguero MD;  Location: [facility] CATH;  Service: Cardiology;  Laterality: N/A;    ANGIOGRAM, CAROTID Left 08/25/2023    Procedure: ANGIOGRAM, CAROTID;  Surgeon: Nick Box MD;  Location: [facility] CATH;  Service: Cardiology;  Laterality: Left;    CATARACT EXTRACTION, BILATERAL      ECHOCARDIOGRAM,TRANSESOPHAGEAL N/A 10/03/2023    Procedure: Transesophageal echo (JOSE ARMANDO) intra-procedure log documentation;  Surgeon: Nick Box MD;  Location: [facility] OR;  Service: Cardiology;  Laterality: N/A;    HAND SURGERY      INSERTION OF STENT INTO PERIPHERAL VESSEL N/A 01/30/2025    Procedure: INSERTION, STENT, VESSEL, PERIPHERAL;  Surgeon: Harsha Salcedo MD;  Location: [facility] CATH;  Service: Cardiology;  Laterality: N/A;    PERCUTANEOUS TRANSCATHETER AORTIC VALVE REPLACEMENT (TAVR) Left 10/03/2023    Procedure: REPLACEMENT, AORTIC VALVE, PERCUTANEOUS, TRANSCATHETER;  Surgeon: Nick Box MD;  Location: [facility] OR;  Service: Cardiology;  Laterality: Left;    PERCUTANEOUS TRANSCATHETER AORTIC VALVE REPLACEMENT (TAVR) Left 10/03/2023    Procedure: REPLACEMENT, AORTIC VALVE, PERCUTANEOUS, TRANSCATHETER carotid access;  Surgeon: Jun Brito MD;  Location: [facility] OR;  Service: Cardiovascular;  Laterality: Left;    PERCUTANEOUS TRANSLUMINAL ANGIOPLASTY N/A 08/25/2023    Procedure: ANGIOPLASTY-PERCUTANEOUS TRANSLUMINAL (PTA);  Surgeon: Nick Box  MD;  Location: [facility] CATH;  Service: Cardiology;  Laterality: N/A;    PERCUTANEOUS TRANSLUMINAL ANGIOPLASTY (PTA) OF PERIPHERAL VESSEL Left 04/10/2025    Procedure: PTA, PERIPHERAL VESSEL;  Surgeon: Harsha Salcedo MD;  Location: [facility] CATH;  Service: Cardiology;  Laterality: Left;  L LE intervention via left antegrade approach Harsha Salcedo at [facility] in Hybrid OR under MAC  *PACEMAKER*    SHOULDER SURGERY Left     total shoulder replacement    WOUND EXPLORATION N/A 10/03/2023    Procedure: EXPLORATION, WOUND;  Surgeon: Jun Brito MD;  Location: [facility] OR;  Service: Cardiology;  Laterality: N/A;       Review of patient's allergies indicates:  No Known Allergies    Current Facility-Administered Medications on File Prior to Encounter   Medication    [COMPLETED] mupirocin 2 % ointment    [DISCONTINUED] acetaminophen tablet 650 mg    [DISCONTINUED] amLODIPine tablet 5 mg    [DISCONTINUED] atorvastatin tablet 20 mg    [DISCONTINUED] cefTRIAXone injection 1 g    [DISCONTINUED] fluconazole (DIFLUCAN) IVPB 200 mg    [DISCONTINUED] fluconazole tablet 200 mg    [DISCONTINUED] melatonin tablet 6 mg    [DISCONTINUED] QUEtiapine tablet 25 mg    [DISCONTINUED] sodium chloride 0.9% flush 10 mL     Current Outpatient Medications on File Prior to Encounter   Medication Sig    amLODIPine (NORVASC) 5 MG tablet Take 1 tablet (5 mg total) by mouth once daily. Prescribed by Dr. Box    aspirin 81 MG Chew Take 81 mg by mouth once daily.    clopidogreL (PLAVIX) 75 mg tablet Take 1 tablet (75 mg total) by mouth once daily.    rosuvastatin (CRESTOR) 10 MG tablet Take 1 tablet (10 mg total) by mouth once daily.    solifenacin (VESICARE) 10 MG tablet Take 10 mg by mouth once daily. Patient is still taking     Family History       Problem Relation (Age of Onset)    Cancer Mother    Stroke Father          Tobacco Use    Smoking status: Former     Types: Cigars     Passive exposure: Past    Smokeless tobacco: Never   Vaping Use    Vaping  status: Never Used   Substance and Sexual Activity    Alcohol use: Yes     Alcohol/week: 3.0 standard drinks of alcohol     Types: 3 Shots of liquor per week     Comment: 1.5 oz a day    Drug use: Never    Sexual activity: Not Currently     Review of Systems   Constitutional:  Positive for activity change, appetite change and fatigue. Negative for chills and fever.   HENT:  Negative for ear pain, mouth sores, nosebleeds and sore throat.    Eyes:  Negative for visual disturbance.   Respiratory:  Negative for apnea, cough, shortness of breath and wheezing.    Cardiovascular:  Positive for leg swelling. Negative for chest pain and palpitations.   Gastrointestinal:  Positive for constipation. Negative for abdominal distention, abdominal pain, blood in stool, diarrhea, nausea and vomiting.   Endocrine: Positive for cold intolerance. Negative for polyphagia.   Genitourinary:  Positive for difficulty urinating. Negative for flank pain.   Musculoskeletal:  Positive for arthralgias, back pain, gait problem and myalgias.   Skin:  Positive for pallor.   Neurological:  Positive for dizziness, speech difficulty and weakness. Negative for tremors, seizures, syncope, facial asymmetry and headaches.   Hematological:  Negative for adenopathy.   Psychiatric/Behavioral:  Positive for confusion. Negative for agitation and hallucinations. The patient is nervous/anxious.      Objective:     Vital Signs (Most Recent):  Temp: 97.7 °F (36.5 °C) (06/05/25 1939)  Pulse: 70 (06/05/25 2030)  Resp: 18 (06/05/25 1939)  BP: 114/73 (06/05/25 1939)  SpO2: 97 % (06/05/25 1939) Vital Signs (24h Range):  Temp:  [97.4 °F (36.3 °C)-97.7 °F (36.5 °C)] 97.7 °F (36.5 °C)  Pulse:  [] 70  Resp:  [18] 18  SpO2:  [93 %-100 %] 97 %  BP: (113-136)/(53-73) 114/73     Weight: 78.3 kg (172 lb 9.6 oz)  Body mass index is 25.49 kg/m².     Physical Exam  Vitals and nursing note reviewed.   Constitutional:       General: He is not in acute distress.      Appearance: He is obese. He is ill-appearing.      Comments: Improved since last seen, able to stand with assistance   HENT:      Head: Normocephalic and atraumatic.      Nose: Nose normal. No congestion.      Mouth/Throat:      Mouth: Mucous membranes are moist.      Pharynx: No oropharyngeal exudate.   Eyes:      General: No scleral icterus.     Extraocular Movements: Extraocular movements intact.   Neck:      Vascular: No carotid bruit.   Cardiovascular:      Rate and Rhythm: Normal rate and regular rhythm.      Heart sounds: Normal heart sounds. No murmur heard.     No friction rub. No gallop.   Pulmonary:      Effort: No respiratory distress.      Breath sounds: No stridor. Rhonchi present. No wheezing or rales.   Chest:      Chest wall: No tenderness.   Abdominal:      General: There is no distension.      Palpations: Abdomen is soft. There is no mass.      Tenderness: There is no abdominal tenderness. There is no right CVA tenderness, left CVA tenderness, guarding or rebound.      Hernia: No hernia is present.   Genitourinary:     Comments: Bolton in carold  Musculoskeletal:      Cervical back: No rigidity.      Right lower leg: No edema.      Left lower leg: No edema.   Lymphadenopathy:      Cervical: No cervical adenopathy.   Skin:     Capillary Refill: Capillary refill takes less than 2 seconds.      Coloration: Skin is not jaundiced or pale.   Neurological:      Mental Status: He is disoriented.      Motor: Weakness present.      Gait: Gait abnormal.   Psychiatric:      Comments: More alert, o x 3, speech improved, + bilateral symmetric weakness, unsteady                Significant Labs:   Recent Lab Results         06/05/25  0620        Albumin 3.4       ALP 72       ALT 11       Anion Gap 6       AST 17       Baso # 0.03       Basophil % 0.5       BILIRUBIN TOTAL 0.4  Comment: For infants and newborns, interpretation of results should be based   on gestational age, weight and in agreement with clinical    observations.    Premature Infant recommended reference ranges:   0-24 hours:  <8.0 mg/dL   24-48 hours: <12.0 mg/dL   3-5 days:    <15.0 mg/dL   6-29 days:   <15.0 mg/dL       BUN 15       Calcium 8.6       Chloride 108       CO2 24       Creatinine 0.8       eGFR >60  Comment: Estimated GFR calculated using the CKD-EPI creatinine (2021) equation.       Eos # 0.41       Eos % 6.9       Glucose 103       Gran # (ANC) 4.30       Hematocrit 33.6       Hemoglobin 10.7       Immature Grans (Abs) 0.03  Comment: Mild elevation in immature granulocytes is non specific and can be seen in a variety of conditions including stress response, acute inflammation, trauma and pregnancy. Correlation with other laboratory and clinical findings is essential.       Immature Granulocytes 0.5       Lymph # 0.66       Lymph % 11.1       Magnesium  2.0       MCH 30.8       MCHC 31.8       MCV 97       Mono # 0.54       Mono % 9.0       MPV 9.1       Neut % 72.0       nRBC 0       Platelet Count 240       Potassium 4.2       PROTEIN TOTAL 6.6       RBC 3.47       RDW 14.6       Sodium 138       WBC 5.97               Significant Imaging: I have reviewed all pertinent imaging results/findings within the past 24 hours.

## 2025-06-06 NOTE — PLAN OF CARE
Intermittent confusion during night shift. Patient wanted to get out of bed and thought he was at his house. Redirected back to bed. Bed alarm in place. BLE venous stasis ulcers cleansed and dressed x 2 due to patient removing. IV dressing changed and clean, dry, intact.     Problem: Rehabilitation (IRF) Plan of Care  Goal: Plan of Care Review  Outcome: Progressing  Goal: Patient-Specific Goal (Individualized)  Outcome: Progressing  Goal: Absence of New-Onset Illness or Injury  Outcome: Progressing  Goal: Optimal Comfort and Wellbeing  Outcome: Progressing  Goal: Home and Community Transition Plan Established  Outcome: Progressing     Problem: Wound  Goal: Optimal Coping  Outcome: Progressing  Goal: Optimal Functional Ability  Outcome: Progressing  Goal: Absence of Infection Signs and Symptoms  Outcome: Progressing  Goal: Improved Oral Intake  Outcome: Progressing  Goal: Optimal Pain Control and Function  Outcome: Progressing  Goal: Skin Health and Integrity  Outcome: Progressing  Goal: Optimal Wound Healing  Outcome: Progressing     Problem: Infection  Goal: Absence of Infection Signs and Symptoms  Outcome: Progressing     Problem: Fall Injury Risk  Goal: Absence of Fall and Fall-Related Injury  Outcome: Progressing     Problem: Skin Injury Risk Increased  Goal: Skin Health and Integrity  Outcome: Progressing

## 2025-06-06 NOTE — PLAN OF CARE
Received in report that patient had a little confusion last night and tried to get out of bed, but easily redirected. Patient sleeping when making rounds this morning.    Problem: Rehabilitation (IRF) Plan of Care  Goal: Plan of Care Review  Outcome: Progressing  Goal: Patient-Specific Goal (Individualized)  Outcome: Progressing  Goal: Absence of New-Onset Illness or Injury  Outcome: Progressing  Goal: Optimal Comfort and Wellbeing  Outcome: Progressing  Goal: Home and Community Transition Plan Established  Outcome: Progressing     Problem: Wound  Goal: Optimal Coping  Outcome: Progressing  Goal: Optimal Functional Ability  Outcome: Progressing  Goal: Absence of Infection Signs and Symptoms  Outcome: Progressing  Goal: Improved Oral Intake  Outcome: Progressing  Goal: Optimal Pain Control and Function  Outcome: Progressing  Goal: Skin Health and Integrity  Outcome: Progressing  Goal: Optimal Wound Healing  Outcome: Progressing     Problem: Infection  Goal: Absence of Infection Signs and Symptoms  Outcome: Progressing     Problem: Fall Injury Risk  Goal: Absence of Fall and Fall-Related Injury  Outcome: Progressing     Problem: Skin Injury Risk Increased  Goal: Skin Health and Integrity  Outcome: Progressing     Problem: Mobility Impairment  Goal: Optimal Mobility  Outcome: Progressing     Problem: Pain Acute  Goal: Optimal Pain Control and Function  Outcome: Progressing     Problem: UTI (Urinary Tract Infection)  Goal: Improved Infection Symptoms  Outcome: Progressing     Problem: Urinary Retention  Goal: Effective Urinary Elimination  Outcome: Progressing

## 2025-06-06 NOTE — ASSESSMENT & PLAN NOTE
@ baseline.  Monitor.       Plan  - Monitor strict I&Os and daily weights.    - Place on telemetry  - Low sodium diet  - Place on fluid restriction of 2 L.   - Cardiology has not been consulted  - The patient's volume status is at their baseline

## 2025-06-06 NOTE — HPI
"Tobi Liz Jr. is a 89 y.o. male  has a past medical history of Aortic valve stenosis, Arthritis, BPH (benign prostatic hyperplasia), Carotid artery stenosis, Chronic diastolic heart failure, ETOH abuse, Exposure to COVID-19 virus (01/07/2022), Hyperchloremia, Hypertension, Kidney stones, Murmur, Pacemaker, Polymyalgia rheumatica, PVD (peripheral vascular disease), and Renal artery stenosis. presenting with Altered Mental Status (Pt to ED from [facility] via [facility] - family stated that pt was "walking halls last night" - with increased confusion - removed willams cath. Concerned for UTI.      Family reports he is more to his baseline this am.  Pt has been aggravated and annoyed by his willams catheter and family has noticed that he has had more utis since having catheter.  Urologist has given options for catheter removal; suprapubic cath and continuation of willams.  Family wishes to dc willams while in hospital and see how he does.     Willams removed and pending bladder scan this morning. Labs reviewed and overall stable.      6/3/25 FM:  Patient was seen and examined today after team conference and had a change in his neurological exam.  Patient had word-finding confusion him complained of persistent dizziness and lightheadedness despite having normal vital signs.  We performed a stat CT a subdural with midline shift I spoke to the patient's primary care team and we are transferring back down to [facility] as he will require a Neurosurgery consult and transfer and we can not complete this on our inpatient rehab unit.  We will notify family is soon as we can get in touch with them.     6/4/25:  Patient doing well this morning, neurosurgery consult last night and recommendations for continued PT evaluation and if not improving, consider palliative care.  Not a good candidate for surgical intervention at this time.  Labs stable this morning.      Patient reports visual disturbances have improved but still reports " dizziness. Aspirin and Plavix have been discontinued.    6/6 FM:  Patient was seen and examined after transitioned to inpatient rehab.  Patient was transferred down to our acute med surge unit after finding a subdural hematoma.  Patient was seen by tele neuro services and deemed that this was stable and that observation and further therapy would be his only treatment in addition to discontinuing antiplatelet agents.  Patient was on Plavix aspirin prior to identifying the subdural.  Patient has had an improvement in his neurological exam over the last few days he is work with therapy patient's speech is much improved he is alert oriented x3 and eager to return home with his spouse.  We will resume aggressive therapeutic efforts in attempt to transition him home to a supervision/modified independent functional level.  Patient's still has a Bolton catheter in place which we will attempt to wean during this admission.  Chart and case reviewed starting low-dose steroids as there has been some midline shift.     Patient requires acute inpatient rehab admission with 24-hour nursing and active physician oversight to monitor and manage acute medical comorbid conditions, labs, pain, and functional deficits. Patient/family will also require teaching and integration of improving functional skills into daily living. Patient will also require an individualized, interdisciplinary approach to their care, receiving PT, OT services 3 hours per day, 5 days per week. Required care cannot be provided at a lower level of care. Patient is anticipated to require approximately 10-14 days LOS with expected discharge home  with  services.             Impairment group (IGC):   Other Stroke 01.9 Etiologic diagnosis/description:   S06.5XAA: Sudural Hematoma   Date of onset:  6/3/2025 Date of surgery: N/A   Allergies: Patient has no known allergies.   Comorbid condition: HTN, HLD, BPH, CAD, Valvular Heart Disease, CHF Class II, Occlusion of  right iliac artery, Urinary Obstruction, Anemia, Acute bronchitis, Subdural hematoma   Medical/functional conditions requiring inpatient rehabilitation:      This patient requires medical management/24-hour nursing of complex co morbidities HTN, HLD, BPH, CAD, Valvular Heart Disease, CHF Class II, Occlusion of right iliac artery, Urinary Obstruction, Anemia, Acute bronchitis, Subdural hematoma, labs, medications (see medications list), pain, sleep hygiene, anticoagulation, nutrition, hydration, neurological, pulmonary, cardiac status, and preventive healthcare.     This patient requires intense therapy and an integrated, interdisciplinary approach to address safety, impaired mobility, impaired ADLs (dressing, toileting, grooming, showering), impaired cognition, judgment, and memory, communication, pulmonary insufficiency, bowel/bladder problems,preventive healthcare, medication management, integration of functional skills into daily living, and home caregiver support and training.    Risk for medical/clinical complications:      This patient is at risk for the following complications: DVT/PE, pneumonia, malnutrition, neurological decline, respiratory insufficiency, worsening activity intolerance, complications from discontinuation of anticoagulation,  skin breakdown, inadequate sleep, recurring stroke, and constipation.

## 2025-06-06 NOTE — PT/OT/SLP EVAL
Speech Language Pathology   Evaluation Gabriel Liz JrAnastasia   MRN: [id number]   Admitting Diagnosis: Subdural hematoma    Diet recommendations: Solid Diet Level: Regular Diet - IDDSI Level 7  Liquid Diet Level: Thin liquids - IDDSI Level 0 Standard aspiration precautions    SLP Treatment Date: 06/06/25  Speech Start Time: 1150     Speech Stop Time: 1230     Speech Total (min): 40 min       TREATMENT BILLABLE MINUTES:  Eval x 30 , Eval Swallow and Oral Function x 10, and Total Time x 40    Past Medical History:   Diagnosis Date    Aortic valve stenosis     Arthritis     BPH (benign prostatic hyperplasia)     Carotid artery stenosis     Chronic diastolic heart failure     ETOH abuse     Exposure to COVID-19 virus 01/07/2022    Hyperchloremia     Hypertension     Kidney stones     Murmur     Pacemaker     Polymyalgia rheumatica     PVD (peripheral vascular disease)     Renal artery stenosis      Past Surgical History:   Procedure Laterality Date    A-V CARDIAC PACEMAKER INSERTION N/A 10/06/2023    Procedure: INSERTION, CARDIAC PACEMAKER, DUAL CHAMBER;  Surgeon: Douglas Salguero MD;  Location: [facility] CATH;  Service: Cardiology;  Laterality: N/A;    ANGIOGRAM, CAROTID Left 08/25/2023    Procedure: ANGIOGRAM, CAROTID;  Surgeon: Nick Box MD;  Location: [facility] CATH;  Service: Cardiology;  Laterality: Left;    CATARACT EXTRACTION, BILATERAL      ECHOCARDIOGRAM,TRANSESOPHAGEAL N/A 10/03/2023    Procedure: Transesophageal echo (JOSE ARMANDO) intra-procedure log documentation;  Surgeon: Nick Box MD;  Location: [facility] OR;  Service: Cardiology;  Laterality: N/A;    HAND SURGERY      INSERTION OF STENT INTO PERIPHERAL VESSEL N/A 01/30/2025    Procedure: INSERTION, STENT, VESSEL, PERIPHERAL;  Surgeon: Harsha Salcedo MD;  Location: [facility] CATH;  Service: Cardiology;  Laterality: N/A;    PERCUTANEOUS TRANSCATHETER AORTIC VALVE REPLACEMENT (TAVR) Left 10/03/2023    Procedure: REPLACEMENT, AORTIC VALVE, PERCUTANEOUS,  TRANSCATHETER;  Surgeon: Nick Box MD;  Location: [facility] OR;  Service: Cardiology;  Laterality: Left;    PERCUTANEOUS TRANSCATHETER AORTIC VALVE REPLACEMENT (TAVR) Left 10/03/2023    Procedure: REPLACEMENT, AORTIC VALVE, PERCUTANEOUS, TRANSCATHETER carotid access;  Surgeon: Jun Brito MD;  Location: [facility] OR;  Service: Cardiovascular;  Laterality: Left;    PERCUTANEOUS TRANSLUMINAL ANGIOPLASTY N/A 08/25/2023    Procedure: ANGIOPLASTY-PERCUTANEOUS TRANSLUMINAL (PTA);  Surgeon: Nick Box MD;  Location: [facility] CATH;  Service: Cardiology;  Laterality: N/A;    PERCUTANEOUS TRANSLUMINAL ANGIOPLASTY (PTA) OF PERIPHERAL VESSEL Left 04/10/2025    Procedure: PTA, PERIPHERAL VESSEL;  Surgeon: Harsha Salcedo MD;  Location: [facility] CATH;  Service: Cardiology;  Laterality: Left;  L LE intervention via left antegrade approach Harsha Salcedo at [facility] in [facility] OR under MAC  *PACEMAKER*    SHOULDER SURGERY Left     total shoulder replacement    WOUND EXPLORATION N/A 10/03/2023    Procedure: EXPLORATION, WOUND;  Surgeon: Jun Brito MD;  Location: [facility] OR;  Service: Cardiology;  Laterality: N/A;       Has the patient been evaluated by SLP for swallowing? : Yes  Keep patient NPO?: No General Precautions: Standard, fall          Social Hx: Patient lives with wife in assisted living.    Prior diet: regular/thin.    Subjective:  Pt received awake/alert. Pt denies dysphagia-related reports/concerns. Endorses improvement of cognitive deficits since last seen by this therapist. Pt cooperative w/ ST.  Patient goals: none stated    Pain/Comfort  Pain Rating 1: 0/10    Objective:        Oral Musculature Evaluation  Oral Musculature: WFL  Dentition: scattered dentition  Secretion Management: adequate  Mucosal Quality: adequate  Mandibular Strength and Mobility: WFL  Oral Labial Strength and Mobility: WFL  Lingual Strength and Mobility: WFL  Velar Elevation: WFL  Buccal Strength and Mobility: WFL  Voice Prior to PO  Intake: perceptually WFL     Cognitive Status:  Behavioral Observations: alert, appropriate, and cooperative  Memory: immediate recall 3/5 (1st attempt) & 4/5 (2nd attempt); delayed recall 1/5; LTM recall WFL  Orientation: oriented to self, city, place, month, date, and situation  Attention: impaired  Pragmatics: WNL  Executive Function: impaired    MoCA-BLIND / [place]io Cognitive Assessment for the Blind: an adapted version of the original MoCA, a rapid screening instrument for mild cognitive dysfunction.    Total Score: 13/22 = pt scored below normal limits     Attention: 4/6  Language: 2/3  Abstraction:2/2  Delayed Recall: 1/5  Orientation: 4/6    WNL = greater than/ equal to a score of 18/22     Auditory Comprehension: able to identify objects, answers yes/no questions, and able to follow commands    Expressive Language:  Automatic Speech  Days of the week WFL  Initiation WFL  Repetition Words WFL and Sentences WFL  Naming Confrontation WFL and Single word responsive naming WFL  Conversational speech WFL    Motor Speech: WFL    Voice: perceptually WFL    Bedside Swallow Eval:  Consistencies Assessed: Thin liquids via self-administered straw x5+  Puree via self-administered tsp x5+  Solids via self-administered bites from lunch meal tray x5+  Oral Phase: WFL  Pharyngeal Phase: no overt clinical signs/symptoms of aspiration  no overt clinical signs/symptoms of pharyngeal dysphagia    Assessment:  Tobi Covarrubiasnemesio Dooley is a 89 y.o. male with a medical diagnosis of Subdural hematoma and presents with cognitive-linguistic impairment. No overt signs/symptoms of aspiration appreciated. Pt would benefit from skilled ST services at this time.    Spiritual, Cultural Beliefs, Orthodoxy Practices, Values that Affect Care: no    Discharge recommendations: Therapy Intensity Recommendations at Discharge: Low Intensity Therapy     Diet recommendations:    Liquid Diet Level: Thin liquids - IDDSI Level 0     Goals:    Multidisciplinary Problems       SLP Goals          Problem: SLP    Goal Priority Disciplines Outcome   SLP Goal     SLP Ongoing   Description:   ST. Pt will complete STM delayed recall tasks w/ 80% acc given min cues.  2. Pt will complete memory and mental manipulation tasks w/ 80% acc given min cues.  3. Pt will answer orientation questions w/ 100% acc given min cues.  4. Pt will complete functional cognitive tasks w/ environmental distractions present given min assist.    LT. Pt will use appropriate memory strategies to recall functional information and to maintain safety and participate socially in functional living environment.   2. Pt will develop functional cognitive-linguistic-based skills and utilize compensatory strategies to communicate wants and needs effectively to different conversational partners, maintain safety, and participate socially in functional living environment.   3. Pt will be appropriately oriented x4 w/ cues for external aid to improve safety and awareness in functional living environment.   4. Pt will demonstrate use of self awareness, planning, and self monitoring during ADLs to improve safety and awareness in functional living environment.                         Plan:   Patient to be seen Therapy Frequency: 5 x/week  Planned Interventions: Cognitive-Linguistic Therapy   Plan of Care expires: 25  Plan of Care reviewed with: patient  SLP Follow-up?: Yes              2025

## 2025-06-06 NOTE — PLAN OF CARE
Problem: SLP  Goal: SLP Goal  Description:   ST. Pt will complete STM delayed recall tasks w/ 80% acc given min cues.  2. Pt will complete memory and mental manipulation tasks w/ 80% acc given min cues.  3. Pt will answer orientation questions w/ 100% acc given min cues.  4. Pt will complete functional cognitive tasks w/ environmental distractions present given min assist.    LT. Pt will use appropriate memory strategies to recall functional information and to maintain safety and participate socially in functional living environment.   2. Pt will develop functional cognitive-linguistic-based skills and utilize compensatory strategies to communicate wants and needs effectively to different conversational partners, maintain safety, and participate socially in functional living environment.   3. Pt will be appropriately oriented x4 w/ cues for external aid to improve safety and awareness in functional living environment.   4. Pt will demonstrate use of self awareness, planning, and self monitoring during ADLs to improve safety and awareness in functional living environment.   Outcome: POC Established

## 2025-06-07 PROCEDURE — 97110 THERAPEUTIC EXERCISES: CPT

## 2025-06-07 PROCEDURE — 63600175 PHARM REV CODE 636 W HCPCS: Performed by: INTERNAL MEDICINE

## 2025-06-07 PROCEDURE — 25000003 PHARM REV CODE 250: Performed by: NURSE PRACTITIONER

## 2025-06-07 PROCEDURE — 25000003 PHARM REV CODE 250: Performed by: INTERNAL MEDICINE

## 2025-06-07 PROCEDURE — 97535 SELF CARE MNGMENT TRAINING: CPT

## 2025-06-07 PROCEDURE — 11800000 HC REHAB PRIVATE ROOM

## 2025-06-07 PROCEDURE — 97530 THERAPEUTIC ACTIVITIES: CPT

## 2025-06-07 RX ADMIN — MUPIROCIN: 20 OINTMENT TOPICAL at 08:06

## 2025-06-07 RX ADMIN — FLUCONAZOLE 200 MG: 200 TABLET ORAL at 07:06

## 2025-06-07 RX ADMIN — ATORVASTATIN CALCIUM 20 MG: 20 TABLET, FILM COATED ORAL at 09:06

## 2025-06-07 RX ADMIN — AMLODIPINE BESYLATE 5 MG: 5 TABLET ORAL at 09:06

## 2025-06-07 RX ADMIN — MUPIROCIN: 20 OINTMENT TOPICAL at 10:06

## 2025-06-07 RX ADMIN — DEXAMETHASONE 4 MG: 4 TABLET ORAL at 09:06

## 2025-06-07 RX ADMIN — DEXAMETHASONE 4 MG: 4 TABLET ORAL at 07:06

## 2025-06-07 RX ADMIN — QUETIAPINE FUMARATE 25 MG: 25 TABLET ORAL at 07:06

## 2025-06-07 NOTE — PLAN OF CARE
Problem: Rehabilitation (IRF) Plan of Care  Goal: Plan of Care Review  Outcome: Progressing  Goal: Patient-Specific Goal (Individualized)  Outcome: Progressing  Goal: Absence of New-Onset Illness or Injury  Outcome: Progressing  Goal: Optimal Comfort and Wellbeing  Outcome: Progressing  Goal: Home and Community Transition Plan Established  Outcome: Progressing     Problem: Wound  Goal: Optimal Coping  Outcome: Progressing  Goal: Optimal Functional Ability  Outcome: Progressing  Goal: Absence of Infection Signs and Symptoms  Outcome: Progressing  Goal: Improved Oral Intake  Outcome: Progressing  Goal: Optimal Pain Control and Function  Outcome: Progressing  Goal: Skin Health and Integrity  Outcome: Progressing  Goal: Optimal Wound Healing  Outcome: Progressing     Problem: Infection  Goal: Absence of Infection Signs and Symptoms  Outcome: Progressing     Problem: Fall Injury Risk  Goal: Absence of Fall and Fall-Related Injury  Outcome: Progressing     Problem: Skin Injury Risk Increased  Goal: Skin Health and Integrity  Outcome: Progressing     Problem: Mobility Impairment  Goal: Optimal Mobility  Outcome: Progressing

## 2025-06-07 NOTE — PLAN OF CARE
Problem: Rehabilitation (IRF) Plan of Care  Goal: Plan of Care Review  Outcome: Progressing  Goal: Patient-Specific Goal (Individualized)  Outcome: Progressing  Goal: Absence of New-Onset Illness or Injury  Outcome: Progressing  Goal: Optimal Comfort and Wellbeing  Outcome: Progressing  Goal: Home and Community Transition Plan Established  Outcome: Progressing     Problem: Wound  Goal: Optimal Coping  Outcome: Progressing  Goal: Optimal Functional Ability  Outcome: Progressing  Goal: Absence of Infection Signs and Symptoms  Outcome: Progressing  Goal: Improved Oral Intake  Outcome: Progressing  Goal: Optimal Pain Control and Function  Outcome: Progressing  Goal: Skin Health and Integrity  Outcome: Progressing  Goal: Optimal Wound Healing  Outcome: Progressing     Problem: Infection  Goal: Absence of Infection Signs and Symptoms  Outcome: Progressing     Problem: Fall Injury Risk  Goal: Absence of Fall and Fall-Related Injury  Outcome: Progressing     Problem: Skin Injury Risk Increased  Goal: Skin Health and Integrity  Outcome: Progressing     Problem: Mobility Impairment  Goal: Optimal Mobility  Outcome: Progressing     Problem: Pain Acute  Goal: Optimal Pain Control and Function  Outcome: Progressing     Problem: UTI (Urinary Tract Infection)  Goal: Improved Infection Symptoms  Outcome: Progressing     Problem: Urinary Retention  Goal: Effective Urinary Elimination  Outcome: Progressing   Will continue to monitor and will maintain a safe and secure environment.

## 2025-06-07 NOTE — PLAN OF CARE
Problem: Occupational Therapy  Goal: Occupational Therapy Goal  Description: Long Term Goals to be met by: 06/26/25     Patient will increase functional independence with ADLs by performing:    Feeding with Modified [place].  UE Dressing with Set-up Assistance.  LE Dressing with Supervision.  Grooming while seated at sink with Modified [place].  Toileting from toilet with Set-up Assistance for hygiene and clothing management.   Bathing from  shower chair/bench with Stand-by Assistance.  Toilet transfer to toilet with Set-up Assistance.  Increased functional strength to 4+/5 for bilateral UE's.    Outcome: Progressing

## 2025-06-07 NOTE — PT/OT/SLP PROGRESS
Occupational Therapy Inpatient Rehab Treatment    Name: Tobi Liz Jr.  MRN: [id number]    Assessment:  Tobi Liz Jr. is a 89 y.o. male admitted with a medical diagnosis of Subdural hematoma.  He presents with the following impairments/functional limitations:  weakness, impaired endurance, impaired self care skills, impaired functional mobility, gait instability, impaired balance, visual deficits, impaired cognition, decreased coordination, decreased upper extremity function, decreased lower extremity function, decreased safety awareness, decreased ROM, pain, impaired fine motor, impaired skin, edema, impaired cardiopulmonary response to activity, impaired joint extensibility, impaired muscle length.    Pt demonstrated improved functional performance with toileting as noted by min assist in which patient able to pull / adjust clothing from waist with steadying and perform perineal hygiene with cueing requiring assist to pull / adjust clothing back to waist with mod verbal and tactile cueing for safety and technique.     General Precautions: Standard, fall     Orthopedic Precautions:N/A     Braces: N/A    Rehab Prognosis: Good and Fair; patient would benefit from acute skilled OT services to address these deficits and reach maximum level of function.      History:     Past Medical History:   Diagnosis Date    Aortic valve stenosis     Arthritis     BPH (benign prostatic hyperplasia)     Carotid artery stenosis     Chronic diastolic heart failure     ETOH abuse     Exposure to COVID-19 virus 01/07/2022    Hyperchloremia     Hypertension     Kidney stones     Murmur     Pacemaker     Polymyalgia rheumatica     PVD (peripheral vascular disease)     Renal artery stenosis        Past Surgical History:   Procedure Laterality Date    A-V CARDIAC PACEMAKER INSERTION N/A 10/06/2023    Procedure: INSERTION, CARDIAC PACEMAKER, DUAL CHAMBER;  Surgeon: Douglas Salguero MD;  Location: [facility];  Service:  Cardiology;  Laterality: N/A;    ANGIOGRAM, CAROTID Left 08/25/2023    Procedure: ANGIOGRAM, CAROTID;  Surgeon: Nick Box MD;  Location: [facility] CATH;  Service: Cardiology;  Laterality: Left;    CATARACT EXTRACTION, BILATERAL      ECHOCARDIOGRAM,TRANSESOPHAGEAL N/A 10/03/2023    Procedure: Transesophageal echo (JOSE ARMANDO) intra-procedure log documentation;  Surgeon: Nick Box MD;  Location: [facility] OR;  Service: Cardiology;  Laterality: N/A;    HAND SURGERY      INSERTION OF STENT INTO PERIPHERAL VESSEL N/A 01/30/2025    Procedure: INSERTION, STENT, VESSEL, PERIPHERAL;  Surgeon: Harsha Salcedo MD;  Location: [facility] CATH;  Service: Cardiology;  Laterality: N/A;    PERCUTANEOUS TRANSCATHETER AORTIC VALVE REPLACEMENT (TAVR) Left 10/03/2023    Procedure: REPLACEMENT, AORTIC VALVE, PERCUTANEOUS, TRANSCATHETER;  Surgeon: Nick Box MD;  Location: [facility] OR;  Service: Cardiology;  Laterality: Left;    PERCUTANEOUS TRANSCATHETER AORTIC VALVE REPLACEMENT (TAVR) Left 10/03/2023    Procedure: REPLACEMENT, AORTIC VALVE, PERCUTANEOUS, TRANSCATHETER carotid access;  Surgeon: Jun Brito MD;  Location: [facility] OR;  Service: Cardiovascular;  Laterality: Left;    PERCUTANEOUS TRANSLUMINAL ANGIOPLASTY N/A 08/25/2023    Procedure: ANGIOPLASTY-PERCUTANEOUS TRANSLUMINAL (PTA);  Surgeon: Nick Box MD;  Location: [facility] CATH;  Service: Cardiology;  Laterality: N/A;    PERCUTANEOUS TRANSLUMINAL ANGIOPLASTY (PTA) OF PERIPHERAL VESSEL Left 04/10/2025    Procedure: PTA, PERIPHERAL VESSEL;  Surgeon: Harsha Salcedo MD;  Location: [facility] CATH;  Service: Cardiology;  Laterality: Left;  L LE intervention via left antegrade approach Harsha Salcedo at [facility] in [facility] OR under MAC  *PACEMAKER*    SHOULDER SURGERY Left     total shoulder replacement    WOUND EXPLORATION N/A 10/03/2023    Procedure: EXPLORATION, WOUND;  Surgeon: Jun Brito MD;  Location: [facility] OR;  Service: Cardiology;  Laterality: N/A;       Subjective      Orientation: Identifies self and Not oriented to time    Chief Complaint: weakness, but no dizziness on this date    Patient/Family Comments/goals: Pt would like to regain independence with ADL's including functional mobility in order to safely return home to [facility] with least amount of financial burden due to assistance.     Respiratory Status: Room air    Patients cultural, spiritual, Denominational conflicts given the current situation: no       Objective:     Patient found up in chair with willams catheter  upon OT entry to room.    Mobility   Patient completed:  Sit to Stand Transfer with contact guard assistance with rolling walker  Bed to Chair Transfer using Step Transfer technique with contact guard assistance with rolling walker  Toilet Transfer Step Transfer technique with minimum assistance with  grab bars    Functional Mobility  Pt ambulated greater than 200' between surfaces requiring CGA utilizing RW.     ADLs   Current Status   Eating     Oral Hygiene     Shower, Bathe Self     Upper Body Dressing     Lower Body Dressing     Toileting Hygiene 3   Toilet Transfer 3   Putting On, Taking Off Footwear       Limiting Factors for ADLs: endurance, limited ROM, balance, weakness, body habitus, visual, perceptual, coordination, cognition, safety awareness, and pain     Therapeutic Activities  Pt participated in therapeutic activity addressing functional reaching, gross motor coordination, static / dynamic standing balance, standing tolerance, and energy for task / endurance challenging him to retrieve, lift, stabilize, manipulate, and place various items needed to perform functional tasks of ADL's while standing utilizing bilateral UE requiring verbal and tactile cueing to facilitate optimal movements patterns, positioning within RW, and proper weight shifting utilizing forward, diagonal, and lateral steps with steadying assist when reaching outside EVELIN or challenged.     Therapeutic Exercise  He  participated in therapeutic exercise performing 3x12 bilateral UE proximal strengthening exercises utilizing moderate to heavy resistance theraband with scapular retraction, shoulder flexion, shoulder extension, shoulder adduction, shoulder abduction, horizontal abduction, internal rotation, and external rotation requiring continuous verbal and tactile cueing for technique emphasizing quality of movement.     Additional Treatments: Pt participated in ADL retraining as further noted above emphasizing fall prevention, and use of assistive devices and compensatory strategies including energy conservation techniques providing extra time requiring mod verbal and tactile cueing for sequencing, technique, and overall safety awareness.     LifeStyle Change and Education:     Patient left up in chair with call button in reach, nurse notified, and family present.     Education provided: Roles and goals of OT, ADLs, transfer training, bed mobility, body mechanics, assistive device, safety precautions, fall prevention, equipment recommendations, and home safety    Multidisciplinary Problems       Occupational Therapy Goals          Problem: Occupational Therapy    Goal Priority Disciplines Outcome Interventions   Occupational Therapy Goal     OT, PT/OT Progressing    Description: Long Term Goals to be met by: 06/26/25     Patient will increase functional independence with ADLs by performing:    Feeding with Modified [place].  UE Dressing with Set-up Assistance.  LE Dressing with Supervision.  Grooming while seated at sink with Modified [place].  Toileting from toilet with Set-up Assistance for hygiene and clothing management.   Bathing from  shower chair/bench with Stand-by Assistance.  Toilet transfer to toilet with Set-up Assistance.  Increased functional strength to 4+/5 for bilateral UE's.                         Time Tracking     OT Received On: 06/07/25  Time In 1530     Time Out 1700  Total Time 90 min  Therapy  Time: OT Individual: 45  OT Concurrent: 45  Missed Time:    Missed Time Reason:      Billable Minutes: Self Care/Home Management 15, Therapeutic Activity 35, and Therapeutic Exercise 40    06/07/2025

## 2025-06-08 PROCEDURE — 63600175 PHARM REV CODE 636 W HCPCS: Performed by: INTERNAL MEDICINE

## 2025-06-08 PROCEDURE — 25000003 PHARM REV CODE 250: Performed by: NURSE PRACTITIONER

## 2025-06-08 PROCEDURE — 11800000 HC REHAB PRIVATE ROOM

## 2025-06-08 PROCEDURE — 25000003 PHARM REV CODE 250: Performed by: INTERNAL MEDICINE

## 2025-06-08 RX ADMIN — MUPIROCIN: 20 OINTMENT TOPICAL at 08:06

## 2025-06-08 RX ADMIN — DEXAMETHASONE 4 MG: 4 TABLET ORAL at 08:06

## 2025-06-08 RX ADMIN — AMLODIPINE BESYLATE 5 MG: 5 TABLET ORAL at 08:06

## 2025-06-08 RX ADMIN — ATORVASTATIN CALCIUM 20 MG: 20 TABLET, FILM COATED ORAL at 08:06

## 2025-06-08 RX ADMIN — FLUCONAZOLE 200 MG: 200 TABLET ORAL at 08:06

## 2025-06-08 RX ADMIN — QUETIAPINE FUMARATE 25 MG: 25 TABLET ORAL at 08:06

## 2025-06-08 NOTE — ASSESSMENT & PLAN NOTE
Patient's hemorrhage is due to trauma/laceration, this bleeding is associated with an antiplatelet agent, the antiplatelet agent is Select Antiplatelet Agent(s): Aspirin and Clopidogrel. Patients most recent Hgb, Hct, platelets, and INR are listed below.  Recent Labs     06/06/25  0554   HGB 10.1*   HCT 31.6*        Plan  - Will trend hemoglobin/hematocrit Daily  - Will monitor and correct any coagulation defects  - Will transfuse if Hgb is <7g/dl (<8g/dl in cases of active ACS) or if patient has rapid bleeding leading to hemodynamic instability

## 2025-06-08 NOTE — HOSPITAL COURSE
6/8 AA, weekend crosscover, blood pressure stable, no acute events overnight reported, most recent blood work reviewed, continue PT OT effort    6/9 FM:  Patient reports increased dizziness.  Repeat head CT this morning.  Vital signs stable.  Neuro exam unremarkable.  Despite dizziness, patient continues to work well with therapy.  Encouraged patient to continue therapy efforts.    6/10 FM:  Patient seen and examined after team conference.  Patient's mood is good patient's strength has returned rapidly and he is improving more rapidly than expected.  Patient is eager to return home with family he will be returning to assisted living.  May early discharge on Friday.  Continue efforts.    6/11 FM:  Patient doing well this morning.  He is in the dining room and is actively participating in therapy.  No acute events reported.  Vital signs stable.  Patient continues to work well with therapy.  He is anxious for discharge the end of the week.  Encouraged patient to continue therapy efforts.    6/12 FM:  Patient doing well this morning.  He is sitting in wheelchair in the dining room and is actively participating in therapy.  Improved safety awareness, improved endurance.  No acute events reported.  Labs and vital signs stable.  Patient reports continued dizziness but states it is slowly improving.  Patient continues to work well with therapy.  Patient states he feels he is ready for discharge in the morning.  Encouraged patient to continue therapy efforts.    DC Note:  Patient's had an excellent inpatient rehab stay and is had made multiple functional gains.  Patient's lightheadedness and dizziness has improved patient tolerated steroids patient has improved with his safety stamina endurance gait stability and strength.  Patient is transitioning back to assisted living with his wife and they are ultimately looking forward to transitioning home once several renovations in the home or complete.  Patient's mood has been good  family's been present and very supportive transitioning back to home health today.

## 2025-06-08 NOTE — PLAN OF CARE
Problem: Rehabilitation (IRF) Plan of Care  Goal: Plan of Care Review  Outcome: Progressing  Goal: Patient-Specific Goal (Individualized)  Outcome: Progressing  Goal: Absence of New-Onset Illness or Injury  Outcome: Progressing  Goal: Optimal Comfort and Wellbeing  Outcome: Progressing  Goal: Home and Community Transition Plan Established  Outcome: Progressing     Problem: Wound  Goal: Optimal Coping  Outcome: Progressing  Goal: Optimal Functional Ability  Outcome: Progressing  Goal: Absence of Infection Signs and Symptoms  Outcome: Progressing  Goal: Improved Oral Intake  Outcome: Progressing  Goal: Optimal Pain Control and Function  Outcome: Progressing  Goal: Skin Health and Integrity  Outcome: Progressing  Goal: Optimal Wound Healing  Outcome: Progressing     Problem: Infection  Goal: Absence of Infection Signs and Symptoms  Outcome: Progressing     Problem: Fall Injury Risk  Goal: Absence of Fall and Fall-Related Injury  Outcome: Progressing     Problem: Skin Injury Risk Increased  Goal: Skin Health and Integrity  Outcome: Progressing     Problem: Mobility Impairment  Goal: Optimal Mobility  Outcome: Progressing     Problem: Pain Acute  Goal: Optimal Pain Control and Function  Outcome: Progressing     Problem: UTI (Urinary Tract Infection)  Goal: Improved Infection Symptoms  Outcome: Progressing     Problem: Urinary Retention  Goal: Effective Urinary Elimination  Outcome: Progressing   Will continue to monitor and will maintain a safe environment.

## 2025-06-08 NOTE — SUBJECTIVE & OBJECTIVE
Interval History: Patient seen and examined.    Review of Systems   Constitutional:  Positive for activity change, appetite change and fatigue. Negative for chills and fever.   HENT:  Negative for ear pain, mouth sores, nosebleeds and sore throat.    Eyes:  Negative for visual disturbance.   Respiratory:  Negative for apnea, cough, shortness of breath and wheezing.    Cardiovascular:  Positive for leg swelling. Negative for chest pain and palpitations.   Gastrointestinal:  Positive for constipation. Negative for abdominal distention, abdominal pain, blood in stool, diarrhea, nausea and vomiting.   Endocrine: Positive for cold intolerance. Negative for polyphagia.   Genitourinary:  Positive for difficulty urinating. Negative for flank pain.   Musculoskeletal:  Positive for arthralgias, back pain, gait problem and myalgias.   Skin:  Positive for pallor.   Neurological:  Positive for dizziness, speech difficulty and weakness. Negative for tremors, seizures, syncope, facial asymmetry and headaches.   Hematological:  Negative for adenopathy.   Psychiatric/Behavioral:  Positive for confusion. Negative for agitation and hallucinations. The patient is nervous/anxious.      Objective:     Vital Signs (Most Recent):  Temp: 98.3 °F (36.8 °C) (06/08/25 0728)  Pulse: 60 (06/08/25 0728)  Resp: 18 (06/08/25 0728)  BP: 122/60 (06/08/25 0851)  SpO2: 100 % (06/08/25 0728) Vital Signs (24h Range):  Temp:  [98.2 °F (36.8 °C)-98.3 °F (36.8 °C)] 98.3 °F (36.8 °C)  Pulse:  [60-64] 60  Resp:  [16-18] 18  SpO2:  [98 %-100 %] 100 %  BP: (120-122)/(57-60) 122/60     Weight: 78.3 kg (172 lb 9.9 oz)  Body mass index is 25.49 kg/m².    Intake/Output Summary (Last 24 hours) at 6/8/2025 1151  Last data filed at 6/8/2025 1000  Gross per 24 hour   Intake 1320 ml   Output 1301 ml   Net 19 ml         Physical Exam  Vitals and nursing note reviewed.   Constitutional:       General: He is not in acute distress.     Appearance: He is obese. He is  ill-appearing.      Comments: Improved since last seen, able to stand with assistance   HENT:      Head: Normocephalic and atraumatic.      Nose: Nose normal. No congestion.      Mouth/Throat:      Mouth: Mucous membranes are moist.      Pharynx: No oropharyngeal exudate.   Eyes:      General: No scleral icterus.     Extraocular Movements: Extraocular movements intact.   Neck:      Vascular: No carotid bruit.   Cardiovascular:      Rate and Rhythm: Normal rate and regular rhythm.      Heart sounds: Normal heart sounds. No murmur heard.     No friction rub. No gallop.   Pulmonary:      Effort: No respiratory distress.      Breath sounds: No stridor. Rhonchi present. No wheezing or rales.   Chest:      Chest wall: No tenderness.   Abdominal:      General: There is no distension.      Palpations: Abdomen is soft. There is no mass.      Tenderness: There is no abdominal tenderness. There is no right CVA tenderness, left CVA tenderness, guarding or rebound.      Hernia: No hernia is present.   Genitourinary:     Comments: Bolton in francy  Musculoskeletal:      Cervical back: No rigidity.      Right lower leg: No edema.      Left lower leg: No edema.   Lymphadenopathy:      Cervical: No cervical adenopathy.   Skin:     Capillary Refill: Capillary refill takes less than 2 seconds.      Coloration: Skin is not jaundiced or pale.   Neurological:      Mental Status: He is disoriented.      Motor: Weakness present.      Gait: Gait abnormal.   Psychiatric:      Comments: More alert, o x 3, speech improved, + bilateral symmetric weakness, unsteady               Significant Labs: All pertinent labs within the past 24 hours have been reviewed.  Recent Lab Results       None            Significant Imaging: I have reviewed all pertinent imaging results/findings within the past 24 hours.

## 2025-06-08 NOTE — NURSING
Pt showered, IV flushed,  mepilex x3 right led changed, mepilex placed on sacrum. Will continue to monitor.

## 2025-06-08 NOTE — PLAN OF CARE
Problem: Rehabilitation (IRF) Plan of Care  Goal: Plan of Care Review  Outcome: Progressing  Goal: Patient-Specific Goal (Individualized)  Outcome: Progressing  Goal: Absence of New-Onset Illness or Injury  Outcome: Progressing  Goal: Optimal Comfort and Wellbeing  Outcome: Progressing  Goal: Home and Community Transition Plan Established  Outcome: Progressing     Problem: Wound  Goal: Optimal Coping  Outcome: Progressing  Goal: Optimal Functional Ability  Outcome: Progressing  Goal: Absence of Infection Signs and Symptoms  Outcome: Progressing  Goal: Improved Oral Intake  Outcome: Progressing  Goal: Optimal Pain Control and Function  Outcome: Progressing  Goal: Skin Health and Integrity  Outcome: Progressing  Goal: Optimal Wound Healing  Outcome: Progressing     Problem: Infection  Goal: Absence of Infection Signs and Symptoms  Outcome: Progressing     Problem: Fall Injury Risk  Goal: Absence of Fall and Fall-Related Injury  Outcome: Progressing     Problem: Skin Injury Risk Increased  Goal: Skin Health and Integrity  Outcome: Progressing     Problem: Mobility Impairment  Goal: Optimal Mobility  Outcome: Progressing     Problem: Pain Acute  Goal: Optimal Pain Control and Function  Outcome: Progressing     Problem: UTI (Urinary Tract Infection)  Goal: Improved Infection Symptoms  Outcome: Progressing     Problem: Urinary Retention  Goal: Effective Urinary Elimination  Outcome: Progressing

## 2025-06-08 NOTE — PROGRESS NOTES
"[facility] Medicine  Progress Note    Patient Name: Tobi Liz Jr.  MRN: [id number]  Patient Class: IP- Rehab   Admission Date: 6/5/2025  Length of Stay: 3 days  Attending Physician: Dave Reyes III, MD  Primary Care Provider: Luis Enrique Guzman Jr., MD        Subjective     Principal Problem:Subdural hematoma        HPI:  Tobi Liz Jr. is a 89 y.o. male  has a past medical history of Aortic valve stenosis, Arthritis, BPH (benign prostatic hyperplasia), Carotid artery stenosis, Chronic diastolic heart failure, ETOH abuse, Exposure to COVID-19 virus (01/07/2022), Hyperchloremia, Hypertension, Kidney stones, Murmur, Pacemaker, Polymyalgia rheumatica, PVD (peripheral vascular disease), and Renal artery stenosis. presenting with Altered Mental Status (Pt to ED from [facility] via POV - family stated that pt was "walking halls last night" - with increased confusion - removed willams cath. Concerned for UTI.      Family reports he is more to his baseline this am.  Pt has been aggravated and annoyed by his willams catheter and family has noticed that he has had more utis since having catheter.  Urologist has given options for catheter removal; suprapubic cath and continuation of willams.  Family wishes to dc willams while in hospital and see how he does.     Willams removed and pending bladder scan this morning. Labs reviewed and overall stable.      6/3/25 FM:  Patient was seen and examined today after team conference and had a change in his neurological exam.  Patient had word-finding confusion him complained of persistent dizziness and lightheadedness despite having normal vital signs.  We performed a stat CT a subdural with midline shift I spoke to the patient's primary care team and we are transferring back down to [facility] as he will require a Neurosurgery consult and transfer and we can not complete this on our inpatient rehab unit.  We will notify family is soon as we can get " in touch with them.     6/4/25:  Patient doing well this morning, neurosurgery consult last night and recommendations for continued PT evaluation and if not improving, consider palliative care.  Not a good candidate for surgical intervention at this time.  Labs stable this morning.      Patient reports visual disturbances have improved but still reports dizziness. Aspirin and Plavix have been discontinued.    6/6 FM:  Patient was seen and examined after transitioned to inpatient rehab.  Patient was transferred down to our acute [facility] surge unit after finding a subdural hematoma.  Patient was seen by tele neuro services and deemed that this was stable and that observation and further therapy would be his only treatment in addition to discontinuing antiplatelet agents.  Patient was on Plavix aspirin prior to identifying the subdural.  Patient has had an improvement in his neurological exam over the last few days he is work with therapy patient's speech is much improved he is alert oriented x3 and eager to return home with his spouse.  We will resume aggressive therapeutic efforts in attempt to transition him home to a supervision/modified independent functional level.  Patient's still has a Bolton catheter in place which we will attempt to wean during this admission.  Chart and case reviewed starting low-dose steroids as there has been some midline shift.     Patient requires acute inpatient rehab admission with 24-hour nursing and active physician oversight to monitor and manage acute medical comorbid conditions, labs, pain, and functional deficits. Patient/family will also require teaching and integration of improving functional skills into daily living. Patient will also require an individualized, interdisciplinary approach to their care, receiving PT, OT services 3 hours per day, 5 days per week. Required care cannot be provided at a lower level of care. Patient is anticipated to require approximately 10-14 days LOS  with expected discharge home  with  services.             Impairment group (IGC):   Other Stroke 01.9 Etiologic diagnosis/description:   S06.5XAA: Sudural Hematoma   Date of onset:  6/3/2025 Date of surgery: N/A   Allergies: Patient has no known allergies.   Comorbid condition: HTN, HLD, BPH, CAD, Valvular Heart Disease, CHF Class II, Occlusion of right iliac artery, Urinary Obstruction, Anemia, Acute bronchitis, Subdural hematoma   Medical/functional conditions requiring inpatient rehabilitation:      This patient requires medical management/24-hour nursing of complex co morbidities HTN, HLD, BPH, CAD, Valvular Heart Disease, CHF Class II, Occlusion of right iliac artery, Urinary Obstruction, Anemia, Acute bronchitis, Subdural hematoma, labs, medications (see medications list), pain, sleep hygiene, anticoagulation, nutrition, hydration, neurological, pulmonary, cardiac status, and preventive healthcare.     This patient requires intense therapy and an integrated, interdisciplinary approach to address safety, impaired mobility, impaired ADLs (dressing, toileting, grooming, showering), impaired cognition, judgment, and memory, communication, pulmonary insufficiency, bowel/bladder problems,preventive healthcare, medication management, integration of functional skills into daily living, and home caregiver support and training.    Risk for medical/clinical complications:      This patient is at risk for the following complications: DVT/PE, pneumonia, malnutrition, neurological decline, respiratory insufficiency, worsening activity intolerance, complications from discontinuation of anticoagulation,  skin breakdown, inadequate sleep, recurring stroke, and constipation.       Overview/Hospital Course:  6/8 AA, weekend crosscover, blood pressure stable, no acute events overnight reported, most recent blood work reviewed, continue PT OT effort    Interval History: Patient seen and examined.    Review of Systems    Constitutional:  Positive for activity change, appetite change and fatigue. Negative for chills and fever.   HENT:  Negative for ear pain, mouth sores, nosebleeds and sore throat.    Eyes:  Negative for visual disturbance.   Respiratory:  Negative for apnea, cough, shortness of breath and wheezing.    Cardiovascular:  Positive for leg swelling. Negative for chest pain and palpitations.   Gastrointestinal:  Positive for constipation. Negative for abdominal distention, abdominal pain, blood in stool, diarrhea, nausea and vomiting.   Endocrine: Positive for cold intolerance. Negative for polyphagia.   Genitourinary:  Positive for difficulty urinating. Negative for flank pain.   Musculoskeletal:  Positive for arthralgias, back pain, gait problem and myalgias.   Skin:  Positive for pallor.   Neurological:  Positive for dizziness, speech difficulty and weakness. Negative for tremors, seizures, syncope, facial asymmetry and headaches.   Hematological:  Negative for adenopathy.   Psychiatric/Behavioral:  Positive for confusion. Negative for agitation and hallucinations. The patient is nervous/anxious.      Objective:     Vital Signs (Most Recent):  Temp: 98.3 °F (36.8 °C) (06/08/25 0728)  Pulse: 60 (06/08/25 0728)  Resp: 18 (06/08/25 0728)  BP: 122/60 (06/08/25 0851)  SpO2: 100 % (06/08/25 0728) Vital Signs (24h Range):  Temp:  [98.2 °F (36.8 °C)-98.3 °F (36.8 °C)] 98.3 °F (36.8 °C)  Pulse:  [60-64] 60  Resp:  [16-18] 18  SpO2:  [98 %-100 %] 100 %  BP: (120-122)/(57-60) 122/60     Weight: 78.3 kg (172 lb 9.9 oz)  Body mass index is 25.49 kg/m².    Intake/Output Summary (Last 24 hours) at 6/8/2025 1151  Last data filed at 6/8/2025 1000  Gross per 24 hour   Intake 1320 ml   Output 1301 ml   Net 19 ml         Physical Exam  Vitals and nursing note reviewed.   Constitutional:       General: He is not in acute distress.     Appearance: He is obese. He is ill-appearing.      Comments: Improved since last seen, able to stand  with assistance   HENT:      Head: Normocephalic and atraumatic.      Nose: Nose normal. No congestion.      Mouth/Throat:      Mouth: Mucous membranes are moist.      Pharynx: No oropharyngeal exudate.   Eyes:      General: No scleral icterus.     Extraocular Movements: Extraocular movements intact.   Neck:      Vascular: No carotid bruit.   Cardiovascular:      Rate and Rhythm: Normal rate and regular rhythm.      Heart sounds: Normal heart sounds. No murmur heard.     No friction rub. No gallop.   Pulmonary:      Effort: No respiratory distress.      Breath sounds: No stridor. Rhonchi present. No wheezing or rales.   Chest:      Chest wall: No tenderness.   Abdominal:      General: There is no distension.      Palpations: Abdomen is soft. There is no mass.      Tenderness: There is no abdominal tenderness. There is no right CVA tenderness, left CVA tenderness, guarding or rebound.      Hernia: No hernia is present.   Genitourinary:     Comments: Bolton in placd  Musculoskeletal:      Cervical back: No rigidity.      Right lower leg: No edema.      Left lower leg: No edema.   Lymphadenopathy:      Cervical: No cervical adenopathy.   Skin:     Capillary Refill: Capillary refill takes less than 2 seconds.      Coloration: Skin is not jaundiced or pale.   Neurological:      Mental Status: He is disoriented.      Motor: Weakness present.      Gait: Gait abnormal.   Psychiatric:      Comments: More alert, o x 3, speech improved, + bilateral symmetric weakness, unsteady               Significant Labs: All pertinent labs within the past 24 hours have been reviewed.  Recent Lab Results       None            Significant Imaging: I have reviewed all pertinent imaging results/findings within the past 24 hours.      Assessment & Plan  Subdural hematoma  Patient's hemorrhage is due to trauma/laceration, this bleeding is associated with an antiplatelet agent, the antiplatelet agent is Select Antiplatelet Agent(s): Aspirin and  Clopidogrel. Patients most recent Hgb, Hct, platelets, and INR are listed below.  Recent Labs     06/06/25  0554   HGB 10.1*   HCT 31.6*        Plan  - Will trend hemoglobin/hematocrit Daily  - Will monitor and correct any coagulation defects  - Will transfuse if Hgb is <7g/dl (<8g/dl in cases of active ACS) or if patient has rapid bleeding leading to hemodynamic instability  Hypertension  Patient's blood pressure range in the last 24 hours was: BP  Min: 120/57  Max: 122/60.The patient's inpatient anti-hypertensive regimen is listed below:  Current Antihypertensives  amLODIPine tablet 5 mg, Daily, Oral    Plan  - BP is controlled, no changes needed to their regimen    Hyperlipidemia  Home meds.    BPH (benign prostatic hyperplasia)      Congestive heart failure, NYHA class II  @ baseline.  Monitor.       Plan  - Monitor strict I&Os and daily weights.    - Place on telemetry  - Low sodium diet  - Place on fluid restriction of 2 L.   - Cardiology has not been consulted  - The patient's volume status is at their baseline        Insomnia    Gentle txt.  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  Wound of lower extremity  Wound care    Myopathy  Generalized, / Trunk weakness, MF, + CVA    Urinary obstruction  As above    Urinary tract infection without hematuria  Cont abx, review studies, attempt to wean willams next week.    Delirium    Much improved  VTE Risk Mitigation (From admission, onward)           Ordered     IP VTE HIGH RISK PATIENT  Once         06/05/25 1538     Place sequential compression device  Until discontinued         06/05/25 1538                    Discharge Planning   ADONAY:      Code Status: Prior   Medical Readiness for Discharge Date:   Discharge Plan A: Home                Please place Justification for DME        Sujit Liz MD  Department of Hospital Medicine   [facility] ([facility])

## 2025-06-08 NOTE — ASSESSMENT & PLAN NOTE
Patient's blood pressure range in the last 24 hours was: BP  Min: 120/57  Max: 122/60.The patient's inpatient anti-hypertensive regimen is listed below:  Current Antihypertensives  amLODIPine tablet 5 mg, Daily, Oral    Plan  - BP is controlled, no changes needed to their regimen

## 2025-06-09 LAB
ABSOLUTE EOSINOPHIL (OHS): 0 K/UL
ABSOLUTE MONOCYTE (OHS): 0.23 K/UL (ref 0.3–1)
ABSOLUTE NEUTROPHIL COUNT (OHS): 9.23 K/UL (ref 1.8–7.7)
ALBUMIN SERPL BCP-MCNC: 3.7 G/DL (ref 3.5–5.2)
ALP SERPL-CCNC: 73 UNIT/L (ref 40–150)
ALT SERPL W/O P-5'-P-CCNC: 15 UNIT/L (ref 10–44)
ANION GAP (OHS): 6 MMOL/L (ref 8–16)
AST SERPL-CCNC: 13 UNIT/L (ref 11–45)
BASOPHILS # BLD AUTO: 0.01 K/UL
BASOPHILS NFR BLD AUTO: 0.1 %
BILIRUB SERPL-MCNC: 0.3 MG/DL (ref 0.1–1)
BUN SERPL-MCNC: 27 MG/DL (ref 10–30)
CALCIUM SERPL-MCNC: 8.7 MG/DL (ref 8.7–10.5)
CHLORIDE SERPL-SCNC: 108 MMOL/L (ref 95–110)
CO2 SERPL-SCNC: 25 MMOL/L (ref 23–29)
CREAT SERPL-MCNC: 0.8 MG/DL (ref 0.5–1.4)
ERYTHROCYTE [DISTWIDTH] IN BLOOD BY AUTOMATED COUNT: 14.9 % (ref 11.5–14.5)
GFR SERPLBLD CREATININE-BSD FMLA CKD-EPI: >60 ML/MIN/1.73/M2
GLUCOSE SERPL-MCNC: 138 MG/DL (ref 70–110)
HCT VFR BLD AUTO: 38.1 % (ref 40–54)
HGB BLD-MCNC: 12.1 GM/DL (ref 14–18)
IMM GRANULOCYTES # BLD AUTO: 0.04 K/UL (ref 0–0.04)
IMM GRANULOCYTES NFR BLD AUTO: 0.4 % (ref 0–0.5)
LYMPHOCYTES # BLD AUTO: 0.42 K/UL (ref 1–4.8)
MAGNESIUM SERPL-MCNC: 2.2 MG/DL (ref 1.6–2.6)
MCH RBC QN AUTO: 30.8 PG (ref 27–31)
MCHC RBC AUTO-ENTMCNC: 31.8 G/DL (ref 32–36)
MCV RBC AUTO: 97 FL (ref 82–98)
NUCLEATED RBC (/100WBC) (OHS): 0 /100 WBC
PLATELET # BLD AUTO: 263 K/UL (ref 150–450)
PMV BLD AUTO: 9 FL (ref 9.2–12.9)
POTASSIUM SERPL-SCNC: 4.4 MMOL/L (ref 3.5–5.1)
PROT SERPL-MCNC: 7.1 GM/DL (ref 6–8.4)
RBC # BLD AUTO: 3.93 M/UL (ref 4.6–6.2)
RELATIVE EOSINOPHIL (OHS): 0 %
RELATIVE LYMPHOCYTE (OHS): 4.2 % (ref 18–48)
RELATIVE MONOCYTE (OHS): 2.3 % (ref 4–15)
RELATIVE NEUTROPHIL (OHS): 93 % (ref 38–73)
SODIUM SERPL-SCNC: 139 MMOL/L (ref 136–145)
WBC # BLD AUTO: 9.93 K/UL (ref 3.9–12.7)

## 2025-06-09 PROCEDURE — 36415 COLL VENOUS BLD VENIPUNCTURE: CPT | Performed by: INTERNAL MEDICINE

## 2025-06-09 PROCEDURE — 25000003 PHARM REV CODE 250: Performed by: INTERNAL MEDICINE

## 2025-06-09 PROCEDURE — 25000003 PHARM REV CODE 250: Performed by: NURSE PRACTITIONER

## 2025-06-09 PROCEDURE — 80053 COMPREHEN METABOLIC PANEL: CPT | Performed by: INTERNAL MEDICINE

## 2025-06-09 PROCEDURE — 97535 SELF CARE MNGMENT TRAINING: CPT

## 2025-06-09 PROCEDURE — 92507 TX SP LANG VOICE COMM INDIV: CPT

## 2025-06-09 PROCEDURE — 11800000 HC REHAB PRIVATE ROOM

## 2025-06-09 PROCEDURE — 63600175 PHARM REV CODE 636 W HCPCS: Performed by: INTERNAL MEDICINE

## 2025-06-09 PROCEDURE — 85025 COMPLETE CBC W/AUTO DIFF WBC: CPT | Performed by: INTERNAL MEDICINE

## 2025-06-09 PROCEDURE — 97530 THERAPEUTIC ACTIVITIES: CPT

## 2025-06-09 PROCEDURE — 97116 GAIT TRAINING THERAPY: CPT

## 2025-06-09 PROCEDURE — 83735 ASSAY OF MAGNESIUM: CPT | Performed by: INTERNAL MEDICINE

## 2025-06-09 PROCEDURE — 97110 THERAPEUTIC EXERCISES: CPT

## 2025-06-09 RX ORDER — DEXAMETHASONE 0.5 MG/1
2 TABLET ORAL EVERY 12 HOURS
Status: DISCONTINUED | OUTPATIENT
Start: 2025-06-09 | End: 2025-06-13 | Stop reason: HOSPADM

## 2025-06-09 RX ADMIN — QUETIAPINE FUMARATE 25 MG: 25 TABLET ORAL at 08:06

## 2025-06-09 RX ADMIN — ATORVASTATIN CALCIUM 20 MG: 20 TABLET, FILM COATED ORAL at 08:06

## 2025-06-09 RX ADMIN — AMLODIPINE BESYLATE 5 MG: 5 TABLET ORAL at 08:06

## 2025-06-09 RX ADMIN — DEXAMETHASONE 2 MG: 0.5 TABLET ORAL at 08:06

## 2025-06-09 RX ADMIN — MUPIROCIN: 20 OINTMENT TOPICAL at 08:06

## 2025-06-09 RX ADMIN — DEXAMETHASONE 4 MG: 4 TABLET ORAL at 08:06

## 2025-06-09 RX ADMIN — FLUCONAZOLE 200 MG: 200 TABLET ORAL at 07:06

## 2025-06-09 NOTE — PT/OT/SLP PROGRESS
Physical Therapy Inpatient Rehab Treatment    Patient Name:  Tobi Liz Jr.   MRN:  [id number]    Recommendations:     Discharge Recommendations:  Low Intensity Therapy   Discharge Equipment Recommendations: none   Barriers to discharge: None    Assessment:     Tobi Liz Jr. is a 89 y.o. male admitted with a medical diagnosis of Subdural hematoma.  He presents with the following impairments/functional limitations:    weakness, impaired endurance, impaired self care skills, impaired functional mobility, gait instability, impaired balance, decreased coordination, decreased upper extremity function, decreased lower extremity function, decreased safety awareness,  decreased ROM,  edema, impaired cardiopulmonary response to activity, impaired joint extensibility, impaired cognition, advance age, chronic back pain, impaired skin integrity and impaired muscle length..    Patient tolerated treatment fairly well. Denies pain but c/o fatigue and dizziness; falls asleep during therapy session; still with dry scaly skin on both LE , moisturizer applied.     Rehab Diagnosis:  Sudural Hematoma     Recent Surgery: * No surgery found *      General Precautions: Standard, fall     Orthopedic Precautions:N/A     Braces: N/A    Rehab Prognosis: Fair; patient would benefit from acute skilled PT services to address these deficits and reach maximum level of function.      History:     Past Medical History:   Diagnosis Date    Aortic valve stenosis     Arthritis     BPH (benign prostatic hyperplasia)     Carotid artery stenosis     Chronic diastolic heart failure     ETOH abuse     Exposure to COVID-19 virus 01/07/2022    Hyperchloremia     Hypertension     Kidney stones     Murmur     Pacemaker     Polymyalgia rheumatica     PVD (peripheral vascular disease)     Renal artery stenosis        Past Surgical History:   Procedure Laterality Date    A-V CARDIAC PACEMAKER INSERTION N/A 10/06/2023    Procedure: INSERTION, CARDIAC  PACEMAKER, DUAL CHAMBER;  Surgeon: Douglas Salguero MD;  Location: [facility] CATH;  Service: Cardiology;  Laterality: N/A;    ANGIOGRAM, CAROTID Left 08/25/2023    Procedure: ANGIOGRAM, CAROTID;  Surgeon: Nick Box MD;  Location: [facility] CATH;  Service: Cardiology;  Laterality: Left;    CATARACT EXTRACTION, BILATERAL      ECHOCARDIOGRAM,TRANSESOPHAGEAL N/A 10/03/2023    Procedure: Transesophageal echo (JOSE ARMANDO) intra-procedure log documentation;  Surgeon: Nick Box MD;  Location: [facility] OR;  Service: Cardiology;  Laterality: N/A;    HAND SURGERY      INSERTION OF STENT INTO PERIPHERAL VESSEL N/A 01/30/2025    Procedure: INSERTION, STENT, VESSEL, PERIPHERAL;  Surgeon: Harsha Salcedo MD;  Location: [facility] CATH;  Service: Cardiology;  Laterality: N/A;    PERCUTANEOUS TRANSCATHETER AORTIC VALVE REPLACEMENT (TAVR) Left 10/03/2023    Procedure: REPLACEMENT, AORTIC VALVE, PERCUTANEOUS, TRANSCATHETER;  Surgeon: Nick Box MD;  Location: [facility] OR;  Service: Cardiology;  Laterality: Left;    PERCUTANEOUS TRANSCATHETER AORTIC VALVE REPLACEMENT (TAVR) Left 10/03/2023    Procedure: REPLACEMENT, AORTIC VALVE, PERCUTANEOUS, TRANSCATHETER carotid access;  Surgeon: Jun Brito MD;  Location: [facility] OR;  Service: Cardiovascular;  Laterality: Left;    PERCUTANEOUS TRANSLUMINAL ANGIOPLASTY N/A 08/25/2023    Procedure: ANGIOPLASTY-PERCUTANEOUS TRANSLUMINAL (PTA);  Surgeon: Nick Box MD;  Location: [facility] CATH;  Service: Cardiology;  Laterality: N/A;    PERCUTANEOUS TRANSLUMINAL ANGIOPLASTY (PTA) OF PERIPHERAL VESSEL Left 04/10/2025    Procedure: PTA, PERIPHERAL VESSEL;  Surgeon: Harsha Salcedo MD;  Location: [facility] CATH;  Service: Cardiology;  Laterality: Left;  L LE intervention via left antegrade approach Harsha Salcedo at [facility] in [facility] OR under MAC  *PACEMAKER*    SHOULDER SURGERY Left     total shoulder replacement    WOUND EXPLORATION N/A 10/03/2023    Procedure: EXPLORATION, WOUND;  Surgeon: Jun Brito MD;   "Location: [facility] OR;  Service: Cardiology;  Laterality: N/A;       Subjective     Chief Complaint: "I want to go home to my house. I am dizzy."     Respiratory Status: Room air    Patients cultural, spiritual, Amish conflicts given the current situation: no      Objective:     Communicated with nurse and patient  prior to session.  Patient found up in chair with peripheral IV, willams catheter  upon PT entry to room.    Pt is Oriented x3 and Oriented to place and Alert and Cooperative.    Vitals   Vitals at Rest  /84 mmHg    HR 67 bpm    O2 Sat 100%   Pain  Denies      Functional Mobility:   Bed Mobility:     Rolling Left:  Set-up or clean-up assistance   Rolling Right: Set-up or clean-up assistance   Scooting: Set-up or clean-up assistance   Bridging: Set-up or clean-up assistance   Supine to Sit: Set-up or clean-up assistance   Sit to Supine: Set-up or clean-up assistance   Transfers:     Sit to Stand: Set-up or clean-up assistance  with rolling walker  Chair to mat: Set-up or clean-up assistance  with  rolling walker  using  Step Transfer  Gait: Pt ambulates 300 feet, 150 feet, 25 feet x 2 trials, 10 feet x 2 trials  with RW with Set-up or clean-up assistance .   Impairments contributing to gait deviations include impaired balance, decreased flexibility, and decreased strength.  12 steps (6 inch)  stairs with bilateral handrails with Set-up or clean-up assistance   2 inch and 4 inch  curb with rolling walker with Set-up or clean-up assistance   Ambulate 10  feet on uneven surfaces/ramps with rolling walker with Set-up or clean-up assistance    object from ground in standing position with reacher with rolling walker with Set-up or clean-up assistance   Wheelchair Propulsion:  Pt propelled Standard wheelchair x 200 feet on Level tile with  Bilateral upper extremity with Independent.      Current   Status  Discharge   Goal   Functional Area: Care Score:    Roll Left and Right 5 Set-up/clean-up   Sit to " Lying 5 Set-up/clean-up   Lying to Sitting on Side of Bed 5 Set-up/clean-up   Sit to Stand 5 Set-up/clean-up   Chair/Bed-to-Chair Transfer 5 Set-up/clean-up   Car Transfer 10 Supervision or touching assistance   Walk 10 Feet 5 Set-up/clean-up   Walk 50 Feet with Two Turns 5 Set-up/clean-up   Walk 150 Feet 5 Set-up/clean-up   Walk 10 Feet Uneven Surface 5 Supervision or touching assistance   1 Step (Curb) 5 Supervision or touching assistance   4 Steps 5 Supervision or touching assistance   12 Steps 5 Not applicable   Picking Up Object 5 Set-up/clean-up   Wheel 50 Feet with Two Turns 6     Wheel 150 Feet 6         Therapeutic Activities and Exercises:  Wheelchair mobility using both UE/LE  Gait training with RW on flat level surface   Stair training with both side rails, non-reciprocal steps   Curb negotiation with RW   Ramp negotiation with RW   Picking up a small object from the floor with RW  and reacher   Sit <> stand with both UE support  Stand step transfer with a RW   Sit <> supine  Rolling side <> side  Standing balance     SUPINE Both Lower Extremity   Active ROM Sets / Reps / Resistance / Etc.   Ankle PF and DF 3 x 10    Short Arc Quads 3 x 10    Heel Slides 3 x 10    SLR 3 x 10    Hip Abduction/Adduction 3 x 10         Activity Tolerance: Fair    Patient left up in chair with call button in reach and nurse  notified.    Education provided: roles and goals of PT/PTA, transfer training, bed mob, gait training, stair training, balance training, safety awareness, body mechanics, assistive device, strengthening exercises, and fall prevention    Expected compliance: Moderate compliance    GOALS:   Multidisciplinary Problems       Physical Therapy Goals          Problem: Physical Therapy    Goal Priority Disciplines Outcome Interventions   Physical Therapy Goal     PT, PT/OT Ongoing    Description: Problem: Physical Therapy  Goal: Physical Therapy Goal  Description:     Long Term Goals: 6/18/2025     Transfers  Status    Sit to Stand  Long Term Goal:  Complete sit to stand with Set-up Assistance using Rolling Walker with no verbal cues    Stand Step  Long Term Goal:  Complete stand step with  Set-up Assistance  using Rolling Walker with no  verbal cues    Supine <> Sit  Long Term Goal: Complete supine <> sit with Set-up Assistance  With no verbal cues rails prn    Rolling Right/Left  Long Term Goal: Complete rolling  right/left with Set-up Assistance with no verbal cues rails prn      Balance/Coordination    Static Sitting  Long Term Goal: Complete static sitting with Modified Independent  With no  verbal cues    Dynamic Sitting  Long Term Goal: Complete dynamic sitting with  Set-up Assistance  with  no verbal cues minimal excursions    Static Standing  Long Term Goal: Complete static standing with Set-up Assistance  with   no verbal cues and RW    Dynamic Standing  Long Term Goal: Complete dynamic standing with  Set-up Assistance with no verbal cues and minimal  excursions with RW    Endurance    Long Term Goal:  Physical Therapy: 2 times a day, 45 minutes  Rest periods: minimal  Sittin-8 hours/day    Mobility/Gait  Long Term Goal: Will ambulate with Set-up Assistance  using Rolling Walker with no  verbal cues x 300 ft    Stairs Assistance  Long Term Goal: Patient will ascend/descend 4 stairs/steps using both  handrails  nonreciprocal steps with Supervision or Set-up Assistance and minimal  verbal cues    Ramp/Uneven 10 feet surface  Long  Term Goal; Patient will be able to navigate a 10 inch uneven surface with RW  with   Supervision or Set-up Assistance and minimal  verbal cues      2-4  inch curb  Long  Term Goal; Patient will be able to navigate a  2 to 4 inch curb with RW with  Supervision or Set-up Assistance and minimal  verbal cues    Picking up object   Long  Term Goal; Patient will be able to  a small object from the floor  with proper A.D. with  Set-up Assistance and minimal  verbal cues    Car  transfers  Long  Term Goal; Patient will be able to get in and out of a vehicle  with RW with  stand by assistance                                            Plan:     During this hospitalization, patient to be seen 5 x/week to address the identified rehab impairments via gait training, therapeutic activities, therapeutic exercises, neuromuscular re-education and progress toward the following goals:    Plan of Care Expires:  06/18/25  PT Next Visit Date: 06/10/25  Plan of Care reviewed with: patient    Additional Information:         Time Tracking:     Therapy Time  PT Received On: 06/09/25  PT Start Time: 0830  PT Stop Time: 1000  PT Total Time (min): 90 min   PT Individual: 60  PT Concurrent: 30      Billable Minutes: Gait Training 30, Therapeutic Activity 30, and Therapeutic Exercise 30    06/09/2025

## 2025-06-09 NOTE — PT/OT/SLP PROGRESS
Speech-Language Pathology Treatment    Tobi Liz Jr.   MRN: [id number]     Diet recommendations: Solid Diet Level: Regular Diet - IDDSI Level 7  Liquid Diet Level: Thin liquids - IDDSI Level 0 HOB to 90 degrees and Standard aspiration precautions    SLP Treatment Date: 25  Speech Start Time: 1650     Speech Stop Time: 1720     Speech Total (min): 30 min       TREATMENT BILLABLE MINUTES:  Speech Therapy Individual x 30    General Precautions: Standard, fall        Subjective:  Pt received awake/alert. Pt w/ c/o dizziness. Cooperative w/ ST.    Objective:   Patient found with: willams catheter    Pt communicates wants/needs WFL. Participates in unstructured conversation w/ familiar communication partners (family) and this therapist w/ no difficulty. Pt w/ good LTM recall. Pt independently recalls safety precaution (instruction to use of bell when needing to get up). Pt also recalls details from prior conversation w/ physician independently. Pt educated on memory strategies; pt v/u and returns demonstration of strategy use during delayed recall task. Provided 5 unrelated words, pt demonstrates delayed recall of 4/5 words independently; improves to 5/5 given min cues. Pt completes memory and mental manipulation task w 50% acc given mod cues. Pt demonstrates appropriate focused attention throughout session w/ presence of environmental background noise (TV).    Pt answers the following orientation questions:  Orientation Yes No  Given cues   Person x     Place   x   Situation x     City x     State x     Month   x   Date  x    Day of Week x     Year   x    x     Age x     President x     Family Members x        Pain/Comfort  Pain Rating 1: 0/10    Assessment:  Tobi Liz Jr. is a 89 y.o. male with a SLP diagnosis of Cognitive-Linguistic Impairment. Progressing towards established goals. Pt would benefit from continuation of skilled ST services at this time.    Discharge recommendations: Therapy  Intensity Recommendations at Discharge:  (TBD pending pt progress)     Goals:   Multidisciplinary Problems       SLP Goals          Problem: SLP    Goal Priority Disciplines Outcome   SLP Goal     SLP Ongoing   Description:   ST. Pt will complete STM delayed recall tasks w/ 80% acc given min cues.  2. Pt will complete memory and mental manipulation tasks w/ 80% acc given min cues.  3. Pt will answer orientation questions w/ 100% acc given min cues.  4. Pt will complete functional cognitive tasks w/ environmental distractions present given min assist.    LT. Pt will use appropriate memory strategies to recall functional information and to maintain safety and participate socially in functional living environment.   2. Pt will develop functional cognitive-linguistic-based skills and utilize compensatory strategies to communicate wants and needs effectively to different conversational partners, maintain safety, and participate socially in functional living environment.   3. Pt will be appropriately oriented x4 w/ cues for external aid to improve safety and awareness in functional living environment.   4. Pt will demonstrate use of self awareness, planning, and self monitoring during ADLs to improve safety and awareness in functional living environment.                         Plan:   Patient to be seen Therapy Frequency: 5 x/week   Planned Interventions: Cognitive-Linguistic Therapy  Plan of Care Expires: 25  Plan of Care reviewed with: patient  SLP Follow-up?: Yes         2025

## 2025-06-09 NOTE — PT/OT/SLP PROGRESS
Occupational Therapy Inpatient Rehab Treatment    Name: Tobi Liz Jr.  MRN: [id number]    Assessment:  Tobi Liz Jr. is a 89 y.o. male admitted with a medical diagnosis of Subdural hematoma.  He presents with the following impairments/functional limitations:  weakness, impaired endurance, impaired self care skills, impaired functional mobility, gait instability, impaired balance, visual deficits, impaired cognition, decreased coordination, decreased upper extremity function, decreased lower extremity function, decreased safety awareness, decreased ROM, pain, impaired fine motor, impaired skin, edema, impaired cardiopulmonary response to activity, impaired joint extensibility, impaired muscle length.    Pt demonstrated progress with functional short term goals as noted below by changes in functional QI scores in which patient able to achieve 8/8 STG's while progressing towards long term goals. Pt now mod assist to setup assistance with ADL's including functional mobility with extra time, and use of assistive devices and compensatory strategies including energy conservation techniques due to advanced age requiring mod verbal and tactile cueing for safety awareness, sequencing at times, and technique.     General Precautions: Standard, fall     Orthopedic Precautions:N/A     Braces: N/A    Rehab Prognosis: Good; patient would benefit from acute skilled OT services to address these deficits and reach maximum level of function.      History:     Past Medical History:   Diagnosis Date    Aortic valve stenosis     Arthritis     BPH (benign prostatic hyperplasia)     Carotid artery stenosis     Chronic diastolic heart failure     ETOH abuse     Exposure to COVID-19 virus 01/07/2022    Hyperchloremia     Hypertension     Kidney stones     Murmur     Pacemaker     Polymyalgia rheumatica     PVD (peripheral vascular disease)     Renal artery stenosis        Past Surgical History:   Procedure Laterality Date     A-V CARDIAC PACEMAKER INSERTION N/A 10/06/2023    Procedure: INSERTION, CARDIAC PACEMAKER, DUAL CHAMBER;  Surgeon: Douglas Salguero MD;  Location: [facility] CATH;  Service: Cardiology;  Laterality: N/A;    ANGIOGRAM, CAROTID Left 08/25/2023    Procedure: ANGIOGRAM, CAROTID;  Surgeon: Nick Box MD;  Location: [facility] CATH;  Service: Cardiology;  Laterality: Left;    CATARACT EXTRACTION, BILATERAL      ECHOCARDIOGRAM,TRANSESOPHAGEAL N/A 10/03/2023    Procedure: Transesophageal echo (JOSE ARMANDO) intra-procedure log documentation;  Surgeon: Nick Box MD;  Location: [facility] OR;  Service: Cardiology;  Laterality: N/A;    HAND SURGERY      INSERTION OF STENT INTO PERIPHERAL VESSEL N/A 01/30/2025    Procedure: INSERTION, STENT, VESSEL, PERIPHERAL;  Surgeon: Harsha Salcedo MD;  Location: [facility] CATH;  Service: Cardiology;  Laterality: N/A;    PERCUTANEOUS TRANSCATHETER AORTIC VALVE REPLACEMENT (TAVR) Left 10/03/2023    Procedure: REPLACEMENT, AORTIC VALVE, PERCUTANEOUS, TRANSCATHETER;  Surgeon: Nick Box MD;  Location: [facility] OR;  Service: Cardiology;  Laterality: Left;    PERCUTANEOUS TRANSCATHETER AORTIC VALVE REPLACEMENT (TAVR) Left 10/03/2023    Procedure: REPLACEMENT, AORTIC VALVE, PERCUTANEOUS, TRANSCATHETER carotid access;  Surgeon: Jun Brito MD;  Location: [facility] OR;  Service: Cardiovascular;  Laterality: Left;    PERCUTANEOUS TRANSLUMINAL ANGIOPLASTY N/A 08/25/2023    Procedure: ANGIOPLASTY-PERCUTANEOUS TRANSLUMINAL (PTA);  Surgeon: Nick Box MD;  Location: [facility] CATH;  Service: Cardiology;  Laterality: N/A;    PERCUTANEOUS TRANSLUMINAL ANGIOPLASTY (PTA) OF PERIPHERAL VESSEL Left 04/10/2025    Procedure: PTA, PERIPHERAL VESSEL;  Surgeon: Harsha Salcedo MD;  Location: [facility] CATH;  Service: Cardiology;  Laterality: Left;  L LE intervention via left antegrade approach Harsha Salcedo at [facility] in [facility] OR under MAC  *PACEMAKER*    SHOULDER SURGERY Left     total shoulder replacement    WOUND EXPLORATION N/A  10/03/2023    Procedure: EXPLORATION, WOUND;  Surgeon: Jun Brito MD;  Location: [facility] OR;  Service: Cardiology;  Laterality: N/A;       Subjective     Orientation: Oriented x4    Chief Complaint: weakness and moderate dizziness as per patient report     Patient/Family Comments/goals: Pt would like to regain independence with ADL's including functional mobility in order to safely return home to [facility] with least amount of financial burden due to assistance.     Respiratory Status: Room air    Patients cultural, spiritual, Hindu conflicts given the current situation: no       Objective:     Patient found up in chair with willams catheter  upon OT entry to room.    Mobility   Patient completed:  Sit to Stand Transfer with supervision with rolling walker  Bed to Chair Transfer using Step Transfer technique with supervision with rolling walker  Toilet Transfer Step Transfer technique with supervision with  grab bars  Shower Transfer Step Transfer technique with supervision with grab bars and shower chair.    Functional Mobility  Pt ambulated greater than 200' between surfaces requiring supervision utilizing RW.     ADLs   Current Status   Eating 5   Oral Hygiene 5   Shower, Bathe Self 3   Upper Body Dressing 4   Lower Body Dressing 3   Toileting Hygiene 3   Toilet Transfer 4   Putting On, Taking Off Footwear 3     Limiting Factors for ADLs: endurance, limited ROM, balance, weakness, body habitus, visual, perceptual, coordination, cognition, safety awareness, and pain     Therapeutic Activities  He participated in therapeutic activity addressing functional reaching, gross motor coordination, static / dynamic standing balance, standing tolerance, and energy for task / endurance challenging him to retrieve, lift, stabilize, manipulate, and place various items needed to perform functional tasks of ADL's while standing utilizing bilateral UE requiring verbal and tactile cueing to facilitate optimal  movements patterns, positioning within RW, and proper weight shifting utilizing forward, diagonal, and lateral steps with intermittent steadying assist when reaching outside EVELIN.     Additional Treatments: Pt participated in extensive ADL retraining as further noted above emphasizing fall prevention, and use of assistive devices and compensatory strategies including energy conservation techniques providing extra time requiring mod verbal and tactile cueing for sequencing, technique, and overall safety awareness.     LifeStyle Change and Education:     Patient left up in chair with all lines intact and nurse notified.     Education provided: Roles and goals of OT, ADLs, transfer training, bed mobility, body mechanics, assistive device, sequencing, safety precautions, fall prevention, equipment recommendations, and home safety    Short Term Goals to be met by: 06/16/25      Patient will increase functional independence with ADLs by performing:     Feeding with Set-up Assistance. MET  UE Dressing with Supervision. MET  LE Dressing with Moderate Assistance. MET  Grooming while seated at sink with Set-up Assistance. MET  Toileting from toilet with Minimal Assistance for hygiene and clothing management. MET  Bathing from  shower chair/bench with Minimal Assistance. MET  Toilet transfer to toilet with Contact Guard Assistance. MET  Increased functional strength to 4- to 4/5 for bilateral UE's. MET     Long Term Goals to be met by: 06/26/25      Patient will increase functional independence with ADLs by performing:     Feeding with Modified [place]. ONGOING  UE Dressing with Set-up Assistance. ONGOING  LE Dressing with Supervision. ONGOING  Grooming while seated at sink with Modified [place]. ONGOING  Toileting from toilet with Set-up Assistance for hygiene and clothing management. ONGOING  Bathing from  shower chair/bench with Stand-by Assistance. ONGOING  Toilet transfer to toilet with Set-up Assistance.  ONGOING  Increased functional strength to 4+/5 for bilateral UE's. ONGOING    Time Tracking     OT Received On: 06/09/25  Time In 1030     Time Out 1200  Total Time 90 min  Therapy Time: OT Individual: 60  OT Concurrent: 30  Missed Time:    Missed Time Reason:      Billable Minutes: Self Care/Home Management 60 and Therapeutic Activity 30    06/09/2025

## 2025-06-09 NOTE — PROGRESS NOTES
"[facility] Medicine  Progress Note    Patient Name: Tobi Liz Jr.  MRN: [id number]  Patient Class: IP- Rehab   Admission Date: 6/5/2025  Length of Stay: 4 days  Attending Physician: Dave Reyes III, MD  Primary Care Provider: Luis Enrique Guzman Jr., MD        Subjective     Principal Problem:Subdural hematoma        HPI:  Tobi Liz Jr. is a 89 y.o. male  has a past medical history of Aortic valve stenosis, Arthritis, BPH (benign prostatic hyperplasia), Carotid artery stenosis, Chronic diastolic heart failure, ETOH abuse, Exposure to COVID-19 virus (01/07/2022), Hyperchloremia, Hypertension, Kidney stones, Murmur, Pacemaker, Polymyalgia rheumatica, PVD (peripheral vascular disease), and Renal artery stenosis. presenting with Altered Mental Status (Pt to ED from [facility] via POV - family stated that pt was "walking halls last night" - with increased confusion - removed willams cath. Concerned for UTI.      Family reports he is more to his baseline this am.  Pt has been aggravated and annoyed by his willams catheter and family has noticed that he has had more utis since having catheter.  Urologist has given options for catheter removal; suprapubic cath and continuation of willams.  Family wishes to dc willams while in hospital and see how he does.     Willams removed and pending bladder scan this morning. Labs reviewed and overall stable.      6/3/25 FM:  Patient was seen and examined today after team conference and had a change in his neurological exam.  Patient had word-finding confusion him complained of persistent dizziness and lightheadedness despite having normal vital signs.  We performed a stat CT a subdural with midline shift I spoke to the patient's primary care team and we are transferring back down to [facility] as he will require a Neurosurgery consult and transfer and we can not complete this on our inpatient rehab unit.  We will notify family is soon as we can get " in touch with them.     6/4/25:  Patient doing well this morning, neurosurgery consult last night and recommendations for continued PT evaluation and if not improving, consider palliative care.  Not a good candidate for surgical intervention at this time.  Labs stable this morning.      Patient reports visual disturbances have improved but still reports dizziness. Aspirin and Plavix have been discontinued.    6/6 FM:  Patient was seen and examined after transitioned to inpatient rehab.  Patient was transferred down to our acute [facility] surge unit after finding a subdural hematoma.  Patient was seen by tele neuro services and deemed that this was stable and that observation and further therapy would be his only treatment in addition to discontinuing antiplatelet agents.  Patient was on Plavix aspirin prior to identifying the subdural.  Patient has had an improvement in his neurological exam over the last few days he is work with therapy patient's speech is much improved he is alert oriented x3 and eager to return home with his spouse.  We will resume aggressive therapeutic efforts in attempt to transition him home to a supervision/modified independent functional level.  Patient's still has a Bolton catheter in place which we will attempt to wean during this admission.  Chart and case reviewed starting low-dose steroids as there has been some midline shift.     Patient requires acute inpatient rehab admission with 24-hour nursing and active physician oversight to monitor and manage acute medical comorbid conditions, labs, pain, and functional deficits. Patient/family will also require teaching and integration of improving functional skills into daily living. Patient will also require an individualized, interdisciplinary approach to their care, receiving PT, OT services 3 hours per day, 5 days per week. Required care cannot be provided at a lower level of care. Patient is anticipated to require approximately 10-14 days LOS  with expected discharge home  with  services.             Impairment group (IGC):   Other Stroke 01.9 Etiologic diagnosis/description:   S06.5XAA: Sudural Hematoma   Date of onset:  6/3/2025 Date of surgery: N/A   Allergies: Patient has no known allergies.   Comorbid condition: HTN, HLD, BPH, CAD, Valvular Heart Disease, CHF Class II, Occlusion of right iliac artery, Urinary Obstruction, Anemia, Acute bronchitis, Subdural hematoma   Medical/functional conditions requiring inpatient rehabilitation:      This patient requires medical management/24-hour nursing of complex co morbidities HTN, HLD, BPH, CAD, Valvular Heart Disease, CHF Class II, Occlusion of right iliac artery, Urinary Obstruction, Anemia, Acute bronchitis, Subdural hematoma, labs, medications (see medications list), pain, sleep hygiene, anticoagulation, nutrition, hydration, neurological, pulmonary, cardiac status, and preventive healthcare.     This patient requires intense therapy and an integrated, interdisciplinary approach to address safety, impaired mobility, impaired ADLs (dressing, toileting, grooming, showering), impaired cognition, judgment, and memory, communication, pulmonary insufficiency, bowel/bladder problems,preventive healthcare, medication management, integration of functional skills into daily living, and home caregiver support and training.    Risk for medical/clinical complications:      This patient is at risk for the following complications: DVT/PE, pneumonia, malnutrition, neurological decline, respiratory insufficiency, worsening activity intolerance, complications from discontinuation of anticoagulation,  skin breakdown, inadequate sleep, recurring stroke, and constipation.       Overview/Hospital Course:  6/8 AA, weekend crosscover, blood pressure stable, no acute events overnight reported, most recent blood work reviewed, continue PT OT effort    6/9 FM:  Patient reports increased dizziness.  Repeat head CT this morning.   Vital signs stable.  Neuro exam unremarkable.  Despite dizziness, patient continues to work well with therapy.  Encouraged patient to continue therapy efforts.    Interval History: Patient seen and examined.    Review of Systems   Constitutional:  Positive for activity change, appetite change and fatigue. Negative for chills and fever.   HENT:  Negative for ear pain, mouth sores, nosebleeds and sore throat.    Eyes:  Negative for visual disturbance.   Respiratory:  Negative for apnea, cough, shortness of breath and wheezing.    Cardiovascular:  Positive for leg swelling. Negative for chest pain and palpitations.   Gastrointestinal:  Positive for constipation. Negative for abdominal distention, abdominal pain, blood in stool, diarrhea, nausea and vomiting.   Endocrine: Positive for cold intolerance. Negative for polyphagia.   Genitourinary:  Positive for difficulty urinating. Negative for flank pain.   Musculoskeletal:  Positive for arthralgias, back pain, gait problem and myalgias.   Skin:  Positive for pallor.   Neurological:  Positive for dizziness, speech difficulty and weakness. Negative for tremors, seizures, syncope, facial asymmetry and headaches.   Hematological:  Negative for adenopathy.   Psychiatric/Behavioral:  Positive for confusion. Negative for agitation and hallucinations. The patient is nervous/anxious.      Objective:     Vital Signs (Most Recent):  Temp: 97.6 °F (36.4 °C) (06/09/25 0723)  Pulse: (!) 59 (06/09/25 0723)  Resp: 19 (06/09/25 0813)  BP: 133/67 (06/09/25 0813)  SpO2: 98 % (06/09/25 0723) Vital Signs (24h Range):  Temp:  [97.4 °F (36.3 °C)-97.6 °F (36.4 °C)] 97.6 °F (36.4 °C)  Pulse:  [59-62] 59  Resp:  [11-19] 19  SpO2:  [95 %-98 %] 98 %  BP: (111-160)/(53-67) 133/67     Weight: 78.3 kg (172 lb 9.9 oz)  Body mass index is 25.49 kg/m².    Intake/Output Summary (Last 24 hours) at 6/9/2025 1001  Last data filed at 6/9/2025 0915  Gross per 24 hour   Intake 1470 ml   Output 2200 ml   Net  -730 ml         Physical Exam  Vitals and nursing note reviewed.   Constitutional:       General: He is not in acute distress.     Appearance: He is obese. He is ill-appearing.      Comments: Improved since last seen, able to stand with assistance   HENT:      Head: Normocephalic and atraumatic.      Nose: Nose normal. No congestion.      Mouth/Throat:      Mouth: Mucous membranes are moist.      Pharynx: No oropharyngeal exudate.   Eyes:      General: No scleral icterus.     Extraocular Movements: Extraocular movements intact.   Neck:      Vascular: No carotid bruit.   Cardiovascular:      Rate and Rhythm: Normal rate and regular rhythm.      Heart sounds: Normal heart sounds. No murmur heard.     No friction rub. No gallop.   Pulmonary:      Effort: No respiratory distress.      Breath sounds: No stridor. Rhonchi present. No wheezing or rales.   Chest:      Chest wall: No tenderness.   Abdominal:      General: There is no distension.      Palpations: Abdomen is soft. There is no mass.      Tenderness: There is no abdominal tenderness. There is no right CVA tenderness, left CVA tenderness, guarding or rebound.      Hernia: No hernia is present.   Genitourinary:     Comments: Bolton in placd  Musculoskeletal:      Cervical back: No rigidity.      Right lower leg: No edema.      Left lower leg: No edema.   Lymphadenopathy:      Cervical: No cervical adenopathy.   Skin:     Capillary Refill: Capillary refill takes less than 2 seconds.      Coloration: Skin is not jaundiced or pale.   Neurological:      Mental Status: He is disoriented.      Motor: Weakness present.      Gait: Gait abnormal.   Psychiatric:      Comments: More alert, o x 3, speech improved, + bilateral symmetric weakness, unsteady               Significant Labs: All pertinent labs within the past 24 hours have been reviewed.  Recent Lab Results         06/09/25  0705        Albumin 3.7       ALP 73       ALT 15       Anion Gap 6       AST 13       Baso #  0.01       Basophil % 0.1       BILIRUBIN TOTAL 0.3  Comment: For infants and newborns, interpretation of results should be based   on gestational age, weight and in agreement with clinical   observations.    Premature Infant recommended reference ranges:   0-24 hours:  <8.0 mg/dL   24-48 hours: <12.0 mg/dL   3-5 days:    <15.0 mg/dL   6-29 days:   <15.0 mg/dL       BUN 27       Calcium 8.7       Chloride 108       CO2 25       Creatinine 0.8       eGFR >60  Comment: Estimated GFR calculated using the CKD-EPI creatinine (2021) equation.       Eos # 0.00       Eos % 0.0       Glucose 138       Gran # (ANC) 9.23       Hematocrit 38.1       Hemoglobin 12.1       Immature Grans (Abs) 0.04  Comment: Mild elevation in immature granulocytes is non specific and can be seen in a variety of conditions including stress response, acute inflammation, trauma and pregnancy. Correlation with other laboratory and clinical findings is essential.       Immature Granulocytes 0.4       Lymph # 0.42       Lymph % 4.2       Magnesium  2.2       MCH 30.8       MCHC 31.8       MCV 97       Mono # 0.23       Mono % 2.3       MPV 9.0       Neut % 93.0       nRBC 0       Platelet Count 263       Potassium 4.4       PROTEIN TOTAL 7.1       RBC 3.93       RDW 14.9       Sodium 139       WBC 9.93               Significant Imaging: I have reviewed all pertinent imaging results/findings within the past 24 hours.      Assessment & Plan  Subdural hematoma  Patient's hemorrhage is due to trauma/laceration, this bleeding is associated with an antiplatelet agent, the antiplatelet agent is Select Antiplatelet Agent(s): Aspirin and Clopidogrel. Patients most recent Hgb, Hct, platelets, and INR are listed below.  Recent Labs     06/09/25  0705   HGB 12.1*   HCT 38.1*        Plan  - Will trend hemoglobin/hematocrit Daily  - Will monitor and correct any coagulation defects  - Will transfuse if Hgb is <7g/dl (<8g/dl in cases of active ACS) or if  patient has rapid bleeding leading to hemodynamic instability    6/9:  Patient with increased dizziness.  Repeat head CT this morning.  Hypertension  Patient's blood pressure range in the last 24 hours was: BP  Min: 111/53  Max: 142/61.The patient's inpatient anti-hypertensive regimen is listed below:  Current Antihypertensives  amLODIPine tablet 5 mg, Daily, Oral    Plan  - BP is controlled, no changes needed to their regimen    Hyperlipidemia  Home meds.    BPH (benign prostatic hyperplasia)  Noted.  Stable.    Congestive heart failure, NYHA class II  @ baseline.  Monitor.       Plan  - Monitor strict I&Os and daily weights.    - Place on telemetry  - Low sodium diet  - Place on fluid restriction of 2 L.   - Cardiology has not been consulted  - The patient's volume status is at their baseline        Insomnia  Gentle txt.  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  Wound of lower extremity  Wound care    Myopathy  Generalized, / Trunk weakness, MF, + CVA    Urinary obstruction  As above    Urinary tract infection without hematuria  Cont abx, review studies, attempt to wean willams next week.    Delirium  Much improved    VTE Risk Mitigation (From admission, onward)           Ordered     IP VTE HIGH RISK PATIENT  Once         06/05/25 1538     Place sequential compression device  Until discontinued         06/05/25 1538                    Discharge Planning   ADONAY:      Code Status: Prior   Medical Readiness for Discharge Date:   Discharge Plan A: Home                Please place Justification for DME        Dave Reyes III, MD  Department of Hospital Medicine   [facility] ([facility])

## 2025-06-09 NOTE — ASSESSMENT & PLAN NOTE
Patient's hemorrhage is due to trauma/laceration, this bleeding is associated with an antiplatelet agent, the antiplatelet agent is Select Antiplatelet Agent(s): Aspirin and Clopidogrel. Patients most recent Hgb, Hct, platelets, and INR are listed below.  Recent Labs     06/09/25  0705   HGB 12.1*   HCT 38.1*        Plan  - Will trend hemoglobin/hematocrit Daily  - Will monitor and correct any coagulation defects  - Will transfuse if Hgb is <7g/dl (<8g/dl in cases of active ACS) or if patient has rapid bleeding leading to hemodynamic instability    6/9:  Patient with increased dizziness.  Repeat head CT this morning.

## 2025-06-09 NOTE — PLAN OF CARE
Problem: SLP  Goal: SLP Goal  Description:   ST. Pt will complete STM delayed recall tasks w/ 80% acc given min cues.  2. Pt will complete memory and mental manipulation tasks w/ 80% acc given min cues.  3. Pt will answer orientation questions w/ 100% acc given min cues.  4. Pt will complete functional cognitive tasks w/ environmental distractions present given min assist.    LT. Pt will use appropriate memory strategies to recall functional information and to maintain safety and participate socially in functional living environment.   2. Pt will develop functional cognitive-linguistic-based skills and utilize compensatory strategies to communicate wants and needs effectively to different conversational partners, maintain safety, and participate socially in functional living environment.   3. Pt will be appropriately oriented x4 w/ cues for external aid to improve safety and awareness in functional living environment.   4. Pt will demonstrate use of self awareness, planning, and self monitoring during ADLs to improve safety and awareness in functional living environment.   Outcome: Progressing

## 2025-06-09 NOTE — PLAN OF CARE
No issues reported overnight.    Problem: Rehabilitation (IRF) Plan of Care  Goal: Plan of Care Review  Outcome: Progressing  Goal: Patient-Specific Goal (Individualized)  Outcome: Progressing  Goal: Absence of New-Onset Illness or Injury  Outcome: Progressing  Goal: Optimal Comfort and Wellbeing  Outcome: Progressing  Goal: Home and Community Transition Plan Established  Outcome: Progressing     Problem: Wound  Goal: Optimal Coping  Outcome: Progressing  Goal: Optimal Functional Ability  Outcome: Progressing  Goal: Absence of Infection Signs and Symptoms  Outcome: Progressing  Goal: Improved Oral Intake  Outcome: Progressing  Goal: Optimal Pain Control and Function  Outcome: Progressing  Goal: Skin Health and Integrity  Outcome: Progressing  Goal: Optimal Wound Healing  Outcome: Progressing     Problem: Infection  Goal: Absence of Infection Signs and Symptoms  Outcome: Progressing     Problem: Fall Injury Risk  Goal: Absence of Fall and Fall-Related Injury  Outcome: Progressing     Problem: Skin Injury Risk Increased  Goal: Skin Health and Integrity  Outcome: Progressing     Problem: Mobility Impairment  Goal: Optimal Mobility  Outcome: Progressing     Problem: Pain Acute  Goal: Optimal Pain Control and Function  Outcome: Progressing     Problem: UTI (Urinary Tract Infection)  Goal: Improved Infection Symptoms  Outcome: Progressing     Problem: Urinary Retention  Goal: Effective Urinary Elimination  Outcome: Progressing

## 2025-06-09 NOTE — PLAN OF CARE
Problem: Physical Therapy  Goal: Physical Therapy Goal  Description: Problem: Physical Therapy  Goal: Physical Therapy Goal  Description:     Long Term Goals: 2025     Transfers Status    Sit to Stand  Long Term Goal:  Complete sit to stand with Set-up Assistance using Rolling Walker with no verbal cues    Stand Step  Long Term Goal:  Complete stand step with  Set-up Assistance  using Rolling Walker with no  verbal cues    Supine <> Sit  Long Term Goal: Complete supine <> sit with Set-up Assistance  With no verbal cues rails prn    Rolling Right/Left  Long Term Goal: Complete rolling  right/left with Set-up Assistance with no verbal cues rails prn      Balance/Coordination    Static Sitting  Long Term Goal: Complete static sitting with Modified Independent  With no  verbal cues    Dynamic Sitting  Long Term Goal: Complete dynamic sitting with  Set-up Assistance  with  no verbal cues minimal excursions    Static Standing  Long Term Goal: Complete static standing with Set-up Assistance  with   no verbal cues and RW    Dynamic Standing  Long Term Goal: Complete dynamic standing with  Set-up Assistance with no verbal cues and minimal  excursions with RW    Endurance    Long Term Goal:  Physical Therapy: 2 times a day, 45 minutes  Rest periods: minimal  Sittin-8 hours/day    Mobility/Gait  Long Term Goal: Will ambulate with Set-up Assistance  using Rolling Walker with no  verbal cues x 300 ft    Stairs Assistance  Long Term Goal: Patient will ascend/descend 4 stairs/steps using both  handrails  nonreciprocal steps with Supervision or Set-up Assistance and minimal  verbal cues    Ramp/Uneven 10 feet surface  Long  Term Goal; Patient will be able to navigate a 10 inch uneven surface with RW  with   Supervision or Set-up Assistance and minimal  verbal cues      2-4  inch curb  Long  Term Goal; Patient will be able to navigate a  2 to 4 inch curb with RW with  Supervision or Set-up Assistance and minimal  verbal  cues    Picking up object   Long  Term Goal; Patient will be able to  a small object from the floor  with proper A.D. with  Set-up Assistance and minimal  verbal cues    Car transfers  Long  Term Goal; Patient will be able to get in and out of a vehicle  with RW with  stand by assistance       Outcome: Ongoing, denies pain but c/o fatigue; falls asleep during therapy session; still with dry scaly skin on both LE

## 2025-06-09 NOTE — SUBJECTIVE & OBJECTIVE
Interval History: Patient seen and examined.    Review of Systems   Constitutional:  Positive for activity change, appetite change and fatigue. Negative for chills and fever.   HENT:  Negative for ear pain, mouth sores, nosebleeds and sore throat.    Eyes:  Negative for visual disturbance.   Respiratory:  Negative for apnea, cough, shortness of breath and wheezing.    Cardiovascular:  Positive for leg swelling. Negative for chest pain and palpitations.   Gastrointestinal:  Positive for constipation. Negative for abdominal distention, abdominal pain, blood in stool, diarrhea, nausea and vomiting.   Endocrine: Positive for cold intolerance. Negative for polyphagia.   Genitourinary:  Positive for difficulty urinating. Negative for flank pain.   Musculoskeletal:  Positive for arthralgias, back pain, gait problem and myalgias.   Skin:  Positive for pallor.   Neurological:  Positive for dizziness, speech difficulty and weakness. Negative for tremors, seizures, syncope, facial asymmetry and headaches.   Hematological:  Negative for adenopathy.   Psychiatric/Behavioral:  Positive for confusion. Negative for agitation and hallucinations. The patient is nervous/anxious.      Objective:     Vital Signs (Most Recent):  Temp: 97.6 °F (36.4 °C) (06/09/25 0723)  Pulse: (!) 59 (06/09/25 0723)  Resp: 19 (06/09/25 0813)  BP: 133/67 (06/09/25 0813)  SpO2: 98 % (06/09/25 0723) Vital Signs (24h Range):  Temp:  [97.4 °F (36.3 °C)-97.6 °F (36.4 °C)] 97.6 °F (36.4 °C)  Pulse:  [59-62] 59  Resp:  [11-19] 19  SpO2:  [95 %-98 %] 98 %  BP: (111-160)/(53-67) 133/67     Weight: 78.3 kg (172 lb 9.9 oz)  Body mass index is 25.49 kg/m².    Intake/Output Summary (Last 24 hours) at 6/9/2025 1001  Last data filed at 6/9/2025 0915  Gross per 24 hour   Intake 1470 ml   Output 2200 ml   Net -730 ml         Physical Exam  Vitals and nursing note reviewed.   Constitutional:       General: He is not in acute distress.     Appearance: He is obese. He is  ill-appearing.      Comments: Improved since last seen, able to stand with assistance   HENT:      Head: Normocephalic and atraumatic.      Nose: Nose normal. No congestion.      Mouth/Throat:      Mouth: Mucous membranes are moist.      Pharynx: No oropharyngeal exudate.   Eyes:      General: No scleral icterus.     Extraocular Movements: Extraocular movements intact.   Neck:      Vascular: No carotid bruit.   Cardiovascular:      Rate and Rhythm: Normal rate and regular rhythm.      Heart sounds: Normal heart sounds. No murmur heard.     No friction rub. No gallop.   Pulmonary:      Effort: No respiratory distress.      Breath sounds: No stridor. Rhonchi present. No wheezing or rales.   Chest:      Chest wall: No tenderness.   Abdominal:      General: There is no distension.      Palpations: Abdomen is soft. There is no mass.      Tenderness: There is no abdominal tenderness. There is no right CVA tenderness, left CVA tenderness, guarding or rebound.      Hernia: No hernia is present.   Genitourinary:     Comments: [place] in carold  Musculoskeletal:      Cervical back: No rigidity.      Right lower leg: No edema.      Left lower leg: No edema.   Lymphadenopathy:      Cervical: No cervical adenopathy.   Skin:     Capillary Refill: Capillary refill takes less than 2 seconds.      Coloration: Skin is not jaundiced or pale.   Neurological:      Mental Status: He is disoriented.      Motor: Weakness present.      Gait: Gait abnormal.   Psychiatric:      Comments: More alert, o x 3, speech improved, + bilateral symmetric weakness, unsteady               Significant Labs: All pertinent labs within the past 24 hours have been reviewed.  Recent Lab Results         06/09/25  0705        Albumin 3.7       ALP 73       ALT 15       Anion Gap 6       AST 13       Baso # 0.01       Basophil % 0.1       BILIRUBIN TOTAL 0.3  Comment: For infants and newborns, interpretation of results should be based   on gestational age, weight  and in agreement with clinical   observations.    Premature Infant recommended reference ranges:   0-24 hours:  <8.0 mg/dL   24-48 hours: <12.0 mg/dL   3-5 days:    <15.0 mg/dL   6-29 days:   <15.0 mg/dL       BUN 27       Calcium 8.7       Chloride 108       CO2 25       Creatinine 0.8       eGFR >60  Comment: Estimated GFR calculated using the CKD-EPI creatinine (2021) equation.       Eos # 0.00       Eos % 0.0       Glucose 138       Gran # (ANC) 9.23       Hematocrit 38.1       Hemoglobin 12.1       Immature Grans (Abs) 0.04  Comment: Mild elevation in immature granulocytes is non specific and can be seen in a variety of conditions including stress response, acute inflammation, trauma and pregnancy. Correlation with other laboratory and clinical findings is essential.       Immature Granulocytes 0.4       Lymph # 0.42       Lymph % 4.2       Magnesium  2.2       MCH 30.8       MCHC 31.8       MCV 97       Mono # 0.23       Mono % 2.3       MPV 9.0       Neut % 93.0       nRBC 0       Platelet Count 263       Potassium 4.4       PROTEIN TOTAL 7.1       RBC 3.93       RDW 14.9       Sodium 139       WBC 9.93               Significant Imaging: I have reviewed all pertinent imaging results/findings within the past 24 hours.

## 2025-06-09 NOTE — ASSESSMENT & PLAN NOTE
Patient's blood pressure range in the last 24 hours was: BP  Min: 111/53  Max: 142/61.The patient's inpatient anti-hypertensive regimen is listed below:  Current Antihypertensives  amLODIPine tablet 5 mg, Daily, Oral    Plan  - BP is controlled, no changes needed to their regimen

## 2025-06-09 NOTE — PLAN OF CARE
Problem: Occupational Therapy  Goal: Occupational Therapy Goal  Description: Long Term Goals to be met by: 06/26/25     Patient will increase functional independence with ADLs by performing:    Feeding with Modified [place].  UE Dressing with Set-up Assistance.  LE Dressing with Supervision.  Grooming while seated at sink with Modified [place].  Toileting from toilet with Set-up Assistance for hygiene and clothing management.   Bathing from  shower chair/bench with Stand-by Assistance.  Toilet transfer to toilet with Set-up Assistance.  Increased functional strength to 4+/5 for bilateral UE's.    Outcome: Progressing

## 2025-06-10 PROCEDURE — 97110 THERAPEUTIC EXERCISES: CPT

## 2025-06-10 PROCEDURE — 25000003 PHARM REV CODE 250: Performed by: NURSE PRACTITIONER

## 2025-06-10 PROCEDURE — 97530 THERAPEUTIC ACTIVITIES: CPT

## 2025-06-10 PROCEDURE — 63600175 PHARM REV CODE 636 W HCPCS: Performed by: INTERNAL MEDICINE

## 2025-06-10 PROCEDURE — 97116 GAIT TRAINING THERAPY: CPT

## 2025-06-10 PROCEDURE — 11800000 HC REHAB PRIVATE ROOM

## 2025-06-10 PROCEDURE — 92507 TX SP LANG VOICE COMM INDIV: CPT

## 2025-06-10 PROCEDURE — 97535 SELF CARE MNGMENT TRAINING: CPT

## 2025-06-10 PROCEDURE — 25000003 PHARM REV CODE 250: Performed by: INTERNAL MEDICINE

## 2025-06-10 RX ORDER — POLYETHYLENE GLYCOL 3350 17 G/17G
17 POWDER, FOR SOLUTION ORAL DAILY PRN
Status: DISCONTINUED | OUTPATIENT
Start: 2025-06-10 | End: 2025-06-13 | Stop reason: HOSPADM

## 2025-06-10 RX ADMIN — MUPIROCIN: 20 OINTMENT TOPICAL at 08:06

## 2025-06-10 RX ADMIN — AMLODIPINE BESYLATE 5 MG: 5 TABLET ORAL at 08:06

## 2025-06-10 RX ADMIN — DEXAMETHASONE 2 MG: 0.5 TABLET ORAL at 08:06

## 2025-06-10 RX ADMIN — FLUCONAZOLE 200 MG: 200 TABLET ORAL at 06:06

## 2025-06-10 RX ADMIN — ATORVASTATIN CALCIUM 20 MG: 20 TABLET, FILM COATED ORAL at 08:06

## 2025-06-10 RX ADMIN — QUETIAPINE FUMARATE 25 MG: 25 TABLET ORAL at 08:06

## 2025-06-10 NOTE — PLAN OF CARE
Recommendations  1. Rec'd Cardiac diet.  2. Rec'd ONS: Ensure Enlive or Boost Plus TID to provide additional nutrition.   3. Encourage PO intake and compliance with drinking ONS.   4. Rec'd Beneprotein TID. 5. RD to follow and make rec's accordingly.  Goals:   1. Pt will consume > 75% EEN/EPN via PO intake by next RD follow up.  Nutrition Goal Status: progressing towards goal

## 2025-06-10 NOTE — PT/OT/SLP PROGRESS
Speech-Language Pathology Treatment    Tobi Liz Jr.   MRN: [id number]     Diet recommendations: Solid Diet Level: Regular Diet - IDDSI Level 7  Liquid Diet Level: Thin liquids - IDDSI Level 0 HOB to 90 degrees and Standard aspiration precautions    SLP Treatment Date: 06/10/25  Speech Start Time: 1215     Speech Stop Time: 1255     Speech Total (min): 40 min       TREATMENT BILLABLE MINUTES:  Speech Therapy Individual x 40    General Precautions: Standard, fall        Subjective:  Pt received awake/alert. Pt w/ c/o dizziness. Cooperative w/ ST.    Objective:   Patient found with: willams catheter    The [place] Cognitive Assessment for the [employer] ([facility]-Blind) was re-administered this session to compare/contrast results to those at time of initial evaluation on 6/6/2025 and to determine presence/absence of functional progress. Pt's results are as follows:    Total Score: 13/22 = pt scored below normal limits   **total score at time of initial assessment: 13/22    Attention: 5/6; 4/6 at initial assessment  Language: 2/3; 2/3 at initial assessment  Abstraction: 2/2; 2/2 at initial assessment  Delayed Recall: 0/5; 1/5 at initial assessment  Orientation: 4/6; 4/6 at initial assessment    WNL = greater than/equal to a score of 18/22      Pt communicates wants/needs WFL. Participates in unstructured conversation w/ this therapist w/ no difficulty. Pt provided ongoing education on memory strategies; pt v/u. Pt completes immediate recall task w/ 100% acc independently. Completes delayed recall task w/ 0% acc independently; improves to 60% acc given max cues. Pt independently recalls details from yesterday's conversation w/ physician. Pt demonstrates mildly decreased sustained and selective attention throughout session w/ presence of environmental background noise (i.e.,TV and surrounding conversations). Requires increased time to complete therapy tasks.     Pain/Comfort  Pain Rating 1: 0/10    Assessment:  Tobi LOZADA  Shalonda Dooley is a 89 y.o. male with a SLP diagnosis of Cognitive-Linguistic Impairment. Progressing towards established goals. Pt would benefit from continuation of skilled ST services at this time.    Discharge recommendations: Therapy Intensity Recommendations at Discharge: Low Intensity Therapy     Goals:   Multidisciplinary Problems       SLP Goals          Problem: SLP    Goal Priority Disciplines Outcome   SLP Goal     SLP Progressing   Description:   ST. Pt will complete STM delayed recall tasks w/ 80% acc given min cues.  2. Pt will complete memory and mental manipulation tasks w/ 80% acc given min cues.  3. Pt will answer orientation questions w/ 100% acc given min cues.  4. Pt will complete functional cognitive tasks w/ environmental distractions present given min assist.    LT. Pt will use appropriate memory strategies to recall functional information and to maintain safety and participate socially in functional living environment.   2. Pt will develop functional cognitive-linguistic-based skills and utilize compensatory strategies to communicate wants and needs effectively to different conversational partners, maintain safety, and participate socially in functional living environment.   3. Pt will be appropriately oriented x4 w/ cues for external aid to improve safety and awareness in functional living environment.   4. Pt will demonstrate use of self awareness, planning, and self monitoring during ADLs to improve safety and awareness in functional living environment.                         Plan:   Patient to be seen Therapy Frequency: 5 x/week   Planned Interventions: Cognitive-Linguistic Therapy  Plan of Care Expires: 25  Plan of Care reviewed with: patient  SLP Follow-up?: Yes         6/10/2025

## 2025-06-10 NOTE — PT/OT/SLP PROGRESS
Occupational Therapy Inpatient Rehab Treatment    Name: Tobi Liz Jr.  MRN: [id number]    Assessment:  Tobi Liz Jr. is a 89 y.o. male admitted with a medical diagnosis of Subdural hematoma.  He presents with the following impairments/functional limitations:  weakness, impaired endurance, impaired self care skills, impaired functional mobility, gait instability, impaired balance, visual deficits, impaired cognition, decreased coordination, decreased upper extremity function, decreased lower extremity function, decreased safety awareness, decreased ROM, pain, impaired fine motor, impaired skin, edema, impaired cardiopulmonary response to activity, impaired joint extensibility, impaired muscle length.    Pt demonstrated improved functional performance with toileting as noted by supervision in which patient able to perform clothing management and perineal hygiene with verbal cueing for safety and technique utilizing grab bar for stabilization.     General Precautions: Standard, fall     Orthopedic Precautions:N/A     Braces: N/A    Rehab Prognosis: Fair; patient would benefit from acute skilled OT services to address these deficits and reach maximum level of function.      History:     Past Medical History:   Diagnosis Date    Aortic valve stenosis     Arthritis     BPH (benign prostatic hyperplasia)     Carotid artery stenosis     Chronic diastolic heart failure     ETOH abuse     Exposure to COVID-19 virus 01/07/2022    Hyperchloremia     Hypertension     Kidney stones     Murmur     Pacemaker     Polymyalgia rheumatica     PVD (peripheral vascular disease)     Renal artery stenosis        Past Surgical History:   Procedure Laterality Date    A-V CARDIAC PACEMAKER INSERTION N/A 10/06/2023    Procedure: INSERTION, CARDIAC PACEMAKER, DUAL CHAMBER;  Surgeon: Douglas Salguero MD;  Location: [facility] CATH;  Service: Cardiology;  Laterality: N/A;    ANGIOGRAM, CAROTID Left 08/25/2023    Procedure: ANGIOGRAM,  CAROTID;  Surgeon: Nick Box MD;  Location: [facility] CATH;  Service: Cardiology;  Laterality: Left;    CATARACT EXTRACTION, BILATERAL      ECHOCARDIOGRAM,TRANSESOPHAGEAL N/A 10/03/2023    Procedure: Transesophageal echo (JOSE ARMANDO) intra-procedure log documentation;  Surgeon: Nick Box MD;  Location: [facility] OR;  Service: Cardiology;  Laterality: N/A;    HAND SURGERY      INSERTION OF STENT INTO PERIPHERAL VESSEL N/A 01/30/2025    Procedure: INSERTION, STENT, VESSEL, PERIPHERAL;  Surgeon: Harsha Salcedo MD;  Location: [facility] CATH;  Service: Cardiology;  Laterality: N/A;    PERCUTANEOUS TRANSCATHETER AORTIC VALVE REPLACEMENT (TAVR) Left 10/03/2023    Procedure: REPLACEMENT, AORTIC VALVE, PERCUTANEOUS, TRANSCATHETER;  Surgeon: Nick Box MD;  Location: [facility] OR;  Service: Cardiology;  Laterality: Left;    PERCUTANEOUS TRANSCATHETER AORTIC VALVE REPLACEMENT (TAVR) Left 10/03/2023    Procedure: REPLACEMENT, AORTIC VALVE, PERCUTANEOUS, TRANSCATHETER carotid access;  Surgeon: Jun Brito MD;  Location: [facility] OR;  Service: Cardiovascular;  Laterality: Left;    PERCUTANEOUS TRANSLUMINAL ANGIOPLASTY N/A 08/25/2023    Procedure: ANGIOPLASTY-PERCUTANEOUS TRANSLUMINAL (PTA);  Surgeon: Nick Box MD;  Location: [facility] CATH;  Service: Cardiology;  Laterality: N/A;    PERCUTANEOUS TRANSLUMINAL ANGIOPLASTY (PTA) OF PERIPHERAL VESSEL Left 04/10/2025    Procedure: PTA, PERIPHERAL VESSEL;  Surgeon: Harsha Salcedo MD;  Location: [facility] CATH;  Service: Cardiology;  Laterality: Left;  L LE intervention via left antegrade approach Harsha Salcedo at [facility] in Hybrid OR under MAC  *PACEMAKER*    SHOULDER SURGERY Left     total shoulder replacement    WOUND EXPLORATION N/A 10/03/2023    Procedure: EXPLORATION, WOUND;  Surgeon: Jun Brito MD;  Location: [facility] OR;  Service: Cardiology;  Laterality: N/A;       Subjective     Orientation: Oriented x4     Chief Complaint: weakness and moderate dizziness as per patient report       Patient/Family Comments/goals: Pt would like to regain independence with ADL's including functional mobility in order to safely return home to [facility] with least amount of financial burden due to assistance.      Respiratory Status: Room air     Patients cultural, spiritual, Zoroastrian conflicts given the current situation: no    Objective:     Patient found up in chair with willams catheter  upon OT entry to room.    Mobility   Patient completed:  Sit to Stand Transfer with setup assistance with rolling walker  Bed to Chair Transfer using Step Transfer technique with setup assistance with rolling walker  Toilet Transfer Step Transfer technique with setup assistance with  rolling walker and grab bars    Functional Mobility  Pt ambulated greater than 200' between surfaces requiring setup assistance utilizing RW.     ADLs   Current Status   Eating     Oral Hygiene     Shower, Bathe Self     Upper Body Dressing     Lower Body Dressing     Toileting Hygiene 4   Toilet Transfer 5   Putting On, Taking Off Footwear       Limiting Factors for ADLs: endurance, limited ROM, balance, weakness, body habitus, visual, perceptual, coordination, cognition, safety awareness, and pain     Therapeutic Activities  Pt participated in therapeutic activity addressing functional reaching, gross motor coordination, static / dynamic standing balance, standing tolerance, and energy for task / endurance challenging him to retrieve, lift, stabilize, manipulate, and place various items needed to perform functional tasks of ADL's while standing utilizing bilateral UE requiring verbal and tactile cueing to facilitate optimal movements patterns, positioning within RW, and proper weight shifting utilizing forward, diagonal, and lateral steps with intermittent steadying assist when reaching outside EVELIN.     Therapeutic Exercise  He participated in therapeutic exercise performing 3x12-15 bilateral UE proximal strengthening exercises utilizing  moderate to heavy resistance theraband with scapular retraction, shoulder flexion, shoulder extension, shoulder adduction, shoulder abduction, horizontal abduction, internal rotation, and external rotation requiring mod verbal and tactile cueing for technique emphasizing quality of movement.     Additional Treatments: Pt participated in ADL retraining as further noted above emphasizing fall prevention, and use of assistive devices and compensatory strategies including energy conservation techniques providing extra time requiring verbal cueing for sequencing, technique, and overall safety awareness.     LifeStyle Change and Education:             Patient left up in chair with nurse notified and PT present.     Education provided: Roles and goals of OT, ADLs, transfer training, bed mobility, body mechanics, assistive device, sequencing, safety precautions, fall prevention, equipment recommendations, and home safety    Multidisciplinary Problems       Occupational Therapy Goals          Problem: Occupational Therapy    Goal Priority Disciplines Outcome Interventions   Occupational Therapy Goal     OT, PT/OT Progressing    Description: Long Term Goals to be met by: 06/26/25     Patient will increase functional independence with ADLs by performing:    Feeding with Modified [place].  UE Dressing with Set-up Assistance.  LE Dressing with Supervision.  Grooming while seated at sink with Modified [place].  Toileting from toilet with Set-up Assistance for hygiene and clothing management.   Bathing from  shower chair/bench with Stand-by Assistance.  Toilet transfer to toilet with Set-up Assistance.  Increased functional strength to 4+/5 for bilateral UE's.                         Time Tracking     OT Received On: 06/10/25  Time In 0830     Time Out 1000  Total Time 90 min  Therapy Time: OT Individual: 60  OT Concurrent: 30  Missed Time:    Missed Time Reason:      Billable Minutes: Self Care/Home Management 15,  Therapeutic Activity 40, and Therapeutic Exercise 35    06/10/2025

## 2025-06-10 NOTE — PLAN OF CARE
Problem: Rehabilitation (IRF) Plan of Care  Goal: Plan of Care Review  Outcome: Progressing  Goal: Absence of New-Onset Illness or Injury  Outcome: Progressing     Problem: Wound  Goal: Absence of Infection Signs and Symptoms  Outcome: Progressing     Problem: Fall Injury Risk  Goal: Absence of Fall and Fall-Related Injury  Outcome: Progressing     Problem: Skin Injury Risk Increased  Goal: Skin Health and Integrity  Outcome: Progressing     Problem: Mobility Impairment  Goal: Optimal Mobility  Outcome: Progressing

## 2025-06-10 NOTE — PLAN OF CARE
Problem: Rehabilitation (IRF) Plan of Care  Goal: Plan of Care Review  Outcome: Progressing  Goal: Patient-Specific Goal (Individualized)  Outcome: Progressing  Goal: Absence of New-Onset Illness or Injury  Outcome: Progressing  Goal: Optimal Comfort and Wellbeing  Outcome: Progressing     Problem: Wound  Goal: Absence of Infection Signs and Symptoms  Outcome: Progressing     Problem: Fall Injury Risk  Goal: Absence of Fall and Fall-Related Injury  Outcome: Progressing     Problem: Mobility Impairment  Goal: Optimal Mobility  Outcome: Progressing

## 2025-06-10 NOTE — PLAN OF CARE
06/10/25 1416   Post-Acute Status   Post-Acute Authorization Home Health   Home Health Status Referrals Sent   Discharge Plan   Discharge Plan A Home Health   Discharge Plan B Home Health       Patient discharging on Friday 6/13/2025 to [facility] with home health services from Nursing [facility] as patient's preference.    Met with patient and spouse to review discharge recommendation of assisted living and home health and is agreeable to plan.  Patient/family provided list of facilities in-netowrk with patient's payor plan. Providers that are owned, operated, or affilitated with [facility] are included on the list.  Notified that referral sent to below listed facilities from in-network list based on proximity to home/family support.  [facility]  2.  3.  4.  5. (Can senf more than 5)  Patient/family instructed to identify preference.  Preferred Facility: (If more than 1, listed in order of descending preference).  1.  If an additional preferred facility not listed above is identified, additional referral to be sent. If above facilities unable to accept, will send additional referrals to in-network providers.

## 2025-06-10 NOTE — PROGRESS NOTES
[facility])  Adult Nutrition  Progress Note    SUMMARY       Recommendations  1. Rec'd Cardiac diet.  2. Rec'd ONS: Ensure Enlive or Boost Plus TID to provide additional nutrition.   3. Encourage PO intake and compliance with drinking ONS.   4. Rec'd Beneprotein TID. 5. RD to follow and make rec's accordingly.  Goals:   1. Pt will consume > 75% EEN/EPN via PO intake by next RD follow up.  Nutrition Goal Status: new  Communication of RD Recs: other (Documented in POC)    Nutrition Discharge Planning    Nutrition Discharge Planning: Therapeutic diet (comments), Oral supplement regimen (comments)  Therapeutic diet (comments): Cardiac Diet  Oral supplement regimen (comments): Ensure Enlive or Boost Plus TID as needed    Assessment and Plan     Nutrition Problem  Inadequate protein energy intake      Related to (etiology):    Increased demand for protein energy     Signs and Symptoms (as evidenced by):    Intake <50% estimated needs, decreased appetite x 1 week     Interventions/Recommendations (treatment strategy):  1. Rec'd Cardiac diet.   2. Rec'd ONS: Ensure Enlive or Boost Plus TID to provide additional nutrition.   3. Encourage PO intake and compliance with drinking ONS.   4. Rec'd Beneprotein TID.   5. RD to follow and make rec's accordingly.     Nutrition Diagnosis Status:   Improving     Malnutrition Assessment  NFPE not warranted at this time. Pt does not meet positive malnutrition criteria.     Nutrition Related Social Determinants of Health  None identified at this time.    Reason for Assessment    Reason For Assessment: RD follow-up  Diagnosis: other (see comments) (Subdural Hematoma)  General Information Comments: Followed up on pt this morning. Spoke with pt about current appetite and PO intake. PT reports his appetite is good and states he is eating >75% of all meals. Encouraged pt to continue with good PO intake. RD to follow and make rec's accordingly.    Nutrition/Diet  "History    Nutrition Intake History: Regular /CardiacDiet  Food Preferences: Likes: eggs, turkey carballo, and grits for breakfast everyday  Spiritual, Cultural Beliefs, Sikh Practices, Values that Affect Care: no  Food Allergies: NKFA  Factors Affecting Nutritional Intake: decreased appetite    Anthropometrics    Height: 5' 9" (175.3 cm)  Height (inches): 69 in  Height Method: Stated  Weight: 76.4 kg (168 lb 6.4 oz)  Weight (lb): 168.4 lb  Weight Method: Standard Scale  Ideal Body Weight (IBW), Male: 160 lb  % Ideal Body Weight, Male (lb): 107.89 %  BMI (Calculated): 24.9  BMI Grade: 25 - 29.9 - overweight  Usual Body Weight (UBW), k.11 kg  % Usual Body Weight: 101.76  % Weight Change From Usual Weight: 1.54 %    Lab/Procedures/Meds    Pertinent Labs Reviewed: reviewed  Pertinent Medications Reviewed: reviewed    Estimated/Assessed Needs    Weight Used For Calorie Calculations: 78.3 kg (172 lb 9.9 oz)  Energy Calorie Requirements (kcal): 4992-5553 (25-30 kcal/kg)  Energy Need Method: Kcal/kg  Protein Requirements:  (1.2-2.0g/kg)  Weight Used For Protein Calculations: 78.1 kg (172 lb 2.9 oz)  Fluid Requirements (mL): 9233-3444 (1mL/kcal)  Estimated Fluid Requirement Method: RDA Method  RDA Method (mL):     Nutrition Prescription Ordered    Current Diet Order: Heart Healthy  Oral Nutrition Supplement: None    Evaluation of Received Nutrient/Fluid Intake    % Kcal Needs: 75%  % Protein Needs: 75%  I/O: -620  Energy Calories Required: meeting needs  Protein Required: meeting needs  Tolerance: tolerating  % Intake of Estimated Energy Needs: 75 - 100 %  % Meal Intake: 75 - 100 %    Nutrition Risk    Level of Risk/Frequency of Follow-up: low     Monitor and Evaluation    Monitor and Evaluation: Energy intake, Food and beverage intake, Protein intake, Diet order, Weight, Food and nutrition knowledge, Beliefs and attitudes, Electrolyte and renal panel, Gastrointestinal profile, Glucose/endocrine profile, " Inflammatory profile, Lipid profile     Nutrition Follow-Up    RD Follow-up?: Yes

## 2025-06-10 NOTE — NURSING
Skin assessment note: 2 healing ulcers to the anterior RLE 1. 2.4 x 1.3 with a dark tan to brownish color scab and 2. 1.5 x 0.7 with a pink center and partial tan color scab to some of the wound. Also  healing ulcer to the lateral RLE 0.7 x 0.5 with maroon colored scab.Ulcer to anterior, medial LLE that is pink 1.5 x 0.7. See pictures in epic.

## 2025-06-10 NOTE — PT/OT/SLP PROGRESS
Physical Therapy Inpatient Rehab Treatment    Patient Name:  Tobi Liz Jr.   MRN:  [id number]    Recommendations:     Discharge Recommendations:  Low Intensity Therapy   Discharge Equipment Recommendations: none   Barriers to discharge: None    Assessment:     Tobi Liz Jr. is a 89 y.o. male admitted with a medical diagnosis of Subdural hematoma.  He presents with the following impairments/functional limitations:     weakness, impaired endurance, impaired self care skills, impaired functional mobility, gait instability, impaired balance, decreased coordination, decreased upper extremity function, decreased lower extremity function, decreased safety awareness,  decreased ROM,  edema, impaired cardiopulmonary response to activity, impaired joint extensibility, impaired cognition, advance age, chronic back pain, impaired skin integrity and impaired muscle length.    Patient is still c/o dizziness.Tolerated treatment well with multiple rest breaks in between;falls asleep during therapy.   .    Rehab Diagnosis:  Sudural Hematoma     Recent Surgery: * No surgery found *      General Precautions: Standard, fall     Orthopedic Precautions:N/A     Braces: N/A    Rehab Prognosis: Fair; patient would benefit from acute skilled PT services to address these deficits and reach maximum level of function.      History:     Past Medical History:   Diagnosis Date    Aortic valve stenosis     Arthritis     BPH (benign prostatic hyperplasia)     Carotid artery stenosis     Chronic diastolic heart failure     ETOH abuse     Exposure to COVID-19 virus 01/07/2022    Hyperchloremia     Hypertension     Kidney stones     Murmur     Pacemaker     Polymyalgia rheumatica     PVD (peripheral vascular disease)     Renal artery stenosis        Past Surgical History:   Procedure Laterality Date    A-V CARDIAC PACEMAKER INSERTION N/A 10/06/2023    Procedure: INSERTION, CARDIAC PACEMAKER, DUAL CHAMBER;  Surgeon: Douglas Salguero MD;   Location: [facility] CATH;  Service: Cardiology;  Laterality: N/A;    ANGIOGRAM, CAROTID Left 08/25/2023    Procedure: ANGIOGRAM, CAROTID;  Surgeon: Nick Box MD;  Location: [facility] CATH;  Service: Cardiology;  Laterality: Left;    CATARACT EXTRACTION, BILATERAL      ECHOCARDIOGRAM,TRANSESOPHAGEAL N/A 10/03/2023    Procedure: Transesophageal echo (JOSE ARMANDO) intra-procedure log documentation;  Surgeon: Nick Box MD;  Location: [facility] OR;  Service: Cardiology;  Laterality: N/A;    HAND SURGERY      INSERTION OF STENT INTO PERIPHERAL VESSEL N/A 01/30/2025    Procedure: INSERTION, STENT, VESSEL, PERIPHERAL;  Surgeon: Harsha Salcedo MD;  Location: [facility] CATH;  Service: Cardiology;  Laterality: N/A;    PERCUTANEOUS TRANSCATHETER AORTIC VALVE REPLACEMENT (TAVR) Left 10/03/2023    Procedure: REPLACEMENT, AORTIC VALVE, PERCUTANEOUS, TRANSCATHETER;  Surgeon: Nick Box MD;  Location: [facility] OR;  Service: Cardiology;  Laterality: Left;    PERCUTANEOUS TRANSCATHETER AORTIC VALVE REPLACEMENT (TAVR) Left 10/03/2023    Procedure: REPLACEMENT, AORTIC VALVE, PERCUTANEOUS, TRANSCATHETER carotid access;  Surgeon: Jun Brito MD;  Location: [facility] OR;  Service: Cardiovascular;  Laterality: Left;    PERCUTANEOUS TRANSLUMINAL ANGIOPLASTY N/A 08/25/2023    Procedure: ANGIOPLASTY-PERCUTANEOUS TRANSLUMINAL (PTA);  Surgeon: Nick Box MD;  Location: [facility] CATH;  Service: Cardiology;  Laterality: N/A;    PERCUTANEOUS TRANSLUMINAL ANGIOPLASTY (PTA) OF PERIPHERAL VESSEL Left 04/10/2025    Procedure: PTA, PERIPHERAL VESSEL;  Surgeon: Harsha Salcedo MD;  Location: [facility] CATH;  Service: Cardiology;  Laterality: Left;  L LE intervention via left antegrade approach Harsha Salcedo at [facility] in Hybrid OR under MAC  *PACEMAKER*    SHOULDER SURGERY Left     total shoulder replacement    WOUND EXPLORATION N/A 10/03/2023    Procedure: EXPLORATION, WOUND;  Surgeon: Jun Brito MD;  Location: [facility] OR;  Service: Cardiology;  Laterality:  "N/A;       Subjective     Chief Complaint: "I am dizzy."    Respiratory Status: Room air    Patients cultural, spiritual, Church conflicts given the current situation: no      Objective:     Communicated with nurse and patient  prior to session.  Patient found up in chair with willams catheter  upon PT entry to room.    Pt is Oriented x3, Oriented to place, and Oriented to situation and Alert and Cooperative.    Vitals   Vitals at Rest  /54 mmHg    HR 72 bpm   O2 Sat 98%   Pain Denies     Functional Mobility:   Bed Mobility:     Rolling Left:  Set-up or clean-up assistance   Rolling Right: Set-up or clean-up assistance   Scooting: Set-up or clean-up assistance   Bridging: Set-up or clean-up assistance   Supine to Sit: Set-up or clean-up assistance   Sit to Supine: Set-up or clean-up assistance   Transfers:     Sit to Stand: Set-up or clean-up assistance  with rolling walker  Chair to mat: Set-up or clean-up assistance  with  rolling walker  using  Step Transfer  Gait: Pt ambulates 300 feet, 150 feet, 25 feet x 2 trials, 10 feet x 2 trials  with RW with Set-up or clean-up assistance .   Impairments contributing to gait deviations include impaired balance, decreased flexibility, and decreased strength.  12 steps (6 inch)  stairs with bilateral handrails with Set-up or clean-up assistance   2 inch and 4 inch  curb with rolling walker with Set-up or clean-up assistance   Ambulate 10  feet on uneven surfaces/ramps with rolling walker with Set-up or clean-up assistance    object from ground in standing position with reacher with rolling walker with Set-up or clean-up assistance        Current   Status  Discharge   Goal   Functional Area: Care Score:    Roll Left and Right 5 Set-up/clean-up   Sit to Lying 5 Set-up/clean-up   Lying to Sitting on Side of Bed 5 Set-up/clean-up   Sit to Stand 5 Set-up/clean-up   Chair/Bed-to-Chair Transfer 5 Set-up/clean-up   Car Transfer 10 Supervision or touching assistance "   Walk 10 Feet 5 Set-up/clean-up   Walk 50 Feet with Two Turns 5 Set-up/clean-up   Walk 150 Feet 5 Set-up/clean-up   Walk 10 Feet Uneven Surface 5 Supervision or touching assistance   1 Step (Curb) 5 Supervision or touching assistance   4 Steps 5 Supervision or touching assistance   12 Steps 5 Not applicable   Picking Up Object 5 Set-up/clean-up   Wheel 50 Feet with Two Turns       Wheel 150 Feet           Therapeutic Activities and Exercises:  Gait training with RW on flat level surface   Stair training with both side rails, non-reciprocal steps   Curb negotiation with RW   Ramp negotiation with RW   Picking up a small object from the floor with RW  and reacher   Sit <> stand with both UE support  Stand step transfer with a RW   Sit <> supine  Rolling side <> side  Standing balance      SUPINE Both Lower Extremity   Active ROM Sets / Reps / Resistance / Etc.   Ankle PF and DF 3 x 10    Short Arc Quads 3 x 10    Heel Slides 3 x 10    SLR 3 x 10    Hip Abduction/Adduction 3 x 10      Nu step x 10 minutes     Activity Tolerance: Fair    Patient left up in chair with call button in reach and nurse  notified.    Education provided: roles and goals of PT/PTA, transfer training, bed mob, gait training, stair training, balance training, safety awareness, body mechanics, assistive device, strengthening exercises, and fall prevention    Expected compliance: Moderate compliance    GOALS:   Multidisciplinary Problems       Physical Therapy Goals          Problem: Physical Therapy    Goal Priority Disciplines Outcome Interventions   Physical Therapy Goal     PT, PT/OT Ongoing    Description: Problem: Physical Therapy  Goal: Physical Therapy Goal  Description:     Long Term Goals: 6/18/2025     Transfers Status    Sit to Stand  Long Term Goal:  Complete sit to stand with Set-up Assistance using Rolling Walker with no verbal cues    Stand Step  Long Term Goal:  Complete stand step with  Set-up Assistance  using Rolling Walker  with no  verbal cues    Supine <> Sit  Long Term Goal: Complete supine <> sit with Set-up Assistance  With no verbal cues rails prn    Rolling Right/Left  Long Term Goal: Complete rolling  right/left with Set-up Assistance with no verbal cues rails prn      Balance/Coordination    Static Sitting  Long Term Goal: Complete static sitting with Modified Independent  With no  verbal cues    Dynamic Sitting  Long Term Goal: Complete dynamic sitting with  Set-up Assistance  with  no verbal cues minimal excursions    Static Standing  Long Term Goal: Complete static standing with Set-up Assistance  with   no verbal cues and RW    Dynamic Standing  Long Term Goal: Complete dynamic standing with  Set-up Assistance with no verbal cues and minimal  excursions with RW    Endurance    Long Term Goal:  Physical Therapy: 2 times a day, 45 minutes  Rest periods: minimal  Sittin-8 hours/day    Mobility/Gait  Long Term Goal: Will ambulate with Set-up Assistance  using Rolling Walker with no  verbal cues x 300 ft    Stairs Assistance  Long Term Goal: Patient will ascend/descend 4 stairs/steps using both  handrails  nonreciprocal steps with Supervision or Set-up Assistance and minimal  verbal cues    Ramp/Uneven 10 feet surface  Long  Term Goal; Patient will be able to navigate a 10 inch uneven surface with RW  with   Supervision or Set-up Assistance and minimal  verbal cues      2-4  inch curb  Long  Term Goal; Patient will be able to navigate a  2 to 4 inch curb with RW with  Supervision or Set-up Assistance and minimal  verbal cues    Picking up object   Long  Term Goal; Patient will be able to  a small object from the floor  with proper A.D. with  Set-up Assistance and minimal  verbal cues    Car transfers  Long  Term Goal; Patient will be able to get in and out of a vehicle  with RW with  stand by assistance                                            Plan:     During this hospitalization, patient to be seen 5 x/week to  address the identified rehab impairments via gait training, therapeutic activities, therapeutic exercises, neuromuscular re-education and progress toward the following goals:    Plan of Care Expires:  06/18/25  PT Next Visit Date: 06/11/25  Plan of Care reviewed with: patient    Additional Information:     Utilizing concurrent therapy to address goals related to safe bed mobility, transfers, gait and advance gait activities.      Time Tracking:     Therapy Time  PT Received On: 06/10/25  PT Start Time: 1010  PT Stop Time: 1140  PT Total Time (min): 90 min   PT Individual: 60  PT Concurrent: 30      Billable Minutes: Gait Training 30, Therapeutic Activity 15, and Therapeutic Exercise 45    06/10/2025

## 2025-06-10 NOTE — PLAN OF CARE
Problem: Physical Therapy  Goal: Physical Therapy Goal  Description: Problem: Physical Therapy  Goal: Physical Therapy Goal  Description:     Long Term Goals: 2025     Transfers Status    Sit to Stand  Long Term Goal:  Complete sit to stand with Set-up Assistance using Rolling Walker with no verbal cues    Stand Step  Long Term Goal:  Complete stand step with  Set-up Assistance  using Rolling Walker with no  verbal cues    Supine <> Sit  Long Term Goal: Complete supine <> sit with Set-up Assistance  With no verbal cues rails prn    Rolling Right/Left  Long Term Goal: Complete rolling  right/left with Set-up Assistance with no verbal cues rails prn      Balance/Coordination    Static Sitting  Long Term Goal: Complete static sitting with Modified Independent  With no  verbal cues    Dynamic Sitting  Long Term Goal: Complete dynamic sitting with  Set-up Assistance  with  no verbal cues minimal excursions    Static Standing  Long Term Goal: Complete static standing with Set-up Assistance  with   no verbal cues and RW    Dynamic Standing  Long Term Goal: Complete dynamic standing with  Set-up Assistance with no verbal cues and minimal  excursions with RW    Endurance    Long Term Goal:  Physical Therapy: 2 times a day, 45 minutes  Rest periods: minimal  Sittin-8 hours/day    Mobility/Gait  Long Term Goal: Will ambulate with Set-up Assistance  using Rolling Walker with no  verbal cues x 300 ft    Stairs Assistance  Long Term Goal: Patient will ascend/descend 4 stairs/steps using both  handrails  nonreciprocal steps with Supervision or Set-up Assistance and minimal  verbal cues    Ramp/Uneven 10 feet surface  Long  Term Goal; Patient will be able to navigate a 10 inch uneven surface with RW  with   Supervision or Set-up Assistance and minimal  verbal cues      2-4  inch curb  Long  Term Goal; Patient will be able to navigate a  2 to 4 inch curb with RW with  Supervision or Set-up Assistance and minimal  verbal  cues    Picking up object   Long  Term Goal; Patient will be able to  a small object from the floor  with proper A.D. with  Set-up Assistance and minimal  verbal cues    Car transfers  Long  Term Goal; Patient will be able to get in and out of a vehicle  with RW with  stand by assistance     Outcome: Ongoing

## 2025-06-10 NOTE — PLAN OF CARE
No issues reported overnight. Patient sleeping when making rounds this morning.    Problem: Rehabilitation ([facility]) Plan of Care  Goal: Plan of Care Review  Outcome: Progressing  Goal: Patient-Specific Goal (Individualized)  Outcome: Progressing  Goal: Absence of New-Onset Illness or Injury  Outcome: Progressing  Goal: Optimal Comfort and Wellbeing  Outcome: Progressing  Goal: Home and Community Transition Plan Established  Outcome: Progressing     Problem: Wound  Goal: Optimal Coping  Outcome: Progressing  Goal: Optimal Functional Ability  Outcome: Progressing  Goal: Absence of Infection Signs and Symptoms  Outcome: Progressing  Goal: Improved Oral Intake  Outcome: Progressing  Goal: Optimal Pain Control and Function  Outcome: Progressing  Goal: Skin Health and Integrity  Outcome: Progressing  Goal: Optimal Wound Healing  Outcome: Progressing     Problem: Infection  Goal: Absence of Infection Signs and Symptoms  Outcome: Progressing     Problem: Fall Injury Risk  Goal: Absence of Fall and Fall-Related Injury  Outcome: Progressing     Problem: Skin Injury Risk Increased  Goal: Skin Health and Integrity  Outcome: Progressing     Problem: Mobility Impairment  Goal: Optimal Mobility  Outcome: Progressing     Problem: Pain Acute  Goal: Optimal Pain Control and Function  Outcome: Progressing     Problem: UTI (Urinary Tract Infection)  Goal: Improved Infection Symptoms  Outcome: Progressing     Problem: Urinary Retention  Goal: Effective Urinary Elimination  Outcome: Progressing

## 2025-06-10 NOTE — PLAN OF CARE
Problem: Occupational Therapy  Goal: Occupational Therapy Goal  Description: Long Term Goals to be met by: 06/26/25     Patient will increase functional independence with ADLs by performing:    Feeding with Modified [place].  UE Dressing with Set-up Assistance.  LE Dressing with Supervision.  Grooming while seated at sink with Modified [place].  Toileting from toilet with Set-up Assistance for hygiene and clothing management.   Bathing from  shower chair/bench with Stand-by Assistance.  Toilet transfer to toilet with Set-up Assistance.  Increased functional strength to 4+/5 for bilateral UE's.    Outcome: Progressing

## 2025-06-10 NOTE — PLAN OF CARE
Resolved  Problem: UTI (Urinary Tract Infection)  Goal: Improved Infection Symptoms  6/10/2025 0644 by Sarai Pierson, RN  Outcome: Met  6/10/2025 0643 by Sarai Pierson, RN  Outcome: Progressing

## 2025-06-11 PROCEDURE — 92507 TX SP LANG VOICE COMM INDIV: CPT

## 2025-06-11 PROCEDURE — 97116 GAIT TRAINING THERAPY: CPT

## 2025-06-11 PROCEDURE — 63600175 PHARM REV CODE 636 W HCPCS: Performed by: INTERNAL MEDICINE

## 2025-06-11 PROCEDURE — 25000003 PHARM REV CODE 250: Performed by: INTERNAL MEDICINE

## 2025-06-11 PROCEDURE — 97535 SELF CARE MNGMENT TRAINING: CPT

## 2025-06-11 PROCEDURE — 97530 THERAPEUTIC ACTIVITIES: CPT

## 2025-06-11 PROCEDURE — 97110 THERAPEUTIC EXERCISES: CPT

## 2025-06-11 PROCEDURE — 11800000 HC REHAB PRIVATE ROOM

## 2025-06-11 PROCEDURE — 25000003 PHARM REV CODE 250: Performed by: NURSE PRACTITIONER

## 2025-06-11 RX ADMIN — ATORVASTATIN CALCIUM 20 MG: 20 TABLET, FILM COATED ORAL at 08:06

## 2025-06-11 RX ADMIN — DEXAMETHASONE 2 MG: 0.5 TABLET ORAL at 08:06

## 2025-06-11 RX ADMIN — FLUCONAZOLE 200 MG: 200 TABLET ORAL at 07:06

## 2025-06-11 RX ADMIN — QUETIAPINE FUMARATE 25 MG: 25 TABLET ORAL at 08:06

## 2025-06-11 RX ADMIN — AMLODIPINE BESYLATE 5 MG: 5 TABLET ORAL at 08:06

## 2025-06-11 NOTE — PROGRESS NOTES
"[facility]  Progress Note    Patient Name: Tobi Liz Jr.  MRN: [id number]  Patient Class: IP- Rehab   Admission Date: 6/5/2025  Length of Stay: 5 days  Attending Physician: Dave Reyes III, MD  Primary Care Provider: Luis Enrique Guzman Jr., MD        Subjective     Principal Problem:Subdural hematoma        HPI:  Tobi Liz Jr. is a 89 y.o. male  has a past medical history of Aortic valve stenosis, Arthritis, BPH (benign prostatic hyperplasia), Carotid artery stenosis, Chronic diastolic heart failure, ETOH abuse, Exposure to COVID-19 virus (01/07/2022), Hyperchloremia, Hypertension, Kidney stones, Murmur, Pacemaker, Polymyalgia rheumatica, PVD (peripheral vascular disease), and Renal artery stenosis. presenting with Altered Mental Status (Pt to ED from [facility] via POV - family stated that pt was "walking halls last night" - with increased confusion - removed willams cath. Concerned for UTI.      Family reports he is more to his baseline this am.  Pt has been aggravated and annoyed by his willams catheter and family has noticed that he has had more utis since having catheter.  Urologist has given options for catheter removal; suprapubic cath and continuation of willams.  Family wishes to dc willams while in hospital and see how he does.     Iwllams removed and pending bladder scan this morning. Labs reviewed and overall stable.      6/3/25 FM:  Patient was seen and examined today after team conference and had a change in his neurological exam.  Patient had word-finding confusion him complained of persistent dizziness and lightheadedness despite having normal vital signs.  We performed a stat CT a subdural with midline shift I spoke to the patient's primary care team and we are transferring back down to [facility] as he will require a Neurosurgery consult and transfer and we can not complete this on our inpatient rehab unit.  We will notify family is soon as we can get " in touch with them.     6/4/25:  Patient doing well this morning, neurosurgery consult last night and recommendations for continued PT evaluation and if not improving, consider palliative care.  Not a good candidate for surgical intervention at this time.  Labs stable this morning.      Patient reports visual disturbances have improved but still reports dizziness. Aspirin and Plavix have been discontinued.    6/6 FM:  Patient was seen and examined after transitioned to inpatient rehab.  Patient was transferred down to our acute [facility] surge unit after finding a subdural hematoma.  Patient was seen by tele neuro services and deemed that this was stable and that observation and further therapy would be his only treatment in addition to discontinuing antiplatelet agents.  Patient was on Plavix aspirin prior to identifying the subdural.  Patient has had an improvement in his neurological exam over the last few days he is work with therapy patient's speech is much improved he is alert oriented x3 and eager to return home with his spouse.  We will resume aggressive therapeutic efforts in attempt to transition him home to a supervision/modified independent functional level.  Patient's still has a Bolton catheter in place which we will attempt to wean during this admission.  Chart and case reviewed starting low-dose steroids as there has been some midline shift.     Patient requires acute inpatient rehab admission with 24-hour nursing and active physician oversight to monitor and manage acute medical comorbid conditions, labs, pain, and functional deficits. Patient/family will also require teaching and integration of improving functional skills into daily living. Patient will also require an individualized, interdisciplinary approach to their care, receiving PT, OT services 3 hours per day, 5 days per week. Required care cannot be provided at a lower level of care. Patient is anticipated to require approximately 10-14 days LOS  with expected discharge home  with  services.             Impairment group (IGC):   Other Stroke 01.9 Etiologic diagnosis/description:   S06.5XAA: Sudural Hematoma   Date of onset:  6/3/2025 Date of surgery: N/A   Allergies: Patient has no known allergies.   Comorbid condition: HTN, HLD, BPH, CAD, Valvular Heart Disease, CHF Class II, Occlusion of right iliac artery, Urinary Obstruction, Anemia, Acute bronchitis, Subdural hematoma   Medical/functional conditions requiring inpatient rehabilitation:      This patient requires medical management/24-hour nursing of complex co morbidities HTN, HLD, BPH, CAD, Valvular Heart Disease, CHF Class II, Occlusion of right iliac artery, Urinary Obstruction, Anemia, Acute bronchitis, Subdural hematoma, labs, medications (see medications list), pain, sleep hygiene, anticoagulation, nutrition, hydration, neurological, pulmonary, cardiac status, and preventive healthcare.     This patient requires intense therapy and an integrated, interdisciplinary approach to address safety, impaired mobility, impaired ADLs (dressing, toileting, grooming, showering), impaired cognition, judgment, and memory, communication, pulmonary insufficiency, bowel/bladder problems,preventive healthcare, medication management, integration of functional skills into daily living, and home caregiver support and training.    Risk for medical/clinical complications:      This patient is at risk for the following complications: DVT/PE, pneumonia, malnutrition, neurological decline, respiratory insufficiency, worsening activity intolerance, complications from discontinuation of anticoagulation,  skin breakdown, inadequate sleep, recurring stroke, and constipation.       Overview/Hospital Course:  6/8 AA, weekend crosscover, blood pressure stable, no acute events overnight reported, most recent blood work reviewed, continue PT OT effort    6/9 FM:  Patient reports increased dizziness.  Repeat head CT this morning.   Vital signs stable.  Neuro exam unremarkable.  Despite dizziness, patient continues to work well with therapy.  Encouraged patient to continue therapy efforts.    6/10 FM:  Patient seen and examined after team conference.  Patient's mood is good patient's strength has returned rapidly and he is improving more rapidly than expected.  Patient is eager to return home with family he will be returning to assisted living.  May early discharge on Friday.  Continue efforts.    Interval History: Patient seen and examined.    Review of Systems   Constitutional:  Positive for activity change, appetite change and fatigue. Negative for chills and fever.   HENT:  Negative for ear pain, mouth sores, nosebleeds and sore throat.    Eyes:  Negative for visual disturbance.   Respiratory:  Negative for apnea, cough, shortness of breath and wheezing.    Cardiovascular:  Positive for leg swelling. Negative for chest pain and palpitations.   Gastrointestinal:  Positive for constipation. Negative for abdominal distention, abdominal pain, blood in stool, diarrhea, nausea and vomiting.   Endocrine: Positive for cold intolerance. Negative for polyphagia.   Genitourinary:  Positive for difficulty urinating. Negative for flank pain.   Musculoskeletal:  Positive for arthralgias, back pain, gait problem and myalgias.   Skin:  Positive for pallor.   Neurological:  Positive for dizziness, speech difficulty and weakness. Negative for tremors, seizures, syncope, facial asymmetry and headaches.   Hematological:  Negative for adenopathy.   Psychiatric/Behavioral:  Positive for confusion. Negative for agitation and hallucinations. The patient is nervous/anxious.      Objective:     Vital Signs (Most Recent):  Temp: 97.3 °F (36.3 °C) (06/10/25 1819)  Pulse: 67 (06/10/25 1819)  Resp: 16 (06/10/25 1819)  BP: (!) 113/56 (06/10/25 1819)  SpO2: 98 % (06/10/25 1819) Vital Signs (24h Range):  Temp:  [97.3 °F (36.3 °C)] 97.3 °F (36.3 °C)  Pulse:  [60-67]  67  Resp:  [11-16] 16  SpO2:  [98 %] 98 %  BP: (113-177)/(56-74) 113/56     Weight: 76.4 kg (168 lb 6.4 oz)  Body mass index is 24.87 kg/m².    Intake/Output Summary (Last 24 hours) at 6/10/2025 2147  Last data filed at 6/10/2025 1712  Gross per 24 hour   Intake 1670 ml   Output 1500 ml   Net 170 ml         Physical Exam  Vitals and nursing note reviewed.   Constitutional:       General: He is not in acute distress.     Appearance: He is obese. He is ill-appearing.      Comments: Improved since last seen, able to stand with assistance   HENT:      Head: Normocephalic and atraumatic.      Nose: Nose normal. No congestion.      Mouth/Throat:      Mouth: Mucous membranes are moist.      Pharynx: No oropharyngeal exudate.   Eyes:      General: No scleral icterus.     Extraocular Movements: Extraocular movements intact.   Neck:      Vascular: No carotid bruit.   Cardiovascular:      Rate and Rhythm: Normal rate and regular rhythm.      Heart sounds: Normal heart sounds. No murmur heard.     No friction rub. No gallop.   Pulmonary:      Effort: No respiratory distress.      Breath sounds: No stridor. Rhonchi present. No wheezing or rales.   Chest:      Chest wall: No tenderness.   Abdominal:      General: There is no distension.      Palpations: Abdomen is soft. There is no mass.      Tenderness: There is no abdominal tenderness. There is no right CVA tenderness, left CVA tenderness, guarding or rebound.      Hernia: No hernia is present.   Genitourinary:     Comments: Bolton in placd  Musculoskeletal:      Cervical back: No rigidity.      Right lower leg: No edema.      Left lower leg: No edema.   Lymphadenopathy:      Cervical: No cervical adenopathy.   Skin:     Capillary Refill: Capillary refill takes less than 2 seconds.      Coloration: Skin is not jaundiced or pale.   Neurological:      Mental Status: He is disoriented.      Motor: Weakness present.      Gait: Gait abnormal.   Psychiatric:      Comments: More alert,  o x 3, speech improved, + bilateral symmetric weakness, unsteady               Significant Labs: All pertinent labs within the past 24 hours have been reviewed.  Recent Lab Results       None            Significant Imaging: I have reviewed all pertinent imaging results/findings within the past 24 hours.      Assessment & Plan  Subdural hematoma  Patient's hemorrhage is due to trauma/laceration, this bleeding is associated with an antiplatelet agent, the antiplatelet agent is Select Antiplatelet Agent(s): Aspirin and Clopidogrel. Patients most recent Hgb, Hct, platelets, and INR are listed below.  Recent Labs     06/09/25  0705   HGB 12.1*   HCT 38.1*        Plan  - Will trend hemoglobin/hematocrit Daily  - Will monitor and correct any coagulation defects  - Will transfuse if Hgb is <7g/dl (<8g/dl in cases of active ACS) or if patient has rapid bleeding leading to hemodynamic instability    6/9:  Patient with increased dizziness.  Repeat head CT this morning.  Hypertension  Patient's blood pressure range in the last 24 hours was: BP  Min: 113/56  Max: 177/74.The patient's inpatient anti-hypertensive regimen is listed below:  Current Antihypertensives  amLODIPine tablet 5 mg, Daily, Oral    Plan  - BP is controlled, no changes needed to their regimen    Hyperlipidemia  Home meds.    BPH (benign prostatic hyperplasia)  Noted.  Stable.    Congestive heart failure, NYHA class II  @ baseline.  Monitor.       Plan  - Monitor strict I&Os and daily weights.    - Place on telemetry  - Low sodium diet  - Place on fluid restriction of 2 L.   - Cardiology has not been consulted  - The patient's volume status is at their baseline        Insomnia  Gentle txt.  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  Wound of lower extremity  Wound care    Myopathy  Generalized, / Trunk weakness, MF, + CVA    Urinary obstruction  As above    Urinary tract infection without hematuria  Cont abx,  review studies, attempt to wean willams next week.    6/10 FM:  Much improved.  Delirium  Much improved  VTE Risk Mitigation (From admission, onward)           Ordered     IP VTE HIGH RISK PATIENT  Once         06/05/25 1538     Place sequential compression device  Until discontinued         06/05/25 1538                    Discharge Planning   ADONAY:      Code Status: Prior   Medical Readiness for Discharge Date:   Discharge Plan A: Home Health                Please place Justification for DME        Dave Reyes III, MD  Department of Hospital Medicine   [facility] ([facility])

## 2025-06-11 NOTE — SUBJECTIVE & OBJECTIVE
Interval History: Patient seen and examined.    Review of Systems   Constitutional:  Positive for activity change, appetite change and fatigue. Negative for chills and fever.   HENT:  Negative for ear pain, mouth sores, nosebleeds and sore throat.    Eyes:  Negative for visual disturbance.   Respiratory:  Negative for apnea, cough, shortness of breath and wheezing.    Cardiovascular:  Positive for leg swelling. Negative for chest pain and palpitations.   Gastrointestinal:  Positive for constipation. Negative for abdominal distention, abdominal pain, blood in stool, diarrhea, nausea and vomiting.   Endocrine: Positive for cold intolerance. Negative for polyphagia.   Genitourinary:  Positive for difficulty urinating. Negative for flank pain.   Musculoskeletal:  Positive for arthralgias, back pain, gait problem and myalgias.   Skin:  Positive for pallor.   Neurological:  Positive for dizziness, speech difficulty and weakness. Negative for tremors, seizures, syncope, facial asymmetry and headaches.   Hematological:  Negative for adenopathy.   Psychiatric/Behavioral:  Positive for confusion. Negative for agitation and hallucinations. The patient is nervous/anxious.      Objective:     Vital Signs (Most Recent):  Temp: 97.3 °F (36.3 °C) (06/10/25 1819)  Pulse: 67 (06/10/25 1819)  Resp: 16 (06/10/25 1819)  BP: (!) 113/56 (06/10/25 1819)  SpO2: 98 % (06/10/25 1819) Vital Signs (24h Range):  Temp:  [97.3 °F (36.3 °C)] 97.3 °F (36.3 °C)  Pulse:  [60-67] 67  Resp:  [11-16] 16  SpO2:  [98 %] 98 %  BP: (113-177)/(56-74) 113/56     Weight: 76.4 kg (168 lb 6.4 oz)  Body mass index is 24.87 kg/m².    Intake/Output Summary (Last 24 hours) at 6/10/2025 2147  Last data filed at 6/10/2025 1712  Gross per 24 hour   Intake 1670 ml   Output 1500 ml   Net 170 ml         Physical Exam  Vitals and nursing note reviewed.   Constitutional:       General: He is not in acute distress.     Appearance: He is obese. He is ill-appearing.       Comments: Improved since last seen, able to stand with assistance   HENT:      Head: Normocephalic and atraumatic.      Nose: Nose normal. No congestion.      Mouth/Throat:      Mouth: Mucous membranes are moist.      Pharynx: No oropharyngeal exudate.   Eyes:      General: No scleral icterus.     Extraocular Movements: Extraocular movements intact.   Neck:      Vascular: No carotid bruit.   Cardiovascular:      Rate and Rhythm: Normal rate and regular rhythm.      Heart sounds: Normal heart sounds. No murmur heard.     No friction rub. No gallop.   Pulmonary:      Effort: No respiratory distress.      Breath sounds: No stridor. Rhonchi present. No wheezing or rales.   Chest:      Chest wall: No tenderness.   Abdominal:      General: There is no distension.      Palpations: Abdomen is soft. There is no mass.      Tenderness: There is no abdominal tenderness. There is no right CVA tenderness, left CVA tenderness, guarding or rebound.      Hernia: No hernia is present.   Genitourinary:     Comments: Bolton in carold  Musculoskeletal:      Cervical back: No rigidity.      Right lower leg: No edema.      Left lower leg: No edema.   Lymphadenopathy:      Cervical: No cervical adenopathy.   Skin:     Capillary Refill: Capillary refill takes less than 2 seconds.      Coloration: Skin is not jaundiced or pale.   Neurological:      Mental Status: He is disoriented.      Motor: Weakness present.      Gait: Gait abnormal.   Psychiatric:      Comments: More alert, o x 3, speech improved, + bilateral symmetric weakness, unsteady               Significant Labs: All pertinent labs within the past 24 hours have been reviewed.  Recent Lab Results       None            Significant Imaging: I have reviewed all pertinent imaging results/findings within the past 24 hours.

## 2025-06-11 NOTE — PLAN OF CARE
Problem: Physical Therapy  Goal: Physical Therapy Goal  Description: Problem: Physical Therapy  Goal: Physical Therapy Goal  Description:     Long Term Goals: 2025     Transfers Status    Sit to Stand  Long Term Goal:  Complete sit to stand with Set-up Assistance using Rolling Walker with no verbal cues (MET dated 2025)     Stand Step  Long Term Goal:  Complete stand step with  Set-up Assistance  using Rolling Walker with no  verbal cues(MET dated 2025)       Supine <> Sit  Long Term Goal: Complete supine <> sit with Set-up Assistance  With no verbal cues rails prn(MET dated 2025)       Rolling Right/Left  Long Term Goal: Complete rolling  right/left with Set-up Assistance with no verbal cues rails prn(MET dated 2025)         Balance/Coordination    Static Sitting  Long Term Goal: Complete static sitting with Modified Independent  With no  verbal cues(MET dated 2025)       Dynamic Sitting  Long Term Goal: Complete dynamic sitting with  Set-up Assistance  with  no verbal cues minimal excursions(MET dated 2025)       Static Standing  Long Term Goal: Complete static standing with Set-up Assistance  with   no verbal cues and RW(MET dated 2025)       Dynamic Standing  Long Term Goal: Complete dynamic standing with  Set-up Assistance with no verbal cues and minimal  excursions with RW(MET dated 2025)       Endurance    Long Term Goal:(MET dated 2025)     Physical Therapy: 2 times a day, 45 minutes  Rest periods: minimal  Sittin-8 hours/day    Mobility/Gait  Long Term Goal: Will ambulate with Set-up Assistance  using Rolling Walker with no  verbal cues x 300 ft(MET dated 2025)       Stairs Assistance  Long Term Goal: Patient will ascend/descend 4 stairs/steps using both  handrails  nonreciprocal steps with Supervision or Set-up Assistance and minimal  verbal cues(MET dated 2025)       Ramp/Uneven 10 feet surface  Long  Term Goal; Patient will be able to  navigate a 10 inch uneven surface with RW  with   Supervision or Set-up Assistance and minimal  verbal cues(MET dated 6/11/2025)         2-4  inch curb  Long  Term Goal; Patient will be able to navigate a  2 to 4 inch curb with RW with  Supervision or Set-up Assistance and minimal  verbal cues(MET dated 6/11/2025)       Picking up object   Long  Term Goal; Patient will be able to  a small object from the floor  with proper A.D. with  Set-up Assistance and minimal  verbal cues(MET dated 6/11/2025)       Car transfers  Long  Term Goal; Patient will be able to get in and out of a vehicle  with RW with  stand by assistance     Outcome: Progressing, patient is still c/o  dizziness; tolerated treatment  with rest breaks in between, patient falls asleep if left alone for a few minutes

## 2025-06-11 NOTE — SUBJECTIVE & OBJECTIVE
Interval History: Patient seen and examined.    Review of Systems   Constitutional:  Positive for activity change, appetite change and fatigue. Negative for chills and fever.   HENT:  Negative for ear pain, mouth sores, nosebleeds and sore throat.    Eyes:  Negative for visual disturbance.   Respiratory:  Negative for apnea, cough, shortness of breath and wheezing.    Cardiovascular:  Positive for leg swelling. Negative for chest pain and palpitations.   Gastrointestinal:  Positive for constipation. Negative for abdominal distention, abdominal pain, blood in stool, diarrhea, nausea and vomiting.   Endocrine: Positive for cold intolerance. Negative for polyphagia.   Genitourinary:  Positive for difficulty urinating. Negative for flank pain.   Musculoskeletal:  Positive for arthralgias, back pain, gait problem and myalgias.   Skin:  Positive for pallor.   Neurological:  Positive for dizziness, speech difficulty and weakness. Negative for tremors, seizures, syncope, facial asymmetry and headaches.   Hematological:  Negative for adenopathy.   Psychiatric/Behavioral:  Positive for confusion. Negative for agitation and hallucinations. The patient is nervous/anxious.      Objective:     Vital Signs (Most Recent):  Temp: 97.8 °F (36.6 °C) (06/11/25 1932)  Pulse: 60 (06/11/25 1932)  Resp: 18 (06/11/25 1932)  BP: (!) 141/63 (06/11/25 1932)  SpO2: 100 % (06/11/25 1932) Vital Signs (24h Range):  Temp:  [97.8 °F (36.6 °C)-98 °F (36.7 °C)] 97.8 °F (36.6 °C)  Pulse:  [60-74] 60  Resp:  [18] 18  SpO2:  [96 %-100 %] 100 %  BP: (120-141)/(59-63) 141/63     Weight: 76.4 kg (168 lb 6.4 oz)  Body mass index is 24.87 kg/m².    Intake/Output Summary (Last 24 hours) at 6/11/2025 2110  Last data filed at 6/11/2025 1800  Gross per 24 hour   Intake 1200 ml   Output 2400 ml   Net -1200 ml         Physical Exam  Vitals and nursing note reviewed.   Constitutional:       General: He is not in acute distress.     Appearance: He is obese. He is  ill-appearing.      Comments: Improved since last seen, able to stand with assistance   HENT:      Head: Normocephalic and atraumatic.      Nose: Nose normal. No congestion.      Mouth/Throat:      Mouth: Mucous membranes are moist.      Pharynx: No oropharyngeal exudate.   Eyes:      General: No scleral icterus.     Extraocular Movements: Extraocular movements intact.   Neck:      Vascular: No carotid bruit.   Cardiovascular:      Rate and Rhythm: Normal rate and regular rhythm.      Heart sounds: Normal heart sounds. No murmur heard.     No friction rub. No gallop.   Pulmonary:      Effort: No respiratory distress.      Breath sounds: No stridor. Rhonchi present. No wheezing or rales.   Chest:      Chest wall: No tenderness.   Abdominal:      General: There is no distension.      Palpations: Abdomen is soft. There is no mass.      Tenderness: There is no abdominal tenderness. There is no right CVA tenderness, left CVA tenderness, guarding or rebound.      Hernia: No hernia is present.   Genitourinary:     Comments: Bolton in francy  Musculoskeletal:      Cervical back: No rigidity.      Right lower leg: No edema.      Left lower leg: No edema.   Lymphadenopathy:      Cervical: No cervical adenopathy.   Skin:     Capillary Refill: Capillary refill takes less than 2 seconds.      Coloration: Skin is not jaundiced or pale.   Neurological:      Mental Status: He is disoriented.      Motor: Weakness present.      Gait: Gait abnormal.   Psychiatric:      Comments: More alert, o x 3, speech improved, + bilateral symmetric weakness, unsteady               Significant Labs: All pertinent labs within the past 24 hours have been reviewed.  Recent Lab Results       None            Significant Imaging: I have reviewed all pertinent imaging results/findings within the past 24 hours.

## 2025-06-11 NOTE — ASSESSMENT & PLAN NOTE
Patient's blood pressure range in the last 24 hours was: BP  Min: 113/56  Max: 177/74.The patient's inpatient anti-hypertensive regimen is listed below:  Current Antihypertensives  amLODIPine tablet 5 mg, Daily, Oral    Plan  - BP is controlled, no changes needed to their regimen

## 2025-06-11 NOTE — PT/OT/SLP PROGRESS
Physical Therapy Inpatient Rehab Treatment    Patient Name:  Tobi Liz Jr.   MRN:  [id number]    Recommendations:     Discharge Recommendations:  Low Intensity Therapy   Discharge Equipment Recommendations: none   Barriers to discharge: None    Assessment:     Tobi Liz Jr. is a 89 y.o. male admitted with a medical diagnosis of Subdural hematoma.  He presents with the following impairments/functional limitations:    weakness, impaired endurance, impaired self care skills, impaired functional mobility, gait instability, impaired balance, decreased coordination, decreased upper extremity function, decreased lower extremity function, decreased safety awareness,  decreased ROM,  edema, impaired cardiopulmonary response to activity, impaired joint extensibility, impaired cognition, advance age, chronic back pain, impaired skin integrity and impaired muscle length.    Patient is still c/o  dizziness; tolerated treatment  with rest breaks in between, patient falls asleep if left alone for a few minutes  Anticipate discharge to the assisted living facility on Friday, 6/13/2025.       Rehab Diagnosis:  Sudural Hematoma     Recent Surgery: * No surgery found *      General Precautions: Standard, fall     Orthopedic Precautions:N/A     Braces: N/A    Rehab Prognosis: Fair; patient would benefit from acute skilled PT services to address these deficits and reach maximum level of function.      History:     Past Medical History:   Diagnosis Date    Aortic valve stenosis     Arthritis     BPH (benign prostatic hyperplasia)     Carotid artery stenosis     Chronic diastolic heart failure     ETOH abuse     Exposure to COVID-19 virus 01/07/2022    Hyperchloremia     Hypertension     Kidney stones     Murmur     Pacemaker     Polymyalgia rheumatica     PVD (peripheral vascular disease)     Renal artery stenosis        Past Surgical History:   Procedure Laterality Date    A-V CARDIAC PACEMAKER INSERTION N/A 10/06/2023     Procedure: INSERTION, CARDIAC PACEMAKER, DUAL CHAMBER;  Surgeon: Douglas Salguero MD;  Location: [facility] CATH;  Service: Cardiology;  Laterality: N/A;    ANGIOGRAM, CAROTID Left 08/25/2023    Procedure: ANGIOGRAM, CAROTID;  Surgeon: Nick Box MD;  Location: [facility] CATH;  Service: Cardiology;  Laterality: Left;    CATARACT EXTRACTION, BILATERAL      ECHOCARDIOGRAM,TRANSESOPHAGEAL N/A 10/03/2023    Procedure: Transesophageal echo (JOSE ARMANDO) intra-procedure log documentation;  Surgeon: Nick Box MD;  Location: [facility] OR;  Service: Cardiology;  Laterality: N/A;    HAND SURGERY      INSERTION OF STENT INTO PERIPHERAL VESSEL N/A 01/30/2025    Procedure: INSERTION, STENT, VESSEL, PERIPHERAL;  Surgeon: Harsha Salcedo MD;  Location: [facility] CATH;  Service: Cardiology;  Laterality: N/A;    PERCUTANEOUS TRANSCATHETER AORTIC VALVE REPLACEMENT (TAVR) Left 10/03/2023    Procedure: REPLACEMENT, AORTIC VALVE, PERCUTANEOUS, TRANSCATHETER;  Surgeon: Nick Box MD;  Location: [facility] OR;  Service: Cardiology;  Laterality: Left;    PERCUTANEOUS TRANSCATHETER AORTIC VALVE REPLACEMENT (TAVR) Left 10/03/2023    Procedure: REPLACEMENT, AORTIC VALVE, PERCUTANEOUS, TRANSCATHETER carotid access;  Surgeon: Jun Brito MD;  Location: [facility] OR;  Service: Cardiovascular;  Laterality: Left;    PERCUTANEOUS TRANSLUMINAL ANGIOPLASTY N/A 08/25/2023    Procedure: ANGIOPLASTY-PERCUTANEOUS TRANSLUMINAL (PTA);  Surgeon: Nick Box MD;  Location: [facility] CATH;  Service: Cardiology;  Laterality: N/A;    PERCUTANEOUS TRANSLUMINAL ANGIOPLASTY (PTA) OF PERIPHERAL VESSEL Left 04/10/2025    Procedure: PTA, PERIPHERAL VESSEL;  Surgeon: Harsha Salcedo MD;  Location: [facility] CATH;  Service: Cardiology;  Laterality: Left;  L LE intervention via left antegrade approach Harsha Salcedo at [facility] in Hybrid OR under MAC  *PACEMAKER*    SHOULDER SURGERY Left     total shoulder replacement    WOUND EXPLORATION N/A 10/03/2023    Procedure: EXPLORATION, WOUND;   Surgeon: Jun Brito MD;  Location: [facility] OR;  Service: Cardiology;  Laterality: N/A;       Subjective     Chief Complaint: Patient is c/o dizziness.     Respiratory Status: Room air    Patients cultural, spiritual, Mandaen conflicts given the current situation: no      Objective:     Communicated with nurse, patient and OT  prior to session.  Patient found ambulatory in room/berman with willams catheter  upon PT entry to room.    Pt is Oriented x3, Oriented to place, and Oriented to situation and Alert and Cooperative.    Vitals   Vitals at Rest  /55 mmHg    HR 74 bpm    O2 Sat 98%  on room air    Pain Denies      Functional Mobility:   Bed Mobility:     Rolling Left:  Set-up or clean-up assistance   Rolling Right: Set-up or clean-up assistance   Scooting: Set-up or clean-up assistance   Bridging: Set-up or clean-up assistance   Supine to Sit: Set-up or clean-up assistance   Sit to Supine: Set-up or clean-up assistance   Transfers:     Sit to Stand: Set-up or clean-up assistance  with rolling walker  Chair to mat: Set-up or clean-up assistance  with  rolling walker  using  Step Transfer  Gait: Pt ambulates 300 feet, 150 feet x 2 trials,  25 feet x 2 trials, 10 feet x 2 trials  with RW with Set-up or clean-up assistance .   Impairments contributing to gait deviations include impaired balance, decreased flexibility, and decreased strength.  4 steps (6 inch) and 12 steps (6 inch)  stairs with bilateral handrails with Set-up or clean-up assistance   2 inch and 4 inch  curb with rolling walker with Set-up or clean-up assistance   Ambulate 10  feet on uneven surfaces/ramps with rolling walker with Set-up or clean-up assistance    object from ground in standing position with reacher with rolling walker with Set-up or clean-up assistance        Current   Status  Discharge   Goal   Functional Area: Care Score:    Roll Left and Right 5 Set-up/clean-up   Sit to Lying 5 Set-up/clean-up   Lying to Sitting  on Side of Bed 5 Set-up/clean-up   Sit to Stand 5 Set-up/clean-up   Chair/Bed-to-Chair Transfer 5 Set-up/clean-up   Car Transfer 10 Supervision or touching assistance   Walk 10 Feet 5 Set-up/clean-up   Walk 50 Feet with Two Turns 5 Set-up/clean-up   Walk 150 Feet 5 Set-up/clean-up   Walk 10 Feet Uneven Surface 5 Supervision or touching assistance   1 Step (Curb) 5 Supervision or touching assistance   4 Steps 5 Supervision or touching assistance   12 Steps 5 Not applicable   Picking Up Object 5 Set-up/clean-up   Wheel 50 Feet with Two Turns       Wheel 150 Feet           Therapeutic Activities and Exercises:  Gait training with RW on flat level surface   Stair training with both side rails, non-reciprocal steps   Curb negotiation with RW   Ramp negotiation with RW   Picking up a small object from the floor with RW  and reacher   Sit <> stand with both UE support  Stand step transfer with a RW   Sit <> supine  Rolling side <> side  Standing balance      SUPINE Both Lower Extremity   Active ROM Sets / Reps / Resistance / Etc.   Ankle PF and DF 3 x 10    Short Arc Quads 3 x 10    Heel Slides 3 x 10    SLR 3 x 10    Hip Abduction/Adduction 3 x 10    Bridging  3 x 10       Nu step x 15 minutes     Activity Tolerance: Fair    Patient left up in chair with call button in reach and nurse  notified.    Education provided: roles and goals of PT/PTA, transfer training, bed mob, gait training, stair training, balance training, safety awareness, body mechanics, assistive device, strengthening exercises, and fall prevention    Expected compliance: Low compliance    GOALS:   Multidisciplinary Problems       Physical Therapy Goals          Problem: Physical Therapy    Goal Priority Disciplines Outcome Interventions   Physical Therapy Goal     PT, PT/OT Progressing    Description: Problem: Physical Therapy  Goal: Physical Therapy Goal  Description:     Long Term Goals: 6/18/2025     Transfers Status    Sit to Stand  Long Term Goal:   Complete sit to stand with Set-up Assistance using Rolling Walker with no verbal cues (MET dated 2025)     Stand Step  Long Term Goal:  Complete stand step with  Set-up Assistance  using Rolling Walker with no  verbal cues(MET dated 2025)       Supine <> Sit  Long Term Goal: Complete supine <> sit with Set-up Assistance  With no verbal cues rails prn(MET dated 2025)       Rolling Right/Left  Long Term Goal: Complete rolling  right/left with Set-up Assistance with no verbal cues rails prn(MET dated 2025)         Balance/Coordination    Static Sitting  Long Term Goal: Complete static sitting with Modified Independent  With no  verbal cues(MET dated 2025)       Dynamic Sitting  Long Term Goal: Complete dynamic sitting with  Set-up Assistance  with  no verbal cues minimal excursions(MET dated 2025)       Static Standing  Long Term Goal: Complete static standing with Set-up Assistance  with   no verbal cues and RW(MET dated 2025)       Dynamic Standing  Long Term Goal: Complete dynamic standing with  Set-up Assistance with no verbal cues and minimal  excursions with RW(MET dated 2025)       Endurance    Long Term Goal:(MET dated 2025)     Physical Therapy: 2 times a day, 45 minutes  Rest periods: minimal  Sittin-8 hours/day    Mobility/Gait  Long Term Goal: Will ambulate with Set-up Assistance  using Rolling Walker with no  verbal cues x 300 ft(MET dated 2025)       Stairs Assistance  Long Term Goal: Patient will ascend/descend 4 stairs/steps using both  handrails  nonreciprocal steps with Supervision or Set-up Assistance and minimal  verbal cues(MET dated 2025)       Ramp/Uneven 10 feet surface  Long  Term Goal; Patient will be able to navigate a 10 inch uneven surface with RW  with   Supervision or Set-up Assistance and minimal  verbal cues(MET dated 2025)         2-4  inch curb  Long  Term Goal; Patient will be able to navigate a  2 to 4 inch curb with RW  with  Supervision or Set-up Assistance and minimal  verbal cues(MET dated 6/11/2025)       Picking up object   Long  Term Goal; Patient will be able to  a small object from the floor  with proper A.D. with  Set-up Assistance and minimal  verbal cues(MET dated 6/11/2025)       Car transfers  Long  Term Goal; Patient will be able to get in and out of a vehicle  with RW with  stand by assistance                                            Plan:     During this hospitalization, patient to be seen 5 x/week to address the identified rehab impairments via gait training, therapeutic activities, therapeutic exercises, neuromuscular re-education and progress toward the following goals:    Plan of Care Expires:  06/18/25  PT Next Visit Date: 06/12/25  Plan of Care reviewed with: patient    Additional Information:     Utilizing concurrent therapy to address goals related to safe bed mobility, transfers, gait and advance gait activities.      Time Tracking:     Therapy Time  PT Received On: 06/11/25  PT Start Time: 1005  PT Stop Time: 1135  PT Total Time (min): 90 min   PT Individual: 60  PT Concurrent: 30      Billable Minutes: Gait Training 30, Therapeutic Activity 15, and Therapeutic Exercise 45    06/11/2025

## 2025-06-11 NOTE — PLAN OF CARE
Problem: Rehabilitation ([facility]) Plan of Care  Goal: Plan of Care Review  Outcome: Progressing  Goal: Patient-Specific Goal (Individualized)  Outcome: Progressing  Goal: Absence of New-Onset Illness or Injury  Outcome: Progressing  Goal: Optimal Comfort and Wellbeing  Outcome: Progressing  Goal: Home and Community Transition Plan Established  Outcome: Progressing     Problem: Wound  Goal: Optimal Coping  Outcome: Progressing  Goal: Optimal Functional Ability  Outcome: Progressing  Goal: Absence of Infection Signs and Symptoms  Outcome: Progressing  Goal: Improved Oral Intake  Outcome: Progressing  Goal: Optimal Pain Control and Function  Outcome: Progressing  Goal: Skin Health and Integrity  Outcome: Progressing  Goal: Optimal Wound Healing  Outcome: Progressing     Problem: Infection  Goal: Absence of Infection Signs and Symptoms  Outcome: Progressing     Problem: Fall Injury Risk  Goal: Absence of Fall and Fall-Related Injury  Outcome: Progressing     Problem: Skin Injury Risk Increased  Goal: Skin Health and Integrity  Outcome: Progressing     Problem: Mobility Impairment  Goal: Optimal Mobility  Outcome: Progressing     Problem: Pain Acute  Goal: Optimal Pain Control and Function  Outcome: Progressing     Problem: Urinary Retention  Goal: Effective Urinary Elimination  Outcome: Progressing

## 2025-06-11 NOTE — PLAN OF CARE
Problem: Occupational Therapy  Goal: Occupational Therapy Goal  Description: Long Term Goals to be met by: 06/26/25     Patient will increase functional independence with ADLs by performing:    Feeding with Modified Riley.  UE Dressing with Set-up Assistance.  LE Dressing with Supervision.  Grooming while seated at sink with Modified Riley.  Toileting from toilet with Set-up Assistance for hygiene and clothing management.   Bathing from  shower chair/bench with Stand-by Assistance.  Toilet transfer to toilet with Set-up Assistance.  Increased functional strength to 4+/5 for bilateral UE's.    Outcome: Progressing

## 2025-06-11 NOTE — PROGRESS NOTES
"[facility] Medicine  Progress Note    Patient Name: Tobi Liz Jr.  MRN: [id number]  Patient Class: IP- Rehab   Admission Date: 6/5/2025  Length of Stay: 6 days  Attending Physician: Dave Reyes III, MD  Primary Care Provider: Luis Enrique Guzman Jr., MD        Subjective     Principal Problem:Subdural hematoma        HPI:  Tobi Liz Jr. is a 89 y.o. male  has a past medical history of Aortic valve stenosis, Arthritis, BPH (benign prostatic hyperplasia), Carotid artery stenosis, Chronic diastolic heart failure, ETOH abuse, Exposure to COVID-19 virus (01/07/2022), Hyperchloremia, Hypertension, Kidney stones, Murmur, Pacemaker, Polymyalgia rheumatica, PVD (peripheral vascular disease), and Renal artery stenosis. presenting with Altered Mental Status (Pt to ED from [facility] via POV - family stated that pt was "walking halls last night" - with increased confusion - removed willams cath. Concerned for UTI.      Family reports he is more to his baseline this am.  Pt has been aggravated and annoyed by his willams catheter and family has noticed that he has had more utis since having catheter.  Urologist has given options for catheter removal; suprapubic cath and continuation of willams.  Family wishes to dc willams while in hospital and see how he does.     Willams removed and pending bladder scan this morning. Labs reviewed and overall stable.      6/3/25 FM:  Patient was seen and examined today after team conference and had a change in his neurological exam.  Patient had word-finding confusion him complained of persistent dizziness and lightheadedness despite having normal vital signs.  We performed a stat CT a subdural with midline shift I spoke to the patient's primary care team and we are transferring back down to [facility] as he will require a Neurosurgery consult and transfer and we can not complete this on our inpatient rehab unit.  We will notify family is soon as we can get " in touch with them.     6/4/25:  Patient doing well this morning, neurosurgery consult last night and recommendations for continued PT evaluation and if not improving, consider palliative care.  Not a good candidate for surgical intervention at this time.  Labs stable this morning.      Patient reports visual disturbances have improved but still reports dizziness. Aspirin and Plavix have been discontinued.    6/6 FM:  Patient was seen and examined after transitioned to inpatient rehab.  Patient was transferred down to our acute [facility] surge unit after finding a subdural hematoma.  Patient was seen by tele neuro services and deemed that this was stable and that observation and further therapy would be his only treatment in addition to discontinuing antiplatelet agents.  Patient was on Plavix aspirin prior to identifying the subdural.  Patient has had an improvement in his neurological exam over the last few days he is work with therapy patient's speech is much improved he is alert oriented x3 and eager to return home with his spouse.  We will resume aggressive therapeutic efforts in attempt to transition him home to a supervision/modified independent functional level.  Patient's still has a Bolton catheter in place which we will attempt to wean during this admission.  Chart and case reviewed starting low-dose steroids as there has been some midline shift.     Patient requires acute inpatient rehab admission with 24-hour nursing and active physician oversight to monitor and manage acute medical comorbid conditions, labs, pain, and functional deficits. Patient/family will also require teaching and integration of improving functional skills into daily living. Patient will also require an individualized, interdisciplinary approach to their care, receiving PT, OT services 3 hours per day, 5 days per week. Required care cannot be provided at a lower level of care. Patient is anticipated to require approximately 10-14 days LOS  with expected discharge home  with  services.             Impairment group (IGC):   Other Stroke 01.9 Etiologic diagnosis/description:   S06.5XAA: Sudural Hematoma   Date of onset:  6/3/2025 Date of surgery: N/A   Allergies: Patient has no known allergies.   Comorbid condition: HTN, HLD, BPH, CAD, Valvular Heart Disease, CHF Class II, Occlusion of right iliac artery, Urinary Obstruction, Anemia, Acute bronchitis, Subdural hematoma   Medical/functional conditions requiring inpatient rehabilitation:      This patient requires medical management/24-hour nursing of complex co morbidities HTN, HLD, BPH, CAD, Valvular Heart Disease, CHF Class II, Occlusion of right iliac artery, Urinary Obstruction, Anemia, Acute bronchitis, Subdural hematoma, labs, medications (see medications list), pain, sleep hygiene, anticoagulation, nutrition, hydration, neurological, pulmonary, cardiac status, and preventive healthcare.     This patient requires intense therapy and an integrated, interdisciplinary approach to address safety, impaired mobility, impaired ADLs (dressing, toileting, grooming, showering), impaired cognition, judgment, and memory, communication, pulmonary insufficiency, bowel/bladder problems,preventive healthcare, medication management, integration of functional skills into daily living, and home caregiver support and training.    Risk for medical/clinical complications:      This patient is at risk for the following complications: DVT/PE, pneumonia, malnutrition, neurological decline, respiratory insufficiency, worsening activity intolerance, complications from discontinuation of anticoagulation,  skin breakdown, inadequate sleep, recurring stroke, and constipation.       Overview/Hospital Course:  6/8 AA, weekend crosscover, blood pressure stable, no acute events overnight reported, most recent blood work reviewed, continue PT OT effort    6/9 FM:  Patient reports increased dizziness.  Repeat head CT this morning.   Vital signs stable.  Neuro exam unremarkable.  Despite dizziness, patient continues to work well with therapy.  Encouraged patient to continue therapy efforts.    6/10 FM:  Patient seen and examined after team conference.  Patient's mood is good patient's strength has returned rapidly and he is improving more rapidly than expected.  Patient is eager to return home with family he will be returning to assisted living.  May early discharge on Friday.  Continue efforts.    6/11 FM:  Patient doing well this morning.  He is in the dining room and is actively participating in therapy.  No acute events reported.  Vital signs stable.  Patient continues to work well with therapy.  He is anxious for discharge the end of the week.  Encouraged patient to continue therapy efforts.    Interval History: Patient seen and examined.    Review of Systems   Constitutional:  Positive for activity change, appetite change and fatigue. Negative for chills and fever.   HENT:  Negative for ear pain, mouth sores, nosebleeds and sore throat.    Eyes:  Negative for visual disturbance.   Respiratory:  Negative for apnea, cough, shortness of breath and wheezing.    Cardiovascular:  Positive for leg swelling. Negative for chest pain and palpitations.   Gastrointestinal:  Positive for constipation. Negative for abdominal distention, abdominal pain, blood in stool, diarrhea, nausea and vomiting.   Endocrine: Positive for cold intolerance. Negative for polyphagia.   Genitourinary:  Positive for difficulty urinating. Negative for flank pain.   Musculoskeletal:  Positive for arthralgias, back pain, gait problem and myalgias.   Skin:  Positive for pallor.   Neurological:  Positive for dizziness, speech difficulty and weakness. Negative for tremors, seizures, syncope, facial asymmetry and headaches.   Hematological:  Negative for adenopathy.   Psychiatric/Behavioral:  Positive for confusion. Negative for agitation and hallucinations. The patient is  nervous/anxious.      Objective:     Vital Signs (Most Recent):  Temp: 97.8 °F (36.6 °C) (06/11/25 1932)  Pulse: 60 (06/11/25 1932)  Resp: 18 (06/11/25 1932)  BP: (!) 141/63 (06/11/25 1932)  SpO2: 100 % (06/11/25 1932) Vital Signs (24h Range):  Temp:  [97.8 °F (36.6 °C)-98 °F (36.7 °C)] 97.8 °F (36.6 °C)  Pulse:  [60-74] 60  Resp:  [18] 18  SpO2:  [96 %-100 %] 100 %  BP: (120-141)/(59-63) 141/63     Weight: 76.4 kg (168 lb 6.4 oz)  Body mass index is 24.87 kg/m².    Intake/Output Summary (Last 24 hours) at 6/11/2025 2110  Last data filed at 6/11/2025 1800  Gross per 24 hour   Intake 1200 ml   Output 2400 ml   Net -1200 ml         Physical Exam  Vitals and nursing note reviewed.   Constitutional:       General: He is not in acute distress.     Appearance: He is obese. He is ill-appearing.      Comments: Improved since last seen, able to stand with assistance   HENT:      Head: Normocephalic and atraumatic.      Nose: Nose normal. No congestion.      Mouth/Throat:      Mouth: Mucous membranes are moist.      Pharynx: No oropharyngeal exudate.   Eyes:      General: No scleral icterus.     Extraocular Movements: Extraocular movements intact.   Neck:      Vascular: No carotid bruit.   Cardiovascular:      Rate and Rhythm: Normal rate and regular rhythm.      Heart sounds: Normal heart sounds. No murmur heard.     No friction rub. No gallop.   Pulmonary:      Effort: No respiratory distress.      Breath sounds: No stridor. Rhonchi present. No wheezing or rales.   Chest:      Chest wall: No tenderness.   Abdominal:      General: There is no distension.      Palpations: Abdomen is soft. There is no mass.      Tenderness: There is no abdominal tenderness. There is no right CVA tenderness, left CVA tenderness, guarding or rebound.      Hernia: No hernia is present.   Genitourinary:     Comments: Bolton in placd  Musculoskeletal:      Cervical back: No rigidity.      Right lower leg: No edema.      Left lower leg: No edema.    Lymphadenopathy:      Cervical: No cervical adenopathy.   Skin:     Capillary Refill: Capillary refill takes less than 2 seconds.      Coloration: Skin is not jaundiced or pale.   Neurological:      Mental Status: He is disoriented.      Motor: Weakness present.      Gait: Gait abnormal.   Psychiatric:      Comments: More alert, o x 3, speech improved, + bilateral symmetric weakness, unsteady               Significant Labs: All pertinent labs within the past 24 hours have been reviewed.  Recent Lab Results       None            Significant Imaging: I have reviewed all pertinent imaging results/findings within the past 24 hours.      Assessment & Plan  Subdural hematoma  Patient's hemorrhage is due to trauma/laceration, this bleeding is associated with an antiplatelet agent, the antiplatelet agent is Select Antiplatelet Agent(s): Aspirin and Clopidogrel. Patients most recent Hgb, Hct, platelets, and INR are listed below.  Recent Labs     06/09/25  0705   HGB 12.1*   HCT 38.1*        Plan  - Will trend hemoglobin/hematocrit Daily  - Will monitor and correct any coagulation defects  - Will transfuse if Hgb is <7g/dl (<8g/dl in cases of active ACS) or if patient has rapid bleeding leading to hemodynamic instability    6/9:  Patient with increased dizziness.  Repeat head CT this morning.  Hypertension  Patient's blood pressure range in the last 24 hours was: BP  Min: 120/59  Max: 141/63.The patient's inpatient anti-hypertensive regimen is listed below:  Current Antihypertensives  amLODIPine tablet 5 mg, Daily, Oral    Plan  - BP is controlled, no changes needed to their regimen    Hyperlipidemia  Home meds.    BPH (benign prostatic hyperplasia)  Noted.  Stable.    Congestive heart failure, NYHA class II  @ baseline.  Monitor.       Plan  - Monitor strict I&Os and daily weights.    - Place on telemetry  - Low sodium diet  - Place on fluid restriction of 2 L.   - Cardiology has not been consulted  - The patient's  volume status is at their baseline        Insomnia  Gentle txt.  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  Wound of lower extremity  Wound care    Myopathy  Generalized, / Trunk weakness, MF, + CVA    Urinary obstruction  As above    Urinary tract infection without hematuria  Cont abx, review studies, attempt to wean willams next week.    6/10 FM:  Much improved.  Delirium  Much improved    VTE Risk Mitigation (From admission, onward)           Ordered     IP VTE HIGH RISK PATIENT  Once         06/05/25 1538     Place sequential compression device  Until discontinued         06/05/25 1538                    Discharge Planning   ADONAY:      Code Status: Prior   Medical Readiness for Discharge Date:   Discharge Plan A: Home Health                Please place Justification for DME        Dave Reyes III, MD  Department of Hospital Medicine   [place] [facility] ([facility])

## 2025-06-11 NOTE — PT/OT/SLP PROGRESS
"Speech-Language Pathology Treatment    Tobi Liz Jr.   MRN: [id number]     Diet recommendations: Solid Diet Level: Regular Diet - IDDSI Level 7  Liquid Diet Level: Thin liquids - IDDSI Level 0 HOB to 90 degrees and Standard aspiration precautions    SLP Treatment Date: 25  Speech Start Time: 1450     Speech Stop Time: 1520     Speech Total (min): 30 min       TREATMENT BILLABLE MINUTES:  Speech Therapy Individual x 30    General Precautions: Standard, fall        Subjective:  Pt received awake/alert w/ wife present. "It's a bad day for this. I slept good last night, then this morning I woke up dizzy."Pt w/ c/o dizziness. Cooperative w/ ST.    Objective:   Patient found with: willams catheter    Pt communicates wants/needs WFL. Participates in unstructured conversation w/ wife and this therapist w/ no difficulty. Pt unable to recall memory strategies. SLP provides ongoing education on memory strategies; pt v/u. Pt completes immediate recall task w/ 80% acc independently; improves to 100% given min cues. Pt prompted to utilize memory strategies to complete delayed recall task. Completes delayed recall task w/ 20% acc independently; improves to 80% acc given max cues. Pt independently recalls d/c date; however, requires cueing for d/c disposition. Targeting financial management, pt fills out blank check to pay sample bill. Pt requires min assist for filling out "date" section on check; independently fills out all other sections appropriately. Pt demonstrates adequate focused attention throughout session w/ presence of environmental background noise (i.e.,TV and surrounding conversations).    Pt answers the following orientation questions (90% acc given mod cues):  Orientation Yes No  Given cues   Person x     Place x     Situation x     City x     Month   x   Date  x    Day of Week x     Year   x    x     Age x        Pain/Comfort  Pain Rating 1: 0/10    Assessment:  Tobi Liz JrAnastasia is a 89 y.o. " male with a SLP diagnosis of Cognitive-Linguistic Impairment. Progressing towards established goals. Pt would benefit from continuation of skilled ST services at this time.    Discharge recommendations: Therapy Intensity Recommendations at Discharge: Low Intensity Therapy     Goals:   Multidisciplinary Problems       SLP Goals          Problem: SLP    Goal Priority Disciplines Outcome   SLP Goal     SLP Progressing   Description:   ST. Pt will complete STM delayed recall tasks w/ 80% acc given min cues.  2. Pt will complete memory and mental manipulation tasks w/ 80% acc given min cues.  3. Pt will answer orientation questions w/ 100% acc given min cues.  4. Pt will complete functional cognitive tasks w/ environmental distractions present given min assist.    LT. Pt will use appropriate memory strategies to recall functional information and to maintain safety and participate socially in functional living environment.   2. Pt will develop functional cognitive-linguistic-based skills and utilize compensatory strategies to communicate wants and needs effectively to different conversational partners, maintain safety, and participate socially in functional living environment.   3. Pt will be appropriately oriented x4 w/ cues for external aid to improve safety and awareness in functional living environment.   4. Pt will demonstrate use of self awareness, planning, and self monitoring during ADLs to improve safety and awareness in functional living environment.                         Plan:   Patient to be seen Therapy Frequency: 5 x/week   Planned Interventions: Cognitive-Linguistic Therapy  Plan of Care Expires: 25  Plan of Care reviewed with: patient, spouse  SLP Follow-up?: Yes         2025

## 2025-06-11 NOTE — ASSESSMENT & PLAN NOTE
Patient's blood pressure range in the last 24 hours was: BP  Min: 120/59  Max: 141/63.The patient's inpatient anti-hypertensive regimen is listed below:  Current Antihypertensives  amLODIPine tablet 5 mg, Daily, Oral    Plan  - BP is controlled, no changes needed to their regimen

## 2025-06-11 NOTE — PT/OT/SLP PROGRESS
Occupational Therapy Inpatient Rehab Treatment    Name: Tobi Liz Jr.  MRN: [id number]    Assessment:  Tobi Liz Jr. is a 89 y.o. male admitted with a medical diagnosis of Subdural hematoma.  He presents with the following impairments/functional limitations:  weakness, impaired endurance, impaired self care skills, impaired functional mobility, gait instability, impaired balance, visual deficits, impaired cognition, decreased coordination, decreased upper extremity function, decreased lower extremity function, decreased safety awareness, decreased ROM, pain, impaired fine motor, impaired skin, edema, impaired cardiopulmonary response to activity, impaired joint extensibility, impaired muscle length.    Pt demonstrated improved functional performance with UB and LB dressing as noted by setup assistance and minimal assistance, respectively, in which patient able to thread bilateral Ue's and head while pulling / adjusting pullover shirt to waist on back without cueing while threading bilateral LE's into pants, pull / adjust pants to waist, and garo bilateral shoes with steadying assist and tactile cueing for technique while seated unsupported.    General Precautions: Standard, fall     Orthopedic Precautions:N/A     Braces: N/A    Rehab Prognosis: Good and Fair; patient would benefit from acute skilled OT services to address these deficits and reach maximum level of function.      History:     Past Medical History:   Diagnosis Date    Aortic valve stenosis     Arthritis     BPH (benign prostatic hyperplasia)     Carotid artery stenosis     Chronic diastolic heart failure     ETOH abuse     Exposure to COVID-19 virus 01/07/2022    Hyperchloremia     Hypertension     Kidney stones     Murmur     Pacemaker     Polymyalgia rheumatica     PVD (peripheral vascular disease)     Renal artery stenosis        Past Surgical History:   Procedure Laterality Date    A-V CARDIAC PACEMAKER INSERTION N/A 10/06/2023     Procedure: INSERTION, CARDIAC PACEMAKER, DUAL CHAMBER;  Surgeon: Douglas Salguero MD;  Location: [facility] CATH;  Service: Cardiology;  Laterality: N/A;    ANGIOGRAM, CAROTID Left 08/25/2023    Procedure: ANGIOGRAM, CAROTID;  Surgeon: Nick Box MD;  Location: [facility] CATH;  Service: Cardiology;  Laterality: Left;    CATARACT EXTRACTION, BILATERAL      ECHOCARDIOGRAM,TRANSESOPHAGEAL N/A 10/03/2023    Procedure: Transesophageal echo (JOSE ARMANDO) intra-procedure log documentation;  Surgeon: Nick Box MD;  Location: [facility] OR;  Service: Cardiology;  Laterality: N/A;    HAND SURGERY      INSERTION OF STENT INTO PERIPHERAL VESSEL N/A 01/30/2025    Procedure: INSERTION, STENT, VESSEL, PERIPHERAL;  Surgeon: Harsha Salcedo MD;  Location: [facility] CATH;  Service: Cardiology;  Laterality: N/A;    PERCUTANEOUS TRANSCATHETER AORTIC VALVE REPLACEMENT (TAVR) Left 10/03/2023    Procedure: REPLACEMENT, AORTIC VALVE, PERCUTANEOUS, TRANSCATHETER;  Surgeon: Nick Box MD;  Location: [facility] OR;  Service: Cardiology;  Laterality: Left;    PERCUTANEOUS TRANSCATHETER AORTIC VALVE REPLACEMENT (TAVR) Left 10/03/2023    Procedure: REPLACEMENT, AORTIC VALVE, PERCUTANEOUS, TRANSCATHETER carotid access;  Surgeon: Jun Brito MD;  Location: [facility] OR;  Service: Cardiovascular;  Laterality: Left;    PERCUTANEOUS TRANSLUMINAL ANGIOPLASTY N/A 08/25/2023    Procedure: ANGIOPLASTY-PERCUTANEOUS TRANSLUMINAL (PTA);  Surgeon: Nick Box MD;  Location: [facility] CATH;  Service: Cardiology;  Laterality: N/A;    PERCUTANEOUS TRANSLUMINAL ANGIOPLASTY (PTA) OF PERIPHERAL VESSEL Left 04/10/2025    Procedure: PTA, PERIPHERAL VESSEL;  Surgeon: Harsha Salcedo MD;  Location: [facility] CATH;  Service: Cardiology;  Laterality: Left;  L LE intervention via left antegrade approach Harsha Salcedo at [facility] in Hybrid OR under MAC  *PACEMAKER*    SHOULDER SURGERY Left     total shoulder replacement    WOUND EXPLORATION N/A 10/03/2023    Procedure: EXPLORATION, WOUND;   Surgeon: Jun Brito MD;  Location: [facility];  Service: Cardiology;  Laterality: N/A;       Subjective     Orientation: Oriented x4    Chief Complaint: weakness and moderate dizziness as per patient report      Patient/Family Comments/goals: Pt would like to regain independence with ADL's including functional mobility in order to safely return home to [facility] with least amount of financial burden due to assistance.      Respiratory Status: Room air     Patients cultural, spiritual, Latter-day conflicts given the current situation: no    Objective:     Patient found up in chair with willams catheter  upon OT entry to room.    Mobility   Patient completed:  Sit to Stand Transfer with setup assistance with rolling walker  Bed to Chair Transfer using Step Transfer technique with setup assistance with rolling walker    Functional Mobility  Pt ambulated greater than 200' between surfaces requiring setup assistance utilizing RW.     ADLs   Current Status   Eating     Oral Hygiene     Shower, Bathe Self     Upper Body Dressing 5   Lower Body Dressing 3   Toileting Hygiene     Toilet Transfer     Putting On, Taking Off Footwear 3     Limiting Factors for ADLs: endurance, limited ROM, balance, weakness, body habitus, visual, perceptual, coordination, cognition, safety awareness, and pain     Therapeutic Activities  He participated in therapeutic activity addressing functional reaching, gross motor coordination, static / dynamic standing balance, standing tolerance, and energy for task / endurance challenging him to retrieve, lift, stabilize, manipulate, and place various items needed to perform functional tasks of ADL's while standing utilizing bilateral UE requiring min-mod verbal and tactile cueing to facilitate optimal movements patterns, positioning within RW, and proper weight shifting utilizing forward, diagonal, and lateral steps with minimal to no steadying assist when reaching outside EVELIN.      Therapeutic Exercise  Pt participated in therapeutic exercise performing 3x15 bilateral UE proximal strengthening exercises utilizing heavy resistance theraband with scapular retraction, shoulder flexion, shoulder extension, shoulder adduction, shoulder abduction, horizontal abduction, internal rotation, and external rotation requiring min-mod verbal and tactile cueing for technique emphasizing quality of movement.     Additional Treatments: Pt participated in ADL retraining as further noted above emphasizing fall prevention, and use of assistive devices and compensatory strategies including energy conservation techniques providing extra time requiring min-mod verbal cueing for sequencing, technique, and overall safety awareness.     LifeStyle Change and Education:             Patient left up in chair with all lines intact, nurse notified, and PT present.     Education provided: Roles and goals of OT, ADLs, transfer training, bed mobility, body mechanics, assistive device, sequencing, safety precautions, fall prevention, equipment recommendations, and home safety    Multidisciplinary Problems       Occupational Therapy Goals          Problem: Occupational Therapy    Goal Priority Disciplines Outcome Interventions   Occupational Therapy Goal     OT, PT/OT Progressing    Description: Long Term Goals to be met by: 06/26/25     Patient will increase functional independence with ADLs by performing:    Feeding with Modified [place].  UE Dressing with Set-up Assistance.  LE Dressing with Supervision.  Grooming while seated at sink with Modified [place].  Toileting from toilet with Set-up Assistance for hygiene and clothing management.   Bathing from  shower chair/bench with Stand-by Assistance.  Toilet transfer to toilet with Set-up Assistance.  Increased functional strength to 4+/5 for bilateral UE's.                         Time Tracking     OT Received On: 06/11/25  Time In 0830     Time Out 1000  Total  Time 90 min  Therapy Time: OT Individual: 60  OT Concurrent: 30  Missed Time:    Missed Time Reason:      Billable Minutes: Self Care/Home Management 30, Therapeutic Activity 30, and Therapeutic Exercise 30    06/11/2025

## 2025-06-12 LAB
ABSOLUTE EOSINOPHIL (OHS): 0 K/UL
ABSOLUTE MONOCYTE (OHS): 0.41 K/UL (ref 0.3–1)
ABSOLUTE NEUTROPHIL COUNT (OHS): 7.58 K/UL (ref 1.8–7.7)
ALBUMIN SERPL BCP-MCNC: 3.3 G/DL (ref 3.5–5.2)
ALP SERPL-CCNC: 66 UNIT/L (ref 40–150)
ALT SERPL W/O P-5'-P-CCNC: 26 UNIT/L (ref 10–44)
ANION GAP (OHS): 8 MMOL/L (ref 8–16)
AST SERPL-CCNC: 15 UNIT/L (ref 11–45)
BASOPHILS # BLD AUTO: 0.01 K/UL
BASOPHILS NFR BLD AUTO: 0.1 %
BILIRUB SERPL-MCNC: 0.4 MG/DL (ref 0.1–1)
BUN SERPL-MCNC: 30 MG/DL (ref 10–30)
CALCIUM SERPL-MCNC: 8.2 MG/DL (ref 8.7–10.5)
CHLORIDE SERPL-SCNC: 103 MMOL/L (ref 95–110)
CO2 SERPL-SCNC: 24 MMOL/L (ref 23–29)
CREAT SERPL-MCNC: 0.9 MG/DL (ref 0.5–1.4)
ERYTHROCYTE [DISTWIDTH] IN BLOOD BY AUTOMATED COUNT: 14.8 % (ref 11.5–14.5)
GFR SERPLBLD CREATININE-BSD FMLA CKD-EPI: >60 ML/MIN/1.73/M2
GLUCOSE SERPL-MCNC: 129 MG/DL (ref 70–110)
HCT VFR BLD AUTO: 40.1 % (ref 40–54)
HGB BLD-MCNC: 12.8 GM/DL (ref 14–18)
IMM GRANULOCYTES # BLD AUTO: 0.06 K/UL (ref 0–0.04)
IMM GRANULOCYTES NFR BLD AUTO: 0.7 % (ref 0–0.5)
LYMPHOCYTES # BLD AUTO: 0.64 K/UL (ref 1–4.8)
MAGNESIUM SERPL-MCNC: 2.1 MG/DL (ref 1.6–2.6)
MCH RBC QN AUTO: 30.3 PG (ref 27–31)
MCHC RBC AUTO-ENTMCNC: 31.9 G/DL (ref 32–36)
MCV RBC AUTO: 95 FL (ref 82–98)
NUCLEATED RBC (/100WBC) (OHS): 0 /100 WBC
PLATELET # BLD AUTO: 231 K/UL (ref 150–450)
PMV BLD AUTO: 9 FL (ref 9.2–12.9)
POTASSIUM SERPL-SCNC: 4.3 MMOL/L (ref 3.5–5.1)
PROT SERPL-MCNC: 6.4 GM/DL (ref 6–8.4)
RBC # BLD AUTO: 4.22 M/UL (ref 4.6–6.2)
RELATIVE EOSINOPHIL (OHS): 0 %
RELATIVE LYMPHOCYTE (OHS): 7.4 % (ref 18–48)
RELATIVE MONOCYTE (OHS): 4.7 % (ref 4–15)
RELATIVE NEUTROPHIL (OHS): 87.1 % (ref 38–73)
SODIUM SERPL-SCNC: 135 MMOL/L (ref 136–145)
WBC # BLD AUTO: 8.7 K/UL (ref 3.9–12.7)

## 2025-06-12 PROCEDURE — 97535 SELF CARE MNGMENT TRAINING: CPT

## 2025-06-12 PROCEDURE — 80053 COMPREHEN METABOLIC PANEL: CPT | Performed by: INTERNAL MEDICINE

## 2025-06-12 PROCEDURE — 63600175 PHARM REV CODE 636 W HCPCS: Performed by: INTERNAL MEDICINE

## 2025-06-12 PROCEDURE — 11800000 HC REHAB PRIVATE ROOM

## 2025-06-12 PROCEDURE — 85025 COMPLETE CBC W/AUTO DIFF WBC: CPT | Performed by: INTERNAL MEDICINE

## 2025-06-12 PROCEDURE — 97530 THERAPEUTIC ACTIVITIES: CPT

## 2025-06-12 PROCEDURE — 97110 THERAPEUTIC EXERCISES: CPT

## 2025-06-12 PROCEDURE — 36415 COLL VENOUS BLD VENIPUNCTURE: CPT | Performed by: INTERNAL MEDICINE

## 2025-06-12 PROCEDURE — 25000003 PHARM REV CODE 250: Performed by: INTERNAL MEDICINE

## 2025-06-12 PROCEDURE — 97116 GAIT TRAINING THERAPY: CPT

## 2025-06-12 PROCEDURE — 83735 ASSAY OF MAGNESIUM: CPT | Performed by: INTERNAL MEDICINE

## 2025-06-12 PROCEDURE — 92507 TX SP LANG VOICE COMM INDIV: CPT

## 2025-06-12 PROCEDURE — 25000003 PHARM REV CODE 250: Performed by: NURSE PRACTITIONER

## 2025-06-12 RX ADMIN — FLUCONAZOLE 200 MG: 200 TABLET ORAL at 06:06

## 2025-06-12 RX ADMIN — DEXAMETHASONE 2 MG: 0.5 TABLET ORAL at 08:06

## 2025-06-12 RX ADMIN — AMLODIPINE BESYLATE 5 MG: 5 TABLET ORAL at 08:06

## 2025-06-12 RX ADMIN — QUETIAPINE FUMARATE 25 MG: 25 TABLET ORAL at 08:06

## 2025-06-12 RX ADMIN — ATORVASTATIN CALCIUM 20 MG: 20 TABLET, FILM COATED ORAL at 08:06

## 2025-06-12 NOTE — PT/OT/SLP PROGRESS
Occupational Therapy Inpatient Rehab Treatment    Name: Tobi Liz Jr.  MRN: [id number]    Assessment:  Tobi Liz Jr. is a 89 y.o. male admitted with a medical diagnosis of Subdural hematoma.  He presents with the following impairments/functional limitations:  weakness, impaired endurance, impaired self care skills, impaired functional mobility, gait instability, impaired balance, visual deficits, impaired cognition, decreased coordination, decreased upper extremity function, decreased lower extremity function, decreased safety awareness, decreased ROM, pain, impaired fine motor, impaired skin, edema, impaired cardiopulmonary response to activity, impaired joint extensibility, impaired muscle length.    Pt now supervision to modified independent with ADL's including functional mobility with extra time, and use of compensatory strategies, assistive devices, and adaptive equipment with intermittent cueing for sequencing, safety awareness, and technique. He achieved 8/8 STG's and 8/8 LTG's. Pt to transition back in [facility] tomorrow.     General Precautions: Standard, fall     Orthopedic Precautions:N/A     Braces: N/A    Rehab Prognosis: Good and Fair; patient would benefit from acute skilled OT services to address these deficits and reach maximum level of function.      History:     Past Medical History:   Diagnosis Date    Aortic valve stenosis     Arthritis     BPH (benign prostatic hyperplasia)     Carotid artery stenosis     Chronic diastolic heart failure     ETOH abuse     Exposure to COVID-19 virus 01/07/2022    Hyperchloremia     Hypertension     Kidney stones     Murmur     Pacemaker     Polymyalgia rheumatica     PVD (peripheral vascular disease)     Renal artery stenosis        Past Surgical History:   Procedure Laterality Date    A-V CARDIAC PACEMAKER INSERTION N/A 10/06/2023    Procedure: INSERTION, CARDIAC PACEMAKER, DUAL CHAMBER;  Surgeon: Douglas Salguero,  MD;  Location: [facility] CATH;  Service: Cardiology;  Laterality: N/A;    ANGIOGRAM, CAROTID Left 08/25/2023    Procedure: ANGIOGRAM, CAROTID;  Surgeon: Nick Box MD;  Location: [facility] CATH;  Service: Cardiology;  Laterality: Left;    CATARACT EXTRACTION, BILATERAL      ECHOCARDIOGRAM,TRANSESOPHAGEAL N/A 10/03/2023    Procedure: Transesophageal echo (JOSE ARMANDO) intra-procedure log documentation;  Surgeon: Nick Box MD;  Location: [facility] OR;  Service: Cardiology;  Laterality: N/A;    HAND SURGERY      INSERTION OF STENT INTO PERIPHERAL VESSEL N/A 01/30/2025    Procedure: INSERTION, STENT, VESSEL, PERIPHERAL;  Surgeon: Harsha Salcedo MD;  Location: [facility] CATH;  Service: Cardiology;  Laterality: N/A;    PERCUTANEOUS TRANSCATHETER AORTIC VALVE REPLACEMENT (TAVR) Left 10/03/2023    Procedure: REPLACEMENT, AORTIC VALVE, PERCUTANEOUS, TRANSCATHETER;  Surgeon: Nick Box MD;  Location: [facility] OR;  Service: Cardiology;  Laterality: Left;    PERCUTANEOUS TRANSCATHETER AORTIC VALVE REPLACEMENT (TAVR) Left 10/03/2023    Procedure: REPLACEMENT, AORTIC VALVE, PERCUTANEOUS, TRANSCATHETER carotid access;  Surgeon: Jun Brito MD;  Location: [facility] OR;  Service: Cardiovascular;  Laterality: Left;    PERCUTANEOUS TRANSLUMINAL ANGIOPLASTY N/A 08/25/2023    Procedure: ANGIOPLASTY-PERCUTANEOUS TRANSLUMINAL (PTA);  Surgeon: Nick Box MD;  Location: [facility] CATH;  Service: Cardiology;  Laterality: N/A;    PERCUTANEOUS TRANSLUMINAL ANGIOPLASTY (PTA) OF PERIPHERAL VESSEL Left 04/10/2025    Procedure: PTA, PERIPHERAL VESSEL;  Surgeon: Harsha Salcedo MD;  Location: [facility] CATH;  Service: Cardiology;  Laterality: Left;  L LE intervention via left antegrade approach Harsha Salcedo at [facility] in Hybrid OR under MAC  *PACEMAKER*    SHOULDER SURGERY Left     total shoulder replacement    WOUND EXPLORATION N/A 10/03/2023    Procedure: EXPLORATION, WOUND;  Surgeon: Jun Brito MD;  Location: [facility] OR;  Service: Cardiology;  Laterality:  N/A;       Subjective     Orientation: Oriented x4     Chief Complaint: weakness and moderate dizziness as per patient report      Patient/Family Comments/goals: Pt would like to regain independence with ADL's including functional mobility in order to safely return home to [facility] with least amount of financial burden due to assistance.      Respiratory Status: Room air     Patients cultural, spiritual, Yarsani conflicts given the current situation: no    Objective:     Patient found sitting edge of bed with willams catheter  upon OT entry to room.    Mobility   Patient completed:  Sit to Stand Transfer with setup assistance with rolling walker  Bed to Chair Transfer using Step Transfer technique with setup assistance with rolling walker  Toilet Transfer Step Transfer technique with setup assistance with  grab bars  Shower Transfer Step Transfer technique with setup assistance with grab bars and shower chair.    Functional Mobility  Pt ambulated greater than 200' between surfaces requiring setup assistance utilizing RW.     ADLs   Current Status   Eating 6   Oral Hygiene 6   Shower, Bathe Self 4   Upper Body Dressing 5   Lower Body Dressing 4   Toileting Hygiene 5   Toilet Transfer 5   Putting On, Taking Off Footwear 4     Limiting Factors for ADLs: endurance, limited ROM, balance, weakness, body habitus, visual, perceptual, coordination, cognition, safety awareness, and pain    Therapeutic Activities  Pt participated in therapeutic activity addressing trunk mobility, trunk control, dynamic sitting balance, trunk strength, and active ROM in bilateral shoulders with flexion and abduction challenging him to perform trunk flexion, extension, and lateral flexion utilizing large stability ball requiring verbal and tactile cueing to facilitate optimal movement patterns and increased range per trial in order to improve active ROM impacting performance with functional tasks below waist.     Therapeutic Exercise  Pt  participated in therapeutic exercise performing 3x15 bilateral UE proximal strengthening exercises utilizing heavy resistance theraband with scapular retraction, shoulder flexion, shoulder extension, shoulder adduction, shoulder abduction, horizontal abduction, internal rotation, and external rotation requiring min-mod verbal and tactile cueing for technique emphasizing quality of movement.     Additional Treatments: Pt participated in extensive ADL retraining as further noted above emphasizing fall prevention, and use of assistive devices, adaptive equipment, and compensatory strategies including energy conservation techniques providing extra time requiring intermittent verbal cueing for sequencing, technique, and overall safety awareness.     LifeStyle Change and Education:     Patient left up in chair with all lines intact, nurse notified, and PT present.     Education provided: Roles and goals of OT, ADLs, transfer training, bed mobility, body mechanics, assistive device, sequencing, safety precautions, fall prevention, equipment recommendations, and home safety    Short Term Goals to be met by: 06/16/25      Patient will increase functional independence with ADLs by performing:     Feeding with Set-up Assistance. MET  UE Dressing with Supervision. MET  LE Dressing with Moderate Assistance. MET  Grooming while seated at sink with Set-up Assistance. MET  Toileting from toilet with Minimal Assistance for hygiene and clothing management. MET  Bathing from  shower chair/bench with Minimal Assistance. MET  Toilet transfer to toilet with Contact Guard Assistance. MET  Increased functional strength to 4- to 4/5 for bilateral UE's. MET     Long Term Goals to be met by: 06/26/25      Patient will increase functional independence with ADLs by performing:     Feeding with Modified [place]. MET  UE Dressing with Set-up Assistance. MET  LE Dressing with Supervision. MET  Grooming while seated at sink with Modified  [place]. MET  Toileting from toilet with Set-up Assistance for hygiene and clothing management. MET  Bathing from  shower chair/bench with Stand-by Assistance. MET  Toilet transfer to toilet with Set-up Assistance. MET  Increased functional strength to 4+/5 for bilateral UE's. MET      Time Tracking     OT Received On: 06/12/25  Time In 0830     Time Out 1000  Total Time 90 min  Therapy Time: OT Individual: 60  OT Concurrent: 30  Missed Time:    Missed Time Reason:      Billable Minutes: Self Care/Home Management 45, Therapeutic Activity 15, and Therapeutic Exercise 30    06/12/2025

## 2025-06-12 NOTE — SUBJECTIVE & OBJECTIVE
Interval History: Patient seen and examined.    Review of Systems   Constitutional:  Positive for activity change, appetite change and fatigue. Negative for chills and fever.   HENT:  Negative for ear pain, mouth sores, nosebleeds and sore throat.    Eyes:  Negative for visual disturbance.   Respiratory:  Negative for apnea, cough, shortness of breath and wheezing.    Cardiovascular:  Positive for leg swelling. Negative for chest pain and palpitations.   Gastrointestinal:  Positive for constipation. Negative for abdominal distention, abdominal pain, blood in stool, diarrhea, nausea and vomiting.   Endocrine: Positive for cold intolerance. Negative for polyphagia.   Genitourinary:  Positive for difficulty urinating. Negative for flank pain.   Musculoskeletal:  Positive for arthralgias, back pain, gait problem and myalgias.   Skin:  Positive for pallor.   Neurological:  Positive for dizziness, speech difficulty and weakness. Negative for tremors, seizures, syncope, facial asymmetry and headaches.   Hematological:  Negative for adenopathy.   Psychiatric/Behavioral:  Positive for confusion. Negative for agitation and hallucinations. The patient is nervous/anxious.      Objective:     Vital Signs (Most Recent):  Temp: 98 °F (36.7 °C) (06/12/25 1924)  Pulse: 73 (06/12/25 1924)  Resp: 16 (06/12/25 1924)  BP: (!) 141/62 (06/12/25 1924)  SpO2: 97 % (06/12/25 1924) Vital Signs (24h Range):  Temp:  [98 °F (36.7 °C)-98.3 °F (36.8 °C)] 98 °F (36.7 °C)  Pulse:  [73-77] 73  Resp:  [16-18] 16  SpO2:  [97 %-98 %] 97 %  BP: (131-141)/(62-80) 141/62     Weight: 76.4 kg (168 lb 6.4 oz)  Body mass index is 24.87 kg/m².    Intake/Output Summary (Last 24 hours) at 6/12/2025 2104  Last data filed at 6/12/2025 1800  Gross per 24 hour   Intake 690 ml   Output 2000 ml   Net -1310 ml         Physical Exam  Vitals and nursing note reviewed.   Constitutional:       General: He is not in acute distress.     Appearance: He is obese. He is  ill-appearing.      Comments: Improved since last seen, able to stand with assistance   HENT:      Head: Normocephalic and atraumatic.      Nose: Nose normal. No congestion.      Mouth/Throat:      Mouth: Mucous membranes are moist.      Pharynx: No oropharyngeal exudate.   Eyes:      General: No scleral icterus.     Extraocular Movements: Extraocular movements intact.   Neck:      Vascular: No carotid bruit.   Cardiovascular:      Rate and Rhythm: Normal rate and regular rhythm.      Heart sounds: Normal heart sounds. No murmur heard.     No friction rub. No gallop.   Pulmonary:      Effort: No respiratory distress.      Breath sounds: No stridor. Rhonchi present. No wheezing or rales.   Chest:      Chest wall: No tenderness.   Abdominal:      General: There is no distension.      Palpations: Abdomen is soft. There is no mass.      Tenderness: There is no abdominal tenderness. There is no right CVA tenderness, left CVA tenderness, guarding or rebound.      Hernia: No hernia is present.   Genitourinary:     Comments: [place] in [place]  Musculoskeletal:      Cervical back: No rigidity.      Right lower leg: No edema.      Left lower leg: No edema.   Lymphadenopathy:      Cervical: No cervical adenopathy.   Skin:     Capillary Refill: Capillary refill takes less than 2 seconds.      Coloration: Skin is not jaundiced or pale.   Neurological:      Mental Status: He is disoriented.      Motor: Weakness present.      Gait: Gait abnormal.   Psychiatric:      Comments: More alert, o x 3, speech improved, + bilateral symmetric weakness, unsteady               Significant Labs: All pertinent labs within the past 24 hours have been reviewed.  Recent Lab Results         06/12/25  0539        Albumin 3.3       ALP 66       ALT 26       Anion Gap 8       AST 15       Baso # 0.01       Basophil % 0.1       BILIRUBIN TOTAL 0.4  Comment: For infants and newborns, interpretation of results should be based   on gestational age, weight  and in agreement with clinical   observations.    Premature Infant recommended reference ranges:   0-24 hours:  <8.0 mg/dL   24-48 hours: <12.0 mg/dL   3-5 days:    <15.0 mg/dL   6-29 days:   <15.0 mg/dL       BUN 30       Calcium 8.2       Chloride 103       CO2 24       Creatinine 0.9       eGFR >60  Comment: Estimated GFR calculated using the CKD-EPI creatinine (2021) equation.       Eos # 0.00       Eos % 0.0       Glucose 129       Gran # (ANC) 7.58       Hematocrit 40.1       Hemoglobin 12.8       Immature Grans (Abs) 0.06  Comment: Mild elevation in immature granulocytes is non specific and can be seen in a variety of conditions including stress response, acute inflammation, trauma and pregnancy. Correlation with other laboratory and clinical findings is essential.       Immature Granulocytes 0.7       Lymph # 0.64       Lymph % 7.4       Magnesium  2.1       MCH 30.3       MCHC 31.9       MCV 95       Mono # 0.41       Mono % 4.7       MPV 9.0       Neut % 87.1       nRBC 0       Platelet Count 231       Potassium 4.3       PROTEIN TOTAL 6.4       RBC 4.22       RDW 14.8       Sodium 135       WBC 8.70               Significant Imaging: I have reviewed all pertinent imaging results/findings within the past 24 hours.

## 2025-06-12 NOTE — PT/OT/SLP PROGRESS
"Speech-Language Pathology Treatment    Tobi Liz Jr.   MRN: [id number]     Diet recommendations: Solid Diet Level: Regular Diet - IDDSI Level 7  Liquid Diet Level: Thin liquids - IDDSI Level 0 HOB to 90 degrees and Standard aspiration precautions    SLP Treatment Date: 25  Speech Start Time: 1345     Speech Stop Time: 1415     Speech Total (min): 30 min       TREATMENT BILLABLE MINUTES:  Speech Therapy Individual x 30    General Precautions: Standard, fall        Subjective:  Pt found asleep in recliner; wakes to verbal stim. Pt reports doing "fair" this date. Cooperative w/ ST.    Objective:   Patient found with: willams catheter    Pt communicates wants/needs WFL. Participates in unstructured conversation w/ this therapist w/ no difficulty. Pt unable to recall memory strategies. SLP provides ongoing education on memory strategies; pt v/u. Pt prompted to utilize memory strategies to complete delayed recall task. Completes delayed recall task w/ 75% acc given max cues. Pt participates in familiar card game; requires max assist for recall of rules, planning, organization, and use of strategy. Pt completes memory and mental manipulation task (4-word scrambled sentences) w/ 50% acc mod-max. Targeting financial management, pt and SLP collaborate to identify memory aids/strategies for pt to utilize following d/c from IPR facility in order to assist pt w/ recall of due dates for bills various bills. Pt demonstrates adequate focused attention throughout session w/ presence of environmental background noise (i.e.,TV audio).    Pt answers the following orientation questions (90% acc given min cues):  Orientation Yes No  Given cues   Person x     Place x     Situation x     City x     Month  x    Date x     Day of Week x     Year   x    x     Age x        Pain/Comfort  Pain Rating 1: 0/10    Assessment:  Tobi Liz Jr. is a 89 y.o. male with a SLP diagnosis of Cognitive-Linguistic Impairment. Progressing " towards established goals. Pt would benefit from continuation of skilled ST services at this time.    Discharge recommendations: Therapy Intensity Recommendations at Discharge: Low Intensity Therapy     Goals:   Multidisciplinary Problems       SLP Goals          Problem: SLP    Goal Priority Disciplines Outcome   SLP Goal     SLP Progressing   Description:   ST. Pt will complete STM delayed recall tasks w/ 80% acc given min cues.  2. Pt will complete memory and mental manipulation tasks w/ 80% acc given min cues.  3. Pt will answer orientation questions w/ 100% acc given min cues.  4. Pt will complete functional cognitive tasks w/ environmental distractions present given min assist.    LT. Pt will use appropriate memory strategies to recall functional information and to maintain safety and participate socially in functional living environment.   2. Pt will develop functional cognitive-linguistic-based skills and utilize compensatory strategies to communicate wants and needs effectively to different conversational partners, maintain safety, and participate socially in functional living environment.   3. Pt will be appropriately oriented x4 w/ cues for external aid to improve safety and awareness in functional living environment.   4. Pt will demonstrate use of self awareness, planning, and self monitoring during ADLs to improve safety and awareness in functional living environment.                         Plan:   Patient to be seen Therapy Frequency: 5 x/week   Planned Interventions: Cognitive-Linguistic Therapy  Plan of Care Expires: 25  Plan of Care reviewed with: patient  SLP Follow-up?: Yes         2025

## 2025-06-12 NOTE — PLAN OF CARE
Problem: Physical Therapy  Goal: Physical Therapy Goal  Description: Problem: Physical Therapy  Goal: Physical Therapy Goal  Description:     Long Term Goals: 2025     Transfers Status    Sit to Stand  Long Term Goal:  Complete sit to stand with Set-up Assistance using Rolling Walker with no verbal cues (MET dated 2025)     Stand Step  Long Term Goal:  Complete stand step with  Set-up Assistance  using Rolling Walker with no  verbal cues(MET dated 2025)       Supine <> Sit  Long Term Goal: Complete supine <> sit with Set-up Assistance  With no verbal cues rails prn(MET dated 2025)       Rolling Right/Left  Long Term Goal: Complete rolling  right/left with Set-up Assistance with no verbal cues rails prn(MET dated 2025)         Balance/Coordination    Static Sitting  Long Term Goal: Complete static sitting with Modified Independent  With no  verbal cues(MET dated 2025)       Dynamic Sitting  Long Term Goal: Complete dynamic sitting with  Set-up Assistance  with  no verbal cues minimal excursions(MET dated 2025)       Static Standing  Long Term Goal: Complete static standing with Set-up Assistance  with   no verbal cues and RW(MET dated 2025)       Dynamic Standing  Long Term Goal: Complete dynamic standing with  Set-up Assistance with no verbal cues and minimal  excursions with RW(MET dated 2025)       Endurance    Long Term Goal:(MET dated 2025)     Physical Therapy: 2 times a day, 45 minutes  Rest periods: minimal  Sittin-8 hours/day    Mobility/Gait  Long Term Goal: Will ambulate with Set-up Assistance  using Rolling Walker with no  verbal cues x 300 ft(MET dated 2025)       Stairs Assistance  Long Term Goal: Patient will ascend/descend 4 stairs/steps using both  handrails  nonreciprocal steps with Supervision or Set-up Assistance and minimal  verbal cues(MET dated 2025)       Ramp/Uneven 10 feet surface  Long  Term Goal; Patient will be able to  navigate a 10 inch uneven surface with RW  with   Supervision or Set-up Assistance and minimal  verbal cues(MET dated 6/11/2025)         2-4  inch curb  Long  Term Goal; Patient will be able to navigate a  2 to 4 inch curb with RW with  Supervision or Set-up Assistance and minimal  verbal cues(MET dated 6/11/2025)       Picking up object   Long  Term Goal; Patient will be able to  a small object from the floor  with proper A.D. with  Set-up Assistance and minimal  verbal cues(MET dated 6/11/2025)       Car transfers  Long  Term Goal; Patient will be able to get in and out of a vehicle  with RW with  stand by assistance       Outcome: Ongoing, patient is c/o low back pain today; no change in functional mobility status; anticipate discharge tomorrow with follow up from home health P.T.

## 2025-06-12 NOTE — PLAN OF CARE
No events overnight.     Problem: Rehabilitation (IRF) Plan of Care  Goal: Plan of Care Review  Outcome: Progressing  Goal: Patient-Specific Goal (Individualized)  Outcome: Progressing  Goal: Absence of New-Onset Illness or Injury  Outcome: Progressing  Goal: Optimal Comfort and Wellbeing  Outcome: Progressing  Goal: Home and Community Transition Plan Established  Outcome: Progressing     Problem: Wound  Goal: Optimal Coping  Outcome: Progressing  Goal: Optimal Functional Ability  Outcome: Progressing  Goal: Absence of Infection Signs and Symptoms  Outcome: Progressing  Goal: Improved Oral Intake  Outcome: Progressing  Goal: Optimal Pain Control and Function  Outcome: Progressing  Goal: Skin Health and Integrity  Outcome: Progressing  Goal: Optimal Wound Healing  Outcome: Progressing     Problem: Infection  Goal: Absence of Infection Signs and Symptoms  Outcome: Progressing     Problem: Fall Injury Risk  Goal: Absence of Fall and Fall-Related Injury  Outcome: Progressing     Problem: Skin Injury Risk Increased  Goal: Skin Health and Integrity  Outcome: Progressing     Problem: Mobility Impairment  Goal: Optimal Mobility  Outcome: Progressing     Problem: Pain Acute  Goal: Optimal Pain Control and Function  Outcome: Progressing     Problem: Urinary Retention  Goal: Effective Urinary Elimination  Outcome: Progressing

## 2025-06-12 NOTE — ASSESSMENT & PLAN NOTE
Patient's hemorrhage is due to trauma/laceration, this bleeding is associated with an antiplatelet agent, the antiplatelet agent is Select Antiplatelet Agent(s): Aspirin and Clopidogrel. Patients most recent Hgb, Hct, platelets, and INR are listed below.  Recent Labs     06/12/25  0539   HGB 12.8*   HCT 40.1        Plan  - Will trend hemoglobin/hematocrit Daily  - Will monitor and correct any coagulation defects  - Will transfuse if Hgb is <7g/dl (<8g/dl in cases of active ACS) or if patient has rapid bleeding leading to hemodynamic instability    6/9:  Patient with increased dizziness.  Repeat head CT this morning.

## 2025-06-12 NOTE — PROGRESS NOTES
"[facility] Medicine  Progress Note    Patient Name: Tobi Liz Jr.  MRN: [id number]  Patient Class: IP- Rehab   Admission Date: 6/5/2025  Length of Stay: 7 days  Attending Physician: Dave Reyes III, MD  Primary Care Provider: Luis Enrique Guzman Jr., MD        Subjective     Principal Problem:Subdural hematoma        HPI:  Tobi Liz Jr. is a 89 y.o. male  has a past medical history of Aortic valve stenosis, Arthritis, BPH (benign prostatic hyperplasia), Carotid artery stenosis, Chronic diastolic heart failure, ETOH abuse, Exposure to COVID-19 virus (01/07/2022), Hyperchloremia, Hypertension, Kidney stones, Murmur, Pacemaker, Polymyalgia rheumatica, PVD (peripheral vascular disease), and Renal artery stenosis. presenting with Altered Mental Status (Pt to ED from [facility] via [facility] - family stated that pt was "walking halls last night" - with increased confusion - removed willams cath. Concerned for UTI.      Family reports he is more to his baseline this am.  Pt has been aggravated and annoyed by his willams catheter and family has noticed that he has had more utis since having catheter.  Urologist has given options for catheter removal; suprapubic cath and continuation of willams.  Family wishes to dc willams while in hospital and see how he does.     Willams removed and pending bladder scan this morning. Labs reviewed and overall stable.      6/3/25 FM:  Patient was seen and examined today after team conference and had a change in his neurological exam.  Patient had word-finding confusion him complained of persistent dizziness and lightheadedness despite having normal vital signs.  We performed a stat CT a subdural with midline shift I spoke to the patient's primary care team and we are transferring back down to [facility] as he will require a Neurosurgery consult and transfer and we can not complete this on our inpatient rehab unit.  We will notify family is soon as we can get " in touch with them.     6/4/25:  Patient doing well this morning, neurosurgery consult last night and recommendations for continued PT evaluation and if not improving, consider palliative care.  Not a good candidate for surgical intervention at this time.  Labs stable this morning.      Patient reports visual disturbances have improved but still reports dizziness. Aspirin and Plavix have been discontinued.    6/6 FM:  Patient was seen and examined after transitioned to inpatient rehab.  Patient was transferred down to our acute [facility] surge unit after finding a subdural hematoma.  Patient was seen by tele neuro services and deemed that this was stable and that observation and further therapy would be his only treatment in addition to discontinuing antiplatelet agents.  Patient was on Plavix aspirin prior to identifying the subdural.  Patient has had an improvement in his neurological exam over the last few days he is work with therapy patient's speech is much improved he is alert oriented x3 and eager to return home with his spouse.  We will resume aggressive therapeutic efforts in attempt to transition him home to a supervision/modified independent functional level.  Patient's still has a Bolton catheter in place which we will attempt to wean during this admission.  Chart and case reviewed starting low-dose steroids as there has been some midline shift.     Patient requires acute inpatient rehab admission with 24-hour nursing and active physician oversight to monitor and manage acute medical comorbid conditions, labs, pain, and functional deficits. Patient/family will also require teaching and integration of improving functional skills into daily living. Patient will also require an individualized, interdisciplinary approach to their care, receiving PT, OT services 3 hours per day, 5 days per week. Required care cannot be provided at a lower level of care. Patient is anticipated to require approximately 10-14 days LOS  with expected discharge home  with  services.             Impairment group (IGC):   Other Stroke 01.9 Etiologic diagnosis/description:   S06.5XAA: Sudural Hematoma   Date of onset:  6/3/2025 Date of surgery: N/A   Allergies: Patient has no known allergies.   Comorbid condition: HTN, HLD, BPH, CAD, Valvular Heart Disease, CHF Class II, Occlusion of right iliac artery, Urinary Obstruction, Anemia, Acute bronchitis, Subdural hematoma   Medical/functional conditions requiring inpatient rehabilitation:      This patient requires medical management/24-hour nursing of complex co morbidities HTN, HLD, BPH, CAD, Valvular Heart Disease, CHF Class II, Occlusion of right iliac artery, Urinary Obstruction, Anemia, Acute bronchitis, Subdural hematoma, labs, medications (see medications list), pain, sleep hygiene, anticoagulation, nutrition, hydration, neurological, pulmonary, cardiac status, and preventive healthcare.     This patient requires intense therapy and an integrated, interdisciplinary approach to address safety, impaired mobility, impaired ADLs (dressing, toileting, grooming, showering), impaired cognition, judgment, and memory, communication, pulmonary insufficiency, bowel/bladder problems,preventive healthcare, medication management, integration of functional skills into daily living, and home caregiver support and training.    Risk for medical/clinical complications:      This patient is at risk for the following complications: DVT/PE, pneumonia, malnutrition, neurological decline, respiratory insufficiency, worsening activity intolerance, complications from discontinuation of anticoagulation,  skin breakdown, inadequate sleep, recurring stroke, and constipation.       Overview/Hospital Course:  6/8 AA, weekend crosscover, blood pressure stable, no acute events overnight reported, most recent blood work reviewed, continue PT OT effort    6/9 FM:  Patient reports increased dizziness.  Repeat head CT this morning.   Vital signs stable.  Neuro exam unremarkable.  Despite dizziness, patient continues to work well with therapy.  Encouraged patient to continue therapy efforts.    6/10 FM:  Patient seen and examined after team conference.  Patient's mood is good patient's strength has returned rapidly and he is improving more rapidly than expected.  Patient is eager to return home with family he will be returning to assisted living.  May early discharge on Friday.  Continue efforts.    6/11 FM:  Patient doing well this morning.  He is in the dining room and is actively participating in therapy.  No acute events reported.  Vital signs stable.  Patient continues to work well with therapy.  He is anxious for discharge the end of the week.  Encouraged patient to continue therapy efforts.    6/12 FM:  Patient doing well this morning.  He is sitting in wheelchair in the dining room and is actively participating in therapy.  Improved safety awareness, improved endurance.  No acute events reported.  Labs and vital signs stable.  Patient reports continued dizziness but states it is slowly improving.  Patient continues to work well with therapy.  Patient states he feels he is ready for discharge in the morning.  Encouraged patient to continue therapy efforts.    Interval History: Patient seen and examined.    Review of Systems   Constitutional:  Positive for activity change, appetite change and fatigue. Negative for chills and fever.   HENT:  Negative for ear pain, mouth sores, nosebleeds and sore throat.    Eyes:  Negative for visual disturbance.   Respiratory:  Negative for apnea, cough, shortness of breath and wheezing.    Cardiovascular:  Positive for leg swelling. Negative for chest pain and palpitations.   Gastrointestinal:  Positive for constipation. Negative for abdominal distention, abdominal pain, blood in stool, diarrhea, nausea and vomiting.   Endocrine: Positive for cold intolerance. Negative for polyphagia.   Genitourinary:   Positive for difficulty urinating. Negative for flank pain.   Musculoskeletal:  Positive for arthralgias, back pain, gait problem and myalgias.   Skin:  Positive for pallor.   Neurological:  Positive for dizziness, speech difficulty and weakness. Negative for tremors, seizures, syncope, facial asymmetry and headaches.   Hematological:  Negative for adenopathy.   Psychiatric/Behavioral:  Positive for confusion. Negative for agitation and hallucinations. The patient is nervous/anxious.      Objective:     Vital Signs (Most Recent):  Temp: 98 °F (36.7 °C) (06/12/25 1924)  Pulse: 73 (06/12/25 1924)  Resp: 16 (06/12/25 1924)  BP: (!) 141/62 (06/12/25 1924)  SpO2: 97 % (06/12/25 1924) Vital Signs (24h Range):  Temp:  [98 °F (36.7 °C)-98.3 °F (36.8 °C)] 98 °F (36.7 °C)  Pulse:  [73-77] 73  Resp:  [16-18] 16  SpO2:  [97 %-98 %] 97 %  BP: (131-141)/(62-80) 141/62     Weight: 76.4 kg (168 lb 6.4 oz)  Body mass index is 24.87 kg/m².    Intake/Output Summary (Last 24 hours) at 6/12/2025 2104  Last data filed at 6/12/2025 1800  Gross per 24 hour   Intake 690 ml   Output 2000 ml   Net -1310 ml         Physical Exam  Vitals and nursing note reviewed.   Constitutional:       General: He is not in acute distress.     Appearance: He is obese. He is ill-appearing.      Comments: Improved since last seen, able to stand with assistance   HENT:      Head: Normocephalic and atraumatic.      Nose: Nose normal. No congestion.      Mouth/Throat:      Mouth: Mucous membranes are moist.      Pharynx: No oropharyngeal exudate.   Eyes:      General: No scleral icterus.     Extraocular Movements: Extraocular movements intact.   Neck:      Vascular: No carotid bruit.   Cardiovascular:      Rate and Rhythm: Normal rate and regular rhythm.      Heart sounds: Normal heart sounds. No murmur heard.     No friction rub. No gallop.   Pulmonary:      Effort: No respiratory distress.      Breath sounds: No stridor. Rhonchi present. No wheezing or rales.    Chest:      Chest wall: No tenderness.   Abdominal:      General: There is no distension.      Palpations: Abdomen is soft. There is no mass.      Tenderness: There is no abdominal tenderness. There is no right CVA tenderness, left CVA tenderness, guarding or rebound.      Hernia: No hernia is present.   Genitourinary:     Comments: Che in francy  Musculoskeletal:      Cervical back: No rigidity.      Right lower leg: No edema.      Left lower leg: No edema.   Lymphadenopathy:      Cervical: No cervical adenopathy.   Skin:     Capillary Refill: Capillary refill takes less than 2 seconds.      Coloration: Skin is not jaundiced or pale.   Neurological:      Mental Status: He is disoriented.      Motor: Weakness present.      Gait: Gait abnormal.   Psychiatric:      Comments: More alert, o x 3, speech improved, + bilateral symmetric weakness, unsteady               Significant Labs: All pertinent labs within the past 24 hours have been reviewed.  Recent Lab Results         06/12/25  0539        Albumin 3.3       ALP 66       ALT 26       Anion Gap 8       AST 15       Baso # 0.01       Basophil % 0.1       BILIRUBIN TOTAL 0.4  Comment: For infants and newborns, interpretation of results should be based   on gestational age, weight and in agreement with clinical   observations.    Premature Infant recommended reference ranges:   0-24 hours:  <8.0 mg/dL   24-48 hours: <12.0 mg/dL   3-5 days:    <15.0 mg/dL   6-29 days:   <15.0 mg/dL       BUN 30       Calcium 8.2       Chloride 103       CO2 24       Creatinine 0.9       eGFR >60  Comment: Estimated GFR calculated using the CKD-EPI creatinine (2021) equation.       Eos # 0.00       Eos % 0.0       Glucose 129       Gran # (ANC) 7.58       Hematocrit 40.1       Hemoglobin 12.8       Immature Grans (Abs) 0.06  Comment: Mild elevation in immature granulocytes is non specific and can be seen in a variety of conditions including stress response, acute inflammation, trauma  and pregnancy. Correlation with other laboratory and clinical findings is essential.       Immature Granulocytes 0.7       Lymph # 0.64       Lymph % 7.4       Magnesium  2.1       MCH 30.3       MCHC 31.9       MCV 95       Mono # 0.41       Mono % 4.7       MPV 9.0       Neut % 87.1       nRBC 0       Platelet Count 231       Potassium 4.3       PROTEIN TOTAL 6.4       RBC 4.22       RDW 14.8       Sodium 135       WBC 8.70               Significant Imaging: I have reviewed all pertinent imaging results/findings within the past 24 hours.      Assessment & Plan  Subdural hematoma  Patient's hemorrhage is due to trauma/laceration, this bleeding is associated with an antiplatelet agent, the antiplatelet agent is Select Antiplatelet Agent(s): Aspirin and Clopidogrel. Patients most recent Hgb, Hct, platelets, and INR are listed below.  Recent Labs     06/12/25  0539   HGB 12.8*   HCT 40.1        Plan  - Will trend hemoglobin/hematocrit Daily  - Will monitor and correct any coagulation defects  - Will transfuse if Hgb is <7g/dl (<8g/dl in cases of active ACS) or if patient has rapid bleeding leading to hemodynamic instability    6/9:  Patient with increased dizziness.  Repeat head CT this morning.  Hypertension  Patient's blood pressure range in the last 24 hours was: BP  Min: 131/80  Max: 141/62.The patient's inpatient anti-hypertensive regimen is listed below:  Current Antihypertensives  amLODIPine tablet 5 mg, Daily, Oral    Plan  - BP is controlled, no changes needed to their regimen    Hyperlipidemia  Home meds.    BPH (benign prostatic hyperplasia)  Noted.  Stable.    Congestive heart failure, NYHA class II  @ baseline.  Monitor.       Plan  - Monitor strict I&Os and daily weights.    - Place on telemetry  - Low sodium diet  - Place on fluid restriction of 2 L.   - Cardiology has not been consulted  - The patient's volume status is at their baseline        Insomnia  Gentle txt.  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  Wound of lower extremity  Wound care    Myopathy  Generalized, / Trunk weakness, MF, + CVA    Urinary obstruction  As above    Urinary tract infection without hematuria  Cont abx, review studies, attempt to wean willams next week.    6/10 FM:  Much improved.  Delirium  Much improved    VTE Risk Mitigation (From admission, onward)           Ordered     IP VTE HIGH RISK PATIENT  Once         06/05/25 1538     Place sequential compression device  Until discontinued         06/05/25 1538                    Discharge Planning   ADONAY:      Code Status: Prior   Medical Readiness for Discharge Date:   Discharge Plan A: Home Health                Please place Justification for DME        Dave Reyes III, MD  Department of Hospital Medicine   [facility] ([facility])

## 2025-06-12 NOTE — PT/OT/SLP PROGRESS
Physical Therapy Inpatient Rehab Treatment    Patient Name:  Tobi Liz Jr.   MRN:  [id number]    Recommendations:     Discharge Recommendations:  Low Intensity Therapy   Discharge Equipment Recommendations: none (patient has a RW and wheelchair)   Barriers to discharge: None    Assessment:     Tobi Liz Jr. is a 89 y.o. male admitted with a medical diagnosis of Subdural hematoma.  He presents with the following impairments/functional limitations:     weakness, impaired endurance, impaired self care skills, impaired functional mobility, gait instability, impaired balance, decreased coordination, decreased upper extremity function, decreased lower extremity function, decreased safety awareness,  decreased ROM,  edema, impaired cardiopulmonary response to activity, impaired joint extensibility, impaired cognition, advance age, chronic back pain, impaired skin integrity and impaired muscle length.     Patient is c/o low back pain today; no c/o dizziness; no change in functional mobility status; anticipate discharge tomorrow with follow up from home health P.T.  No DME needed, patient has a RW and wheelchair for home use.  No family training needed, patient was here in March of this year and training was done at that time.  Recommend supervision with functional mobility for safety upon discharge. Goals were all met except for car transfer which will be done tomorrow.     Rehab Diagnosis:  Sudural Hematoma     Recent Surgery: * No surgery found *      General Precautions: Standard, fall     Orthopedic Precautions:N/A     Braces: N/A    Rehab Prognosis: Fair; patient would benefit from acute skilled PT services to address these deficits and reach maximum level of function.      History:     Past Medical History:   Diagnosis Date    Aortic valve stenosis     Arthritis     BPH (benign prostatic hyperplasia)     Carotid artery stenosis     Chronic diastolic heart failure     ETOH abuse     Exposure to COVID-19  virus 01/07/2022    Hyperchloremia     Hypertension     Kidney stones     Murmur     Pacemaker     Polymyalgia rheumatica     PVD (peripheral vascular disease)     Renal artery stenosis        Past Surgical History:   Procedure Laterality Date    A-V CARDIAC PACEMAKER INSERTION N/A 10/06/2023    Procedure: INSERTION, CARDIAC PACEMAKER, DUAL CHAMBER;  Surgeon: Douglas Salguero MD;  Location: [facility] CATH;  Service: Cardiology;  Laterality: N/A;    ANGIOGRAM, CAROTID Left 08/25/2023    Procedure: ANGIOGRAM, CAROTID;  Surgeon: Nick Box MD;  Location: [facility] CATH;  Service: Cardiology;  Laterality: Left;    CATARACT EXTRACTION, BILATERAL      ECHOCARDIOGRAM,TRANSESOPHAGEAL N/A 10/03/2023    Procedure: Transesophageal echo (JOSE ARMANDO) intra-procedure log documentation;  Surgeon: Nick Box MD;  Location: [facility] OR;  Service: Cardiology;  Laterality: N/A;    HAND SURGERY      INSERTION OF STENT INTO PERIPHERAL VESSEL N/A 01/30/2025    Procedure: INSERTION, STENT, VESSEL, PERIPHERAL;  Surgeon: Harsha Salcedo MD;  Location: [facility] CATH;  Service: Cardiology;  Laterality: N/A;    PERCUTANEOUS TRANSCATHETER AORTIC VALVE REPLACEMENT (TAVR) Left 10/03/2023    Procedure: REPLACEMENT, AORTIC VALVE, PERCUTANEOUS, TRANSCATHETER;  Surgeon: Nick Box MD;  Location: [facility] OR;  Service: Cardiology;  Laterality: Left;    PERCUTANEOUS TRANSCATHETER AORTIC VALVE REPLACEMENT (TAVR) Left 10/03/2023    Procedure: REPLACEMENT, AORTIC VALVE, PERCUTANEOUS, TRANSCATHETER carotid access;  Surgeon: Jun Brito MD;  Location: [facility] OR;  Service: Cardiovascular;  Laterality: Left;    PERCUTANEOUS TRANSLUMINAL ANGIOPLASTY N/A 08/25/2023    Procedure: ANGIOPLASTY-PERCUTANEOUS TRANSLUMINAL (PTA);  Surgeon: Nick Box MD;  Location: [facility] CATH;  Service: Cardiology;  Laterality: N/A;    PERCUTANEOUS TRANSLUMINAL ANGIOPLASTY (PTA) OF PERIPHERAL VESSEL Left 04/10/2025    Procedure: PTA, PERIPHERAL VESSEL;  Surgeon: Harsha Salcedo MD;   "Location: [facility] CATH;  Service: Cardiology;  Laterality: Left;  L LE intervention via left antegrade approach Harsha Salcedo at [facility] in [place] OR under MAC  *PACEMAKER*    SHOULDER SURGERY Left     total shoulder replacement    WOUND EXPLORATION N/A 10/03/2023    Procedure: EXPLORATION, WOUND;  Surgeon: Jun Brito MD;  Location: [facility] OR;  Service: Cardiology;  Laterality: N/A;       Subjective     Chief Complaint: "My back hurts."     Respiratory Status: Room air    Patients cultural, spiritual, Islam conflicts given the current situation: no      Objective:     Communicated with nurse, OT and patient  prior to session.  Patient found up in chair with willams catheter  upon PT entry to room.    Pt is Oriented x3, Oriented to place, Oriented to situation, and Not oriented to time and Alert and Cooperative.    Vitals   Vitals at Rest  /65 mmHg    HR 77 bpm    O2 Sat 94% on room air    Pain Low back rated 5/10       Functional Mobility:   Bed Mobility:     Rolling Left:  Set-up or clean-up assistance   Rolling Right: Set-up or clean-up assistance   Scooting: Set-up or clean-up assistance   Bridging: Set-up or clean-up assistance   Supine to Sit: Set-up or clean-up assistance   Sit to Supine: Set-up or clean-up assistance   Transfers:     Sit to Stand: Set-up or clean-up assistance  with rolling walker  Chair to mat: Set-up or clean-up assistance  with  rolling walker  using  Step Transfer  Gait: Pt ambulates 300 feet, 150 feet x 2 trials, 50 feet,  25 feet x 2 trials, 10 feet x 2 trials  with RW with Set-up or clean-up assistance .   Impairments contributing to gait deviations include impaired balance, decreased flexibility, and decreased strength.  4 steps (6 inch) and 12 steps (6 inch)  stairs with bilateral handrails with Set-up or clean-up assistance   2 inch and 4 inch  curb with rolling walker with Set-up or clean-up assistance   Ambulate 10  feet on uneven surfaces/ramps with rolling walker " with Set-up or clean-up assistance    object from ground in standing position with reacher with rolling walker with Set-up or clean-up assistance        Current   Status  Discharge   Goal   Functional Area: Care Score:    Roll Left and Right 5 Set-up/clean-up   Sit to Lying 5 Set-up/clean-up   Lying to Sitting on Side of Bed 5 Set-up/clean-up   Sit to Stand 5 Set-up/clean-up   Chair/Bed-to-Chair Transfer 5 Set-up/clean-up   Car Transfer 10 Supervision or touching assistance   Walk 10 Feet 5 Set-up/clean-up   Walk 50 Feet with Two Turns 5 Set-up/clean-up   Walk 150 Feet 5 Set-up/clean-up   Walk 10 Feet Uneven Surface 5 Supervision or touching assistance   1 Step (Curb) 5 Supervision or touching assistance   4 Steps 5 Supervision or touching assistance   12 Steps 5 Not applicable   Picking Up Object 5 Set-up/clean-up   Wheel 50 Feet with Two Turns 6     Wheel 150 Feet 6         Therapeutic Activities and Exercises:  Gait training with RW on flat level surface   Stair training with both side rails, non-reciprocal steps   Curb negotiation with RW   Ramp negotiation with RW   Picking up a small object from the floor with RW  and reacher   Sit <> stand with both UE support  Stand step transfer with a RW   Sit <> supine  Rolling side <> side  Standing balance standing on an Airex foam while doing dynamic activities with right UE    Standing  Both Lower Extremity   Active ROM Sets / Reps / Resistance / Etc.   Heel-toe raises  2 minutes    Marching  2 minutes with both UE support          SUPINE Both Lower Extremity   Active ROM Sets / Reps / Resistance / Etc.   Ankle PF and DF 3 x 10    Short Arc Quads 3 x 10    Heel Slides 3 x 10    SLR 3 x 10    Hip Abduction/Adduction 3 x 10    Bridging  3 x 10       Nu step x 10 minutes     Activity Tolerance: Fair    Patient left up in chair with call button in reach and nurse  notified.    Education provided: roles and goals of PT/PTA, transfer training, bed mob, gait training,  stair training, balance training, safety awareness, body mechanics, assistive device, strengthening exercises, and fall prevention    Expected compliance: Moderate compliance and Low compliance    GOALS:   Multidisciplinary Problems       Physical Therapy Goals          Problem: Physical Therapy    Goal Priority Disciplines Outcome Interventions   Physical Therapy Goal     PT, PT/OT Ongoing    Description: Problem: Physical Therapy  Goal: Physical Therapy Goal  Description:     Long Term Goals: 2025     Transfers Status    Sit to Stand  Long Term Goal:  Complete sit to stand with Set-up Assistance using Rolling Walker with no verbal cues (MET dated 2025)     Stand Step  Long Term Goal:  Complete stand step with  Set-up Assistance  using Rolling Walker with no  verbal cues(MET dated 2025)       Supine <> Sit  Long Term Goal: Complete supine <> sit with Set-up Assistance  With no verbal cues rails prn(MET dated 2025)       Rolling Right/Left  Long Term Goal: Complete rolling  right/left with Set-up Assistance with no verbal cues rails prn(MET dated 2025)         Balance/Coordination    Static Sitting  Long Term Goal: Complete static sitting with Modified Independent  With no  verbal cues(MET dated 2025)       Dynamic Sitting  Long Term Goal: Complete dynamic sitting with  Set-up Assistance  with  no verbal cues minimal excursions(MET dated 2025)       Static Standing  Long Term Goal: Complete static standing with Set-up Assistance  with   no verbal cues and RW(MET dated 2025)       Dynamic Standing  Long Term Goal: Complete dynamic standing with  Set-up Assistance with no verbal cues and minimal  excursions with RW(MET dated 2025)       Endurance    Long Term Goal:(MET dated 2025)     Physical Therapy: 2 times a day, 45 minutes  Rest periods: minimal  Sittin-8 hours/day    Mobility/Gait  Long Term Goal: Will ambulate with Set-up Assistance  using Rolling Walker  with no  verbal cues x 300 ft(MET dated 6/11/2025)       Stairs Assistance  Long Term Goal: Patient will ascend/descend 4 stairs/steps using both  handrails  nonreciprocal steps with Supervision or Set-up Assistance and minimal  verbal cues(MET dated 6/11/2025)       Ramp/Uneven 10 feet surface  Long  Term Goal; Patient will be able to navigate a 10 inch uneven surface with RW  with   Supervision or Set-up Assistance and minimal  verbal cues(MET dated 6/11/2025)         2-4  inch curb  Long  Term Goal; Patient will be able to navigate a  2 to 4 inch curb with RW with  Supervision or Set-up Assistance and minimal  verbal cues(MET dated 6/11/2025)       Picking up object   Long  Term Goal; Patient will be able to  a small object from the floor  with proper A.D. with  Set-up Assistance and minimal  verbal cues(MET dated 6/11/2025)       Car transfers  Long  Term Goal; Patient will be able to get in and out of a vehicle  with RW with  stand by assistance                                            Plan:     During this hospitalization, patient to be seen 5 x/week to address the identified rehab impairments via gait training, therapeutic activities, therapeutic exercises, neuromuscular re-education and progress toward the following goals:    Plan of Care Expires:  06/18/25  PT Next Visit Date: 06/12/25  Plan of Care reviewed with: patient    Additional Information:     Utilizing concurrent therapy to address goals related to safe bed mobility, transfers, gait and advance gait activities.      Time Tracking:     Therapy Time  PT Received On: 06/12/25  PT Start Time: 1005  PT Stop Time: 1135  PT Total Time (min): 90 min   PT Individual: 60  PT Concurrent: 35    Billable Minutes: Gait Training 30, Therapeutic Activity 30, and Therapeutic Exercise 30    06/12/2025

## 2025-06-12 NOTE — PLAN OF CARE
Problem: Occupational Therapy  Goal: Occupational Therapy Goal  Description: Long Term Goals to be met by: 06/26/25     Patient will increase functional independence with ADLs by performing:    Feeding with Modified Walker.  UE Dressing with Set-up Assistance.  LE Dressing with Supervision.  Grooming while seated at sink with Modified Walker.  Toileting from toilet with Set-up Assistance for hygiene and clothing management.   Bathing from  shower chair/bench with Stand-by Assistance.  Toilet transfer to toilet with Set-up Assistance.  Increased functional strength to 4+/5 for bilateral UE's.    Outcome: Progressing

## 2025-06-12 NOTE — ASSESSMENT & PLAN NOTE
Patient's blood pressure range in the last 24 hours was: BP  Min: 131/80  Max: 141/62.The patient's inpatient anti-hypertensive regimen is listed below:  Current Antihypertensives  amLODIPine tablet 5 mg, Daily, Oral    Plan  - BP is controlled, no changes needed to their regimen     CONSTITUTIONAL: Well appearing and in no apparent distress.  ENT: Airway patent, moist mucous membranes.   EYES: Pupils equal, round and reactive to light. EOMI. Conjunctiva normal appearing.   CARDIAC: Normal rate, regular rhythm.  Heart sounds S1, S2.    RESPIRATORY: Breath sounds clear and equal bilaterally.   GASTROINTESTINAL: Abdomen soft, non-tender, not distended.  MUSCULOSKELETAL: Spine appears normal. No LE swelling.   NEUROLOGICAL: Alert and oriented x3, no focal deficits, no motor or sensory deficits. 5/5 muscle strength throughout.  SKIN: Skin normal color, warm, dry and intact.   PSYCHIATRIC: Normal mood and affect.

## 2025-06-13 VITALS
DIASTOLIC BLOOD PRESSURE: 63 MMHG | BODY MASS INDEX: 24.94 KG/M2 | HEIGHT: 69 IN | OXYGEN SATURATION: 98 % | WEIGHT: 168.38 LBS | HEART RATE: 69 BPM | TEMPERATURE: 98 F | SYSTOLIC BLOOD PRESSURE: 133 MMHG | RESPIRATION RATE: 18 BRPM

## 2025-06-13 PROCEDURE — 25000003 PHARM REV CODE 250: Performed by: NURSE PRACTITIONER

## 2025-06-13 PROCEDURE — 97537 COMMUNITY/WORK REINTEGRATION: CPT

## 2025-06-13 PROCEDURE — 97550 CAREGIVER TRAING 1ST 30 MIN: CPT

## 2025-06-13 PROCEDURE — 63600175 PHARM REV CODE 636 W HCPCS: Performed by: INTERNAL MEDICINE

## 2025-06-13 RX ORDER — QUETIAPINE FUMARATE 25 MG/1
25 TABLET, FILM COATED ORAL NIGHTLY
Qty: 30 TABLET | Refills: 0 | Status: SHIPPED | OUTPATIENT
Start: 2025-06-13

## 2025-06-13 RX ORDER — DEXAMETHASONE 0.5 MG/1
TABLET ORAL
Qty: 60 TABLET | Refills: 0 | Status: SHIPPED | OUTPATIENT
Start: 2025-06-13 | End: 2025-07-03

## 2025-06-13 RX ORDER — ACETAMINOPHEN 325 MG/1
650 TABLET ORAL EVERY 6 HOURS PRN
COMMUNITY
Start: 2025-06-13

## 2025-06-13 RX ORDER — FLUCONAZOLE 200 MG/1
200 TABLET ORAL DAILY
Qty: 14 TABLET | Refills: 0 | Status: SHIPPED | OUTPATIENT
Start: 2025-06-13

## 2025-06-13 RX ORDER — POLYETHYLENE GLYCOL 3350 17 G/17G
17 POWDER, FOR SOLUTION ORAL DAILY PRN
Qty: 30 PACKET | Refills: 0 | Status: SHIPPED | OUTPATIENT
Start: 2025-06-13

## 2025-06-13 RX ADMIN — DEXAMETHASONE 2 MG: 0.5 TABLET ORAL at 08:06

## 2025-06-13 RX ADMIN — AMLODIPINE BESYLATE 5 MG: 5 TABLET ORAL at 08:06

## 2025-06-13 RX ADMIN — ATORVASTATIN CALCIUM 20 MG: 20 TABLET, FILM COATED ORAL at 08:06

## 2025-06-13 NOTE — PT/OT/SLP PROGRESS
Occupational Therapy Inpatient Rehab Treatment    Name: Tobi Liz Jr.  MRN: [id number]    Assessment:  Tobi Liz Jr. is a 89 y.o. male admitted with a medical diagnosis of Subdural hematoma.  He presents with the following impairments/functional limitations:  weakness, impaired endurance, impaired self care skills, impaired functional mobility, gait instability, impaired balance, visual deficits, impaired cognition, decreased coordination, decreased upper extremity function, decreased lower extremity function, decreased safety awareness, decreased ROM, pain, impaired fine motor, impaired skin, edema, impaired cardiopulmonary response to activity, impaired joint extensibility, impaired muscle length.    Pt demonstrated and verbalized understanding following patient / family training regarding car transfers in which he performed requiring supervision following training secondary to min verbal cueing for safety and technique. All questions and concerns of patient and his son-in-law were addressed on this date prior to conclusion of session.     General Precautions: Standard, fall     Orthopedic Precautions:N/A     Braces: N/A    Rehab Prognosis: Good and Fair; patient would benefit from acute skilled OT services to address these deficits and reach maximum level of function.      History:     Past Medical History:   Diagnosis Date    Aortic valve stenosis     Arthritis     BPH (benign prostatic hyperplasia)     Carotid artery stenosis     Chronic diastolic heart failure     ETOH abuse     Exposure to COVID-19 virus 01/07/2022    Hyperchloremia     Hypertension     Kidney stones     Murmur     Pacemaker     Polymyalgia rheumatica     PVD (peripheral vascular disease)     Renal artery stenosis        Past Surgical History:   Procedure Laterality Date    A-V CARDIAC PACEMAKER INSERTION N/A 10/06/2023    Procedure: INSERTION, CARDIAC PACEMAKER, DUAL CHAMBER;  Surgeon: Douglas Salguero MD;  [facility]: [facility]  CATH;  Service: Cardiology;  Laterality: N/A;    ANGIOGRAM, CAROTID Left 08/25/2023    Procedure: ANGIOGRAM, CAROTID;  Surgeon: Nick Box MD;  Location: [facility] CATH;  Service: Cardiology;  Laterality: Left;    CATARACT EXTRACTION, BILATERAL      ECHOCARDIOGRAM,TRANSESOPHAGEAL N/A 10/03/2023    Procedure: Transesophageal echo (JOSE ARMANDO) intra-procedure log documentation;  Surgeon: Nick Box MD;  Location: [facility] OR;  Service: Cardiology;  Laterality: N/A;    HAND SURGERY      INSERTION OF STENT INTO PERIPHERAL VESSEL N/A 01/30/2025    Procedure: INSERTION, STENT, VESSEL, PERIPHERAL;  Surgeon: Harsha Salcedo MD;  Location: [facility] CATH;  Service: Cardiology;  Laterality: N/A;    PERCUTANEOUS TRANSCATHETER AORTIC VALVE REPLACEMENT (TAVR) Left 10/03/2023    Procedure: REPLACEMENT, AORTIC VALVE, PERCUTANEOUS, TRANSCATHETER;  Surgeon: Nick Box MD;  Location: [facility] OR;  Service: Cardiology;  Laterality: Left;    PERCUTANEOUS TRANSCATHETER AORTIC VALVE REPLACEMENT (TAVR) Left 10/03/2023    Procedure: REPLACEMENT, AORTIC VALVE, PERCUTANEOUS, TRANSCATHETER carotid access;  Surgeon: Jun Brito MD;  Location: [facility] OR;  Service: Cardiovascular;  Laterality: Left;    PERCUTANEOUS TRANSLUMINAL ANGIOPLASTY N/A 08/25/2023    Procedure: ANGIOPLASTY-PERCUTANEOUS TRANSLUMINAL (PTA);  Surgeon: Nick Box MD;  Location: [facility] CATH;  Service: Cardiology;  Laterality: N/A;    PERCUTANEOUS TRANSLUMINAL ANGIOPLASTY (PTA) OF PERIPHERAL VESSEL Left 04/10/2025    Procedure: PTA, PERIPHERAL VESSEL;  Surgeon: Harsha Salcedo MD;  Location: [facility] CATH;  Service: Cardiology;  Laterality: Left;  L LE intervention via left antegrade approach Harsha Salcedo at [facility] in [place] OR under MAC  *PACEMAKER*    SHOULDER SURGERY Left     total shoulder replacement    WOUND EXPLORATION N/A 10/03/2023    Procedure: EXPLORATION, WOUND;  Surgeon: Jun Brito MD;  Location: [facility] OR;  Service: Cardiology;  Laterality: N/A;        Subjective     Orientation: Oriented x4     Chief Complaint: weakness and moderate dizziness as per patient report      Patient/Family Comments/goals: Pt would like to regain independence with ADL's including functional mobility in order to safely return home to [facility] with least amount of financial burden due to assistance.      Respiratory Status: Room air     Patients cultural, spiritual, Pentecostalism conflicts given the current situation: no    Objective:     Patient found up in chair with willams catheter  upon OT entry to room.    Mobility   Patient completed:  Sit to Stand Transfer with setup assistance with rolling walker  Bed to Chair Transfer using Step Transfer technique with setup assistance with rolling walker    Functional Mobility  Pt ambulated greater than 200' between surfaces requiring setup assistance utilizing RW.     ADLs    Current Status   Eating 6   Oral Hygiene 6   Shower, Bathe Self 4   Upper Body Dressing 5   Lower Body Dressing 4   Toileting Hygiene 5   Toilet Transfer 5   Putting On, Taking Off Footwear 4      Limiting Factors for ADLs: endurance, limited ROM, balance, weakness, body habitus, visual, perceptual, coordination, cognition, safety awareness, and pain    Additional Treatments: Pt and his son-in-law participated in patient / family training addressing current functional status, use of compensatory strategies, assistive devices, and adaptive equipment, safety awareness, activity tolerance, home exercise program, car transfers, continuation of daily routine following discharge, and ADL's while addressing all questions and concerns of patient and family on this date prior to conclusion of session.     LifeStyle Change and Education:     Patient left seated in vehicle with nurse notified.     Education provided: Roles and goals of OT, ADLs, transfer training, bed mobility, body mechanics, assistive device, sequencing, safety precautions, fall prevention, equipment  recommendations, and home safety    Short Term Goals to be met by: 06/16/25      Patient will increase functional independence with ADLs by performing:     Feeding with Set-up Assistance. MET  UE Dressing with Supervision. MET  LE Dressing with Moderate Assistance. MET  Grooming while seated at sink with Set-up Assistance. MET  Toileting from toilet with Minimal Assistance for hygiene and clothing management. MET  Bathing from  shower chair/bench with Minimal Assistance. MET  Toilet transfer to toilet with Contact Guard Assistance. MET  Increased functional strength to 4- to 4/5 for bilateral UE's. MET     Long Term Goals to be met by: 06/26/25      Patient will increase functional independence with ADLs by performing:     Feeding with Modified [place]. MET  UE Dressing with Set-up Assistance. MET  LE Dressing with Supervision. MET  Grooming while seated at sink with Modified [place]. MET  Toileting from toilet with Set-up Assistance for hygiene and clothing management. MET  Bathing from  shower chair/bench with Stand-by Assistance. MET  Toilet transfer to toilet with Set-up Assistance. MET  Increased functional strength to 4+/5 for bilateral UE's. MET    Time Tracking     OT Received On: 06/13/25  Time In 0900     Time Out 0945  Total Time 45 min  Therapy Time: OT Individual: 45  Missed Time:    Missed Time Reason:      Billable Minutes: Caregiver Training 30 and Community/Work Re-entry 15     06/13/2025

## 2025-06-13 NOTE — NURSING
Called home phone on face sheet . No answered. Called her daughter Linh  to let her know that anytime she is ready to come get him he will be ready. Went over all d/c orders along with medications with Linh.  She verbalized understanding.

## 2025-06-13 NOTE — ASSESSMENT & PLAN NOTE
Patient's hemorrhage is due to trauma/laceration, this bleeding is associated with an antiplatelet agent, the antiplatelet agent is Select Antiplatelet Agent(s): Aspirin and Clopidogrel. Patients most recent Hgb, Hct, platelets, and INR are listed below.  Recent Labs     06/12/25  0539   HGB 12.8*   HCT 40.1        Plan  - Will trend hemoglobin/hematocrit Daily  - Will monitor and correct any coagulation defects  - Will transfuse if Hgb is <7g/dl (<8g/dl in cases of active ACS) or if patient has rapid bleeding leading to hemodynamic instability    6/9:  Patient with increased dizziness.  Repeat head CT this morning.    DC Note:  Hold ASA and Plavix for min 6 weeks and then resume ASA alone.

## 2025-06-13 NOTE — DISCHARGE SUMMARY
"[facility]  Discharge Summary      Patient Name: Tobi Liz Jr.  MRN: [id number]  [facility] [id number]  Patient Class: IP- Rehab  Admission Date: 6/5/2025  Hospital Length of Stay: 8 days  Discharge Date and Time: 06/13/2025 8:18 AM  Attending Physician: Dave Reyes III, MD   Discharging Provider: Dave Reyes III, MD  Primary Care Provider: Luis Enrique Guzman Jr., MD    Primary Care Team: Networked reference to record PCT     HPI:   Tobi Liz Jr. is a 89 y.o. male  has a past medical history of Aortic valve stenosis, Arthritis, BPH (benign prostatic hyperplasia), Carotid artery stenosis, Chronic diastolic heart failure, ETOH abuse, Exposure to COVID-19 virus (01/07/2022), Hyperchloremia, Hypertension, Kidney stones, Murmur, Pacemaker, Polymyalgia rheumatica, PVD (peripheral vascular disease), and Renal artery stenosis. presenting with Altered Mental Status (Pt to ED from [facility] via POV - family stated that pt was "walking halls last night" - with increased confusion - removed willams cath. Concerned for UTI.      Family reports he is more to his baseline this am.  Pt has been aggravated and annoyed by his willams catheter and family has noticed that he has had more utis since having catheter.  Urologist has given options for catheter removal; suprapubic cath and continuation of willams.  Family wishes to dc willams while in hospital and see how he does.     Willams removed and pending bladder scan this morning. Labs reviewed and overall stable.      6/3/25 FM:  Patient was seen and examined today after team conference and had a change in his neurological exam.  Patient had word-finding confusion him complained of persistent dizziness and lightheadedness despite having normal vital signs.  We performed a stat CT a subdural with midline shift I spoke to the patient's primary care team and we are transferring back down to [facility] as he will require a Neurosurgery " consult and transfer and we can not complete this on our inpatient rehab unit.  We will notify family is soon as we can get in touch with them.     6/4/25:  Patient doing well this morning, neurosurgery consult last night and recommendations for continued PT evaluation and if not improving, consider palliative care.  Not a good candidate for surgical intervention at this time.  Labs stable this morning.      Patient reports visual disturbances have improved but still reports dizziness. Aspirin and Plavix have been discontinued.    6/6 FM:  Patient was seen and examined after transitioned to inpatient rehab.  Patient was transferred down to our acute med surge unit after finding a subdural hematoma.  Patient was seen by tele neuro services and deemed that this was stable and that observation and further therapy would be his only treatment in addition to discontinuing antiplatelet agents.  Patient was on Plavix aspirin prior to identifying the subdural.  Patient has had an improvement in his neurological exam over the last few days he is work with therapy patient's speech is much improved he is alert oriented x3 and eager to return home with his spouse.  We will resume aggressive therapeutic efforts in attempt to transition him home to a supervision/modified independent functional level.  Patient's still has a Bolton catheter in place which we will attempt to wean during this admission.  Chart and case reviewed starting low-dose steroids as there has been some midline shift.     Patient requires acute inpatient rehab admission with 24-hour nursing and active physician oversight to monitor and manage acute medical comorbid conditions, labs, pain, and functional deficits. Patient/family will also require teaching and integration of improving functional skills into daily living. Patient will also require an individualized, interdisciplinary approach to their care, receiving PT, OT services 3 hours per day, 5 days per  week. Required care cannot be provided at a lower level of care. Patient is anticipated to require approximately 10-14 days LOS with expected discharge home  with  services.             Impairment group (IGC):   Other Stroke 01.9 Etiologic diagnosis/description:   S06.5XAA: Sudural Hematoma   Date of onset:  6/3/2025 Date of surgery: N/A   Allergies: Patient has no known allergies.   Comorbid condition: HTN, HLD, BPH, CAD, Valvular Heart Disease, CHF Class II, Occlusion of right iliac artery, Urinary Obstruction, Anemia, Acute bronchitis, Subdural hematoma   Medical/functional conditions requiring inpatient rehabilitation:      This patient requires medical management/24-hour nursing of complex co morbidities HTN, HLD, BPH, CAD, Valvular Heart Disease, CHF Class II, Occlusion of right iliac artery, Urinary Obstruction, Anemia, Acute bronchitis, Subdural hematoma, labs, medications (see medications list), pain, sleep hygiene, anticoagulation, nutrition, hydration, neurological, pulmonary, cardiac status, and preventive healthcare.     This patient requires intense therapy and an integrated, interdisciplinary approach to address safety, impaired mobility, impaired ADLs (dressing, toileting, grooming, showering), impaired cognition, judgment, and memory, communication, pulmonary insufficiency, bowel/bladder problems,preventive healthcare, medication management, integration of functional skills into daily living, and home caregiver support and training.    Risk for medical/clinical complications:      This patient is at risk for the following complications: DVT/PE, pneumonia, malnutrition, neurological decline, respiratory insufficiency, worsening activity intolerance, complications from discontinuation of anticoagulation,  skin breakdown, inadequate sleep, recurring stroke, and constipation.       * No surgery found *      Hospital Course:   6/8 AA, weekend crosscover, blood pressure stable, no acute events  overnight reported, most recent blood work reviewed, continue PT OT effort    6/9 FM:  Patient reports increased dizziness.  Repeat head CT this morning.  Vital signs stable.  Neuro exam unremarkable.  Despite dizziness, patient continues to work well with therapy.  Encouraged patient to continue therapy efforts.    6/10 FM:  Patient seen and examined after team conference.  Patient's mood is good patient's strength has returned rapidly and he is improving more rapidly than expected.  Patient is eager to return home with family he will be returning to assisted living.  May early discharge on Friday.  Continue efforts.    6/11 FM:  Patient doing well this morning.  He is in the dining room and is actively participating in therapy.  No acute events reported.  Vital signs stable.  Patient continues to work well with therapy.  He is anxious for discharge the end of the week.  Encouraged patient to continue therapy efforts.    6/12 FM:  Patient doing well this morning.  He is sitting in wheelchair in the dining room and is actively participating in therapy.  Improved safety awareness, improved endurance.  No acute events reported.  Labs and vital signs stable.  Patient reports continued dizziness but states it is slowly improving.  Patient continues to work well with therapy.  Patient states he feels he is ready for discharge in the morning.  Encouraged patient to continue therapy efforts.    DC Note:  Patient's had an excellent inpatient rehab stay and is had made multiple functional gains.  Patient's lightheadedness and dizziness has improved patient tolerated steroids patient has improved with his safety stamina endurance gait stability and strength.  Patient is transitioning back to assisted living with his wife and they are ultimately looking forward to transitioning home once several renovations in the home or complete.  Patient's mood has been good family's been present and very supportive transitioning back to  home health today.     Goals of Care Treatment Preferences:  Code Status: Full Code      SDOH Screening:  The patient was screened for utility difficulties, food insecurity, transport difficulties, housing insecurity, and interpersonal safety and there were no concerns identified this admission.     Consults:   Consults (From admission, onward)          Status Ordering Provider     Inpatient consult to Social Work/Case Management  Once        Provider:  (Not yet assigned)    Acknowledged RAMSEY CORDOVA III     Inpatient consult to Social Work/Case Management  Once        Provider:  (Not yet assigned)    Completed ARPAN AMANDA            Assessment & Plan  Subdural hematoma  Patient's hemorrhage is due to trauma/laceration, this bleeding is associated with an antiplatelet agent, the antiplatelet agent is Select Antiplatelet Agent(s): Aspirin and Clopidogrel. Patients most recent Hgb, Hct, platelets, and INR are listed below.  Recent Labs     06/12/25  0539   HGB 12.8*   HCT 40.1        Plan  - Will trend hemoglobin/hematocrit Daily  - Will monitor and correct any coagulation defects  - Will transfuse if Hgb is <7g/dl (<8g/dl in cases of active ACS) or if patient has rapid bleeding leading to hemodynamic instability    6/9:  Patient with increased dizziness.  Repeat head CT this morning.    DC Note:  Hold ASA and Plavix for min 6 weeks and then resume ASA alone.  Hypertension  Patient's blood pressure range in the last 24 hours was: BP  Min: 133/63  Max: 141/62.The patient's inpatient anti-hypertensive regimen is listed below:  Current Antihypertensives  amLODIPine tablet 5 mg, Daily, Oral    Plan  - BP is controlled, no changes needed to their regimen    Hyperlipidemia  Home meds.    BPH (benign prostatic hyperplasia)  Noted.  Stable.    Congestive heart failure, NYHA class II  @ baseline.  Monitor.       Plan  - Monitor strict I&Os and daily weights.    - Place on telemetry  - Low sodium diet  - Place on  fluid restriction of 2 L.   - Cardiology has not been consulted  - The patient's volume status is at their baseline        Insomnia  Gentle txt.  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  Wound of lower extremity  Wound care    Myopathy  Generalized, / Trunk weakness, MF, + CVA    Urinary obstruction  As above    Urinary tract infection without hematuria  Cont abx, review studies, attempt to wean willams next week.    6/10 FM:  Much improved.  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    Specialty: Internal Medicine  Why: Follow up with Dr. Guzman on June 24, 2025 @ 9:30 a.m.  Contact information:  [address] [place] [id number]  [phone]                           Patient Instructions:      Ambulatory referral/consult to Home Health   Standing Status: Future   Referral Priority: Routine Referral Type: Home Health   Referral Reason: Specialty Services Required   Referred to Provider: [facility] Requested Specialty:  Home Health Services   Number of Visits Requested: 1     Diet Cardiac     Activity as tolerated       Significant Diagnostic Studies: N/A    Pending Diagnostic Studies:       None           Medications:  Reconciled Home Medications:      Medication List        START taking these medications      acetaminophen 325 MG tablet  Commonly known as: TYLENOL  Take 2 tablets (650 mg total) by mouth every 6 (six) hours as needed.     dexAMETHasone 0.5 MG tablet  Commonly known as: DECADRON  Take 2 tablets (1 mg total) by mouth every 12 (twelve) hours for 10 days, THEN 1 tablet (0.5 mg total) every 12 (twelve) hours for 10 days.  Start taking on: June 13, 2025     fluconazole 200 MG Tab  Commonly known as: DIFLUCAN  Take 1 tablet (200 mg total) by mouth once daily.     polyethylene glycol 17 gram Pwpk  Commonly known as: GLYCOLAX  Take 17 g by mouth daily as needed for Constipation.     QUEtiapine 25 MG Tab  Commonly known as: SEROQUEL  Take 1 tablet (25 mg total) by mouth nightly.            CONTINUE taking these medications      amLODIPine 5 MG tablet  Commonly known as: NORVASC  Take 1 tablet (5 mg total) by mouth once daily. Prescribed by Dr. Box     rosuvastatin 10 MG tablet  Commonly known as: CRESTOR  Take 1 tablet (10 mg total) by mouth once daily.     solifenacin 10 MG tablet  Commonly known as: VESICARE  Take 10 mg by mouth once daily. Patient is still taking            STOP taking these medications      aspirin 81 MG Chew     clopidogreL 75 mg tablet  Commonly known as: PLAVIX              Indwelling Lines/Drains at time of discharge:   Lines/Drains/Airways       Drain  Duration                  Urethral Catheter 06/02/25 1015 Coude 18 Fr. 10 days                    Time spent on the discharge of patient: 45 minutes         Dave Reyes III, MD  Department of Hospital Medicine  [facility]

## 2025-06-13 NOTE — PLAN OF CARE
Problem: Rehabilitation (IRF) Plan of Care  Goal: Plan of Care Review  Outcome: Adequate for Care Transition  Goal: Patient-Specific Goal (Individualized)  Outcome: Adequate for Care Transition  Goal: Absence of New-Onset Illness or Injury  Outcome: Adequate for Care Transition  Goal: Optimal Comfort and Wellbeing  Outcome: Adequate for Care Transition  Goal: Home and Community Transition Plan Established  Outcome: Adequate for Care Transition     Problem: Wound  Goal: Optimal Coping  Outcome: Adequate for Care Transition  Goal: Optimal Functional Ability  Outcome: Adequate for Care Transition  Goal: Absence of Infection Signs and Symptoms  Outcome: Adequate for Care Transition  Goal: Improved Oral Intake  Outcome: Adequate for Care Transition  Goal: Optimal Pain Control and Function  Outcome: Adequate for Care Transition  Goal: Skin Health and Integrity  Outcome: Adequate for Care Transition  Goal: Optimal Wound Healing  Outcome: Adequate for Care Transition     Problem: Infection  Goal: Absence of Infection Signs and Symptoms  Outcome: Adequate for Care Transition     Problem: Fall Injury Risk  Goal: Absence of Fall and Fall-Related Injury  Outcome: Adequate for Care Transition     Problem: Skin Injury Risk Increased  Goal: Skin Health and Integrity  Outcome: Adequate for Care Transition     Problem: Mobility Impairment  Goal: Optimal Mobility  Outcome: Adequate for Care Transition     Problem: Pain Acute  Goal: Optimal Pain Control and Function  Outcome: Adequate for Care Transition     Problem: Urinary Retention  Goal: Effective Urinary Elimination  Outcome: Adequate for Care Transition

## 2025-06-13 NOTE — PLAN OF CARE
Problem: Occupational Therapy  Goal: Occupational Therapy Goal  Description: Long Term Goals to be met by: 06/26/25     Patient will increase functional independence with ADLs by performing:    Feeding with Modified [place].  UE Dressing with Set-up Assistance.  LE Dressing with Supervision.  Grooming while seated at sink with Modified [place].  Toileting from toilet with Set-up Assistance for hygiene and clothing management.   Bathing from  shower chair/bench with Stand-by Assistance.  Toilet transfer to toilet with Set-up Assistance.  Increased functional strength to 4+/5 for bilateral UE's.    Outcome: Met

## 2025-06-13 NOTE — NURSING
Patient was brought to front exit with Nadine via wheelchair. GOVIND Jean helped him with car transfer. No problems noted.

## 2025-06-13 NOTE — PLAN OF CARE
No events overnight    Problem: Rehabilitation (IRF) Plan of Care  Goal: Plan of Care Review  Outcome: Progressing  Goal: Patient-Specific Goal (Individualized)  Outcome: Progressing  Goal: Absence of New-Onset Illness or Injury  Outcome: Progressing  Goal: Optimal Comfort and Wellbeing  Outcome: Progressing  Goal: Home and Community Transition Plan Established  Outcome: Progressing     Problem: Wound  Goal: Optimal Coping  Outcome: Progressing  Goal: Optimal Functional Ability  Outcome: Progressing  Goal: Absence of Infection Signs and Symptoms  Outcome: Progressing  Goal: Improved Oral Intake  Outcome: Progressing  Goal: Optimal Pain Control and Function  Outcome: Progressing  Goal: Skin Health and Integrity  Outcome: Progressing  Goal: Optimal Wound Healing  Outcome: Progressing     Problem: Infection  Goal: Absence of Infection Signs and Symptoms  Outcome: Progressing     Problem: Fall Injury Risk  Goal: Absence of Fall and Fall-Related Injury  Outcome: Progressing     Problem: Skin Injury Risk Increased  Goal: Skin Health and Integrity  Outcome: Progressing     Problem: Mobility Impairment  Goal: Optimal Mobility  Outcome: Progressing     Problem: Pain Acute  Goal: Optimal Pain Control and Function  Outcome: Progressing     Problem: Urinary Retention  Goal: Effective Urinary Elimination  Outcome: Progressing

## 2025-06-13 NOTE — ASSESSMENT & PLAN NOTE
Patient's blood pressure range in the last 24 hours was: BP  Min: 133/63  Max: 141/62.The patient's inpatient anti-hypertensive regimen is listed below:  Current Antihypertensives  amLODIPine tablet 5 mg, Daily, Oral    Plan  - BP is controlled, no changes needed to their regimen

## 2025-06-13 NOTE — PLAN OF CARE
[facility] (Hospital)  Discharge Final Note    Primary Care Provider: Luis Enrique Guzman Jr., MD    Expected Discharge Date: 6/13/2025    Final Discharge Note (most recent)       Final Note - 06/13/25 1115          Final Note    Assessment Type Final Discharge Note     Anticipated Discharge Disposition Home-Health Care Svc   Assisted Living [facility] Resources/Appts/Education Provided Appointments scheduled and added to AVS        Post-Acute Status    Post-Acute Authorization Home Health     Post-Acute Placement Status Set-up Complete/Auth obtained     Home Health Status Set-up Complete/Auth obtained     Discharge Delays None known at this time                     Important Message from Medicare             Contact Info       Luis Enrique Guzman Jr., MD   Specialty: Internal Medicine   Relationship: PCP - General    [address] [place] [id number]   Phone: [phone]       Next Steps: Follow up on 6/24/2025    Instructions: Follow up with Dr. Guzman on June 24, 2025 @ 9:30 a.m.

## 2025-06-13 NOTE — PT/OT/SLP DISCHARGE
Physical Therapy Discharge Summary    Name: Tobi Liz Jr.  MRN: [id number]   Principal Problem: Subdural hematoma     Patient Discharged from in-patient rehab Physical Therapy on 6/13/2025 .  Please refer to prior PT note dated   6/12/2025  for functional status.     Assessment:     Patient appropriate for care in another setting.No DME needed, patient has a RW and wheelchair for home use. No family training needed, patient was here in March of this year and training was done at that time. Recommend supervision with functional mobility for safety upon discharge. Follow up with home health P.T.. He presents with the following impairments/functional limitations:     weakness, impaired endurance, impaired self care skills, impaired functional mobility, gait instability, impaired balance, decreased coordination, decreased upper extremity function, decreased lower extremity function, decreased safety awareness,  decreased ROM,  edema, impaired cardiopulmonary response to activity, impaired joint extensibility, impaired cognition, advance age, chronic back pain, impaired skin integrity and impaired muscle length.     Objective:     GOALS:   Multidisciplinary Problems       Physical Therapy Goals          Problem: Physical Therapy    Goal Priority Disciplines Outcome Interventions   Physical Therapy Goal     PT, PT/OT Ongoing    Description: Problem: Physical Therapy  Goal: Physical Therapy Goal  Description:     Long Term Goals: 6/18/2025     Transfers Status    Sit to Stand  Long Term Goal:  Complete sit to stand with Set-up Assistance using Rolling Walker with no verbal cues (MET dated 6/11/2025)     Stand Step  Long Term Goal:  Complete stand step with  Set-up Assistance  using Rolling Walker with no  verbal cues(MET dated 6/11/2025)       Supine <> Sit  Long Term Goal: Complete supine <> sit with Set-up Assistance  With no verbal cues rails prn(MET dated 6/11/2025)       Rolling Right/Left  Long Term Goal:  Complete rolling  right/left with Set-up Assistance with no verbal cues rails prn(MET dated 2025)         Balance/Coordination    Static Sitting  Long Term Goal: Complete static sitting with Modified Independent  With no  verbal cues(MET dated 2025)       Dynamic Sitting  Long Term Goal: Complete dynamic sitting with  Set-up Assistance  with  no verbal cues minimal excursions(MET dated 2025)       Static Standing  Long Term Goal: Complete static standing with Set-up Assistance  with   no verbal cues and RW(MET dated 2025)       Dynamic Standing  Long Term Goal: Complete dynamic standing with  Set-up Assistance with no verbal cues and minimal  excursions with RW(MET dated 2025)       Endurance    Long Term Goal:(MET dated 2025)     Physical Therapy: 2 times a day, 45 minutes  Rest periods: minimal  Sittin-8 hours/day    Mobility/Gait  Long Term Goal: Will ambulate with Set-up Assistance  using Rolling Walker with no  verbal cues x 300 ft(MET dated 2025)       Stairs Assistance  Long Term Goal: Patient will ascend/descend 4 stairs/steps using both  handrails  nonreciprocal steps with Supervision or Set-up Assistance and minimal  verbal cues(MET dated 2025)       Ramp/Uneven 10 feet surface  Long  Term Goal; Patient will be able to navigate a 10 inch uneven surface with RW  with   Supervision or Set-up Assistance and minimal  verbal cues(MET dated 2025)         2-4  inch curb  Long  Term Goal; Patient will be able to navigate a  2 to 4 inch curb with RW with  Supervision or Set-up Assistance and minimal  verbal cues(MET dated 2025)       Picking up object   Long  Term Goal; Patient will be able to  a small object from the floor  with proper A.D. with  Set-up Assistance and minimal  verbal cues(MET dated 2025)       Car transfers  Long  Term Goal; Patient will be able to get in and out of a vehicle  with RW with  stand by assistance  (MET dated 2025,  per credible report of Ted Graham OT)                                           Care Scores:   Admission Assessment Current   Status  Discharge   Goal   Functional Area: Care Score:  Care Score:    Roll Left and Right 4 5 Set-up/clean-up   Sit to Lying 4 5 Set-up/clean-up   Lying to Sitting on Side of Bed 4 5 Set-up/clean-up   Sit to Stand 4 5 Set-up/clean-up   Chair/Bed-to-Chair Transfer 4 5 Set-up/clean-up   Car Transfer 10 4 per credible report of Ted Graham OT  Supervision or touching assistance   Walk 10 Feet 4 5 Set-up/clean-up   Walk 50 Feet with Two Turns 4 5 Set-up/clean-up   Walk 150 Feet 4 5 Set-up/clean-up   Walk 10 Feet Uneven Surface 4 5 Supervision or touching assistance   1 Step (Curb) 4 5 Supervision or touching assistance   4 Steps 4 5 Supervision or touching assistance   12 Steps 9 5 Not applicable   Picking Up Object 4 5 Set-up/clean-up   Wheel 50 Feet with Two Turns 6 6     Wheel 150 Feet 6 6         Reasons for Discontinuation of Therapy Services  Transfer to alternate level of care.      Plan:     Patient Discharged to: Home with Home Health Service.    6/13/2025

## 2025-06-13 NOTE — PT/OT/SLP DISCHARGE
Occupational Therapy Discharge Summary    Tobi Liz Jr.  MRN: [id number]   Principal Problem: Subdural hematoma      Patient Discharged from inpatient Occupational Therapy on 06/13/25.  Please refer to prior OT note dated 06/12/25 and 06/13/25 for functional status.    Assessment:     Pt was cooperative and motivated with minimal verbal encouragement during treatment sessions including ADL training, Functional mobility training, Strengthening exercises, Energy conservation techniques, Endurance training, Coordination training, Balance training, Self care, Home management, Transfer training, Therapeutic exercise, Therapeutic activity, Cognitive training, Community reintegration, and Patient/family education allowing him to progress with functional goals in which he achieved 8/8 STG's and 8/8 LTG's. Pt now supervision to modified independent with ADL's including functional mobility with extra time, and use of compensatory strategies, assistive devices, and adaptive equipment with intermittent cueing for sequencing, safety awareness, and technique.     Objective:     GOALS:   Short Term Goals to be met by: 06/16/25      Patient will increase functional independence with ADLs by performing:     Feeding with Set-up Assistance. MET  UE Dressing with Supervision. MET  LE Dressing with Moderate Assistance. MET  Grooming while seated at sink with Set-up Assistance. MET  Toileting from toilet with Minimal Assistance for hygiene and clothing management. MET  Bathing from  shower chair/bench with Minimal Assistance. MET  Toilet transfer to toilet with Contact Guard Assistance. MET  Increased functional strength to 4- to 4/5 for bilateral UE's. MET     Long Term Goals to be met by: 06/26/25      Patient will increase functional independence with ADLs by performing:     Feeding with Modified [place]. MET  UE Dressing with Set-up Assistance. MET  LE Dressing with Supervision. MET  Grooming while seated at sink with  Modified [place]. MET  Toileting from toilet with Set-up Assistance for hygiene and clothing management. MET  Bathing from  shower chair/bench with Stand-by Assistance. MET  Toilet transfer to toilet with Set-up Assistance. MET  Increased functional strength to 4+/5 for bilateral UE's. MET      Reasons for Discontinuation of Therapy Services  Satisfactory goal achievement.      Plan:     Patient Discharged to: Home with Home Health Service    6/13/2025

## 2025-06-16 ENCOUNTER — PATIENT OUTREACH (OUTPATIENT)
Dept: ADMINISTRATIVE | Facility: CLINIC | Age: OVER 89
End: 2025-06-16
Payer: MEDICARE

## 2025-06-16 NOTE — PROGRESS NOTES
C3 nurse spoke with Tobi Liz 's daughter, Eugenio for a TCC post hospital discharge follow up call. The patient has a scheduled HOSFU appointment with Luis Enrique Guzman Jr., MD on 06/24/25 @ 7218.

## 2025-06-16 NOTE — PLAN OF CARE
[facility] (Hospital)  Discharge Final Note    Primary Care Provider: Luis Enrique Guzman Jr., MD    Expected Discharge Date: 6/13/2025    Final Discharge Note (most recent)       Final Note - 06/16/25 0738          Final Note    Assessment Type Final Discharge Note     Anticipated Discharge Disposition Home-Health Care [facility] Resources/Appts/Education Provided Appointments scheduled and added to AVS        Post-Acute Status    Post-Acute Authorization Home Health;Placement     Post-Acute Placement Status Set-up Complete/Auth obtained     Home Health Status Set-up Complete/Auth obtained     Discharge Delays None known at this time                     Important Message from Medicare              Follow-up providers       Luis Enrique Guzman Jr., MD   Specialty: Internal Medicine   Relationship: PCP - General    [address] [place] [address]   Phone: [phone]       Next Steps: Follow up on 6/24/2025    Instructions: Follow up with Dr. Guzman on June 24, 2025 @ 9:30 a.m.              After-discharge care                Destination       [facility]   Service: Assisted Living    [address] [place] [id number]   Phone: [phone]                 Home Medical Care       [facility]   Service: Home Nursing, Home Rehabilitation    [address] [facility] [address]   Phone: [phone]

## 2025-06-24 PROBLEM — J20.9 ACUTE BRONCHITIS: Status: RESOLVED | Noted: 2025-03-14 | Resolved: 2025-06-24

## 2025-07-04 ENCOUNTER — HOSPITAL ENCOUNTER (INPATIENT)
Facility: HOSPITAL | Age: OVER 89
LOS: 4 days | Discharge: HOME HEALTH | DRG: 699 | End: 2025-07-08
Attending: EMERGENCY MEDICINE | Admitting: EMERGENCY MEDICINE
Payer: MEDICARE

## 2025-07-04 DIAGNOSIS — I50.42 CHRONIC COMBINED SYSTOLIC AND DIASTOLIC CONGESTIVE HEART FAILURE, NYHA CLASS 2: ICD-10-CM

## 2025-07-04 DIAGNOSIS — R41.82 ALTERED MENTAL STATUS, UNSPECIFIED ALTERED MENTAL STATUS TYPE: Primary | ICD-10-CM

## 2025-07-04 DIAGNOSIS — Z13.6 SCREENING FOR CARDIOVASCULAR CONDITION: ICD-10-CM

## 2025-07-04 DIAGNOSIS — T83.511A URINARY TRACT INFECTION ASSOCIATED WITH INDWELLING URETHRAL CATHETER, INITIAL ENCOUNTER: ICD-10-CM

## 2025-07-04 DIAGNOSIS — N39.0 URINARY TRACT INFECTION ASSOCIATED WITH INDWELLING URETHRAL CATHETER, INITIAL ENCOUNTER: ICD-10-CM

## 2025-07-04 DIAGNOSIS — L89.90 PRESSURE INJURY OF SKIN, UNSPECIFIED INJURY STAGE, UNSPECIFIED LOCATION: ICD-10-CM

## 2025-07-04 LAB
ABSOLUTE NEUTROPHIL MANUAL (OHS): 5.3 K/UL
ALBUMIN SERPL BCP-MCNC: 3.1 G/DL (ref 3.5–5.2)
ALP SERPL-CCNC: 69 UNIT/L (ref 40–150)
ALT SERPL W/O P-5'-P-CCNC: 23 UNIT/L (ref 10–44)
ANION GAP (OHS): 10 MMOL/L (ref 8–16)
AST SERPL-CCNC: 17 UNIT/L (ref 11–45)
BACTERIA #/AREA URNS AUTO: ABNORMAL /HPF
BILIRUB SERPL-MCNC: 0.5 MG/DL (ref 0.1–1)
BILIRUB UR QL STRIP.AUTO: NEGATIVE
BUN SERPL-MCNC: 20 MG/DL (ref 10–30)
CALCIUM SERPL-MCNC: 8.1 MG/DL (ref 8.7–10.5)
CHLORIDE SERPL-SCNC: 107 MMOL/L (ref 95–110)
CLARITY UR: ABNORMAL
CO2 SERPL-SCNC: 25 MMOL/L (ref 23–29)
COLOR UR AUTO: YELLOW
CREAT SERPL-MCNC: 0.9 MG/DL (ref 0.5–1.4)
ERYTHROCYTE [DISTWIDTH] IN BLOOD BY AUTOMATED COUNT: 14.6 % (ref 11.5–14.5)
GFR SERPLBLD CREATININE-BSD FMLA CKD-EPI: >60 ML/MIN/1.73/M2
GLUCOSE SERPL-MCNC: 126 MG/DL (ref 70–110)
GLUCOSE UR QL STRIP: NEGATIVE
HCT VFR BLD AUTO: 43 % (ref 40–54)
HGB BLD-MCNC: 14.2 GM/DL (ref 14–18)
HGB UR QL STRIP: ABNORMAL
HOLD SPECIMEN: NORMAL
HOLD SPECIMEN: NORMAL
HYALINE CASTS UR QL AUTO: 6 /LPF (ref 0–1)
KETONES UR QL STRIP: NEGATIVE
LACTATE SERPL-SCNC: 2.1 MMOL/L (ref 0.5–2.2)
LACTATE SERPL-SCNC: 2.1 MMOL/L (ref 0.5–2.2)
LEUKOCYTE ESTERASE UR QL STRIP: ABNORMAL
LYMPHOCYTES NFR BLD MANUAL: 4 % (ref 18–48)
MCH RBC QN AUTO: 30.9 PG (ref 27–31)
MCHC RBC AUTO-ENTMCNC: 33 G/DL (ref 32–36)
MCV RBC AUTO: 94 FL (ref 82–98)
MICROSCOPIC COMMENT: ABNORMAL
MONOCYTES NFR BLD MANUAL: 3 % (ref 4–15)
NEUTROPHILS NFR BLD MANUAL: 81 % (ref 38–73)
NEUTS BAND NFR BLD MANUAL: 12 %
NITRITE UR QL STRIP: POSITIVE
NUCLEATED RBC (/100WBC) (OHS): 0 /100 WBC
OHS QRS DURATION: 110 MS
OHS QTC CALCULATION: 436 MS
PH UR STRIP: 7 [PH]
PLATELET # BLD AUTO: 124 K/UL (ref 150–450)
PMV BLD AUTO: 9.2 FL (ref 9.2–12.9)
POTASSIUM SERPL-SCNC: 3.8 MMOL/L (ref 3.5–5.1)
PROT SERPL-MCNC: 6.4 GM/DL (ref 6–8.4)
PROT UR QL STRIP: ABNORMAL
RBC # BLD AUTO: 4.6 M/UL (ref 4.6–6.2)
RBC #/AREA URNS AUTO: >100 /HPF (ref 0–4)
SODIUM SERPL-SCNC: 142 MMOL/L (ref 136–145)
SP GR UR STRIP: 1.01
SQUAMOUS #/AREA URNS AUTO: 1 /HPF
UROBILINOGEN UR STRIP-ACNC: 1 EU/DL
WBC # BLD AUTO: 5.68 K/UL (ref 3.9–12.7)
WBC #/AREA URNS AUTO: >100 /HPF (ref 0–5)

## 2025-07-04 PROCEDURE — 93005 ELECTROCARDIOGRAM TRACING: CPT

## 2025-07-04 PROCEDURE — 96361 HYDRATE IV INFUSION ADD-ON: CPT

## 2025-07-04 PROCEDURE — 96374 THER/PROPH/DIAG INJ IV PUSH: CPT

## 2025-07-04 PROCEDURE — 25000003 PHARM REV CODE 250: Performed by: EMERGENCY MEDICINE

## 2025-07-04 PROCEDURE — 85027 COMPLETE CBC AUTOMATED: CPT | Performed by: EMERGENCY MEDICINE

## 2025-07-04 PROCEDURE — 83605 ASSAY OF LACTIC ACID: CPT | Performed by: EMERGENCY MEDICINE

## 2025-07-04 PROCEDURE — 99285 EMERGENCY DEPT VISIT HI MDM: CPT | Mod: 25

## 2025-07-04 PROCEDURE — 36415 COLL VENOUS BLD VENIPUNCTURE: CPT | Performed by: EMERGENCY MEDICINE

## 2025-07-04 PROCEDURE — 87086 URINE CULTURE/COLONY COUNT: CPT | Performed by: EMERGENCY MEDICINE

## 2025-07-04 PROCEDURE — 93010 ELECTROCARDIOGRAM REPORT: CPT | Mod: ,,, | Performed by: INTERNAL MEDICINE

## 2025-07-04 PROCEDURE — 81001 URINALYSIS AUTO W/SCOPE: CPT | Performed by: EMERGENCY MEDICINE

## 2025-07-04 PROCEDURE — 87040 BLOOD CULTURE FOR BACTERIA: CPT | Performed by: EMERGENCY MEDICINE

## 2025-07-04 PROCEDURE — 63600175 PHARM REV CODE 636 W HCPCS: Performed by: EMERGENCY MEDICINE

## 2025-07-04 PROCEDURE — 21400001 HC TELEMETRY ROOM

## 2025-07-04 PROCEDURE — 51702 INSERT TEMP BLADDER CATH: CPT

## 2025-07-04 PROCEDURE — 80053 COMPREHEN METABOLIC PANEL: CPT | Performed by: EMERGENCY MEDICINE

## 2025-07-04 PROCEDURE — 25000003 PHARM REV CODE 250: Performed by: INTERNAL MEDICINE

## 2025-07-04 RX ORDER — OXYBUTYNIN CHLORIDE 5 MG/1
5 TABLET, EXTENDED RELEASE ORAL DAILY
Status: DISCONTINUED | OUTPATIENT
Start: 2025-07-05 | End: 2025-07-08 | Stop reason: HOSPADM

## 2025-07-04 RX ORDER — TALC
6 POWDER (GRAM) TOPICAL NIGHTLY PRN
Status: DISCONTINUED | OUTPATIENT
Start: 2025-07-04 | End: 2025-07-08 | Stop reason: HOSPADM

## 2025-07-04 RX ORDER — ACETAMINOPHEN 325 MG/1
650 TABLET ORAL EVERY 8 HOURS PRN
Status: DISCONTINUED | OUTPATIENT
Start: 2025-07-04 | End: 2025-07-08 | Stop reason: HOSPADM

## 2025-07-04 RX ORDER — TALC
6 POWDER (GRAM) TOPICAL NIGHTLY PRN
Status: DISCONTINUED | OUTPATIENT
Start: 2025-07-04 | End: 2025-07-04

## 2025-07-04 RX ORDER — MUPIROCIN 20 MG/G
OINTMENT TOPICAL 2 TIMES DAILY
Status: DISCONTINUED | OUTPATIENT
Start: 2025-07-04 | End: 2025-07-08 | Stop reason: HOSPADM

## 2025-07-04 RX ORDER — AMLODIPINE BESYLATE 5 MG/1
5 TABLET ORAL DAILY
Status: DISCONTINUED | OUTPATIENT
Start: 2025-07-05 | End: 2025-07-08 | Stop reason: HOSPADM

## 2025-07-04 RX ORDER — SODIUM CHLORIDE 0.9 % (FLUSH) 0.9 %
10 SYRINGE (ML) INJECTION
Status: DISCONTINUED | OUTPATIENT
Start: 2025-07-04 | End: 2025-07-04

## 2025-07-04 RX ORDER — MEROPENEM 1 G/1
1 INJECTION, POWDER, FOR SOLUTION INTRAVENOUS
Status: COMPLETED | OUTPATIENT
Start: 2025-07-04 | End: 2025-07-04

## 2025-07-04 RX ORDER — SODIUM CHLORIDE 0.9 % (FLUSH) 0.9 %
10 SYRINGE (ML) INJECTION
Status: DISCONTINUED | OUTPATIENT
Start: 2025-07-04 | End: 2025-07-08 | Stop reason: HOSPADM

## 2025-07-04 RX ORDER — QUETIAPINE FUMARATE 25 MG/1
25 TABLET, FILM COATED ORAL NIGHTLY
Status: DISCONTINUED | OUTPATIENT
Start: 2025-07-04 | End: 2025-07-08 | Stop reason: HOSPADM

## 2025-07-04 RX ORDER — SODIUM CHLORIDE 9 MG/ML
INJECTION, SOLUTION INTRAVENOUS CONTINUOUS
Status: DISCONTINUED | OUTPATIENT
Start: 2025-07-04 | End: 2025-07-08 | Stop reason: HOSPADM

## 2025-07-04 RX ORDER — ONDANSETRON HYDROCHLORIDE 2 MG/ML
4 INJECTION, SOLUTION INTRAVENOUS EVERY 8 HOURS PRN
Status: DISCONTINUED | OUTPATIENT
Start: 2025-07-04 | End: 2025-07-08 | Stop reason: HOSPADM

## 2025-07-04 RX ORDER — MEROPENEM 1 G/1
1 INJECTION, POWDER, FOR SOLUTION INTRAVENOUS
Status: DISCONTINUED | OUTPATIENT
Start: 2025-07-05 | End: 2025-07-07

## 2025-07-04 RX ADMIN — SODIUM CHLORIDE: 9 INJECTION, SOLUTION INTRAVENOUS at 05:07

## 2025-07-04 RX ADMIN — SODIUM CHLORIDE 500 ML: 9 INJECTION, SOLUTION INTRAVENOUS at 04:07

## 2025-07-04 RX ADMIN — QUETIAPINE FUMARATE 25 MG: 25 TABLET ORAL at 09:07

## 2025-07-04 RX ADMIN — MEROPENEM 1 G: 1 INJECTION, POWDER, FOR SOLUTION INTRAVENOUS at 04:07

## 2025-07-04 RX ADMIN — MUPIROCIN: 20 OINTMENT TOPICAL at 09:07

## 2025-07-04 NOTE — ED NOTES
"Upon initial assessment,   Pt came in via EMS with confusion, and 18fr Willams present. Willams was leaking and the bad contained 800ml of urine. Bag was removed by deflating 10ml balloon. White discharge noted and redness in the penile area (see media).   After removing the old willams, cleaned pt's private area, diaper placed, redness noted to the sacral area (see media)  Edema 4+ noted to bilateral lower ext, redness noted and dressing noted. As per wife "it just leaks fluids and home health nurse came this morning and dressed it"  Skin tear to right elbow, as per wife "he fell couple days ago"  "He was not using walker like he's supposed to".   "

## 2025-07-04 NOTE — ED PROVIDER NOTES
Encounter Date: 7/4/2025       History     Chief Complaint   Patient presents with    Altered Mental Status     Pt to ER with AMS.  Pt has willams in place with foul smelling urine and redness to left lower leg     89-year-old male with a chronic indwelling Willams catheter presents the ER with altered mental status since around 2:00 p.m..  History of this in the past, history of multiple urinary tract infections in the past as well.  History of chronic diastolic heart failure.  Has wounds to his legs, left lower leg is erythematous, edematous.  Here in the emergency department he is alert, knows his name, knows his street address, does not know that he is in a hospital, does not know what year it is.  Urine is foul-smelling      Review of patient's allergies indicates:  No Known Allergies  Past Medical History:   Diagnosis Date    Aortic valve stenosis     Arthritis     BPH (benign prostatic hyperplasia)     Carotid artery stenosis     Chronic diastolic heart failure     ETOH abuse     Exposure to COVID-19 virus 01/07/2022    Hyperchloremia     Hypertension     Kidney stones     Murmur     Pacemaker     Polymyalgia rheumatica     PVD (peripheral vascular disease)     Renal artery stenosis      Past Surgical History:   Procedure Laterality Date    A-V CARDIAC PACEMAKER INSERTION N/A 10/06/2023    Procedure: INSERTION, CARDIAC PACEMAKER, DUAL CHAMBER;  Surgeon: Douglas Salguero MD;  Location: [facility] CATH;  Service: Cardiology;  Laterality: N/A;    ANGIOGRAM, CAROTID Left 08/25/2023    Procedure: ANGIOGRAM, CAROTID;  Surgeon: Nick Box MD;  Location: [facility] CATH;  Service: Cardiology;  Laterality: Left;    CATARACT EXTRACTION, BILATERAL      ECHOCARDIOGRAM,TRANSESOPHAGEAL N/A 10/03/2023    Procedure: Transesophageal echo (JOSE ARMANDO) intra-procedure log documentation;  Surgeon: Nick Box MD;  Location: [facility] OR;  Service: Cardiology;  Laterality: N/A;    HAND SURGERY      INSERTION OF STENT INTO PERIPHERAL VESSEL N/A  01/30/2025    Procedure: INSERTION, STENT, VESSEL, PERIPHERAL;  Surgeon: Harsha Salcedo MD;  Location: [facility] CATH;  Service: Cardiology;  Laterality: N/A;    PERCUTANEOUS TRANSCATHETER AORTIC VALVE REPLACEMENT (TAVR) Left 10/03/2023    Procedure: REPLACEMENT, AORTIC VALVE, PERCUTANEOUS, TRANSCATHETER;  Surgeon: Nick Box MD;  Location: [facility] OR;  Service: Cardiology;  Laterality: Left;    PERCUTANEOUS TRANSCATHETER AORTIC VALVE REPLACEMENT (TAVR) Left 10/03/2023    Procedure: REPLACEMENT, AORTIC VALVE, PERCUTANEOUS, TRANSCATHETER carotid access;  Surgeon: Jun Brito MD;  Location: [facility] OR;  Service: Cardiovascular;  Laterality: Left;    PERCUTANEOUS TRANSLUMINAL ANGIOPLASTY N/A 08/25/2023    Procedure: ANGIOPLASTY-PERCUTANEOUS TRANSLUMINAL (PTA);  Surgeon: Nick Box MD;  Location: [facility] CATH;  Service: Cardiology;  Laterality: N/A;    PERCUTANEOUS TRANSLUMINAL ANGIOPLASTY (PTA) OF PERIPHERAL VESSEL Left 04/10/2025    Procedure: PTA, PERIPHERAL VESSEL;  Surgeon: Harsha Salcedo MD;  Location: [facility] CATH;  Service: Cardiology;  Laterality: Left;  L LE intervention via left antegrade approach Harsha Salcedo at [facility] in Hybrid OR under MAC  *PACEMAKER*    SHOULDER SURGERY Left     total shoulder replacement    WOUND EXPLORATION N/A 10/03/2023    Procedure: EXPLORATION, WOUND;  Surgeon: Jun Brito MD;  Location: [facility] OR;  Service: Cardiology;  Laterality: N/A;     Family History   Problem Relation Name Age of Onset    Cancer Mother      Stroke Father       Social History[1]  Review of Systems   Unable to perform ROS: Mental status change   All other systems reviewed and are negative.      Physical Exam     Initial Vitals   BP Pulse Resp Temp SpO2   07/04/25 1609 07/04/25 1609 07/04/25 1609 07/04/25 1609 07/04/25 1647   108/67 93 18 99.2 °F (37.3 °C) 96 %      MAP       --                Physical Exam    Constitutional: He is not diaphoretic. No distress.   HENT:   Head: Normocephalic and  atraumatic.   Eyes: Conjunctivae and EOM are normal. Pupils are equal, round, and reactive to light.   Neck: Neck supple.   Normal range of motion.  Cardiovascular:  Normal rate, regular rhythm and normal heart sounds.           Pulmonary/Chest: Breath sounds normal.   Abdominal: Abdomen is soft.   Musculoskeletal:         General: Normal range of motion.      Cervical back: Normal range of motion and neck supple.     Neurological: He is alert.   Skin: Skin is warm. Capillary refill takes less than 2 seconds.   Erythema noted to left lower extremity         ED Course   Critical Care    Date/Time: 7/4/2025 5:15 PM    Performed by: Antonino Graham MD  Authorized by: Antonino Graham MD  Direct patient critical care time: 20 minutes  Additional history critical care time: 10 minutes  Ordering / reviewing critical care time: 10 minutes  Documentation critical care time: 10 minutes  Consulting other physicians critical care time: 10 minutes  Consult with family critical care time: 10 minutes  Total critical care time (exclusive of procedural time) : 70 minutes  Critical care was necessary to treat or prevent imminent or life-threatening deterioration of the following conditions: sepsis.  Critical care was time spent personally by me on the following activities: review of old charts, re-evaluation of patient's condition, pulse oximetry, ordering and review of radiographic studies, ordering and review of laboratory studies, ordering and performing treatments and interventions, obtaining history from patient or surrogate, examination of patient, evaluation of patient's response to treatment, discussions with primary provider and development of treatment plan with patient or surrogate.        Labs Reviewed   COMPREHENSIVE METABOLIC PANEL - Abnormal       Result Value    Sodium 142      Potassium 3.8      Chloride 107      CO2 25      Glucose 126 (*)     BUN 20      Creatinine 0.9      Calcium 8.1 (*)     Protein Total  6.4      Albumin 3.1 (*)     Bilirubin Total 0.5      ALP 69      AST 17      ALT 23      Anion Gap 10      eGFR >60     URINALYSIS, REFLEX TO URINE CULTURE - Abnormal    Color, UA Yellow      Appearance, UA Cloudy (*)     pH, UA 7.0      Spec Grav UA 1.015      Protein, UA 1+ (*)     Glucose, UA Negative      Ketones, UA Negative      Bilirubin, UA Negative      Blood, UA 3+ (*)     Nitrites, UA Positive (*)     Urobilinogen, UA 1.0      Leukocyte Esterase, UA 3+ (*)    CBC WITH DIFFERENTIAL - Abnormal    WBC 5.68      RBC 4.60      HGB 14.2      HCT 43.0      MCV 94      MCH 30.9      MCHC 33.0      RDW 14.6 (*)     Platelet Count 124 (*)     MPV 9.2      Nucleated RBC 0     URINALYSIS MICROSCOPIC - Abnormal    RBC, UA >100 (*)     WBC, UA >100 (*)     Bacteria, UA Occasional      Squamous Epithelial Cells, UA 1      Hyaline Casts, UA 6 (*)     Microscopic Comment       MANUAL DIFFERENTIAL - Abnormal    Gran # (ANC) 5.3      Segmented Neutrophil % 81.0 (*)     Bands % 12.0      Lymphocyte % 4.0 (*)     Monocyte % 3.0 (*)    LACTIC ACID, PLASMA - Normal    Lactic Acid Level 2.1      Narrative:     Falsely low lactic acid results can be found in samples containing >=13.0 mg/dL total bilirubin and/or >=3.5 mg/dL direct bilirubin.    CULTURE, BLOOD   CULTURE, BLOOD   CULTURE, URINE   CBC W/ AUTO DIFFERENTIAL    Narrative:     The following orders were created for panel order CBC auto differential.  Procedure                               Abnormality         Status                     ---------                               -----------         ------                     CBC with Differential[[id number]]       Abnormal            Final result               Manual Differential[[id number]]         Abnormal            Final result                 Please view results for these tests on the individual orders.   EXTRA TUBES    Narrative:     The following orders were created for panel order EXTRA TUBES.  Procedure                                Abnormality         Status                     ---------                               -----------         ------                     Light Blue Top Hold[[id number]]                             Final result                 Please view results for these tests on the individual orders.   LIGHT BLUE TOP HOLD    Extra Tube Hold     GREY TOP URINE HOLD    Extra Tube Hold     LACTIC ACID, PLASMA   LACTIC ACID, PLASMA     EKG Readings: (Independently Interpreted)   Initial Reading: No STEMI. Rhythm: Normal Sinus Rhythm. Heart Rate: 98. Ectopy: No Ectopy. Conduction: 1st Degree AV Block. ST Segments: Normal ST Segments. T Waves: Normal. Axis: Left Axis Deviation. Clinical Impression: Normal Sinus Rhythm and AV Block - 1st Degree       Imaging Results              CT Head Without Contrast (Final result)  Result time 07/04/25 17:05:36      Final result by Dana Stack MD (07/04/25 17:05:36)                   Impression:      1. Low density subacute chronic right subdural hematoma with mass effect on the right lateral ventricle with some mild midline shift from right to left of 3 mm this is unchanged with no acute hemorrhage.      Electronically signed by: Dana Stack MD  Date:    07/04/2025  Time:    17:05               Narrative:    EXAMINATION:  CT HEAD WITHOUT CONTRAST    CLINICAL HISTORY:  Mental status change, unknown cause;    TECHNIQUE:  Iterative reconstruction technique was performed.    CT/Cardiac Nuclear exams in prior 12 months: 5    COMPARISON:  06/09/2025    FINDINGS:  Skull is intact.  There is diffuse volume loss and white matter disease.  In the right frontal parietal convexity is an extra-axial collection measuring 1.7 cm in thickness felt to be chronic subdural hematoma there is no new hyperdense blood products however there is some mass effect on the right lateral ventricle with mild midline shift from right to left of 3 mm.  This is similar in appearance to the previous study.                                        X-Ray Chest AP Portable (Final result)  Result time 07/04/25 16:56:48      Final result by Dana Stack MD (07/04/25 16:56:48)                   Impression:      Hardware fixates the left shoulder with the disc fibrillator pacemaker in place with minimal left basilar discoid atelectasis or scar      Electronically signed by: Dana Stack MD  Date:    07/04/2025  Time:    16:56               Narrative:    EXAMINATION:  XR CHEST AP PORTABLE    CLINICAL HISTORY:  Sepsis;    TECHNIQUE:  portable chest x-ray    COMPARISON:  03/14/2025.  <Comparisons>    FINDINGS:  Hardware fixates the left shoulder.  Pacemaker is in place.  There are chronic changes with some left basilar discoid atelectasis or scar.                                       Medications   sodium chloride 0.9% bolus 500 mL 500 mL (500 mLs Intravenous New Bag 7/4/25 1638)   meropenem injection 1 g (1 g Intravenous Given 7/4/25 1648)     Medical Decision Making  Amount and/or Complexity of Data Reviewed  Labs: ordered.  Radiology: ordered.    Risk  Prescription drug management.               ED Course as of 07/04/25 1720   Fri Jul 04, 2025   1712 Discussed case with Medicine on-call, will admit for IV antibiotics, gentle IV fluids, neuro checks [SD]      ED Course User Index  [SD] Antonino Graham MD               Medical Decision Making:   Differential Diagnosis:   Sepsis, UTI, electrolyte abnormality, altered mental status  Clinical Tests:   Sepsis Perfusion Assessment: ED Sepsis Reassessment Timestamp  ED Management:  Patient will not get the full bolus dose for sepsis, he has a history of chronic diastolic heart failure, and he is at risk for fluid overload.  His blood pressure is 108/67.  Pulse rates 93  Tobi Covarrubiasnemesio Dumont. presents with sepsis without organ dysfunction secondary to Urinary Tract Infection.    Interventions include:    Antibiotics:         Fluid Resuscitation:   Other- Patient to receive less  volume other than 30cc/kg due to Congestive Heart Failure     Labs and Imaging:   @XCEVDVVMN3AO(poclac:3,lactate:3)@  @LASTLAB(CULTBLD:4)@   Additional cultures were collected as indicated and imaging reviewed to identify infection source.    Hemodynamic Support and Monitoring:  Vasopressors were not needed     The patient was re-evaluated at Admission Time and Date: 17:15 and patient and/or surrogate was updated on plan of care.    The following services were consulted:None              Clinical Impression:  Final diagnoses:  [Z13.6] Screening for cardiovascular condition  [R41.82] Altered mental status, unspecified altered mental status type (Primary)  [T83.511A, N39.0] Urinary tract infection associated with indwelling urethral catheter, initial encounter          ED Disposition Condition    Admit                       [1]   Social History  Tobacco Use    Smoking status: Former     Types: Cigars     Passive exposure: Past    Smokeless tobacco: Never   Vaping Use    Vaping status: Never Used   Substance Use Topics    Alcohol use: Yes     Alcohol/week: 3.0 standard drinks of alcohol     Types: 3 Shots of liquor per week     Comment: 1.5 oz a day    Drug use: Never        Antonino Graham MD  07/04/25 [id number]

## 2025-07-05 PROBLEM — T83.511A URINARY TRACT INFECTION ASSOCIATED WITH INDWELLING URETHRAL CATHETER: Status: ACTIVE | Noted: 2025-05-31

## 2025-07-05 PROBLEM — R41.82 ALTERED MENTAL STATUS: Status: ACTIVE | Noted: 2025-07-05

## 2025-07-05 LAB
ABSOLUTE EOSINOPHIL (OHS): 0.06 K/UL
ABSOLUTE MONOCYTE (OHS): 0.55 K/UL (ref 0.3–1)
ABSOLUTE NEUTROPHIL COUNT (OHS): 5.27 K/UL (ref 1.8–7.7)
ALBUMIN SERPL BCP-MCNC: 2.5 G/DL (ref 3.5–5.2)
ALP SERPL-CCNC: 52 UNIT/L (ref 40–150)
ALT SERPL W/O P-5'-P-CCNC: 18 UNIT/L (ref 10–44)
ANION GAP (OHS): 9 MMOL/L (ref 8–16)
AST SERPL-CCNC: 13 UNIT/L (ref 11–45)
BASOPHILS # BLD AUTO: 0.01 K/UL
BASOPHILS NFR BLD AUTO: 0.2 %
BILIRUB SERPL-MCNC: 0.8 MG/DL (ref 0.1–1)
BUN SERPL-MCNC: 14 MG/DL (ref 10–30)
CALCIUM SERPL-MCNC: 7.9 MG/DL (ref 8.7–10.5)
CHLORIDE SERPL-SCNC: 109 MMOL/L (ref 95–110)
CO2 SERPL-SCNC: 24 MMOL/L (ref 23–29)
CREAT SERPL-MCNC: 0.7 MG/DL (ref 0.5–1.4)
ERYTHROCYTE [DISTWIDTH] IN BLOOD BY AUTOMATED COUNT: 14.5 % (ref 11.5–14.5)
GFR SERPLBLD CREATININE-BSD FMLA CKD-EPI: >60 ML/MIN/1.73/M2
GLUCOSE SERPL-MCNC: 106 MG/DL (ref 70–110)
HCT VFR BLD AUTO: 38.1 % (ref 40–54)
HGB BLD-MCNC: 12.4 GM/DL (ref 14–18)
IMM GRANULOCYTES # BLD AUTO: 0.05 K/UL (ref 0–0.04)
IMM GRANULOCYTES NFR BLD AUTO: 0.8 % (ref 0–0.5)
LYMPHOCYTES # BLD AUTO: 0.51 K/UL (ref 1–4.8)
MCH RBC QN AUTO: 30.7 PG (ref 27–31)
MCHC RBC AUTO-ENTMCNC: 32.5 G/DL (ref 32–36)
MCV RBC AUTO: 94 FL (ref 82–98)
NUCLEATED RBC (/100WBC) (OHS): 0 /100 WBC
PLATELET # BLD AUTO: 106 K/UL (ref 150–450)
PMV BLD AUTO: 9.5 FL (ref 9.2–12.9)
POTASSIUM SERPL-SCNC: 3.7 MMOL/L (ref 3.5–5.1)
PROT SERPL-MCNC: 5.5 GM/DL (ref 6–8.4)
RBC # BLD AUTO: 4.04 M/UL (ref 4.6–6.2)
RELATIVE EOSINOPHIL (OHS): 0.9 %
RELATIVE LYMPHOCYTE (OHS): 7.9 % (ref 18–48)
RELATIVE MONOCYTE (OHS): 8.5 % (ref 4–15)
RELATIVE NEUTROPHIL (OHS): 81.7 % (ref 38–73)
SODIUM SERPL-SCNC: 142 MMOL/L (ref 136–145)
WBC # BLD AUTO: 6.45 K/UL (ref 3.9–12.7)

## 2025-07-05 PROCEDURE — 21400001 HC TELEMETRY ROOM

## 2025-07-05 PROCEDURE — 25000003 PHARM REV CODE 250: Performed by: STUDENT IN AN ORGANIZED HEALTH CARE EDUCATION/TRAINING PROGRAM

## 2025-07-05 PROCEDURE — 85025 COMPLETE CBC W/AUTO DIFF WBC: CPT | Performed by: EMERGENCY MEDICINE

## 2025-07-05 PROCEDURE — 36415 COLL VENOUS BLD VENIPUNCTURE: CPT | Performed by: EMERGENCY MEDICINE

## 2025-07-05 PROCEDURE — 97162 PT EVAL MOD COMPLEX 30 MIN: CPT

## 2025-07-05 PROCEDURE — 25000003 PHARM REV CODE 250: Performed by: EMERGENCY MEDICINE

## 2025-07-05 PROCEDURE — 97166 OT EVAL MOD COMPLEX 45 MIN: CPT

## 2025-07-05 PROCEDURE — 80053 COMPREHEN METABOLIC PANEL: CPT | Performed by: EMERGENCY MEDICINE

## 2025-07-05 PROCEDURE — 63600175 PHARM REV CODE 636 W HCPCS: Performed by: EMERGENCY MEDICINE

## 2025-07-05 RX ORDER — ATORVASTATIN CALCIUM 40 MG/1
40 TABLET, FILM COATED ORAL DAILY
Status: DISCONTINUED | OUTPATIENT
Start: 2025-07-05 | End: 2025-07-08 | Stop reason: HOSPADM

## 2025-07-05 RX ADMIN — AMLODIPINE BESYLATE 5 MG: 5 TABLET ORAL at 09:07

## 2025-07-05 RX ADMIN — SODIUM CHLORIDE: 9 INJECTION, SOLUTION INTRAVENOUS at 09:07

## 2025-07-05 RX ADMIN — MEROPENEM 1 G: 1 INJECTION, POWDER, FOR SOLUTION INTRAVENOUS at 04:07

## 2025-07-05 RX ADMIN — ACETAMINOPHEN 650 MG: 325 TABLET ORAL at 11:07

## 2025-07-05 RX ADMIN — MUPIROCIN: 20 OINTMENT TOPICAL at 09:07

## 2025-07-05 RX ADMIN — SODIUM CHLORIDE: 9 INJECTION, SOLUTION INTRAVENOUS at 07:07

## 2025-07-05 RX ADMIN — ATORVASTATIN CALCIUM 40 MG: 40 TABLET, FILM COATED ORAL at 09:07

## 2025-07-05 RX ADMIN — QUETIAPINE FUMARATE 25 MG: 25 TABLET ORAL at 09:07

## 2025-07-05 RX ADMIN — MEROPENEM 1 G: 1 INJECTION, POWDER, FOR SOLUTION INTRAVENOUS at 08:07

## 2025-07-05 RX ADMIN — MEROPENEM 1 G: 1 INJECTION, POWDER, FOR SOLUTION INTRAVENOUS at 12:07

## 2025-07-05 RX ADMIN — OXYBUTYNIN CHLORIDE 5 MG: 5 TABLET, EXTENDED RELEASE ORAL at 09:07

## 2025-07-05 NOTE — ASSESSMENT & PLAN NOTE
Patient knows name and address, however did not know date and where he was.  - likely due to UTI, as he has a history of altered mental status with urinary tract infections  - CT head was stable from prior with chronic right subdural hematoma and mass effect    Plan:  - treat current infection

## 2025-07-05 NOTE — PT/OT/SLP EVAL
Physical Therapy Evaluation     Patient Name: Tobi Liz Jr.   MRN: [id number]  Recent Surgery: * No surgery found *      Recommendations:     Discharge Recommendations: Low Intensity Therapy (back to assisted living facility with home health and 24/7 supervision for safety)   Discharge Equipment Recommendations: none   Barriers to discharge: cognition and advance age     Assessment:     Tobi Liz Jr. is a 89 y.o. male admitted with a medical diagnosis of Altered mental status. He presents with the following impairments/functional limitations: weakness, impaired joint extensibility, impaired endurance, impaired cognition, decreased ROM, impaired muscle length, impaired self care skills, impaired functional mobility, decreased lower extremity function, decreased upper extremity function, impaired skin, edema, decreased safety awareness, gait instability, impaired balance, and impaired cardiopulmonary response to activity. Patient was recently discharge from the in-patient rehab unit on 6/13/2025 at set up assistance level, recommended supervision with functional mobility and gait.     At the time of this re-admission, patient has fallen with resulting left arm skin tear.  He has bright  red with swelling on the left LE and swelling of the right LE. Still with willams catheter.  He required SBA with supine <> sit and sit <> stand using a RW.  Ambulated ~226 feet with RW with CGA/SBA for safety.  Returned to bed with bed alarm on, positioned for comfort.      P.T. to work with patient to prevent decline in function while he is in the hospital.     Rehab Prognosis: Fair; patient would benefit from acute PT services to address these deficits and reach maximum level of function.    Plan:     During this hospitalization, patient to be seen  (3-5x a week) to address the above listed problems via gait training, therapeutic activities, therapeutic exercises, neuromuscular re-education    Plan of Care Expires:  "07/11/25    Subjective     Chief Complaint: "Yes, I am back."   Patient Comments/Goals: unstated   Pain/Comfort:  Pain Rating 1: 0/10  Pain Rating Post-Intervention 1: 0/10    Social History:  Living Environment: Patient lives with their spouse in an assisted living facility    Prior Level of Function: Prior to admission, patient ambulated household distances using rolling walker and mobile per wheelchair   Equipment Used at Home: walker, rolling, wheelchair, urinary catheter supplies  DME owned (not currently used): rolling walker  Assistance Upon Discharge: facility staff    Objective:     Communicated with nurse and patient  prior to session. Patient found HOB elevated with peripheral IV, willams catheter upon PT entry to room.    General Precautions: Standard, fall   Orthopedic Precautions: N/A   Braces: N/A    Respiratory Status: Room air    Exams:  Cognition: Patient is oriented to Person and Place  RLE ROM: WFL  RLE Strength: WFL  LLE ROM: WFL  LLE Strength: WFL  Fine Motor Coordination:    -       Intact  Gross Motor Coordination: WFL  Postural Exam: Patient presented with the following abnormalities:    -       Rounded shoulders  -       Forward head  -       Kyphosis  Sensation:    -       Impaired  light/touch on both LE   Skin Integrity/Edema:     -       Skin integrity: bright red on the left leg , skin tear on the left elbow   -       Edema: Moderate on both LE     Functional Mobility:  Gait belt applied - Yes  Bed Mobility  Rolling Left: stand by assistance  Rolling Right: stand by assistance  Scooting: stand by assistance  Supine to Sit: stand by assistance for LE management and trunk management  Sit to Supine: stand by assistance for LE management and trunk management  Transfers  Sit to Stand: stand by assistance with rolling walker and with cues for hand placement and foot placement  Gait  Patient ambulated ~226 feet  with rolling walker and stand by assistance and contact guard assistance. Patient " demonstrates occasional unsteady gait, decreased step length, flexed posture, and decreased tony. .. All lines remained intact throughout ambulation trail.  Balance  Sitting: supervision  Standing: stand by assistance and contact guard assistance      Therapeutic Activities and Exercises:   Patient educated on role of acute care PT and PT POC, safety while in hospital including calling nurse for mobility, and call light usage  Safety with functional mobility     AM-PAC 6 CLICK MOBILITY  Total Score:18    Patient left HOB elevated with all lines intact, call button in reach, RN notified, and bed alarm on.    GOALS:   Multidisciplinary Problems       Physical Therapy Goals          Problem: Physical Therapy    Goal Priority Disciplines Outcome Interventions   Physical Therapy Goal     PT, PT/OT Progressing    Description: Goals to be met by: 2025    Patient will increase functional independence with mobility by performin. Supine to sit with Supervision or Set-up Assistance.  2. Sit to supine with Supervision or Set-up Assistance.  3. Bed to chair transfer with Supervision or Set-up Assistance with rolling walker using Step Transfer technique.  4. Sit to Stand with Supervision or Set-up Assistance with rolling walker.  5. Gait  x 500  feet with Supervision or Set-up Assistance with rolling walker.  6. Lower extremity exercise program x10 reps, with assistance as needed.                          DME Justifications:  No DME recommended requiring DME justifications    History:     Past Medical History:   Diagnosis Date    Aortic valve stenosis     Arthritis     BPH (benign prostatic hyperplasia)     Carotid artery stenosis     Chronic diastolic heart failure     ETOH abuse     Exposure to COVID-19 virus 2022    Hyperchloremia     Hypertension     Kidney stones     Murmur     Pacemaker     Polymyalgia rheumatica     PVD (peripheral vascular disease)     Renal artery stenosis        Past Surgical  History:   Procedure Laterality Date    A-V CARDIAC PACEMAKER INSERTION N/A 10/06/2023    Procedure: INSERTION, CARDIAC PACEMAKER, DUAL CHAMBER;  Surgeon: Douglas Salguero MD;  Location: [facility] CATH;  Service: Cardiology;  Laterality: N/A;    ANGIOGRAM, CAROTID Left 08/25/2023    Procedure: ANGIOGRAM, CAROTID;  Surgeon: Nick Box MD;  Location: [facility] CATH;  Service: Cardiology;  Laterality: Left;    CATARACT EXTRACTION, BILATERAL      ECHOCARDIOGRAM,TRANSESOPHAGEAL N/A 10/03/2023    Procedure: Transesophageal echo (JOSE ARMANDO) intra-procedure log documentation;  Surgeon: Nick Box MD;  Location: [facility] OR;  Service: Cardiology;  Laterality: N/A;    HAND SURGERY      INSERTION OF STENT INTO PERIPHERAL VESSEL N/A 01/30/2025    Procedure: INSERTION, STENT, VESSEL, PERIPHERAL;  Surgeon: Harsha Salcedo MD;  Location: [facility] CATH;  Service: Cardiology;  Laterality: N/A;    PERCUTANEOUS TRANSCATHETER AORTIC VALVE REPLACEMENT (TAVR) Left 10/03/2023    Procedure: REPLACEMENT, AORTIC VALVE, PERCUTANEOUS, TRANSCATHETER;  Surgeon: Nick Box MD;  Location: [facility] OR;  Service: Cardiology;  Laterality: Left;    PERCUTANEOUS TRANSCATHETER AORTIC VALVE REPLACEMENT (TAVR) Left 10/03/2023    Procedure: REPLACEMENT, AORTIC VALVE, PERCUTANEOUS, TRANSCATHETER carotid access;  Surgeon: Jun Brito MD;  Location: [facility] OR;  Service: Cardiovascular;  Laterality: Left;    PERCUTANEOUS TRANSLUMINAL ANGIOPLASTY N/A 08/25/2023    Procedure: ANGIOPLASTY-PERCUTANEOUS TRANSLUMINAL (PTA);  Surgeon: Nick Box MD;  Location: [facility] CATH;  Service: Cardiology;  Laterality: N/A;    PERCUTANEOUS TRANSLUMINAL ANGIOPLASTY (PTA) OF PERIPHERAL VESSEL Left 04/10/2025    Procedure: PTA, PERIPHERAL VESSEL;  Surgeon: Harsha Salcedo MD;  Location: [facility] CATH;  Service: Cardiology;  Laterality: Left;  L LE intervention via left antegrade approach Harsha Salcedo at [facility] in [facility] OR under MAC  *PACEMAKER*    SHOULDER SURGERY Left     total shoulder  replacement    WOUND EXPLORATION N/A 10/03/2023    Procedure: EXPLORATION, WOUND;  Surgeon: Jun Brito MD;  Location: [facility];  Service: Cardiology;  Laterality: N/A;       Time Tracking:     PT Received On: 07/05/25  PT Start Time: 1053  PT Stop Time: 1126  PT Total Time (min): 33 min     Billable Minutes: Evaluation moderate complexity     7/5/2025

## 2025-07-05 NOTE — SUBJECTIVE & OBJECTIVE
Past Medical History:   Diagnosis Date    Aortic valve stenosis     Arthritis     BPH (benign prostatic hyperplasia)     Carotid artery stenosis     Chronic diastolic heart failure     ETOH abuse     Exposure to COVID-19 virus 01/07/2022    Hyperchloremia     Hypertension     Kidney stones     Murmur     Pacemaker     Polymyalgia rheumatica     PVD (peripheral vascular disease)     Renal artery stenosis        Past Surgical History:   Procedure Laterality Date    A-V CARDIAC PACEMAKER INSERTION N/A 10/06/2023    Procedure: INSERTION, CARDIAC PACEMAKER, DUAL CHAMBER;  Surgeon: Douglas Salguero MD;  Location: [facility] CATH;  Service: Cardiology;  Laterality: N/A;    ANGIOGRAM, CAROTID Left 08/25/2023    Procedure: ANGIOGRAM, CAROTID;  Surgeon: Nick Box MD;  Location: [facility] CATH;  Service: Cardiology;  Laterality: Left;    CATARACT EXTRACTION, BILATERAL      ECHOCARDIOGRAM,TRANSESOPHAGEAL N/A 10/03/2023    Procedure: Transesophageal echo (JOSE ARMANDO) intra-procedure log documentation;  Surgeon: Nick Box MD;  Location: [facility] OR;  Service: Cardiology;  Laterality: N/A;    HAND SURGERY      INSERTION OF STENT INTO PERIPHERAL VESSEL N/A 01/30/2025    Procedure: INSERTION, STENT, VESSEL, PERIPHERAL;  Surgeon: Harsha Salcedo MD;  Location: [facility] CATH;  Service: Cardiology;  Laterality: N/A;    PERCUTANEOUS TRANSCATHETER AORTIC VALVE REPLACEMENT (TAVR) Left 10/03/2023    Procedure: REPLACEMENT, AORTIC VALVE, PERCUTANEOUS, TRANSCATHETER;  Surgeon: Nick Box MD;  Location: [facility] OR;  Service: Cardiology;  Laterality: Left;    PERCUTANEOUS TRANSCATHETER AORTIC VALVE REPLACEMENT (TAVR) Left 10/03/2023    Procedure: REPLACEMENT, AORTIC VALVE, PERCUTANEOUS, TRANSCATHETER carotid access;  Surgeon: Jun Brito MD;  Location: [facility] OR;  Service: Cardiovascular;  Laterality: Left;    PERCUTANEOUS TRANSLUMINAL ANGIOPLASTY N/A 08/25/2023    Procedure: ANGIOPLASTY-PERCUTANEOUS TRANSLUMINAL (PTA);  Surgeon: Nick Box  MD;  Location: [facility] CATH;  Service: Cardiology;  Laterality: N/A;    PERCUTANEOUS TRANSLUMINAL ANGIOPLASTY (PTA) OF PERIPHERAL VESSEL Left 04/10/2025    Procedure: PTA, PERIPHERAL VESSEL;  Surgeon: Harsha Salcedo MD;  Location: [facility] CATH;  Service: Cardiology;  Laterality: Left;  L LE intervention via left antegrade approach Harsha Salcedo at [facility] in [facility] OR under MAC  *PACEMAKER*    SHOULDER SURGERY Left     total shoulder replacement    WOUND EXPLORATION N/A 10/03/2023    Procedure: EXPLORATION, WOUND;  Surgeon: Jun Brito MD;  Location: [facility] OR;  Service: Cardiology;  Laterality: N/A;       Review of patient's allergies indicates:  No Known Allergies    No current facility-administered medications on file prior to encounter.     Current Outpatient Medications on File Prior to Encounter   Medication Sig    amLODIPine (NORVASC) 5 MG tablet Take 1 tablet (5 mg total) by mouth once daily. Prescribed by Dr. Box    QUEtiapine (SEROQUEL) 25 MG Tab Take 1 tablet (25 mg total) by mouth nightly.    rosuvastatin (CRESTOR) 10 MG tablet Take 1 tablet (10 mg total) by mouth once daily.    solifenacin (VESICARE) 10 MG tablet Take 10 mg by mouth once daily. Patient is still taking    acetaminophen (TYLENOL) 325 MG tablet Take 2 tablets (650 mg total) by mouth every 6 (six) hours as needed.    fluconazole (DIFLUCAN) 200 MG Tab Take 1 tablet (200 mg total) by mouth once daily.    polyethylene glycol (GLYCOLAX) 17 gram PwPk Take 17 g by mouth daily as needed for Constipation.     Family History       Problem Relation (Age of Onset)    Cancer Mother    Stroke Father          Tobacco Use    Smoking status: Former     Types: Cigars     Passive exposure: Past    Smokeless tobacco: Never   Vaping Use    Vaping status: Never Used   Substance and Sexual Activity    Alcohol use: Yes     Alcohol/week: 3.0 standard drinks of alcohol     Types: 3 Shots of liquor per week     Comment: 1.5 oz a day    Drug use: Never    Sexual  activity: Not Currently     Review of Systems   Unable to perform ROS: Mental status change     Objective:     Vital Signs (Most Recent):  Temp: 98.1 °F (36.7 °C) (07/05/25 0422)  Pulse: (!) 59 (07/05/25 0653)  Resp: 20 (07/05/25 0422)  BP: (!) 154/66 (07/05/25 0422)  SpO2: 98 % (07/05/25 0422) Vital Signs (24h Range):  Temp:  [97.9 °F (36.6 °C)-99.2 °F (37.3 °C)] 98.1 °F (36.7 °C)  Pulse:  [59-93] 59  Resp:  [16-20] 20  SpO2:  [96 %-98 %] 98 %  BP: (108-154)/(54-67) 154/66     Weight: 81.6 kg (180 lb)  Body mass index is 27.37 kg/m².     Physical Exam  Constitutional:       General: He is not in acute distress.  HENT:      Head: Normocephalic.      Nose: Nose normal.      Mouth/Throat:      Mouth: Mucous membranes are moist.   Eyes:      Extraocular Movements: Extraocular movements intact.   Cardiovascular:      Rate and Rhythm: Normal rate and regular rhythm.      Pulses: Normal pulses.      Heart sounds: No murmur heard.     No gallop.   Pulmonary:      Effort: Pulmonary effort is normal. No respiratory distress.      Breath sounds: No wheezing.   Abdominal:      Palpations: Abdomen is soft.      Tenderness: There is no abdominal tenderness.   Musculoskeletal:         General: Tenderness present. Normal range of motion.      Cervical back: Normal range of motion.      Left lower leg: Edema present.      Comments: erythema   Skin:     General: Skin is warm.      Findings: Erythema (left lower leg) present.   Neurological:      Mental Status: He is alert. He is disoriented.      Comments: Disoriented to place and time   Psychiatric:         Mood and Affect: Mood normal.                Recent Labs   Lab 07/04/25  1635 07/05/25  0517   WBC 5.68 6.45   HGB 14.2 12.4*   HCT 43.0 38.1*   MCV 94 94   RBC 4.60 4.04*   MCH 30.9 30.7   MCHC 33.0 32.5   RDW 14.6* 14.5   * 106*   MPV 9.2 9.5   GRAN 5.3  --    LYMPH 4.0* 0.51*  7.9*   MONO 3.0* 8.5  0.55   BASOPHIL  --  0.2  0.01       Recent Labs   Lab  "07/04/25  1635 07/05/25  0517    142   K 3.8 3.7    109   CO2 25 24   BUN 20 14   CREATININE 0.9 0.7   * 106   CALCIUM 8.1* 7.9*   PROT 6.4 5.5*   ALBUMIN 3.1* 2.5*   ALKPHOS 69 52   BILITOT 0.5 0.8   ALT 23 18   AST 17 13   ANIONGAP 10 9       Recent Labs   Lab 07/04/25  1639   COLORU Yellow   APPEARANCEUA Cloudy*   PHUR 7.0   SPECGRAV 1.015   PROTEINUA 1+*   GLUCUA Negative   BILIRUBINUA Negative   OCCULTUA 3+*   UROBILINOGEN 1.0   NITRITE Positive*   LEUKOCYTESUR 3+*   RBCUA >100*   WBCUA >100*   BACTERIA Occasional   HYALINECASTS 6*       No results for input(s): "PH", "PCO2", "PO2", "HCO3", "POCSATURATED", "BE" in the last 168 hours.    No results for input(s): "TROPONINI", "CPK", "CPKMB" in the last 168 hours.    No results for input(s): "PT", "INR", "APTT" in the last 168 hours.    Lab Results   Component Value Date    HGBA1C 5.8 (H) 11/11/2024       No results for input(s): "TSH", "H4MBHGI", "X3NLSKX", "THYROIDAB", "FREET4" in the last 168 hours.    Microbiology Results (last 7 days)       Procedure Component Value Units Date/Time    Urine culture [[id number]] Collected: 07/04/25 1639    Order Status: Sent Specimen: Urine, Catheterized Updated: 07/04/25 1701    Blood culture x two cultures. Draw prior to antibiotics. [[id number]] Collected: 07/04/25 1647    Order Status: Resulted Specimen: Blood from Peripheral, Antecubital, Left Updated: 07/04/25 1654    Blood culture x two cultures. Draw prior to antibiotics. [[id number]] Collected: 07/04/25 1635    Order Status: Resulted Specimen: Blood Updated: 07/04/25 1654            CT Head Without Contrast  Result Date: 7/4/2025  EXAMINATION: CT HEAD WITHOUT CONTRAST CLINICAL HISTORY: Mental status change, unknown cause; TECHNIQUE: Iterative reconstruction technique was performed. CT/Cardiac Nuclear exams in prior 12 months: 5 COMPARISON: 06/09/2025 FINDINGS: Skull is intact.  There is diffuse volume loss and white matter disease.  In the right " frontal parietal convexity is an extra-axial collection measuring 1.7 cm in thickness felt to be chronic subdural hematoma there is no new hyperdense blood products however there is some mass effect on the right lateral ventricle with mild midline shift from right to left of 3 mm.  This is similar in appearance to the previous study.     1. Low density subacute chronic right subdural hematoma with mass effect on the right lateral ventricle with some mild midline shift from right to left of 3 mm this is unchanged with no acute hemorrhage. Electronically signed by: Dana Stack MD Date:    07/04/2025 Time:    17:05    X-Ray Chest AP Portable  Result Date: 7/4/2025  EXAMINATION: XR CHEST AP PORTABLE CLINICAL HISTORY: Sepsis; TECHNIQUE: portable chest x-ray COMPARISON: 03/14/2025.  <Comparisons> FINDINGS: Hardware fixates the left shoulder.  Pacemaker is in place.  There are chronic changes with some left basilar discoid atelectasis or scar.     Hardware fixates the left shoulder with the disc fibrillator pacemaker in place with minimal left basilar discoid atelectasis or scar Electronically signed by: Dana Stack MD Date:    07/04/2025 Time:    16:56    CT Head Without Contrast  Result Date: 6/9/2025  EXAMINATION: CT HEAD WITHOUT CONTRAST CLINICAL HISTORY: Dizziness, persistent/recurrent, cardiac or vascular cause suspected; CT/Cardiac Nuclear exams in prior 12 months: 5 TECHNIQUE: Axial head CT performed without IV contrast.  Iterative reconstruction utilized. COMPARISON: CT, 06/03/2025 FINDINGS: Low-density right subdural hematoma extending from skull vertex into floor of right anterior cranial fossa averaging approximately 1.5 cm in thickness has not significantly changed in size since 06/03/2025.  No evidence of interval hemorrhage or increased mass effect with similar 4 mm left midline shift. No evidence of an acute infarction, hydrocephalus or other acute detrimental change.     1. Low-density subacute-chronic  right subdural hematoma with 4 mm left midline shift, not significantly changed since 06/03/2025 2. No acute detrimental change since prior head CT Electronically signed by: Yasmine Flores MD Date:    06/09/2025 Time:    11:01

## 2025-07-05 NOTE — H&P
[facility] Medicine  History & Physical    Patient Name: Tobi Liz Jr.  MRN: [id number]  Patient Class: IP- Inpatient  Admission Date: 7/4/2025  Attending Physician: Luis Enrique Guzman Jr., MD   Primary Care Provider: Luis Enrique Guzman Jr., MD         Patient information was obtained from patient and ER records.     Subjective:     Principal Problem:Altered mental status    Chief Complaint:   Chief Complaint   Patient presents with    Altered Mental Status     Pt to ER with AMS.  Pt has willams in place with foul smelling urine and redness to left lower leg        HPI: ED HPI: 89-year-old male with a chronic indwelling Willams catheter presents the ER with altered mental status since around 2:00 p.m.. History of this in the past, history of multiple urinary tract infections in the past as well. History of chronic diastolic heart failure. Has wounds to his legs, left lower leg is erythematous, edematous. Here in the emergency department he is alert, knows his name, knows his street address, does not know that he is in a hospital, does not know what year it is. Urine is foul-smelling     IM HPI: Patient Tobi Liz Jr. is a 89 y.o. male with a PMHx of  has a past medical history of Aortic valve stenosis, Arthritis, BPH (benign prostatic hyperplasia), Carotid artery stenosis, Chronic diastolic heart failure, ETOH abuse, Exposure to COVID-19 virus (01/07/2022), Hyperchloremia, Hypertension, Kidney stones, Murmur, Pacemaker, Polymyalgia rheumatica, PVD (peripheral vascular disease), and Renal artery stenosis., presents complaining of altered mental status, which is not uncommon for him given his multiple urinary tract infections given he has a chronic indwelling Willams catheter.  He was also admitted in June 2 inpatient rehab following a subdural hematoma which was thought to be considered due to trauma, as patient was on aspirin and Plavix.  In the ED patient was afebrile without  leukocytosis, vital signs stable.  Urine pulled from [facility] was foul-smelling and sent for urine culture.  He was started on meropenem given his past urine culture sensitivities.      Past Medical History:   Diagnosis Date    Aortic valve stenosis     Arthritis     BPH (benign prostatic hyperplasia)     Carotid artery stenosis     Chronic diastolic heart failure     ETOH abuse     Exposure to COVID-19 virus 01/07/2022    Hyperchloremia     Hypertension     Kidney stones     Murmur     Pacemaker     Polymyalgia rheumatica     PVD (peripheral vascular disease)     Renal artery stenosis        Past Surgical History:   Procedure Laterality Date    A-V CARDIAC PACEMAKER INSERTION N/A 10/06/2023    Procedure: INSERTION, CARDIAC PACEMAKER, DUAL CHAMBER;  Surgeon: Douglas Salguero MD;  Location: [facility] CATH;  Service: Cardiology;  Laterality: N/A;    ANGIOGRAM, CAROTID Left 08/25/2023    Procedure: ANGIOGRAM, CAROTID;  Surgeon: Nick Box MD;  Location: [facility] CATH;  Service: Cardiology;  Laterality: Left;    CATARACT EXTRACTION, BILATERAL      ECHOCARDIOGRAM,TRANSESOPHAGEAL N/A 10/03/2023    Procedure: Transesophageal echo (JOSE ARMANDO) intra-procedure log documentation;  Surgeon: Nick Box MD;  Location: [facility] OR;  Service: Cardiology;  Laterality: N/A;    HAND SURGERY      INSERTION OF STENT INTO PERIPHERAL VESSEL N/A 01/30/2025    Procedure: INSERTION, STENT, VESSEL, PERIPHERAL;  Surgeon: Harsha Salcedo MD;  Location: [facility] CATH;  Service: Cardiology;  Laterality: N/A;    PERCUTANEOUS TRANSCATHETER AORTIC VALVE REPLACEMENT (TAVR) Left 10/03/2023    Procedure: REPLACEMENT, AORTIC VALVE, PERCUTANEOUS, TRANSCATHETER;  Surgeon: Nick Box MD;  Location: [facility] OR;  Service: Cardiology;  Laterality: Left;    PERCUTANEOUS TRANSCATHETER AORTIC VALVE REPLACEMENT (TAVR) Left 10/03/2023    Procedure: REPLACEMENT, AORTIC VALVE, PERCUTANEOUS, TRANSCATHETER carotid access;  Surgeon: Jun Brito MD;  Location: [facility] OR;   Service: Cardiovascular;  Laterality: Left;    PERCUTANEOUS TRANSLUMINAL ANGIOPLASTY N/A 08/25/2023    Procedure: ANGIOPLASTY-PERCUTANEOUS TRANSLUMINAL (PTA);  Surgeon: Nick Box MD;  Location: [facility] CATH;  Service: Cardiology;  Laterality: N/A;    PERCUTANEOUS TRANSLUMINAL ANGIOPLASTY (PTA) OF PERIPHERAL VESSEL Left 04/10/2025    Procedure: PTA, PERIPHERAL VESSEL;  Surgeon: Harsha Salcedo MD;  Location: [facility] CATH;  Service: Cardiology;  Laterality: Left;  L LE intervention via left antegrade approach Harsha Salcedo at [facility] in Hybrid OR under MAC  *PACEMAKER*    SHOULDER SURGERY Left     total shoulder replacement    WOUND EXPLORATION N/A 10/03/2023    Procedure: EXPLORATION, WOUND;  Surgeon: Jun Brito MD;  Location: [facility] OR;  Service: Cardiology;  Laterality: N/A;       Review of patient's allergies indicates:  No Known Allergies    No current facility-administered medications on file prior to encounter.     Current Outpatient Medications on File Prior to Encounter   Medication Sig    amLODIPine (NORVASC) 5 MG tablet Take 1 tablet (5 mg total) by mouth once daily. Prescribed by Dr. Box    QUEtiapine (SEROQUEL) 25 MG Tab Take 1 tablet (25 mg total) by mouth nightly.    rosuvastatin (CRESTOR) 10 MG tablet Take 1 tablet (10 mg total) by mouth once daily.    solifenacin (VESICARE) 10 MG tablet Take 10 mg by mouth once daily. Patient is still taking    acetaminophen (TYLENOL) 325 MG tablet Take 2 tablets (650 mg total) by mouth every 6 (six) hours as needed.    fluconazole (DIFLUCAN) 200 MG Tab Take 1 tablet (200 mg total) by mouth once daily.    polyethylene glycol (GLYCOLAX) 17 gram PwPk Take 17 g by mouth daily as needed for Constipation.     Family History       Problem Relation (Age of Onset)    Cancer Mother    Stroke Father          Tobacco Use    Smoking status: Former     Types: Cigars     Passive exposure: Past    Smokeless tobacco: Never   Vaping Use    Vaping status: Never Used   Substance  and Sexual Activity    Alcohol use: Yes     Alcohol/week: 3.0 standard drinks of alcohol     Types: 3 Shots of liquor per week     Comment: 1.5 oz a day    Drug use: Never    Sexual activity: Not Currently     Review of Systems   Unable to perform ROS: Mental status change     Objective:     Vital Signs (Most Recent):  Temp: 98.1 °F (36.7 °C) (07/05/25 0422)  Pulse: (!) 59 (07/05/25 0653)  Resp: 20 (07/05/25 0422)  BP: (!) 154/66 (07/05/25 0422)  SpO2: 98 % (07/05/25 0422) Vital Signs (24h Range):  Temp:  [97.9 °F (36.6 °C)-99.2 °F (37.3 °C)] 98.1 °F (36.7 °C)  Pulse:  [59-93] 59  Resp:  [16-20] 20  SpO2:  [96 %-98 %] 98 %  BP: (108-154)/(54-67) 154/66     Weight: 81.6 kg (180 lb)  Body mass index is 27.37 kg/m².     Physical Exam  Constitutional:       General: He is not in acute distress.  HENT:      Head: Normocephalic.      Nose: Nose normal.      Mouth/Throat:      Mouth: Mucous membranes are moist.   Eyes:      Extraocular Movements: Extraocular movements intact.   Cardiovascular:      Rate and Rhythm: Normal rate and regular rhythm.      Pulses: Normal pulses.      Heart sounds: No murmur heard.     No gallop.   Pulmonary:      Effort: Pulmonary effort is normal. No respiratory distress.      Breath sounds: No wheezing.   Abdominal:      Palpations: Abdomen is soft.      Tenderness: There is no abdominal tenderness.   Musculoskeletal:         General: Tenderness present. Normal range of motion.      Cervical back: Normal range of motion.      Left lower leg: Edema present.      Comments: erythema   Skin:     General: Skin is warm.      Findings: Erythema (left lower leg) present.   Neurological:      Mental Status: He is alert. He is disoriented.      Comments: Disoriented to place and time   Psychiatric:         Mood and Affect: Mood normal.                Recent Labs   Lab 07/04/25  1635 07/05/25  0517   WBC 5.68 6.45   HGB 14.2 12.4*   HCT 43.0 38.1*   MCV 94 94   RBC 4.60 4.04*   MCH 30.9 30.7   MCHC 33.0  "32.5   RDW 14.6* 14.5   * 106*   MPV 9.2 9.5   GRAN 5.3  --    LYMPH 4.0* 0.51*  7.9*   MONO 3.0* 8.5  0.55   BASOPHIL  --  0.2  0.01       Recent Labs   Lab 07/04/25  1635 07/05/25  0517    142   K 3.8 3.7    109   CO2 25 24   BUN 20 14   CREATININE 0.9 0.7   * 106   CALCIUM 8.1* 7.9*   PROT 6.4 5.5*   ALBUMIN 3.1* 2.5*   ALKPHOS 69 52   BILITOT 0.5 0.8   ALT 23 18   AST 17 13   ANIONGAP 10 9       Recent Labs   Lab 07/04/25  1639   COLORU Yellow   APPEARANCEUA Cloudy*   PHUR 7.0   SPECGRAV 1.015   PROTEINUA 1+*   GLUCUA Negative   BILIRUBINUA Negative   OCCULTUA 3+*   UROBILINOGEN 1.0   NITRITE Positive*   LEUKOCYTESUR 3+*   RBCUA >100*   WBCUA >100*   BACTERIA Occasional   HYALINECASTS 6*       No results for input(s): "PH", "PCO2", "PO2", "HCO3", "POCSATURATED", "BE" in the last 168 hours.    No results for input(s): "TROPONINI", "CPK", "CPKMB" in the last 168 hours.    No results for input(s): "PT", "INR", "APTT" in the last 168 hours.    Lab Results   Component Value Date    HGBA1C 5.8 (H) 11/11/2024       No results for input(s): "TSH", "I4YKOMY", "X5HSTPS", "THYROIDAB", "FREET4" in the last 168 hours.    Microbiology Results (last 7 days)       Procedure Component Value Units Date/Time    Urine culture [[id number]] Collected: 07/04/25 1639    Order Status: Sent Specimen: Urine, Catheterized Updated: 07/04/25 1701    Blood culture x two cultures. Draw prior to antibiotics. [[id number]] Collected: 07/04/25 1647    Order Status: Resulted Specimen: Blood from Peripheral, Antecubital, Left Updated: 07/04/25 1654    Blood culture x two cultures. Draw prior to antibiotics. [[id number]] Collected: 07/04/25 1635    Order Status: Resulted Specimen: Blood Updated: 07/04/25 1654            CT Head Without Contrast  Result Date: 7/4/2025  EXAMINATION: CT HEAD WITHOUT CONTRAST CLINICAL HISTORY: Mental status change, unknown cause; TECHNIQUE: Iterative reconstruction technique was performed. " CT/Cardiac Nuclear exams in prior 12 months: 5 COMPARISON: 06/09/2025 FINDINGS: Skull is intact.  There is diffuse volume loss and white matter disease.  In the right frontal parietal convexity is an extra-axial collection measuring 1.7 cm in thickness felt to be chronic subdural hematoma there is no new hyperdense blood products however there is some mass effect on the right lateral ventricle with mild midline shift from right to left of 3 mm.  This is similar in appearance to the previous study.     1. Low density subacute chronic right subdural hematoma with mass effect on the right lateral ventricle with some mild midline shift from right to left of 3 mm this is unchanged with no acute hemorrhage. Electronically signed by: Dana Stack MD Date:    07/04/2025 Time:    17:05    X-Ray Chest AP Portable  Result Date: 7/4/2025  EXAMINATION: XR CHEST AP PORTABLE CLINICAL HISTORY: Sepsis; TECHNIQUE: portable chest x-ray COMPARISON: 03/14/2025.  <Comparisons> FINDINGS: Hardware fixates the left shoulder.  Pacemaker is in place.  There are chronic changes with some left basilar discoid atelectasis or scar.     Hardware fixates the left shoulder with the disc fibrillator pacemaker in place with minimal left basilar discoid atelectasis or scar Electronically signed by: Dana Stack MD Date:    07/04/2025 Time:    16:56    CT Head Without Contrast  Result Date: 6/9/2025  EXAMINATION: CT HEAD WITHOUT CONTRAST CLINICAL HISTORY: Dizziness, persistent/recurrent, cardiac or vascular cause suspected; CT/Cardiac Nuclear exams in prior 12 months: 5 TECHNIQUE: Axial head CT performed without IV contrast.  Iterative reconstruction utilized. COMPARISON: CT, 06/03/2025 FINDINGS: Low-density right subdural hematoma extending from skull vertex into floor of right anterior cranial fossa averaging approximately 1.5 cm in thickness has not significantly changed in size since 06/03/2025.  No evidence of interval hemorrhage or increased mass  "effect with similar 4 mm left midline shift. No evidence of an acute infarction, hydrocephalus or other acute detrimental change.     1. Low-density subacute-chronic right subdural hematoma with 4 mm left midline shift, not significantly changed since 06/03/2025 2. No acute detrimental change since prior head CT Electronically signed by: Yasmine Flores MD Date:    06/09/2025 Time:    11:01      Assessment/Plan:     Assessment & Plan  Altered mental status  Patient knows name and address, however did not know date and where he was.  - likely due to UTI, as he has a history of altered mental status with urinary tract infections  - CT head was stable from prior with chronic right subdural hematoma and mass effect    Plan:  - treat current infection    Hypertension  Patient's blood pressure range in the last 24 hours was: BP  Min: 108/67  Max: 154/66.The patient's inpatient anti-hypertensive regimen is listed below:  Current Antihypertensives  amLODIPine tablet 5 mg, Daily, Oral    Plan  - BP is controlled, no changes needed to their regimen    Hyperlipidemia  Continue home statin    Congestive heart failure, NYHA class II  Patient has Diastolic (HFpEF) heart failure that is Chronic. On presentation their CHF was well compensated. Most recent BNP and echo results are listed below.  No results for input(s): "BNP" in the last 72 hours.  Latest ECHO  No results found for this or any previous visit.    Current Heart Failure Medications       Plan  - Monitor strict I&Os and daily weights.    - Place on telemetry  - Low sodium diet  - Place on fluid restriction of 1.5 L.   - Cardiology has not been consulted  - The patient's volume status is at their baseline        Insomnia  Continue Seroquel    Wound of lower extremity  Order wound care    Urinary tract infection associated with indwelling urethral catheter  Patient with chronic indwelling [place] catheter in multiple past urinary tract infections  - afebrile, no " leukocytosis, no lactic acidosis  - started on meropenem in the ED based on past urine cultures  - urine culture pending    Plan:  - follow culture  - continue antibiotics    Subdural hematoma  History of a subdural hematoma, most recently admitted at the beginning of June.  Imaging shows no changes.  Recent Labs     07/04/25  1635 07/05/25  0517   HGB 14.2 12.4*   HCT 43.0 38.1*   * 106*     Plan  - Will trend hemoglobin/hematocrit Daily  - Will monitor and correct any coagulation defects  - Will transfuse if Hgb is <7g/dl (<8g/dl in cases of active ACS) or if patient has rapid bleeding leading to hemodynamic instability  - hold any antiplatelets and anticoagulants at this time  VTE Risk Mitigation (From admission, onward)           Ordered     IP VTE HIGH RISK PATIENT  Once         07/04/25 1744     Place sequential compression device  Until discontinued         07/04/25 1744     Place MERRITT hose  Until discontinued         07/04/25 1744                    SDOH Screening:  The patient was unable to be screened for utility difficulties, food insecurity, transport difficulties, housing insecurity, and interpersonal safety, so no concerns could be identified this admission.                              Gabriel Venegas MD  Department of Hospital Medicine  [facility]

## 2025-07-05 NOTE — ASSESSMENT & PLAN NOTE
Patient's blood pressure range in the last 24 hours was: BP  Min: 108/67  Max: 154/66.The patient's inpatient anti-hypertensive regimen is listed below:  Current Antihypertensives  amLODIPine tablet 5 mg, Daily, Oral    Plan  - BP is controlled, no changes needed to their regimen

## 2025-07-05 NOTE — PT/OT/SLP EVAL
Occupational Therapy   Evaluation    Name: Tobi Liz Jr.  MRN: [id number]  Admitting Diagnosis: Altered mental status  Recent Surgery: * No surgery found *      Recommendations:     Discharge Recommendations: Low Intensity Therapy  Discharge Equipment Recommendations:  none  Barriers to discharge:  Other (Comment) (Medical status)    Assessment:     Tobi Liz Jr. is a 89 y.o. male with a medical diagnosis of Altered mental status.  He presents with functional deficits impacting independence with ADL's including functional mobility. Performance deficits affecting function: weakness, impaired endurance, impaired self care skills, impaired functional mobility, gait instability, impaired balance, decreased upper extremity function, decreased lower extremity function, decreased safety awareness, impaired cognition, decreased ROM, impaired skin, edema, impaired cardiopulmonary response to activity, impaired joint extensibility, impaired muscle length.      Rehab Prognosis: Good; patient would benefit from acute skilled OT services to address these deficits and reach maximum level of function.       Plan:     Patient to be seen 3 x/week to address the above listed problems via self-care/home management, therapeutic activities, therapeutic exercises, cognitive retraining  Plan of Care Expires: 07/16/25  Plan of Care Reviewed with: patient    Subjective     Chief Complaint: weakness  Patient/Family Comments/goals: Pt would like to regain independence with ADL's including functional mobility.    Occupational Profile:  Living Environment: Pt lives with his spouse in an apartment at [facility].   Previous level of function: Setup Assistance to Modified Independent  Roles and Routines: ADL's  Equipment Used at Home: walker, rolling, wheelchair, shower chair, urinary catheter supplies  Assistance upon Discharge: [facility] staff    Pain/Comfort:  Pain Rating 1: 0/10  Pain Rating Post-Intervention 1:  0/10    Patients cultural, spiritual, Uatsdin conflicts given the current situation: no    Objective:     Communicated with: nurse prior to session.  Patient found HOB elevated with peripheral IV, willams catheter upon OT entry to room.    General Precautions: Standard, fall  Orthopedic Precautions: N/A  Braces: N/A  Respiratory Status: Room air    Occupational Performance:    Bed Mobility:    Patient completed Rolling/Turning to Left with  stand by assistance  Patient completed Rolling/Turning to Right with stand by assistance  Patient completed Scooting/Bridging with stand by assistance  Patient completed Supine to Sit with stand by assistance  Patient completed Sit to Supine with stand by assistance    Functional Mobility/Transfers:  Patient completed Sit <> Stand Transfer with stand by assistance  with  rolling walker   Patient completed Bed <> Chair Transfer using Step Transfer technique with stand by assistance with rolling walker  Patient completed Toilet Transfer Step Transfer technique with stand by assistance with  grab bars  Functional Mobility: Pt ambulated greater than 200' between surfaces requiring SBA utilizing RW.     Activities of Daily Living:  Feeding:  setup assistance    Grooming: supervision    Bathing: minimum assistance    Upper Body Dressing: stand by assistance    Lower Body Dressing: moderate assistance    Toileting: minimum assistance      Cognitive/Visual Perceptual:  Cognitive/Psychosocial Skills:  -       Oriented to: Person, Place, and Situation   -       Follows Commands/attention:Follows multistep  commands  -       Communication: anomia  -       Memory: Impaired STM  -       Safety awareness/insight to disability: impaired   -       Mood/Affect/Coping skills/emotional control: Cooperative and Pleasant    Physical Exam:  Postural examination/scapula alignment: -       Rounded shoulders  -       Forward head  -       Kyphosis  Skin integrity: redness (L) lower leg  Edema:  Moderate  bilateral lower legs  Sensation: -       Intact  light/touch bilateral UE's  Dominant hand: -       Right  Upper Extremity Range of Motion:  -       Right Upper Extremity: WFL except 95 degrees shoulder flexion in plane of scaption  -       Left Upper Extremity: WFL except 95 degrees shoulder flexion in plane of scaption  Upper Extremity Strength: -       Right Upper Extremity: Deficits: 3- to 4/5  -       Left Upper Extremity: Deficits: 3- to 4-/5  Fine Motor Coordination: -       Intact  Gross motor coordination: WFL    AMPAC 6 Click ADL:  AMPAC Total Score: 20    Treatment & Education:  Pt was provided education / instruction regarding role of OT and established OT POC.    Patient left HOB elevated with all lines intact, call button in reach, and nurse notified    GOALS:   Goals to be met by: 07/16/25     Patient will increase functional independence with ADLs by performing:    UE Dressing with Set-up Assistance.  LE Dressing with Supervision.  Grooming while standing at sink with Set-up Assistance.  Toileting from toilet with Set-up Assistance for hygiene and clothing management.   Bathing from  shower chair/bench with Supervision.  Toilet transfer to toilet with Set-up Assistance.      DME Justifications:  No DME recommended requiring DME justifications    History:     Past Medical History:   Diagnosis Date    Aortic valve stenosis     Arthritis     BPH (benign prostatic hyperplasia)     Carotid artery stenosis     Chronic diastolic heart failure     ETOH abuse     Exposure to COVID-19 virus 01/07/2022    Hyperchloremia     Hypertension     Kidney stones     Murmur     Pacemaker     Polymyalgia rheumatica     PVD (peripheral vascular disease)     Renal artery stenosis          Past Surgical History:   Procedure Laterality Date    A-V CARDIAC PACEMAKER INSERTION N/A 10/06/2023    Procedure: INSERTION, CARDIAC PACEMAKER, DUAL CHAMBER;  Surgeon: Douglas Salguero MD;  Location: [facility] CATH;  Service: Cardiology;   Laterality: N/A;    ANGIOGRAM, CAROTID Left 08/25/2023    Procedure: ANGIOGRAM, CAROTID;  Surgeon: Nick Box MD;  Location: [facility] CATH;  Service: Cardiology;  Laterality: Left;    CATARACT EXTRACTION, BILATERAL      ECHOCARDIOGRAM,TRANSESOPHAGEAL N/A 10/03/2023    Procedure: Transesophageal echo (JOSE ARMANDO) intra-procedure log documentation;  Surgeon: Nick Box MD;  Location: [facility] OR;  Service: Cardiology;  Laterality: N/A;    HAND SURGERY      INSERTION OF STENT INTO PERIPHERAL VESSEL N/A 01/30/2025    Procedure: INSERTION, STENT, VESSEL, PERIPHERAL;  Surgeon: Harsha Salcedo MD;  Location: [facility] CATH;  Service: Cardiology;  Laterality: N/A;    PERCUTANEOUS TRANSCATHETER AORTIC VALVE REPLACEMENT (TAVR) Left 10/03/2023    Procedure: REPLACEMENT, AORTIC VALVE, PERCUTANEOUS, TRANSCATHETER;  Surgeon: Nick Box MD;  Location: [facility] OR;  Service: Cardiology;  Laterality: Left;    PERCUTANEOUS TRANSCATHETER AORTIC VALVE REPLACEMENT (TAVR) Left 10/03/2023    Procedure: REPLACEMENT, AORTIC VALVE, PERCUTANEOUS, TRANSCATHETER carotid access;  Surgeon: Jun Brito MD;  Location: [facility] OR;  Service: Cardiovascular;  Laterality: Left;    PERCUTANEOUS TRANSLUMINAL ANGIOPLASTY N/A 08/25/2023    Procedure: ANGIOPLASTY-PERCUTANEOUS TRANSLUMINAL (PTA);  Surgeon: Nick Box MD;  Location: [facility] CATH;  Service: Cardiology;  Laterality: N/A;    PERCUTANEOUS TRANSLUMINAL ANGIOPLASTY (PTA) OF PERIPHERAL VESSEL Left 04/10/2025    Procedure: PTA, PERIPHERAL VESSEL;  Surgeon: Harsha Salcedo MD;  Location: [facility] CATH;  Service: Cardiology;  Laterality: Left;  L LE intervention via left antegrade approach Harsha Salcedo at [facility] in [facility] OR under MAC  *PACEMAKER*    SHOULDER SURGERY Left     total shoulder replacement    WOUND EXPLORATION N/A 10/03/2023    Procedure: EXPLORATION, WOUND;  Surgeon: Jun Brito MD;  Location: [facility] OR;  Service: Cardiology;  Laterality: N/A;       Time Tracking:     OT Date of  Treatment: 07/05/25  OT Start Time: 1500  OT Stop Time: 1526  OT Total Time (min): 26 min    Billable Minutes:Evaluation 26 7/5/2025

## 2025-07-05 NOTE — PLAN OF CARE
Problem: Occupational Therapy  Goal: Occupational Therapy Goal  Description: Goals to be met by: 07/16/25     Patient will increase functional independence with ADLs by performing:    UE Dressing with Set-up Assistance.  LE Dressing with Supervision.  Grooming while standing at sink with Set-up Assistance.  Toileting from toilet with Set-up Assistance for hygiene and clothing management.   Bathing from  shower chair/bench with Supervision.  Toilet transfer to toilet with Set-up Assistance.    7/5/2025 1547 by Ted Graham OT  Established OT POC

## 2025-07-05 NOTE — PLAN OF CARE
[facility]  Initial Discharge Assessment       Primary Care Provider: Luis Enrique Guzman Jr., MD    Admission Diagnosis: Screening for cardiovascular condition [Z13.6]  Altered mental status, unspecified altered mental status type [R41.82]  Urinary tract infection associated with indwelling urethral catheter, initial encounter [T83.511A, N39.0]    Admission Date: 7/4/2025  Expected Discharge Date:     Transition of Care Barriers: None    Payor: MEDICARE / Plan: MEDICARE PART A & B / Product Type: Government /     Extended Emergency Contact Information  Primary Emergency Contact: Eugenio Hemphill  Mobile Phone: [phone]  Relation: Daughter  Preferred language: English   needed? No  Secondary Emergency Contact: Therese Liz  Mobile Phone: [phone]  Relation: Spouse  Preferred language: English   needed? No    Discharge Plan A: Home with family, Home Health  Discharge Plan B: Home with family, Home Health      [facility] [address] [place] [address]  [address] [address]  Phone: [phone] Fax: [phone]      Initial Assessment (most recent)       Adult Discharge Assessment - 07/05/25 1335          Discharge Assessment    Assessment Type Discharge Planning Assessment     Confirmed/corrected address, phone number and insurance Yes     Confirmed Demographics Correct on Facesheet     Source of Information patient;family     Communicated ADONAY with patient/caregiver Date not available/Unable to determine     People in Home spouse     Name(s) of People in Home Therese Shalonda     Facility Arrived From: home     Do you expect to return to your current living situation? Yes     Do you have help at home or someone to help you manage your care at home? Yes     Who are your caregiver(s) and their phone number(s)? Trevon comes MWF to bathe patient.     Prior to hospitilization cognitive status: Unable to Assess     Current cognitive status:  Alert/Oriented     Walking or Climbing Stairs Difficulty yes     Walking or Climbing Stairs ambulation difficulty, requires equipment     Mobility Management walker     Dressing/Bathing Difficulty yes     Dressing/Bathing bathing difficulty, assistance 1 person     Dressing/Bathing Management has private help MWF     Equipment Currently Used at Home wheelchair;walker, rolling;hospital bed     Patient currently being followed by outpatient case management? Unable to determine (comments)     Do you currently have service(s) that help you manage your care at home? Yes     Name and Contact number of agency [facility]     Is the pt/caregiver preference to resume services with current agency Yes     Do you take prescription medications? Yes     Do you have any problems affording any of your prescribed medications? TBD     Is the patient taking medications as prescribed? yes     Who is going to help you get home at discharge? family     How do you get to doctors appointments? family or friend will provide     Are you on dialysis? No     Do you take coumadin? No     Discharge Plan A Home with family;Home Health     Discharge Plan B Home with family;Home Health     DME Needed Upon Discharge  none     Discharge Plan discussed with: Patient;Spouse/sig other     Transition of Care Barriers None                   Spoke with patient at bedside, he confirms contact information.  Already has appropriate DME at home.  States he will be returning home upon discharge, wants to continue with home health.  Wife confirms information per phone.  Resume HH with [facility] when discharged.

## 2025-07-05 NOTE — ASSESSMENT & PLAN NOTE
History of a subdural hematoma, most recently admitted at the beginning of June.  Imaging shows no changes.  Recent Labs     07/04/25  1635 07/05/25  0517   HGB 14.2 12.4*   HCT 43.0 38.1*   * 106*     Plan  - Will trend hemoglobin/hematocrit Daily  - Will monitor and correct any coagulation defects  - Will transfuse if Hgb is <7g/dl (<8g/dl in cases of active ACS) or if patient has rapid bleeding leading to hemodynamic instability  - hold any antiplatelets and anticoagulants at this time

## 2025-07-05 NOTE — ASSESSMENT & PLAN NOTE
"Patient has Diastolic (HFpEF) heart failure that is Chronic. On presentation their CHF was well compensated. Most recent BNP and echo results are listed below.  No results for input(s): "BNP" in the last 72 hours.  Latest ECHO  No results found for this or any previous visit.    Current Heart Failure Medications       Plan  - Monitor strict I&Os and daily weights.    - Place on telemetry  - Low sodium diet  - Place on fluid restriction of 1.5 L.   - Cardiology has not been consulted  - The patient's volume status is at their baseline        "

## 2025-07-05 NOTE — HPI
ED HPI: 89-year-old male with a chronic indwelling Bolton catheter presents the ER with altered mental status since around 2:00 p.m.. History of this in the past, history of multiple urinary tract infections in the past as well. History of chronic diastolic heart failure. Has wounds to his legs, left lower leg is erythematous, edematous. Here in the emergency department he is alert, knows his name, knows his street address, does not know that he is in a hospital, does not know what year it is. Urine is foul-smelling     IM HPI: Patient Tobi Liz Jr. is a 89 y.o. male with a PMHx of  has a past medical history of Aortic valve stenosis, Arthritis, BPH (benign prostatic hyperplasia), Carotid artery stenosis, Chronic diastolic heart failure, ETOH abuse, Exposure to COVID-19 virus (01/07/2022), Hyperchloremia, Hypertension, Kidney stones, Murmur, Pacemaker, Polymyalgia rheumatica, PVD (peripheral vascular disease), and Renal artery stenosis., presents complaining of altered mental status, which is not uncommon for him given his multiple urinary tract infections given he has a chronic indwelling Bolton catheter.  He was also admitted in June 2 inpatient rehab following a subdural hematoma which was thought to be considered due to trauma, as patient was on aspirin and Plavix.  In the ED patient was afebrile without leukocytosis, vital signs stable.  Urine pulled from Bolton was foul-smelling and sent for urine culture.  He was started on meropenem given his past urine culture sensitivities.

## 2025-07-05 NOTE — PLAN OF CARE
POC reviewed with the patient. IV maintained. Bolton Cath maintained. Tele maintained. Pt has a wound consult ordered per MD for venous stasis ulcers. CMP, CBC, MAG labs drawn daily. Atorvastatin 40 mg daily added to MAR. PT/OT started today which pt walked around the berman and nurses station twice today. @ 1604 I sat pt on right side of the bed for 15 minutes with no problems. Pt denies any problems or complaints. Call bell and personal belongings are in reach. Ongoing patient care throughout the day shift.    Problem: Skin Injury Risk Increased  Goal: Skin Health and Integrity  Outcome: Progressing     Problem: Infection  Goal: Absence of Infection Signs and Symptoms  Outcome: Progressing     Problem: Adult Inpatient Plan of Care  Goal: Plan of Care Review  Outcome: Progressing  Goal: Patient-Specific Goal (Individualized)  Outcome: Progressing  Goal: Absence of Hospital-Acquired Illness or Injury  Outcome: Progressing  Goal: Optimal Comfort and Wellbeing  Outcome: Progressing  Goal: Readiness for Transition of Care  Outcome: Progressing     Problem: Wound  Goal: Optimal Coping  Outcome: Progressing  Goal: Optimal Functional Ability  Outcome: Progressing  Goal: Absence of Infection Signs and Symptoms  Outcome: Progressing  Goal: Improved Oral Intake  Outcome: Progressing  Goal: Optimal Pain Control and Function  Outcome: Progressing  Goal: Skin Health and Integrity  Outcome: Progressing  Goal: Optimal Wound Healing  Outcome: Progressing     Problem: Fall Injury Risk  Goal: Absence of Fall and Fall-Related Injury  Outcome: Progressing

## 2025-07-05 NOTE — PLAN OF CARE
Plan of care reviewed with patient. Peripheral IV in place, patent and saline locked. Second Peripheral IV in place, patent and infusing NS @75ml/h. Pt tolerated medications well. NO PRN medications given during shift. Vital signs stable, No acute distress noted. Pt free from falls and injury. Questions and concerns addressed. Pt slept throughout the night. Pt belongings and call bell with in reach.    Problem: Skin Injury Risk Increased  Goal: Skin Health and Integrity  Outcome: Ongoing     Problem: Infection  Goal: Absence of Infection Signs and Symptoms  Outcome: Ongoing     Problem: Adult Inpatient Plan of Care  Goal: Plan of Care Review  Outcome: Ongoing  Goal: Patient-Specific Goal (Individualized)  Outcome: Ongoing  Goal: Absence of Hospital-Acquired Illness or Injury  Outcome: Ongoing  Goal: Optimal Comfort and Wellbeing  Outcome: Ongoing  Goal: Readiness for Transition of Care  Outcome: Ongoing     Problem: Wound  Goal: Optimal Coping  Outcome: Ongoing  Goal: Optimal Functional Ability  Outcome: Ongoing  Goal: Absence of Infection Signs and Symptoms  Outcome: Ongoing  Goal: Improved Oral Intake  Outcome: Ongoing  Goal: Optimal Pain Control and Function  Outcome: Ongoing  Goal: Skin Health and Integrity  Outcome: Ongoing  Goal: Optimal Wound Healing  Outcome: Ongoing     Problem: Fall Injury Risk  Goal: Absence of Fall and Fall-Related Injury  Outcome: Ongoing

## 2025-07-05 NOTE — PLAN OF CARE
07/05/25 0937   Medicare Message   Important Message from Medicare regarding Discharge Appeal Rights Signed/date by patient/caregiver   Date IMM was signed 07/04/25   Time IMM was signed [id number]     Obtained per admitting.

## 2025-07-05 NOTE — ASSESSMENT & PLAN NOTE
Patient with chronic indwelling Bolton catheter in multiple past urinary tract infections  - afebrile, no leukocytosis, no lactic acidosis  - started on meropenem in the ED based on past urine cultures  - urine culture pending    Plan:  - follow culture  - continue antibiotics

## 2025-07-06 LAB
ABSOLUTE EOSINOPHIL (OHS): 0.09 K/UL
ABSOLUTE MONOCYTE (OHS): 0.39 K/UL (ref 0.3–1)
ABSOLUTE NEUTROPHIL COUNT (OHS): 3.99 K/UL (ref 1.8–7.7)
ALBUMIN SERPL BCP-MCNC: 2.4 G/DL (ref 3.5–5.2)
ALP SERPL-CCNC: 49 UNIT/L (ref 40–150)
ALT SERPL W/O P-5'-P-CCNC: 14 UNIT/L (ref 10–44)
ANION GAP (OHS): 7 MMOL/L (ref 8–16)
AST SERPL-CCNC: 13 UNIT/L (ref 11–45)
BASOPHILS # BLD AUTO: 0.01 K/UL
BASOPHILS NFR BLD AUTO: 0.2 %
BILIRUB SERPL-MCNC: 0.5 MG/DL (ref 0.1–1)
BUN SERPL-MCNC: 12 MG/DL (ref 10–30)
CALCIUM SERPL-MCNC: 8 MG/DL (ref 8.7–10.5)
CHLORIDE SERPL-SCNC: 112 MMOL/L (ref 95–110)
CO2 SERPL-SCNC: 22 MMOL/L (ref 23–29)
CREAT SERPL-MCNC: 0.6 MG/DL (ref 0.5–1.4)
ERYTHROCYTE [DISTWIDTH] IN BLOOD BY AUTOMATED COUNT: 14.4 % (ref 11.5–14.5)
GFR SERPLBLD CREATININE-BSD FMLA CKD-EPI: >60 ML/MIN/1.73/M2
GLUCOSE SERPL-MCNC: 97 MG/DL (ref 70–110)
HCT VFR BLD AUTO: 36.3 % (ref 40–54)
HGB BLD-MCNC: 11.9 GM/DL (ref 14–18)
IMM GRANULOCYTES # BLD AUTO: 0.09 K/UL (ref 0–0.04)
IMM GRANULOCYTES NFR BLD AUTO: 1.7 % (ref 0–0.5)
LYMPHOCYTES # BLD AUTO: 0.63 K/UL (ref 1–4.8)
MAGNESIUM SERPL-MCNC: 1.9 MG/DL (ref 1.6–2.6)
MCH RBC QN AUTO: 30.3 PG (ref 27–31)
MCHC RBC AUTO-ENTMCNC: 32.8 G/DL (ref 32–36)
MCV RBC AUTO: 92 FL (ref 82–98)
NUCLEATED RBC (/100WBC) (OHS): 0 /100 WBC
PLATELET # BLD AUTO: 114 K/UL (ref 150–450)
PMV BLD AUTO: 9.3 FL (ref 9.2–12.9)
POTASSIUM SERPL-SCNC: 3.6 MMOL/L (ref 3.5–5.1)
PROT SERPL-MCNC: 5.5 GM/DL (ref 6–8.4)
RBC # BLD AUTO: 3.93 M/UL (ref 4.6–6.2)
RELATIVE EOSINOPHIL (OHS): 1.7 %
RELATIVE LYMPHOCYTE (OHS): 12.1 % (ref 18–48)
RELATIVE MONOCYTE (OHS): 7.5 % (ref 4–15)
RELATIVE NEUTROPHIL (OHS): 76.8 % (ref 38–73)
SODIUM SERPL-SCNC: 141 MMOL/L (ref 136–145)
WBC # BLD AUTO: 5.2 K/UL (ref 3.9–12.7)

## 2025-07-06 PROCEDURE — 36415 COLL VENOUS BLD VENIPUNCTURE: CPT | Performed by: STUDENT IN AN ORGANIZED HEALTH CARE EDUCATION/TRAINING PROGRAM

## 2025-07-06 PROCEDURE — 63600175 PHARM REV CODE 636 W HCPCS: Performed by: EMERGENCY MEDICINE

## 2025-07-06 PROCEDURE — 80053 COMPREHEN METABOLIC PANEL: CPT | Performed by: STUDENT IN AN ORGANIZED HEALTH CARE EDUCATION/TRAINING PROGRAM

## 2025-07-06 PROCEDURE — 85025 COMPLETE CBC W/AUTO DIFF WBC: CPT | Performed by: STUDENT IN AN ORGANIZED HEALTH CARE EDUCATION/TRAINING PROGRAM

## 2025-07-06 PROCEDURE — 25000003 PHARM REV CODE 250: Performed by: STUDENT IN AN ORGANIZED HEALTH CARE EDUCATION/TRAINING PROGRAM

## 2025-07-06 PROCEDURE — 83735 ASSAY OF MAGNESIUM: CPT | Performed by: STUDENT IN AN ORGANIZED HEALTH CARE EDUCATION/TRAINING PROGRAM

## 2025-07-06 PROCEDURE — 21400001 HC TELEMETRY ROOM

## 2025-07-06 PROCEDURE — 25000003 PHARM REV CODE 250: Performed by: EMERGENCY MEDICINE

## 2025-07-06 PROCEDURE — 25000003 PHARM REV CODE 250: Performed by: INTERNAL MEDICINE

## 2025-07-06 RX ADMIN — AMLODIPINE BESYLATE 5 MG: 5 TABLET ORAL at 08:07

## 2025-07-06 RX ADMIN — SODIUM CHLORIDE: 9 INJECTION, SOLUTION INTRAVENOUS at 11:07

## 2025-07-06 RX ADMIN — ATORVASTATIN CALCIUM 40 MG: 40 TABLET, FILM COATED ORAL at 08:07

## 2025-07-06 RX ADMIN — MEROPENEM 1 G: 1 INJECTION, POWDER, FOR SOLUTION INTRAVENOUS at 09:07

## 2025-07-06 RX ADMIN — MUPIROCIN: 20 OINTMENT TOPICAL at 09:07

## 2025-07-06 RX ADMIN — MEROPENEM 1 G: 1 INJECTION, POWDER, FOR SOLUTION INTRAVENOUS at 05:07

## 2025-07-06 RX ADMIN — OXYBUTYNIN CHLORIDE 5 MG: 5 TABLET, EXTENDED RELEASE ORAL at 08:07

## 2025-07-06 RX ADMIN — QUETIAPINE FUMARATE 25 MG: 25 TABLET ORAL at 08:07

## 2025-07-06 RX ADMIN — MEROPENEM 1 G: 1 INJECTION, POWDER, FOR SOLUTION INTRAVENOUS at 12:07

## 2025-07-06 RX ADMIN — ACETAMINOPHEN 650 MG: 325 TABLET ORAL at 10:07

## 2025-07-06 NOTE — SUBJECTIVE & OBJECTIVE
Interval History:  Patient seen and examined by me    Review of Systems   Constitutional:  Negative for activity change, fatigue and fever.   HENT:  Negative for congestion.    Eyes:  Negative for visual disturbance.   Respiratory:  Negative for cough and shortness of breath.    Cardiovascular:  Negative for chest pain and leg swelling.   Gastrointestinal:  Negative for abdominal pain, constipation, diarrhea, nausea and vomiting.   Endocrine: Negative for polyuria.   Genitourinary:         Bolton in place   Musculoskeletal:  Negative for arthralgias and back pain.   Skin:  Negative for color change.   Neurological:  Negative for dizziness, seizures and headaches.   Hematological:  Negative for adenopathy.   Psychiatric/Behavioral: Negative.       Objective:     Vital Signs (Most Recent):  Temp: 98.6 °F (37 °C) (07/06/25 0735)  Pulse: 60 (07/06/25 0735)  Resp: 18 (07/06/25 0735)  BP: (!) 152/68 (07/06/25 0735)  SpO2: 97 % (07/06/25 0735) Vital Signs (24h Range):  Temp:  [97.7 °F (36.5 °C)-98.6 °F (37 °C)] 98.6 °F (37 °C)  Pulse:  [59-70] 60  Resp:  [16-18] 18  SpO2:  [96 %-98 %] 97 %  BP: (120-159)/(57-68) 152/68     Weight: 81.6 kg (180 lb)  Body mass index is 27.37 kg/m².    Intake/Output Summary (Last 24 hours) at 7/6/2025 0806  Last data filed at 7/6/2025 0630  Gross per 24 hour   Intake 2636.14 ml   Output 3575 ml   Net -938.86 ml         Physical Exam  Constitutional:       General: He is not in acute distress.  HENT:      Head: Normocephalic.      Nose: Nose normal.      Mouth/Throat:      Mouth: Mucous membranes are moist.   Eyes:      Extraocular Movements: Extraocular movements intact.   Cardiovascular:      Rate and Rhythm: Normal rate and regular rhythm.      Pulses: Normal pulses.      Heart sounds: No murmur heard.     No gallop.   Pulmonary:      Effort: Pulmonary effort is normal. No respiratory distress.      Breath sounds: No wheezing.   Abdominal:      Palpations: Abdomen is soft.      Tenderness:  "There is no abdominal tenderness.   Musculoskeletal:         General: Tenderness present. Normal range of motion.      Cervical back: Normal range of motion.      Left lower leg: Edema present.      Comments: erythema   Skin:     General: Skin is warm.      Findings: Erythema (left lower leg) present.   Neurological:      Mental Status: He is alert. Mental status is at baseline.      Comments: Disoriented to place and time   Psychiatric:         Mood and Affect: Mood normal.             Recent Labs   Lab 07/04/25  1635 07/05/25  0517 07/06/25  0514   WBC 5.68 6.45 5.20   HGB 14.2 12.4* 11.9*   HCT 43.0 38.1* 36.3*   MCV 94 94 92   RBC 4.60 4.04* 3.93*   MCH 30.9 30.7 30.3   MCHC 33.0 32.5 32.8   RDW 14.6* 14.5 14.4   * 106* 114*   MPV 9.2 9.5 9.3   GRAN 5.3  --   --    LYMPH 4.0* 0.51*  7.9* 0.63*  12.1*   MONO 3.0* 8.5  0.55 7.5  0.39   BASOPHIL  --  0.2  0.01 0.2  0.01       Recent Labs   Lab 07/04/25  1635 07/05/25  0517 07/06/25  0514    142 141   K 3.8 3.7 3.6    109 112*   CO2 25 24 22*   BUN 20 14 12   CREATININE 0.9 0.7 0.6   * 106 97   CALCIUM 8.1* 7.9* 8.0*   PROT 6.4 5.5* 5.5*   ALBUMIN 3.1* 2.5* 2.4*   ALKPHOS 69 52 49   BILITOT 0.5 0.8 0.5   ALT 23 18 14   AST 17 13 13   ANIONGAP 10 9 7*   MG  --   --  1.9       Recent Labs   Lab 07/04/25  1639   COLORU Yellow   APPEARANCEUA Cloudy*   PHUR 7.0   SPECGRAV 1.015   PROTEINUA 1+*   GLUCUA Negative   BILIRUBINUA Negative   OCCULTUA 3+*   UROBILINOGEN 1.0   NITRITE Positive*   LEUKOCYTESUR 3+*   RBCUA >100*   WBCUA >100*   BACTERIA Occasional   HYALINECASTS 6*       No results for input(s): "PH", "PCO2", "PO2", "HCO3", "POCSATURATED", "BE" in the last 168 hours.    No results for input(s): "TROPONINI", "CPK", "CPKMB" in the last 168 hours.    No results for input(s): "PT", "INR", "APTT" in the last 168 hours.    Lab Results   Component Value Date    HGBA1C 5.8 (H) 11/11/2024       No results for input(s): "TSH", "N7IOCLK", " ""H6XGTRI", "THYROIDAB", "FREET4" in the last 168 hours.    Microbiology Results (last 7 days)       Procedure Component Value Units Date/Time    Blood culture x two cultures. Draw prior to antibiotics. [[id number]]  (Normal) Collected: 07/04/25 1635    Order Status: Completed Specimen: Blood Updated: 07/06/25 0700     Blood Culture No Growth After 24 Hours    Blood culture x two cultures. Draw prior to antibiotics. [[id number]]  (Normal) Collected: 07/04/25 1647    Order Status: Completed Specimen: Blood from Peripheral, Antecubital, Left Updated: 07/06/25 0700     Blood Culture No Growth After 24 Hours    Urine culture [[id number]] Collected: 07/04/25 1639    Order Status: Sent Specimen: Urine, Catheterized Updated: 07/04/25 1701            CT Head Without Contrast  Result Date: 7/4/2025  EXAMINATION: CT HEAD WITHOUT CONTRAST CLINICAL HISTORY: Mental status change, unknown cause; TECHNIQUE: Iterative reconstruction technique was performed. CT/Cardiac Nuclear exams in prior 12 months: 5 COMPARISON: 06/09/2025 FINDINGS: Skull is intact.  There is diffuse volume loss and white matter disease.  In the right frontal parietal convexity is an extra-axial collection measuring 1.7 cm in thickness felt to be chronic subdural hematoma there is no new hyperdense blood products however there is some mass effect on the right lateral ventricle with mild midline shift from right to left of 3 mm.  This is similar in appearance to the previous study.     1. Low density subacute chronic right subdural hematoma with mass effect on the right lateral ventricle with some mild midline shift from right to left of 3 mm this is unchanged with no acute hemorrhage. Electronically signed by: Dana Stack MD Date:    07/04/2025 Time:    17:05    X-Ray Chest AP Portable  Result Date: 7/4/2025  EXAMINATION: XR CHEST AP PORTABLE CLINICAL HISTORY: Sepsis; TECHNIQUE: portable chest x-ray COMPARISON: 03/14/2025.  <Comparisons> FINDINGS: Hardware " fixates the left shoulder.  Pacemaker is in place.  There are chronic changes with some left basilar discoid atelectasis or scar.     Hardware fixates the left shoulder with the disc fibrillator pacemaker in place with minimal left basilar discoid atelectasis or scar Electronically signed by: Dana Stack MD Date:    07/04/2025 Time:    16:56    CT Head Without Contrast  Result Date: 6/9/2025  EXAMINATION: CT HEAD WITHOUT CONTRAST CLINICAL HISTORY: Dizziness, persistent/recurrent, cardiac or vascular cause suspected; CT/Cardiac Nuclear exams in prior 12 months: 5 TECHNIQUE: Axial head CT performed without IV contrast.  Iterative reconstruction utilized. COMPARISON: CT, 06/03/2025 FINDINGS: Low-density right subdural hematoma extending from skull vertex into floor of right anterior cranial fossa averaging approximately 1.5 cm in thickness has not significantly changed in size since 06/03/2025.  No evidence of interval hemorrhage or increased mass effect with similar 4 mm left midline shift. No evidence of an acute infarction, hydrocephalus or other acute detrimental change.     1. Low-density subacute-chronic right subdural hematoma with 4 mm left midline shift, not significantly changed since 06/03/2025 2. No acute detrimental change since prior head CT Electronically signed by: Yasmine Flores MD Date:    06/09/2025 Time:    11:01

## 2025-07-06 NOTE — PLAN OF CARE
07/06/25 1401   Medicare Message   Important Message from Medicare regarding Discharge Appeal Rights Given to patient/caregiver;Explained to patient/caregiver;Signed/date by patient/caregiver   Date IMM was signed 07/06/25   Time IMM was signed 1402

## 2025-07-06 NOTE — PROGRESS NOTES
[facility] Medicine  Progress Note    Patient Name: Tobi Liz Jr.  MRN: [id number]  Patient Class: IP- Inpatient   Admission Date: 7/4/2025  Length of Stay: 2 days  Attending Physician: Luis Enrique Guzman Jr., MD  Primary Care Provider: Luis Enrique Guzman Jr., MD        Subjective     Principal Problem:Altered mental status        HPI:  ED HPI: 89-year-old male with a chronic indwelling Bolton catheter presents the ER with altered mental status since around 2:00 p.m.. History of this in the past, history of multiple urinary tract infections in the past as well. History of chronic diastolic heart failure. Has wounds to his legs, left lower leg is erythematous, edematous. Here in the emergency department he is alert, knows his name, knows his street address, does not know that he is in a hospital, does not know what year it is. Urine is foul-smelling     IM HPI: Patient Tobi Liz Jr. is a 89 y.o. male with a PMHx of  has a past medical history of Aortic valve stenosis, Arthritis, BPH (benign prostatic hyperplasia), Carotid artery stenosis, Chronic diastolic heart failure, ETOH abuse, Exposure to COVID-19 virus (01/07/2022), Hyperchloremia, Hypertension, Kidney stones, Murmur, Pacemaker, Polymyalgia rheumatica, PVD (peripheral vascular disease), and Renal artery stenosis., presents complaining of altered mental status, which is not uncommon for him given his multiple urinary tract infections given he has a chronic indwelling Bolton catheter.  He was also admitted in June 2 inpatient rehab following a subdural hematoma which was thought to be considered due to trauma, as patient was on aspirin and Plavix.  In the ED patient was afebrile without leukocytosis, vital signs stable.  Urine pulled from Bolton was foul-smelling and sent for urine culture.  He was started on meropenem given his past urine culture sensitivities.      Overview/Hospital Course:  7/6/25:  Patient remains afebrile  without leukocytosis, continued on meropenem for UTI.  Urine culture pending, blood cultures no growth to date.    Interval History:  Patient seen and examined by me    Review of Systems   Constitutional:  Negative for activity change, fatigue and fever.   HENT:  Negative for congestion.    Eyes:  Negative for visual disturbance.   Respiratory:  Negative for cough and shortness of breath.    Cardiovascular:  Negative for chest pain and leg swelling.   Gastrointestinal:  Negative for abdominal pain, constipation, diarrhea, nausea and vomiting.   Endocrine: Negative for polyuria.   Genitourinary:         Bolton in place   Musculoskeletal:  Negative for arthralgias and back pain.   Skin:  Negative for color change.   Neurological:  Negative for dizziness, seizures and headaches.   Hematological:  Negative for adenopathy.   Psychiatric/Behavioral: Negative.       Objective:     Vital Signs (Most Recent):  Temp: 98.6 °F (37 °C) (07/06/25 0735)  Pulse: 60 (07/06/25 0735)  Resp: 18 (07/06/25 0735)  BP: (!) 152/68 (07/06/25 0735)  SpO2: 97 % (07/06/25 0735) Vital Signs (24h Range):  Temp:  [97.7 °F (36.5 °C)-98.6 °F (37 °C)] 98.6 °F (37 °C)  Pulse:  [59-70] 60  Resp:  [16-18] 18  SpO2:  [96 %-98 %] 97 %  BP: (120-159)/(57-68) 152/68     Weight: 81.6 kg (180 lb)  Body mass index is 27.37 kg/m².    Intake/Output Summary (Last 24 hours) at 7/6/2025 0806  Last data filed at 7/6/2025 0630  Gross per 24 hour   Intake 2636.14 ml   Output 3575 ml   Net -938.86 ml         Physical Exam  Constitutional:       General: He is not in acute distress.  HENT:      Head: Normocephalic.      Nose: Nose normal.      Mouth/Throat:      Mouth: Mucous membranes are moist.   Eyes:      Extraocular Movements: Extraocular movements intact.   Cardiovascular:      Rate and Rhythm: Normal rate and regular rhythm.      Pulses: Normal pulses.      Heart sounds: No murmur heard.     No gallop.   Pulmonary:      Effort: Pulmonary effort is normal. No  "respiratory distress.      Breath sounds: No wheezing.   Abdominal:      Palpations: Abdomen is soft.      Tenderness: There is no abdominal tenderness.   Musculoskeletal:         General: Tenderness present. Normal range of motion.      Cervical back: Normal range of motion.      Left lower leg: Edema present.      Comments: erythema   Skin:     General: Skin is warm.      Findings: Erythema (left lower leg) present.   Neurological:      Mental Status: He is alert. Mental status is at baseline.      Comments: Disoriented to place and time   Psychiatric:         Mood and Affect: Mood normal.             Recent Labs   Lab 07/04/25  1635 07/05/25  0517 07/06/25  0514   WBC 5.68 6.45 5.20   HGB 14.2 12.4* 11.9*   HCT 43.0 38.1* 36.3*   MCV 94 94 92   RBC 4.60 4.04* 3.93*   MCH 30.9 30.7 30.3   MCHC 33.0 32.5 32.8   RDW 14.6* 14.5 14.4   * 106* 114*   MPV 9.2 9.5 9.3   GRAN 5.3  --   --    LYMPH 4.0* 0.51*  7.9* 0.63*  12.1*   MONO 3.0* 8.5  0.55 7.5  0.39   BASOPHIL  --  0.2  0.01 0.2  0.01       Recent Labs   Lab 07/04/25  1635 07/05/25  0517 07/06/25  0514    142 141   K 3.8 3.7 3.6    109 112*   CO2 25 24 22*   BUN 20 14 12   CREATININE 0.9 0.7 0.6   * 106 97   CALCIUM 8.1* 7.9* 8.0*   PROT 6.4 5.5* 5.5*   ALBUMIN 3.1* 2.5* 2.4*   ALKPHOS 69 52 49   BILITOT 0.5 0.8 0.5   ALT 23 18 14   AST 17 13 13   ANIONGAP 10 9 7*   MG  --   --  1.9       Recent Labs   Lab 07/04/25  1639   COLORU Yellow   APPEARANCEUA Cloudy*   PHUR 7.0   SPECGRAV 1.015   PROTEINUA 1+*   GLUCUA Negative   BILIRUBINUA Negative   OCCULTUA 3+*   UROBILINOGEN 1.0   NITRITE Positive*   LEUKOCYTESUR 3+*   RBCUA >100*   WBCUA >100*   BACTERIA Occasional   HYALINECASTS 6*       No results for input(s): "PH", "PCO2", "PO2", "HCO3", "POCSATURATED", "BE" in the last 168 hours.    No results for input(s): "TROPONINI", "CPK", "CPKMB" in the last 168 hours.    No results for input(s): "PT", "INR", "APTT" in the last 168 " "hours.    Lab Results   Component Value Date    HGBA1C 5.8 (H) 11/11/2024       No results for input(s): "TSH", "E0MIQET", "G9PXHFR", "THYROIDAB", "FREET4" in the last 168 hours.    Microbiology Results (last 7 days)       Procedure Component Value Units Date/Time    Blood culture x two cultures. Draw prior to antibiotics. [[id number]]  (Normal) Collected: 07/04/25 1635    Order Status: Completed Specimen: Blood Updated: 07/06/25 0700     Blood Culture No Growth After 24 Hours    Blood culture x two cultures. Draw prior to antibiotics. [[id number]]  (Normal) Collected: 07/04/25 1647    Order Status: Completed Specimen: Blood from Peripheral, Antecubital, Left Updated: 07/06/25 0700     Blood Culture No Growth After 24 Hours    Urine culture [[id number]] Collected: 07/04/25 1639    Order Status: Sent Specimen: Urine, Catheterized Updated: 07/04/25 1701            CT Head Without Contrast  Result Date: 7/4/2025  EXAMINATION: CT HEAD WITHOUT CONTRAST CLINICAL HISTORY: Mental status change, unknown cause; TECHNIQUE: Iterative reconstruction technique was performed. CT/Cardiac Nuclear exams in prior 12 months: 5 COMPARISON: 06/09/2025 FINDINGS: Skull is intact.  There is diffuse volume loss and white matter disease.  In the right frontal parietal convexity is an extra-axial collection measuring 1.7 cm in thickness felt to be chronic subdural hematoma there is no new hyperdense blood products however there is some mass effect on the right lateral ventricle with mild midline shift from right to left of 3 mm.  This is similar in appearance to the previous study.     1. Low density subacute chronic right subdural hematoma with mass effect on the right lateral ventricle with some mild midline shift from right to left of 3 mm this is unchanged with no acute hemorrhage. Electronically signed by: Dana Stack MD Date:    07/04/2025 Time:    17:05    X-Ray Chest AP Portable  Result Date: 7/4/2025  EXAMINATION: XR CHEST AP " PORTABLE CLINICAL HISTORY: Sepsis; TECHNIQUE: portable chest x-ray COMPARISON: 03/14/2025.  <Comparisons> FINDINGS: Hardware fixates the left shoulder.  Pacemaker is in place.  There are chronic changes with some left basilar discoid atelectasis or scar.     Hardware fixates the left shoulder with the disc fibrillator pacemaker in place with minimal left basilar discoid atelectasis or scar Electronically signed by: Dana Stack MD Date:    07/04/2025 Time:    16:56    CT Head Without Contrast  Result Date: 6/9/2025  EXAMINATION: CT HEAD WITHOUT CONTRAST CLINICAL HISTORY: Dizziness, persistent/recurrent, cardiac or vascular cause suspected; CT/Cardiac Nuclear exams in prior 12 months: 5 TECHNIQUE: Axial head CT performed without IV contrast.  Iterative reconstruction utilized. COMPARISON: CT, 06/03/2025 FINDINGS: Low-density right subdural hematoma extending from skull vertex into floor of right anterior cranial fossa averaging approximately 1.5 cm in thickness has not significantly changed in size since 06/03/2025.  No evidence of interval hemorrhage or increased mass effect with similar 4 mm left midline shift. No evidence of an acute infarction, hydrocephalus or other acute detrimental change.     1. Low-density subacute-chronic right subdural hematoma with 4 mm left midline shift, not significantly changed since 06/03/2025 2. No acute detrimental change since prior head CT Electronically signed by: Yasmine Flores MD Date:    06/09/2025 Time:    11:01          Assessment & Plan  Altered mental status  Patient knows name and address, however did not know date and where he was.  - likely due to UTI, as he has a history of altered mental status with urinary tract infections  - CT head was stable from prior with chronic right subdural hematoma and mass effect    Plan:  - treat current infection    Hypertension  Patient's blood pressure range in the last 24 hours was: BP  Min: 120/57  Max: 159/68.The patient's  "inpatient anti-hypertensive regimen is listed below:  Current Antihypertensives  amLODIPine tablet 5 mg, Daily, Oral    Plan  - BP is controlled, no changes needed to their regimen    Hyperlipidemia  Continue home statin    Congestive heart failure, NYHA class II  Patient has Diastolic (HFpEF) heart failure that is Chronic. On presentation their CHF was well compensated. Most recent BNP and echo results are listed below.  No results for input(s): "BNP" in the last 72 hours.  Latest ECHO  No results found for this or any previous visit.    Current Heart Failure Medications       Plan  - Monitor strict I&Os and daily weights.    - Place on telemetry  - Low sodium diet  - Place on fluid restriction of 1.5 L.   - Cardiology has not been consulted  - The patient's volume status is at their baseline        Insomnia  Continue Seroquel    Wound of lower extremity  Order wound care    Urinary tract infection associated with indwelling urethral catheter  Patient with chronic indwelling Bolton catheter in multiple past urinary tract infections  - afebrile, no leukocytosis, no lactic acidosis  - started on meropenem in the ED based on past urine cultures  - urine culture pending    Plan:  - follow culture  - continue antibiotics    Subdural hematoma  History of a subdural hematoma, most recently admitted at the beginning of June.  Imaging shows no changes.  Recent Labs     07/04/25  1635 07/05/25  0517 07/06/25  0514   HGB 14.2 12.4* 11.9*   HCT 43.0 38.1* 36.3*   * 106* 114*     Plan  - Will trend hemoglobin/hematocrit Daily  - Will monitor and correct any coagulation defects  - Will transfuse if Hgb is <7g/dl (<8g/dl in cases of active ACS) or if patient has rapid bleeding leading to hemodynamic instability  - hold any antiplatelets and anticoagulants at this time  VTE Risk Mitigation (From admission, onward)           Ordered     IP VTE HIGH RISK PATIENT  Once         07/04/25 1744     Place sequential compression " device  Until discontinued         07/04/25 1744     Place MERRITT hose  Until discontinued         07/04/25 1744                    Discharge Planning   ADONAY: 7/10/2025     Code Status: Full Code   Medical Readiness for Discharge Date:   Discharge Plan A: Home with family, Home Health                  Please place Justification for DME      Gabriel Venegas MD  Department of Hospital Medicine   [facility]

## 2025-07-06 NOTE — PLAN OF CARE
Plan of care reviewed with patient. Peripheral IV in place and infusing 0.9% NS @ 75 ml/hr. Pt tolerated medications well. Pt was alert to person and place. Pt was able to sleep awaking to voice, and touch. Pt awakened by lower left leg pain. PRN Acetaminophen given per MD orders. Vital signs stable, No acute distress noted.  Pt free from falls and injury. Questions and concerns addressed.  Pt belongings and call bell with in reach.    Problem: Skin Injury Risk Increased  Goal: Skin Health and Integrity  Outcome: Ongoing     Problem: Infection  Goal: Absence of Infection Signs and Symptoms  Outcome: Ongoing     Problem: Adult Inpatient Plan of Care  Goal: Plan of Care Review  Outcome: Ongoing  Goal: Patient-Specific Goal (Individualized)  Outcome: Ongoing  Goal: Absence of Hospital-Acquired Illness or Injury  Outcome: Ongoing  Goal: Optimal Comfort and Wellbeing  Outcome: Ongoing  Goal: Readiness for Transition of Care  Outcome: Ongoing     Problem: Wound  Goal: Optimal Coping  Outcome: Ongoing  Goal: Optimal Functional Ability  Outcome: Ongoing  Goal: Absence of Infection Signs and Symptoms  Outcome: Ongoing  Goal: Improved Oral Intake  Outcome: Ongoing  Goal: Optimal Pain Control and Function  Outcome: Ongoing  Goal: Skin Health and Integrity  Outcome: Ongoing  Goal: Optimal Wound Healing  Outcome: Ongoing     Problem: Fall Injury Risk  Goal: Absence of Fall and Fall-Related Injury  Outcome: Ongoing

## 2025-07-06 NOTE — ASSESSMENT & PLAN NOTE
Patient's blood pressure range in the last 24 hours was: BP  Min: 120/57  Max: 159/68.The patient's inpatient anti-hypertensive regimen is listed below:  Current Antihypertensives  amLODIPine tablet 5 mg, Daily, Oral    Plan  - BP is controlled, no changes needed to their regimen

## 2025-07-06 NOTE — ASSESSMENT & PLAN NOTE
History of a subdural hematoma, most recently admitted at the beginning of June.  Imaging shows no changes.  Recent Labs     07/04/25  1635 07/05/25  0517 07/06/25  0514   HGB 14.2 12.4* 11.9*   HCT 43.0 38.1* 36.3*   * 106* 114*     Plan  - Will trend hemoglobin/hematocrit Daily  - Will monitor and correct any coagulation defects  - Will transfuse if Hgb is <7g/dl (<8g/dl in cases of active ACS) or if patient has rapid bleeding leading to hemodynamic instability  - hold any antiplatelets and anticoagulants at this time

## 2025-07-06 NOTE — HOSPITAL COURSE
7/6/25:  Patient remains afebrile without leukocytosis, continued on meropenem for UTI.  Urine culture pending, blood cultures no growth to date.  7/7/25:  Patient doing well overnight.  Urine culture positive for >100,000 cfu gram negative rods, pending sensitivity.  Blood cultures negative to date.  Labs otherwise stable this morning.   7/8/25:  Patient's urine culture positive for serratia marcescens and sensitive to all agents except macrobid.  Discharge home with oral cefuroxime. Planned for venous procedure with cardiology next week and will plan to admit to hospice following this procedure.  Labs stable.

## 2025-07-07 LAB
ABSOLUTE EOSINOPHIL (OHS): 0.06 K/UL
ABSOLUTE MONOCYTE (OHS): 0.28 K/UL (ref 0.3–1)
ABSOLUTE NEUTROPHIL COUNT (OHS): 2.95 K/UL (ref 1.8–7.7)
ALBUMIN SERPL BCP-MCNC: 2.4 G/DL (ref 3.5–5.2)
ALP SERPL-CCNC: 49 UNIT/L (ref 40–150)
ALT SERPL W/O P-5'-P-CCNC: 15 UNIT/L (ref 10–44)
ANION GAP (OHS): 5 MMOL/L (ref 8–16)
AST SERPL-CCNC: 17 UNIT/L (ref 11–45)
BACTERIA UR CULT: ABNORMAL
BASOPHILS # BLD AUTO: 0.01 K/UL
BASOPHILS NFR BLD AUTO: 0.2 %
BILIRUB SERPL-MCNC: 0.4 MG/DL (ref 0.1–1)
BUN SERPL-MCNC: 14 MG/DL (ref 10–30)
CALCIUM SERPL-MCNC: 8.1 MG/DL (ref 8.7–10.5)
CHLORIDE SERPL-SCNC: 112 MMOL/L (ref 95–110)
CO2 SERPL-SCNC: 24 MMOL/L (ref 23–29)
CREAT SERPL-MCNC: 0.7 MG/DL (ref 0.5–1.4)
ERYTHROCYTE [DISTWIDTH] IN BLOOD BY AUTOMATED COUNT: 14.1 % (ref 11.5–14.5)
GFR SERPLBLD CREATININE-BSD FMLA CKD-EPI: >60 ML/MIN/1.73/M2
GLUCOSE SERPL-MCNC: 98 MG/DL (ref 70–110)
HCT VFR BLD AUTO: 37.2 % (ref 40–54)
HGB BLD-MCNC: 12.3 GM/DL (ref 14–18)
IMM GRANULOCYTES # BLD AUTO: 0.04 K/UL (ref 0–0.04)
IMM GRANULOCYTES NFR BLD AUTO: 1 % (ref 0–0.5)
LYMPHOCYTES # BLD AUTO: 0.7 K/UL (ref 1–4.8)
MAGNESIUM SERPL-MCNC: 1.9 MG/DL (ref 1.6–2.6)
MCH RBC QN AUTO: 30.6 PG (ref 27–31)
MCHC RBC AUTO-ENTMCNC: 33.1 G/DL (ref 32–36)
MCV RBC AUTO: 93 FL (ref 82–98)
NUCLEATED RBC (/100WBC) (OHS): 0 /100 WBC
PLATELET # BLD AUTO: 112 K/UL (ref 150–450)
PMV BLD AUTO: 9.2 FL (ref 9.2–12.9)
POTASSIUM SERPL-SCNC: 3.6 MMOL/L (ref 3.5–5.1)
PROT SERPL-MCNC: 5.7 GM/DL (ref 6–8.4)
RBC # BLD AUTO: 4.02 M/UL (ref 4.6–6.2)
RELATIVE EOSINOPHIL (OHS): 1.5 %
RELATIVE LYMPHOCYTE (OHS): 17.3 % (ref 18–48)
RELATIVE MONOCYTE (OHS): 6.9 % (ref 4–15)
RELATIVE NEUTROPHIL (OHS): 73.1 % (ref 38–73)
SODIUM SERPL-SCNC: 141 MMOL/L (ref 136–145)
WBC # BLD AUTO: 4.04 K/UL (ref 3.9–12.7)

## 2025-07-07 PROCEDURE — 83735 ASSAY OF MAGNESIUM: CPT | Performed by: STUDENT IN AN ORGANIZED HEALTH CARE EDUCATION/TRAINING PROGRAM

## 2025-07-07 PROCEDURE — 97535 SELF CARE MNGMENT TRAINING: CPT

## 2025-07-07 PROCEDURE — 85025 COMPLETE CBC W/AUTO DIFF WBC: CPT | Performed by: STUDENT IN AN ORGANIZED HEALTH CARE EDUCATION/TRAINING PROGRAM

## 2025-07-07 PROCEDURE — 97530 THERAPEUTIC ACTIVITIES: CPT

## 2025-07-07 PROCEDURE — 63600175 PHARM REV CODE 636 W HCPCS: Performed by: EMERGENCY MEDICINE

## 2025-07-07 PROCEDURE — 25000003 PHARM REV CODE 250: Performed by: STUDENT IN AN ORGANIZED HEALTH CARE EDUCATION/TRAINING PROGRAM

## 2025-07-07 PROCEDURE — 63600175 PHARM REV CODE 636 W HCPCS: Performed by: NURSE PRACTITIONER

## 2025-07-07 PROCEDURE — 97110 THERAPEUTIC EXERCISES: CPT

## 2025-07-07 PROCEDURE — 21400001 HC TELEMETRY ROOM

## 2025-07-07 PROCEDURE — 36415 COLL VENOUS BLD VENIPUNCTURE: CPT | Performed by: STUDENT IN AN ORGANIZED HEALTH CARE EDUCATION/TRAINING PROGRAM

## 2025-07-07 PROCEDURE — 80053 COMPREHEN METABOLIC PANEL: CPT | Performed by: STUDENT IN AN ORGANIZED HEALTH CARE EDUCATION/TRAINING PROGRAM

## 2025-07-07 PROCEDURE — 25000003 PHARM REV CODE 250: Performed by: EMERGENCY MEDICINE

## 2025-07-07 RX ORDER — CEFUROXIME AXETIL 500 MG/1
500 TABLET ORAL 2 TIMES DAILY
Qty: 20 TABLET | Refills: 0 | Status: SHIPPED | OUTPATIENT
Start: 2025-07-07

## 2025-07-07 RX ORDER — CEFTRIAXONE 1 G/1
2 INJECTION, POWDER, FOR SOLUTION INTRAMUSCULAR; INTRAVENOUS
Status: DISCONTINUED | OUTPATIENT
Start: 2025-07-07 | End: 2025-07-08 | Stop reason: HOSPADM

## 2025-07-07 RX ADMIN — QUETIAPINE FUMARATE 25 MG: 25 TABLET ORAL at 09:07

## 2025-07-07 RX ADMIN — SODIUM CHLORIDE: 9 INJECTION, SOLUTION INTRAVENOUS at 02:07

## 2025-07-07 RX ADMIN — ATORVASTATIN CALCIUM 40 MG: 40 TABLET, FILM COATED ORAL at 09:07

## 2025-07-07 RX ADMIN — CEFTRIAXONE SODIUM 2 G: 1 INJECTION, POWDER, FOR SOLUTION INTRAMUSCULAR; INTRAVENOUS at 02:07

## 2025-07-07 RX ADMIN — MUPIROCIN: 20 OINTMENT TOPICAL at 09:07

## 2025-07-07 RX ADMIN — MEROPENEM 1 G: 1 INJECTION, POWDER, FOR SOLUTION INTRAVENOUS at 12:07

## 2025-07-07 RX ADMIN — Medication 6 MG: at 11:07

## 2025-07-07 RX ADMIN — ACETAMINOPHEN 650 MG: 325 TABLET ORAL at 12:07

## 2025-07-07 RX ADMIN — Medication 6 MG: at 12:07

## 2025-07-07 RX ADMIN — OXYBUTYNIN CHLORIDE 5 MG: 5 TABLET, EXTENDED RELEASE ORAL at 09:07

## 2025-07-07 RX ADMIN — ACETAMINOPHEN 650 MG: 325 TABLET ORAL at 02:07

## 2025-07-07 RX ADMIN — SODIUM CHLORIDE: 9 INJECTION, SOLUTION INTRAVENOUS at 12:07

## 2025-07-07 RX ADMIN — AMLODIPINE BESYLATE 5 MG: 5 TABLET ORAL at 09:07

## 2025-07-07 RX ADMIN — MEROPENEM 1 G: 1 INJECTION, POWDER, FOR SOLUTION INTRAVENOUS at 09:07

## 2025-07-07 NOTE — PLAN OF CARE
Problem: Physical Therapy  Goal: Physical Therapy Goal  Description: Goals to be met by: 2025    Patient will increase functional independence with mobility by performin. Supine to sit with Supervision or Set-up Assistance.  2. Sit to supine with Supervision or Set-up Assistance.  3. Bed to chair transfer with Supervision or Set-up Assistance with rolling walker using Step Transfer technique.  4. Sit to Stand with Supervision or Set-up Assistance with rolling walker.  5. Gait  x 500  feet with Supervision or Set-up Assistance with rolling walker.  6. Lower extremity exercise program x10 reps, with assistance as needed.     Outcome: Progressing,increase distance ambulated, still with swelling and redness on both LE

## 2025-07-07 NOTE — PLAN OF CARE
Plan of care reviewed with the patient. Patient able to sit in recliner during shift after working with PT/OT. Wound care completed by wound care nurse.

## 2025-07-07 NOTE — CONSULTS
"  [facility] Surg  Adult Nutrition  Consult Note    SUMMARY     Recommendations  1. Rec'd Cardiac Diet.   2. Rec'd Jordan BID to promote wound healing and to provide additional nutrition.   3. Rec'd ONS: Ensure Enlive or Boost Plus TID to provide additional nutrition.   4. Encourage compliance with all ONS.   5. RD to follow and make rec's accordingly.  Goals:   1. Pt will consume >75% EEN/EPN via PO intake by next RD follow up.  Nutrition Goal Status: new  Communication of RD Recs: other (Documented in POC)    Nutrition Discharge Planning     Nutrition Discharge Planning: Too early to determine, pending clinical course    Malnutrition Assessment     Skin (Micronutrient): bruised, thinned, wounds unhealed   NFPE not appropriate at this time due to AMS. RD to continue to monitor for signs and symptoms of malnutrition and re-attempt NFPE at follow up.    Reason for Assessment    Reason For Assessment: consult (protein-curtis malnutrition, wounds)  Diagnosis: other (see comments) (Altered mental status)  General Information Comments: RD consulted for protein-curtis malnutrition and wounds. Pt has multiple wounds. Pt's appetite is good and he is eating most of his meals. Pt has increased nutrieint needs due to delayed wound healing. Will make rec's to add ONS to promote wound healing and to provide additional nutrition. NFPE not appropriate at this time due to AMs. RD to follow and make rec's accordingly.    Nutrition/Diet History    Nutrition Intake History: Heart Healthy, Regular  Food Preferences: None  Spiritual, Cultural Beliefs, Denominational Practices, Values that Affect Care: no  Food Allergies: NKFA  Factors Affecting Nutritional Intake: decreased appetite  Nutrition-related SDOH: None Identified    Anthropometrics    Height: 5' 8" (172.7 cm)  Height (inches): 68 in  Height Method: Stated  Weight: 81.6 kg (179 lb 14.3 oz)  Weight (lb): 179.9 lb  Weight Method: Bed Scale  Ideal Body Weight (IBW), Male: 154 lb  % Ideal " Body Weight, Male (lb): 116.82 %  BMI (Calculated): 27.4  BMI Grade: 25 - 29.9 - overweight    Lab/Procedures/Meds    Pertinent Labs Reviewed: reviewed  Pertinent Labs Comments: Albumin = 2.4  Pertinent Medications Reviewed: reviewed    Estimated/Assessed Needs    Weight Used For Calorie Calculations: 81.6 kg (179 lb 14.3 oz)  Energy Calorie Requirements (kcal): 2040 (25kcal/kg)  Energy Need Method: Kcal/kg  Protein Requirements: 98 (1.2g/kg)  Weight Used For Protein Calculations: 81.6 kg (179 lb 14.3 oz)  Fluid Requirements (mL): 2040 (1mL/kcal)  Estimated Fluid Requirement Method: RDA Method  RDA Method (mL): 2040  CHO Requirement: 68g CHO per meal (50% kcal from CHO)    Nutrition Prescription Ordered    Current Diet Order: Heart Healthy  Oral Nutrition Supplement: None    Evaluation of Received Nutrient/Fluid Intake    % Kcal Needs: 50-75%  % Protein Needs: 50-75%  I/O: - 700.1  Energy Calories Required: not meeting needs  Protein Required: not meeting needs  Tolerance: tolerating  % Intake of Estimated Energy Needs: 50 - 75 %  % Meal Intake: 75 - 100 %    PES Statement  Inadequate protein energy intake related to Increased demand for protein energy secondary to delayed wound healing as evidenced by Wounds, Intake <75% estimated needs  Status: New    Nutrition Risk    Level of Risk/Frequency of Follow-up: moderate     Monitor and Evaluation    Monitor and Evaluation: Energy intake, Food and beverage intake, Protein intake, Diet order, Food and nutrition knowledge, Weight, Beliefs and attitudes, Electrolyte and renal panel, Glucose/endocrine profile, Gastrointestinal profile, Inflammatory profile, Lipid profile, Skin     Nutrition Follow-Up    RD Follow-up?: Yes

## 2025-07-07 NOTE — PT/OT/SLP PROGRESS
"Physical Therapy Treatment    Patient Name:  Tobi Lzi Jr.   MRN:  [id number]    Recommendations:     Discharge Recommendations: Low Intensity Therapy (home with home health P.T.)  Discharge Equipment Recommendations: none  Barriers to discharge: None    Assessment:     Tobi Liz Jr. is a 89 y.o. male admitted with a medical diagnosis of Altered mental status.  He presents with the following impairments/functional limitations: weakness, impaired joint extensibility, impaired endurance, decreased ROM, impaired muscle length, impaired self care skills, impaired functional mobility, decreased lower extremity function, decreased upper extremity function, impaired skin, decreased safety awareness, impaired balance, impaired cardiopulmonary response to activity, edema, and  gait instability .  Patient was found supine in bed, wife at bedside, patient is agreeable to therapy.  He required set up assistance with supine to sit using side rail for  support. SBA with sit <> stand using a RW, ambulated ~407 feet with RW with SBA, decrease feet clearance from the floor, occasional toe dragging.   Patient was left sitting on the recliner chair with legs elevated, attending nurse made aware.  He is safe to transfer with nursing staff.  No DME needed upon discharge and follow up with home health once medically stable.     Rehab Prognosis: Fair; patient would benefit from acute skilled PT services to address these deficits and reach maximum level of function.    Recent Surgery: * No surgery found *      Plan:     During this hospitalization, patient to be seen  (3-5x a week) to address the identified rehab impairments via gait training, therapeutic activities, therapeutic exercises, neuromuscular re-education and progress toward the following goals:    Plan of Care Expires:  07/11/25    Subjective     Chief Complaint: "I am okay."   Patient/Family Comments/goals: Get back home.   Pain/Comfort:  Pain Rating 1: " 0/10  Pain Rating Post-Intervention 1: 0/10      Objective:     Communicated with nurse , wife and patient  prior to session.  Patient found HOB elevated with peripheral IV, willams catheter upon PT entry to room.     General Precautions: Standard, fall, other (see comments) (recurrent UTI due to chronic willams catheter)  Orthopedic Precautions: N/A  Braces: N/A  Respiratory Status: Room air     Functional Mobility:  Bed Mobility:     Rolling Left:  stand by assistance  Rolling Right: stand by assistance  Scooting: stand by assistance  Bridging: stand by assistance  Supine to Sit: stand by assistance  Transfers:     Sit to Stand:  stand by assistance with rolling walker  Gait: ~407 feet with RW with SBA, decrease feet clearance from the floor.   Balance: independent with static sitting, SBA with static standing using a RW       AM-PAC 6 CLICK MOBILITY  Turning over in bed (including adjusting bedclothes, sheets and blankets)?: 3  Sitting down on and standing up from a chair with arms (e.g., wheelchair, bedside commode, etc.): 3  Moving from lying on back to sitting on the side of the bed?: 3  Moving to and from a bed to a chair (including a wheelchair)?: 3  Need to walk in hospital room?: 3  Climbing 3-5 steps with a railing?: 3 (based on clinical judgement)  Basic Mobility Total Score: 18       Treatment & Education:  Rolling side <> side  Supine > sit  Scooting to the edge  of the bed   Sitting balance/tolerance  Sit <> stand with RW  Standing balance/tolerance   Out of bed   Gait with RW   Bed organization to reduce risk of skin breakdowns   Line management/organization   Continuous verbal cues for situational/environmental awareness   Chair  positioning         Seated   Both Lower Extremity   Active ROM Sets / Reps / Resistance / Etc.   LAQ 1 x 10    Ankle DF/PF                 1 x 10         PT discussed the  importance of patient's performance of Home Exercise Program (HEP)  and out of bed with patient  verbalizing understanding.      Patient left up in chair with all lines intact, call button in reach, and nurse  notified..    GOALS:   Multidisciplinary Problems       Physical Therapy Goals          Problem: Physical Therapy    Goal Priority Disciplines Outcome Interventions   Physical Therapy Goal     PT, PT/OT Progressing    Description: Goals to be met by: 2025    Patient will increase functional independence with mobility by performin. Supine to sit with Supervision or Set-up Assistance.  2. Sit to supine with Supervision or Set-up Assistance.  3. Bed to chair transfer with Supervision or Set-up Assistance with rolling walker using Step Transfer technique.  4. Sit to Stand with Supervision or Set-up Assistance with rolling walker.  5. Gait  x 500  feet with Supervision or Set-up Assistance with rolling walker.  6. Lower extremity exercise program x10 reps, with assistance as needed.                          DME Justifications:  No DME recommended requiring DME justifications    Time Tracking:     PT Received On: 25  PT Start Time: 0830     PT Stop Time: 0853  PT Total Time (min): 23 min     Billable Minutes: Therapeutic Activity 15 and Therapeutic Exercise 8    Treatment Type: Treatment  PT/PTA: PT     Number of PTA visits since last PT visit: 0     2025

## 2025-07-07 NOTE — ASSESSMENT & PLAN NOTE
Patient with chronic indwelling Bolton catheter in multiple past urinary tract infections  - afebrile, no leukocytosis, no lactic acidosis  - started on meropenem in the ED based on past urine cultures  - urine culture pending    Plan:  - follow culture  - continue antibiotics    7/7/25: Gram negative rods, continue merrem.

## 2025-07-07 NOTE — PLAN OF CARE
Plan of care reviewed with patient. Peripheral IV in place and infusing 0.9% NaCl @ 75ml/hr. Pt tolerated medications well. PRN acetaminophen & melatonin per MD orders. Vital signs stable, No acute distress noted. Pt free from falls and injury. Questions and concerns addressed. Pt restless throughout the night. Pt Telemetry in place.  Pt belongings and call bell with in reach.    Problem: Skin Injury Risk Increased  Goal: Skin Health and Integrity  Outcome: Progressing     Problem: Infection  Goal: Absence of Infection Signs and Symptoms  Outcome: Progressing     Problem: Adult Inpatient Plan of Care  Goal: Plan of Care Review  Outcome: Progressing  Goal: Patient-Specific Goal (Individualized)  Outcome: Progressing  Goal: Absence of Hospital-Acquired Illness or Injury  Outcome: Progressing  Goal: Optimal Comfort and Wellbeing  Outcome: Progressing  Goal: Readiness for Transition of Care  Outcome: Progressing     Problem: Wound  Goal: Optimal Coping  Outcome: Progressing  Goal: Optimal Functional Ability  Outcome: Progressing  Goal: Absence of Infection Signs and Symptoms  Outcome: Progressing  Goal: Improved Oral Intake  Outcome: Progressing  Goal: Optimal Pain Control and Function  Outcome: Progressing  Goal: Skin Health and Integrity  Outcome: Progressing  Goal: Optimal Wound Healing  Outcome: Progressing     Problem: Fall Injury Risk  Goal: Absence of Fall and Fall-Related Injury  Outcome: Progressing

## 2025-07-07 NOTE — PROGRESS NOTES
07/07/25 0949        Wound 03/12/25 1403 Venous Ulcer Right anterior;lower Leg   Date First Assessed/Time First Assessed: 03/12/25 1403   Present on Original Admission: Yes  Primary Wound Type: Venous Ulcer  Side: Right  Orientation: anterior;lower  Location: Leg   Dressing Appearance Dried drainage   Drainage Amount Scant   Drainage Characteristics/Odor Serosanguineous   Periwound Area Swelling;Redness;Excoriated   Wound Edges Defined   Wound Length (cm) 0.2 cm   Wound Width (cm) 0.2 cm   Wound Depth (cm) 0.05 cm   Wound Volume (cm^3) 0.001 cm^3   Wound Surface Area (cm^2) 0.03 cm^2   Care Cleansed with:;Wound cleanser   Dressing Applied;Foam;Silicone;Rolled gauze   Periwound Care Cleansed with pH balanced cleanser   Dressing Change Due 07/10/25     Patient has excoriation to bilateral lower legs. No wounds noted to L lower leg. Bilateral lower legs with redness and edema. Bilateral lower legs wrapped with Conform bandages. Tolerated procedure well.

## 2025-07-07 NOTE — PLAN OF CARE
Recommendations  1. Rec'd Cardiac Diet.   2. Rec'd Jordan BID to promote wound healing and to provide additional nutrition.   3. Rec'd ONS: Ensure Enlive or Boost Plus TID to provide additional nutrition.   4. Encourage compliance with all ONS.   5. RD to follow and make rec's accordingly.  Goals:   1. Pt will consume >75% EEN/EPN via PO intake by next RD follow up.  Nutrition Goal Status: new

## 2025-07-07 NOTE — ASSESSMENT & PLAN NOTE
History of a subdural hematoma, most recently admitted at the beginning of June.  Imaging shows no changes.  Recent Labs     07/05/25  0517 07/06/25  0514 07/07/25  0512   HGB 12.4* 11.9* 12.3*   HCT 38.1* 36.3* 37.2*   * 114* 112*     Plan  - Will trend hemoglobin/hematocrit Daily  - Will monitor and correct any coagulation defects  - Will transfuse if Hgb is <7g/dl (<8g/dl in cases of active ACS) or if patient has rapid bleeding leading to hemodynamic instability  - hold any antiplatelets and anticoagulants at this time

## 2025-07-07 NOTE — PLAN OF CARE
Problem: Occupational Therapy  Goal: Occupational Therapy Goal  Description: Goals to be met by: 07/16/25     Patient will increase functional independence with ADLs by performing:    UE Dressing with Set-up Assistance.  LE Dressing with Supervision.  Grooming while standing at sink with Set-up Assistance.  Toileting from toilet with Set-up Assistance for hygiene and clothing management.   Bathing from  shower chair/bench with Supervision.  Toilet transfer to toilet with Set-up Assistance.    Outcome: Progressing

## 2025-07-07 NOTE — PT/OT/SLP PROGRESS
Occupational Therapy   Treatment    Name: Tobi Liz Jr.  MRN: [id number]  Admitting Diagnosis:  Altered mental status       Recommendations:     Discharge Recommendations: Low Intensity Therapy  Discharge Equipment Recommendations:  none  Barriers to discharge:  Other (Comment) (Medical status)    Assessment:     Tobi Liz Jr. is a 89 y.o. male with a medical diagnosis of Altered mental status.  He presents with improved functional performance as noted by min assist to garo bilateral shoes. Performance deficits affecting function are weakness, impaired endurance, impaired self care skills, impaired functional mobility, gait instability, impaired balance, decreased upper extremity function, decreased lower extremity function, decreased safety awareness, impaired cognition, decreased ROM, impaired skin, edema, impaired cardiopulmonary response to activity, impaired joint extensibility, impaired muscle length.     Rehab Prognosis:  Good and Fair; patient would benefit from acute skilled OT services to address these deficits and reach maximum level of function.       Plan:     Patient to be seen 3 x/week to address the above listed problems via self-care/home management, therapeutic activities, therapeutic exercises, cognitive retraining  Plan of Care Expires: 07/16/25  Plan of Care Reviewed with: patient    Subjective     Chief Complaint: weakness  Patient/Family Comments/goals: Pt would like to regain independence with ADL's including functional mobility.  Pain/Comfort:  Pain Rating 1: 7/10  Location - Side 1: Right  Location 1: foot  Pain Addressed 1: Reposition, Distraction, Nurse notified  Pain Rating Post-Intervention 1: 0/10    Objective:     Communicated with: nurse prior to session.  Patient found HOB elevated with peripheral IV, telemetry, willams catheter upon OT entry to room.    General Precautions: Standard, fall    Orthopedic Precautions:N/A  Braces: N/A  Respiratory Status: Room air      Occupational Performance:     Bed Mobility:    Patient completed Rolling/Turning to Left with  stand by assistance  Patient completed Rolling/Turning to Right with stand by assistance  Patient completed Scooting/Bridging with stand by assistance  Patient completed Supine to Sit with stand by assistance  Patient completed Sit to Supine with stand by assistance     Functional Mobility/Transfers:  Patient completed Sit <> Stand Transfer with stand by assistance  with  rolling walker   Patient completed Toilet Transfer Step Transfer technique with stand by assistance with  grab bars  Functional Mobility: Pt ambulated greater than 200' between surfaces requiring SBA utilizing RW.     Activities of Daily Living:  Lower Body Dressing: minimum assistance        [facility] 6 Click ADL:      Treatment & Education:  Pt was cooperative and motivated without verbal encouragement while exhibiting positive affect. He performed bed mobility requiring SBA for safety secondary to verbal cueing for technique with use of bedrails. Pt then participated in ADL retraining regarding LB dressing providing extra time emphasizing use of compensatory strategies requiring min assist to garo bilateral shoes secondary to steadying assist while seated EOB unsupported with verbal and tactile cueing for safety and technique. Pt then participated in functional transfer retraining to / from bed and toilet emphasizing fall prevention providing extra time with repetition requiring SBA for safety secondary to mod verbal cueing for safety awareness and technique utilizing RW and grab bar. He ambulated greater than 200' between surfaces requiring SBA utilizing RW.     Patient left HOB elevated with all lines intact, call button in reach, and nurse notified    GOALS:   Multidisciplinary Problems       Occupational Therapy Goals          Problem: Occupational Therapy    Goal Priority Disciplines Outcome Interventions   Occupational Therapy Goal     OT, PT/OT  Progressing    Description: Goals to be met by: 07/16/25     Patient will increase functional independence with ADLs by performing:    UE Dressing with Set-up Assistance.  LE Dressing with Supervision.  Grooming while standing at sink with Set-up Assistance.  Toileting from toilet with Set-up Assistance for hygiene and clothing management.   Bathing from  shower chair/bench with Supervision.  Toilet transfer to toilet with Set-up Assistance.                         DME Justifications:  No DME recommended requiring DME justifications    Time Tracking:     OT Date of Treatment: 07/07/25  OT Start Time: 1445  OT Stop Time: 1518  OT Total Time (min): 33 min    Billable Minutes:Self Care/Home Management 12  Therapeutic Activity 21    OT/JOHNATHON: OT          7/7/2025

## 2025-07-07 NOTE — ASSESSMENT & PLAN NOTE
Patient's blood pressure range in the last 24 hours was: BP  Min: 118/56  Max: 139/64.The patient's inpatient anti-hypertensive regimen is listed below:  Current Antihypertensives  amLODIPine tablet 5 mg, Daily, Oral    Plan  - BP is controlled, no changes needed to their regimen

## 2025-07-07 NOTE — CONSULTS
Wound care consult received. See separate notes for wound assessment/measurements. Low air loss pump to be placed on bed. Moisture management per indwelling  catheter. Continue turning every 2 hours and offloading heels off of bed with pillow. Provider notified and orders obtained. Recommend dietary consultation due to low albumin level.

## 2025-07-07 NOTE — PROGRESS NOTES
[facility] Medicine  Progress Note    Patient Name: Tobi Liz Jr.  MRN: [id number]  Patient Class: IP- Inpatient   Admission Date: 7/4/2025  Length of Stay: 3 days  Attending Physician: Luis Enrique Guzman Jr., MD  Primary Care Provider: Luis Enrique Guzman Jr., MD        Subjective     Principal Problem:Altered mental status        HPI:  ED HPI: 89-year-old male with a chronic indwelling Bolton catheter presents the ER with altered mental status since around 2:00 p.m.. History of this in the past, history of multiple urinary tract infections in the past as well. History of chronic diastolic heart failure. Has wounds to his legs, left lower leg is erythematous, edematous. Here in the emergency department he is alert, knows his name, knows his street address, does not know that he is in a hospital, does not know what year it is. Urine is foul-smelling     IM HPI: Patient Tobi Liz Jr. is a 89 y.o. male with a PMHx of  has a past medical history of Aortic valve stenosis, Arthritis, BPH (benign prostatic hyperplasia), Carotid artery stenosis, Chronic diastolic heart failure, ETOH abuse, Exposure to COVID-19 virus (01/07/2022), Hyperchloremia, Hypertension, Kidney stones, Murmur, Pacemaker, Polymyalgia rheumatica, PVD (peripheral vascular disease), and Renal artery stenosis., presents complaining of altered mental status, which is not uncommon for him given his multiple urinary tract infections given he has a chronic indwelling Bolton catheter.  He was also admitted in June 2 inpatient rehab following a subdural hematoma which was thought to be considered due to trauma, as patient was on aspirin and Plavix.  In the ED patient was afebrile without leukocytosis, vital signs stable.  Urine pulled from Bolton was foul-smelling and sent for urine culture.  He was started on meropenem given his past urine culture sensitivities.      Overview/Hospital Course:  7/6/25:  Patient remains afebrile  without leukocytosis, continued on meropenem for UTI.  Urine culture pending, blood cultures no growth to date.  7/7/25:  Patient doing well overnight.  Urine culture positive for >100,000 cfu gram negative rods, pending sensitivity.  Blood cultures negative to date.  Labs otherwise stable this morning.         Review of Systems   Constitutional:  Negative for activity change, fatigue and fever.   HENT:  Negative for congestion.    Eyes:  Negative for visual disturbance.   Respiratory:  Negative for cough and shortness of breath.    Cardiovascular:  Negative for chest pain and leg swelling.   Gastrointestinal:  Negative for abdominal pain, constipation, diarrhea, nausea and vomiting.   Endocrine: Negative for polyuria.   Genitourinary:         Bolton in place   Musculoskeletal:  Negative for arthralgias and back pain.   Skin:  Negative for color change.   Neurological:  Negative for dizziness, seizures and headaches.   Hematological:  Negative for adenopathy.   Psychiatric/Behavioral: Negative.       Objective:     Vital Signs (Most Recent):  Temp: 96.7 °F (35.9 °C) (07/07/25 0710)  Pulse: 60 (07/07/25 0706)  Resp: 12 (07/07/25 0706)  BP: 129/62 (07/07/25 0706)  SpO2: 97 % (07/07/25 0706) Vital Signs (24h Range):  Temp:  [93.2 °F (34 °C)-98.2 °F (36.8 °C)] 96.7 °F (35.9 °C)  Pulse:  [60-79] 60  Resp:  [12-18] 12  SpO2:  [94 %-99 %] 97 %  BP: (118-139)/(54-65) 129/62     Weight: 81.6 kg (180 lb)  Body mass index is 27.37 kg/m².    Intake/Output Summary (Last 24 hours) at 7/7/2025 0901  Last data filed at 7/7/2025 0604  Gross per 24 hour   Intake 2658.39 ml   Output 2900 ml   Net -241.61 ml         Physical Exam  Constitutional:       General: He is not in acute distress.  HENT:      Head: Normocephalic.      Nose: Nose normal.      Mouth/Throat:      Mouth: Mucous membranes are moist.   Eyes:      Extraocular Movements: Extraocular movements intact.   Cardiovascular:      Rate and Rhythm: Normal rate and regular  "rhythm.      Pulses: Normal pulses.      Heart sounds: No murmur heard.     No gallop.   Pulmonary:      Effort: Pulmonary effort is normal. No respiratory distress.      Breath sounds: No wheezing.   Abdominal:      Palpations: Abdomen is soft.      Tenderness: There is no abdominal tenderness.   Musculoskeletal:         General: Tenderness present. Normal range of motion.      Cervical back: Normal range of motion.      Left lower leg: Edema present.      Comments: erythema   Skin:     General: Skin is warm.      Findings: Erythema (left lower leg) present.   Neurological:      Mental Status: He is alert. Mental status is at baseline.      Comments: Disoriented to place and time   Psychiatric:         Mood and Affect: Mood normal.               Significant Labs: All pertinent labs within the past 24 hours have been reviewed.    Significant Imaging: I have reviewed all pertinent imaging results/findings within the past 24 hours.      Assessment & Plan  Altered mental status  Patient knows name and address, however did not know date and where he was.  - likely due to UTI, as he has a history of altered mental status with urinary tract infections  - CT head was stable from prior with chronic right subdural hematoma and mass effect    Plan:  - treat current infection    Hypertension  Patient's blood pressure range in the last 24 hours was: BP  Min: 118/56  Max: 139/64.The patient's inpatient anti-hypertensive regimen is listed below:  Current Antihypertensives  amLODIPine tablet 5 mg, Daily, Oral    Plan  - BP is controlled, no changes needed to their regimen    Hyperlipidemia  Continue home statin    Congestive heart failure, NYHA class II  Patient has Diastolic (HFpEF) heart failure that is Chronic. On presentation their CHF was well compensated. Most recent BNP and echo results are listed below.  No results for input(s): "BNP" in the last 72 hours.  Latest ECHO  No results found for this or any previous " visit.    Current Heart Failure Medications       Plan  - Monitor strict I&Os and daily weights.    - Place on telemetry  - Low sodium diet  - Place on fluid restriction of 1.5 L.   - Cardiology has not been consulted  - The patient's volume status is at their baseline        Insomnia  Continue Seroquel    Wound of lower extremity  Order wound care    Urinary tract infection associated with indwelling urethral catheter  Patient with chronic indwelling Bolton catheter in multiple past urinary tract infections  - afebrile, no leukocytosis, no lactic acidosis  - started on meropenem in the ED based on past urine cultures  - urine culture pending    Plan:  - follow culture  - continue antibiotics    7/7/25: Gram negative rods, continue merrem.     Subdural hematoma  History of a subdural hematoma, most recently admitted at the beginning of June.  Imaging shows no changes.  Recent Labs     07/05/25  0517 07/06/25  0514 07/07/25  0512   HGB 12.4* 11.9* 12.3*   HCT 38.1* 36.3* 37.2*   * 114* 112*     Plan  - Will trend hemoglobin/hematocrit Daily  - Will monitor and correct any coagulation defects  - Will transfuse if Hgb is <7g/dl (<8g/dl in cases of active ACS) or if patient has rapid bleeding leading to hemodynamic instability  - hold any antiplatelets and anticoagulants at this time    VTE Risk Mitigation (From admission, onward)           Ordered     IP VTE HIGH RISK PATIENT  Once         07/04/25 1744     Place sequential compression device  Until discontinued         07/04/25 1744     Place MERRITT hose  Until discontinued         07/04/25 1744                    Discharge Planning   ADONAY: 7/10/2025     Code Status: Full Code   Medical Readiness for Discharge Date:   Discharge Plan A: Home with family, Home Health                  Please place Justification for DME      TALHA Luna  Department of Hospital Medicine   [facility]

## 2025-07-07 NOTE — SUBJECTIVE & OBJECTIVE
Review of Systems   Constitutional:  Negative for activity change, fatigue and fever.   HENT:  Negative for congestion.    Eyes:  Negative for visual disturbance.   Respiratory:  Negative for cough and shortness of breath.    Cardiovascular:  Negative for chest pain and leg swelling.   Gastrointestinal:  Negative for abdominal pain, constipation, diarrhea, nausea and vomiting.   Endocrine: Negative for polyuria.   Genitourinary:         Bolton in place   Musculoskeletal:  Negative for arthralgias and back pain.   Skin:  Negative for color change.   Neurological:  Negative for dizziness, seizures and headaches.   Hematological:  Negative for adenopathy.   Psychiatric/Behavioral: Negative.       Objective:     Vital Signs (Most Recent):  Temp: 96.7 °F (35.9 °C) (07/07/25 0710)  Pulse: 60 (07/07/25 0706)  Resp: 12 (07/07/25 0706)  BP: 129/62 (07/07/25 0706)  SpO2: 97 % (07/07/25 0706) Vital Signs (24h Range):  Temp:  [93.2 °F (34 °C)-98.2 °F (36.8 °C)] 96.7 °F (35.9 °C)  Pulse:  [60-79] 60  Resp:  [12-18] 12  SpO2:  [94 %-99 %] 97 %  BP: (118-139)/(54-65) 129/62     Weight: 81.6 kg (180 lb)  Body mass index is 27.37 kg/m².    Intake/Output Summary (Last 24 hours) at 7/7/2025 0901  Last data filed at 7/7/2025 0604  Gross per 24 hour   Intake 2658.39 ml   Output 2900 ml   Net -241.61 ml         Physical Exam  Constitutional:       General: He is not in acute distress.  HENT:      Head: Normocephalic.      Nose: Nose normal.      Mouth/Throat:      Mouth: Mucous membranes are moist.   Eyes:      Extraocular Movements: Extraocular movements intact.   Cardiovascular:      Rate and Rhythm: Normal rate and regular rhythm.      Pulses: Normal pulses.      Heart sounds: No murmur heard.     No gallop.   Pulmonary:      Effort: Pulmonary effort is normal. No respiratory distress.      Breath sounds: No wheezing.   Abdominal:      Palpations: Abdomen is soft.      Tenderness: There is no abdominal tenderness.    Musculoskeletal:         General: Tenderness present. Normal range of motion.      Cervical back: Normal range of motion.      Left lower leg: Edema present.      Comments: erythema   Skin:     General: Skin is warm.      Findings: Erythema (left lower leg) present.   Neurological:      Mental Status: He is alert. Mental status is at baseline.      Comments: Disoriented to place and time   Psychiatric:         Mood and Affect: Mood normal.               Significant Labs: All pertinent labs within the past 24 hours have been reviewed.    Significant Imaging: I have reviewed all pertinent imaging results/findings within the past 24 hours.

## 2025-07-08 VITALS
SYSTOLIC BLOOD PRESSURE: 122 MMHG | BODY MASS INDEX: 27.26 KG/M2 | TEMPERATURE: 98 F | DIASTOLIC BLOOD PRESSURE: 60 MMHG | RESPIRATION RATE: 16 BRPM | OXYGEN SATURATION: 96 % | WEIGHT: 179.88 LBS | HEIGHT: 68 IN | HEART RATE: 68 BPM

## 2025-07-08 LAB
ABSOLUTE EOSINOPHIL (OHS): 0.1 K/UL
ABSOLUTE MONOCYTE (OHS): 0.38 K/UL (ref 0.3–1)
ABSOLUTE NEUTROPHIL COUNT (OHS): 3.06 K/UL (ref 1.8–7.7)
ALBUMIN SERPL BCP-MCNC: 2.4 G/DL (ref 3.5–5.2)
ALP SERPL-CCNC: 52 UNIT/L (ref 40–150)
ALT SERPL W/O P-5'-P-CCNC: 16 UNIT/L (ref 10–44)
ANION GAP (OHS): 7 MMOL/L (ref 8–16)
AST SERPL-CCNC: 15 UNIT/L (ref 11–45)
BASOPHILS # BLD AUTO: 0.02 K/UL
BASOPHILS NFR BLD AUTO: 0.5 %
BILIRUB SERPL-MCNC: 0.4 MG/DL (ref 0.1–1)
BUN SERPL-MCNC: 11 MG/DL (ref 10–30)
CALCIUM SERPL-MCNC: 8.2 MG/DL (ref 8.7–10.5)
CHLORIDE SERPL-SCNC: 109 MMOL/L (ref 95–110)
CO2 SERPL-SCNC: 25 MMOL/L (ref 23–29)
CREAT SERPL-MCNC: 0.6 MG/DL (ref 0.5–1.4)
ERYTHROCYTE [DISTWIDTH] IN BLOOD BY AUTOMATED COUNT: 13.9 % (ref 11.5–14.5)
GFR SERPLBLD CREATININE-BSD FMLA CKD-EPI: >60 ML/MIN/1.73/M2
GLUCOSE SERPL-MCNC: 94 MG/DL (ref 70–110)
HCT VFR BLD AUTO: 38.3 % (ref 40–54)
HGB BLD-MCNC: 12.5 GM/DL (ref 14–18)
IMM GRANULOCYTES # BLD AUTO: 0.06 K/UL (ref 0–0.04)
IMM GRANULOCYTES NFR BLD AUTO: 1.4 % (ref 0–0.5)
LYMPHOCYTES # BLD AUTO: 0.74 K/UL (ref 1–4.8)
MAGNESIUM SERPL-MCNC: 1.9 MG/DL (ref 1.6–2.6)
MCH RBC QN AUTO: 30.3 PG (ref 27–31)
MCHC RBC AUTO-ENTMCNC: 32.6 G/DL (ref 32–36)
MCV RBC AUTO: 93 FL (ref 82–98)
NUCLEATED RBC (/100WBC) (OHS): 0 /100 WBC
PLATELET # BLD AUTO: 136 K/UL (ref 150–450)
PMV BLD AUTO: 9.4 FL (ref 9.2–12.9)
POTASSIUM SERPL-SCNC: 3.5 MMOL/L (ref 3.5–5.1)
PROT SERPL-MCNC: 5.8 GM/DL (ref 6–8.4)
RBC # BLD AUTO: 4.13 M/UL (ref 4.6–6.2)
RELATIVE EOSINOPHIL (OHS): 2.3 %
RELATIVE LYMPHOCYTE (OHS): 17 % (ref 18–48)
RELATIVE MONOCYTE (OHS): 8.7 % (ref 4–15)
RELATIVE NEUTROPHIL (OHS): 70.1 % (ref 38–73)
SODIUM SERPL-SCNC: 141 MMOL/L (ref 136–145)
WBC # BLD AUTO: 4.36 K/UL (ref 3.9–12.7)

## 2025-07-08 PROCEDURE — 82565 ASSAY OF CREATININE: CPT | Performed by: STUDENT IN AN ORGANIZED HEALTH CARE EDUCATION/TRAINING PROGRAM

## 2025-07-08 PROCEDURE — 85025 COMPLETE CBC W/AUTO DIFF WBC: CPT | Performed by: STUDENT IN AN ORGANIZED HEALTH CARE EDUCATION/TRAINING PROGRAM

## 2025-07-08 PROCEDURE — 97530 THERAPEUTIC ACTIVITIES: CPT

## 2025-07-08 PROCEDURE — 83735 ASSAY OF MAGNESIUM: CPT | Performed by: STUDENT IN AN ORGANIZED HEALTH CARE EDUCATION/TRAINING PROGRAM

## 2025-07-08 PROCEDURE — 25000003 PHARM REV CODE 250: Performed by: EMERGENCY MEDICINE

## 2025-07-08 PROCEDURE — 25000003 PHARM REV CODE 250: Performed by: STUDENT IN AN ORGANIZED HEALTH CARE EDUCATION/TRAINING PROGRAM

## 2025-07-08 PROCEDURE — 36415 COLL VENOUS BLD VENIPUNCTURE: CPT | Performed by: STUDENT IN AN ORGANIZED HEALTH CARE EDUCATION/TRAINING PROGRAM

## 2025-07-08 RX ADMIN — MUPIROCIN: 20 OINTMENT TOPICAL at 08:07

## 2025-07-08 RX ADMIN — ATORVASTATIN CALCIUM 40 MG: 40 TABLET, FILM COATED ORAL at 08:07

## 2025-07-08 RX ADMIN — AMLODIPINE BESYLATE 5 MG: 5 TABLET ORAL at 08:07

## 2025-07-08 RX ADMIN — SODIUM CHLORIDE: 9 INJECTION, SOLUTION INTRAVENOUS at 03:07

## 2025-07-08 RX ADMIN — OXYBUTYNIN CHLORIDE 5 MG: 5 TABLET, EXTENDED RELEASE ORAL at 08:07

## 2025-07-08 NOTE — ASSESSMENT & PLAN NOTE
History of a subdural hematoma, most recently admitted at the beginning of June.  Imaging shows no changes.  Recent Labs     07/06/25  0514 07/07/25  0512 07/08/25  0545   HGB 11.9* 12.3* 12.5*   HCT 36.3* 37.2* 38.3*   * 112* 136*     Plan  - Will trend hemoglobin/hematocrit Daily  - Will monitor and correct any coagulation defects  - Will transfuse if Hgb is <7g/dl (<8g/dl in cases of active ACS) or if patient has rapid bleeding leading to hemodynamic instability  - hold any antiplatelets and anticoagulants at this time

## 2025-07-08 NOTE — ASSESSMENT & PLAN NOTE
Patient with chronic indwelling Bolton catheter in multiple past urinary tract infections  - afebrile, no leukocytosis, no lactic acidosis  - started on meropenem in the ED based on past urine cultures  - urine culture pending    Plan:  - follow culture  - continue antibiotics    7/7/25: Gram negative rods, continue merrem.   7/8/25:  Change to cefuroxime at discharge.

## 2025-07-08 NOTE — PLAN OF CARE
Plan of care met. PIV removed and tele monitor removed. Discharge instructions provided by virtual nurse. Discharged with family.

## 2025-07-08 NOTE — ASSESSMENT & PLAN NOTE
Patient's blood pressure range in the last 24 hours was: BP  Min: 113/52  Max: 129/56.The patient's inpatient anti-hypertensive regimen is listed below:  Current Antihypertensives  amLODIPine tablet 5 mg, Daily, Oral    Plan  - BP is controlled, no changes needed to their regimen

## 2025-07-08 NOTE — PLAN OF CARE
[facility] - Med Surg  Discharge Final Note    Primary Care Provider: Luis Enrique Guzman Jr., MD    Expected Discharge Date: 7/8/2025    Final Discharge Note (most recent)       Final Note - 07/08/25 0857          Final Note    Assessment Type Final Discharge Note     Anticipated Discharge Disposition Home-Health Care [facility]     What phone number can be called within the next 1-3 days to see how you are doing after discharge? [phone]     Hospital Resources/Appts/Education Provided Appointments scheduled and added to AVS;Post-Acute resouces added to AVS        Post-Acute Status    Post-Acute Authorization Home Health     Home Health Status Set-up Complete/Auth obtained     Discharge Delays None known at this time                     Important Message from Medicare  Important Message from Medicare regarding Discharge Appeal Rights: Given to patient/caregiver, Explained to patient/caregiver, Signed/date by patient/caregiver     Date IMM was signed: 07/06/25  Time IMM was signed: 1402     Follow-up providers       Luis Enrique Guzman Jr., MD   Specialty: Internal Medicine   Relationship: PCP - General    [address] [place] [id number]   Phone: [phone]       Next Steps: Follow up on 7/14/2025    Instructions: follow up with Dr. Guzman on July 14, 2025 @ 11:00 a.m.              After-discharge care                Home Medical Care       *[facility]   Service: Home Nursing, Home Rehabilitation    [address] [facility] [address]   Phone: [phone]

## 2025-07-08 NOTE — PT/OT/SLP DISCHARGE
Physical Therapy Discharge Summary    Name: Tobi Liz Jr.  MRN: [id number]   Principal Problem: Altered mental status     Patient Discharged from acute Physical Therapy on 2025.  Please refer to prior PT note dated   2025  for functional status.     Assessment:     Patient appropriate for care in another setting.    Objective:     GOALS:   Multidisciplinary Problems       Physical Therapy Goals          Problem: Physical Therapy    Goal Priority Disciplines Outcome Interventions   Physical Therapy Goal     PT, PT/OT Adequate for Care Transition    Description: Goals to be met by: 2025    Patient will increase functional independence with mobility by performin. Supine to sit with Supervision or Set-up Assistance.  2. Sit to supine with Supervision or Set-up Assistance.  3. Bed to chair transfer with Supervision or Set-up Assistance with rolling walker using Step Transfer technique.  4. Sit to Stand with Supervision or Set-up Assistance with rolling walker.  5. Gait  x 500  feet with Supervision or Set-up Assistance with rolling walker.  6. Lower extremity exercise program x10 reps, with assistance as needed.                          Reasons for Discontinuation of Therapy Services  Transfer to alternate level of care.      Plan:     Patient Discharged to: Home with Home Health Service.      2025

## 2025-07-08 NOTE — PT/OT/SLP PROGRESS
"Physical Therapy Treatment    Patient Name:  Tobi Liz Jr.   MRN:  [id number]    Recommendations:     Discharge Recommendations: Low Intensity Therapy  Discharge Equipment Recommendations: none  Barriers to discharge: None    Assessment:     Tobi Liz Jr. is a 89 y.o. male admitted with a medical diagnosis of Altered mental status.  He presents with the following impairments/functional limitations: weakness, impaired joint extensibility, impaired endurance, impaired muscle length, impaired functional mobility, impaired self care skills, decreased upper extremity function, decreased lower extremity function, impaired skin, impaired balance, decreased safety awareness, impaired cardiopulmonary response to activity, and  gait instability Patient was found supine in bed,, initially refusing therapy but agreed after encouragement.  He required set up assistance with supine <>  sit using side rail for support. SBA with sit <> stand using a RW, ambulated ~223  feet with RW with SBA, decrease feet clearance from the floor, occasional toe dragging. Left supine in bed with multiple nursing students, anticipate to discharge today.     Rehab Prognosis: Fair; patient would benefit from acute skilled PT services to address these deficits and reach maximum level of function.    Recent Surgery: * No surgery found *      Plan:     During this hospitalization, patient to be seen  (3-5x a week) to address the identified rehab impairments via gait training, therapeutic activities, therapeutic exercises, neuromuscular re-education and progress toward the following goals:    Plan of Care Expires:  07/11/25    Subjective     Chief Complaint: "I don't feel like walking, I would skip today."   Patient/Family Comments/goals: Go back to [facility].   Pain/Comfort:  Pain Rating 1: 0/10  Pain Rating Post-Intervention 1: 0/10      Objective:     Communicated with nurse and patient  prior to session.  Patient found supine " with peripheral IV, telemetry, willams catheter upon PT entry to room.     General Precautions: Standard, fall  Orthopedic Precautions: N/A  Braces: N/A  Respiratory Status: Room air     Functional Mobility:  Bed Mobility:     Rolling Left:  stand by assistance  Rolling Right: stand by assistance  Scooting: stand by assistance  Bridging: stand by assistance  Supine to Sit: stand by assistance  Sit to supine: stand by assistance  Transfers:     Sit to Stand:  stand by assistance with rolling walker  Gait: ~223 feet with RW with SBA, decrease feet clearance from the floor.   Balance: independent with static sitting, SBA with static standing using a RW          AM-PAC 6 CLICK MOBILITY  Turning over in bed (including adjusting bedclothes, sheets and blankets)?: 3  Sitting down on and standing up from a chair with arms (e.g., wheelchair, bedside commode, etc.): 3  Moving from lying on back to sitting on the side of the bed?: 3  Moving to and from a bed to a chair (including a wheelchair)?: 3  Need to walk in hospital room?: 3  Climbing 3-5 steps with a railing?: 3 (based on clinical judgement)  Basic Mobility Total Score: 18       Treatment & Education:  Rolling side <> side  Supine <> sit  Scooting to the edge  of the bed   Sitting balance/tolerance  Sit <> stand with RW  Standing balance/tolerance   Gait with RW   Bed organization to reduce risk of skin breakdowns   Line management/organization     Patient left HOB elevated with all lines intact, call button in reach, nurse notified, and nursing students  present..    GOALS:   Multidisciplinary Problems       Physical Therapy Goals          Problem: Physical Therapy    Goal Priority Disciplines Outcome Interventions   Physical Therapy Goal     PT, PT/OT Adequate for Care Transition    Description: Goals to be met by: 2025    Patient will increase functional independence with mobility by performin. Supine to sit with Supervision or Set-up Assistance.  2. Sit  to supine with Supervision or Set-up Assistance.  3. Bed to chair transfer with Supervision or Set-up Assistance with rolling walker using Step Transfer technique.  4. Sit to Stand with Supervision or Set-up Assistance with rolling walker.  5. Gait  x 500  feet with Supervision or Set-up Assistance with rolling walker.  6. Lower extremity exercise program x10 reps, with assistance as needed.                          DME Justifications:  No DME recommended requiring DME justifications    Time Tracking:     PT Received On: 07/08/25  PT Start Time: 0839     PT Stop Time: 0858  PT Total Time (min): 19 min     Billable Minutes: Therapeutic Activity 19    Treatment Type: Treatment  PT/PTA: PT     Number of PTA visits since last PT visit: 0     07/08/2025

## 2025-07-08 NOTE — DISCHARGE INSTRUCTIONS
Our goal at [facility] is to always give you outstanding care and exceptional service. You may receive a survey from [employer] by mail, text or e-mail in the next 24-48 hours asking about the care you received with us. The survey should only take 5-10 minutes to complete and is very important to us.     Your feedback provides us with a way to recognize our staff who work tirelessly to provide the best care! Also, your responses help us learn how to improve when your experience was below our aspiration of excellence. We are always looking for ways to improve your stay. We WILL use your feedback to continue making improvements to help us provide the highest quality care. We keep your personal information and feedback confidential. We appreciate your time completing this survey and can't wait to hear from you!!!    We look forward to your continued care with us! Thanks so much for choosing [facility] for your healthcare needs!

## 2025-07-08 NOTE — PLAN OF CARE
Problem: Adult Inpatient Plan of Care  Goal: Readiness for Transition of Care  Outcome: Not Progressing         Problem: Skin Injury Risk Increased  Goal: Skin Health and Integrity  Outcome: Progressing     Problem: Infection  Goal: Absence of Infection Signs and Symptoms  Outcome: Progressing     Problem: Adult Inpatient Plan of Care  Goal: Plan of Care Review  Outcome: Progressing  Goal: Patient-Specific Goal (Individualized)  Outcome: Progressing  Goal: Absence of Hospital-Acquired Illness or Injury  Outcome: Progressing  Goal: Optimal Comfort and Wellbeing  Outcome: Progressing     Problem: Wound  Goal: Optimal Coping  Outcome: Progressing  Goal: Optimal Functional Ability  Outcome: Progressing  Goal: Absence of Infection Signs and Symptoms  Outcome: Progressing  Goal: Improved Oral Intake  Outcome: Progressing  Goal: Optimal Pain Control and Function  Outcome: Progressing  Goal: Skin Health and Integrity  Outcome: Progressing  Goal: Optimal Wound Healing  Outcome: Progressing     Problem: Fall Injury Risk  Goal: Absence of Fall and Fall-Related Injury  Outcome: Progressing

## 2025-07-08 NOTE — DISCHARGE SUMMARY
[facility] Medicine  Discharge Summary      Patient Name: Tobi Liz Jr.  MRN: [id number]  [facility]: [id number]  Patient Class: IP- Inpatient  Admission Date: 7/4/2025  Hospital Length of Stay: 4 days  Discharge Date and Time: 07/08/2025 8:20 AM  Attending Physician: Luis Enrique Guzman Jr., MD   Discharging Provider: TALHA Luna  Primary Care Provider: Luis Enrique Guzman Jr., MD    Primary Care Team: Networked reference to record PCT     HPI:   ED HPI: 89-year-old male with a chronic indwelling Bolton catheter presents the ER with altered mental status since around 2:00 p.m.. History of this in the past, history of multiple urinary tract infections in the past as well. History of chronic diastolic heart failure. Has wounds to his legs, left lower leg is erythematous, edematous. Here in the emergency department he is alert, knows his name, knows his street address, does not know that he is in a hospital, does not know what year it is. Urine is foul-smelling     IM HPI: Patient Tobi Liz Jr. is a 89 y.o. male with a PMHx of  has a past medical history of Aortic valve stenosis, Arthritis, BPH (benign prostatic hyperplasia), Carotid artery stenosis, Chronic diastolic heart failure, ETOH abuse, Exposure to COVID-19 virus (01/07/2022), Hyperchloremia, Hypertension, Kidney stones, Murmur, Pacemaker, Polymyalgia rheumatica, PVD (peripheral vascular disease), and Renal artery stenosis., presents complaining of altered mental status, which is not uncommon for him given his multiple urinary tract infections given he has a chronic indwelling Bolton catheter.  He was also admitted in June 2 inpatient rehab following a subdural hematoma which was thought to be considered due to trauma, as patient was on aspirin and Plavix.  In the ED patient was afebrile without leukocytosis, vital signs stable.  Urine pulled from Bolton was foul-smelling and sent for urine culture.  He was started on meropenem  "given his past urine culture sensitivities.      * No surgery found *      Hospital Course:   7/6/25:  Patient remains afebrile without leukocytosis, continued on meropenem for UTI.  Urine culture pending, blood cultures no growth to date.  7/7/25:  Patient doing well overnight.  Urine culture positive for >100,000 cfu gram negative rods, pending sensitivity.  Blood cultures negative to date.  Labs otherwise stable this morning.   7/8/25:  Patient's urine culture positive for serratia marcescens and sensitive to all agents except macrobid.  Discharge home with oral cefuroxime. Planned for venous procedure with cardiology next week and will plan to admit to hospice following this procedure.  Labs stable.      Goals of Care Treatment Preferences:  Code Status: Full Code         Consults:   Consults (From admission, onward)          Status Ordering Provider     Inpatient consult to Registered Dietitian/Nutritionist  Once        Provider:  (Not yet assigned)    Completed ARPAN AMANDA            Assessment & Plan  Altered mental status  Patient knows name and address, however did not know date and where he was.  - likely due to UTI, as he has a history of altered mental status with urinary tract infections  - CT head was stable from prior with chronic right subdural hematoma and mass effect    Plan:  - treat current infection    Hypertension  Patient's blood pressure range in the last 24 hours was: BP  Min: 113/52  Max: 129/56.The patient's inpatient anti-hypertensive regimen is listed below:  Current Antihypertensives  amLODIPine tablet 5 mg, Daily, Oral    Plan  - BP is controlled, no changes needed to their regimen    Hyperlipidemia  Continue home statin    Congestive heart failure, NYHA class II  Patient has Diastolic (HFpEF) heart failure that is Chronic. On presentation their CHF was well compensated. Most recent BNP and echo results are listed below.  No results for input(s): "BNP" in the last 72 hours.  Latest " ECHO  No results found for this or any previous visit.    Current Heart Failure Medications       Plan  - Monitor strict I&Os and daily weights.    - Place on telemetry  - Low sodium diet  - Place on fluid restriction of 1.5 L.   - Cardiology has not been consulted  - The patient's volume status is at their baseline        Insomnia  Continue Seroquel    Wound of lower extremity  Order wound care    Urinary tract infection associated with indwelling urethral catheter  Patient with chronic indwelling Bolton catheter in multiple past urinary tract infections  - afebrile, no leukocytosis, no lactic acidosis  - started on meropenem in the ED based on past urine cultures  - urine culture pending    Plan:  - follow culture  - continue antibiotics    7/7/25: Gram negative rods, continue merrem.   7/8/25:  Change to cefuroxime at discharge.      Subdural hematoma  History of a subdural hematoma, most recently admitted at the beginning of June.  Imaging shows no changes.  Recent Labs     07/06/25  0514 07/07/25  0512 07/08/25  0545   HGB 11.9* 12.3* 12.5*   HCT 36.3* 37.2* 38.3*   * 112* 136*     Plan  - Will trend hemoglobin/hematocrit Daily  - Will monitor and correct any coagulation defects  - Will transfuse if Hgb is <7g/dl (<8g/dl in cases of active ACS) or if patient has rapid bleeding leading to hemodynamic instability  - hold any antiplatelets and anticoagulants at this time    Final Active Diagnoses:    Diagnosis Date Noted POA    PRINCIPAL PROBLEM:  Altered mental status [R41.82] 07/05/2025 Yes    Subdural hematoma [S06.5XAA] 06/03/2025 Yes    Urinary tract infection associated with indwelling urethral catheter [T83.511A, N39.0] 05/31/2025 Yes    Wound of lower extremity [S81.809A] 03/11/2025 Yes    Insomnia [G47.00] 04/05/2024 Yes    Congestive heart failure, NYHA class II [I50.9] 10/03/2023 Yes    Hypertension [I10] 05/26/2021 Yes    Hyperlipidemia [E78.5] 05/26/2021 Yes      Problems Resolved During this  Admission:       Discharged Condition: good    Disposition: Home or Self Care    Follow Up:   Follow-up Information       Luis Enrique Guzman Jr., MD Follow up in 5 day(s).    Specialty: Internal Medicine  Contact information:  [address] [place] [id number]  [phone]                           Patient Instructions:      Ambulatory referral/consult to Outpatient Case Management   Referral Priority: Routine Referral Type: Consultation   Referral Reason: Specialty Services Required   Number of Visits Requested: 1     Diet Cardiac       Significant Diagnostic Studies: Labs: All labs within the past 24 hours have been reviewed    Pending Diagnostic Studies:       None           Medications:  Reconciled Home Medications:      Medication List        START taking these medications      cefUROXime 500 MG tablet  Commonly known as: CEFTIN  Take 1 tablet (500 mg total) by mouth 2 (two) times daily.            CONTINUE taking these medications      acetaminophen 325 MG tablet  Commonly known as: TYLENOL  Take 2 tablets (650 mg total) by mouth every 6 (six) hours as needed.     amLODIPine 5 MG tablet  Commonly known as: NORVASC  Take 1 tablet (5 mg total) by mouth once daily. Prescribed by Dr. Box     polyethylene glycol 17 gram Pwpk  Commonly known as: GLYCOLAX  Take 17 g by mouth daily as needed for Constipation.     QUEtiapine 25 MG Tab  Commonly known as: SEROQUEL  Take 1 tablet (25 mg total) by mouth nightly.     rosuvastatin 10 MG tablet  Commonly known as: CRESTOR  Take 1 tablet (10 mg total) by mouth once daily.     solifenacin 10 MG tablet  Commonly known as: VESICARE  Take 10 mg by mouth once daily. Patient is still taking            STOP taking these medications      dexAMETHasone 0.5 MG tablet  Commonly known as: DECADRON     fluconazole 200 MG Tab  Commonly known as: DIFLUCAN              Indwelling Lines/Drains at time of discharge:   Lines/Drains/Airways        Drain  Duration                  Urethral Catheter 07/04/25 1630 18 Fr. 3 days                        Time spent on the discharge of patient: 30 minutes         TALHA Luna  Department of Hospital Medicine  [facility]

## 2025-07-10 LAB
BACTERIA BLD CULT: NORMAL
BACTERIA BLD CULT: NORMAL

## 2025-07-14 ENCOUNTER — LAB REQUISITION (OUTPATIENT)
Dept: LAB | Facility: HOSPITAL | Age: OVER 89
End: 2025-07-14
Attending: INTERNAL MEDICINE
Payer: MEDICARE

## 2025-07-14 DIAGNOSIS — L08.9 LOCAL INFECTION OF THE SKIN AND SUBCUTANEOUS TISSUE, UNSPECIFIED: ICD-10-CM

## 2025-07-14 PROCEDURE — 87186 SC STD MICRODIL/AGAR DIL: CPT

## 2025-07-14 PROCEDURE — 87075 CULTR BACTERIA EXCEPT BLOOD: CPT

## 2025-07-15 RX ORDER — QUETIAPINE FUMARATE 25 MG/1
25 TABLET, FILM COATED ORAL NIGHTLY
Qty: 30 TABLET | Refills: 0 | OUTPATIENT
Start: 2025-07-15

## 2025-07-15 RX ORDER — ROSUVASTATIN CALCIUM 10 MG/1
10 TABLET, COATED ORAL
Qty: 90 TABLET | Refills: 0 | OUTPATIENT
Start: 2025-07-15

## 2025-07-17 LAB
BACTERIA SPEC AEROBE CULT: ABNORMAL
BACTERIA SPEC ANAEROBE CULT: NORMAL